# Patient Record
Sex: MALE | Race: WHITE | NOT HISPANIC OR LATINO | Employment: FULL TIME | URBAN - METROPOLITAN AREA
[De-identification: names, ages, dates, MRNs, and addresses within clinical notes are randomized per-mention and may not be internally consistent; named-entity substitution may affect disease eponyms.]

---

## 2019-02-12 ENCOUNTER — APPOINTMENT (OUTPATIENT)
Dept: GENERAL RADIOLOGY | Facility: CLINIC | Age: 44
DRG: 897 | End: 2019-02-12
Attending: EMERGENCY MEDICINE
Payer: COMMERCIAL

## 2019-02-12 ENCOUNTER — HOSPITAL ENCOUNTER (INPATIENT)
Facility: CLINIC | Age: 44
LOS: 2 days | Discharge: HOME | DRG: 897 | End: 2019-02-14
Attending: EMERGENCY MEDICINE | Admitting: INTERNAL MEDICINE
Payer: COMMERCIAL

## 2019-02-12 DIAGNOSIS — F10.930 ALCOHOL WITHDRAWAL SYNDROME WITHOUT COMPLICATION (H): ICD-10-CM

## 2019-02-12 LAB
ALBUMIN SERPL-MCNC: 3.9 G/DL (ref 3.4–5)
ALP SERPL-CCNC: 59 U/L (ref 40–150)
ALT SERPL W P-5'-P-CCNC: 27 U/L (ref 0–70)
ANION GAP SERPL CALCULATED.3IONS-SCNC: 17 MMOL/L (ref 3–14)
AST SERPL W P-5'-P-CCNC: 22 U/L (ref 0–45)
BASOPHILS # BLD AUTO: 0 10E9/L (ref 0–0.2)
BASOPHILS NFR BLD AUTO: 0.2 %
BILIRUB SERPL-MCNC: 0.7 MG/DL (ref 0.2–1.3)
BUN SERPL-MCNC: 6 MG/DL (ref 7–30)
CALCIUM SERPL-MCNC: 8.5 MG/DL (ref 8.5–10.1)
CHLORIDE SERPL-SCNC: 103 MMOL/L (ref 94–109)
CO2 SERPL-SCNC: 20 MMOL/L (ref 20–32)
CREAT SERPL-MCNC: 0.85 MG/DL (ref 0.66–1.25)
DIFFERENTIAL METHOD BLD: ABNORMAL
EOSINOPHIL # BLD AUTO: 0 10E9/L (ref 0–0.7)
EOSINOPHIL NFR BLD AUTO: 0 %
ERYTHROCYTE [DISTWIDTH] IN BLOOD BY AUTOMATED COUNT: 12.8 % (ref 10–15)
ETHANOL SERPL-MCNC: <0.01 G/DL
GFR SERPL CREATININE-BSD FRML MDRD: >90 ML/MIN/{1.73_M2}
GLUCOSE SERPL-MCNC: 132 MG/DL (ref 70–99)
HCT VFR BLD AUTO: 44.5 % (ref 40–53)
HGB BLD-MCNC: 15.8 G/DL (ref 13.3–17.7)
IMM GRANULOCYTES # BLD: 0 10E9/L (ref 0–0.4)
IMM GRANULOCYTES NFR BLD: 0.3 %
INTERPRETATION ECG - MUSE: NORMAL
LIPASE SERPL-CCNC: 139 U/L (ref 73–393)
LYMPHOCYTES # BLD AUTO: 0.7 10E9/L (ref 0.8–5.3)
LYMPHOCYTES NFR BLD AUTO: 7.1 %
MCH RBC QN AUTO: 33.5 PG (ref 26.5–33)
MCHC RBC AUTO-ENTMCNC: 35.5 G/DL (ref 31.5–36.5)
MCV RBC AUTO: 94 FL (ref 78–100)
MONOCYTES # BLD AUTO: 0.5 10E9/L (ref 0–1.3)
MONOCYTES NFR BLD AUTO: 5 %
NEUTROPHILS # BLD AUTO: 8 10E9/L (ref 1.6–8.3)
NEUTROPHILS NFR BLD AUTO: 87.4 %
NRBC # BLD AUTO: 0 10*3/UL
NRBC BLD AUTO-RTO: 0 /100
PLATELET # BLD AUTO: 232 10E9/L (ref 150–450)
POTASSIUM SERPL-SCNC: 3.5 MMOL/L (ref 3.4–5.3)
PROT SERPL-MCNC: 7.6 G/DL (ref 6.8–8.8)
RBC # BLD AUTO: 4.72 10E12/L (ref 4.4–5.9)
SODIUM SERPL-SCNC: 140 MMOL/L (ref 133–144)
TROPONIN I SERPL-MCNC: <0.015 UG/L (ref 0–0.04)
WBC # BLD AUTO: 9.2 10E9/L (ref 4–11)

## 2019-02-12 PROCEDURE — 80053 COMPREHEN METABOLIC PANEL: CPT | Performed by: EMERGENCY MEDICINE

## 2019-02-12 PROCEDURE — 93005 ELECTROCARDIOGRAM TRACING: CPT

## 2019-02-12 PROCEDURE — HZ2ZZZZ DETOXIFICATION SERVICES FOR SUBSTANCE ABUSE TREATMENT: ICD-10-PCS | Performed by: INTERNAL MEDICINE

## 2019-02-12 PROCEDURE — 96376 TX/PRO/DX INJ SAME DRUG ADON: CPT

## 2019-02-12 PROCEDURE — 96374 THER/PROPH/DIAG INJ IV PUSH: CPT

## 2019-02-12 PROCEDURE — 25000128 H RX IP 250 OP 636: Performed by: EMERGENCY MEDICINE

## 2019-02-12 PROCEDURE — 96361 HYDRATE IV INFUSION ADD-ON: CPT

## 2019-02-12 PROCEDURE — 25000132 ZZH RX MED GY IP 250 OP 250 PS 637: Performed by: EMERGENCY MEDICINE

## 2019-02-12 PROCEDURE — 99223 1ST HOSP IP/OBS HIGH 75: CPT | Mod: AI | Performed by: INTERNAL MEDICINE

## 2019-02-12 PROCEDURE — 83690 ASSAY OF LIPASE: CPT | Performed by: EMERGENCY MEDICINE

## 2019-02-12 PROCEDURE — 71046 X-RAY EXAM CHEST 2 VIEWS: CPT

## 2019-02-12 PROCEDURE — 25000132 ZZH RX MED GY IP 250 OP 250 PS 637: Performed by: INTERNAL MEDICINE

## 2019-02-12 PROCEDURE — 25800030 ZZH RX IP 258 OP 636: Performed by: INTERNAL MEDICINE

## 2019-02-12 PROCEDURE — 96375 TX/PRO/DX INJ NEW DRUG ADDON: CPT

## 2019-02-12 PROCEDURE — 99285 EMERGENCY DEPT VISIT HI MDM: CPT | Mod: 25

## 2019-02-12 PROCEDURE — 80320 DRUG SCREEN QUANTALCOHOLS: CPT | Performed by: EMERGENCY MEDICINE

## 2019-02-12 PROCEDURE — 12000000 ZZH R&B MED SURG/OB

## 2019-02-12 PROCEDURE — 84484 ASSAY OF TROPONIN QUANT: CPT | Performed by: EMERGENCY MEDICINE

## 2019-02-12 PROCEDURE — 85025 COMPLETE CBC W/AUTO DIFF WBC: CPT | Performed by: EMERGENCY MEDICINE

## 2019-02-12 RX ORDER — ONDANSETRON 2 MG/ML
4 INJECTION INTRAMUSCULAR; INTRAVENOUS EVERY 6 HOURS PRN
Status: DISCONTINUED | OUTPATIENT
Start: 2019-02-12 | End: 2019-02-14 | Stop reason: HOSPADM

## 2019-02-12 RX ORDER — AMOXICILLIN 250 MG
2 CAPSULE ORAL 2 TIMES DAILY PRN
Status: DISCONTINUED | OUTPATIENT
Start: 2019-02-12 | End: 2019-02-14 | Stop reason: HOSPADM

## 2019-02-12 RX ORDER — FOLIC ACID 1 MG/1
1 TABLET ORAL ONCE
Status: COMPLETED | OUTPATIENT
Start: 2019-02-12 | End: 2019-02-12

## 2019-02-12 RX ORDER — FOLIC ACID 1 MG/1
1 TABLET ORAL DAILY
Status: DISCONTINUED | OUTPATIENT
Start: 2019-02-13 | End: 2019-02-14 | Stop reason: HOSPADM

## 2019-02-12 RX ORDER — MULTIVITAMIN,THERAPEUTIC
1 TABLET ORAL ONCE
Status: COMPLETED | OUTPATIENT
Start: 2019-02-12 | End: 2019-02-12

## 2019-02-12 RX ORDER — LANOLIN ALCOHOL/MO/W.PET/CERES
100 CREAM (GRAM) TOPICAL DAILY
Status: DISCONTINUED | OUTPATIENT
Start: 2019-02-13 | End: 2019-02-14 | Stop reason: HOSPADM

## 2019-02-12 RX ORDER — AMOXICILLIN 250 MG
1 CAPSULE ORAL 2 TIMES DAILY PRN
Status: DISCONTINUED | OUTPATIENT
Start: 2019-02-12 | End: 2019-02-14 | Stop reason: HOSPADM

## 2019-02-12 RX ORDER — NALOXONE HYDROCHLORIDE 0.4 MG/ML
.1-.4 INJECTION, SOLUTION INTRAMUSCULAR; INTRAVENOUS; SUBCUTANEOUS
Status: DISCONTINUED | OUTPATIENT
Start: 2019-02-12 | End: 2019-02-14 | Stop reason: HOSPADM

## 2019-02-12 RX ORDER — CHLORDIAZEPOXIDE HYDROCHLORIDE 10 MG/1
10 CAPSULE, GELATIN COATED ORAL 3 TIMES DAILY PRN
Status: ON HOLD | COMMUNITY
End: 2019-09-12

## 2019-02-12 RX ORDER — LANOLIN ALCOHOL/MO/W.PET/CERES
1 CREAM (GRAM) TOPICAL
Status: ON HOLD | COMMUNITY
End: 2019-09-12

## 2019-02-12 RX ORDER — QUETIAPINE FUMARATE 25 MG/1
25-50 TABLET, FILM COATED ORAL EVERY 6 HOURS PRN
Status: DISCONTINUED | OUTPATIENT
Start: 2019-02-12 | End: 2019-02-14 | Stop reason: HOSPADM

## 2019-02-12 RX ORDER — GABAPENTIN 400 MG/1
800 CAPSULE ORAL 3 TIMES DAILY
Status: ON HOLD | COMMUNITY
End: 2019-09-12

## 2019-02-12 RX ORDER — PANTOPRAZOLE SODIUM 40 MG/1
40 TABLET, DELAYED RELEASE ORAL DAILY
Status: DISCONTINUED | OUTPATIENT
Start: 2019-02-12 | End: 2019-02-14 | Stop reason: HOSPADM

## 2019-02-12 RX ORDER — DIAZEPAM 5 MG
10 TABLET ORAL ONCE
Status: COMPLETED | OUTPATIENT
Start: 2019-02-12 | End: 2019-02-12

## 2019-02-12 RX ORDER — METOPROLOL SUCCINATE 50 MG/1
50 TABLET, EXTENDED RELEASE ORAL DAILY
Status: ON HOLD | COMMUNITY
Start: 2018-12-06 | End: 2024-03-13

## 2019-02-12 RX ORDER — LANOLIN ALCOHOL/MO/W.PET/CERES
100 CREAM (GRAM) TOPICAL ONCE
Status: COMPLETED | OUTPATIENT
Start: 2019-02-12 | End: 2019-02-12

## 2019-02-12 RX ORDER — LABETALOL 20 MG/4 ML (5 MG/ML) INTRAVENOUS SYRINGE
10
Status: DISCONTINUED | OUTPATIENT
Start: 2019-02-12 | End: 2019-02-14 | Stop reason: HOSPADM

## 2019-02-12 RX ORDER — PANTOPRAZOLE SODIUM 40 MG/1
40 TABLET, DELAYED RELEASE ORAL DAILY
Status: ON HOLD | COMMUNITY
End: 2019-09-12

## 2019-02-12 RX ORDER — ONDANSETRON 2 MG/ML
4 INJECTION INTRAMUSCULAR; INTRAVENOUS EVERY 30 MIN PRN
Status: DISCONTINUED | OUTPATIENT
Start: 2019-02-12 | End: 2019-02-12

## 2019-02-12 RX ORDER — MELOXICAM 15 MG/1
15 TABLET ORAL DAILY PRN
Status: ON HOLD | COMMUNITY
Start: 2019-01-08 | End: 2019-09-14

## 2019-02-12 RX ORDER — DIAZEPAM 5 MG
5 TABLET ORAL 3 TIMES DAILY PRN
Status: DISCONTINUED | OUTPATIENT
Start: 2019-02-12 | End: 2019-02-13

## 2019-02-12 RX ORDER — LORAZEPAM 2 MG/ML
1 INJECTION INTRAMUSCULAR ONCE
Status: COMPLETED | OUTPATIENT
Start: 2019-02-12 | End: 2019-02-12

## 2019-02-12 RX ORDER — OLANZAPINE 5 MG/1
5 TABLET, ORALLY DISINTEGRATING ORAL EVERY 6 HOURS PRN
Status: DISCONTINUED | OUTPATIENT
Start: 2019-02-12 | End: 2019-02-13

## 2019-02-12 RX ORDER — LORAZEPAM 1 MG/1
1-2 TABLET ORAL EVERY 30 MIN PRN
Status: DISCONTINUED | OUTPATIENT
Start: 2019-02-12 | End: 2019-02-14 | Stop reason: HOSPADM

## 2019-02-12 RX ORDER — METOPROLOL SUCCINATE 50 MG/1
50 TABLET, EXTENDED RELEASE ORAL DAILY
Status: DISCONTINUED | OUTPATIENT
Start: 2019-02-12 | End: 2019-02-14 | Stop reason: HOSPADM

## 2019-02-12 RX ORDER — DIAZEPAM 5 MG
10 TABLET ORAL
Status: COMPLETED | OUTPATIENT
Start: 2019-02-12 | End: 2019-02-12

## 2019-02-12 RX ORDER — ONDANSETRON 4 MG/1
4 TABLET, ORALLY DISINTEGRATING ORAL EVERY 6 HOURS PRN
Status: DISCONTINUED | OUTPATIENT
Start: 2019-02-12 | End: 2019-02-14 | Stop reason: HOSPADM

## 2019-02-12 RX ORDER — SODIUM CHLORIDE 9 MG/ML
INJECTION, SOLUTION INTRAVENOUS CONTINUOUS
Status: DISCONTINUED | OUTPATIENT
Start: 2019-02-12 | End: 2019-02-13

## 2019-02-12 RX ORDER — LORAZEPAM 2 MG/ML
1 INJECTION INTRAMUSCULAR
Status: COMPLETED | OUTPATIENT
Start: 2019-02-12 | End: 2019-02-12

## 2019-02-12 RX ORDER — MULTIPLE VITAMINS W/ MINERALS TAB 9MG-400MCG
1 TAB ORAL DAILY
Status: DISCONTINUED | OUTPATIENT
Start: 2019-02-13 | End: 2019-02-14 | Stop reason: HOSPADM

## 2019-02-12 RX ORDER — LORAZEPAM 2 MG/ML
1-2 INJECTION INTRAMUSCULAR EVERY 30 MIN PRN
Status: DISCONTINUED | OUTPATIENT
Start: 2019-02-12 | End: 2019-02-14 | Stop reason: HOSPADM

## 2019-02-12 RX ORDER — HYDRALAZINE HYDROCHLORIDE 20 MG/ML
10 INJECTION INTRAMUSCULAR; INTRAVENOUS EVERY 4 HOURS PRN
Status: DISCONTINUED | OUTPATIENT
Start: 2019-02-12 | End: 2019-02-14 | Stop reason: HOSPADM

## 2019-02-12 RX ORDER — SODIUM CHLORIDE 9 MG/ML
INJECTION, SOLUTION INTRAVENOUS CONTINUOUS
Status: DISCONTINUED | OUTPATIENT
Start: 2019-02-12 | End: 2019-02-12

## 2019-02-12 RX ADMIN — LORAZEPAM 1 MG: 1 TABLET ORAL at 18:40

## 2019-02-12 RX ADMIN — LORAZEPAM 1 MG: 2 INJECTION INTRAMUSCULAR; INTRAVENOUS at 13:23

## 2019-02-12 RX ADMIN — METOPROLOL SUCCINATE 50 MG: 50 TABLET, EXTENDED RELEASE ORAL at 18:40

## 2019-02-12 RX ADMIN — GABAPENTIN 800 MG: 300 CAPSULE ORAL at 18:39

## 2019-02-12 RX ADMIN — SERTRALINE HYDROCHLORIDE 50 MG: 50 TABLET ORAL at 18:41

## 2019-02-12 RX ADMIN — DIAZEPAM 10 MG: 5 TABLET ORAL at 15:47

## 2019-02-12 RX ADMIN — LORAZEPAM 1 MG: 2 INJECTION INTRAMUSCULAR; INTRAVENOUS at 14:21

## 2019-02-12 RX ADMIN — GABAPENTIN 800 MG: 300 CAPSULE ORAL at 22:54

## 2019-02-12 RX ADMIN — Medication 100 MG: at 17:37

## 2019-02-12 RX ADMIN — FOLIC ACID 1 MG: 1 TABLET ORAL at 17:37

## 2019-02-12 RX ADMIN — DIAZEPAM 10 MG: 5 TABLET ORAL at 17:36

## 2019-02-12 RX ADMIN — SODIUM CHLORIDE: 9 INJECTION, SOLUTION INTRAVENOUS at 18:44

## 2019-02-12 RX ADMIN — THERA TABS 1 TABLET: TAB at 17:37

## 2019-02-12 RX ADMIN — SODIUM CHLORIDE 1000 ML: 9 INJECTION, SOLUTION INTRAVENOUS at 14:21

## 2019-02-12 RX ADMIN — DIAZEPAM 10 MG: 5 TABLET ORAL at 14:25

## 2019-02-12 RX ADMIN — SODIUM CHLORIDE 1000 ML: 9 INJECTION, SOLUTION INTRAVENOUS at 17:37

## 2019-02-12 RX ADMIN — SODIUM CHLORIDE 1000 ML: 9 INJECTION, SOLUTION INTRAVENOUS at 13:15

## 2019-02-12 RX ADMIN — LORAZEPAM 1 MG: 2 INJECTION INTRAMUSCULAR; INTRAVENOUS at 15:15

## 2019-02-12 RX ADMIN — ONDANSETRON 4 MG: 2 INJECTION INTRAMUSCULAR; INTRAVENOUS at 13:22

## 2019-02-12 RX ADMIN — PANTOPRAZOLE SODIUM 40 MG: 40 TABLET, DELAYED RELEASE ORAL at 18:40

## 2019-02-12 ASSESSMENT — ACTIVITIES OF DAILY LIVING (ADL)
FALL_HISTORY_WITHIN_LAST_SIX_MONTHS: NO
RETIRED_EATING: 4-->COMPLETELY DEPENDENT
RETIRED_COMMUNICATION: 0-->UNDERSTANDS/COMMUNICATES WITHOUT DIFFICULTY
DRESS: 4-->COMPLETELY DEPENDENT
ADLS_ACUITY_SCORE: 31
SWALLOWING: 0-->SWALLOWS FOODS/LIQUIDS WITHOUT DIFFICULTY
COGNITION: 0 - NO COGNITION ISSUES REPORTED
AMBULATION: 3-->ASSISTIVE EQUIPMENT AND PERSON
TRANSFERRING: 4-->COMPLETELY DEPENDENT
BATHING: 3-->ASSISTIVE EQUIPMENT AND PERSON
TOILETING: 3-->ASSISTIVE EQUIPMENT AND PERSON

## 2019-02-12 ASSESSMENT — ENCOUNTER SYMPTOMS
VOMITING: 1
SHORTNESS OF BREATH: 0
HALLUCINATIONS: 0
ABDOMINAL PAIN: 1
DIARRHEA: 0

## 2019-02-12 ASSESSMENT — MIFFLIN-ST. JEOR
SCORE: 1723.6
SCORE: 1757.13

## 2019-02-12 NOTE — H&P
[facility]    History and Physical - Hospitalist Service       Date of Admission:  2/12/2019    Assessment & Plan   Ravi Sahh is a 43 year old male with PMH of HTN and etoh abuse being admitted on 2/12/2019 for alcohol withdrawal.    1.) Alcohol Withdrawal  - Patient to be admitted to 6th floor with telemetry  - Will order CIWA protocol with ativan   - IVFs and PO Thiamine/Folic Acid  - Psychiatry consult in AM if patients mental status allows    2.) HTN  - he hasnt had his PTA metoprolol today due to vomiting  - if unable to tolerate after zofran I will have Labetalol and Hydralazine IV ordered    3.) Elevated Anion Gap  - Suspect related to his vomiting and etoh abuse  - rehydrate with IVFs and monitor BMP    4.) Depression and Anxiety  - resume PTA Zoloft, Librium, and gabapentin  - Psych consult for the AM    5.) Nausea and Vomiting  - Suspect EtOH Gastritis from binging  - PPI and zofran  - IVFs until vomiting resolved and holding down PO  - have IV HTN meds ordered     Diet: Clears  DVT Prophylaxis: Pneumatic Compression Devices  Power Catheter: not present  Code Status: FULL    Disposition Plan   Expected discharge: 2 - 3 days, recommended to prior living arrangement once mental status at baseline and safe disposition plan/ TCU bed available.  Entered: Galen Muniz MD 02/12/2019, 3:19 PM     The patient's care was discussed with the Patient and Patient's Family.    Galen Muniz MD  [facility]    ______________________________________________________________________    Chief Complaint   Tremors, Nausea and Vomiting    History is obtained from the patient, electronic health record, emergency department physician and patient's spouse    History of Present Illness   Ravi Shah is a 43 year old male who has PMH of HTN and etoh abuse presents today with tremors and N/V.  He has been drinking about 750ml of vodka daily for the past week.  He says he will have  week long binges.  He was last hospitalized in [facility] about a month ago for withdrawal, he was inpatient for about a week.  He was supposed to see a psychiatrist this Friday.  His last drink was last night before bed, he did not resume drinking today due to onset of nausea and vomiting.  Started having tremors today, knows this is withdrawal so his wife got him to come in.  He denies any history of seizures.  He admits to not taking his HTN medication today due to vomiting, his BP is significantly elevated into 160s.  The rest of his vitals are normal.  He denies any hallucinations or formication.      Review of Systems    The 10 point Review of Systems is negative other than noted in the HPI.    Past Medical History    I have reviewed this patient's medical history and updated it with pertinent information if needed.   Past Medical History:   Diagnosis Date     Anxiety      Depressive disorder      Hypertension        Past Surgical History   I have reviewed this patient's surgical history and updated it with pertinent information if needed.  Past Surgical History:   Procedure Laterality Date     ENT SURGERY     * Repair of Herniated Disc L5-S1    Social History   I have reviewed this patient's social history and updated it with pertinent information if needed.  Social History     Tobacco Use     Smoking status: Former Smoker     Smokeless tobacco: Current User   Substance Use Topics     Alcohol use: Yes     Comment: 750cc per day for 2 weeks     Drug use: No       Family History   I have reviewed this patient's family history with the patient, his father had HTN, his mother had heart disease.    Prior to Admission Medications   Prior to Admission Medications   Prescriptions Last Dose Informant Patient Reported? Taking?   chlordiazePOXIDE (LIBRIUM) 10 MG capsule 2/11/2019 at Unknown time Self Yes Yes   Sig: Take 10 mg by mouth 3 times daily as needed for anxiety   gabapentin (NEURONTIN) 400 MG capsule 2/11/2019 at  Unknown time Self Yes Yes   Sig: Take 800 mg by mouth 3 times daily    hypromellose (ARTIFICIAL TEARS) 0.5 % SOLN ophthalmic solution prn Spouse/Significant Other Yes Yes   Sig: Place 1 drop into both eyes 4 times daily as needed for dry eyes   melatonin 3 MG tablet prn Spouse/Significant Other Yes Yes   Sig: Take 1 mg by mouth nightly as needed for sleep   meloxicam (MOBIC) 15 MG tablet prn Spouse/Significant Other Yes Yes   Sig: Take 15 mg by mouth daily as needed    metoprolol succinate ER (TOPROL-XL) 50 MG 24 hr tablet 2/11/2019 at Unknown time Spouse/Significant Other Yes Yes   Sig: Take 50 mg by mouth daily    pantoprazole (PROTONIX) 40 MG EC tablet 2/11/2019 at Unknown time Spouse/Significant Other Yes Yes   Sig: Take 40 mg by mouth daily    sertraline (ZOLOFT) 50 MG tablet 2/11/2019 at Unknown time Spouse/Significant Other Yes Yes   Sig: Take 50 mg by mouth daily       Facility-Administered Medications: None     Allergies   No Known Allergies    Physical Exam   Vital Signs: Temp: 97.7  F (36.5  C) Temp src: Temporal BP: (!) 161/118 Pulse: 93   Resp: 18 SpO2: 97 % O2 Device: None (Room air)    Weight: 180 lbs 0 oz    Constitutional: awake, alert and mild distress  Respiratory: No increased work of breathing, good air exchange, clear to auscultation bilaterally, no crackles or wheezing  Cardiovascular: Normal apical impulse, regular rate and rhythm, normal S1 and S2, no S3 or S4, and no murmur noted  GI: No scars, normal bowel sounds, soft, non-distended, non-tender, no masses palpated, no hepatosplenomegally  Musculoskeletal: no lower extremity pitting edema present  there is no redness, warmth, or swelling of the joints  full range of motion noted  motor strength is 5 out of 5 all extremities bilaterally  Neurologic: Cranial Nerves:  cranial nerves II-XII are grossly intact  Sensory:  Sensory intact  Coordination:  Finger/Nose:  Right:  abnormal - due to tremors  Left:  abnormal - due to  tremors  Neuropsychiatric: General: fidgeting and normal eye contact  Level of consciousness: alert / normal  Affect: pleasant and anxious  Orientation: oriented to self, place, time and situation  Memory and insight: normal, memory for past and recent events intact and thought process normal    Data   Data reviewed today: I reviewed all medications, new labs and imaging results over the last 24 hours. I personally reviewed no images or EKG's today.    Recent Labs   Lab 02/12/19  1305   WBC 9.2   HGB 15.8   MCV 94         POTASSIUM 3.5   CHLORIDE 103   CO2 20   BUN 6*   CR 0.85   ANIONGAP 17*   ISAIAS 8.5   *   ALBUMIN 3.9   PROTTOTAL 7.6   BILITOTAL 0.7   ALKPHOS 59   ALT 27   AST 22   LIPASE 139   TROPI <0.015     Recent Results (from the past 24 hour(s))   XR Chest 2 Views    Narrative    XR CHEST 2 VW 2/12/2019 2:06 PM    HISTORY: Chest pain.    COMPARISON: None.    FINDINGS: No airspace consolidation, pleural effusion or pneumothorax.  Normal heart size.      Impression    IMPRESSION: No acute cardiopulmonary abnormality.    HERMINIO DOMÍNGUEZ MD

## 2019-02-12 NOTE — PHARMACY-ADMISSION MEDICATION HISTORY
Admission medication history interview status for the 2/12/2019  admission is complete. See EPIC admission navigator for prior to admission medications     Medication history source reliability:Good, patient's wife present to assist with medication list.    Actions taken by pharmacist (provider contacted, etc):None     Additional medication history information not noted on PTA med list :None    Medication reconciliation/reorder completed by provider prior to medication history? No    Time spent in this activity: 5 minutes    Prior to Admission medications    Medication Sig Last Dose Taking? Auth Provider   chlordiazePOXIDE (LIBRIUM) 10 MG capsule Take 10 mg by mouth 3 times daily as needed for anxiety 2/11/2019 at Unknown time Yes Reported, Patient   gabapentin (NEURONTIN) 400 MG capsule Take 800 mg by mouth 3 times daily  2/11/2019 at Unknown time Yes Reported, Patient   hypromellose (ARTIFICIAL TEARS) 0.5 % SOLN ophthalmic solution Place 1 drop into both eyes 4 times daily as needed for dry eyes prn Yes Unknown, Entered By History   melatonin 3 MG tablet Take 1 mg by mouth nightly as needed for sleep prn Yes Reported, Patient   meloxicam (MOBIC) 15 MG tablet Take 15 mg by mouth daily as needed  prn Yes Reported, Patient   metoprolol succinate ER (TOPROL-XL) 50 MG 24 hr tablet Take 50 mg by mouth daily  2/11/2019 at Unknown time Yes Reported, Patient   pantoprazole (PROTONIX) 40 MG EC tablet Take 40 mg by mouth daily  2/11/2019 at Unknown time Yes Reported, Patient   sertraline (ZOLOFT) 50 MG tablet Take 50 mg by mouth daily  2/11/2019 at Unknown time Yes Reported, Patient

## 2019-02-12 NOTE — ED PROVIDER NOTES
History     Chief Complaint:  Alcohol withdrawal     HPI   Ravi Shah is a 43 year old male who presents with alcohol withdrawl. The patient's wife has emptied house of alcohol since an admission at a [place] hospital for alcohol withdrawal 1 month ago, but the patient has recently started drinking again. He has been drinking 750 mLs of vodka for the past week and today he has been vomiting since morning. The patient's last drink was yesterday before bed. Due to concern, the patient has visited the ED for evaluation and treatment. Upon arrival, the patient reports abdominal pain and some slight chest discomfort. The patient denies any blood in his vomit, diarrhea, shortness of breath, hallucinations, or suicidal ideation. Of note: the patient is schedule to see a psychiatrist in 3 days for alcoholism.     Allergies:  The patient has no known drug allergies.    Medications:    librum  neurontin  mobic  toprol  protonix  zoloft     Past Medical History:    hypertension    Past Surgical History:    ENT surgery     Family History:    No past pertinent family history.    Social History:  Marital Status:     Smoker:   Former   Smokeless:   Current   Alcohol:   Yes - 750 mLs a day   Drugs:   No   Lives at:   Home   Arrives with:   Wife   Clinic:    Unknown   Work:   Unknown     Review of Systems   Respiratory: Negative for shortness of breath.    Cardiovascular: Positive for chest pain.   Gastrointestinal: Positive for abdominal pain and vomiting. Negative for diarrhea.   Psychiatric/Behavioral: Negative for hallucinations and suicidal ideas.   All other systems reviewed and are negative.    Physical Exam     Patient Vitals for the past 24 hrs:   BP Temp Temp src Pulse Heart Rate Resp SpO2 Height Weight   02/12/19 1538 -- -- -- -- 102 25 97 % -- --   02/12/19 1536 (!) 150/105 -- -- 98 96 19 -- -- --   02/12/19 1535 -- -- -- -- 98 25 -- -- --   02/12/19 1534 -- -- -- -- 97 25 -- -- --   02/12/19 1533 -- -- -- --  "-- (!) 32 -- -- --   02/12/19 1519 -- -- -- -- -- -- 98 % -- --   02/12/19 1518 -- -- -- -- -- -- 99 % -- --   02/12/19 1517 -- -- -- -- -- -- 98 % -- --   02/12/19 1515 -- -- -- -- -- -- 98 % -- --   02/12/19 1514 -- -- -- -- -- -- 98 % -- --   02/12/19 1512 (!) 148/111 -- -- 106 -- -- -- -- --   02/12/19 1330 (!) 161/118 -- -- 93 -- -- 97 % -- --   02/12/19 1247 (!) 163/108 97.7  F (36.5  C) Temporal 110 -- 18 96 % 1.803 m (5' 11\") 81.6 kg (180 lb)     Physical Exam  VS: Reviewed per above  HENT: Mucous membranes moist  EYES: sclera anicteric  CV: Rate as noted, regular rhythm.   RESP: Effort normal. Breath sounds are normal bilaterally.  GI: mild general tenderness, not distended.  NEURO: Alert, moving all extremities, tremulousness of the extremities, tongue fasciculations  MSK: No deformity of the extremities  SKIN: Warm and dry      Emergency Department Course   ECG:  Indication: alcohol withdrawal  Time: 1334  Vent. Rate 91 bpm. DC interval 150. QRS duration 80. QT/QTc 404/496. P-R-T axis 59 34 46. Normal sinus rhythm. Prolonged QT. Abnormal ECG. Read time: 1343    Imaging:  Radiographic findings were communicated with the patient and family who voiced understanding of the findings.    XR Chest 2 views:   No acute cardiopulmonary abnormality. As per radiology.     Laboratory:  1305 Troponin: <0.015  CBC: WBC: 9.2, HGB: 15.8, PLT: 232  Lipase: 139  Alcohol ethyl: <0.01  CMP: Glucose 132, anion gap 17, urea nitrogen 6, o/w WNL (Creatinine: 0.85)    Interventions:  1315: NS 1L IV Bolus   1323: Ativan 1 mg IV  1421: NS 1L IV Bolus   1421: Ativan 1 mg IV  1425: Valium 10 mg PO  1515: Ativan 1 mg IV    Emergency Department Course:  (1300) I performed an exam of the patient as documented above.     (1403) I rechecked the patient and discussed the results of their workup thus far.   (1455)  I rechecked the patient.       Findings and plan explained. Patient discharged home with instructions regarding supportive care, " medications, and reasons to return. The importance of close follow-up was reviewed.     I personally reviewed the laboratory results with them and answered all related questions prior to discharge.    Impression & Plan    Medical Decision Making:  Patient presents the ER for evaluation of alcohol withdrawal symptoms, nausea and vomiting.  Vital signs notable for heart rate of 110, blood pressure 160 systolic.  Exam is consistent with alcohol withdrawal.  Abdomen is soft and minimally tender throughout- I have a lower suspicion for sinister surgical intra-abdominal process.  There is no lab evidence of pancreatitis, hepatitis, acute kidney injury, electrolyte derangement.  Anion gap was elevated to 17, which I suspect is secondary to occult lactic acidosis or ketosis in the setting of vomiting and alcohol use.  EKG did not reveal specific signs of ischemia and a single troponin was negative.  I have a lower suspicion for occult ACS.  Chest x-ray did not reveal evidence of Boerhaave syndrome, pneumothorax, pneumonia, widened mediastinum to suggest aortic dissection.  Patient was given IV Ativan as well as p.o. Valium while in the ER for alcohol withdrawal symptoms.  He was admitted to the hospital for further management.    Diagnosis:    ICD-10-CM    1. Alcohol withdrawal syndrome without complication (H) F10.230      Disposition:  Admitted to hospitalist service    Discharge Medications:     Medication List      There are no discharge medications for this visit.       Scribe Disclosure:  I, Jaylon Cool, am serving as a scribe on 2/12/2019 at 1:00 PM to personally document services performed by William Yeung MD based on my observations and the provider's statements to me.     Jaylon Cool  2/12/2019    EMERGENCY DEPARTMENT       William Yeung MD  02/12/19 [id number]

## 2019-02-12 NOTE — PROGRESS NOTES
RECEIVING UNIT ED HANDOFF REVIEW    ED Nurse Handoff Report was reviewed by: Ricardo Simpson on February 12, 2019 at 4:39 PM     Awaiting room to be cleaned. Check with [facility]UC before sending patient.

## 2019-02-12 NOTE — ED NOTES
"[facility]  ED Nurse Handoff Report    ED Chief complaint: Withdrawal (drank 750ml daily for past couple weeks.  Last drink yesterday. +N/V)      ED Diagnosis:   Final diagnoses:   Alcohol withdrawal syndrome without complication (H)       Code Status: Full Code    Allergies: No Known Allergies    Activity level - Baseline/Home:  Independent    Activity Level - Current:   Independent     Needed?: No    Isolation: No  Infection: Not Applicable  Bariatric?: No    Vital Signs:   Vitals:    02/12/19 1247 02/12/19 1330   BP: (!) 163/108 (!) 161/118   Pulse: 110 93   Resp: 18    Temp: 97.7  F (36.5  C)    TempSrc: Temporal    SpO2: 96% 97%   Weight: 81.6 kg (180 lb)    Height: 1.803 m (5' 11\")        Cardiac Rhythm: ,        Pain level:      Is this patient confused?: No   Does this patient have a guardian?  No         If yes, is there guardianship documents in the Epic \"Code/ACP\" activity?  N/A         Guardian Notified?  N/A  [place]rell - Suicide Severity Rating Scale Completed?  Yes  If yes, what color did the patient score?  White    Patient Report: Initial Complaint: Pt C/O nausea and vomiting and feeling shaky due to alcohol withdrawal. Last drink was last night. Had episode of withdrawal 1 month ago in [place]  Focused Assessment: Pt shaky and continue to have nausea and vomiting. States he wants treatment  Tests Performed: Labs  Abnormal Results:   Abnormal Labs Reviewed   CBC WITH PLATELETS DIFFERENTIAL - Abnormal; Notable for the following components:       Result Value    MCH 33.5 (*)     Absolute Lymphocytes 0.7 (*)     All other components within normal limits   COMPREHENSIVE METABOLIC PANEL - Abnormal; Notable for the following components:    Anion Gap 17 (*)     Glucose 132 (*)     Urea Nitrogen 6 (*)     All other components within normal limits     Treatments provided: Ativan, Valium, Zofran and IV fluids    Family Comments: Wife at the bedside    OBS brochure/video " discussed/provided to patient/family: N/A              Name of person given brochure if not patient: na              Relationship to patient: na    ED Medications:   Medications   0.9% sodium chloride BOLUS (1,000 mLs Intravenous New Bag 2/12/19 1421)     Followed by   0.9% sodium chloride BOLUS (0 mLs Intravenous Stopped 2/12/19 1443)     Followed by   sodium chloride 0.9% infusion (not administered)   ondansetron (ZOFRAN) injection 4 mg (4 mg Intravenous Given 2/12/19 1322)   LORazepam (ATIVAN) injection 1 mg (not administered)   LORazepam (ATIVAN) injection 1 mg (1 mg Intravenous Given 2/12/19 1323)   LORazepam (ATIVAN) injection 1 mg (1 mg Intravenous Given 2/12/19 1421)   diazepam (VALIUM) tablet 10 mg (10 mg Oral Given 2/12/19 1425)       Drips infusing?:  No    For the majority of the shift this patient was Green.   Interventions performed were na.    Severe Sepsis OR Septic Shock Diagnosis Present: No    To be done/followed up on inpatient unit:  Ativan, fluids    ED NURSE PHONE NUMBER: [phone]

## 2019-02-13 LAB
ANION GAP SERPL CALCULATED.3IONS-SCNC: 9 MMOL/L (ref 3–14)
BUN SERPL-MCNC: 3 MG/DL (ref 7–30)
CALCIUM SERPL-MCNC: 7.9 MG/DL (ref 8.5–10.1)
CHLORIDE SERPL-SCNC: 104 MMOL/L (ref 94–109)
CO2 SERPL-SCNC: 24 MMOL/L (ref 20–32)
CREAT SERPL-MCNC: 0.84 MG/DL (ref 0.66–1.25)
GFR SERPL CREATININE-BSD FRML MDRD: >90 ML/MIN/{1.73_M2}
GLUCOSE SERPL-MCNC: 120 MG/DL (ref 70–99)
POTASSIUM SERPL-SCNC: 3.5 MMOL/L (ref 3.4–5.3)
SODIUM SERPL-SCNC: 137 MMOL/L (ref 133–144)

## 2019-02-13 PROCEDURE — 36415 COLL VENOUS BLD VENIPUNCTURE: CPT | Performed by: INTERNAL MEDICINE

## 2019-02-13 PROCEDURE — 25000132 ZZH RX MED GY IP 250 OP 250 PS 637: Performed by: INTERNAL MEDICINE

## 2019-02-13 PROCEDURE — 99221 1ST HOSP IP/OBS SF/LOW 40: CPT | Performed by: PSYCHIATRY & NEUROLOGY

## 2019-02-13 PROCEDURE — 25800030 ZZH RX IP 258 OP 636: Performed by: INTERNAL MEDICINE

## 2019-02-13 PROCEDURE — 80048 BASIC METABOLIC PNL TOTAL CA: CPT | Performed by: INTERNAL MEDICINE

## 2019-02-13 PROCEDURE — 12000000 ZZH R&B MED SURG/OB

## 2019-02-13 PROCEDURE — 99232 SBSQ HOSP IP/OBS MODERATE 35: CPT | Performed by: INTERNAL MEDICINE

## 2019-02-13 RX ADMIN — PANTOPRAZOLE SODIUM 40 MG: 40 TABLET, DELAYED RELEASE ORAL at 08:15

## 2019-02-13 RX ADMIN — GABAPENTIN 800 MG: 300 CAPSULE ORAL at 16:19

## 2019-02-13 RX ADMIN — MULTIPLE VITAMINS W/ MINERALS TAB 1 TABLET: TAB at 08:16

## 2019-02-13 RX ADMIN — Medication 1 MG: at 21:47

## 2019-02-13 RX ADMIN — FOLIC ACID 1 MG: 1 TABLET ORAL at 08:15

## 2019-02-13 RX ADMIN — Medication 100 MG: at 08:16

## 2019-02-13 RX ADMIN — SERTRALINE HYDROCHLORIDE 50 MG: 50 TABLET ORAL at 08:15

## 2019-02-13 RX ADMIN — SODIUM CHLORIDE: 9 INJECTION, SOLUTION INTRAVENOUS at 03:03

## 2019-02-13 RX ADMIN — METOPROLOL SUCCINATE 50 MG: 50 TABLET, EXTENDED RELEASE ORAL at 08:15

## 2019-02-13 RX ADMIN — GABAPENTIN 800 MG: 300 CAPSULE ORAL at 08:14

## 2019-02-13 RX ADMIN — GABAPENTIN 800 MG: 300 CAPSULE ORAL at 21:36

## 2019-02-13 ASSESSMENT — ACTIVITIES OF DAILY LIVING (ADL)
ADLS_ACUITY_SCORE: 28

## 2019-02-13 NOTE — PLAN OF CARE
A&Ox4,VSS ex elevated BPs, mild anxious. CIWA 11, 7, & 5 PRN Ativan given x 1. Nausea, and minor tremors with hand extention. Last emesis noon, abd discomfort nausea, tremors. Christiano clear liquid diet. Alarms on. Up and stable with SBA.IVF infusing. Psych to see in AM Continue to monitor.

## 2019-02-13 NOTE — CONSULTS
D: SATHISH following for discharge planning/CD. SW reviewed chart.   I: Met with pt. He and his spouse called [facility] this morning. Pt completed the over the phone assessment and plans to admit to [facility] Thursday directly after discharge. His spouse will transport him. Questions answered.   P: SW available if needed.    DIANA Suarez, LGSW  [id number]

## 2019-02-13 NOTE — CONSULTS
"Consult Date:  02/13/2019      REQUESTING PHYSICIAN:  Galen Muniz MD      REQUESTING PHYSICIAN:  REASON FOR CONSULTATION:  Alcohol withdrawal.      IDENTIFYING DATA:  The patient is a 43-year-old gentleman admitted with symptoms of alcohol withdrawal, drinking approximately 3/4 liter per liquor per day.  He is currently convalescing on station 66.      CHIEF COMPLAINT:  \"I think I need treatment.\"      HISTORY OF PRESENT ILLNESS:  Ravi is a 43-year-old  male admitted with symptoms of alcohol withdrawal, drinking 3/4 of a liter of vodka daily.  He was hospitalized in [place] about a month ago with alcohol withdrawal when he was on vacation.  He was there for 6 days.  He has never had treatment, never had a DWI.  His wife is aware that he is drinking too much.  He works full-time as a .  He has never pursued treatment but acknowledges a need to do that.  He denies other drug use.  He is currently alert.  He looks disheveled, somewhat tremulous and is on a withdrawal protocol.      On further questioning, he denies any current psychiatric symptoms, denies safety concerns.  Review of his labs indicate that he is doing okay physically.  His blood count, liver function test and electrolytes were all normal on admission.  He is currently voluntary.  At this point, no chemical dependency assessment has been ordered.  Chart review indicates that he has been on Zoloft and gabapentin; Librium is also mentioned but I suspect that this was given when he was going through withdrawal in the past.  He has been on a fairly high dose of gabapentin 800 mg t.i.d.  He does not really endorse much in the way of psychiatric history but has been followed by family practice for anxiety.  It looks to me like he has had some sleep issues, but much of what is going on revolves around untreated alcohol use.  He does not endorse any thoughts of self-harm. When I looked at his prior to admission medications, Valium was " "mention, but it looks like that may be recent.      PAST PSYCHIATRIC HISTORY:  Possible anxiety/depression diagnosis.      PAST CHEMICAL DEPENDENCY HISTORY:  Notable for untreated alcohol use.  He is drinking 3/4 of a liter per day and has a previous hospitalization a month ago with alcohol withdrawal that lasted 6 days.      PAST MEDICAL HISTORY:  Notable for alcohol withdrawal and previous detox and baseline hypertension.      PRIOR TO ADMISSION MEDICATIONS:  Zoloft 50 mg daily, Protonix, Toprol, Mobic, melatonin, artificial tears, gabapentin, Librium p.r.n.      FAMILY HISTORY/SOCIAL HISTORY:  He endorses possible alcohol use in his family history.      SOCIAL HISTORY:  The patient is  with 1 child.  Works as a  and lives locally.  He has never had any legal problems,  history or trauma.      REVIEW OF SYSTEMS:  A 10-point review of systems is notable for alcohol withdrawal on neurologic exam with some tremor.      VITAL SIGNS:  Temperature 97.2, pulse 76, respiratory rate 18, blood pressure 159/106, oxygen saturation 97%.      MENTAL STATUS EXAMINATION:  The patient is a pleasant man, slightly disheveled.  He looks fatigued.  He is a good historian.  Speech is soft, rate and flow.  Normal use of language appropriate.  Motor exam tremulous.  Coordination, station and gait within normal limits.  Muscle strength and tone adequate.  Affect is pleasant.  Mood \"okay.\"  Thought process logical, coherent and goal directed.  No loosening of associations, no flight of ideas.  No formal thought disorder on exam.  Thought content negative for hallucinations, delusions, paranoia, suicidal or homicidal ideation.  Insight and judgment moderately impaired with respect to alcohol use.      IMPRESSION:  The patient is a 43-year-old gentleman with a severe alcohol use disorder.  He has concurrent depression and anxiety.  He should not be given prescribed benzodiazepines but should be maintained on a " withdrawal protocol and fully detoxed.  Continuing Zoloft is reasonable.  He needs a chemical dependency assessment and in my opinion, he should be transitioned to an inpatient setting, he is not a good candidate for outpatient treatment given his inability to stay sober.      DIAGNOSES:   1.  Alcohol use disorder, severe.   2.  Alcohol withdrawal.   3.  Unspecified depressive disorder.   4.  Unspecified anxiety disorder.      PLAN:   1.  Continue Zoloft and gabapentin.   2.  Avoid scheduled benzodiazepines and use a CIWA protocol with Valium.   3.  CD assessment to look at transitioning to an inpatient treatment setting.  He is not holdable or committable, but I think it is preferable to try to get an inpatient option set up for the best possible outcome.         GAYE LANE MD             D: 2019   T: 2019   MT: NISHA      Name:     TRICIA SYKES   MRN:      [id number]        Account:       [id number]   :      1975           Consult Date:  2019      Document: [id number]       cc: Galen Muniz MD

## 2019-02-13 NOTE — PLAN OF CARE
Patient alert/orient X4, up independently in room/hallway.  Lungs clear on RA.  CIWA scores 2/2 (slight tremor).  Tolerating diet, bp slightly elevated.  Denied pain. Other Vss.

## 2019-02-13 NOTE — PROGRESS NOTES
[facility]    Medicine Progress Note - Hospitalist Service       Date of Admission:  2/12/2019  Assessment & Plan   Ravi Shah is a 43 year old male with PMH of HTN and etoh abuse being admitted on 2/12/2019 for alcohol withdrawal.     Alcohol Withdrawal  N/V.  Resolved  Possible alcohol gastritis  Patient admits to drinking 0.75 mL of alcohol almost daily.  Last drink was on Monday.  On Tuesday began to have N/V and came in to the ED. Expresses desire to quit drinking and was admitted for alcohol withdrawal  Of note patient did have a CIWA of 11 yesterday and has received 20 mg of Valium and 1 mg of Ativan   - CIWA protocol with ativan PRN   - PO Thiamine/Folic Acid  - Psychiatry consulted  - CD consulted as well      HTHN   - PTA Metoprolol      Elevated Anion Gap.  Resolved   Suspect related to his vomiting and ETOH abuse.  Resolved with IVF      Depression and Anxiety  - PTA Zoloft, Librium, and gabapentin      Diet: Advance Diet as Tolerated: Regular Diet Adult    DVT Prophylaxis: Pneumatic Compression Devices  Power Catheter: not present  Code Status: Full Code      Disposition Plan   Expected discharge: 1-2 days, recommended to prior living arrangement once through alcohol withdrawal .  Entered: Bry Nogueira DO 02/13/2019, 11:35 AM       The patient's care was discussed with the Bedside Nurse and Patient.    Bry Nogueira DO  Hospitalist Service  [facility]    ______________________________________________________________________    Interval History   Patient seen and examined.  No longer having issues with nausea or vomiting.  No seizures or hallucinations.  No pain at this time.  No fevers or chills.     Data reviewed today: I reviewed all medications, new labs and imaging results over the last 24 hours. I personally reviewed no images or EKG's today.    Physical Exam   Vital Signs: Temp: 97.2  F (36.2  C) Temp src: Oral BP: (!) 159/106 Pulse: 76 Heart Rate:  85 Resp: 18 SpO2: 97 % O2 Device: None (Room air)    Weight: 187 lbs 6.26 oz  General Appearance: Resting comfortably in bed.  NAD   Respiratory: Clear to auscultation.  No respiratory distress  Cardiovascular: RRR.  No murmurs  GI: Bowel sounds present.  No tenderness  Skin: No rashes.  No cyanosis  Other: No edema      Data   Recent Labs   Lab 02/13/19  0819 02/12/19  1305   WBC  --  9.2   HGB  --  15.8   MCV  --  94   PLT  --  232    140   POTASSIUM 3.5 3.5   CHLORIDE 104 103   CO2 24 20   BUN 3* 6*   CR 0.84 0.85   ANIONGAP 9 17*   ISAIAS 7.9* 8.5   * 132*   ALBUMIN  --  3.9   PROTTOTAL  --  7.6   BILITOTAL  --  0.7   ALKPHOS  --  59   ALT  --  27   AST  --  22   LIPASE  --  139   TROPI  --  <0.015     No results found for this or any previous visit (from the past 24 hour(s)).

## 2019-02-13 NOTE — PLAN OF CARE
A&Ox4,VSS ex elevated BPs, mild anxious, CIWA 2-3. Denies pain, nausea, HA. Mild tremors with hand extention. Christiano clear liquid diet, advanced to regular for breakfast. Up independently with no alarms present. IVF infusing. Psych to see in AM, Continue to monitor.

## 2019-02-14 VITALS
BODY MASS INDEX: 26.23 KG/M2 | RESPIRATION RATE: 18 BRPM | TEMPERATURE: 97.8 F | SYSTOLIC BLOOD PRESSURE: 133 MMHG | OXYGEN SATURATION: 96 % | HEART RATE: 76 BPM | WEIGHT: 187.39 LBS | DIASTOLIC BLOOD PRESSURE: 85 MMHG | HEIGHT: 71 IN

## 2019-02-14 PROCEDURE — 99238 HOSP IP/OBS DSCHRG MGMT 30/<: CPT | Performed by: INTERNAL MEDICINE

## 2019-02-14 PROCEDURE — 25000132 ZZH RX MED GY IP 250 OP 250 PS 637: Performed by: INTERNAL MEDICINE

## 2019-02-14 RX ORDER — LANOLIN ALCOHOL/MO/W.PET/CERES
100 CREAM (GRAM) TOPICAL DAILY
Qty: 30 TABLET | Refills: 0 | Status: ON HOLD | OUTPATIENT
Start: 2019-02-15 | End: 2019-09-14

## 2019-02-14 RX ORDER — ONDANSETRON 4 MG/1
4 TABLET, ORALLY DISINTEGRATING ORAL EVERY 8 HOURS PRN
Qty: 30 TABLET | Refills: 0 | Status: SHIPPED | OUTPATIENT
Start: 2019-02-14 | End: 2019-02-21

## 2019-02-14 RX ORDER — MULTIPLE VITAMINS W/ MINERALS TAB 9MG-400MCG
1 TAB ORAL DAILY
Qty: 30 TABLET | Refills: 0 | Status: ON HOLD | OUTPATIENT
Start: 2019-02-15 | End: 2019-09-12

## 2019-02-14 RX ORDER — FOLIC ACID 1 MG/1
1 TABLET ORAL DAILY
Qty: 30 TABLET | Refills: 0 | Status: ON HOLD | OUTPATIENT
Start: 2019-02-15 | End: 2019-09-12

## 2019-02-14 RX ADMIN — MULTIPLE VITAMINS W/ MINERALS TAB 1 TABLET: TAB at 08:44

## 2019-02-14 RX ADMIN — GABAPENTIN 800 MG: 300 CAPSULE ORAL at 08:44

## 2019-02-14 RX ADMIN — FOLIC ACID 1 MG: 1 TABLET ORAL at 08:45

## 2019-02-14 RX ADMIN — PANTOPRAZOLE SODIUM 40 MG: 40 TABLET, DELAYED RELEASE ORAL at 08:44

## 2019-02-14 RX ADMIN — Medication 100 MG: at 08:44

## 2019-02-14 RX ADMIN — SERTRALINE HYDROCHLORIDE 50 MG: 50 TABLET ORAL at 08:44

## 2019-02-14 RX ADMIN — METOPROLOL SUCCINATE 50 MG: 50 TABLET, EXTENDED RELEASE ORAL at 08:44

## 2019-02-14 ASSESSMENT — ACTIVITIES OF DAILY LIVING (ADL)
ADLS_ACUITY_SCORE: 28

## 2019-02-14 NOTE — DISCHARGE SUMMARY
[facility]  Hospitalist Discharge Summary       Date of Admission:  2/12/2019  Date of Discharge:  2/14/2019  1:32 PM  Discharging Provider: Bry Nogueira DO      Discharge Diagnoses   1. Alcohol withdrawal   2. Possible alcohol related gastritis  3. HTN   4. Elevated anion gap, suspect related to starvation ketosis and alcohol use   5. Depression and anxiety     Follow-ups Needed After Discharge   Follow-up Appointments     Follow-up and recommended labs and tests       Follow up with primary care provider, Joe Sandra, within 2-4 weeks after leaving [facility], for hospital follow- up. No follow up labs or test are needed.  Follow up with [facility] as planned               Unresulted Labs Ordered in the Past 30 Days of this Admission     No orders found from 12/14/2018 to 2/13/2019.      No pending results     Hospital Course   Patient admits to drinking 0.75 mL of alcohol almost daily.  Last drink was on Monday.  On Tuesday began to have N/V and came in to the ED. Expresses desire to quit drinking and was admitted for alcohol withdrawal  Of note patient did have a CIWA of 11 on 2/12 and received 20 mg of Valium and 1 mg of Ativan.  Since then CIWAs barely registered   - CIWA protocol with ativan PRN   - PO Thiamine/Folic Acid  - Psychiatry consulted and felt the patient was not holdable  - Patient plans to do inpatient rehab at [facility] and plans to meet with them tomorrow       Consultations This Hospital Stay   PSYCHIATRY IP CONSULT  CHEMICAL DEPENDENCY IP CONSULT  SOCIAL WORK IP CONSULT    Code Status   Full Code    Time Spent on this Encounter   I, Bry Nogueira, personally saw the patient today and spent less than or equal to 30 minutes discharging this patient.       Bry Nogueira DO  [facility]  ______________________________________________________________________    Physical Exam   Vital Signs: Temp: 97.8  F (36.6  C) Temp src: Oral BP: 133/85   Heart  Rate: 71 Resp: 18 SpO2: 96 % O2 Device: None (Room air)    Weight: 187 lbs 6.26 oz  General Appearance: Resting comfortably.  NAD   Respiratory: Clear to auscultation.  No respiratory distress  Cardiovascular: RRR.  No murmurs  GI: Bowel sounds present.  No tenderness  Skin: No rashes.  No cyanosis  Other: No edema         Primary Care Physician   Joe Sandra    Discharge Disposition   Discharged to home  Condition at discharge: Stable    Significant Results and Procedures   Most Recent 3 CBC's:  Recent Labs   Lab Test 02/12/19  1305   WBC 9.2   HGB 15.8   MCV 94        Most Recent 3 BMP's:  Recent Labs   Lab Test 02/13/19  0819 02/12/19  1305    140   POTASSIUM 3.5 3.5   CHLORIDE 104 103   CO2 24 20   BUN 3* 6*   CR 0.84 0.85   ANIONGAP 9 17*   ISAIAS 7.9* 8.5   * 132*   ,   Results for orders placed or performed during the hospital encounter of 02/12/19   XR Chest 2 Views    Narrative    XR CHEST 2 VW 2/12/2019 2:06 PM    HISTORY: Chest pain.    COMPARISON: None.    FINDINGS: No airspace consolidation, pleural effusion or pneumothorax.  Normal heart size.      Impression    IMPRESSION: No acute cardiopulmonary abnormality.    HERMINIO DOMÍNGUEZ MD       Discharge Orders      Reason for your hospital stay    ETOH withdrawal     Follow-up and recommended labs and tests     Follow up with primary care provider, Joe Sandra, within 2-4 weeks after leaving [facility], for hospital follow- up. No follow up labs or test are needed.  Follow up with [facility] as planned     Activity    Your activity upon discharge: activity as tolerated     Diet    Follow this diet upon discharge: Orders Placed This Encounter      Advance Diet as Tolerated: Regular Diet Adult     Discharge Medications   Current Discharge Medication List      START taking these medications    Details   folic acid (FOLVITE) 1 MG tablet Take 1 tablet (1 mg) by mouth daily  Qty: 30 tablet, Refills: 0    Comments: Future refills by PCP   Joe Sandra with phone number [phone].  Associated Diagnoses: Alcohol withdrawal syndrome without complication (H)      multivitamin w/minerals (THERA-VIT-M) tablet Take 1 tablet by mouth daily  Qty: 30 tablet, Refills: 0    Comments: Future refills by PCP Dr. Joe Sandra with phone number [phone].  Associated Diagnoses: Alcohol withdrawal syndrome without complication (H)      ondansetron (ZOFRAN-ODT) 4 MG ODT tab Take 1 tablet (4 mg) by mouth every 8 hours as needed for nausea or vomiting  Qty: 30 tablet, Refills: 0    Comments: Future refills by PCP Dr. Joe Sandra with phone number [phone].  Associated Diagnoses: Alcohol withdrawal syndrome without complication (H)      vitamin B1 (THIAMINE) 100 MG tablet Take 1 tablet (100 mg) by mouth daily  Qty: 30 tablet, Refills: 0    Comments: Future refills by PCP Dr. Joe Sandra with phone number [phone].  Associated Diagnoses: Alcohol withdrawal syndrome without complication (H)         CONTINUE these medications which have NOT CHANGED    Details   chlordiazePOXIDE (LIBRIUM) 10 MG capsule Take 10 mg by mouth 3 times daily as needed for anxiety      gabapentin (NEURONTIN) 400 MG capsule Take 800 mg by mouth 3 times daily       hypromellose (ARTIFICIAL TEARS) 0.5 % SOLN ophthalmic solution Place 1 drop into both eyes 4 times daily as needed for dry eyes      melatonin 3 MG tablet Take 1 mg by mouth nightly as needed for sleep      meloxicam (MOBIC) 15 MG tablet Take 15 mg by mouth daily as needed       metoprolol succinate ER (TOPROL-XL) 50 MG 24 hr tablet Take 50 mg by mouth daily       pantoprazole (PROTONIX) 40 MG EC tablet Take 40 mg by mouth daily       sertraline (ZOLOFT) 50 MG tablet Take 50 mg by mouth daily            Allergies   No Known Allergies

## 2019-02-14 NOTE — PLAN OF CARE
Pt is A/Ox4. Up at nadine. VSS on RA. Denies pain. No chest pain. CIWA 0/0. LS clear, BS +,no bm this shift. Denies nausea. Voiding. SW is following. Anticipate discharge to [facility] today 2/14. Nursing will continue to monitor.

## 2019-02-14 NOTE — PLAN OF CARE
A+Ox4. Up independently in room and halls. Tolerating regular diet, good appetite. CIWA 1/1. LS clear, BS +, skin intact. VSS, on RA, denies pain. SW following. Anticipate discharge to [facility] tomorrow 2/14. Nursing will continue to monitor.

## 2019-03-12 ENCOUNTER — HOSPITAL ENCOUNTER (OUTPATIENT)
Dept: BEHAVIORAL HEALTH | Facility: CLINIC | Age: 44
Discharge: HOME | End: 2019-03-12
Attending: SOCIAL WORKER | Admitting: SOCIAL WORKER
Payer: COMMERCIAL

## 2019-03-12 VITALS — BODY MASS INDEX: 25.2 KG/M2 | HEIGHT: 71 IN | WEIGHT: 180 LBS

## 2019-03-12 PROCEDURE — H0001 ALCOHOL AND/OR DRUG ASSESS: HCPCS

## 2019-03-12 ASSESSMENT — ANXIETY QUESTIONNAIRES
5. BEING SO RESTLESS THAT IT IS HARD TO SIT STILL: SEVERAL DAYS
1. FEELING NERVOUS, ANXIOUS, OR ON EDGE: MORE THAN HALF THE DAYS
IF YOU CHECKED OFF ANY PROBLEMS ON THIS QUESTIONNAIRE, HOW DIFFICULT HAVE THESE PROBLEMS MADE IT FOR YOU TO DO YOUR WORK, TAKE CARE OF THINGS AT HOME, OR GET ALONG WITH OTHER PEOPLE: SOMEWHAT DIFFICULT
6. BECOMING EASILY ANNOYED OR IRRITABLE: NOT AT ALL
7. FEELING AFRAID AS IF SOMETHING AWFUL MIGHT HAPPEN: NOT AT ALL
3. WORRYING TOO MUCH ABOUT DIFFERENT THINGS: MORE THAN HALF THE DAYS
2. NOT BEING ABLE TO STOP OR CONTROL WORRYING: SEVERAL DAYS
GAD7 TOTAL SCORE: 8

## 2019-03-12 ASSESSMENT — MIFFLIN-ST. JEOR: SCORE: 1723.6

## 2019-03-12 ASSESSMENT — PATIENT HEALTH QUESTIONNAIRE - PHQ9
SUM OF ALL RESPONSES TO PHQ QUESTIONS 1-9: 10
5. POOR APPETITE OR OVEREATING: MORE THAN HALF THE DAYS

## 2019-03-12 ASSESSMENT — PAIN SCALES - GENERAL: PAINLEVEL: NO PAIN (0)

## 2019-03-12 NOTE — PROGRESS NOTES
[facility]  [address] [place] [address] [address]  [place] [address]      ADULT CD ASSESSMENT ADDENDUM      Patient Name: Ravi Shah  Cell Phone:   Home: [phone] (home) [phone] (work)   Mobile:   Telephone Information:   Mobile [phone]       Email:  [email or web address]  Emergency Contact: Dedra Shah   Tel: [phone]    The patient reported being:      With which race do you identify? White    Initial Screening Questions     1. Are you currently having severe withdrawal symptoms that are putting yourself or others in danger?  No    2. Are you currently having severe medical problems that require immediate attention?  No    3. Are you currently having severe emotional or behavioral problems that are putting yourself or others at risk of harm?  No    4. Do you have sufficient reading skills that will enable you to understand written materials, including the program rules and client rights materials?  Yes     Family History and other additional information     Who raised you? (parents, grandparents, adoptive parents, step-parents, etc.)    Both Parents    Please tell me what it was like growing up in your family. (please include any history of substance abuse, mental health issues, emotional/physical/sexual abuse, forms of discipline, and support)     Per EMR consult note on 2/13/19:  PAST PSYCHIATRIC HISTORY:  Possible anxiety/depression diagnosis.   PAST CHEMICAL DEPENDENCY HISTORY:  Notable for untreated alcohol use.  He is drinking 3/4 of a liter per day and has a previous hospitalization a month ago with alcohol withdrawal that lasted 6 days.   PAST MEDICAL HISTORY:  Notable for alcohol withdrawal and previous detox and baseline hypertension.    PRIOR TO ADMISSION MEDICATIONS:  Zoloft 50 mg daily, Protonix, Toprol, Mobic, melatonin, artificial tears, gabapentin, Librium p.r.n.   FAMILY HISTORY/SOCIAL HISTORY:  He endorses possible alcohol use in his family history.   SOCIAL  "HISTORY:  The patient is  with 1 child.  Works as a  and lives locally.  He has never had any legal problems,  history or trauma.        Do you have any children or Stepchildren? Yes, explain: Nahum 11 yrs old.     Are you being investigated by Child Protection Services? No    Do you have a child protection worker, probation office or ?  No    How would you describe your current finances?  Doing okay    If you are having problems, (unpaid bills, bankruptcy, IRS problems) please explain:  No    If working or a student are you able to function appropriately in that setting? Yes     Describe your preferred learning style:  by hands-on practice    What are your some of your personal strengths?  \"strong support from my family and friends, eloisa, willpower\"    Do you currently participate in community eloisa activities, such as attending Pentecostalism, temple, Congregation or Baptist services?  Yes, explain: [facility] [employer] in [place].     How does your spirituality impact your recovery?  Per client:a lot, big deal for whole thing.    Do you currently self-administer your medications?  Yes    Have you ever had to lie to people important to you about how much you ramirez?   No   Have you ever felt the need to bet more and more money?   No   Have you ever attempted treatment for a gambling problem?   No   Have you ever touched or fondled someone else inappropriately or forced them to have sex with you against their will?   No   Are you or have you ever been a registered sex offender?   No   Is there any history of sexual abuse in your family? No   Have you ever felt obsessed by your sexual behavior, such as having sex with many partners, masturbating often, using pornography often?   No     Have you ever received therapy or stayed in the hospital for mental health problems?   No     Have you ever hurt yourself, such as cutting, burning or hitting yourself?   No     Have you ever purged, binged or " "restricted yourself as a way to control your weight?   No     Are you on a special diet?   No     Do you have any concerns regarding your nutritional status?   No     Have you had any appetite changes in the last 3 months?   No   Have you had weight loss or weight gain of more than 10 lbs in the last 3 months?   If patient gained or lost more than 10 lbs, then refer to program RN / attending Physician for assessment.   No   Was the patient informed of BMI?    Above,  General nutrition education   No   Have you engaged in any risk-taking behavior that would put you at risk for exposure to blood-borne or sexually transmitted diseases?   No   Do you have any dental problems?   No   Have you ever lived through any trauma or stressful life events?   Yes, explain: \"death of a friend\"   In the past month, have you had any of the following symptoms related to the trauma listed above? (dreams, intense memories, flashbacks, physical reactions, etc.)   No   Have you ever believed people were spying on you, or that someone was plotting against you or trying to hurt you?   No   Have you ever believed someone was reading your mind or could hear your thoughts or that you could actually read someone's mind or hear what another person was thinking?   No   Have you ever believed that someone of some force outside of yourself was putting thoughts into your mind or made you act in a way that was not your usual self?  Have you ever though you were possessed?   No   Have you ever believed you were being sent special messages through the TV, radio or newspaper?   No   Have you ever heard things other people couldn't hear, such as voices or other noises?   No   Have you ever had visions when you were awake?  Or have you ever seen things other people couldn't see?   No   Do you have a valid 's license?    Yes     PHQ-9, ADY-7 and Suicide Risk Assessment   PHQ-9 on 3/12/2019 ADY-7 on 3/12/2019   The patient's PHQ-9 score was 10 out of " 27, indicating moderate depression.   The patient's ADY-7 score was 8 out of 21, indicating mild anxiety.       Warren-Suicide Severity Rating Scale   Suicide Ideation   1.) Have you ever wished you were dead or that you could go to sleep and not wake up?     Lifetime:  No   Past Month:  No     2.) Have you actually had any thoughts of killing yourself?   Lifetime:  No   Past Month:  No     3.) Have you been thinking about how you might do this?     Lifetime:  No   Past Month:  No     4.) Have you had these thoughts and had some intention of acting on them?     Lifetime:  No   Past Month:  No     5.) Have you started to work out the details of how to kill yourself?   Lifetime:  No   Past Month:  No     6.) Do you intend to carry out this plan?      Lifetime:  No   Past Month:  No     Intensity of Ideation   Intensity of ideation (1 being least severe, 5 being most severe):     Lifetime:  The patient denied ever having any suicidal thoughts in life.   Past Month:  The patient denied ever having any suicidal thoughts in life.     How often do you have these thoughts?  The patient denied ever having any suicidal thoughts in life.     When you have the thoughts how long do they last?  The patient denied ever having any suicidal thoughts in life.     Can you stop thinking about killing yourself or wanting to die if you want to?  The patient denied ever having any suicidal thoughts in life.     Are there things - anyone or anything (i.e. family, Cheondoism, pain of death) that stopped you from wanting to die or acting on thoughts of suicide?  Does not apply     What sort of reasons did you have for thinking about wanting to die or killing yourself (ie end pain, stop how you were feeling, get attention or reaction, revenge)?  Does not apply     Suicidal Behavior   (Suicide Attempt) - Have you made a suicide attempt?     Lifetime:  The patient had never made a suicide attempt.   Past Month:  The patient had never made a  suicide attempt.     Have you engaged in self-harm (non-suicidal self-injury)?  The patient denied having any history of engaging in self-harm (non-suicidal self-injury).     (Interrupted Attempt) - Has there been a time when you started to do something to end your life but someone or something stopped you before you actually did anything?  The patient denied having any history of an interrupted suicide attempt.     (Aborted or Self-Interrupted Attempt) - Has there been a time when you started to do something to try to end your life but you stopped yourself before you actually did anything?  No     (Preparatory Acts of Behavior) - Have you taken any steps towards making suicide attempt or preparing to kill yourself (such as collecting pills, getting a gun, giving valuables away or writing a suicide note)?  No     Actual Lethality/Medical Damage:  The patient denied ever making a suicidal attempt.       2008  [employer] [facility] [facility]  Used with permission by Sheree Monet, PhD.       Guide to C-SSRS Risk Ratings   NO IDEATION:  with no active thoughts IDEATION: with a wish to die. IDEATION: with active thoughts. Risk Ratings   If Yes No No 0 - Very Low Risk   If NA Yes No 1 - Low Risk   If NA Yes Yes 2 - Low/moderate risk   IDEATION: associated thoughts of methods without intent or plan INTENT: Intent to follow through on suicide PLAN: Plan to follow through on suicide Risk Ratings cont...   If Yes No No 3 - Moderate Risk   If Yes Yes No 4 - High Risk   If Yes Yes Yes 5 - High Risk   The patient's ADDITIONAL RISK FACTORS and lack of PROTECTIVE FACTORS may increase their overall suicide risk ratings.     Additional Risk Factors:    Significant history of having untreated or poorly treated mental health symptoms   Protective Factors:    Having people in his/her life that would prevent the patient from considering a suicide attempt (i.e. young children, spouse, parents, etc.)     An absence of  "chronic health problems or stable and well treated chronic health issues     A positive relationship with his/her clinical medical and/or mental health providers     Risk Status   Past month:0. - Very Low Risk:  Evaluation Counselors:  Document in Epic / SBAR to counselor \"Very Low Risk\".      Treatment Counselors:  Reassess upon admission as applicable, assess weekly in progress notes under Dimension 3 and summarize in Discharge / Treatment summary under Dimension 3.    Past 24 hours:0. - Very Low Risk:  Evaluation Counselors:  Document in Epic / SBAR to counselor \"Very Low Risk\".      Treatment Counselors:  Reassess upon admission as applicable, assess weekly in progress notes under Dimension 3 and summarize in Discharge / Treatment summary under Dimension 3.   Additional information to support suicide risk rating: There was no additional information to provide at this time.     Mental Health Status   Physical Appearance/Attire: Appears stated age and Neat   Hygiene: well groomed   Eye Contact: at examiner   Speech Rate:  regular   Speech Volume: regular   Speech Quality: fluid   Cognitive/Perceptual:  reality based   Cognition: memory intact    Judgment: able to concentrate   Insight: able to concentrate   Orientation:  time, place, person and situation   Thought: concrete   Hallucinations:  none   General Behavioral Tone: cooperative   Psychomotor Activity: no problem noted   Gait:  no problem   Mood: appropriate   Affect: congruence/appropriate   Counselor Notes: NA     Criteria for Diagnosis: DSM-5 Criteria for Substance Use Disorders      Alcohol Use Disorder Severe - 303.90 (F10.20)    Level of Care   I.) Intoxication and Withdrawal: 0   II.) Biomedical:  0   III.) Emotional and Behavioral:  2   IV.) Readiness to Change:  1   V.) Relapse Potential: 3   VI.) Recovery Environmental: 3     Initial Problem List     The patient lacks relapse prevention skills  The patient has poor coping skills  The patient has " poor refusal skills   The patient lacks a sober peer support network  The patient has dual issues of MI and CD  The patient lacks the ability to effectively manage his/her mental health issues    Patient/Client is willing to follow treatment recommendations.  Yes    Counselor: Diandra Wagner, [facility]      Vulnerable Adult Checklist for OUTPATIENTS     1.  Do you have a physical, emotional or mental infirmity or dysfunction?       No    2.  Does this issue impair your ability to provide for your own care without help, including providing yourself with food, shelter, clothing, healthcare or supervision?       No    3.  Because of this issue, I need assistance to protect myself from maltreatment by others.      No    Based on the above information:    This person is not a functional Vulnerable Adult according to [place] Statute 626.5572 subdivision 21.

## 2019-03-12 NOTE — PROGRESS NOTES
Rule 25 Assessment  Background Information   1. Date of Assessment Request 2/27/19 2. Date of Assessment  3/12/2019 3. Date Service Authorized     4.   Diandra Wagner [facility]   5.  Phone Number   [phone] 6. Referent  Claudia/Wife 7. Assessment Site  [facility]     8. Client Name   Ravi Shah 9. Date of Birth  1975 Age  43 year old 10. Gender  male  11. PMI/ Insurance No.  [id number]   12. Client's Primary Language:  English 13. Do you require special accommodations, such as an  or assistance with written material? No   14. Current Address: [address] [place] [id number]   15. Client Phone Numbers: [phone] (home) [phone] (work)     16. Tell me what has happened to bring you here today.    Per EMR intake:  2/27/19 Received call from pt's wife regarding IOP groups for pt.  He is currently IP at [facility] for alcohol use and will be discharged on 3/2/19.  She will have assessment faxed to intake.    3/12/19 at 1:00pm at [place]     Per EMR intake:  3/1/19 Received CD Assessment and ROGERIO from Melia Cullen ([facility] [phone] ext [phone]) faxed to CD OP [place]    Per client:Discharged on 3/2/19 from [facility].        17. Have you had other rule 25 assessments?     Yes. When, Where, and What circumstances: [facility] on 2/15/19.    DIMENSION I - Acute Intoxication /Withdrawal Potential   1. Chemical use most recent 12 months outside a facility and other significant use history (client self-report)              X = Primary Drug Used   Age of First Use Most Recent Pattern of Use and Duration   Need enough information to show pattern (both frequency and amounts) and to show tolerance for each chemical that has a diagnosis   Date of last use and time, if needed   Withdrawal Potential? Requiring special care Method of use  (oral, smoked, snort, IV, etc)     X Alcohol     13   Per EMR discharge summary on 2/14/19:Patient admits to drinking 0.75 mL of  "alcohol almost daily.  Last drink was on Monday    Per [facility] on 2/15/19:\"for the past 2 years he has been drinking 750ml or more daily. Last use 2/14/19,11pm, pint vodka\".Why it became a problem 41 hurt back and didn't want to take oxy.     Per client:  Discharging from [facility] on 3/2/19 denied use.      2/14/19,11pm, pint vodka\"  no oral      Marijuana/  Hashish   15 Per [facility] on 2/15/19:early December 2018 party 1/2 joint.     Per client:  Discharging from [facility] on 3/2/19 denied use.   12/2018 no smoke      Cocaine/Crack     19 Per [facility] on 2/15/19:cocaine used every wkend for a few years ages [age] but denied problems.  22 no snort      Meth/  Amphetamines   19 Per [facility] on 2/15/19:only meth became a problem ages [age]. Meth daily use for 2 years 2911-5613. Amount can't remember.  2006 no oral      Heroin     No use          Other Opiates/  Synthetics   No use          Inhalants     No use          Benzodiazepines     No use          Hallucinogens     19 Per [facility] on 2/15/19:LSD, mushrooms, couple of times at 19 years old.  19 no oral      Barbiturates/  Sedatives/  Hypnotics No use          Over-the-Counter Drugs   No use          Other     No use          Nicotine     18 Per [facility] on 2/15/19:quit 2007 2007 no smoke     2. Do you use greater amounts of alcohol/other drugs to feel intoxicated or achieve the desired effect?  Yes.  Or use the same amount and get less of an effect?  Yes.  Example: The patient reported having increased use and tolerance issues with alcohol.    3A. Have you ever been to detox?     No    3B. When was the first time?     The patient denied ever having a detoxification admission.    3C. How many times since then?     The patient denied ever having a detoxification admission.    3D. Date of most recent detox:     The patient denied ever having a detoxification admission.    4.  Withdrawal symptoms: " Have you had any of the following withdrawal symptoms?  Past 12 months Recent (past 30 days)   Sweating (Rapid Pulse)  Shaky / Jittery / Tremors  Unable to Sleep  High Blood Pressure  Diarrhea  Diminished Appetite  Unable to Eat  Anxiety / Worried Unable to Sleep  Agitation  Fatigue / Extremely Tired  Sad / Depressed Feeling  Nausea / Vomiting  Diarrhea  Anxiety / Worried     's Visual Observations and Symptoms: No visible withdrawal symptoms at this time    Based on the above information, is withdrawal likely to require attention as part of treatment participation?  No    Dimension I Ratings   Acute intoxication/Withdrawal potential - The placing authority must use the criteria in Dimension I to determine a client s acute intoxication and withdrawal potential.    RISK DESCRIPTIONS - Severity ratin Client displays full functioning with good ability to tolerate and cope with withdrawal discomfort. No signs or symptoms of intoxication or withdrawal or resolving signs or symptoms.    REASONS SEVERITY WAS ASSIGNED (What about the amount of the person s use and date of most recent use and history of withdrawal problems suggests the potential of withdrawal symptoms requiring professional assistance? )     No current concern. Clt reported last use date of alcohol as 19.          DIMENSION II - Biomedical Complications and Conditions   1a. Do you have any current health/medical conditions?(Include any infectious diseases, allergies, or chronic or acute pain, history of chronic conditions)       Yes.   Illnesses/Medical Conditions you are receiving care for:     Per EMR:  Past Medical History:   Diagnosis Date     Anxiety      Depressive disorder      Hypertension    .    1b. On a scale of mild, moderate to severe please specify the severity of the patient's diabetes and/or neuropathy.    The patient denied having a history of being diagnosed with diabetes or neuropathy.    2. Do you have a health care  provider? When was your most recent appointment? What concerns were identified?     The patient's PCP is Dr. Joe Sandra, [facility]. No upcoming appointment yet, but will schedule after knowing schedule for IOP tx.     3. If indicated by answers to items 1 or 2: How do you deal with these concerns? Is that working for you? If you are not receiving care for this problem, why not?      The patient reported taking prescription medications as prescribed for the above medical issues.    4A. List current medication(s) including over-the-counter or herbal supplements--including pain management:     Per client:  Zoloft 50mg, once per day since 10/17/18 for anxiety, Boaz   Metoprolol 50mg, once per day since 2/20/18 for hypertension, Boaz      Per EMR:  Current Outpatient Medications   Medication     chlordiazePOXIDE (LIBRIUM) 10 MG capsule     folic acid (FOLVITE) 1 MG tablet     gabapentin (NEURONTIN) 400 MG capsule     hypromellose (ARTIFICIAL TEARS) 0.5 % SOLN ophthalmic solution     melatonin 3 MG tablet     meloxicam (MOBIC) 15 MG tablet     metoprolol succinate ER (TOPROL-XL) 50 MG 24 hr tablet     multivitamin w/minerals (THERA-VIT-M) tablet     pantoprazole (PROTONIX) 40 MG EC tablet     sertraline (ZOLOFT) 50 MG tablet     vitamin B1 (THIAMINE) 100 MG tablet     No current facility-administered medications for this encounter.        4B. Do you follow current medical recommendations/take medications as prescribed?     Yes    4C. When did you last take your medication?     Per client: this morning.     4D. Do you need a referral to have a follow up with a primary care physician?    No.    5. Has a health care provider/healer ever recommended that you reduce or quit alcohol/drug use?     Yes    6. Are you pregnant?     NA, because the patient is male    7. Have you had any injuries, assaults/violence towards you, accidents, health related issues, overdose(s) or hospitalizations related to your  use of alcohol or other drugs:     Yes, explain: per EMR one past ED/hospitalization related to use on 19.Per [facility] collateral on 2/15/19:- hospitalization with 2 days in ICU. Weekly blackouts.     8. Do you have any specific physical needs/accommodations? No    Dimension II Ratings   Biomedical Conditions and Complications - The placing authority must use the criteria in Dimension II to determine a client s biomedical conditions and complications.   RISK DESCRIPTIONS - Severity ratin Client displays full functioning with good ability to cope with physical discomfort.    REASONS SEVERITY WAS ASSIGNED (What physical/medical problems does this person have that would inhibit his or her ability to participate in treatment? What issues does he or she have that require assistance to address?)    Clt reported medical conditions. Clt reported he has a pcp and is able to get the services he needs. Clt reported he takes his medications as prescribed and directed by his pcp.         DIMENSION III - Emotional, Behavioral, Cognitive Conditions and Complications   1. (Optional) Tell me what it was like growing up in your family. (substance use, mental health, discipline, abuse, support)     Per EMR consult note on 19:  PAST PSYCHIATRIC HISTORY:  Possible anxiety/depression diagnosis.   PAST CHEMICAL DEPENDENCY HISTORY:  Notable for untreated alcohol use.  He is drinking 3/4 of a liter per day and has a previous hospitalization a month ago with alcohol withdrawal that lasted 6 days.   PAST MEDICAL HISTORY:  Notable for alcohol withdrawal and previous detox and baseline hypertension.    PRIOR TO ADMISSION MEDICATIONS:  Zoloft 50 mg daily, Protonix, Toprol, Mobic, melatonin, artificial tears, gabapentin, Librium p.r.n.   FAMILY HISTORY/SOCIAL HISTORY:  He endorses possible alcohol use in his family history.   SOCIAL HISTORY:  The patient is  with 1 child.  Works as a  and lives locally.   He has never had any legal problems,  history or trauma.     2. When was the last time that you had significant problems...  A. with feeling very trapped, lonely, sad, blue, depressed or hopeless  about the future? Past Month    B. with sleep trouble, such as bad dreams, sleeping restlessly, or falling  asleep during the day? Past Month    C. with feeling very anxious, nervous, tense, scared, panicked, or like  something bad was going to happen? Past Month    D. with becoming very distressed and upset when something reminded  you of the past? 1+ years ago    E. with thinking about ending your life or committing suicide? Never    3. When was the last time that you did the following things two or more times?  A. Lied or conned to get things you wanted or to avoid having to do  something? Never    B. Had a hard time paying attention at school, work, or home? 2 - 12 months ago    C. Had a hard time listening to instructions at school, work, or home? Never    D. Were a bully or threatened other people? Never    E. Started physical fights with other people? Never    Note: These questions are from the Global Appraisal of Individual Needs--Short Screener. Any item marked  past month  or  2 to 12 months ago  will be scored with a severity rating of at least 2.     For each item that has occurred in the past month or past year ask follow up questions to determine how often the person has felt this way or has the behavior occurred? How recently? How has it affected their daily living? And, whether they were using or in withdrawal at the time?     Per [facility] collateral on 2/15/19: pt's current symptoms of anxiety and a recent panic attack are causing moderate difficultly managing work, family, and social activities, and his anxiety also distracts from his recovery efforts. He reports ADD but does not use medication for it. Pt reports that his anxiety has been triggered by speaking in public, social functions, and work  stress which he would either avoid situations or drink to cope.     4A. If the person has answered item 2E with  in the past year  or  the past month , ask about frequency and history of suicide in the family or someone close and whether they were under the influence.     Clt denied past or current SI.    Any history of suicide in your family? Or someone close to you?     The patient denied any family member or someone close to the patient had ever completed suicide.    4B. If the person answered item 2E  in the past month  ask about  intent, plan, means and access and any other follow-up information  to determine imminent risk. Document any actions taken to intervene  on any identified imminent risk.      Clt denied past or current SI.     5A. Have you ever been diagnosed with a mental health problem?     Yes, explain:clt reported: depression and anxiety.       5B. Are you receiving care for any mental health issues? If yes, what is the focus of that care or treatment?  Are you satisfied with the service? Most recent appointment?  How has it been helpful?     Yes, client reported:medications and past therapy. Psychiatrist 3/22/19 with [facility] to see if that is needed for ongoing care.     Per [facility] collateral on 2/15/19, psychotherapy once for 1 year in 2008.     6. Have you been prescribed medications for emotional/psychological problems?     Yes, see above in dimension 2.     7. Does your MH provider know about your use?     Yes.  7B. What does he or she have to say about it?(DSM) see above, seek out tx.    8A. Have you ever been verbally, emotionally, physically or sexually abused?      No     Follow up questions to learn current risk, continuing emotional impact.      The patient denied having any history of being verbally, emotionally, physically or sexually abused.    8B. Have you received counseling for abuse?      The patient denied having any history of being verbally, emotionally, physically or  sexually abused.    9. Have you ever experienced or been part of a group that experienced community violence, historical trauma, rape or assault?     No    10A. :    No    11. Do you have problems with any of the following things in your daily life?    No      Note: If the person has any of the above problems, follow up with items 12, 13, and 14. If none of the issues in item 11 are a problem for the person, skip to item 15.    The patient denied having any history of having problems with headaches, dizziness, problem solving, concentration, performing job/school work, remembering, in relationships with others, reading, writing, calculation, fights, being fired or arrests in his daily life.    12. Have you been diagnosed with traumatic brain injury or Alzheimer s?  No    13. If the answer to #12 is no, ask the following questions:    Have you ever hit your head or been hit on the head? No    Were you ever seen in the Emergency Room, hospital or by a doctor because of an injury to your head? No    Have you had any significant illness that affected your brain (brain tumor, meningitis, West Nile Virus, stroke or seizure, heart attack, near drowning or near suffocation)? No    14. If the answer to #12 is yes, ask if any of the problems identified in #11 occurred since the head injury or loss of oxygen. The patient had never had a head injury or a loss of oxygen.    15A. Highest grade of school completed:     Some college, but no degree    15B. Do you have a learning disability? No    15C. Did you ever have tutoring in Math or English? No    15D. Have you ever been diagnosed with Fetal Alcohol Effects or Fetal Alcohol Syndrome? No    16. If yes to item 15 B, C, or D: How has this affected your use or been affected by your use?     No    Dimension III Ratings   Emotional/Behavioral/Cognitive - The placing authority must use the criteria in Dimension III to determine a client s emotional, behavioral, and cognitive  conditions and complications.   RISK DESCRIPTIONS - Severity ratin Client has difficulty with impulse control and lacks coping skills. Client has thoughts of suicide or harm to others without means; however, the thoughts may interfere with participation in some treatment activities. Client has difficulty functioning in significant life areas. Client has moderate symptoms of emotional, behavioral, or cognitive problems. Client is able to participate in most treatment activities.    REASONS SEVERITY WAS ASSIGNED - What current issues might with thinking, feelings or behavior pose barriers to participation in a treatment program? What coping skills or other assets does the person have to offset those issues? Are these problems that can be initially accommodated by a treatment provider? If not, what specialized skills or attributes must a provider have?    Clt reported mental health diagnosis. Clt reported he takes prescribed medication for mental health symptoms by his pcp. Clt reported he has an upcoming appointment with a psychiatrist. Clt reported past therapy but denied current. Clt denied past or current abuse issues. Clt denied past or current SI. Clt denied past suicide attempts. Clt's PHQ-9 score was 10 out of 27, indicating moderate depression. Clt's ADY-7 score was 8 out of 21, indicating mild anxiety.       DIMENSION IV - Readiness for Change   1. You ve told me what brought you here today. (first section) What do you think the problem really is?     Clt reported following up with [facility].     2. Tell me how things are going. Ask enough questions to determine whether the person has use related problems or assets that can be built upon in the following areas: Family/friends/relationships; Legal; Financial; Emotional; Educational; Recreational/ leisure; Vocational/employment; Living arrangements (DSM)      Per client family and friend relationships- great, awesome, hobbies-fishing, cycling, sports  with son, isabela. 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 Can go back whenever I want to. Financially doing okay, no problems,     3. What activities have you engaged in when using alcohol/other drugs that could be hazardous to you or others (i.e. driving a car/motorcycle/boat, operating machinery, unsafe sex, sharing needles for drugs or tattoos, etc     The patient reported having a history of driving while under the influnece of alcohol or drugs.    4. How much time do you spend getting, using or getting over using alcohol or drugs? (DSM)     Per [facility] collateral on 2/15/19: always drinking more and longer than intended. Pt reports planning his drinking.     5. Reasons for drinking/drug use (Use the space below to record answers. It may not be necessary to ask each item.)  Like the feeling Yes   Trying to forget problems Yes   To cope with stress Yes   To relieve physical pain Yes   To cope with anxiety Yes   To cope with depression No   To relax or unwind Yes   Makes it easier to talk with people No   Partner encourages use No   Most friends drink or use Yes   To cope with family problems Yes   Afraid of withdrawal symptoms/to feel better Yes   Other (specify)  No     A. What concerns other people about your alcohol or drug use/Has anyone told you that you use too much? What did they say? (DSM)     Per [facility] collateral on 2/15/19: wife is not aware of the amount and frequency pt has been drinking, but she knows he's been dishonest about it. She's lost trust in him and states she won't live out her life in pt's addiction, nor will she let her son do so. She is very interested in attending family program.     B. What did you think about that/ do you think you have a problem with alcohol or drug use?     Per [facility] collateral on 2/15/19: open to recommendations, given up things he  used to enjoy, on his own and with his family. Pt reported marital conflict due to drinking and his home responsibilities have deteriorated due to drinking.     6. What changes are you willing to make? What substance are you willing to stop using? How are you going to do that? Have you tried that before? What interfered with your success with that goal?      Client reported following recommendations of [facility] to continue to not use such as IOP tx, psychiatrist appointment.     7. What would be helpful to you in making this change?     Client reported following recommendations of [facility] to continue to not use such as IOP tx, psychiatrist appointment.     Dimension IV Ratings   Readiness for Change - The placing authority must use the criteria in Dimension IV to determine a client s readiness for change.   RISK DESCRIPTIONS - Severity ratin Client is motivated with active reinforcement, to explore treatment and strategies for change, but ambivalent about illness or need for change.    REASONS SEVERITY WAS ASSIGNED - (What information did the person provide that supports your assessment of his or her readiness to change? How aware is the person of problems caused by continued use? How willing is she or he to make changes? What does the person feel would be helpful? What has the person been able to do without help?)      Clt reported he plans to continue with abstaining from use and following through with IP tx recommendations of step down IOP tx program and psychiatrist appointment. Clt appears to be in the contemplative stage of change evidenced by his verbal report and inconsistent past behaviors such as continued use despite the negative consequences of that use.          DIMENSION V - Relapse, Continued Use, and Continued Problem Potential   1A. In what ways have you tried to control, cut-down or quit your use? If you have had periods of sobriety, how did you accomplish that? What was helpful? What happened  to prevent you from continuing your sobriety? (DSM)     Per client since 3/2/19:promise to son and wife. Fear of getting sick again, not normal sickness, alcohol poisoning, two times in one month, have to be it. That was horrible, do not want to go to treatment again and go through withdrawal again. Per [facility] collateral on 2/15/19: pt reports trying to quit on his own for two days at a time for the past year. 3 weeks of no use in January of 2019 then relapsed.     1B. What were the circumstances of your most recent relapse with mood altering chemicals?    Clt reported triggers of coping with stress, anxiety, to relax, social, and afraid of withdrawal when starting.     2. Have you experienced cravings? If yes, ask follow up questions to determine if the person recognizes triggers and if the person has had any success in dealing with them.     Client reported prior to [facility]-clt reported having cravings to use mood altering chemicals on an almost daily basis.    3. Have you been treated for alcohol/other drug abuse/dependence? Yes.  3B. Number of times(lifetime) (over what period) 1.  3C. Number of times completed treatment (lifetime) 1.  3D. During the past three years have you participated in outpatient and/or residential?  Yes.  3E. When and where? [facility] IP tx 2/15/19-3/2/19.   3F. What was helpful? What was not? Helpful-being removed from everything,structure, program being busy throughout the day, downtime but not a lot to over think things, other guys in unit, friendships I made, counselor was great, she was awesome. Not helpful-nothing.    4. Support group participation: Have you/do you attend support group meetings to reduce/stop your alcohol/drug use? How recently? What was your experience? Are you willing to restart? If the person has not participated, is he or she willing?     No attendance prior to [facility]. Per client: no meeting attendance, first meeting today and sponsor friend of a friend.      5. What would assist you in staying sober/straight?     Clt reported following recommendations of [facility] to continue to not use such as IOP tx, psychiatrist appointment.     Dimension V Ratings   Relapse/Continued Use/Continued problem potential - The placing authority must use the criteria in Dimension V to determine a client s relapse, continued use, and continued problem potential.   RISK DESCRIPTIONS - Severity rating: 3 Client has poor recognition and understanding of relapse and recidivism issues and displays moderately high vulnerability for further substance use or mental health problems. Client has few coping skills and rarely applies coping skills.    REASONS SEVERITY WAS ASSIGNED - (What information did the person provide that indicates his or her understanding of relapse issues? What about the person s experience indicates how prone he or she is to relapse? What coping skills does the person have that decrease relapse potential?)      Eric reported almost daily use for the past two years. Clt reported one past IP tx recently that he completed. Clt reported triggers and prior to IP tx cravings. Clt denied any sober support group meeting attendance. Clt reported he plans to work with sponsor and attend meetings. Eric appears to be at moderately high risk for use evidenced by his lack of reported sobriety prior to IP tx despite the severity of the negative consequences of his heavy use. Eric would appear to benefit from continuation of utilization of daily sober living skills in a structured sober environment.          DIMENSION VI - Recovery Environment   1. Are you employed/attending school? Tell me about that.     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    Per Claudia collateral on 2/15/19:  for [employer], since July of 2018.    Per Claudia collateral on 2/15/19: pt  denies that  He drinks while on the job, but he states he can't control his drinking once home, on days off, or on the weekends, and he has few sober hobbies to occupy his time.     2A. Describe a typical day; evening for you. Work, school, social, leisure, volunteer, spiritual practices. Include time spent obtaining, using, recovering from drugs or alcohol. (DSM)     Clt reported discharge from [facility] on 3/2/19 and reported acclimating again to being home, appointments, and figuring out next steps.     Please describe what leisure activities have been associated with your substance abuse:     The patient denied having any leisure activities which had been associated with his substance abuse.    2B. How often do you spend more time than you planned using or use more than you planned? (DSM)     Per [facility] collateral on 2/15/19: always drinking more and longer than intended. Pt reports planning his drinking.     3. How important is using to your social connections? Do many of your family or friends use?     Clt reported most friends drink or use.     4A. Are you currently in a significant relationship?     Yes.  4B. How long? 22 years            Please describe your significant other's use of mood altering chemicals? She could go rest of life not drinking but will but 2-3 off switch. Never anything else either.     4C. Sexual Orientation:     Heterosexual    5A. Who do you live with?      Client reported with his wife and child.    5B. Tell me about their alcohol/drug use and mental health issues.     See above    5C. Are you concerned for your safety there? No    5D. Are you concerned about the safety of anyone else who lives with you? No    6A. Do you have children who live with you?     Yes.  (Ask follow-up questions to determine the person's relationship and responsibility, both legal and care giving; and what arrangements are made for supervision for the children when the person is not available.) Isma Sidhu  "11.     6B. Do you have children who do not live with you?     No    7A. Who supports you in making changes in your alcohol or drug use? What are they willing to do to support you? Who is upset or angry about you making changes in your alcohol or drug use? How big a problem is this for you?      \"mom, dad, family, sister, sober friends\"    7B. This table is provided to record information about the person s relationships and available support It is not necessary to ask each item; only to get a comprehensive picture of their support system.  How often can you count on the following people when you need someone?   Partner / Spouse Always supportive   Parent(s)/Aunt(s)/Uncle(s)/Grandparents Always supportive   Sibling(s)/Cousin(s) Always supportive   Child(silverio) Always supportive   Other relative(s) The patient doesn't have any current contact with other relatives.   Friend(s)/neighbor(s) Always supportive   Child(silverio) s father(s)/mother(s) Always supportive   Support group member(s) The patient denied having any current involvement with 12-step or other support group meetings.   Community of eloisa members Always supportive   /counselor/therapist/healer The patient denied having any current involvement with a , counselor, therapist or healer.   Other (specify) No     8A. What is your current living situation?     Client reported living with wife and child in home.     Per Claudia collateral on 2/15/19:own home been there 11 years    8B. What is your long term plan for where you will be living?     No change reported    8C. Tell me about your living environment/neighborhood? Ask enough follow up questions to determine safety, criminal activity, availability of alcohol and drugs, supportive or antagonistic to the person making changes.      No concern reported.     9. Criminal justice history: Gather current/recent history and any significant history related to substance use--Arrests? " Convictions? Circumstances? Alcohol or drug involvement? Sentences? Still on probation or parole? Expectations of the court? Current court order? Any sex offenses - lifetime? What level? (DSM)    None    10. What obstacles exist to participating in treatment? (Time off work, childcare, funding, transportation, pending assisted time, living situation)     The patient denied having any obstacles for participating in substance abuse treatment.    Dimension VI Ratings   Recovery environment - The placing authority must use the criteria in Dimension VI to determine a client s recovery environment.   RISK DESCRIPTIONS - Severity rating: 3 Client is not engaged in structured, meaningful activity and the client's peers, family, significant other, and living environment are unsupportive, or there is significant criminal justice system involvement.    REASONS SEVERITY WAS ASSIGNED - (What support does the person have for making changes? What structure/stability does the person have in his or her daily life that will increase the likelihood that changes can be sustained? What problems exist in the person s environment that will jeopardize getting/staying clean and sober?)     Eric reported he is self employed and can go back to work whenever he feels he is ready. Clt reported he lives with his wife and child. Clt reported his wife does consume alcohol. Clt reported his wife, parents, family, and friends are his supportive network. Clt denied past or current legal issues. Hersont would appear to benefit from structured sober support and routine. Hersont appears to lack sober support network.          Client Choice/Exceptions   Would you like services specific to language, age, gender, culture, Rastafari preference, race, ethnicity, sexual orientation or disability?  No    What particular treatment choices and options would you like to have? IOP    Do you have a preference for a particular treatment program? [facility]    Criteria for  Diagnosis     Criteria for Diagnosis  DSM-5 Criteria for Substance Use Disorder  Instructions: Determine whether the client currently meets the criteria for Substance Use Disorder using the diagnostic criteria in the DSM-V pp.481-589. Current means during the most recent 12 months outside a facility that controls access to substances    Category of Substance Severity (ICD-10 Code / DSM 5 Code)     Alcohol Use Disorder Severe  (10.20) (303.90)   Cannabis Use Disorder The patient does not meet the criteria for a Cannabis use disorder.   Hallucinogen Use Disorder The patient does not meet the criteria for a Hallucinogen use disorder.   Inhalant Use Disorder The patient does not meet the criteria for an Inhalant use disorder.   Opioid Use Disorder The patient does not meet the criteria for an Opioid use disorder.   Sedative, Hypnotic, or Anxiolytic Use Disorder The patient does not meet the criteria for a Sedative/Hypnotic use disorder.   Stimulant Related Disorder The patient does not meet the criteria for a Stimulant use disorder.   Tobacco Use Disorder The patient does not meet the criteria for a Tobacco use disorder.   Other (or unknown) Substance Use Disorder The patient does not meet the criteria for a Other (or unknown) Substance use disorder.       Collateral Contact Summary   Number of contacts made: 2    Contact with referring person:  Yes-collateral documentation received.     If court related records were reviewed, summarize here: No court records had been reviewed at the time of this documentation.    Information from collateral contacts supported/largely agreed with information from the client and associated risk ratings.      Rule 25 Assessment Summary and Plan   's Recommendation    1. Abstain from using all non-prescribed mood altering chemicals and substances. Take all medication as prescribed and directed by licensed physicians.  2. Complete an IOP treatment program such as [facility].  3. Follow all recommendations and referrals made by pcp.   4. Start attending sober support group meetings weekly. Obtain male sponsor.      Collateral Contacts     Name:    Claudia Conradty Mark/Melia Cullen   Relationship:    IP tx program/coordinator   Phone Number:    [phone] ext [phone] Releases:    Yes     Received collateral from collateral contact which was reviewed and utilized for this assessment collateral. Collateral documentation that was received will be uploaded into client medical record once received by medical record department. See in above assessment and below collateral information from collateral contact.  Per Claudia collateral on 2/15/19: Alcohol use disorder, severe.     Collateral Contacts     Name:    Electronic Medical Records (EMR)   Relationship:    Medical Records   Phone Number:    None   Releases:    Yes     Clt medical record was reviewed and utilized for collateral purposes of this assessment and largely agreed with the information from the client.    ollateral Contacts      A problematic pattern of alcohol/drug use leading to clinically significant impairment or distress, as manifested by at least two of the following, occurring within a 12-month period:    1.) Alcohol/drug is often taken in larger amounts or over a longer period than was intended.  2.) There is a persistent desire or unsuccessful efforts to cut down or control alcohol/drug use  3.) A great deal of time is spent in activities necessary to obtain alcohol, use alcohol, or recover from its effects.  4.) Craving, or a strong desire or urge to use alcohol/drug  5.) Recurrent alcohol/drug use resulting in a failure to fulfill major role obligations at work, school or home.  6.) Continued alcohol use despite having persistent or recurrent social or interpersonal problems caused or exacerbated by the effects of alcohol/drug.  8.) Recurrent alcohol/drug use in situations in which it is physically  hazardous.  9.) Alcohol/drug use is continued despite knowledge of having a persistent or recurrent physical or psychological problem that is likely to have been caused or exacerbated by alcohol.  10.) Tolerance, as defined by either of the following: A need for markedly increased amounts of alcohol/drug to achieve intoxication or desired effect. and A markedly diminished effect with continued use of the same amount of alcohol/drug.  11.) Withdrawal, as manifested by either of the following: The characteristic withdrawal syndrome for alcohol/drug (refer to Criteria A and B of the criteria set for alcohol/drug withdrawal). and Alcohol/drug (or a closely related substance, such as a benzodiazepine) is taken to relieve or avoid withdrawal symptoms.      Specify if: In early remission:  After full criteria for alcohol/drug use disorder were previously met, none of the criteria for alcohol/drug use disorder have been met for at least 3 months but for less than 12 months (with the exception that Criterion A4,  Craving or a strong desire or urge to use alcohol/drug  may be met).     In sustained remission:   After full criteria for alcohol use disorder were previously met, none of the criteria for alcohol/drug use disorder have been met at any time during a period of 12 months or longer (with the exception that Criterion A4,  Craving or strong desire or urge to use alcohol/drug  may be met).   Specify if:   This additional specifier is used if the individual is in an environment where access to alcohol is restricted.    Mild: Presence of 2-3 symptoms  Moderate: Presence of 4-5 symptoms  Severe: Presence of 6 or more symptoms

## 2019-03-13 ASSESSMENT — ANXIETY QUESTIONNAIRES: GAD7 TOTAL SCORE: 8

## 2019-03-18 ENCOUNTER — HOSPITAL ENCOUNTER (OUTPATIENT)
Dept: BEHAVIORAL HEALTH | Facility: CLINIC | Age: 44
End: 2019-03-18
Attending: SOCIAL WORKER
Payer: COMMERCIAL

## 2019-03-18 PROCEDURE — H2035 A/D TX PROGRAM, PER HOUR: HCPCS

## 2019-03-18 PROCEDURE — H2035 A/D TX PROGRAM, PER HOUR: HCPCS | Mod: HQ

## 2019-03-18 NOTE — PROGRESS NOTES
[place]worth-Suicide Severity Rating Scale Reassessment   Have you ever wished you were dead or that you could go to sleep and not wake up?  Past Month:  No   Have you actually had any thoughts of killing yourself?  Past Month:  No   Have you been thinking about how you might do this?     Past Month:  No Lifetime:  No   Have you had these thoughts and had some intention of acting on them?     Past Month:  No Lifetime:  No   Have you started to work out the details of how to kill yourself?   Past Month:  No Lifetime:  No   Do you intend to carry out this plan?   No   When you have the thoughts how long do they last?  The patient denied ever having any suicidal thoughts in life.   Are there things - anyone or anything (i.e. family, Episcopalian, pain of death) that stopped you from wanting to die or acting on thoughts of suicide?  Does not apply     2008  [employer]  Used with permission by Sheree Monet, PhD.       Guide to C-SSRS Risk Ratings   NO IDEATION:  with no active thoughts IDEATION: with a wish to die. IDEATION: with active thoughts. Risk Ratings   If Yes No No 0 - Very Low Risk   If NA Yes No 1 - Low Risk   If NA Yes Yes 2 - Low/moderate risk   IDEATION: associated thoughts of methods without intent or plan INTENT: Intent to follow through on suicide PLAN: Plan to follow through on suicide Risk Ratings cont...   If Yes No No 3 - Moderate Risk   If Yes Yes No 4 - High Risk   If Yes Yes Yes 5 - High Risk   The patient's ADDITIONAL RISK FACTORS and lack of PROTECTIVE FACTORS may increase their overall suicide risk ratings.     Additional Risk Factors:    No additional risk factors   Protective Factors:    not applicable     Risk Status   0. - Very Low Risk:       Additional information to support suicide risk rating: There was no additional information to provide at this time.     [place]worth-Suicide Severity Rating Scale Reassessment   Have you ever wished you were dead or that you  "could go to sleep and not wake up?  Past Month:  No   Have you actually had any thoughts of killing yourself?  Past Month:  No   Have you been thinking about how you might do this?     Past Month:  No Lifetime:  No   Have you had these thoughts and had some intention of acting on them?     Past Month:  No Lifetime:  No   Have you started to work out the details of how to kill yourself?   Past Month:  No Lifetime:  No   Do you intend to carry out this plan?   No   When you have the thoughts how long do they last?  The patient denied ever having any suicidal thoughts in life.   Are there things - anyone or anything (i.e. family, Congregational, pain of death) that stopped you from wanting to die or acting on thoughts of suicide?  Does not apply     2008  [employer]  Used with permission by Sheree Monet, PhD.       Guide to C-SSRS Risk Ratings   NO IDEATION:  with no active thoughts IDEATION: with a wish to die. IDEATION: with active thoughts. Risk Ratings   If Yes No No 0 - Very Low Risk   If NA Yes No 1 - Low Risk   If NA Yes Yes 2 - Low/moderate risk   IDEATION: associated thoughts of methods without intent or plan INTENT: Intent to follow through on suicide PLAN: Plan to follow through on suicide Risk Ratings cont...   If Yes No No 3 - Moderate Risk   If Yes Yes No 4 - High Risk   If Yes Yes Yes 5 - High Risk   The patient's ADDITIONAL RISK FACTORS and lack of PROTECTIVE FACTORS may increase their overall suicide risk ratings.     Additional Risk Factors:    No additional risk factors   Protective Factors:    No significant protective factors     Risk Status   0. - Very Low Risk:  Evaluation Counselors:  Document in Epic / SBAR to counselor \"Very Low Risk\".         Additional information to support suicide risk rating: There was no additional information to provide at this time.     "

## 2019-03-18 NOTE — PROGRESS NOTES
Initial Services Plan        Before your first treatment group, please do the following    Immediate health & safety concerns: Look for a support network (such as AA, NA, DBT group, a Jain group, etc.)    Suggestions for client during the time between intake & completion of treatment plan:  Introduce yourself to the treatment group.  Spend time getting to know your peers.  Other: complete Safety Plan and Goals for treatment.    Client issues to be addressed in the first treatment sessions:  Other : discuss client's plans to return to work.  Make treatment and recovery a priorty before returning to work      ABDELRAHMAN Sanford  3/18/2019  1:16 PM

## 2019-03-18 NOTE — PROGRESS NOTES
Comprehensive Assessment Summary     Based on client interview, review of previous assessments and   comprehensive assessment interview the following diagnosis and recommendations are:     Patient: Ravi Shah  MRN; [id number]   : 1975  Age: 43 year old Sex: male       Client meets criteria for:  303.90 Alcohol Dependence    Dimension One: Acute Intoxication/Withdrawal Potential     Ratin    (Consider the client's ability to cope with withdrawal symptoms and current state of intoxication)   Client last used alcohol on 19 and was detoxed at that time.    Dimension Two: Biomedical Condition and Complications    Ratin  (Consider the degree to which any physical disorder would interfere with treatment for substance abuse, and the client's ability to tolerate any related discomfort; determine the impact of continued chemical use on the unborn child if the client is pregnant)   Client has no health issues that interfere with treatment    Dimension Three: Emotional/Behavioral/Cognitive Conditions & Complications  Ratin  (Determine the degree to which any condition or complications are likely to interfere with treatment for substance abuse or with functioning in significant life areas and the likelihood of risk of harm to self or others)   Client reports anxiety, depression and history of ADHD.  He is taking medication for mood disorders.    Dimension Four: Treatment Acceptance/Resistance     Ratin  (Consider the amount of support and encouragement necessary to keep the client involved in treatment)   Client completed 2 weeks at [facility] and left early due to insurance not covering treatment.  He followed recommendation to continue in [facility].    Dimension Five: Continued Use/Relaspe Prevention     Ratin  (Consider the degree to which the client's recognizes relapse issues and has the skills to prevent relapse of either substance use or mental health problems)  Since this is first treatment attempt client has limited sober coping tools.    Dimension Six: Recovery Environment     Ratin   (Consider the degree to which key areas of the client's life are supportive of or antagonistic to treatment participation and recovery)   Client lives in supportive environment (wife and son support his recovery). He is self employed as a  and plans to go back to work during OP treatment.  No legal issues.    I have reviewed the information on the assessment, psychosocial and medical history and checklist:        it is current

## 2019-03-19 ENCOUNTER — HOSPITAL ENCOUNTER (OUTPATIENT)
Dept: BEHAVIORAL HEALTH | Facility: CLINIC | Age: 44
End: 2019-03-19
Attending: SOCIAL WORKER
Payer: COMMERCIAL

## 2019-03-19 PROCEDURE — H2035 A/D TX PROGRAM, PER HOUR: HCPCS | Mod: HQ

## 2019-03-20 ENCOUNTER — HOSPITAL ENCOUNTER (OUTPATIENT)
Dept: BEHAVIORAL HEALTH | Facility: CLINIC | Age: 44
End: 2019-03-20
Attending: SOCIAL WORKER
Payer: COMMERCIAL

## 2019-03-20 PROCEDURE — H2035 A/D TX PROGRAM, PER HOUR: HCPCS

## 2019-03-20 PROCEDURE — H2035 A/D TX PROGRAM, PER HOUR: HCPCS | Mod: HQ

## 2019-03-20 NOTE — PROGRESS NOTES
CD ADULT Progress Note     Treatment Plan Review completed on:  3/20/2019     Attendance Dates:  3/18/19 3/18/19 and 3/19/19     Total # of Group Sessions:  Phase I:  3 group therapy sessions (plus service initiation and treatment planning sessions)     MONDAY TUESDAY WEDNESDAY THURSDAY FRIDAY SATURDAY SUNDAY Total   Group Therapy 3 hours      3 hours 3 hours     9hours   Specialty Groups*           1:1 1 hour  1 hour      2 hours   Family Program           [place]             Phase II             Absent           Total  4 hours  3 hours 4hours     11 hours     *Specialty Groups include Mental Health Care, Assertiveness and Communication, Sobriety Maintenance Skills, Spiritual Care, Stress Management, Relapse Prevention, Family Systems.                    Learning Style:  Verbal    Staff member contributing: ABDELRAHMAN Ordaz     Received supervision:  No    Client: contributed to goals and plan    Did Client receive a copy of treatment plan/revised plan: Yes; signed 3/20/19    Changes to Treatment Plan: No    Client agrees with plan/revised plan: Yes    Any changes in Vulnerable Adult Status:  No    Substance Use Disorders:  Alcohol Use Disorder Severe (F10.20)    1) Care Coordination Activities:  referred to  aa meetings  2) Medical, Mental Health and other appointments the client attended: 3/22/19 psych appt  3) Medication issues: Zoloft -client is compliant  4) Physical and mental health problems: See dimensions 2 and 3 below for further details.   5) Review and evaluation of the individual abuse prevention plan: completed     ASAM Risk Ratings and Data       DIMENSION 1: Acute Intoxication/Withdrawal  The client's ability to cope with withdrawal symptoms and current state of intoxication       Acute Intoxication/Withdrawal - Current Risk Factor:  0    Reporting sober date of 2/14/19    Goals:  Develop effective strategies to maintain sobriety. Report to counselor and group any alcohol or substance use.     Data:  Client agrees to self disclose any alcohol or drug use to counselor and group    DIMENSION 2:  Biomedical Conditions and Complaints  The degree to which any physical disorder would interfere with treatment for substance abuse and the client's ability to tolerate any related discomfort     Biomedical Conditions and Complaints - Current Risk Factor:  0    Goals: Disclose CD status to medical providers and follow up with medical interventions while in IOP.     Data: No current health concerns. Client wants to improve physical health.   Intervention: collaboration with client; set treatment plan goals for walking, 2xweek; biking 3xweek;  And yoga 2x week. Assessment:  Client is motivated to engage in workouts with encouragement from his Dr following recovery from a back injury 2 years ago.  Plan:  Client will engage in exercise as noted above and on treatment plan and check in with group as assigned.    DIMENSION 3:  Emotional/Behavioral/Cognitive Conditions and Complications  The degree to which any condition or complications are likely to interfere with treatment for substance abuse or with function in significant life areas and the likelihood of risk of harm to self or others.     Emotional/Behavioral - Current Risk Factor:  2    DSM-5 Diagnoses:   Anxiety       Suicide Assessment: low risk  Risk Status    Ideation - Active thoughts of suicide Intent to follow through on suicide Plan for completing suicide    Yes No Yes No Yes No   Emergent  x  x  x   Urgent / Non-Emergent  x  x  x   Non- Urgent  x  x  x   No Current/Active Risk  x  x  x          Goals: Understand the relationship between mental health concerns and addiction.     Data: client admits he was self medicating with alcohol to relieve anxiety and wants to develop new coping skills.  Intervention:  Treatment planning with assignments  For meditative breathing, medication management and therapy;  Assessment: client is motivated to follow through.  Plan:   Client will use coping strategies as identified in treatment plan and engage in self care when feeling anxious.    DIMENSION 4:  Readiness to Change  Consider the amount of support and encouragement necessary to keep the client involved in treatment.     Readiness to Change - Current Risk Factor:  0    Goals:  Increase awareness with how substance use is conflicted with personal values and address any ambivalence to change.     Data: client reports motivation for change at 10 out of 10     DIMENSION 5:  Relapse/Continued Use/Continued Problem Potential  Consider the degree to which the client recognizes relapse issues and has the skills to prevent relapse of either substance use or mental health problems.     Relapse/Continued Use/Continued Problem Potential - Current Risk Factor:  2    Goals:  Increase awareness of the disease concept of addiction and insight into personal relapse triggers and strategies to address.     Data: client reports minimal cravings and thoughts of drinking    DIMENSION 6:  Recovery Environment  Consider the degree to which key areas of the client's life are supportive of or antagonistic to treatment participation and recovery.     Recovery Environment - Current Risk Factor:  2    Support group attended this week:  No    Did family agree to attend family week:  NA    If yes: NA    Goals:  Increase sober support network. Continue to identify and attend sober support group meetings.     Data: clients wife is supportive.  He has many friends who are recovering and has a sponsor 'Ray'.  He is not returning to work for several weeks.           Mandy NG [facility]

## 2019-03-20 NOTE — PROGRESS NOTES
Acknowledgement of Current Treatment Plan - Initial Treatment Plan     INITIAL TREATMENT PLAN:     1. I have participated in creating my treatment plan with my therapist / counselor on  3/20/19.   I agree with the plan as it is written in the electronic health record.    Name  Ravi Shah Signature/Date   Patient     Name of Therapist / Counselor  Mandy NG ABDELRAHMAN Signature/Date   Counselor/Therapist        2. I have completed and reviewed my Safety Plan with my counselor and signed this on _________. I have been given the hard copy of this plan.    Patient signature/date:      _____________________________________________________________________________    3. Last Use Date: __________    Patient signature/date:     _____________________________________________________________________________                                                    Acknowledgement of Current Treatment Plan - Additional Entries       ADDITIONAL GOALS AND INTERVENTIONS:    Signatures/dates required for any additional Problems, Goals, and/or Interventions added to treatment plan:  Change/Addition in Dimension ____ on date:_________  Insert here:         I have participated in creating this change and/or addition to my treatment plan copied above.   I have been given a copy of this signature page with change/addition to my treatment plan and I am in agreement with how it is written in the electronic record.       Patient signature:       ________________________________________________________________________      Counselor/Therapist signature:    ________________________________________________________________              Change in date of last use:       ________________________________________________________________        Patient signature/date (required for change in last use date):     ________________________________________________________________

## 2019-03-20 NOTE — PROGRESS NOTES
" Skills Group Progress Note   Ravi Shah  [id number]    Attendance Dates: 3/20/2019.  Length of group: 3-Hours     DIMENSION 3:  Emotional/Behavioral/Cognitive Conditions and Complications  The degree to which any condition or complications are likely to interfere with treatment for substance abuse or with function in significant life areas and the likelihood of risk of harm to self or others.     Emotional/Behavioral - Current Risk Factor:  2    DSM-5 Diagnoses:  During a hospital admission to [facility] on 2/13/19, client was provided with the following diagnoses: Unspecified depressive disorder, Unspecified anxiety disorder     Goal(s):  Learn skills to reduce symptoms of anxiety in ways that promote self care.     Guns in the home?: Yes (explain) - Client stated that the gun(s) is/are locked.  (If there are guns, tell client we recommend guns are locked in gun safe, with ammo locked separately, off-site at this time.)      Data: Client actively participated in MH group session and engaged in the discussion in response to the handout on \"Myths in the Way of Interpersonal Effectiveness\". Client reports feeling the following two emotions today: \"happy, stressed out.\" Client denied suicidal ideation or self-injurious behavior.  Client stated that he anticipates this coming Friday to be a stressor for him as he used to drink on Fridays; he stated that he will make certain to stay busy by taking a walk or by reading a [place] Meditation book.      *Client denies having a therapist at this time.  He did schedule an individual session with writer for 3/21/19. Client does report having a psychiatrist.     > Client reports his next appointment with his psychiatrist is on 3/22/19.     > Client reports taking the following psychotropic medications: N/A at this time.     > Client reports his risk factors are \"history of drinking, symptoms of anxiety, difficulty in sleeping at night, difficulty in relationship with former " "co-worker\".    > Client reports his protective factors are \"supportive family, having a sponsor, some coping skills\".     Intervention: Counselor/therapist used Behavioral Therapy, Cognitive Behavioral Therapy, Counselor Feedback, Education, Emotional management, Group Feedback, Mindfulness, Motivational Enhancement Therapy, Relapse Prevention, and DBT.    > Client practiced the following skills in group session(s) this week: mindfulness based stress reduction skills, along with breathing exercises. Writer provided client with verbal interventions including: validation, support, and psycho-education.    > Client also learned and practiced the following skills in group sessions this week: basic mindfulness meditation practice, hand out and discussion of \"Myths in the Way of Interpersonal Effectiveness\".    Assessment:    Stages of Change Model: Client is currently in the Stages of Change Model  Action stage within the stages of change model.     > Client was observed engaging in the basic mindfulness meditation practice and reported it helped with relaxation and anxiety.  Client appeared very open to continued learning for lifelong sobriety.    > Client was able to \"teach back\" the skills he learned on interpersonal effectiveness.  Client appeared to gain insight into ways his thinking can increase his symptoms of anxiety.    Plan:   -Client will spend time getting acclimated to the group.   -Client will continue to work on treatment plan objectives.  -Client will attend all weekly Phase 1 group sessions.   -Client to engage in sober activities each week.   -Client will attend 1:1 session on 3/21/19      Demetrice Vargas MS, LAD, [facility]  Licensed Psychotherapist  "

## 2019-03-20 NOTE — PROGRESS NOTES
Patient Safety Plan Template    Name:   Ravi Shah YOB: 1975 Age:  43 year old MR Number:  [id number]   Step 1: Warning signs (Thoughts, images, mood, situation, behavior) that a crisis may be developin. boredom     2. sadness     3. regret     Step 2: Internal coping strategies - Things I can do to take my mind off of my problems without contacting another person (relaxation technique, physical activity):     1. Stay busy     2. Work out     3. reading     Step 3: People and social settings that provide distraction:     1. Name: [employer]   Phone: [phone]   2. Name: [facility]   Phone: [phone]   3. Place: [facility]   4. Place: sporting events     The one thing that is most important to me and worth living for is: my family     Step 4: People whom I can ask for help:     1. Name: Jorje   Phone: above     2. Name: [facility]   Phone: above     3. Name: Shawn   Phone: [phone]     Step 5: Professionals or agencies I can contact during a crisis:     1. Clinician Name: Dr Joe Sandra   Phone: [phone]   Clinician Pager or Emergency Contact #: na     2. Clinician Name: maurizio   Phone: maurizio     Clinician Pager or Emergency Contact #: na     3. Local Urgent Care Services:     Urgent Care Services Address:     Urgent Care Services Phone: na     4. Suicide Prevention Lifeline Phone: 0-506-430-PVSS ([phone])     Step 6: Making the environment safe:     1. Keeping alcohol out of the house     2. Finding a project to do in the evenings     Safety Plan Template 2008 Meme Cantu and Jeet Snyder is reprinted with the express permission of the authors.  No portion of the Safety Plan Template may be reproduced without the express, written permission.  You can contact the authors at bhs@[email or web address] or [email or web address].

## 2019-03-21 NOTE — PROGRESS NOTES
Case consultation:  D)  Counselor reviewed ct's case.  Ct is new to the program.  This is his first treatment.  DOC:  Alcohol.  Ct has symptoms of anxiety.  He has a psychiatrist appointment on 3/22/19.  P)  Obtain ROGERIO for psychiatrist for case consult.    DIANA Redman, LICSW, [facility]  Clinical

## 2019-03-26 ENCOUNTER — HOSPITAL ENCOUNTER (OUTPATIENT)
Dept: BEHAVIORAL HEALTH | Facility: CLINIC | Age: 44
End: 2019-03-26
Attending: SOCIAL WORKER
Payer: COMMERCIAL

## 2019-03-26 PROCEDURE — H2035 A/D TX PROGRAM, PER HOUR: HCPCS | Mod: HQ

## 2019-03-27 ENCOUNTER — HOSPITAL ENCOUNTER (OUTPATIENT)
Dept: BEHAVIORAL HEALTH | Facility: CLINIC | Age: 44
End: 2019-03-27
Attending: SOCIAL WORKER
Payer: COMMERCIAL

## 2019-03-27 PROCEDURE — H2035 A/D TX PROGRAM, PER HOUR: HCPCS

## 2019-03-27 PROCEDURE — H2035 A/D TX PROGRAM, PER HOUR: HCPCS | Mod: HQ

## 2019-03-27 NOTE — PROGRESS NOTES
"D: Met with client on 3/27/19 for a 1:1 session.  The session focused on client's drinking history and his tendency to \"overthink\", which causes anxiety for him.  The session also focused on creating a treatment plan goal for client.  Client stated that while he tends to \"overthink\", he realizes that things he worries about tend to turn out better than expected.  He stated that he has worked on re-framing his thoughts, which he has learned in prior therapy.  He would like to continue to work on managing his thoughts effectively.  Client stated that his reason for being in treatment currently is due to escalating drinking, which began in 2016 due to a spine disc injury, and that he used alcohol to manage the pain.  Over the last 9 months or so, client has experienced increased withdrawal symptoms from alcohol use and realizes that his health is at risk.  He stated that he meets with a sponsor every week to work on the 12-Steps and that he has connected with some men in his [employer] who are in recovery, as well.  Lastly, client's acknowledgement of current treatment plan- additional entry, was created.      I: Reflection, CBT Skills, and acknowledgement of current treatment plan- additional entry    A: Client appears to have awareness that his thoughts can be unhelpful for him and increase his level of anxiety.  Additionally, he seems motivated to be in recovery, and is working on developing sober support.      P: Attend follow-up 1:1 session on 4/17/19      Continue attending Phase I group      Work on treatment plan goals   "

## 2019-03-27 NOTE — PROGRESS NOTES
"    Acknowledgement of Current Treatment Plan - Additional Entries       ADDITIONAL GOALS AND INTERVENTIONS:    Signatures/dates required for any additional Problems, Goals, and/or Interventions added to treatment plan:  Change/Addition in Dimension III on date: 3/27/19  Insert here:     Area of Treatment focus:   Client verbalizes that he tends to \"overthink\" which increases his level of anxiety.     Goal:   Client will develop further awareness into his anxious symptoms and he will develop tools to manage these symptoms.     Treatment Strategies:   Client will read through the handout on \"Thinking Traps\" at least once.  He will identify his top three thinking traps and he will share about these top three thinking traps in group therapy once.  Due: 4/3/19          I have participated in creating this change and/or addition to my treatment plan copied above.   I have been given a copy of this signature page with change/addition to my treatment plan and I am in agreement with how it is written in the electronic record.       Patient signature:       ________________________________________________________________________      Counselor/Therapist signature:    ________________________________________________________________              Change in date of last use:       ________________________________________________________________        Patient signature/date (required for change in last use date):     ________________________________________________________________          "

## 2019-03-27 NOTE — PROGRESS NOTES
" Skills Group Progress Note   Ravi Shah  [id number]    Attendance Dates: 3/27/2019.  Length of group: 3-Hours     DIMENSION 3:  Emotional/Behavioral/Cognitive Conditions and Complications  The degree to which any condition or complications are likely to interfere with treatment for substance abuse or with function in significant life areas and the likelihood of risk of harm to self or others.     Emotional/Behavioral - Current Risk Factor:  2    DSM-5 Diagnoses:  During a hospital admission to [facility] on 2/13/19, client was provided with the following diagnoses: Unspecified depressive disorder, Unspecified anxiety disorder     Goal(s):  Learn skills to reduce symptoms of anxiety in ways that promote self care.  Goal(s):  Client will develop further awareness into his anxious symptoms and he will develop tools to manage these symptoms.     Guns in the home?: Yes (explain) - Client stated that the gun(s) is/are locked.  (If there are guns, tell client we recommend guns are locked in gun safe, with ammo locked separately, off-site at this time.)      Data: Client actively participated in MH group session and engaged in the discussion and role play in response to the handout on \"DEAR MAN\". Client reports feeling the following emotion(s) today: \"happy\".  Client denied suicidal ideation or self-injurious behavior.  Client stated that he anticipates no stressors this Friday; he stated that he will engage in self-care this weekend by biking, and playing basketball.  He also stated that his wife will have events planned for them.     *Client denies having a therapist at this time.  He did schedule a follow-up individual session with writer for 4/17/19. Client does report having a psychiatrist at [facility].     > Client reports his next appointment with his psychiatrist is on _______.  (no upcoming appointment scheduled at this time).      > Client reports taking the following psychotropic medications: Zoloft, which has " "been increased from 50mg to 100mg.     > Client reports his risk factors are \"history of drinking, symptoms of anxiety, difficulty in sleeping at night, difficulty in relationship with former co-worker\".    > Client reports his protective factors are \"supportive family, having a sponsor, some coping skills\".     Intervention: Counselor/therapist used Behavioral Therapy, Cognitive Behavioral Therapy, Counselor Feedback, Education, Emotional management, Group Feedback, Mindfulness, Motivational Enhancement Therapy, Relapse Prevention, and DBT.    > Client practiced the following skills in group session(s) this week: guided imagery reading. Writer provided client with verbal interventions including: validation, support, and psycho-education.    > Client also learned and practiced the following skills in group sessions this week:  hand out and discussion of DBT Skill \"DEAR MAN\".    Assessment:    Stages of Change Model: Client is currently in the Stages of Change Model  Action stage within the stages of change model.     > Client was observed engaging in the guided imagery practice and reported it helped with relaxation and anxiety.  Client appeared very open to continued learning for lifelong sobriety.    > Client was able to \"teach back\" the skills he learned on interpersonal effectiveness, specifically the DBT Skill \"DEAR MAN\".  He practiced using the skill in a role play with a group partner.  Client appeared to gain insight into tools he can use when making requests of his co-worker.    Plan:   -Client will spend time getting acclimated to the group.   -Client will continue to work on treatment plan objectives.  -Client will attend all weekly Phase 1 group sessions.   -Client to engage in sober activities each week.   -Client will attend follow-up 1:1 session on 4/17/19      Demetrice Vargas MS, [facility], [facility]  Licensed Psychotherapist  "

## 2019-03-28 NOTE — PROGRESS NOTES
CD ADULT Progress Note     Treatment Plan Review completed on:  3/27/2019     Attendance Dates:  3/26, 3/27 (client was absent - excused- due to sons illness)    Total # of Group Sessions:  Phase I:  3 group therapy sessions (plus service initiation and treatment planning sessions)     MONDAY TUESDAY WEDNESDAY THURSDAY FRIDAY SATURDAY SUNDAY Total   Group Therapy excusedabsence      3 hours 3 hours     6 hours   Specialty Groups*           1:1   1 hour      1hours   Family Program           [place]             Phase II             Absent           Total   3 hours 4hours     7 hours     *Specialty Groups include Mental Health Care, Assertiveness and Communication, Sobriety Maintenance Skills, Spiritual Care, Stress Management, Relapse Prevention, Family Systems.                    Learning Style:  Verbal    Staff member contributing: ABDELRAHMAN Ordaz     Received supervision:  No    Client: contributed to goals and plan    Did Client receive a copy of treatment plan/revised plan: Yes; signed 3/20/19    Changes to Treatment Plan: No    Client agrees with plan/revised plan: Yes    Any changes in Vulnerable Adult Status:  No    Substance Use Disorders:  Alcohol Use Disorder Severe (F10.20)    1) Care Coordination Activities:  referred to  aa meetings  2) Medical, Mental Health and other appointments the client attended: 3/22/19 psych appt  3) Medication issues: Zoloft -client is compliant  4) Physical and mental health problems: See dimensions 2 and 3 below for further details.   5) Review and evaluation of the individual abuse prevention plan: completed     ASAM Risk Ratings and Data       DIMENSION 1: Acute Intoxication/Withdrawal  The client's ability to cope with withdrawal symptoms and current state of intoxication       Acute Intoxication/Withdrawal - Current Risk Factor:  0    Reporting sober date of 2/15/19  (last use date was 2/14/19)    Goals:  Develop effective strategies to maintain sobriety. Report to  counselor and group any alcohol or substance use.     3/25/19 Data: Client agrees to self disclose any alcohol or drug use to counselor and group    DIMENSION 2:  Biomedical Conditions and Complaints  The degree to which any physical disorder would interfere with treatment for substance abuse and the client's ability to tolerate any related discomfort     Biomedical Conditions and Complaints - Current Risk Factor:  0    Goals: Disclose CD status to medical providers and follow up with medical interventions while in IOP.     Data: 3/26/19 client did not participate in exercise activities as planned due to household tasks (cleaning up the garage).  Client reports overall health at 8 out of 10 (1=poor)    DIMENSION 3:  Emotional/Behavioral/Cognitive Conditions and Complications  The degree to which any condition or complications are likely to interfere with treatment for substance abuse or with function in significant life areas and the likelihood of risk of harm to self or others.     Emotional/Behavioral - Current Risk Factor:  2    DSM-5 Diagnoses:   Anxiety       Suicide Assessment: low risk  Risk Status    Ideation - Active thoughts of suicide Intent to follow through on suicide Plan for completing suicide    Yes No Yes No Yes No   Emergent  x  x  x   Urgent / Non-Emergent  x  x  x   Non- Urgent  x  x  x   No Current/Active Risk  x  x  x          Goals: Understand the relationship between mental health concerns and addiction.     Data: 3/26/19;  Ravi says mental health concerns are 2 out of 10  (0=no concerns) .  He experiences some anxiety and is toaking Zoloft to address symptoms.  He saw a psychiatrist and 3/22 and did not get a report.  He denies SI or thoughts of SIB.      DIMENSION 4:  Readiness to Change  Consider the amount of support and encouragement necessary to keep the client involved in treatment.     Readiness to Change - Current Risk Factor:  0    Goals:  Increase awareness with how substance use is  conflicted with personal values and address any ambivalence to change.     Data 3/26/19: client reports motivation for change at 10 out of 10.       DIMENSION 5:  Relapse/Continued Use/Continued Problem Potential  Consider the degree to which the client recognizes relapse issues and has the skills to prevent relapse of either substance use or mental health problems.     Relapse/Continued Use/Continued Problem Potential - Current Risk Factor:  2    Goals:  Increase awareness of the disease concept of addiction and insight into personal relapse triggers and strategies to address.     Data 3/26/19:  Client reports cravings at 1 out of 10 this week (1=no cravings).  Intervention:  Group processed triggers and warning signs of using.  Education on the Early stage of recovery which includes overconfidence, high energy (sometimes called the 'honeymoon' phase)  I cautioned Ravi to keep priorities on recovery and to not become complacent because he is feeling better.   Assessment: client displays some of the markers of early recovery and needs to watch overconfidence.  Plan:  Meet with sponsor to discuss early phase of recovery and attend 12 step meeting this weekend.    DIMENSION 6:  Recovery Environment  Consider the degree to which key areas of the client's life are supportive of or antagonistic to treatment participation and recovery.     Recovery Environment - Current Risk Factor:  2    Support group attended this week:  yes    Did family agree to attend family week:  NA    If yes: NA    Goals:  Increase sober support network. Continue to identify and attend sober support group meetings.     Data 3/26/19:  Client states he attended 2 support groups outside of treatment this week.  He attended [employer] and spent time at [employer] house.                 Mandy CURIEL

## 2019-04-11 NOTE — PROGRESS NOTES
Visit Date:   03/27/2019      CHEMICAL DEPENDENCY DISCHARGE SUMMARY      EVALUATION COUNSELOR:  Diandra QUICK.   REFERRAL SOURCE:  [facility].   PROGRAM:  Intensive Outpatient Chemical Dependency.   ADMISSION DATE:  03/18/2019.   LAST SESSION DATE:  03/27/2019.   DISCHARGE DATE:  04/11/2019.   ADMISSION DIAGNOSES:  Alcohol use disorder, severe, F10.20.   DISCHARGE DIAGNOSIS:  Alcohol use disorder, severe, F10.20.   DISCHARGE STATUS:  Incomplete treatment due to client's leaving per his request.   LAST USE DATE:  02/14/2019.     HOURS OF TREATMENT COMPLETED:  18.   PRESENTING INFORMATION:  Ravi Shah completed 2 weeks of residential treatment at [facility].  He left after 2 weeks because his insurance would not pay for anymore and they referred him to intensive outpatient treatment.  This is his first CD treatment.  SERVICES PROVIDED:  Our services included assessment, treatment planning, education regarding chemical dependency, individual and group therapy, mental health assessment, and referrals on discharge.      ISSUES ADDRESSED IN TREATMENT:   DIMENSION 1/ACUTE INTOXICATION AND WITHDRAWAL:  There was no return to alcohol use during his treatment and therefore no withdrawal issues.  Initial risk 0.  Discharge risk 0.      DIMENSION 2:  Biomedical Conditions:  There were no medical health concerns during the course of treatment, he was working on improving his physical health by walking and biking.  Initial risk 0.  Discharge risk 0.      DIMENSION 3/EMOTIONAL AND BEHAVIORAL CONDITIONS:  The client has a history of anxiety, which he self-medicated with alcohol.  Our goal was to learn skills to reduce symptoms of anxiety to promote self-care.  Some of the treatment strategies are participating in a mental health therapy group once a week; take medications for anxiety (sertraline 50 mg daily)as prescribed;  practice meditative breathing for 5 minutes twice a day for 5 days initially.  He also  experiences some shame and guilt connected to alcohol use.  He was given material to read, but did not complete that assignment.  Initial risk 2.  Discharge risk 2.      DIMENSION 4:  Readiness to Change:  This is his first treatment and he lacks insight into the impact of alcohol on himself and his family.  Our goal was to increase his internal motivation. He had an assignment to identify values violated and emotional consequences which he did not share with the group.  It also became apparent that his treatment was not his priority by going on a vacation during spring break with his wife and son, which he neglected to tell the counselors about.  He called after the first day a vacation and said he would be gone for a week. Following that vacation, he never returned to group and did not call.  I made several phone calls and left voicemail with no response from client.  On the day of his 3rd unexcused absence he called to tell me that he was returning to work and he was unable to continue our program.  Initial risk 1.  Discharge risk 3.      DIMENSION 5:  Relapse and Continued Use Potential:  The client lacks a daily routine that includes healthy and sober living skills.  Our goal was to develop sober coping and living skills.  The interventions are attending sober support groups; working with a sponsor;  practicing coping strategies of calling group members for support, using mindfulness tools; practicing yoga.    Initial risk 2.  Discharge risk 2.      DIMENSION 6:  Recovery Environment:  The client has strained relationships with his wife and son due to lack of trust around his alcohol use.  Our goal was to improve trust and communication and some of the strategies were to engage in yoga with his wife 2 times a week for 20 minutes and also to consider couples counseling and/or our family program. It is unclear how many of these strategies he used in the 5 sessions he was here.  Initial risk 2.  Discharge risk 2.       STRENGTHS: supportive wife and son; AA sponsor and recovering friends.     PROGNOSIS:  The prognosis is guarded at this time.  It would be upgraded if he enrolls in a treatment program and completes that program however he was not interested in further treatment at this time.     LIVING ARRANGEMENTS AT DISCHARGE:  The client lives with his wife and son at their home in [place], telephone is [phone].      CONTINUING CARE RECOMMENDATIONS AND REFERRALS:   1.  Abstain from all mood-altering chemicals.   2.  Attend AA meetings and work with a sponsor for sober support.   3.  Participate in an outpatient treatment program in order to complete this treatment.   4.  Schedule individual therapy through [facility].   5. Take Sertraline for anxiety as prescribed.        This information has been disclosed to you from records protected by Federal confidentiality rules (42 CFR part 2). The Federal rules prohibit you from making any further disclosure of this information unless further disclosure is expressly permitted by the written consent of the person to whom it pertains or as otherwise permitted by 42 CFR part 2. A general authorization for the release of medical or other information is NOT sufficient for this purpose. The Federal rules restrict any use of the information to criminally investigate or prosecute any alcohol or drug abuse patient.      HERNANDEZ COBB, [facility]             D: 2019   T: 2019   MT: KRISTEL      Name:     TRICIA SYKES   MRN:      [id number]        Account:      [id number]   :      1975           Visit Date:   2019      Document: [id number]

## 2019-09-11 ENCOUNTER — HOSPITAL ENCOUNTER (INPATIENT)
Facility: CLINIC | Age: 44
LOS: 2 days | Discharge: HOME | DRG: 380 | End: 2019-09-14
Attending: EMERGENCY MEDICINE | Admitting: INTERNAL MEDICINE
Payer: COMMERCIAL

## 2019-09-11 DIAGNOSIS — K85.20 ALCOHOL-INDUCED ACUTE PANCREATITIS, UNSPECIFIED COMPLICATION STATUS: ICD-10-CM

## 2019-09-11 DIAGNOSIS — F10.930 ALCOHOL WITHDRAWAL SYNDROME WITHOUT COMPLICATION (H): ICD-10-CM

## 2019-09-11 DIAGNOSIS — K22.11 ESOPHAGEAL ULCER WITH BLEEDING: ICD-10-CM

## 2019-09-11 DIAGNOSIS — F41.9 ANXIETY: Primary | ICD-10-CM

## 2019-09-11 DIAGNOSIS — K92.0 HEMATEMESIS WITH NAUSEA: ICD-10-CM

## 2019-09-11 LAB
ALBUMIN SERPL-MCNC: 4.1 G/DL (ref 3.4–5)
ALP SERPL-CCNC: 66 U/L (ref 40–150)
ALT SERPL W P-5'-P-CCNC: 68 U/L (ref 0–70)
ANION GAP SERPL CALCULATED.3IONS-SCNC: 8 MMOL/L (ref 3–14)
AST SERPL W P-5'-P-CCNC: 58 U/L (ref 0–45)
BASOPHILS # BLD AUTO: 0 10E9/L (ref 0–0.2)
BASOPHILS NFR BLD AUTO: 0.7 %
BILIRUB SERPL-MCNC: 0.5 MG/DL (ref 0.2–1.3)
BUN SERPL-MCNC: 9 MG/DL (ref 7–30)
CALCIUM SERPL-MCNC: 8.7 MG/DL (ref 8.5–10.1)
CHLORIDE SERPL-SCNC: 102 MMOL/L (ref 94–109)
CO2 SERPL-SCNC: 27 MMOL/L (ref 20–32)
CREAT SERPL-MCNC: 0.86 MG/DL (ref 0.66–1.25)
DIFFERENTIAL METHOD BLD: ABNORMAL
EOSINOPHIL # BLD AUTO: 0 10E9/L (ref 0–0.7)
EOSINOPHIL NFR BLD AUTO: 0.7 %
ERYTHROCYTE [DISTWIDTH] IN BLOOD BY AUTOMATED COUNT: 12.8 % (ref 10–15)
ETHANOL SERPL-MCNC: 0.37 G/DL
GFR SERPL CREATININE-BSD FRML MDRD: >90 ML/MIN/{1.73_M2}
GLUCOSE SERPL-MCNC: 144 MG/DL (ref 70–99)
HCT VFR BLD AUTO: 47 % (ref 40–53)
HGB BLD-MCNC: 16.4 G/DL (ref 13.3–17.7)
IMM GRANULOCYTES # BLD: 0 10E9/L (ref 0–0.4)
IMM GRANULOCYTES NFR BLD: 0.4 %
LIPASE SERPL-CCNC: 484 U/L (ref 73–393)
LYMPHOCYTES # BLD AUTO: 1.4 10E9/L (ref 0.8–5.3)
LYMPHOCYTES NFR BLD AUTO: 30.2 %
MAGNESIUM SERPL-MCNC: 2.2 MG/DL (ref 1.6–2.3)
MCH RBC QN AUTO: 35 PG (ref 26.5–33)
MCHC RBC AUTO-ENTMCNC: 34.9 G/DL (ref 31.5–36.5)
MCV RBC AUTO: 100 FL (ref 78–100)
MONOCYTES # BLD AUTO: 0.5 10E9/L (ref 0–1.3)
MONOCYTES NFR BLD AUTO: 10.8 %
NEUTROPHILS # BLD AUTO: 2.6 10E9/L (ref 1.6–8.3)
NEUTROPHILS NFR BLD AUTO: 57.2 %
NRBC # BLD AUTO: 0 10*3/UL
NRBC BLD AUTO-RTO: 0 /100
PLATELET # BLD AUTO: 223 10E9/L (ref 150–450)
POTASSIUM SERPL-SCNC: 3.4 MMOL/L (ref 3.4–5.3)
PROT SERPL-MCNC: 8 G/DL (ref 6.8–8.8)
RBC # BLD AUTO: 4.68 10E12/L (ref 4.4–5.9)
SODIUM SERPL-SCNC: 137 MMOL/L (ref 133–144)
WBC # BLD AUTO: 4.5 10E9/L (ref 4–11)

## 2019-09-11 PROCEDURE — 85025 COMPLETE CBC W/AUTO DIFF WBC: CPT | Performed by: EMERGENCY MEDICINE

## 2019-09-11 PROCEDURE — 96366 THER/PROPH/DIAG IV INF ADDON: CPT

## 2019-09-11 PROCEDURE — 99207 ZZC CDG-MDM COMPONENT: MEETS MODERATE - UP CODED: CPT | Performed by: INTERNAL MEDICINE

## 2019-09-11 PROCEDURE — 96376 TX/PRO/DX INJ SAME DRUG ADON: CPT

## 2019-09-11 PROCEDURE — 96375 TX/PRO/DX INJ NEW DRUG ADDON: CPT

## 2019-09-11 PROCEDURE — 99285 EMERGENCY DEPT VISIT HI MDM: CPT | Mod: 25

## 2019-09-11 PROCEDURE — 80320 DRUG SCREEN QUANTALCOHOLS: CPT | Performed by: EMERGENCY MEDICINE

## 2019-09-11 PROCEDURE — 25000128 H RX IP 250 OP 636: Performed by: EMERGENCY MEDICINE

## 2019-09-11 PROCEDURE — 80053 COMPREHEN METABOLIC PANEL: CPT | Performed by: EMERGENCY MEDICINE

## 2019-09-11 PROCEDURE — 99223 1ST HOSP IP/OBS HIGH 75: CPT | Mod: AI | Performed by: INTERNAL MEDICINE

## 2019-09-11 PROCEDURE — 25800030 ZZH RX IP 258 OP 636: Performed by: EMERGENCY MEDICINE

## 2019-09-11 PROCEDURE — 85610 PROTHROMBIN TIME: CPT | Performed by: EMERGENCY MEDICINE

## 2019-09-11 PROCEDURE — 83690 ASSAY OF LIPASE: CPT | Performed by: EMERGENCY MEDICINE

## 2019-09-11 PROCEDURE — 83735 ASSAY OF MAGNESIUM: CPT | Performed by: EMERGENCY MEDICINE

## 2019-09-11 PROCEDURE — 96365 THER/PROPH/DIAG IV INF INIT: CPT

## 2019-09-11 PROCEDURE — 25000125 ZZHC RX 250: Performed by: EMERGENCY MEDICINE

## 2019-09-11 RX ORDER — ONDANSETRON 2 MG/ML
4 INJECTION INTRAMUSCULAR; INTRAVENOUS ONCE
Status: DISCONTINUED | OUTPATIENT
Start: 2019-09-11 | End: 2019-09-11 | Stop reason: CLARIF

## 2019-09-11 RX ORDER — ONDANSETRON 2 MG/ML
4 INJECTION INTRAMUSCULAR; INTRAVENOUS EVERY 30 MIN PRN
Status: COMPLETED | OUTPATIENT
Start: 2019-09-11 | End: 2019-09-11

## 2019-09-11 RX ADMIN — ONDANSETRON 4 MG: 2 INJECTION INTRAMUSCULAR; INTRAVENOUS at 23:38

## 2019-09-11 RX ADMIN — SODIUM CHLORIDE 1000 ML: 9 INJECTION, SOLUTION INTRAVENOUS at 22:46

## 2019-09-11 RX ADMIN — THIAMINE HYDROCHLORIDE: 100 INJECTION, SOLUTION INTRAMUSCULAR; INTRAVENOUS at 22:42

## 2019-09-11 RX ADMIN — ONDANSETRON 4 MG: 2 INJECTION INTRAMUSCULAR; INTRAVENOUS at 22:17

## 2019-09-11 RX ADMIN — ONDANSETRON 4 MG: 2 INJECTION INTRAMUSCULAR; INTRAVENOUS at 22:45

## 2019-09-11 ASSESSMENT — ENCOUNTER SYMPTOMS
VOMITING: 1
NAUSEA: 1
DIARRHEA: 1
ABDOMINAL PAIN: 0

## 2019-09-11 ASSESSMENT — MIFFLIN-ST. JEOR: SCORE: 1702.72

## 2019-09-12 LAB
HGB BLD-MCNC: 13.9 G/DL (ref 13.3–17.7)
HGB BLD-MCNC: 13.9 G/DL (ref 13.3–17.7)
INR PPP: 0.93 (ref 0.86–1.14)
UPPER GI ENDOSCOPY: NORMAL

## 2019-09-12 PROCEDURE — 0DJ08ZZ INSPECTION OF UPPER INTESTINAL TRACT, VIA NATURAL OR ARTIFICIAL OPENING ENDOSCOPIC: ICD-10-PCS | Performed by: INTERNAL MEDICINE

## 2019-09-12 PROCEDURE — 36415 COLL VENOUS BLD VENIPUNCTURE: CPT | Performed by: HOSPITALIST

## 2019-09-12 PROCEDURE — 40000104 ZZH STATISTIC MODERATE SEDATION < 10 MIN: Performed by: INTERNAL MEDICINE

## 2019-09-12 PROCEDURE — 12000000 ZZH R&B MED SURG/OB

## 2019-09-12 PROCEDURE — HZ2ZZZZ DETOXIFICATION SERVICES FOR SUBSTANCE ABUSE TREATMENT: ICD-10-PCS | Performed by: INTERNAL MEDICINE

## 2019-09-12 PROCEDURE — 85018 HEMOGLOBIN: CPT | Performed by: HOSPITALIST

## 2019-09-12 PROCEDURE — 25000132 ZZH RX MED GY IP 250 OP 250 PS 637: Performed by: INTERNAL MEDICINE

## 2019-09-12 PROCEDURE — 43235 EGD DIAGNOSTIC BRUSH WASH: CPT | Performed by: INTERNAL MEDICINE

## 2019-09-12 PROCEDURE — 25000131 ZZH RX MED GY IP 250 OP 636 PS 637: Performed by: INTERNAL MEDICINE

## 2019-09-12 PROCEDURE — 25000125 ZZHC RX 250: Performed by: INTERNAL MEDICINE

## 2019-09-12 PROCEDURE — 99232 SBSQ HOSP IP/OBS MODERATE 35: CPT | Performed by: HOSPITALIST

## 2019-09-12 PROCEDURE — 25000128 H RX IP 250 OP 636: Performed by: INTERNAL MEDICINE

## 2019-09-12 PROCEDURE — 25800030 ZZH RX IP 258 OP 636: Performed by: INTERNAL MEDICINE

## 2019-09-12 PROCEDURE — C9113 INJ PANTOPRAZOLE SODIUM, VIA: HCPCS | Performed by: INTERNAL MEDICINE

## 2019-09-12 RX ORDER — AMOXICILLIN 250 MG
1 CAPSULE ORAL 2 TIMES DAILY PRN
Status: DISCONTINUED | OUTPATIENT
Start: 2019-09-12 | End: 2019-09-14 | Stop reason: HOSPADM

## 2019-09-12 RX ORDER — LIDOCAINE 40 MG/G
CREAM TOPICAL
Status: DISCONTINUED | OUTPATIENT
Start: 2019-09-12 | End: 2019-09-12

## 2019-09-12 RX ORDER — POTASSIUM CL/LIDO/0.9 % NACL 10MEQ/0.1L
10 INTRAVENOUS SOLUTION, PIGGYBACK (ML) INTRAVENOUS
Status: DISCONTINUED | OUTPATIENT
Start: 2019-09-12 | End: 2019-09-14 | Stop reason: HOSPADM

## 2019-09-12 RX ORDER — LANOLIN ALCOHOL/MO/W.PET/CERES
3 CREAM (GRAM) TOPICAL
Status: DISCONTINUED | OUTPATIENT
Start: 2019-09-12 | End: 2019-09-14 | Stop reason: HOSPADM

## 2019-09-12 RX ORDER — LANOLIN ALCOHOL/MO/W.PET/CERES
100 CREAM (GRAM) TOPICAL DAILY
Status: DISCONTINUED | OUTPATIENT
Start: 2019-09-12 | End: 2019-09-14 | Stop reason: HOSPADM

## 2019-09-12 RX ORDER — MULTIPLE VITAMINS W/ MINERALS TAB 9MG-400MCG
1 TAB ORAL DAILY
Status: DISCONTINUED | OUTPATIENT
Start: 2019-09-12 | End: 2019-09-14 | Stop reason: HOSPADM

## 2019-09-12 RX ORDER — POTASSIUM CHLORIDE 1.5 G/1.58G
20-40 POWDER, FOR SOLUTION ORAL
Status: DISCONTINUED | OUTPATIENT
Start: 2019-09-12 | End: 2019-09-14 | Stop reason: HOSPADM

## 2019-09-12 RX ORDER — ONDANSETRON 4 MG/1
4 TABLET, ORALLY DISINTEGRATING ORAL EVERY 8 HOURS PRN
COMMUNITY
End: 2020-02-03

## 2019-09-12 RX ORDER — NALOXONE HYDROCHLORIDE 0.4 MG/ML
.1-.4 INJECTION, SOLUTION INTRAMUSCULAR; INTRAVENOUS; SUBCUTANEOUS
Status: DISCONTINUED | OUTPATIENT
Start: 2019-09-12 | End: 2019-09-14 | Stop reason: HOSPADM

## 2019-09-12 RX ORDER — DIAZEPAM 5 MG
10 TABLET ORAL EVERY 30 MIN PRN
Status: DISCONTINUED | OUTPATIENT
Start: 2019-09-12 | End: 2019-09-14 | Stop reason: HOSPADM

## 2019-09-12 RX ORDER — ACETAMINOPHEN 650 MG/1
650 SUPPOSITORY RECTAL EVERY 4 HOURS PRN
Status: DISCONTINUED | OUTPATIENT
Start: 2019-09-12 | End: 2019-09-14 | Stop reason: HOSPADM

## 2019-09-12 RX ORDER — AMOXICILLIN 250 MG
2 CAPSULE ORAL 2 TIMES DAILY PRN
Status: DISCONTINUED | OUTPATIENT
Start: 2019-09-12 | End: 2019-09-14 | Stop reason: HOSPADM

## 2019-09-12 RX ORDER — METOPROLOL SUCCINATE 50 MG/1
50 TABLET, EXTENDED RELEASE ORAL DAILY
Status: DISCONTINUED | OUTPATIENT
Start: 2019-09-12 | End: 2019-09-14 | Stop reason: HOSPADM

## 2019-09-12 RX ORDER — POTASSIUM CHLORIDE 1500 MG/1
20-40 TABLET, EXTENDED RELEASE ORAL
Status: DISCONTINUED | OUTPATIENT
Start: 2019-09-12 | End: 2019-09-14 | Stop reason: HOSPADM

## 2019-09-12 RX ORDER — POTASSIUM CHLORIDE 7.45 MG/ML
10 INJECTION INTRAVENOUS
Status: DISCONTINUED | OUTPATIENT
Start: 2019-09-12 | End: 2019-09-14 | Stop reason: HOSPADM

## 2019-09-12 RX ORDER — ONDANSETRON 2 MG/ML
4 INJECTION INTRAMUSCULAR; INTRAVENOUS EVERY 6 HOURS PRN
Status: DISCONTINUED | OUTPATIENT
Start: 2019-09-12 | End: 2019-09-14 | Stop reason: HOSPADM

## 2019-09-12 RX ORDER — HYDRALAZINE HYDROCHLORIDE 20 MG/ML
10 INJECTION INTRAMUSCULAR; INTRAVENOUS EVERY 6 HOURS PRN
Status: DISCONTINUED | OUTPATIENT
Start: 2019-09-12 | End: 2019-09-14 | Stop reason: HOSPADM

## 2019-09-12 RX ORDER — BISACODYL 10 MG
10 SUPPOSITORY, RECTAL RECTAL DAILY PRN
Status: DISCONTINUED | OUTPATIENT
Start: 2019-09-12 | End: 2019-09-14 | Stop reason: HOSPADM

## 2019-09-12 RX ORDER — POLYETHYLENE GLYCOL 3350 17 G/17G
17 POWDER, FOR SOLUTION ORAL DAILY PRN
Status: DISCONTINUED | OUTPATIENT
Start: 2019-09-12 | End: 2019-09-14 | Stop reason: HOSPADM

## 2019-09-12 RX ORDER — DIAZEPAM 10 MG/2ML
5-10 INJECTION, SOLUTION INTRAMUSCULAR; INTRAVENOUS EVERY 30 MIN PRN
Status: DISCONTINUED | OUTPATIENT
Start: 2019-09-12 | End: 2019-09-14 | Stop reason: HOSPADM

## 2019-09-12 RX ORDER — MAGNESIUM SULFATE HEPTAHYDRATE 40 MG/ML
4 INJECTION, SOLUTION INTRAVENOUS EVERY 4 HOURS PRN
Status: DISCONTINUED | OUTPATIENT
Start: 2019-09-12 | End: 2019-09-14 | Stop reason: HOSPADM

## 2019-09-12 RX ORDER — PROCHLORPERAZINE MALEATE 5 MG
10 TABLET ORAL EVERY 6 HOURS PRN
Status: DISCONTINUED | OUTPATIENT
Start: 2019-09-12 | End: 2019-09-14 | Stop reason: HOSPADM

## 2019-09-12 RX ORDER — NITROGLYCERIN 0.4 MG/1
0.4 TABLET SUBLINGUAL EVERY 5 MIN PRN
Status: DISCONTINUED | OUTPATIENT
Start: 2019-09-12 | End: 2019-09-14 | Stop reason: HOSPADM

## 2019-09-12 RX ORDER — ONDANSETRON 4 MG/1
4 TABLET, ORALLY DISINTEGRATING ORAL EVERY 6 HOURS PRN
Status: DISCONTINUED | OUTPATIENT
Start: 2019-09-12 | End: 2019-09-14 | Stop reason: HOSPADM

## 2019-09-12 RX ORDER — LIDOCAINE 40 MG/G
CREAM TOPICAL
Status: DISCONTINUED | OUTPATIENT
Start: 2019-09-12 | End: 2019-09-14 | Stop reason: HOSPADM

## 2019-09-12 RX ORDER — POTASSIUM CHLORIDE 29.8 MG/ML
20 INJECTION INTRAVENOUS
Status: DISCONTINUED | OUTPATIENT
Start: 2019-09-12 | End: 2019-09-14 | Stop reason: HOSPADM

## 2019-09-12 RX ORDER — ACETAMINOPHEN 325 MG/1
650 TABLET ORAL EVERY 4 HOURS PRN
Status: DISCONTINUED | OUTPATIENT
Start: 2019-09-12 | End: 2019-09-14 | Stop reason: HOSPADM

## 2019-09-12 RX ORDER — PROCHLORPERAZINE 25 MG
25 SUPPOSITORY, RECTAL RECTAL EVERY 12 HOURS PRN
Status: DISCONTINUED | OUTPATIENT
Start: 2019-09-12 | End: 2019-09-14 | Stop reason: HOSPADM

## 2019-09-12 RX ORDER — FOLIC ACID 1 MG/1
1 TABLET ORAL DAILY
Status: DISCONTINUED | OUTPATIENT
Start: 2019-09-12 | End: 2019-09-14 | Stop reason: HOSPADM

## 2019-09-12 RX ORDER — FENTANYL CITRATE 50 UG/ML
INJECTION, SOLUTION INTRAMUSCULAR; INTRAVENOUS PRN
Status: DISCONTINUED | OUTPATIENT
Start: 2019-09-12 | End: 2019-09-12 | Stop reason: HOSPADM

## 2019-09-12 RX ADMIN — PANTOPRAZOLE SODIUM 40 MG: 40 INJECTION, POWDER, FOR SOLUTION INTRAVENOUS at 20:21

## 2019-09-12 RX ADMIN — Medication 100 MG: at 08:55

## 2019-09-12 RX ADMIN — FOLIC ACID 1 MG: 1 TABLET ORAL at 08:55

## 2019-09-12 RX ADMIN — DIAZEPAM 10 MG: 5 TABLET ORAL at 16:10

## 2019-09-12 RX ADMIN — DIAZEPAM 10 MG: 5 TABLET ORAL at 18:08

## 2019-09-12 RX ADMIN — ONDANSETRON 4 MG: 4 TABLET, ORALLY DISINTEGRATING ORAL at 17:06

## 2019-09-12 RX ADMIN — PANTOPRAZOLE SODIUM 40 MG: 40 INJECTION, POWDER, FOR SOLUTION INTRAVENOUS at 00:58

## 2019-09-12 RX ADMIN — DIAZEPAM 10 MG: 5 TABLET ORAL at 17:06

## 2019-09-12 RX ADMIN — DEXTROSE AND SODIUM CHLORIDE: 5; 900 INJECTION, SOLUTION INTRAVENOUS at 00:45

## 2019-09-12 RX ADMIN — DEXTROSE AND SODIUM CHLORIDE: 5; 900 INJECTION, SOLUTION INTRAVENOUS at 12:54

## 2019-09-12 RX ADMIN — DEXTROSE AND SODIUM CHLORIDE: 5; 900 INJECTION, SOLUTION INTRAVENOUS at 22:41

## 2019-09-12 RX ADMIN — PROCHLORPERAZINE MALEATE 10 MG: 5 TABLET ORAL at 20:21

## 2019-09-12 RX ADMIN — Medication 3 MG: at 00:58

## 2019-09-12 RX ADMIN — METOPROLOL SUCCINATE 50 MG: 50 TABLET, EXTENDED RELEASE ORAL at 08:54

## 2019-09-12 RX ADMIN — MULTIPLE VITAMINS W/ MINERALS TAB 1 TABLET: TAB at 08:55

## 2019-09-12 RX ADMIN — ACETAMINOPHEN 650 MG: 325 TABLET ORAL at 15:01

## 2019-09-12 RX ADMIN — PANTOPRAZOLE SODIUM 40 MG: 40 INJECTION, POWDER, FOR SOLUTION INTRAVENOUS at 08:54

## 2019-09-12 RX ADMIN — SERTRALINE HYDROCHLORIDE 50 MG: 50 TABLET ORAL at 08:54

## 2019-09-12 RX ADMIN — ONDANSETRON 4 MG: 2 INJECTION INTRAMUSCULAR; INTRAVENOUS at 11:06

## 2019-09-12 ASSESSMENT — ACTIVITIES OF DAILY LIVING (ADL)
TOILETING: 0-->INDEPENDENT
FALL_HISTORY_WITHIN_LAST_SIX_MONTHS: YES
NUMBER_OF_TIMES_PATIENT_HAS_FALLEN_WITHIN_LAST_SIX_MONTHS: 1
TRANSFERRING: 0-->INDEPENDENT
BATHING: 0-->INDEPENDENT
ADLS_ACUITY_SCORE: 14
SWALLOWING: 0-->SWALLOWS FOODS/LIQUIDS WITHOUT DIFFICULTY
ADLS_ACUITY_SCORE: 14
ADLS_ACUITY_SCORE: 14
COGNITION: 0 - NO COGNITION ISSUES REPORTED
DRESS: 0-->INDEPENDENT
ADLS_ACUITY_SCORE: 14
AMBULATION: 0-->INDEPENDENT
ADLS_ACUITY_SCORE: 14
RETIRED_COMMUNICATION: 0-->UNDERSTANDS/COMMUNICATES WITHOUT DIFFICULTY
RETIRED_EATING: 0-->INDEPENDENT

## 2019-09-12 ASSESSMENT — ENCOUNTER SYMPTOMS: TREMORS: 1

## 2019-09-12 NOTE — PLAN OF CARE
DATE & TIME: 9-12-19 from 0030 to 0730    Cognitive Concerns/ Orientation : A&O x 4   BEHAVIOR & AGGRESSION TOOL COLOR: Green, calm and cooperative for cares  CIWA SCORE: 3&O  ABNL VS/O2: VSS on RA  MOBILITY: Up SBA, ambulated to the room  PAIN MANAGMENT: reported headache rating 3/10 declined intervention  DIET: NPO until GI consulted for possible endoscopy  BOWEL/BLADDER: Continent  ABNL LAB/BG: AST 58. Lipase 483, , Ethanol 0.37  DRAIN/DEVICES: IVF infusing 100 mL/hr  TELEMETRY RHYTHM: NSR  SKIN: flushed, intact  TESTS/PROCEDURES: GI consult pending, possible endoscopy  D/C DAY/GOALS/PLACE: Discharge 2-3 days recommended to inpatient chemical dependent Vs home once safe deposition plan.   OTHER IMPORTANT INFO:   Admission    Patient arrives to room 622-1 via cart from ED.  Care plan note: See shift report note    Inpatient nursing criteria listed below were met:    PCD's Documented: Yes  Skin issues/needs documented :NA  Isolation education started/completed NA  Patient allergies verified with patient: Yes  Verified completion of [place] Risk Assessment Tool:  Yes  Verified completion of Guardianship screening tool: Yes  Fall Prevention: Care plan updated, Education given and documented Yes  Care Plan initiated: Yes  Home medications documented in belongings flowsheet: NA  Patient belongings documented in belongings flowsheet: Yes  Reminder note (belongings/ medications) placed in discharge instructions:NA  Admission profile/ required documentation complete: Yes  Bedside Report Letter given and explained to patient Yes

## 2019-09-12 NOTE — ED NOTES
DATE:  9/11/2019   TIME OF RECEIPT FROM LAB:  7247   LAB TEST:  Ethanol  LAB VALUE:  0.37  RESULTS GIVEN WITH READ-BACK TO (PROVIDER):  Dr. Ivy  TIME LAB VALUE REPORTED TO PROVIDER:   8274

## 2019-09-12 NOTE — PROGRESS NOTES
"Noted 9/11 MD dispo plan, \"2 - 3 days, recommended to Inpatient chemical dependency treatment versus prior living arrangement once safe disposition plan/ TCU bed available.\"  GI, CD, and Psych consults in place.  Added Care Transition RN / SW IP consult for 9/13 given the above.   "

## 2019-09-12 NOTE — CONSULTS
"[facility] Initial Psychiatric Consult Note      TIME SPENT IN PSYCHIATRY INITIAL CONSULT: 55 MINUTES    Consult ordered by: Chele Victoria MD  Reason: Alcohol dependence with withdrawal      Initial History     The patient's care was discussed, patient seen and chart notes were reviewed.    Patient examined for psychiatric consultation.     IDENTIFICATION  Patient is a 44 year old  male. Patient sees PCP Joe Mcmullen. Patient seen on 9/12/19 for an initial psychiatric consultation.  He does not have a psychiatrist or a therapist, however reports that he has seen some in the past.    HISTORY OF PRESENT ILLNESS  Ravi Shah is a 44-year-old  male that was admitted on 9/11/2019 for Delirium Tremens (DTS).  He has a history of alcohol dependence with alcohol withdrawal delirium.  He was last hospitalized in February 2019 for alcohol withdrawal.  He went to treatment following this, and was able to remain sober for three months, however was unable to remain sober and reports drinking approximately 3/4 L of Vodka daily.  The patient's 9/11/2019 AST was slightly elevated and platelets were normal. He drinks at his home and does not drink at the bar \"because then I would have to find a way home.\"  The patient has a history of depression and anxiety which he feels has not been controled on Zoloft 50 mg.  He reports having taken a maximum dose of Zoloft 100 mg, however \"this made me feel sick.\"  He has been on Effexor in the past, which also thought was helpful.  Discussed starting Remeron to treat his depression and anxiety instead, he expresses willingness.  Discussed starting an chemical dependency treatment program, he expresses concern as he wants to continue working.  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  He first started drinking alcohol at age 13 and reports that his daily use \"varies\" and approximates that his use to be 3/4 L of Vodka daily.  He does acknowledge feelings of guilt around his use and his wife has asked him to cut down his drinking.  A work friend of his  5 years ago and acknowledges that his drinking increased following his death.  He denies any DUIs or pending legal issues due to his alcohol use.  He reports that he does not drink at the bar, rather drinks at home.  He reports having a sponsor, however this has not been helpful for him.  He reports previous use of marijuana, cocaine, opiates, LSD, mushrooms and ecstasy.  He states he has not used these substances for over 15 years.         PAST PSYCHIATRIC HISTORY    He is currently prescribed Zoloft 50 mg stating \"I like this medication.\"  Discussed that this medication can be agitating and that Remeron may be a better option for him, he expresses interest.  He reports having seen a psychiatrist in the past however, he does not remember who he has seen.      FAMILY HISTORY    He denies a family history of mental illness, however notes that his son has a history of ADHD.  He acknowledges that his father and paternal grandfather have a history of alcohol dependence.      SOCIAL HISTORY    Patient was born and raised in [place].  He has an older sister (50).  His parents  three years ago.  He states his relationship with his parents is \"awesome\" and that he had a good childhood \"tthey always loved me.\"  The patient is currently living with his wife and son in their home in [place].  She does drink as well, he states \"She would quit if I did too.\"  He provides her number ([phone]).  The patient has a 4 year highschool degree and has two years of college, however never received a degree.  He " last work one week ago.  He works as a  and his wife works for [employer] (business woman).  He denies having any financial stressors, pending criminal charges and has not served in the .         Medications     Medications Prior to Admission   Medication Sig Dispense Refill Last Dose     hypromellose (ARTIFICIAL TEARS) 0.5 % SOLN ophthalmic solution Place 1 drop into both eyes 4 times daily as needed for dry eyes   Past Week at Unknown time     ondansetron (ZOFRAN-ODT) 4 MG ODT tab Take 4 mg by mouth every 8 hours as needed for nausea   Past Week at Unknown time     vitamin B1 (THIAMINE) 100 MG tablet Take 1 tablet (100 mg) by mouth daily 30 tablet 0 Past Week at Unknown time     meloxicam (MOBIC) 15 MG tablet Take 15 mg by mouth daily as needed    More than a month at Unknown time     metoprolol succinate ER (TOPROL-XL) 50 MG 24 hr tablet Take 50 mg by mouth daily    9/10/2019 at am     sertraline (ZOLOFT) 50 MG tablet Take 50 mg by mouth daily    9/10/2019 at am       Scheduled Medications    folic acid  1 mg Oral Daily     metoprolol succinate ER  50 mg Oral Daily     multivitamin w/minerals  1 tablet Oral Daily     pantoprazole (PROTONIX) IV  40 mg Intravenous BID     sertraline  50 mg Oral Daily     sodium chloride (PF)  3 mL Intracatheter Q8H     vitamin B1  100 mg Oral Daily     PRNs:  acetaminophen, acetaminophen, bisacodyl, diazepam **OR** diazepam, lidocaine 4%, lidocaine (buffered or not buffered), magnesium sulfate, melatonin, naloxone, nitroGLYcerin, ondansetron **OR** ondansetron, polyethylene glycol, potassium chloride, potassium chloride with lidocaine, potassium chloride, potassium chloride, potassium chloride, prochlorperazine **OR** prochlorperazine **OR** prochlorperazine, senna-docusate **OR** senna-docusate, sodium chloride (PF)      Allergies      No Known Allergies     Previous Medical History     Past Medical History:   Diagnosis Date     Anxiety      Back pain       "Depressive disorder      Hypertension         Medical Review of Systems     /68   Pulse 77   Temp 97.9  F (36.6  C) (Oral)   Resp 16   Ht 1.778 m (5' 10\")   Wt 81.6 kg (180 lb)   SpO2 97%   BMI 25.83 kg/m    Body mass index is 25.83 kg/m .    Previous 10-point ROS completed by Alondra Ivy MD on 9/11/19 reviewed by Jae Ledesma MD on September 12, 2019 and is unchanged except for those problems mentioned within the HPI.      Mental Status Examination     Appearance Lying in bed, dressed in gown. Appears stated age.   Attitude Cooperative   Orientation Oriented to person, place, time   Eye Contact Poor   Speech Regular rate, rhythm, volume and tone   Language Normal   Psychomotor Behavior Normal   Thought Process Goal-2Oriented, Intact   Associations No loose associations   Thought Content Logical and linear   Mood Depressed   Affect Flat   Fund of Knowledge Intact   Insight Fair, understands that his drinking is an issue   Judgement Poor, continues to drink despite history of alcohol withdrawal and treatment.   Attention Span & Concentration Intact   Recent & Remote Memory Intact   Gait Normal   Muscle Tone Intact      Labs     Labs reviewed.  Recent Results (from the past 24 hour(s))   CBC with platelets differential    Collection Time: 09/11/19 10:19 PM   Result Value Ref Range    WBC 4.5 4.0 - 11.0 10e9/L    RBC Count 4.68 4.4 - 5.9 10e12/L    Hemoglobin 16.4 13.3 - 17.7 g/dL    Hematocrit 47.0 40.0 - 53.0 %     78 - 100 fl    MCH 35.0 (H) 26.5 - 33.0 pg    MCHC 34.9 31.5 - 36.5 g/dL    RDW 12.8 10.0 - 15.0 %    Platelet Count 223 150 - 450 10e9/L    Diff Method Automated Method     % Neutrophils 57.2 %    % Lymphocytes 30.2 %    % Monocytes 10.8 %    % Eosinophils 0.7 %    % Basophils 0.7 %    % Immature Granulocytes 0.4 %    Nucleated RBCs 0 0 /100    Absolute Neutrophil 2.6 1.6 - 8.3 10e9/L    Absolute Lymphocytes 1.4 0.8 - 5.3 10e9/L    Absolute Monocytes 0.5 0.0 - 1.3 10e9/L "    Absolute Eosinophils 0.0 0.0 - 0.7 10e9/L    Absolute Basophils 0.0 0.0 - 0.2 10e9/L    Abs Immature Granulocytes 0.0 0 - 0.4 10e9/L    Absolute Nucleated RBC 0.0    Comprehensive metabolic panel    Collection Time: 09/11/19 10:19 PM   Result Value Ref Range    Sodium 137 133 - 144 mmol/L    Potassium 3.4 3.4 - 5.3 mmol/L    Chloride 102 94 - 109 mmol/L    Carbon Dioxide 27 20 - 32 mmol/L    Anion Gap 8 3 - 14 mmol/L    Glucose 144 (H) 70 - 99 mg/dL    Urea Nitrogen 9 7 - 30 mg/dL    Creatinine 0.86 0.66 - 1.25 mg/dL    GFR Estimate >90 >60 mL/min/[1.73_m2]    GFR Estimate If Black >90 >60 mL/min/[1.73_m2]    Calcium 8.7 8.5 - 10.1 mg/dL    Bilirubin Total 0.5 0.2 - 1.3 mg/dL    Albumin 4.1 3.4 - 5.0 g/dL    Protein Total 8.0 6.8 - 8.8 g/dL    Alkaline Phosphatase 66 40 - 150 U/L    ALT 68 0 - 70 U/L    AST 58 (H) 0 - 45 U/L   Lipase    Collection Time: 09/11/19 10:19 PM   Result Value Ref Range    Lipase 484 (H) 73 - 393 U/L   Alcohol ethyl    Collection Time: 09/11/19 10:19 PM   Result Value Ref Range    Ethanol g/dL 0.37 (HH) <0.01 g/dL   Magnesium    Collection Time: 09/11/19 10:19 PM   Result Value Ref Range    Magnesium 2.2 1.6 - 2.3 mg/dL   INR    Collection Time: 09/11/19 10:19 PM   Result Value Ref Range    INR 0.93 0.86 - 1.14   UPPER GI ENDOSCOPY    Collection Time: 09/12/19 10:07 AM   Result Value Ref Range    Upper GI Endoscopy       [facility] Endoscopy Department  _______________________________________________________________________________  Patient Name: Ravi Shah             Procedure Date: 9/12/2019 10:07 AM  MRN: [id number]                       Account Number: [id number]  YOB: 1975               Admit Type: Inpatient  Age: 44                               Room: 2  Note Status: Finalized                Attending MD: Scott Mcrae MD  Total Sedation Time:                  Instrument Name: 402 GIF-  Gastroscope  _______________________________________________________________________________     Procedure:                Upper GI endoscopy  Indications:              Functional Dyspepsia, Heartburn  Providers:                Scott Mcrae MD, Cari Carpenter RN  Referring MD:               Medicines:                Fentanyl 100 micrograms IV, Midazolam 4 mg IV,                             Cetacaine spray  Complications:            No immediate complications.  _____ __________________________________________________________________________  Procedure:                Pre-Anesthesia Assessment:                            - Prior to the procedure, a History and Physical                             was performed, and patient medications and                             allergies were reviewed. The patient is competent.                             The risks and benefits of the procedure and the                             sedation options and risks were discussed with the                             patient. All questions were answered and informed                             consent was obtained. Patient identification and                             proposed procedure were verified by the physician                             in the pre-procedure area. Mental Status                             Examination: alert and oriented. Airway                             Examination: normal oropharyngeal airway and neck                             mobility. Resp iratory Examination: clear to                             auscultation. CV Examination: normal. Prophylactic                             Antibiotics: The patient does not require                             prophylactic antibiotics. Prior Anticoagulants: The                             patient has taken no previous anticoagulant or                             antiplatelet agents. ASA Grade Assessment: II - A                             patient with mild systemic  disease. After reviewing                             the risks and benefits, the patient was deemed in                             satisfactory condition to undergo the procedure.                             The anesthesia plan was to use moderate sedation /                             analgesia (conscious sedation). Immediately prior                             to administration of medications, the patient was                             re-assessed for adequacy to receive sedatives. The                             heart rate,  respiratory rate, oxygen saturations,                             blood pressure, adequacy of pulmonary ventilation,                             and response to care were monitored throughout the                             procedure. The physical status of the patient was                             re-assessed after the procedure.                            After obtaining informed consent, the endoscope was                             passed under direct vision. Throughout the                             procedure, the patient's blood pressure, pulse, and                             oxygen saturations were monitored continuously. The                             Endoscope was introduced through the mouth, and                             advanced to the third part of duodenum. The upper                             GI endoscopy was accomplished without difficulty.                             The patient tolerated the procedure well.                                                                                    Findings:       Two cratered esophageal ulcers with no bleeding and no stigmata of        recent bleeding were found 37 cm from the incisors. The largest lesion        was 10 mm in largest dimension.       A medium-sized hiatal hernia was present.       Diffuse moderately erythematous mucosa without bleeding was found in the        entire examined stomach.       The cardia and  gastric fundus were normal on retroflexion.       The duodenal bulb, first portion of the duodenum and second portion of        the duodenum were normal.                                                                                   Impression:               - Non-bleeding esophageal ulcers.                            - Medium-sized hiatal hernia.                            - Erythematous mucosa in the stomach.                            - Normal duodenal bulb, first portion of the                             duodenum and second portion of the duodenum.                             - No specimens collected.  Recommendation:           - Anti Relux Precautions                            - Use Protonix (pantoprazole) 40 mg PO BID.                            - Return patient to hospital draper.                            - Mechanical soft diet.                                                                                   Procedure Code(s):       --- Professional ---       51996, Esophagogastroduodenoscopy, flexible, transoral; diagnostic,        including collection of specimen(s) by brushing or washing, when        performed (separate procedure)  Diagnosis Code(s):       --- Professional ---       K22.10, Ulcer of esophagus without bleeding       K44.9, Diaphragmatic hernia without obstruction or gangrene       K31.89, Other diseases of stomach and duodenum       K30, Functional dyspepsia       R12, Heartburn    CPT copyright 2017 American Medical Association. All rights reserved.    The codes documented in this report are preliminary and upon  review may   b e revised to meet current compliance requirements.    Electronically Signed by Scott Mi  __________________  Scott Mcrae MD  9/12/2019 10:37:56 AM  I was physically present for the entire viewing portion of the exam.  Scott Mcrae MD  Number of Addenda: 0    Note Initiated On: 9/12/2019 10:07 AM  MRN:                      [id number]  Procedure  Date:           9/12/2019 10:07:13 AM  Total Procedure Duration: 0 hours 1 minute 21 seconds   Estimated Blood Loss:       Scope In: 10:22:08 AM  Scope Out: 10:23:29 AM          Impression     Ravi Shah is a 44-year-old  male that was admitted on 9/11/2019 for Delirium Tremens (DTS).  He has a history of alcohol dependence with alcohol withdrawal delirium.  He was last hospitalized in February 2019 for alcohol withdrawal.  Recently participated in [facility] alcohol treatment program and states he was sober for approximately three months, however he reports drinking approximatly 3/4 L of Vodka daily since that time.  Discussed participating in a chemical dependency treatment program, however patient concerned about being able to work.  Discussed [facility] as an option for outpatient treatment, he appears willing.  Discussed starting Antabuse, patient unsure at this time.  Patient acknowledges having depression and anxiety, discussed starting Remeron, patient willing.     Diagnoses     1. Major depression, recurrent, severe, without psychotic features.  2. Alcohol use disorder     Plan     1. Explained side effects, benefits and complications of medications to the patient.  2. Medication Changes: Start Remeron 7.5 mg for three days then increasing to 15 mg daily.  3. Consider starting Antabuse.  4. Discussed chemical dependency outpatient treatment at [facility] in [place]  5. Discussed treatment plan with patient and team.  6. Re-consult Psychiatry as needed.    TIME SPENT IN PSYCHIATRY INITIAL CONSULT: 55 MINUTES       Patient ID:  Name: Ravi Shah MRN: [id number]  Admission: 9/11/2019 YOB: 1975

## 2019-09-12 NOTE — ED TRIAGE NOTES
Patient is a daily drinker, consumes at least one liter of vodka per day, trying to quit on his own, having withdrawal symptoms so he drank today to feel better. Has been drinking daily for a couple of years. Today drank about 750 mL bottle he thinks. Patient denied any past seizures from withdrawal.

## 2019-09-12 NOTE — ED PROVIDER NOTES
"  History     Chief Complaint:  Alcohol withdrawal      HPI   Ravi Shah is a 44 year old male with a history of alcohol withdrawal, who presents with constant emesis for the past 2 days, with hematemesis beginning today, prompting him to visit the ED. To note, the patient consumes alcohol daily and reports that he attempted to quit 3 days ago. He describes that he first became tremulous, nauseous, and vomited the following day. Since onset, he continues to endorse \"nonstop\" vomiting, as well as an episode of epistaxis and diarrhea today. He reports that he began consuming alcohol again today in hopes of reducing his symptoms. He endorses drinking 750 ml of alcohol daily and notes that he consumed this much today. He notes that he wants to decrease his alcohol intake and reports that he was previously sober for 2 months in February of this year after treatment at [facility]. He denies abdominal pain. He denies experiencing alcohol withdrawal seizures in the past.     Allergies:  NKDA     Medications:    Librium  Neurontin  Mobic  Toprol  Protonix  Zoloft      Past Medical History:    Anxiety  Depressive disorder  HTN  Alcohol withdrawal    Past Surgical History:    ENT surgery    Family History:    Substance abuse    Social History:  Smoking status: former  Alcohol use: yes   Drug use: no   PCP: Joe Sandra  Marital Status:        Review of Systems   HENT: Positive for nosebleeds.    Gastrointestinal: Positive for diarrhea, nausea and vomiting. Negative for abdominal pain.   Neurological: Positive for tremors.   All other systems reviewed and are negative.        Physical Exam     Patient Vitals for the past 24 hrs:   BP Temp Temp src Pulse Resp SpO2 Height Weight   09/12/19 0036 112/77 98.5  F (36.9  C) Oral 93 16 95 % -- --   09/12/19 0014 (!) 112/92 -- -- 87 16 97 % -- --   09/11/19 2324 120/89 -- -- 87 -- 93 % -- --   09/11/19 2156 (!) 148/105 97.7  F (36.5  C) Oral 91 20 97 % 1.778 m (5' 10\") 81.6 " kg (180 lb)        Physical Exam  General: Patient is alert and uncomfortable.  HEENT: Head atraumatic    Eyes: pupils equal and reactive. Conjunctiva clear   Nares: patent   Oropharynx: no lesions, uvula midline, no palatal draping, normal voice, no trismus  Neck: Supple without lymphadenopathy, no meningismus  Chest: Heart regular rate and rhythm.  No chest wall crepitus.  Lungs: Equal clear to auscultation with no wheeze or rales  Abdomen: Soft, minimal epigastric tenderness to palpation with no rebound or guarding, nondistended, normal bowel sounds  Back: No costovertebral angle tenderness, no midline C, T or L spine tenderness  Neuro: Grossly nonfocal, mildly slurred each, strength equal bilaterally, CN 2-12 intact  Extremities: No deformities, equal radial and DP pulses. No clubbing, cyanosis.  No edema  Skin: Warm and dry with no rash.       Emergency Department Course     Laboratory:  Alcohol ethyl: 0.37 (H)  Magnesium: 2.2  Lipase: 484 (H)  INR: 0.93  CMP: Glucose 144 (H), AST 58 (H), o/w WNL (Creatinine: 0.86)  CBC: WBC 4.5, HGB 16.4,     Interventions:  2217: Zofran 4 mg IV  2242: sodium chloride 0.9 % 1,000 mL with INFUVITE ADULT 10 mL, thiamine 100 mg, folic acid 1 mg infusion IV  2245: Zofran 4 mg IV  2246: NS 1L IV Bolus     Emergency Department Course:  2202: Nursing notes and vitals reviewed. I performed an exam of the patient as documented above.     IV inserted. Medicine administered as documented above. Blood drawn. This was sent to the lab for further testing, results above.    2331: I rechecked the patient and discussed the results of his workup thus far.     2341: I consulted with Dr. Tate of the hospitalist services. They are in agreement to accept the patient for admission.    Findings and plan explained to the Patient who consents to admission. Discussed the patient with Dr. Tate, who will admit the patient to a hospital bed for further monitoring, evaluation, and  treatment.    Impression & Plan      Medical Decision Making:  Patient is a 44-year-old male with history of alcohol dependence and abuse who presents the emergency department with multiple days of vomiting including episodes of hematemesis.  Patient stopped drinking 4 days ago and then due to severe withdrawal symptoms including tremulousness, nausea and vomiting as well as hematemesis he drank yesterday and today.  Patient states that his withdrawal symptoms improved but he continued to have an episode of hematemesis this evening that prompted him to come to the emergency department.  Patient is hemodynamically stable.  There is no crepitus of his chest wall and I do not suspect Boerhaave syndrome.  Suspect his hematemesis is likely from Antonia-Galvez tear.  He denies rectal bleeding and his hemoglobins are within normal limits.  He has minimal epigastric tenderness to palpation but otherwise benign abdomen with no distention and doubt perforation.  Patient remains hemodynamically stable.  Patient is committed to wanting to quit drinking and given his difficulty with withdrawal symptoms over the last several days as well as mild pancreatitis and nausea hematemesis I feel he warrants medically assisted alcohol withdrawal.  Patient is agreeable with the plan for admission.  Continued monitoring and observation as well as treatment of withdrawal symptoms in the hospital.  All patient's questions and concerns addressed.    Diagnosis:    ICD-10-CM    1. Hematemesis with nausea K92.0    2. Alcohol-induced acute pancreatitis, unspecified complication status K85.20        Disposition:  Admitted to Odette Ji, am serving as a scribe at 10:02 PM on 9/11/2019 to document services personally performed by Alondra Ivy MD based on my observations and the provider's statements to me.       Odette Araujo  9/11/2019    EMERGENCY DEPARTMENT       Alondra Ivy MD  09/12/19 0142

## 2019-09-12 NOTE — ED NOTES
"[facility]  ED Nurse Handoff Report    ED Chief complaint: Delirium Tremens (DTS)      ED Diagnosis:   Final diagnoses:   Hematemesis with nausea   Alcohol-induced acute pancreatitis, unspecified complication status       Code Status: Full Code    Allergies: No Known Allergies    Activity level - Baseline/Home:  Independent  Activity Level - Current:   Independent    Patient's Preferred language: english   Needed?: No    Isolation: No  Infection: Not Applicable  Bariatric?: No    Vital Signs:   Vitals:    09/11/19 2156 09/11/19 2324   BP: (!) 148/105 120/89   Pulse: 91 87   Resp: 20    Temp: 97.7  F (36.5  C)    TempSrc: Oral    SpO2: 97% 93%   Weight: 81.6 kg (180 lb)    Height: 1.778 m (5' 10\")        Cardiac Rhythm: ,        Pain level:      Is this patient confused?: No   Does this patient have a guardian?  No         If yes, is there guardianship documents in the Epic \"Code/ACP\" activity?  N/A         Guardian Notified?  N/A  [place] - Suicide Severity Rating Scale Completed?  Yes  If yes, what color did the patient score?  White    Patient Report: Initial Complaint: Pt has hx of alcoholism, drinking 750ml daily.  Last drink was today where he drank 750ml.  Pt has had emesis x2 days, with hematemesis starting 1 day ago.     Focused Assessment: Pt reports nausea and headache in ED.  Denies tremors, sweating, or tactile sensations.  Reports having some hallucinations at times.  VSS in ED.  Agrees with plan for admission.  Tests Performed:   Results for orders placed or performed during the hospital encounter of 09/11/19   CBC with platelets differential   Result Value Ref Range    WBC 4.5 4.0 - 11.0 10e9/L    RBC Count 4.68 4.4 - 5.9 10e12/L    Hemoglobin 16.4 13.3 - 17.7 g/dL    Hematocrit 47.0 40.0 - 53.0 %     78 - 100 fl    MCH 35.0 (H) 26.5 - 33.0 pg    MCHC 34.9 31.5 - 36.5 g/dL    RDW 12.8 10.0 - 15.0 %    Platelet Count 223 150 - 450 10e9/L    Diff Method Automated " Method     % Neutrophils 57.2 %    % Lymphocytes 30.2 %    % Monocytes 10.8 %    % Eosinophils 0.7 %    % Basophils 0.7 %    % Immature Granulocytes 0.4 %    Nucleated RBCs 0 0 /100    Absolute Neutrophil 2.6 1.6 - 8.3 10e9/L    Absolute Lymphocytes 1.4 0.8 - 5.3 10e9/L    Absolute Monocytes 0.5 0.0 - 1.3 10e9/L    Absolute Eosinophils 0.0 0.0 - 0.7 10e9/L    Absolute Basophils 0.0 0.0 - 0.2 10e9/L    Abs Immature Granulocytes 0.0 0 - 0.4 10e9/L    Absolute Nucleated RBC 0.0    Comprehensive metabolic panel   Result Value Ref Range    Sodium 137 133 - 144 mmol/L    Potassium 3.4 3.4 - 5.3 mmol/L    Chloride 102 94 - 109 mmol/L    Carbon Dioxide 27 20 - 32 mmol/L    Anion Gap 8 3 - 14 mmol/L    Glucose 144 (H) 70 - 99 mg/dL    Urea Nitrogen 9 7 - 30 mg/dL    Creatinine 0.86 0.66 - 1.25 mg/dL    GFR Estimate >90 >60 mL/min/[1.73_m2]    GFR Estimate If Black >90 >60 mL/min/[1.73_m2]    Calcium 8.7 8.5 - 10.1 mg/dL    Bilirubin Total 0.5 0.2 - 1.3 mg/dL    Albumin 4.1 3.4 - 5.0 g/dL    Protein Total 8.0 6.8 - 8.8 g/dL    Alkaline Phosphatase 66 40 - 150 U/L    ALT 68 0 - 70 U/L    AST 58 (H) 0 - 45 U/L   Lipase   Result Value Ref Range    Lipase 484 (H) 73 - 393 U/L   Alcohol ethyl   Result Value Ref Range    Ethanol g/dL 0.37 (HH) <0.01 g/dL   Magnesium   Result Value Ref Range    Magnesium 2.2 1.6 - 2.3 mg/dL       Abnormal Results: elevated ETOH  Treatments provided: see MAR    Family Comments: n/a    OBS brochure/video discussed/provided to patient/family: No              Name of person given brochure if not patient: n/a              Relationship to patient: n/a    ED Medications:   Medications   0.9% sodium chloride BOLUS (1,000 mLs Intravenous New Bag 9/11/19 0813)   sodium chloride 0.9 % 1,000 mL with INFUVITE ADULT 10 mL, thiamine 100 mg, folic acid 1 mg infusion ( Intravenous New Bag 9/11/19 2554)   ondansetron (ZOFRAN) injection 4 mg (4 mg Intravenous Given 9/11/19 5715)       Drips infusing?:  No    For the  majority of the shift this patient was Green.   Interventions performed were TLC.    Severe Sepsis OR Septic Shock Diagnosis Present: No    To be done/followed up on inpatient unit:  n/a    ED NURSE PHONE NUMBER: [phone]

## 2019-09-12 NOTE — H&P
"[facility]    History and Physical - Hospitalist Service       Date of Admission:  9/11/2019    Assessment & Plan   Ravi Shah is a 44 year old male admitted on 9/11/2019. He presents with nausea and vomiting, reported hematemesis and melena, acutely intoxicated with alcohol, though reporting recent alcohol withdrawal and seeking treatment.    Alcohol dependence with acute alcohol withdrawal: Patient has a history of alcohol withdrawal delirium requiring ICU stays in the past.  No prior history of seizure.  Predominant symptoms of nausea and vomiting.  Is interested in sobriety  -Chemical dependency consulted  -[facility] protocol with Valium  -Cardiac telemetry  -Continue to encourage cessation  -Symptomatic treatment of nausea and vomiting with antiemetics as well as Valium as above  -Received IV vitamins in the emergency department.  Oral multivitamins daily ordered thereafter  -Continue metoprolol XL 50 mg daily for anxiety and hypertension    Reported hematemesis, melena: Patient reports stools which are watery and look \"like coffee grounds\" as well as hematemesis in the setting of frequent retching and episode of epistaxis.  Currently without hematemesis, no stools yet observed.  Hemoglobin in the 16 range.  Uncertain if hematemesis is secondary to Antonia-Galvez tear or episode of epistaxis.  Seems less likely ulcer or variceal bleed as no history of cirrhosis and bloody emesis only occurred after frequent nonbloody episodes of emesis and epistaxis.  Reassuring that most recent episodes of emesis were not bloody.  This said, somewhat unusual that patient reports coffee-ground stools essentially in the same timeframe as his onset of hematemesis.  -Patient has been consented for blood products by myself if needed  -Gastroenterology consulted for possible endoscopy  -Patient is n.p.o. after midnight.  Following GI plan, can advance diet as tolerated in the setting of nausea and emesis.  -IV Protonix " twice daily    Hypertension:  -Continue metoprolol 50 mg XL daily    Anxiety: Patient reports that this is often the  of his alcohol use  -Continue Zoloft 50 mg daily  -Continue metoprolol XL 50 mg daily.       Diet: N.p.o. currently.  Following GI plan, advance diet as tolerated  DVT Prophylaxis: Pneumatic compression devices  Power Catheter: not present  Code Status: Full code    Disposition Plan   Expected discharge: 2 - 3 days, recommended to Inpatient chemical dependency treatment versus prior living arrangement once safe disposition plan/ TCU bed available.  Entered: Enrique Tate MD 09/11/2019, 11:43 PM     The patient's care was discussed with the Patient and Dr. Ivy in the emergency department.    Enrique Tate MD  [facility]    ______________________________________________________________________    Chief Complaint   Nausea and vomiting    History is obtained from the patient, chart review, discussion with Dr. Ivy in the emergency department.    History of Present Illness   Ravi Shah is a 44 year old male who presents with nausea, vomiting, and acute alcohol intoxication in the setting of developing alcohol withdrawal and attempting to self medicate.    Patient has a history of alcohol dependence with alcohol withdrawal delirium.  Last hospitalized in February 2019 for the same, and reports 3 months of sobriety following that hospitalization and treatment.  Previously was drinking 1 L of vodka daily, though when he went back to drinking after his period of sobriety, had cut back significantly.  Believes that he drank too much on Sunday with the onset of football season, this caused him to have nausea and vomiting.  He felt unwell after this, and stopped drinking alcohol, which caused him to have worsened nausea and vomiting as well as tremulousness.  Drank 750 mL of hard alcohol to stave off alcohol withdrawal, once again developed nausea and vomiting, and  presented to the emergency department for treatment.     Persistent nausea and emesis since Sunday resulted in patient developing an episode of epistaxis on Tuesday.  Associated with this was the development of hematemesis with bright red blood in his emesis.  By history seems most consistent with blood related to his epistaxis of right nostril versus Antonia-Galvez tear as nausea and vomiting had been ongoing, though patient also reports that around the same time he had onset of watery coffee-ground stool.  His watery coffee-ground stool has improved/slowed with last episode of stool 9/11 a.m. no blood or melenic stool has been noted in the emergency department.  Last episode of emesis was this afternoon.  Patient has approximately 50 mL of what appears to be gastric fluid which is nonbloody and emesis bag, though patient reports that this is mostly spit.    Review of Systems    The 10 point Review of Systems is negative other than noted in the HPI or here.   Mild abdominal discomfort which is diffuse  No fevers or chills  No shortness of breath or cough    Past Medical History    I have reviewed this patient's medical history and updated it with pertinent information if needed.   Past Medical History:   Diagnosis Date     Anxiety      Depressive disorder      Hypertension        Past Surgical History   I have reviewed this patient's surgical history and updated it with pertinent information if needed.  Past Surgical History:   Procedure Laterality Date     ENT SURGERY         Social History   I have reviewed this patient's social history and updated it with pertinent information if needed.  Social History     Tobacco Use     Smoking status: Former Smoker     Smokeless tobacco: Current User   Substance Use Topics     Alcohol use: Yes     Comment: One liter vodka per day for several years.      Drug use: No       Family History   I have reviewed this patient's family history and updated it with pertinent information  if needed.   Family History   Problem Relation Age of Onset     No Known Problems Maternal Grandmother      No Known Problems Maternal Grandfather      No Known Problems Paternal Grandmother      Substance Abuse Paternal Grandfather    Father with alcohol use history plus/minus dependence.    Prior to Admission Medications   Prior to Admission Medications   Prescriptions Last Dose Informant Patient Reported? Taking?   chlordiazePOXIDE (LIBRIUM) 10 MG capsule  Self Yes No   Sig: Take 10 mg by mouth 3 times daily as needed for anxiety   folic acid (FOLVITE) 1 MG tablet   No No   Sig: Take 1 tablet (1 mg) by mouth daily   gabapentin (NEURONTIN) 400 MG capsule  Self Yes No   Sig: Take 800 mg by mouth 3 times daily    hypromellose (ARTIFICIAL TEARS) 0.5 % SOLN ophthalmic solution  Spouse/Significant Other Yes No   Sig: Place 1 drop into both eyes 4 times daily as needed for dry eyes   melatonin 3 MG tablet  Spouse/Significant Other Yes No   Sig: Take 1 mg by mouth nightly as needed for sleep   meloxicam (MOBIC) 15 MG tablet  Spouse/Significant Other Yes No   Sig: Take 15 mg by mouth daily as needed    metoprolol succinate ER (TOPROL-XL) 50 MG 24 hr tablet  Spouse/Significant Other Yes No   Sig: Take 50 mg by mouth daily    multivitamin w/minerals (THERA-VIT-M) tablet   No No   Sig: Take 1 tablet by mouth daily   pantoprazole (PROTONIX) 40 MG EC tablet  Spouse/Significant Other Yes No   Sig: Take 40 mg by mouth daily    sertraline (ZOLOFT) 50 MG tablet  Spouse/Significant Other Yes No   Sig: Take 50 mg by mouth daily    vitamin B1 (THIAMINE) 100 MG tablet   No No   Sig: Take 1 tablet (100 mg) by mouth daily      Facility-Administered Medications: None     Allergies   No Known Allergies    Physical Exam   Vital Signs: Temp: 97.7  F (36.5  C) Temp src: Oral BP: 120/89 Pulse: 87   Resp: 20 SpO2: 93 % O2 Device: None (Room air)    Weight: 180 lbs 0 oz    General Appearance: Well-appearing 44-year-old male  Eyes: No scleral  icterus or injection.  HEENT: No blood in posterior oropharynx.  No active epistaxis at this juncture.  Respiratory: Breath sounds are clear bilaterally to auscultation without wheezes or crackles  Cardiovascular: Heart rate in the 90s.  No murmur.  Regular rhythm.  GI: Mild diffuse tenderness to palpation of abdomen, slightly more notable in the epigastrium..  Bowel sounds are present.  No palpable mass.  Lymph/Hematologic: No lower extremity edema  Genitourinary: Not examined  Skin: Francesco complexion.  Somewhat flushed.  Musculoskeletal: Muscular tone intact in all extremities without muscular wasting.  Neurologic: Alert, conversant, appropriate conversation.  Not tremulous at this juncture.  Patient does not appear clinically intoxicated despite an alcohol level of 0.37.  Psychiatric: Normal affect, pleasant    Data   Data reviewed today: I reviewed all medications, new labs and imaging results over the last 24 hours. I personally reviewed no images or EKG's today.    Recent Labs   Lab 09/11/19  2219   WBC 4.5   HGB 16.4         INR 0.93      POTASSIUM 3.4   CHLORIDE 102   CO2 27   BUN 9   CR 0.86   ANIONGAP 8   ISAIAS 8.7   *   ALBUMIN 4.1   PROTTOTAL 8.0   BILITOTAL 0.5   ALKPHOS 66   ALT 68   AST 58*   LIPASE 484*

## 2019-09-12 NOTE — PHARMACY-ADMISSION MEDICATION HISTORY
Admission medication history interview status for the 9/11/2019  admission is complete. See EPIC admission navigator for prior to admission medications     Medication history source reliability:Moderate    Actions taken by pharmacist (provider contacted, etc): Review Surescripts and last office visit note, interviewed patient     Additional medication history information not noted on PTA med list :None    Medication reconciliation/reorder completed by provider prior to medication history? No    Time spent in this activity: 30 min    Prior to Admission medications    Medication Sig Last Dose Taking? Auth Provider   hypromellose (ARTIFICIAL TEARS) 0.5 % SOLN ophthalmic solution Place 1 drop into both eyes 4 times daily as needed for dry eyes Past Week at Unknown time Yes Unknown, Entered By History   ondansetron (ZOFRAN-ODT) 4 MG ODT tab Take 4 mg by mouth every 8 hours as needed for nausea Past Week at Unknown time Yes Unknown, Entered By History   vitamin B1 (THIAMINE) 100 MG tablet Take 1 tablet (100 mg) by mouth daily Past Week at Unknown time Yes Bry Nogueira,    meloxicam (MOBIC) 15 MG tablet Take 15 mg by mouth daily as needed  More than a month at Unknown time  Reported, Patient   metoprolol succinate ER (TOPROL-XL) 50 MG 24 hr tablet Take 50 mg by mouth daily  9/10/2019 at am  Reported, Patient   sertraline (ZOLOFT) 50 MG tablet Take 50 mg by mouth daily  9/10/2019 at am  Reported, Patient

## 2019-09-12 NOTE — PROGRESS NOTES
RECEIVING UNIT ED HANDOFF REVIEW    ED Nurse Handoff Report was reviewed by: Moe Ya RN on September 12, 2019 at 12:05 AM

## 2019-09-12 NOTE — PLAN OF CARE
DATE & TIME: 9/12/19 AM                           Cognitive Concerns/ Orientation : A&Ox4    BEHAVIOR & AGGRESSION TOOL COLOR: Green   CIWA SCORE: 3/2     ABNL VS/O2: VSS on RA   MOBILITY: SBA   PAIN MANAGMENT: C/o mild headache but declined interventions   DIET: Mech/Dental Soft diet   BOWEL/BLADDER: Continent, Had 1 loose black BM this AM per Patient report   ABNL LAB/BG: AST and Lipase elevated  DRAIN/DEVICES: PIV infusing D5NS until patient can tolerate food   TELEMETRY RHYTHM: Discontinued   SKIN: Intact, Flushed   TESTS/PROCEDURES: EGD done this AM, Found Non bleeding esophageal ulcer  D/C DAY/GOALS/PLACE: Discharge 1-2 days pending safe disposition   OTHER IMPORTANT INFO: GI following

## 2019-09-12 NOTE — PROGRESS NOTES
[facility]  Hospitalist Progress Note        Chele Victoria MD   09/12/2019        Interval History:      - continues to have some melanotic stool, no hematemesis; had EGD done today         Assessment and Plan:        Ravi Shah is a 44 year old male with PMH of anxiety/depression, HTN, ETOH abuse who was admitted on 9/11/2019. He presented with nausea and vomiting, reported hematemesis and melena, acutely intoxicated with alcohol, though reporting recent alcohol withdrawal and seeking treatment.     # Alcohol dependence with acute alcohol withdrawal:   - has a history of alcohol withdrawal delirium requiring ICU stays in the past but no h/o withdrawal seizures  - ETOH on admission was 0.37  - continue valium with CIWA protocol; will get chem dep and psych eval  - continue IV fluids with D5 1/2 NS  - chem dep eval pending; will also consult psychiatry  -Continue to encourage cessation  -Received IV vitamins in the emergency department.  Oral multivitamins daily ordered thereafter     # Reported hematemesis, melena:   - continues to have some melanotic stools but no hematemesis; hemodynamically stable; no active bleed  - evaluated by Dr Mcrae from GI; EGD done 9/12/19 noted with non-bleeding esophageal ulcers, medium-sized hiatal hernia, erythematous mucosa in the stomach   - Hb 16.4 on admission; will monitor Hb q 8 hrs  - continue Protonix bid     # Hypertension:  - Continue metoprolol 50 mg XL daily     # Anxiety: Patient reports that this is often the  of his alcohol use  - Continue Zoloft 50 mg daily  - Continue metoprolol XL 50 mg daily.  - psych consulted     Diet: adavanced to soft diet after EGD, advance as tolerated  DVT Prophylaxis: Pneumatic compression devices  Code Status: Full code        Disposition Plan   Expected discharge: 2 - 3 days, recommended to Inpatient chemical dependency treatment versus prior living arrangement once safe disposition plan, pending  "improvement in alcohol withdrawal                      Physical Exam:      Blood pressure 107/68, pulse 77, temperature 97.9  F (36.6  C), temperature source Oral, resp. rate 16, height 1.778 m (5' 10\"), weight 81.6 kg (180 lb), SpO2 97 %.  Vitals:    09/11/19 2156   Weight: 81.6 kg (180 lb)     Vital Signs with Ranges  Temp:  [97.7  F (36.5  C)-98.5  F (36.9  C)] 97.9  F (36.6  C)  Pulse:  [71-93] 77  Heart Rate:  [68-81] 73  Resp:  [9-20] 16  BP: (107-157)/() 107/68  SpO2:  [92 %-97 %] 97 %  I/O's Last 24 hours  No intake/output data recorded.    Constitutional: Alert, awake and oriented X 3; lying comfortably in bed in no apparent distress; grossly tremors of UE noted   HEENT: Pupils equal and reactive to light and accomodation, EOMI intact; neck supple no raised JVD or rigidity    Oral cavity: Dry mucosa   Cardiovascular: Normal s1 s2, regular rate and rhythm, no murmur   Lungs: B/l clear to auscultation, no wheezes or crepitations   Abdomen: Soft, nt, nd, no guarding, rigidity or rebound; BS +   LE : No edema   Musculoskeletal: Power 5/5 in all extremities   Neuro: No focal neurological deficits noted, CN II to XII grossly intact   Psychiatry: normal mood and affect                Medications:          folic acid  1 mg Oral Daily     metoprolol succinate ER  50 mg Oral Daily     multivitamin w/minerals  1 tablet Oral Daily     pantoprazole (PROTONIX) IV  40 mg Intravenous BID     sertraline  50 mg Oral Daily     sodium chloride (PF)  3 mL Intracatheter Q8H     vitamin B1  100 mg Oral Daily     PRN Meds: acetaminophen, acetaminophen, bisacodyl, diazepam **OR** diazepam, lidocaine 4%, lidocaine (buffered or not buffered), magnesium sulfate, melatonin, naloxone, nitroGLYcerin, ondansetron **OR** ondansetron, polyethylene glycol, potassium chloride, potassium chloride with lidocaine, potassium chloride, potassium chloride, potassium chloride, prochlorperazine **OR** prochlorperazine **OR** prochlorperazine, " senna-docusate **OR** senna-docusate, sodium chloride (PF)         Data:      All new lab and imaging data was reviewed.   Recent Labs   Lab Test 09/11/19 2219 02/12/19  1305   WBC 4.5 9.2   HGB 16.4 15.8    94    232   INR 0.93  --       Recent Labs   Lab Test 09/11/19 2219 02/13/19  0819 02/12/19  1305    137 140   POTASSIUM 3.4 3.5 3.5   CHLORIDE 102 104 103   CO2 27 24 20   BUN 9 3* 6*   CR 0.86 0.84 0.85   ANIONGAP 8 9 17*   ISAIAS 8.7 7.9* 8.5   * 120* 132*     Recent Labs   Lab Test 02/12/19  1305   TROPI <0.015

## 2019-09-13 LAB
ALBUMIN SERPL-MCNC: 3.4 G/DL (ref 3.4–5)
ALP SERPL-CCNC: 46 U/L (ref 40–150)
ALT SERPL W P-5'-P-CCNC: 42 U/L (ref 0–70)
ANION GAP SERPL CALCULATED.3IONS-SCNC: 6 MMOL/L (ref 3–14)
AST SERPL W P-5'-P-CCNC: 28 U/L (ref 0–45)
BILIRUB SERPL-MCNC: 1.2 MG/DL (ref 0.2–1.3)
BUN SERPL-MCNC: 5 MG/DL (ref 7–30)
CALCIUM SERPL-MCNC: 8.1 MG/DL (ref 8.5–10.1)
CHLORIDE SERPL-SCNC: 103 MMOL/L (ref 94–109)
CO2 SERPL-SCNC: 27 MMOL/L (ref 20–32)
CREAT SERPL-MCNC: 0.88 MG/DL (ref 0.66–1.25)
ERYTHROCYTE [DISTWIDTH] IN BLOOD BY AUTOMATED COUNT: 12.5 % (ref 10–15)
GFR SERPL CREATININE-BSD FRML MDRD: >90 ML/MIN/{1.73_M2}
GLUCOSE SERPL-MCNC: 105 MG/DL (ref 70–99)
HCT VFR BLD AUTO: 41.6 % (ref 40–53)
HGB BLD-MCNC: 14.4 G/DL (ref 13.3–17.7)
MCH RBC QN AUTO: 35 PG (ref 26.5–33)
MCHC RBC AUTO-ENTMCNC: 34.6 G/DL (ref 31.5–36.5)
MCV RBC AUTO: 101 FL (ref 78–100)
PLATELET # BLD AUTO: 185 10E9/L (ref 150–450)
POTASSIUM SERPL-SCNC: 3.3 MMOL/L (ref 3.4–5.3)
POTASSIUM SERPL-SCNC: 4.1 MMOL/L (ref 3.4–5.3)
PROT SERPL-MCNC: 6.5 G/DL (ref 6.8–8.8)
RBC # BLD AUTO: 4.12 10E12/L (ref 4.4–5.9)
SODIUM SERPL-SCNC: 136 MMOL/L (ref 133–144)
WBC # BLD AUTO: 7.2 10E9/L (ref 4–11)

## 2019-09-13 PROCEDURE — 85027 COMPLETE CBC AUTOMATED: CPT | Performed by: INTERNAL MEDICINE

## 2019-09-13 PROCEDURE — 25000131 ZZH RX MED GY IP 250 OP 636 PS 637: Performed by: INTERNAL MEDICINE

## 2019-09-13 PROCEDURE — 25000132 ZZH RX MED GY IP 250 OP 250 PS 637: Performed by: INTERNAL MEDICINE

## 2019-09-13 PROCEDURE — 36415 COLL VENOUS BLD VENIPUNCTURE: CPT | Performed by: INTERNAL MEDICINE

## 2019-09-13 PROCEDURE — 84132 ASSAY OF SERUM POTASSIUM: CPT | Performed by: INTERNAL MEDICINE

## 2019-09-13 PROCEDURE — 25000128 H RX IP 250 OP 636: Performed by: INTERNAL MEDICINE

## 2019-09-13 PROCEDURE — 80053 COMPREHEN METABOLIC PANEL: CPT | Performed by: INTERNAL MEDICINE

## 2019-09-13 PROCEDURE — 99232 SBSQ HOSP IP/OBS MODERATE 35: CPT | Performed by: PSYCHIATRY & NEUROLOGY

## 2019-09-13 PROCEDURE — 12000000 ZZH R&B MED SURG/OB

## 2019-09-13 PROCEDURE — 25000132 ZZH RX MED GY IP 250 OP 250 PS 637: Performed by: PSYCHIATRY & NEUROLOGY

## 2019-09-13 PROCEDURE — 25800030 ZZH RX IP 258 OP 636: Performed by: INTERNAL MEDICINE

## 2019-09-13 PROCEDURE — C9113 INJ PANTOPRAZOLE SODIUM, VIA: HCPCS | Performed by: INTERNAL MEDICINE

## 2019-09-13 PROCEDURE — H0001 ALCOHOL AND/OR DRUG ASSESS: HCPCS

## 2019-09-13 PROCEDURE — 99232 SBSQ HOSP IP/OBS MODERATE 35: CPT | Performed by: INTERNAL MEDICINE

## 2019-09-13 RX ORDER — MIRTAZAPINE 7.5 MG/1
7.5 TABLET, FILM COATED ORAL AT BEDTIME
Status: DISCONTINUED | OUTPATIENT
Start: 2019-09-13 | End: 2019-09-14 | Stop reason: HOSPADM

## 2019-09-13 RX ORDER — MIRTAZAPINE 15 MG/1
15 TABLET, FILM COATED ORAL AT BEDTIME
Status: DISCONTINUED | OUTPATIENT
Start: 2019-09-13 | End: 2019-09-13

## 2019-09-13 RX ADMIN — Medication 100 MG: at 08:34

## 2019-09-13 RX ADMIN — DEXTROSE AND SODIUM CHLORIDE: 5; 900 INJECTION, SOLUTION INTRAVENOUS at 17:29

## 2019-09-13 RX ADMIN — METOPROLOL SUCCINATE 50 MG: 50 TABLET, EXTENDED RELEASE ORAL at 08:34

## 2019-09-13 RX ADMIN — FOLIC ACID 1 MG: 1 TABLET ORAL at 08:34

## 2019-09-13 RX ADMIN — SERTRALINE HYDROCHLORIDE 50 MG: 50 TABLET ORAL at 08:34

## 2019-09-13 RX ADMIN — MULTIPLE VITAMINS W/ MINERALS TAB 1 TABLET: TAB at 08:34

## 2019-09-13 RX ADMIN — MIRTAZAPINE 7.5 MG: 7.5 TABLET ORAL at 21:20

## 2019-09-13 RX ADMIN — PANTOPRAZOLE SODIUM 40 MG: 40 INJECTION, POWDER, FOR SOLUTION INTRAVENOUS at 08:34

## 2019-09-13 RX ADMIN — DEXTROSE AND SODIUM CHLORIDE: 5; 900 INJECTION, SOLUTION INTRAVENOUS at 07:27

## 2019-09-13 RX ADMIN — POTASSIUM CHLORIDE 20 MEQ: 1500 TABLET, EXTENDED RELEASE ORAL at 11:54

## 2019-09-13 RX ADMIN — PANTOPRAZOLE SODIUM 40 MG: 40 INJECTION, POWDER, FOR SOLUTION INTRAVENOUS at 21:20

## 2019-09-13 RX ADMIN — ONDANSETRON 4 MG: 4 TABLET, ORALLY DISINTEGRATING ORAL at 00:45

## 2019-09-13 RX ADMIN — ACETAMINOPHEN 650 MG: 325 TABLET ORAL at 00:50

## 2019-09-13 RX ADMIN — POTASSIUM CHLORIDE 40 MEQ: 1500 TABLET, EXTENDED RELEASE ORAL at 09:45

## 2019-09-13 RX ADMIN — Medication 3 MG: at 00:45

## 2019-09-13 ASSESSMENT — ACTIVITIES OF DAILY LIVING (ADL)
ADLS_ACUITY_SCORE: 14

## 2019-09-13 NOTE — PROGRESS NOTES
SW:  D:  This note is to acknowledge care transitions consult.    Dr Nicolas's note reviewed.  Note patient's preference is to attend IOP at [facility].  Patient does have insurance.  CD counselor will be rounding today thru the weekend and will meet with patient.  P:   will follow for CD recommendation.

## 2019-09-13 NOTE — CONSULTS
[facility] Psychiatric Consult Progress Note    Interval History:   Pt seen, chart reviewed, case discussed with nursing staff and treating clinicians. Patient feeling progressive improvement. Appetite improving. Had some poor sleep last night, but received melatonin with benefit. Plan to begin Remeron never occurred. Patient remains open to trialing, witrh targets of sleep and nausea. Still interested in referral for outpatient CD tx referral as well. [facility] via Dr. Ledesma and feels this program would fit as it would allow him to continue to work while going to the program in the evening.     Review of systems:   10 point Review of Systems completed by Karri Nicolas DO, and is  is negative other than noted in the HPI     Medications:       folic acid  1 mg Oral Daily     metoprolol succinate ER  50 mg Oral Daily     mirtazapine  15 mg Oral At Bedtime     multivitamin w/minerals  1 tablet Oral Daily     pantoprazole (PROTONIX) IV  40 mg Intravenous BID     sodium chloride (PF)  3 mL Intracatheter Q8H     vitamin B1  100 mg Oral Daily     acetaminophen, acetaminophen, bisacodyl, diazepam **OR** diazepam, hydrALAZINE, lidocaine 4%, lidocaine (buffered or not buffered), magnesium sulfate, melatonin, naloxone, nitroGLYcerin, ondansetron **OR** ondansetron, polyethylene glycol, potassium chloride, potassium chloride with lidocaine, potassium chloride, potassium chloride, potassium chloride, prochlorperazine **OR** prochlorperazine **OR** prochlorperazine, senna-docusate **OR** senna-docusate, sodium chloride (PF)    Mental Status Examination:   Appearance:  awake, alert  Eye Contact:  good  Speech:  clear, coherent  Language:Normal  Psychomotor Behavior:  no evidence of tardive dyskinesia, dystonia, or tics  Mood:  good  Affect:  appropriate and in normal range  Thought Process:  logical, linear and goal oriented no loose associations  Thought Content:  no evidence of suicidal ideation  or homicidal ideation and no evidence of psychotic thought  Oriented to:  time, person, and place  Attention Span and Concentration:  intact  Recent and Remote Memory:  intact  Fund of Knowledge: appropriate  Muscle Strength and Tone: normal  Gait and Station: Normal  Insight:  good  Judgment:  intact       Labs/Vitals:     Recent Results (from the past 24 hour(s))   Hemoglobin    Collection Time: 09/12/19  3:09 PM   Result Value Ref Range    Hemoglobin 13.9 13.3 - 17.7 g/dL   Hemoglobin    Collection Time: 09/12/19 10:07 PM   Result Value Ref Range    Hemoglobin 13.9 13.3 - 17.7 g/dL   Comprehensive metabolic panel    Collection Time: 09/13/19  8:04 AM   Result Value Ref Range    Sodium 136 133 - 144 mmol/L    Potassium 3.3 (L) 3.4 - 5.3 mmol/L    Chloride 103 94 - 109 mmol/L    Carbon Dioxide 27 20 - 32 mmol/L    Anion Gap 6 3 - 14 mmol/L    Glucose 105 (H) 70 - 99 mg/dL    Urea Nitrogen 5 (L) 7 - 30 mg/dL    Creatinine 0.88 0.66 - 1.25 mg/dL    GFR Estimate >90 >60 mL/min/[1.73_m2]    GFR Estimate If Black >90 >60 mL/min/[1.73_m2]    Calcium 8.1 (L) 8.5 - 10.1 mg/dL    Bilirubin Total 1.2 0.2 - 1.3 mg/dL    Albumin 3.4 3.4 - 5.0 g/dL    Protein Total 6.5 (L) 6.8 - 8.8 g/dL    Alkaline Phosphatase 46 40 - 150 U/L    ALT 42 0 - 70 U/L    AST 28 0 - 45 U/L   CBC with platelets    Collection Time: 09/13/19  8:04 AM   Result Value Ref Range    WBC 7.2 4.0 - 11.0 10e9/L    RBC Count 4.12 (L) 4.4 - 5.9 10e12/L    Hemoglobin 14.4 13.3 - 17.7 g/dL    Hematocrit 41.6 40.0 - 53.0 %     (H) 78 - 100 fl    MCH 35.0 (H) 26.5 - 33.0 pg    MCHC 34.6 31.5 - 36.5 g/dL    RDW 12.5 10.0 - 15.0 %    Platelet Count 185 150 - 450 10e9/L     B/P: 139/105, T: 98.4, P: 72, R: 16    Impression:   Ravi Shah is a 44-year-old  male that was admitted on 9/11/2019 for Delirium Tremens (DTS).  He has a history of alcohol dependence with alcohol withdrawal delirium.  He was last hospitalized in February 2019 for alcohol  withdrawal.  Recently participated in [facility] alcohol treatment program and states he was sober for approximately three months, however he reports drinking approximatly 3/4 L of Vodka daily since that time.  Discussed participating in a chemical dependency treatment program, however patient concerned about being able to work.  Discussed [facility] as an option for outpatient treatment, he appears willing.  Discussed starting Antabuse, patient unsure at this time.  Patient acknowledges having depression and anxiety, discussed starting Remeron, patient willing.    9/13/19: Doing better. The Remeron was never initiated, will do so this evening. SW to assist with referral for outpatient CD tx as above      DIagnoses:   1. Major depression, recurrent, severe, without psychotic features.  2. Alcohol use disorder         Plan:   1. Begin Remeron 7.5 mg at bedtime  2. Continue Zoloft 50 mg qd   3. SW to assist with outpatient CD tx referral ([facility] discussed yesterday)  4. Reconsult psych as needed      Attestation:  Patient has been seen and evaluated by me,  Karri Nicolas,

## 2019-09-13 NOTE — PLAN OF CARE
DATE & TIME: 9/13 Day       Cognitive Concerns/ Orientation : A&Ox4.   BEHAVIOR & AGGRESSION TOOL COLOR: Green, pleasant  CIWA SCORE: 4, 4 d/t tremors and sweating.  ABNL VS/O2: VSS on RA except /105.  MOBILITY: Independent, steady on feet.   PAIN MANAGMENT: Denies  DIET: Mechanical soft  BOWEL/BLADDER: Continent, using BR.  ABNL LAB/BG: WNL, Hg 16.4.  DRAIN/DEVICES: PIV D5NS at 100.  TELEMETRY RHYTHM: NA  SKIN: Intact.  TESTS/PROCEDURES: NA  D/C DAY/GOALS/PLACE: Pt interested in IOP at [facility] for treatment. Waiting on CD consult to see.   OTHER IMPORTANT INFO: Psych and GI following. Started on Remeron for tonight.

## 2019-09-13 NOTE — PLAN OF CARE
DATE & TIME: 9/12/19 1900-2300  Cognitive Concerns/ Orientation : Pt A/Ox4   BEHAVIOR & AGGRESSION TOOL COLOR: Green  CIWA SCORE: 5 (nausea, tremor, anxiety)   ABNL VS/O2: VSS on RA except BP elevated  MOBILITY: SBA, fall risk  PAIN MANAGMENT: Denies  DIET: Mechanical soft  BOWEL/BLADDER: Continent  ABNL LAB/BG: Hgb 13.9  DRAIN/DEVICES: IVF infusing  D/C DAY/GOALS/PLACE: Discharge pending progress  OTHER IMPORTANT INFO: No signs of bleeding. Hgb stable. No valium given this shift. Complaints of nausea, PRN Zofran given.

## 2019-09-13 NOTE — CONSULTS
Writer met with pt to discuss treatment options.  Pt would like to go to [facility] for OP treatment.  Completed CD evaluation.  Writer will fax referral/assessment to [facility].    Babs Wright [facility]  [phone]

## 2019-09-13 NOTE — PROGRESS NOTES
[facility]  Gastroenterology Progress Note     Ravi Shah MRN# [id number]   YOB: 1975 Age: 44 year old          Assessment and Plan:     Alcohol withdrawal (H)  Patient appears clinically well with some tremors. Nausea continues to improve. Hemoglobin stable. EGD on 9/12 revealed non bleeding esophageal ulcers, erythematous mucosa of stomach.  Recommendations:  Daily CBC and CMp  Continue with oral pantoprazole 40 mg BID  Continue with current diet  WA protocol  Chemical dependency consult            Hematemesis with nausea  Alcohol-induced acute pancreatitis, unspecified complication status      Interval History:   no new complaints, doing well, denies chest pain, denies shortness of breath, denies abdominal pain, pain is controlled, alert, oriented to person, place and time and has had a bowel movement in the last 24 hours              Review of Systems:   C: NEGATIVE for fever, chills, change in weight  E/M: NEGATIVE for ear, mouth and throat problems  R: NEGATIVE for significant cough or SOB  CV: NEGATIVE for chest pain, palpitations or peripheral edema             Medications:   I have reviewed this patient's current medications    folic acid  1 mg Oral Daily     metoprolol succinate ER  50 mg Oral Daily     mirtazapine  7.5 mg Oral At Bedtime     multivitamin w/minerals  1 tablet Oral Daily     pantoprazole (PROTONIX) IV  40 mg Intravenous BID     [START ON 9/14/2019] sertraline  50 mg Oral Daily     sodium chloride (PF)  3 mL Intracatheter Q8H     vitamin B1  100 mg Oral Daily                  Physical Exam:   Vitals were reviewed  Vital Signs with Ranges  Temp:  [97.7  F (36.5  C)-98.4  F (36.9  C)] 98.4  F (36.9  C)  Pulse:  [72] 72  Heart Rate:  [64-74] 64  Resp:  [16] 16  BP: (139-152)/(105-113) 139/105  SpO2:  [95 %-97 %] 96 %  I/O last 3 completed shifts:  In: 240 [P.O.:240]  Out: -   Constitutional: healthy, alert and no distress   Cardiovascular: negative, PMI  normal. No lifts, heaves, or thrills. RRR. No murmurs, clicks gallops or rub  Respiratory: negative, Percussion normal. Good diaphragmatic excursion. Lungs clear  Head: Normocephalic. No masses, lesions, tenderness or abnormalities  Neck: Neck supple. No adenopathy. Thyroid symmetric, normal size,, Carotids without bruits.  Abdomen: Abdomen soft, non-tender. BS normal. No masses, organomegaly             Data:   I reviewed the patient's new clinical lab test results.   Recent Labs   Lab Test 09/13/19  0804 09/12/19 2207 09/12/19  1509 09/11/19 2219 02/12/19  1305   WBC 7.2  --   --  4.5 9.2   HGB 14.4 13.9 13.9 16.4 15.8   *  --   --  100 94     --   --  223 232   INR  --   --   --  0.93  --      Recent Labs   Lab Test 09/13/19  0804 09/11/19 2219 02/13/19  0819   POTASSIUM 3.3* 3.4 3.5   CHLORIDE 103 102 104   CO2 27 27 24   BUN 5* 9 3*   ANIONGAP 6 8 9     Recent Labs   Lab Test 09/13/19  0804 09/11/19 2219 02/12/19  1305   ALBUMIN 3.4 4.1 3.9   BILITOTAL 1.2 0.5 0.7   ALT 42 68 27   AST 28 58* 22   LIPASE  --  484* 139       I reviewed the patient's new imaging results.    All laboratory data reviewed  All imaging studies reviewed by me.    Ester Tobar PA-C,  9/13/2019  [facility]  Office : [phone]  Cell: [phone] (Dr. Mcrae)  Cell: [phone] (Ester Tobar PA-C)

## 2019-09-13 NOTE — PROGRESS NOTES
[address] [address]        Assessment and Placement Summary Update     Patient name:   Ravi Shah   Patient phone: [phone] (home) [phone] (work)   Last #:   [id number]   : 1975      PMI #: This patient does not have a PMI number.   Patient address:   [address] [place] [id number]     Date of Original Assessment / Last Update: 19 (R25)  19 (DC) Update Assessment Date: 2019   Updated by:   ABDELRAHMAN Lassiter    phone number: [phone]   Referred by:   Self Agency / phone number: N/A   Referral to:   [facility]   NPI: NPI unknown   Summary:  This patient [facility] Rule 25 [facility] [place] [place] [facility] by JEAN PIERRE Arriaga [facility].  The patient's Rule 25 Assessment completed on 3/12/2019 is in the patient's electronic medical record in Epic in the Chart Review section under the Notes/Trans Tab.  The patient was admitted to [facility] on 3/27/2019 and was discharged on 2019.  A Discharge Summary was completed on 2019 and is the patient's electronic medical record in Epic in the Chart Review section under the Notes/Trans Tab.    Reason for today's update: The patient is seeking admission to outpatient treatment at [facility].        Dimension: Severity Rating/ Reason for Changes from Previous Assessment:  Dimension I: Acute intoxication/Withdrawal potential     Previous ratin Current ratin   Comments:   The patient has been drinking since May 2019.  Most recently, he's been drinking about 750 mL daily.  He is currently hospitalized for withdrawal symptoms and will be discharged when medically stable, likely tomorrow 19.     Dimension II: Biomedical Conditions and Complications     Previous ratin Current ratin   Comments:   The patient reports high blood pressure which he manages with Toprol.  He has a  "primary care provider, Dr. Joe Sandra at [facility].     Dimension III: Emotional/Behavioral/Cognitive     Previous ratin Current ratin   Comments:   No changes since last assessment.  Patient reports ongoing symptoms of anxiety.  He has experienced anxiety throughout his life, describes at as \"normal\" for him.  The patient believes his alcohol use became problematic after the death of his best friend 5 years ago.  He has not addressed grief/loss with a therapist or counselor, and is open and willing to do so.  The patient reports medication compliance.  Medications: Zoloft 50 mg      Dimension IV: Readiness for Change     Previous ratin--R25; 3 DC Current ratin   Comments:   The patient acknowledges that he did not prioritize his last treatment episode at [facility], which led to his decision to discharge and eventually return to alcohol use.  He is asking for help at this time.  He understands the expectations of the [facility] program at [facility] and is requesting a referral to obtain the services that he needs.     Dimension V: Relapse/Continued Use/Continued problem potential     Previous ratin Current ratin   Comments:   The patient remained sober from February-May 2019.  He is able to identifying triggers to his decision to drink including being [place], [place], and watching sporting events on TV.  He lacks healthy coping skills and strategies to maintain long-term sobriety.     Dimension VI: Recovery environment    Previous ratin Current ratin   Comments:   The patient own a home in [place], [place]; he lives with his wife and their son.  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  The patient describes his friends as supportive and his home environment as healthy and sober.  The patient acknowledges the negative impact of his alcohol use on his wife and son and may benefit from couples " counseling/therapy.       Summary of Assessment Update and Recommendations:   What was the outcome of last referral?  The patient completed 18 hours of treatment at [facility].  He displayed attendance problems and stopped attending [facility] after two weeks.      Recommendation and rationale for current request and significant issues that need to be addressed:    1.  Abstain from alcohol and all non-prescribed substances  2. Take all medications as prescribed  3. Enter and complete IOP at [facility], or similar program  4. Follow all recommendations of [facility] treatment team.  Recommendations may include, but are not limited to: mental health treatment (therapy), extended treatment, transfer to another program or a higher level of care, family counseling, peer support

## 2019-09-13 NOTE — PLAN OF CARE
DATE & TIME: 9/12/19 9058-7657   Cognitive Concerns/ Orientation : A&Ox4  BEHAVIOR & AGGRESSION TOOL COLOR: Green  CIWA SCORE: 7/3  ABNL VS/O2: BP elevated, otherwise VSS on RA  MOBILITY: SBA  PAIN MANAGMENT: PRN tylenol given x1 for headache  DIET: Mechanical/dental soft  BOWEL/BLADDER: Continent, up to bathroom. No BM overnight.  ABNL LAB/BG: AST 58, Lipase 484  DRAIN/DEVICES: PIV infusing D5NS at 100 ml/hr  TELEMETRY RHYTHM: NA  SKIN: WDL  TESTS/PROCEDURES: Had EGD done yesterday   D/C DAY/GOALS/PLACE: Pending progress  OTHER IMPORTANT INFO: PRN zofran given x1 for nausea. GI/CD/Psych/SW consults.

## 2019-09-13 NOTE — PROGRESS NOTES
[facility]    Hospitalist Progress Note    Date of Service (when I saw the patient): 09/13/2019    Assessment & Plan   Ravi Shah is a 44 year old male who was admitted on 9/11/2019 with hematemesis alcohol withdrawal.     # Alcohol dependence with acute alcohol withdrawal:   - has a history of alcohol withdrawal delirium requiring ICU stays in the past but no h/o withdrawal seizures  - ETOH on admission was 0.37  - continue valium with CIWA protocol  - continue IV fluids with D5 1/2 NS  - Continue to encourage cessation  - Received IV vitamins in the emergency department.  Oral multivitamins daily ordered thereafter  - Psychiatry consult appreciated  - Pt interested in IOP treatment at [facility]  - Awaiting Chem Dep consult     # Reported hematemesis, melena:   - continues to have some melanotic stools but no hematemesis; hemodynamically stable; no active bleed  - evaluated by Dr Mcrae from GI; EGD done 9/12/19 noted with non-bleeding esophageal ulcers, medium-sized hiatal hernia, erythematous mucosa in the stomach   - Hb 16.4 on admission; will monitor Hb q 8 hrs  - continue Protonix bid  - F/u as per GI     # Hypertension:  - Continue metoprolol 50 mg XL daily     # Anxiety: Patient reports that this is often the  of his alcohol use  - Continue Zoloft 50 mg daily  - Continue metoprolol XL 50 mg daily.  - Started Remoron 7.5 mg at bedtime, titrate to 15 mg in three days     Diet: adavanced to soft diet after EGD, advance as tolerated  DVT Prophylaxis: Pneumatic compression devices  Code Status: Full code     Disposition Plan   Expected discharge: 2 - 3 days, pending safe discharge plan and completion of withdrawal    Roland Thornton  Text Page (7 am to 6 pm)    Interval History   Patient complains of nausea, diarrhea, and tremulousness.  He denies vomiting or hallucinations.    -Data reviewed today: I reviewed all new labs and imaging results over the last 24 hours. I personally  reviewed no images or EKG's today.    Physical Exam   Temp: 98.4  F (36.9  C) Temp src: Oral BP: (!) 139/105 Pulse: 72 Heart Rate: 64 Resp: 16 SpO2: 96 % O2 Device: None (Room air)    Vitals:    09/11/19 2156   Weight: 81.6 kg (180 lb)     Vital Signs with Ranges  Temp:  [97.7  F (36.5  C)-98.4  F (36.9  C)] 98.4  F (36.9  C)  Pulse:  [72] 72  Heart Rate:  [64-74] 64  Resp:  [16] 16  BP: (139-152)/(105-113) 139/105  SpO2:  [95 %-97 %] 96 %  I/O last 3 completed shifts:  In: 240 [P.O.:240]  Out: -     Gen: Well nourished, well developed, alert and oriented x 3, tremulous   HEENT: Atraumatic, normocephalic  Lungs: Clear to ausculation without wheezes, rhonchi, or rales  Heart: Regular rate and rhythm, no gallops or rubs, no murmurs  GI: Bowel sound normal, no hepatosplenomegaly or masses  Lymph: No lymphadenopathy or edema  Skin: No rashes     Medications     dextrose 5% and 0.9% NaCl 100 mL/hr at 09/13/19 0727       folic acid  1 mg Oral Daily     metoprolol succinate ER  50 mg Oral Daily     mirtazapine  7.5 mg Oral At Bedtime     multivitamin w/minerals  1 tablet Oral Daily     pantoprazole (PROTONIX) IV  40 mg Intravenous BID     [START ON 9/14/2019] sertraline  50 mg Oral Daily     sodium chloride (PF)  3 mL Intracatheter Q8H     vitamin B1  100 mg Oral Daily       Data   Recent Labs   Lab 09/13/19  0804 09/12/19  2207 09/12/19  1509 09/11/19  2219   WBC 7.2  --   --  4.5   HGB 14.4 13.9 13.9 16.4   *  --   --  100     --   --  223   INR  --   --   --  0.93     --   --  137   POTASSIUM 3.3*  --   --  3.4   CHLORIDE 103  --   --  102   CO2 27  --   --  27   BUN 5*  --   --  9   CR 0.88  --   --  0.86   ANIONGAP 6  --   --  8   ISAIAS 8.1*  --   --  8.7   *  --   --  144*   ALBUMIN 3.4  --   --  4.1   PROTTOTAL 6.5*  --   --  8.0   BILITOTAL 1.2  --   --  0.5   ALKPHOS 46  --   --  66   ALT 42  --   --  68   AST 28  --   --  58*   LIPASE  --   --   --  484*       No results found for this or  any previous visit (from the past 24 hour(s)).

## 2019-09-13 NOTE — CONSULTS
[facility]  Gastroenterology Consultation         Ravi Shah  [address] [id number]  44 year old male    Admission Date/Time: 9/11/2019  Primary Care Provider: Joe Sandra  Referring / Attending Physician:  Dr. Enrique Tate    We were asked to see the patient in consultation by Dr. Enrique Tate for evaluation of hematemesis.      CC: hematemesis    HPI:  Ravi Shah is a 44 year old male who presented to ED with concerns of symptoms related to alcohol intoxication and alcohol withdrawal with complaints of nausea and vomiting. Has had similar episodes in past, most recently in Feb 2019. Has been sober over last e months. Restarted drinking 1 L of vodka daily. Noted bright red blood in emesis over last day and watery coffee ground stool. He has a past medical history of anxiety, depression and hypertension. He denies NSAID or aspirin use. Denies heartburn, dysphagia, chest pain, fever, lightheadedness or weakness.    Initial labs revealed hemoglobin of 16.4, WBC 4.5, Platelets 223. elevated lipase at 484, Mildly elevated AST at 58 with other LFTs normal, Bilirubin 0.5, electrolytes normal, INR 0.93. Ethanol level 0.37    ROS: A comprehensive ten point review of systems was negative aside from those in mentioned in the HPI.      PAST MED HX:  I have reviewed this patient's medical history and updated it with pertinent information if needed.   Past Medical History:   Diagnosis Date     Anxiety      Back pain      Depressive disorder      Hypertension        MEDICATIONS:   Prior to Admission Medications   Prescriptions Last Dose Informant Patient Reported? Taking?   hypromellose (ARTIFICIAL TEARS) 0.5 % SOLN ophthalmic solution Past Week at Unknown time Self Yes Yes   Sig: Place 1 drop into both eyes 4 times daily as needed for dry eyes   meloxicam (MOBIC) 15 MG tablet More than a month at Unknown time Self Yes No   Sig: Take 15 mg by mouth daily as needed    metoprolol  succinate ER (TOPROL-XL) 50 MG 24 hr tablet 9/10/2019 at am Self Yes No   Sig: Take 50 mg by mouth daily    ondansetron (ZOFRAN-ODT) 4 MG ODT tab Past Week at Unknown time Self Yes Yes   Sig: Take 4 mg by mouth every 8 hours as needed for nausea   sertraline (ZOLOFT) 50 MG tablet 9/10/2019 at am Self Yes No   Sig: Take 50 mg by mouth daily    vitamin B1 (THIAMINE) 100 MG tablet Past Week at Unknown time Self No Yes   Sig: Take 1 tablet (100 mg) by mouth daily      Facility-Administered Medications: None       ALLERGIES: No Known Allergies    SOCIAL HISTORY:  Social History     Tobacco Use     Smoking status: Former Smoker     Smokeless tobacco: Current User   Substance Use Topics     Alcohol use: Yes     Comment: One liter vodka per day for several years.      Drug use: No       FAMILY HISTORY:  Family History   Problem Relation Age of Onset     No Known Problems Maternal Grandmother      No Known Problems Maternal Grandfather      No Known Problems Paternal Grandmother      Substance Abuse Paternal Grandfather        PHYSICAL EXAM:   General  Alert, oriented and shakey  Vital Signs with Ranges  Temp: 98.4  F (36.9  C) Temp src: Oral BP: (!) 139/105 Pulse: 72 Heart Rate: 64 Resp: 16 SpO2: 96 % O2 Device: None (Room air)    I/O last 3 completed shifts:  In: 240 [P.O.:240]  Out: -     Constitutional: alert, moderate distress and cooperative   Cardiovascular: negative, PMI normal. No lifts, heaves, or thrills. RRR. No murmurs, clicks gallops or rub  Respiratory: negative, Percussion normal. Good diaphragmatic excursion. Lungs clear  Head: Normocephalic. No masses, lesions, tenderness or abnormalities  Neck: Neck supple. No adenopathy. Thyroid symmetric, normal size,, Carotids without bruits.  Abdomen: Abdomen soft, non-tender. BS normal. No masses, organomegaly          ADDITIONAL COMMENTS:   I reviewed the patient's new clinical lab test results.   Recent Labs   Lab Test 09/13/19  0804 09/12/19  2201 09/12/19  1907  09/11/19 2219 02/12/19  1305   WBC 7.2  --   --  4.5 9.2   HGB 14.4 13.9 13.9 16.4 15.8   *  --   --  100 94     --   --  223 232   INR  --   --   --  0.93  --      Recent Labs   Lab Test 09/13/19 0804 09/11/19 2219 02/13/19  0819   POTASSIUM 3.3* 3.4 3.5   CHLORIDE 103 102 104   CO2 27 27 24   BUN 5* 9 3*   ANIONGAP 6 8 9     Recent Labs   Lab Test 09/13/19  0804 09/11/19 2219 02/12/19  1305   ALBUMIN 3.4 4.1 3.9   BILITOTAL 1.2 0.5 0.7   ALT 42 68 27   AST 28 58* 22   LIPASE  --  484* 139       I reviewed the patient's new imaging results.        CONSULTATION ASSESSMENT AND PLAN:    Active Problems:    Ravi Shah is a pleasant 44 year old male with history of alcohol abuse with presentation to ED for alcohol intoxication and trying to self medicate withdrawal symptoms. Had complaints of nausea an bloody vomit with normal hemoglobin at 16.4.     Alcohol withdrawal (H)    Assessment: Patient has likely developed alcohol gastritis vs blake leon tears vs epitaxis as a cause for hematemesis. Other cause would include unlikely esophageal varices or PUD.     Plan: Will proceed with EGD today  Daily hemoglobin  Transfuse with hemoglobin less than 7  Keep NPo until post procedure  Continue with IV pantoprazole twice daily  Chemical dependency consult  [facility] protocol      [facility].  Office: [phone]  Cell : [phone] (Dr. Mcrae)  Cell: [phone] (Ester Tobar PA-C)

## 2019-09-14 VITALS
OXYGEN SATURATION: 99 % | HEIGHT: 70 IN | RESPIRATION RATE: 16 BRPM | DIASTOLIC BLOOD PRESSURE: 109 MMHG | WEIGHT: 180 LBS | BODY MASS INDEX: 25.77 KG/M2 | HEART RATE: 63 BPM | SYSTOLIC BLOOD PRESSURE: 143 MMHG | TEMPERATURE: 98.8 F

## 2019-09-14 LAB
ANION GAP SERPL CALCULATED.3IONS-SCNC: 4 MMOL/L (ref 3–14)
BUN SERPL-MCNC: 5 MG/DL (ref 7–30)
CALCIUM SERPL-MCNC: 8.7 MG/DL (ref 8.5–10.1)
CHLORIDE SERPL-SCNC: 105 MMOL/L (ref 94–109)
CO2 SERPL-SCNC: 28 MMOL/L (ref 20–32)
CREAT SERPL-MCNC: 0.86 MG/DL (ref 0.66–1.25)
GFR SERPL CREATININE-BSD FRML MDRD: >90 ML/MIN/{1.73_M2}
GLUCOSE SERPL-MCNC: 118 MG/DL (ref 70–99)
MAGNESIUM SERPL-MCNC: 2.1 MG/DL (ref 1.6–2.3)
POTASSIUM SERPL-SCNC: 3.9 MMOL/L (ref 3.4–5.3)
SODIUM SERPL-SCNC: 137 MMOL/L (ref 133–144)

## 2019-09-14 PROCEDURE — 80048 BASIC METABOLIC PNL TOTAL CA: CPT | Performed by: INTERNAL MEDICINE

## 2019-09-14 PROCEDURE — 25000132 ZZH RX MED GY IP 250 OP 250 PS 637: Performed by: PSYCHIATRY & NEUROLOGY

## 2019-09-14 PROCEDURE — 25000128 H RX IP 250 OP 636: Performed by: INTERNAL MEDICINE

## 2019-09-14 PROCEDURE — 25800030 ZZH RX IP 258 OP 636: Performed by: INTERNAL MEDICINE

## 2019-09-14 PROCEDURE — 99239 HOSP IP/OBS DSCHRG MGMT >30: CPT | Performed by: INTERNAL MEDICINE

## 2019-09-14 PROCEDURE — 83735 ASSAY OF MAGNESIUM: CPT | Performed by: INTERNAL MEDICINE

## 2019-09-14 PROCEDURE — C9113 INJ PANTOPRAZOLE SODIUM, VIA: HCPCS | Performed by: INTERNAL MEDICINE

## 2019-09-14 PROCEDURE — 36415 COLL VENOUS BLD VENIPUNCTURE: CPT | Performed by: INTERNAL MEDICINE

## 2019-09-14 PROCEDURE — 25000132 ZZH RX MED GY IP 250 OP 250 PS 637: Performed by: INTERNAL MEDICINE

## 2019-09-14 RX ORDER — MIRTAZAPINE 15 MG/1
15 TABLET, FILM COATED ORAL AT BEDTIME
Qty: 30 TABLET | Refills: 0 | Status: SHIPPED | OUTPATIENT
Start: 2019-09-14 | End: 2020-02-03

## 2019-09-14 RX ORDER — PANTOPRAZOLE SODIUM 40 MG/1
40 TABLET, DELAYED RELEASE ORAL
Qty: 60 TABLET | Refills: 0 | Status: SHIPPED | OUTPATIENT
Start: 2019-09-14 | End: 2020-02-03

## 2019-09-14 RX ORDER — LANOLIN ALCOHOL/MO/W.PET/CERES
100 CREAM (GRAM) TOPICAL DAILY
Qty: 30 TABLET | Refills: 0 | Status: SHIPPED | OUTPATIENT
Start: 2019-09-14 | End: 2020-02-03

## 2019-09-14 RX ADMIN — METOPROLOL SUCCINATE 50 MG: 50 TABLET, EXTENDED RELEASE ORAL at 09:33

## 2019-09-14 RX ADMIN — MULTIPLE VITAMINS W/ MINERALS TAB 1 TABLET: TAB at 09:33

## 2019-09-14 RX ADMIN — PANTOPRAZOLE SODIUM 40 MG: 40 INJECTION, POWDER, FOR SOLUTION INTRAVENOUS at 09:33

## 2019-09-14 RX ADMIN — SERTRALINE HYDROCHLORIDE 50 MG: 50 TABLET ORAL at 09:33

## 2019-09-14 RX ADMIN — DEXTROSE AND SODIUM CHLORIDE: 5; 900 INJECTION, SOLUTION INTRAVENOUS at 03:05

## 2019-09-14 RX ADMIN — FOLIC ACID 1 MG: 1 TABLET ORAL at 09:33

## 2019-09-14 RX ADMIN — Medication 100 MG: at 09:33

## 2019-09-14 ASSESSMENT — ACTIVITIES OF DAILY LIVING (ADL)
ADLS_ACUITY_SCORE: 14

## 2019-09-14 NOTE — DISCHARGE SUMMARY
"Discharge Summary    Ravi Shah MRN# [id number]   YOB: 1975 Age: 44 year old     Date of Admission:  9/11/2019  Date of Discharge:  9/14/2019  Admitting Physician:  Enrique Tate MD  Discharge Physician:  Roland Thornton MD  Discharging Service:  Hospitalist     Primary Provider: Joe Sandra          Admission Diagnoses:   Hematemesis with nausea [K92.0]  Alcohol-induced acute pancreatitis, unspecified complication status [K85.20]          Discharge Diagnosis:   Patient Active Problem List   Diagnosis     Essential hypertension     Alcohol withdrawal (H)  Bleeding esophageal ulcers             Condition on Discharge:       Discharge vitals: Blood pressure (!) 143/109, pulse 63, temperature 98.8  F (37.1  C), temperature source Oral, resp. rate 16, height 1.778 m (5' 10\"), weight 81.6 kg (180 lb), SpO2 99 %.         Gen: Well nourished, well developed, alert and oriented x 3, no acute distressed  HEENT: Atraumatic, normocephalic  Lungs: Clear to ausculation without wheezes, rhonchi, or rales  Heart: Regular rate and rhythm, no gallops or rubs, no murmurs  GI: Bowel sound normal, no hepatosplenomegaly or masses  Lymph: No lymphadenopathy or edema  Skin: No rashes          Procedures / Labs / Imaging:   Most Recent 3 CBC's:  Recent Labs   Lab Test 09/13/19  0804 09/12/19  2207 09/12/19  1509 09/11/19  2219 02/12/19  1305   WBC 7.2  --   --  4.5 9.2   HGB 14.4 13.9 13.9 16.4 15.8   *  --   --  100 94     --   --  223 232      Most Recent 3 BMP's:  Recent Labs   Lab Test 09/14/19  0856 09/13/19  1605 09/13/19  0804 09/11/19  2219     --  136 137   POTASSIUM 3.9 4.1 3.3* 3.4   CHLORIDE 105  --  103 102   CO2 28  --  27 27   BUN 5*  --  5* 9   CR 0.86  --  0.88 0.86   ANIONGAP 4  --  6 8   ISAIAS 8.7  --  8.1* 8.7   *  --  105* 144*     Most Recent 3 Troponin's:  Recent Labs   Lab Test 02/12/19  1305   TROPI <0.015     Most Recent 3 INR's:  Recent Labs   Lab Test " 09/11/19  2219   INR 0.93     Most Recent 2 LFT's:  Recent Labs   Lab Test 09/13/19  0804 09/11/19  2219   AST 28 58*   ALT 42 68   ALKPHOS 46 66   BILITOTAL 1.2 0.5     Most Recent Cholesterol Panel:No lab results found.  Most Recent 6 Bacteria Isolates From Any Culture (See EPIC Reports for Culture Details):No lab results found.  Most Recent TSH, T4 and HgbA1c: No lab results found.  Results for orders placed or performed during the hospital encounter of 02/12/19   XR Chest 2 Views    Narrative    XR CHEST 2 VW 2/12/2019 2:06 PM    HISTORY: Chest pain.    COMPARISON: None.    FINDINGS: No airspace consolidation, pleural effusion or pneumothorax.  Normal heart size.      Impression    IMPRESSION: No acute cardiopulmonary abnormality.    HERMINIO DOMÍNGUEZ MD             Medications Prior to Admission:     No medications prior to admission.             Discharge Medications:     Discharge Medication List as of 9/14/2019 12:41 PM      START taking these medications    Details   mirtazapine (REMERON) 15 MG tablet Take 1 tablet (15 mg) by mouth At Bedtime, Disp-30 tablet, R-0, E-Prescribe      pantoprazole (PROTONIX) 40 MG EC tablet Take 1 tablet (40 mg) by mouth 2 times daily (before meals), Disp-60 tablet, R-0, E-Prescribe         CONTINUE these medications which have CHANGED    Details   vitamin B1 (THIAMINE) 100 MG tablet Take 1 tablet (100 mg) by mouth daily, Disp-30 tablet, R-0, E-Prescribe         CONTINUE these medications which have NOT CHANGED    Details   hypromellose (ARTIFICIAL TEARS) 0.5 % SOLN ophthalmic solution Place 1 drop into both eyes 4 times daily as needed for dry eyes, Historical      metoprolol succinate ER (TOPROL-XL) 50 MG 24 hr tablet Take 50 mg by mouth daily , Historical      ondansetron (ZOFRAN-ODT) 4 MG ODT tab Take 4 mg by mouth every 8 hours as needed for nausea, Historical      sertraline (ZOLOFT) 50 MG tablet Take 50 mg by mouth daily , Historical         STOP taking these medications        meloxicam (MOBIC) 15 MG tablet Comments:   Reason for Stopping:                     Brief History of Illness:   Ravi Shah is a 44 year old male who was admitted for hematemesis.          Hospital Course:   Is a 44-year-old male who presents with hematemesis and active alcohol withdrawal requesting treatment.  The patient was placed on IV Protonix.  He was evaluated by gastroenterology and underwent an EGD which demonstrated 2 esophageal ulcers.  Patient was evaluated by psychiatry and placed on Remeron.  Patient was evaluated by chemical dependency and given information for outpatient treatment.  The patient completed withdrawal and was discharged home.    Greater than 35 minutes were spent in discharge planning.             Pending Results:   Unresulted Labs Ordered in the Past 30 Days of this Admission     No orders found from 8/12/2019 to 9/12/2019.

## 2019-09-14 NOTE — PLAN OF CARE
DATE & TIME: 9/14/19                      Cognitive Concerns/ Orientation : Alert and Oriented x4   BEHAVIOR & AGGRESSION TOOL COLOR: Green   CIWA SCORE: 0,0     ABNL VS/O2: VSS on RA, BP elevated which has been baseline this stay.   MOBILITY: Ind   PAIN MANAGMENT: Denies   DIET: Mech. Soft   BOWEL/BLADDER: Continent   ABNL LAB/BG: NA   DRAIN/DEVICES: PIV Infusing   TELEMETRY RHYTHM: NA   SKIN: Intact   TESTS/PROCEDURES: NA   D/C DAY/GOALS/PLACE: Possibly home tomorrow with outpatient treatment   OTHER IMPORTANT INFO: NA

## 2019-09-14 NOTE — PROGRESS NOTES
[facility]  Gastroenterology Progress Note     Ravi Shah MRN# [id number]   YOB: 1975 Age: 44 year old          Assessment and Plan:     Alcohol withdrawal (H)   Patient appears clinically well with some tremors. Nausea resolved. Hemoglobin stable. EGD on 9/12 revealed non bleeding esophageal ulcers, erythematous mucosa of stomach.  Recommendations:  Daily CBC and CMp  Continue with oral pantoprazole 40 mg BID  Advance diet to a regular diet  [facility] protocol  Chemical dependency consult            Hematemesis with nausea  Alcohol-induced acute pancreatitis, unspecified complication status  Alcohol withdrawal syndrome without complication (H)  Anxiety  Esophageal ulcer with bleeding      Interval History:   no new complaints, doing well, denies chest pain, denies shortness of breath, denies abdominal pain, alert, oriented to person, place and time, has had a bowel movement in the last 24 hours and doing well; no cp, sob, n/v/d, or abd pain.              Review of Systems:   C: NEGATIVE for fever, chills, change in weight  E/M: NEGATIVE for ear, mouth and throat problems  R: NEGATIVE for significant cough or SOB  CV: NEGATIVE for chest pain, palpitations or peripheral edema             Medications:   I have reviewed this patient's current medications    folic acid  1 mg Oral Daily     metoprolol succinate ER  50 mg Oral Daily     mirtazapine  7.5 mg Oral At Bedtime     multivitamin w/minerals  1 tablet Oral Daily     pantoprazole (PROTONIX) IV  40 mg Intravenous BID     sertraline  50 mg Oral Daily     sodium chloride (PF)  3 mL Intracatheter Q8H     vitamin B1  100 mg Oral Daily                  Physical Exam:   Vitals were reviewed  Vital Signs with Ranges  Temp:  [97.8  F (36.6  C)-98.8  F (37.1  C)] 98.8  F (37.1  C)  Pulse:  [63-70] 63  Heart Rate:  [68-72] 68  Resp:  [16-18] 16  BP: (130-143)/() 143/109  SpO2:  [97 %-99 %] 99 %  I/O last 3 completed shifts:  In: 0033  [P.O.:660; I.V.:3006]  Out: -   Constitutional: healthy, alert and no distress   Cardiovascular: negative, PMI normal. No lifts, heaves, or thrills. RRR. No murmurs, clicks gallops or rub  Respiratory: negative, Percussion normal. Good diaphragmatic excursion. Lungs clear  Head: Normocephalic. No masses, lesions, tenderness or abnormalities  Neck: Neck supple. No adenopathy. Thyroid symmetric, normal size,, Carotids without bruits.  Abdomen: Abdomen soft, non-tender. BS normal. No masses, organomegaly           Data:   I reviewed the patient's new clinical lab test results.   Recent Labs   Lab Test 09/13/19  0804 09/12/19  2207 09/12/19  1509 09/11/19  2219 02/12/19  1305   WBC 7.2  --   --  4.5 9.2   HGB 14.4 13.9 13.9 16.4 15.8   *  --   --  100 94     --   --  223 232   INR  --   --   --  0.93  --      Recent Labs   Lab Test 09/14/19  0856 09/13/19  1605 09/13/19  0804 09/11/19  2219   POTASSIUM 3.9 4.1 3.3* 3.4   CHLORIDE 105  --  103 102   CO2 28  --  27 27   BUN 5*  --  5* 9   ANIONGAP 4  --  6 8     Recent Labs   Lab Test 09/13/19  0804 09/11/19  2219 02/12/19  1305   ALBUMIN 3.4 4.1 3.9   BILITOTAL 1.2 0.5 0.7   ALT 42 68 27   AST 28 58* 22   LIPASE  --  484* 139       I reviewed the patient's new imaging results.    All laboratory data reviewed  All imaging studies reviewed by me.    Ester Tobar PA-C,  9/14/2019  [facility]  Office : [phone]  Cell: [phone] (Dr. Mcrae)  Cell: [phone] (Ester Tobar PA-C)

## 2019-09-14 NOTE — DISCHARGE INSTRUCTIONS
[facility] Program general phone number is [phone].   Babs Wright, [facility] counselor who completed your assessment, can be reach at [phone] if [facility] [place] [facility] plan doesn't work out.

## 2019-09-14 NOTE — CONSULTS
SW:  D:  Patient seen by CD counselor Babspam Wright. Note she has made a referral to [facility] Out Patient CD program.    Patient can follow up with [facility] on Monday to determine when he can start the Out Patient program.  The main phone number for [facility] is [phone].    Patient can also re-connect with Babs Wright if the referral to [facility] does not work out.  Ms Wright can be reached at [phone].  Social Work consult is not indicated as patient's discharge needs were organized by Chemical Dependency Counselor.

## 2019-09-14 NOTE — PLAN OF CARE
A&O, VS ex BP slightly elevated on Ra, LS clear, BS audible and normoactive, on mechanical soft diet, voiding adequately and independently in the bathroom, CMS intact, denies pain, with very minimal tremors during the shift. For possible discharge to home.

## 2019-09-15 NOTE — PLAN OF CARE
A&Ox4. CIWA 0. IV removed. discharge instructions taught to pt, verbalized understanding. Denied pain. Discharged to home with family transport.

## 2019-11-04 ENCOUNTER — HEALTH MAINTENANCE LETTER (OUTPATIENT)
Age: 44
End: 2019-11-04

## 2020-02-03 ENCOUNTER — HOSPITAL ENCOUNTER (INPATIENT)
Facility: CLINIC | Age: 45
LOS: 2 days | Discharge: HOME | DRG: 897 | End: 2020-02-05
Attending: EMERGENCY MEDICINE | Admitting: STUDENT IN AN ORGANIZED HEALTH CARE EDUCATION/TRAINING PROGRAM
Payer: COMMERCIAL

## 2020-02-03 ENCOUNTER — APPOINTMENT (OUTPATIENT)
Dept: ULTRASOUND IMAGING | Facility: CLINIC | Age: 45
DRG: 897 | End: 2020-02-03
Attending: EMERGENCY MEDICINE
Payer: COMMERCIAL

## 2020-02-03 DIAGNOSIS — R79.89 ELEVATED LFTS: ICD-10-CM

## 2020-02-03 DIAGNOSIS — F10.930 ALCOHOL WITHDRAWAL SYNDROME WITHOUT COMPLICATION (H): ICD-10-CM

## 2020-02-03 PROBLEM — I10 ESSENTIAL HYPERTENSION: Status: ACTIVE | Noted: 2019-02-12

## 2020-02-03 PROBLEM — F10.939 ALCOHOL WITHDRAWAL (H): Status: ACTIVE | Noted: 2019-02-12

## 2020-02-03 LAB
ALBUMIN SERPL-MCNC: 4.8 G/DL (ref 3.4–5)
ALP SERPL-CCNC: 78 U/L (ref 40–150)
ALT SERPL W P-5'-P-CCNC: 126 U/L (ref 0–70)
ANION GAP SERPL CALCULATED.3IONS-SCNC: 15 MMOL/L (ref 3–14)
AST SERPL W P-5'-P-CCNC: 121 U/L (ref 0–45)
BASOPHILS # BLD AUTO: 0 10E9/L (ref 0–0.2)
BASOPHILS NFR BLD AUTO: 0.1 %
BILIRUB SERPL-MCNC: 2.5 MG/DL (ref 0.2–1.3)
BUN SERPL-MCNC: 12 MG/DL (ref 7–30)
CALCIUM SERPL-MCNC: 10.1 MG/DL (ref 8.5–10.1)
CHLORIDE SERPL-SCNC: 96 MMOL/L (ref 94–109)
CO2 SERPL-SCNC: 20 MMOL/L (ref 20–32)
CREAT SERPL-MCNC: 0.8 MG/DL (ref 0.66–1.25)
DIFFERENTIAL METHOD BLD: ABNORMAL
EOSINOPHIL # BLD AUTO: 0 10E9/L (ref 0–0.7)
EOSINOPHIL NFR BLD AUTO: 0 %
ERYTHROCYTE [DISTWIDTH] IN BLOOD BY AUTOMATED COUNT: 13.1 % (ref 10–15)
GFR SERPL CREATININE-BSD FRML MDRD: >90 ML/MIN/{1.73_M2}
GLUCOSE SERPL-MCNC: 175 MG/DL (ref 70–99)
HCT VFR BLD AUTO: 45.3 % (ref 40–53)
HGB BLD-MCNC: 15.6 G/DL (ref 13.3–17.7)
IMM GRANULOCYTES # BLD: 0 10E9/L (ref 0–0.4)
IMM GRANULOCYTES NFR BLD: 0.3 %
LIPASE SERPL-CCNC: 560 U/L (ref 73–393)
LYMPHOCYTES # BLD AUTO: 0.4 10E9/L (ref 0.8–5.3)
LYMPHOCYTES NFR BLD AUTO: 4.9 %
MAGNESIUM SERPL-MCNC: 1.8 MG/DL (ref 1.6–2.3)
MCH RBC QN AUTO: 34.3 PG (ref 26.5–33)
MCHC RBC AUTO-ENTMCNC: 34.4 G/DL (ref 31.5–36.5)
MCV RBC AUTO: 100 FL (ref 78–100)
MONOCYTES # BLD AUTO: 0.8 10E9/L (ref 0–1.3)
MONOCYTES NFR BLD AUTO: 11.2 %
NEUTROPHILS # BLD AUTO: 6 10E9/L (ref 1.6–8.3)
NEUTROPHILS NFR BLD AUTO: 83.5 %
NRBC # BLD AUTO: 0 10*3/UL
NRBC BLD AUTO-RTO: 0 /100
PLATELET # BLD AUTO: 145 10E9/L (ref 150–450)
POTASSIUM SERPL-SCNC: 3.8 MMOL/L (ref 3.4–5.3)
PROT SERPL-MCNC: 8.9 G/DL (ref 6.8–8.8)
RBC # BLD AUTO: 4.55 10E12/L (ref 4.4–5.9)
SODIUM SERPL-SCNC: 131 MMOL/L (ref 133–144)
WBC # BLD AUTO: 7.2 10E9/L (ref 4–11)

## 2020-02-03 PROCEDURE — 96376 TX/PRO/DX INJ SAME DRUG ADON: CPT

## 2020-02-03 PROCEDURE — 93005 ELECTROCARDIOGRAM TRACING: CPT

## 2020-02-03 PROCEDURE — 96375 TX/PRO/DX INJ NEW DRUG ADDON: CPT

## 2020-02-03 PROCEDURE — 12000000 ZZH R&B MED SURG/OB

## 2020-02-03 PROCEDURE — 80053 COMPREHEN METABOLIC PANEL: CPT | Performed by: EMERGENCY MEDICINE

## 2020-02-03 PROCEDURE — 85025 COMPLETE CBC W/AUTO DIFF WBC: CPT | Performed by: EMERGENCY MEDICINE

## 2020-02-03 PROCEDURE — 99285 EMERGENCY DEPT VISIT HI MDM: CPT | Mod: 25

## 2020-02-03 PROCEDURE — 76705 ECHO EXAM OF ABDOMEN: CPT

## 2020-02-03 PROCEDURE — 96374 THER/PROPH/DIAG INJ IV PUSH: CPT

## 2020-02-03 PROCEDURE — 25800030 ZZH RX IP 258 OP 636: Performed by: EMERGENCY MEDICINE

## 2020-02-03 PROCEDURE — 25000128 H RX IP 250 OP 636: Performed by: EMERGENCY MEDICINE

## 2020-02-03 PROCEDURE — 25000132 ZZH RX MED GY IP 250 OP 250 PS 637: Performed by: EMERGENCY MEDICINE

## 2020-02-03 PROCEDURE — 25000128 H RX IP 250 OP 636: Performed by: PHYSICIAN ASSISTANT

## 2020-02-03 PROCEDURE — HZ2ZZZZ DETOXIFICATION SERVICES FOR SUBSTANCE ABUSE TREATMENT: ICD-10-PCS | Performed by: EMERGENCY MEDICINE

## 2020-02-03 PROCEDURE — 83735 ASSAY OF MAGNESIUM: CPT | Performed by: EMERGENCY MEDICINE

## 2020-02-03 PROCEDURE — 83690 ASSAY OF LIPASE: CPT | Performed by: EMERGENCY MEDICINE

## 2020-02-03 PROCEDURE — 25800030 ZZH RX IP 258 OP 636: Performed by: PHYSICIAN ASSISTANT

## 2020-02-03 PROCEDURE — 96361 HYDRATE IV INFUSION ADD-ON: CPT

## 2020-02-03 PROCEDURE — C9113 INJ PANTOPRAZOLE SODIUM, VIA: HCPCS | Performed by: PHYSICIAN ASSISTANT

## 2020-02-03 PROCEDURE — 99223 1ST HOSP IP/OBS HIGH 75: CPT | Mod: AI | Performed by: PHYSICIAN ASSISTANT

## 2020-02-03 RX ORDER — FOLIC ACID 1 MG/1
1 TABLET ORAL ONCE
Status: COMPLETED | OUTPATIENT
Start: 2020-02-03 | End: 2020-02-03

## 2020-02-03 RX ORDER — LORATADINE 10 MG/1
10 TABLET ORAL DAILY
COMMUNITY
End: 2020-03-01

## 2020-02-03 RX ORDER — NALOXONE HYDROCHLORIDE 0.4 MG/ML
.1-.4 INJECTION, SOLUTION INTRAMUSCULAR; INTRAVENOUS; SUBCUTANEOUS
Status: DISCONTINUED | OUTPATIENT
Start: 2020-02-03 | End: 2020-02-05 | Stop reason: HOSPADM

## 2020-02-03 RX ORDER — ONDANSETRON 2 MG/ML
4 INJECTION INTRAMUSCULAR; INTRAVENOUS EVERY 30 MIN PRN
Status: DISCONTINUED | OUTPATIENT
Start: 2020-02-03 | End: 2020-02-05 | Stop reason: HOSPADM

## 2020-02-03 RX ORDER — AMOXICILLIN 250 MG
1 CAPSULE ORAL 2 TIMES DAILY PRN
Status: DISCONTINUED | OUTPATIENT
Start: 2020-02-03 | End: 2020-02-05 | Stop reason: HOSPADM

## 2020-02-03 RX ORDER — LABETALOL HYDROCHLORIDE 5 MG/ML
10 INJECTION, SOLUTION INTRAVENOUS
Status: DISCONTINUED | OUTPATIENT
Start: 2020-02-03 | End: 2020-02-05 | Stop reason: HOSPADM

## 2020-02-03 RX ORDER — METOPROLOL SUCCINATE 50 MG/1
50 TABLET, EXTENDED RELEASE ORAL DAILY
Status: DISCONTINUED | OUTPATIENT
Start: 2020-02-04 | End: 2020-02-05 | Stop reason: HOSPADM

## 2020-02-03 RX ORDER — QUETIAPINE FUMARATE 25 MG/1
25-50 TABLET, FILM COATED ORAL EVERY 6 HOURS PRN
Status: DISCONTINUED | OUTPATIENT
Start: 2020-02-03 | End: 2020-02-05 | Stop reason: HOSPADM

## 2020-02-03 RX ORDER — LANOLIN ALCOHOL/MO/W.PET/CERES
100 CREAM (GRAM) TOPICAL ONCE
Status: COMPLETED | OUTPATIENT
Start: 2020-02-03 | End: 2020-02-03

## 2020-02-03 RX ORDER — HALOPERIDOL 5 MG/ML
2 INJECTION INTRAMUSCULAR EVERY 6 HOURS PRN
Status: DISCONTINUED | OUTPATIENT
Start: 2020-02-03 | End: 2020-02-05 | Stop reason: HOSPADM

## 2020-02-03 RX ORDER — MAGNESIUM OXIDE 400 MG/1
800 TABLET ORAL ONCE
Status: COMPLETED | OUTPATIENT
Start: 2020-02-03 | End: 2020-02-03

## 2020-02-03 RX ORDER — LORAZEPAM 2 MG/ML
1 INJECTION INTRAMUSCULAR ONCE
Status: COMPLETED | OUTPATIENT
Start: 2020-02-03 | End: 2020-02-03

## 2020-02-03 RX ORDER — FOLIC ACID 1 MG/1
1 TABLET ORAL DAILY
Status: DISCONTINUED | OUTPATIENT
Start: 2020-02-04 | End: 2020-02-05 | Stop reason: HOSPADM

## 2020-02-03 RX ORDER — POTASSIUM CHLORIDE 1500 MG/1
20-40 TABLET, EXTENDED RELEASE ORAL
Status: DISCONTINUED | OUTPATIENT
Start: 2020-02-03 | End: 2020-02-05 | Stop reason: HOSPADM

## 2020-02-03 RX ORDER — LIDOCAINE 40 MG/G
CREAM TOPICAL
Status: DISCONTINUED | OUTPATIENT
Start: 2020-02-03 | End: 2020-02-05 | Stop reason: HOSPADM

## 2020-02-03 RX ORDER — POTASSIUM CHLORIDE 7.45 MG/ML
10 INJECTION INTRAVENOUS
Status: DISCONTINUED | OUTPATIENT
Start: 2020-02-03 | End: 2020-02-05 | Stop reason: HOSPADM

## 2020-02-03 RX ORDER — PROCHLORPERAZINE MALEATE 5 MG
10 TABLET ORAL EVERY 6 HOURS PRN
Status: DISCONTINUED | OUTPATIENT
Start: 2020-02-03 | End: 2020-02-05 | Stop reason: HOSPADM

## 2020-02-03 RX ORDER — MULTIVITAMIN,THERAPEUTIC
1 TABLET ORAL ONCE
Status: COMPLETED | OUTPATIENT
Start: 2020-02-03 | End: 2020-02-03

## 2020-02-03 RX ORDER — TETRAHYDROZOLINE HCL 0.05 %
1 DROPS OPHTHALMIC (EYE) DAILY
Status: ON HOLD | COMMUNITY
End: 2020-03-15

## 2020-02-03 RX ORDER — ACETAMINOPHEN 325 MG/1
650 TABLET ORAL EVERY 4 HOURS PRN
Status: DISCONTINUED | OUTPATIENT
Start: 2020-02-03 | End: 2020-02-05 | Stop reason: HOSPADM

## 2020-02-03 RX ORDER — LORATADINE 10 MG/1
10 TABLET ORAL DAILY
Status: DISCONTINUED | OUTPATIENT
Start: 2020-02-04 | End: 2020-02-05 | Stop reason: HOSPADM

## 2020-02-03 RX ORDER — MAGNESIUM SULFATE HEPTAHYDRATE 40 MG/ML
4 INJECTION, SOLUTION INTRAVENOUS EVERY 4 HOURS PRN
Status: DISCONTINUED | OUTPATIENT
Start: 2020-02-03 | End: 2020-02-05 | Stop reason: HOSPADM

## 2020-02-03 RX ORDER — LANOLIN ALCOHOL/MO/W.PET/CERES
100 CREAM (GRAM) TOPICAL DAILY
Status: DISCONTINUED | OUTPATIENT
Start: 2020-02-04 | End: 2020-02-05 | Stop reason: HOSPADM

## 2020-02-03 RX ORDER — DIAZEPAM 5 MG
10 TABLET ORAL EVERY 30 MIN PRN
Status: DISCONTINUED | OUTPATIENT
Start: 2020-02-03 | End: 2020-02-05 | Stop reason: HOSPADM

## 2020-02-03 RX ORDER — HYDRALAZINE HYDROCHLORIDE 20 MG/ML
10 INJECTION INTRAMUSCULAR; INTRAVENOUS EVERY 4 HOURS PRN
Status: DISCONTINUED | OUTPATIENT
Start: 2020-02-03 | End: 2020-02-05 | Stop reason: HOSPADM

## 2020-02-03 RX ORDER — CALCIUM CARBONATE 500 MG/1
1000 TABLET, CHEWABLE ORAL 4 TIMES DAILY PRN
Status: DISCONTINUED | OUTPATIENT
Start: 2020-02-03 | End: 2020-02-05 | Stop reason: HOSPADM

## 2020-02-03 RX ORDER — POTASSIUM CL/LIDO/0.9 % NACL 10MEQ/0.1L
10 INTRAVENOUS SOLUTION, PIGGYBACK (ML) INTRAVENOUS
Status: DISCONTINUED | OUTPATIENT
Start: 2020-02-03 | End: 2020-02-05 | Stop reason: HOSPADM

## 2020-02-03 RX ORDER — MULTIPLE VITAMINS W/ MINERALS TAB 9MG-400MCG
1 TAB ORAL DAILY
Status: DISCONTINUED | OUTPATIENT
Start: 2020-02-04 | End: 2020-02-05 | Stop reason: HOSPADM

## 2020-02-03 RX ORDER — POTASSIUM CHLORIDE 29.8 MG/ML
20 INJECTION INTRAVENOUS
Status: DISCONTINUED | OUTPATIENT
Start: 2020-02-03 | End: 2020-02-05 | Stop reason: HOSPADM

## 2020-02-03 RX ORDER — PANTOPRAZOLE SODIUM 40 MG/1
40 TABLET, DELAYED RELEASE ORAL EVERY MORNING
Status: ON HOLD | COMMUNITY
End: 2020-03-15

## 2020-02-03 RX ORDER — PROCHLORPERAZINE 25 MG
25 SUPPOSITORY, RECTAL RECTAL EVERY 12 HOURS PRN
Status: DISCONTINUED | OUTPATIENT
Start: 2020-02-03 | End: 2020-02-05 | Stop reason: HOSPADM

## 2020-02-03 RX ORDER — AMOXICILLIN 250 MG
2 CAPSULE ORAL 2 TIMES DAILY PRN
Status: DISCONTINUED | OUTPATIENT
Start: 2020-02-03 | End: 2020-02-05 | Stop reason: HOSPADM

## 2020-02-03 RX ORDER — SODIUM CHLORIDE 9 MG/ML
INJECTION, SOLUTION INTRAVENOUS CONTINUOUS
Status: DISCONTINUED | OUTPATIENT
Start: 2020-02-03 | End: 2020-02-05 | Stop reason: HOSPADM

## 2020-02-03 RX ORDER — ONDANSETRON 2 MG/ML
4 INJECTION INTRAMUSCULAR; INTRAVENOUS EVERY 6 HOURS PRN
Status: DISCONTINUED | OUTPATIENT
Start: 2020-02-03 | End: 2020-02-05 | Stop reason: HOSPADM

## 2020-02-03 RX ORDER — DIAZEPAM 10 MG/2ML
5-10 INJECTION, SOLUTION INTRAMUSCULAR; INTRAVENOUS EVERY 30 MIN PRN
Status: DISCONTINUED | OUTPATIENT
Start: 2020-02-03 | End: 2020-02-05 | Stop reason: HOSPADM

## 2020-02-03 RX ORDER — ONDANSETRON 4 MG/1
4 TABLET, ORALLY DISINTEGRATING ORAL EVERY 6 HOURS PRN
Status: DISCONTINUED | OUTPATIENT
Start: 2020-02-03 | End: 2020-02-05 | Stop reason: HOSPADM

## 2020-02-03 RX ORDER — POTASSIUM CHLORIDE 1.5 G/1.58G
20-40 POWDER, FOR SOLUTION ORAL
Status: DISCONTINUED | OUTPATIENT
Start: 2020-02-03 | End: 2020-02-05 | Stop reason: HOSPADM

## 2020-02-03 RX ADMIN — LABETALOL HYDROCHLORIDE 10 MG: 5 INJECTION INTRAVENOUS at 16:48

## 2020-02-03 RX ADMIN — SODIUM CHLORIDE 1000 ML: 9 INJECTION, SOLUTION INTRAVENOUS at 11:00

## 2020-02-03 RX ADMIN — Medication 800 MG: at 12:05

## 2020-02-03 RX ADMIN — THERA TABS 1 TABLET: TAB at 12:05

## 2020-02-03 RX ADMIN — FOLIC ACID 1 MG: 1 TABLET ORAL at 12:05

## 2020-02-03 RX ADMIN — ONDANSETRON 4 MG: 2 INJECTION INTRAMUSCULAR; INTRAVENOUS at 11:00

## 2020-02-03 RX ADMIN — SODIUM CHLORIDE 1000 ML: 9 INJECTION, SOLUTION INTRAVENOUS at 12:05

## 2020-02-03 RX ADMIN — DIAZEPAM 10 MG: 5 INJECTION, SOLUTION INTRAMUSCULAR; INTRAVENOUS at 16:34

## 2020-02-03 RX ADMIN — LORAZEPAM 1 MG: 2 INJECTION INTRAMUSCULAR; INTRAVENOUS at 13:17

## 2020-02-03 RX ADMIN — LORAZEPAM 1 MG: 2 INJECTION INTRAMUSCULAR; INTRAVENOUS at 11:02

## 2020-02-03 RX ADMIN — SODIUM CHLORIDE: 9 INJECTION, SOLUTION INTRAVENOUS at 23:37

## 2020-02-03 RX ADMIN — SODIUM CHLORIDE: 9 INJECTION, SOLUTION INTRAVENOUS at 17:10

## 2020-02-03 RX ADMIN — PROCHLORPERAZINE EDISYLATE 10 MG: 5 INJECTION INTRAMUSCULAR; INTRAVENOUS at 20:49

## 2020-02-03 RX ADMIN — ONDANSETRON 4 MG: 2 INJECTION INTRAMUSCULAR; INTRAVENOUS at 17:02

## 2020-02-03 RX ADMIN — ONDANSETRON 4 MG: 2 INJECTION INTRAMUSCULAR; INTRAVENOUS at 23:40

## 2020-02-03 RX ADMIN — Medication 100 MG: at 12:05

## 2020-02-03 RX ADMIN — PANTOPRAZOLE SODIUM 40 MG: 40 INJECTION, POWDER, FOR SOLUTION INTRAVENOUS at 17:06

## 2020-02-03 ASSESSMENT — ENCOUNTER SYMPTOMS
TREMORS: 1
VOMITING: 1
DIARRHEA: 0

## 2020-02-03 ASSESSMENT — ACTIVITIES OF DAILY LIVING (ADL)
ADLS_ACUITY_SCORE: 14
ADLS_ACUITY_SCORE: 14

## 2020-02-03 NOTE — ED NOTES
[facility]  ED Nurse Handoff Report    ED Chief complaint: Vomiting (started Sunday 3pm, increasing episodes, emesis every 45 minutes this morning. denies diarrhea.  worsening abd pain that started after the vomiting.  denies fevers. )      ED Diagnosis:   Final diagnoses:   Alcohol withdrawal syndrome without complication (H)   Elevated LFTs       Code Status: to be addressed by admitting    Allergies:   Allergies   Allergen Reactions     Morphine Nausea and Vomiting       Patient Story: Pt arrived to ED from home after vomiting all weekend.   Focused Assessment:  Pt w/ hx of ETOH withdrawal reports slipped and fell on Saturday. Pt states last ETOH was Saturday and began vomiting on Saturday. Pt very shaky, tachycardic and hypertensive in ED. Pt alert and oriented   Abnormal Labs Reviewed   COMPREHENSIVE METABOLIC PANEL - Abnormal; Notable for the following components:       Result Value    Sodium 131 (*)     Anion Gap 15 (*)     Glucose 175 (*)     Bilirubin Total 2.5 (*)     Protein Total 8.9 (*)      (*)      (*)     All other components within normal limits   LIPASE - Abnormal; Notable for the following components:    Lipase 560 (*)     All other components within normal limits   CBC WITH PLATELETS DIFFERENTIAL - Abnormal; Notable for the following components:    MCH 34.3 (*)     Platelet Count 145 (*)     Absolute Lymphocytes 0.4 (*)     All other components within normal limits       Treatments and/or interventions provided: IVF, multivitamins, zofran and ativan x2 doses  Patient's response to treatments and/or interventions: BP improved, Shaking lessened and nausea improved    To be done/followed up on inpatient unit:  admitting orders    Does this patient have any cognitive concerns?: Alcohol/Drugs temporary cognitive concerns    Activity level - Baseline/Home:  Independent  Activity Level - Current:   Independent    Patient's Preferred language: English   Needed?:  No    Isolation: None  Infection: Not Applicable  Bariatric?: No    Vital Signs:   Vitals:    02/03/20 1018 02/03/20 1100 02/03/20 1130 02/03/20 1200   BP: (!) 160/116 (!) 164/120 (!) 174/118 (!) 164/116   Pulse:  100 101 105   Resp: 18 16 17 18   Temp: 98.1  F (36.7  C)      TempSrc: Oral      SpO2: 96% 96% 95% 96%   Weight: 83.6 kg (184 lb 3.2 oz)          Cardiac Rhythm:Cardiac Rhythm: Sinus tachycardia    Was the PSS-3 completed:   Yes  What interventions are required if any?               Family Comments: none present  OBS brochure/video discussed/provided to patient/family: N/A              Name of person given brochure if not patient:               Relationship to patient:     For the majority of the shift this patient's behavior was Green.   Behavioral interventions performed were .    ED NURSE PHONE NUMBER: [phone]

## 2020-02-03 NOTE — PHARMACY-ADMISSION MEDICATION HISTORY
Pharmacy Medication History  Admission medication history interview status for the 2/3/2020  admission is complete. See EPIC admission navigator for prior to admission medications     Medication history sources: Patient  Medication history source reliability: Good  Adherence assessment: Good    Significant changes made to the medication list:  Changed pantoprazole dose to 40 mg daily per pt   Added Visine eye drops  Removed mirtazapine, ondansetron, thiamine, and artifical tears      Additional medication history information:   None    Medication reconciliation completed by provider prior to medication history? N/A    Time spent in this activity: 15 minutes      Prior to Admission medications    Medication Sig Last Dose Taking? Auth Provider   loratadine (CLARITIN) 10 MG tablet Take 10 mg by mouth daily 2/3/2020 at AM Yes Reported, Patient   metoprolol succinate ER (TOPROL-XL) 50 MG 24 hr tablet Take 50 mg by mouth daily  2/3/2020 at AM Yes Reported, Patient   pantoprazole (PROTONIX) 40 MG EC tablet Take 40 mg by mouth every morning 2/3/2020 at AM Yes Reported, Patient   sertraline (ZOLOFT) 50 MG tablet Take 50 mg by mouth every morning  2/3/2020 at AM Yes Reported, Patient   tetrahydrozoline (VISINE) 0.05 % ophthalmic solution Place 1 drop into both eyes daily PRN Yes Reported, Patient

## 2020-02-03 NOTE — H&P
Admitted:     02/03/2020      PRIMARY CARE PHYSICIAN:  Joe Sandra MD      CHIEF COMPLAINT:  Abdominal pain, nausea and vomiting.      HISTORY OF PRESENT ILLNESS:  Ravi Shah is a very pleasant 44-year-old male with a past medical history of alcohol abuse, alcohol withdrawal, anxiety and depression, who presented to the Emergency Department with complaints of abdominal pain, nausea and vomiting.  The patient reports that yesterday around 15:00, he started having episodes of vomiting after waking up from a nap.  He reports that he has been vomiting about every 30 minutes and has associated abdominal pain in the epigastrium.  He reports that his vomit has been mostly liquid contents with a yellow to greenish color.  He denies any hematemesis.  He is unable to even keep water down.  He does report that he has been drinking 1 to 1-1/2 pints of hard liquor daily recently and last drink was on Sunday evening.  He has also been more tremulous.  He denies any fevers or chills, but has had diaphoresis and hot and cold flashes.  He has no headache, weakness, chest pain, cough, shortness of breath, dysuria or focal weakness.  The patient has been hospitalized for alcohol withdrawal in the past with the most recent being last September.  The patient denies any seizure secondary to withdrawal.      In the Emergency Department, the patient was evaluated by Dr. Ivy.  He was found to have blood pressure 160/116 with heart rate of 100, temperature 98.1.  EKG shows sinus tachycardia, but was otherwise normal.  Lab work is significant for sodium of 131, anion gap 15, glucose 175, total bilirubin 2.5, total protein 8.9, , , lipase 560.  Hemoglobin is normal at 15.6, platelet count 145, WBC 7.2.  Magnesium is 1.8.  The patient also had a right upper quadrant ultrasound done that showed markedly fatty infiltrated liver, upper limits of normal size, without any other abnormality demonstrated.  The patient was given  IV Ativan and IV Zofran along with oral thiamine, folate, magnesium and multiple fluid boluses.  He shows no signs of alcoholic ketoacidosis.  He is being admitted to the hospital for further management of alcohol withdrawal and possible acute alcoholic hepatitis.      PAST MEDICAL HISTORY:   1.  Anxiety.   2.  Depression.   3.  Back pain.   4.  Hypertension.   5.  Alcohol use disorder and history of alcohol withdrawal.      PAST SURGICAL HISTORY:   1.  Neck surgery.   2.  EGD.   3.  ENT surgery.      FAMILY HISTORY:  Father had hypertension.  Mother had heart disease.      SOCIAL HISTORY:  The patient is a former smoker.  He denies any illicit drug use.  He drinks 1 to 1-1/2 pints of hard liquor daily.  He is .  He works as a .      PRIOR TO ADMISSION MEDICATIONS:    Medications Prior to Admission   Medication Sig Dispense Refill Last Dose     loratadine (CLARITIN) 10 MG tablet Take 10 mg by mouth daily   2/3/2020 at AM     metoprolol succinate ER (TOPROL-XL) 50 MG 24 hr tablet Take 50 mg by mouth daily    2/3/2020 at AM     pantoprazole (PROTONIX) 40 MG EC tablet Take 40 mg by mouth every morning   2/3/2020 at AM     sertraline (ZOLOFT) 50 MG tablet Take 50 mg by mouth every morning    2/3/2020 at AM     tetrahydrozoline (VISINE) 0.05 % ophthalmic solution Place 1 drop into both eyes daily   PRN       ALLERGIES:  MORPHINE.      REVIEW OF SYSTEMS:  A complete 10-point review of systems was performed and is negative other than the items previously mentioned above HPI.      PHYSICAL EXAMINATION:   VITAL SIGNS:  Blood pressure 164/120, heart rate 102 beats per minute, temperature 98.1, respiratory rate 16, oxygen saturation 96% on room air.   GENERAL:  The patient is alert, oriented to person, place and situation, cooperative, lying in bed, no apparent distress.   EYES:  Pupils equal, round and reactive to light, extraocular movements intact.  Sclerae anicteric.  Head normocephalic, atraumatic.   Throat, mucosa and tongue appear moist.   NECK:  Supple, no cervical adenopathy.   CARDIOVASCULAR:  Mildly tachycardic and regular, no murmurs, rubs or gallops.  Distal pulses are intact.  No edema.   PULMONARY:  Lungs are clear to auscultation bilaterally, no crackles, wheezes or rhonchi.  Breathing is nonlabored   ABDOMEN:  Soft, nondistended, tender in the epigastrium and mildly tender to palpation on bilateral upper quadrants.  No rebound or guarding.  No rigidity.  Bowel sounds normoactive.   MUSCULOSKELETAL:  The patient moves all 4 extremities equally with normal strength.  Limbs atraumatic.   NEUROLOGIC:  Alert, cranial nerves II- are intact and symmetric.  Motor function is intact.  No focal deficits.   SKIN:  Warm, dry, nondiaphoretic.   PSYCHIATRIC:  Normal mood and affect.      LABORATORY DATA:  CMP:  Sodium 131, creatinine 0.80, potassium 3.8, anion gap 15, total protein 8.9, total bilirubin 2.5, , , lipase 560, glucose 175, otherwise within normal limits.  CBC:  WBC 7.2, hemoglobin 15.6, hematocrit 45.3, platelet count 145.      IMAGING:  Right upper quadrant ultrasound, limited:  Markedly fatty infiltrated liver is upper limits of normal in size.  No other abnormality demonstrated, per Radiology.      EKG:  Sinus tachycardia without ischemic changes.  I personally reviewed.      ASSESSMENT AND PLAN:  Milton Shah is a pleasant 44-year-old male with a past medical history of alcohol abuse and dependence, alcohol withdrawal, anxiety, depression and hypertension, who presents to the Emergency Department today with complaints of abdominal pain, nausea and vomiting.  He was found to be in acute alcohol withdrawal with possible acute alcoholic hepatitis and thus was admitted to the hospital for further management.     Acute alcohol withdrawal  Possible alcoholic hepatitis  Alcohol abuse disorder  Abdominal pain, nausea and vomiting.     The patient developed symptoms, yesterday at 15:00 with  frequent episodes of vomiting about every 30 minutes and associated epigastric abdominal pain.  He denies any hematemesis or bloody stools.  He was hospitalized approximately 4 months prior with similar findings.  Last alcoholic drink was evening of 02/01, per his report.  He is drinking 1 pint to 1-1/2 pints a day of hard liquor.  He is tremulous with elevated heart rate and blood pressure.  His LFTs are abnormal with , , total bilirubin 2.5.  Right upper quadrant ultrasound shows markedly fatty liver with no other acute abnormality.  Lipase is mildly elevated at 560.  WBC is normal.  We will treat for acute alcohol withdrawal per [facility] protocol.  We will have symptom-based Valium dosing.  Will order thiamine, folate and multivitamin oral.  He has received 2 liters of IV fluids in the Emergency Department.  We will have maintenance normal saline IV fluids at 150 mL per hour.  Hydralazine and labetalol were ordered for elevated blood pressure and heart rate.  Chemical Dependency and Psychiatry has been consulted.  The patient is interested in alcohol cessation.  He was counseled on the detrimental effects of alcohol on his health and possibility of alcoholic hepatitis.  We will hold off on GI consult for now, but could consider getting them involved.  He has seen Dr. Mcrae in the past.  Repeat CMP in the morning.      Mild hyponatremia.  Sodium is 131.  He is likely dehydrated.  He is receiving IV normal saline.  We will repeat CMP in the morning.     Hypertension.  This is exacerbated by alcohol withdrawal with BP as high as 174/118.  We will continue prior to admission metoprolol XL 50 mg daily.  P.r.n. labetalol and hydralazine were ordered IV.     Depression and anxiety, likely contributing to his alcohol abuse.  We will consult Psychiatry.  Continue Zoloft.     Deep venous thrombosis prophylaxis:  Mechanical with PCDs and ambulation.      CODE STATUS:  The patient is full code, confirmed on  admission.      DISPOSITION:  Inpatient status, anticipating probably 3 or more days of hospitalization.      The patient was discussed with Dr. Arsenio Mi of the [facility].  He is in agreement with my assessment and plan of care.         ARSENIO MI MD       As dictated by LORRI WHITE PA-C            D: 2020   T: 2020   MT: TANK      Name:     TRICIA SYKES   MRN:      [id number]        Account:      [id number]   :      1975        Admitted:     2020                   Document: [id number]       cc: Joe Sandra MD

## 2020-02-03 NOTE — PROGRESS NOTES
RECEIVING UNIT ED HANDOFF REVIEW    ED Nurse Handoff Report was reviewed by: Jazimn Ordonez RN on February 3, 2020 at 2:33 PM

## 2020-02-03 NOTE — ED PROVIDER NOTES
Tremulous,  History     Chief Complaint:  Vomiting     HPI   Ravi Shah is a 44 year old male with past medical history of alcohol withdrawal, anxiety, and depression who presents to the emergency department for evaluation of vomiting.  The patient reports yesterday at 1500 he started having episodes of vomiting every 30 minutes. He has had associated abdominal pain. Due to these worsening symptoms he presented to the emergency department today. Upon arrival, he reports he has become increasingly tremulous. He says he has been seen in the emergency department for alcohol withdrawal in the past, with most recent being four months prior. He says he has never had a seizure secondary to withdrawal. He says the quantity he has been drinking hasn't changed recently, and drinks 1-2 pints regularly. His last drink was two days prior. He hasn't had any known sick contacts, abnormal food, travel, or recent antibiotics. He denies diarrhea.     Allergies:  Morphine     Medications:    Metoprolol succinate  Remeron  Protonix  Zoloft  Thiamine    Past Medical History:    Anxiety  Back pain  Depression  Hypertension  Alcohol withdrawl    Past Surgical History:    ENT surgery  EGD  Neck surgery    Family History:    History reviewed. No pertinent family history.      Social History:  The patient was accompanied to the ED by himself.  Smoking Status: Former Smoker  Smokeless Tobacco: Current user  Alcohol Use: Yes   Marital Status:   [2]       Review of Systems   Gastrointestinal: Positive for vomiting. Negative for diarrhea.   Neurological: Positive for tremors.   All other systems reviewed and are negative.        Physical Exam   First Vitals:  BP: (!) 160/116  Pulse: 100  Heart Rate: 120  Temp: 98.1  F (36.7  C)  Resp: 18  Weight: 83.6 kg (184 lb 3.2 oz)  SpO2: 96 %      Physical Exam  General: Patient is alert and normal appearing.  HEENT: Head atraumatic    Eyes: pupils equal and reactive. Conjunctiva clear   Nares:  patent   Oropharynx: no lesions, uvula midline, no palatal draping, normal voice, no trismus  Neck: Supple without lymphadenopathy, no meningismus  Chest: Heart regular rate and rhythm.   Lungs: Equal clear to auscultation with no wheeze or rales  Abdomen: Soft, non tender, nondistended, normal bowel sounds  Back: No costovertebral angle tenderness, no midline C, T or L spine tenderness  Neuro: Grossly nonfocal, normal speech, strength equal bilaterally, CN 2-12 intact  Extremities: No deformities, equal radial and DP pulses. No clubbing, cyanosis.  No edema  Skin: Warm and dry with no rash.  Mildly jaundiced appearing    Emergency Department Course     ECG:  Indication: vomiting  Completed at 1121.  Read at 1131.   Sinus tachycardia. Otherwise normal ECG.   Rate 101 bpm. ID interval 154. QRS duration 80. QT/QTc 356/461. P-R-T axes 78 57 72.     Imaging:  Radiology findings were communicated with the patient who voiced understanding of the findings.    US Abdomen limited (RUQ)  IMPRESSION:  Markedly fat infiltrated liver is upper limits of normal  in size. No other abnormality demonstrated.  Report per radiology      Laboratory:  Laboratory findings were communicated with the patient who voiced understanding of the findings.    CBC: WBC 7.2, HGB 15.6,  (L)   CMP:  (L), Anion Gap 15 (H), Glucose 175 (H), Bilirubin Total 2.5 (H), Protein Total 8.9 (H),  (H),  (H) o/w WNL. (Creatinine 0.80)   Lipase: 560 (H)   Magnesium: 1.8    Interventions:  1100 Zofran 4mg IV   1100 NS Bolus 1,000mL IV   1102 Ativan 1 mg IV  1205 Folvite 1 mg PO  1205 Thiamine 100 mg PO  1205 Magnesium oxide 800 mg PO  1205 NS Bolus 1,000mL IV      Emergency Department Course:  Nursing notes and vitals reviewed.  1032: I performed an exam of the patient as documented above.    IV was inserted and blood was drawn for laboratory testing, results above.     1117 Patient rechecked and updated.      1251 Patient rechecked and  updated.     1314 I spoke with Dr. Mi of the Hospitalist service regarding patient's presentation, findings, and plan of care.      Findings and plan explained to the Patient who consents to admission. Discussed the patient with Dr. Mi, who will admit the patient to a Suds bed for further monitoring, evaluation, and treatment.   I personally reviewed the laboratory and imaging results with the Patient and answered all related questions prior to admission.   Impression & Plan      Medical Decision Making:  Ravi Shah is a 44 year old male who presents for evaluation of alcohol abuse.  Blood work otherwise looks ok; no signs of alcoholic ketoacidosis.  there is mild liver impairment or acute alcoholic hepatitis.    He appears on exam to be going through acute alcohol withdrawal.  We discussed this and decided on treatment w/ benzodiazepines, see medications given in ED above.     There are no signs of co-ingestion including acetaminophen, drugs, medications, volatile alcohols. He has no signs of trauma related to alcohol use and no further workup is needed including head CT.     Admit to medicine for further cares given history, tachycardia and withdrawal here. Likely will be a prolonged withdrawal course given this and do not feel patient can safely manage at home nor a more ambulatory setting like detox.       Diagnosis:    ICD-10-CM    1. Alcohol withdrawal syndrome without complication (H) F10.230    2. Elevated LFTs R94.5        Disposition:  Admitted under the supervision of Dr. Mi       Scribe Disclosure:  Doreen COOPER, am serving as a scribe at 10:32 AM on 2/3/2020 to document services personally performed by Alondra Ivy MD based on my observations and the provider's statements to me.    Doreen Herbert  2/3/2020    EMERGENCY DEPARTMENT       Alondra Ivy MD  02/03/20 1115

## 2020-02-04 LAB
ALBUMIN SERPL-MCNC: 3.6 G/DL (ref 3.4–5)
ALP SERPL-CCNC: 52 U/L (ref 40–150)
ALT SERPL W P-5'-P-CCNC: 82 U/L (ref 0–70)
ANION GAP SERPL CALCULATED.3IONS-SCNC: 7 MMOL/L (ref 3–14)
AST SERPL W P-5'-P-CCNC: 73 U/L (ref 0–45)
BILIRUB SERPL-MCNC: 2.3 MG/DL (ref 0.2–1.3)
BUN SERPL-MCNC: 7 MG/DL (ref 7–30)
CALCIUM SERPL-MCNC: 8.5 MG/DL (ref 8.5–10.1)
CHLORIDE SERPL-SCNC: 102 MMOL/L (ref 94–109)
CO2 SERPL-SCNC: 27 MMOL/L (ref 20–32)
CREAT SERPL-MCNC: 0.78 MG/DL (ref 0.66–1.25)
ERYTHROCYTE [DISTWIDTH] IN BLOOD BY AUTOMATED COUNT: 13.2 % (ref 10–15)
GFR SERPL CREATININE-BSD FRML MDRD: >90 ML/MIN/{1.73_M2}
GLUCOSE SERPL-MCNC: 111 MG/DL (ref 70–99)
HCT VFR BLD AUTO: 40.6 % (ref 40–53)
HGB BLD-MCNC: 13.3 G/DL (ref 13.3–17.7)
MAGNESIUM SERPL-MCNC: 1.9 MG/DL (ref 1.6–2.3)
MCH RBC QN AUTO: 33.1 PG (ref 26.5–33)
MCHC RBC AUTO-ENTMCNC: 32.8 G/DL (ref 31.5–36.5)
MCV RBC AUTO: 101 FL (ref 78–100)
PLATELET # BLD AUTO: 98 10E9/L (ref 150–450)
POTASSIUM SERPL-SCNC: 3.3 MMOL/L (ref 3.4–5.3)
POTASSIUM SERPL-SCNC: 4 MMOL/L (ref 3.4–5.3)
PROT SERPL-MCNC: 6.5 G/DL (ref 6.8–8.8)
RBC # BLD AUTO: 4.02 10E12/L (ref 4.4–5.9)
SODIUM SERPL-SCNC: 136 MMOL/L (ref 133–144)
WBC # BLD AUTO: 6.4 10E9/L (ref 4–11)

## 2020-02-04 PROCEDURE — 80053 COMPREHEN METABOLIC PANEL: CPT | Performed by: PHYSICIAN ASSISTANT

## 2020-02-04 PROCEDURE — 84132 ASSAY OF SERUM POTASSIUM: CPT | Performed by: HOSPITALIST

## 2020-02-04 PROCEDURE — 25800030 ZZH RX IP 258 OP 636: Performed by: HOSPITALIST

## 2020-02-04 PROCEDURE — 36415 COLL VENOUS BLD VENIPUNCTURE: CPT | Performed by: PHYSICIAN ASSISTANT

## 2020-02-04 PROCEDURE — 25800030 ZZH RX IP 258 OP 636: Performed by: PHYSICIAN ASSISTANT

## 2020-02-04 PROCEDURE — C9113 INJ PANTOPRAZOLE SODIUM, VIA: HCPCS | Performed by: PHYSICIAN ASSISTANT

## 2020-02-04 PROCEDURE — 25000128 H RX IP 250 OP 636: Performed by: PHYSICIAN ASSISTANT

## 2020-02-04 PROCEDURE — 40000007 ZZH STATISTIC ADULT CD FACE TO FACE-NO CHRG

## 2020-02-04 PROCEDURE — 85027 COMPLETE CBC AUTOMATED: CPT | Performed by: PHYSICIAN ASSISTANT

## 2020-02-04 PROCEDURE — 36415 COLL VENOUS BLD VENIPUNCTURE: CPT | Performed by: HOSPITALIST

## 2020-02-04 PROCEDURE — 99233 SBSQ HOSP IP/OBS HIGH 50: CPT | Performed by: PSYCHIATRY & NEUROLOGY

## 2020-02-04 PROCEDURE — 12000000 ZZH R&B MED SURG/OB

## 2020-02-04 PROCEDURE — 25000132 ZZH RX MED GY IP 250 OP 250 PS 637: Performed by: PHYSICIAN ASSISTANT

## 2020-02-04 PROCEDURE — 99232 SBSQ HOSP IP/OBS MODERATE 35: CPT | Performed by: HOSPITALIST

## 2020-02-04 PROCEDURE — 83735 ASSAY OF MAGNESIUM: CPT | Performed by: PHYSICIAN ASSISTANT

## 2020-02-04 RX ORDER — PANTOPRAZOLE SODIUM 40 MG/1
40 TABLET, DELAYED RELEASE ORAL
Status: DISCONTINUED | OUTPATIENT
Start: 2020-02-05 | End: 2020-02-05 | Stop reason: HOSPADM

## 2020-02-04 RX ADMIN — PANTOPRAZOLE SODIUM 40 MG: 40 INJECTION, POWDER, FOR SOLUTION INTRAVENOUS at 08:42

## 2020-02-04 RX ADMIN — SODIUM CHLORIDE: 9 INJECTION, SOLUTION INTRAVENOUS at 06:24

## 2020-02-04 RX ADMIN — SODIUM CHLORIDE: 9 INJECTION, SOLUTION INTRAVENOUS at 13:42

## 2020-02-04 RX ADMIN — THIAMINE HCL TAB 100 MG 100 MG: 100 TAB at 08:42

## 2020-02-04 RX ADMIN — MULTIPLE VITAMINS W/ MINERALS TAB 1 TABLET: TAB at 08:42

## 2020-02-04 RX ADMIN — SODIUM CHLORIDE: 9 INJECTION, SOLUTION INTRAVENOUS at 21:12

## 2020-02-04 RX ADMIN — LORATADINE 10 MG: 10 TABLET ORAL at 08:42

## 2020-02-04 RX ADMIN — SERTRALINE HYDROCHLORIDE 50 MG: 50 TABLET ORAL at 08:41

## 2020-02-04 RX ADMIN — METOPROLOL SUCCINATE 50 MG: 50 TABLET, EXTENDED RELEASE ORAL at 08:42

## 2020-02-04 RX ADMIN — FOLIC ACID 1 MG: 1 TABLET ORAL at 08:42

## 2020-02-04 RX ADMIN — POTASSIUM CHLORIDE 20 MEQ: 1.5 POWDER, FOR SOLUTION ORAL at 11:38

## 2020-02-04 RX ADMIN — POTASSIUM CHLORIDE 40 MEQ: 1.5 POWDER, FOR SOLUTION ORAL at 09:41

## 2020-02-04 ASSESSMENT — ACTIVITIES OF DAILY LIVING (ADL)
ADLS_ACUITY_SCORE: 12

## 2020-02-04 NOTE — PLAN OF CARE
DATE & TIME: 2/4/2020 7555-4119   Cognitive Concerns/ Orientation : A&Ox4  BEHAVIOR & AGGRESSION TOOL COLOR: Green  CIWA SCORE: 2,1,1  ABNL VS/O2: VSS on RA ex HTN  MOBILITY: SBA  PAIN MANAGMENT: denies  DIET: Reg  BOWEL/BLADDER: continent of B&B, up to BR.  ABNL LAB/BG: K+ 3.3 (replaced), recheck is 4.0, Plt 98  DRAIN/DEVICES: PIV NS @ 125ml/hr  TELEMETRY RHYTHM: SR  SKIN: Bruise to r hand, otherwise intact.   TESTS/PROCEDURES: None  D/C DAY/GOALS/PLACE: Discharge pending progress  OTHER IMPORTANT INFO:

## 2020-02-04 NOTE — PROGRESS NOTES
"CLINICAL NUTRITION SERVICES  -  ASSESSMENT NOTE    Malnutrition:   Does not meet criteria at this time.      REASON FOR ASSESSMENT  Ravi Shah is a 44 year old male seen by Registered Dietitian for Admission Nutrition Risk Screen for reduced oral intake over the last month (lower appetite per pt report)    NUTRITION HISTORY  - Information obtained from patient, spouse at bedside, EMR.     - Chart reviewed -->  - H/o alcohol withdrawal, anxiety, and depression.   - Admits with vomiting and abdominal pain  - 1-1.5 pints hard liquor daily.   - . .    - Patient reports that his appetite/intake waxes and wanes. He has a hard time keeping a regular eating schedule when he is on the road, due to scheduling \"everything needs to be there yesterday\".   - He will make stops for soups, sandwiches, salads, trying to stay away from the \"fast food\" \"fried\" options.   - He does have a fridge in his truck, but as of now is not utilizing it for pre-made meals from home. This is something he is receptive to.   - Snacks on protein shakes, raw/unsalted nuts, granola bars, protein bars.   - Some days he may only eat 1 meal/snack, other days he will eat 3. Some days he does not eat at all.   - He is on the road M-F, weekends he is home. His spouse notes that they eat well at home.   - NKFA    CURRENT NUTRITION ORDERS  Diet Order:     Clear Liquid     Current Intake/Tolerance:  - Nausea indicated in EMR  - Patient verbalized good tolerance of clear liquids. Looking forward to eating solids. Craving a plate of eggs.     NUTRITION FOCUSED PHYSICAL ASSESSMENT FOR DIAGNOSING MALNUTRITION)  Yes             Observed:    No nutrition-related physical findings observed    Obtained from Chart/Interdisciplinary Team:  Reviewed    ANTHROPOMETRICS  Height: 5' 10\"  Weight: 184 lbs 3.2 oz (83.6 kg)   Body mass index is 26.43 kg/m .  Weight Status:  Overweight BMI 25-29.9  IBW: 75.5 kg  % IBW: 111%  Weight History: Pt reports UBW of " 185# - may fluctuate up or down 5#.   Wt Readings from Last 10 Encounters:   02/03/20 83.6 kg (184 lb 3.2 oz)   09/11/19 81.6 kg (180 lb)   02/12/19 85 kg (187 lb 6.3 oz)     LABS  Labs reviewed  Recent Labs   Lab Test 02/04/20  0814 02/03/20  1050 09/14/19  0856 09/13/19  1605 09/13/19  0804   POTASSIUM 3.3* 3.8 3.9 4.1 3.3*     Recent Labs   Lab Test 02/04/20  0814 02/03/20  1050 09/14/19  0856 09/11/19  2219   MAG 1.9 1.8 2.1 2.2     Recent Labs   Lab Test 02/04/20  0814 02/03/20  1050 09/14/19  0856 09/13/19  0804 09/11/19  2219    131* 137 136 137     Recent Labs   Lab Test 02/04/20  0814 02/03/20  1050 09/14/19  0856 09/13/19  0804 09/11/19  2219   CR 0.78 0.80 0.86 0.88 0.86     Recent Labs   Lab 02/04/20  0814 02/03/20  1050   * 175*     MEDICATIONS  Medications reviewed  Folic acid 1 mg, Thera vit M, Thiamine 100 mg - per etoh withdrawal protocol  NaCl IVF @ 150 mL/hr   Antiemetics PRN    ASSESSED NUTRITION NEEDS PER APPROVED PRACTICE GUIDELINES:  Dosing Weight 83.6 kg   Estimated Energy Needs: 4693-4058 kcals (25-30 Kcal/Kg)  Justification: maintenance  Estimated Protein Needs:  grams protein (1-1.2 g pro/Kg)  Justification: maintenance  Estimated Fluid Needs: 1 mL/kcal  Justification: maintenance    MALNUTRITION:  % Weight Loss:  None noted  % Intake:  </= 75% for >/= 1 month (severe malnutrition)  Subcutaneous Fat Loss:  None observed  Muscle Loss:  None observed  Fluid Retention:  None noted    Malnutrition Diagnosis: Patient does not meet two of the above criteria necessary for diagnosing malnutrition    NUTRITION DIAGNOSIS:  Inadequate oral intake related to decreased tolerance for oral intakes w/ n/v and abd pain as evidenced by CLD x2 days.       NUTRITION INTERVENTIONS  Recommendations / Nutrition Prescription  Advancement to solids as medically indicated    Micronutrients as ordered     Implementation  Nutrition education: Provided education on meal prep for when he is on the  road, healthy snacks.       Nutrition Goals  Tolerance of diet >CLD in the next 24 hours.       MONITORING AND EVALUATION:  Progress towards goals will be monitored and evaluated per protocol and Practice Guidelines    Rosa Pretty RD, LD  [facility] [place], [facility], 55, [facility]   Pager: [phone]  Weekend Pager: [phone]

## 2020-02-04 NOTE — PLAN OF CARE
DATE & TIME: 2/3/2020 9142-7977   Cognitive Concerns/ Orientation : A&Ox4  BEHAVIOR & AGGRESSION TOOL COLOR: Green   CIWA SCORE: 5, 3, 1  ABNL VS/O2: BP elevated. Other VSS on RA.   MOBILITY: SBA  PAIN MANAGMENT: Denies pain   DIET: Clear liquids  BOWEL/BLADDER: Continent, up to bathroom.  ABNL LAB/BG: Na 131, , , Lipase 560.   DRAIN/DEVICES: PIV infusing NS at 150ml/hr  TELEMETRY RHYTHM: NSR  SKIN: Bruise R hand, otherwise intact.   TESTS/PROCEDURES: NA  D/C DAY/GOALS/PLACE: Pending progress  OTHER IMPORTANT INFO: IV zofran and compazine both given x1 for intermitt nausea.

## 2020-02-04 NOTE — PROGRESS NOTES
[facility]  Hospitalist Progress Note        Chele Victoria MD   02/04/2020        Interval History:      - reports feeling weak, still tremulous         Assessment and Plan:        Milton Shah is a 44-year-old male with a PMH of alcohol abuse and dependence, alcohol withdrawal, anxiety, depression and hypertension, who presented to ED with complaints of abdominal pain, nausea and vomiting and noted with acute alcohol withdrawal with possible acute alcoholic hepatitis.      # Alcohol dependence with Acute alcohol withdrawal  # Abnormal LFTs, likely sec to above  - clinically improving with improved nausea, vomiting and abdominal pain  - tachycardia improving; still grossly tremulous along with tongue fasciculations  - evaluated by chem dep and provided with resources, psychiatry evaluation pending  - LFTS improving; AST//126--->73/82; US abd UR apart from fatty liver  - continue CIWA protocol with valium prn  - SW for disposition  - continue thiamine, folate and multivitamin oral  - will change IV protonix to PO    # Mild hyponatremia  # Hypokalemia.    - Sodium improving 131-->136 with IV hydration  - K 3.3; will start supple protocol     # Hypertension.  - continue PTA Toprol XL; hydralazine IV prn for SBP>180    # Depression and anxiety,   - psych eval pending  - continue PTA Zoloft.      Deep venous thrombosis prophylaxis:  Mechanical with PCDs and ambulation.      CODE STATUS:  full code     DISPOSITION:  likely 2-3 days pending clinical stability from ETOH withdrawal                     Physical Exam:      Blood pressure (!) 136/94, pulse 101, temperature 97.6  F (36.4  C), resp. rate 16, weight 83.6 kg (184 lb 3.2 oz), SpO2 96 %.  Vitals:    02/03/20 1018   Weight: 83.6 kg (184 lb 3.2 oz)     Vital Signs with Ranges  Temp:  [97.6  F (36.4  C)-98.5  F (36.9  C)] 97.6  F (36.4  C)  Pulse:  [100-105] 101  Heart Rate:  [] 82  Resp:  [16-27] 16  BP: (124-174)/()  136/94  SpO2:  [95 %-99 %] 96 %  I/O's Last 24 hours  I/O last 3 completed shifts:  In: 2000 [IV Piggyback:2000]  Out: -     Constitutional: Alert, awake and oriented X 3; lying comfortably in bed; appears anxious; gross tremors of outstretched hands   HEENT: Pupils equal and reactive to light and accomodation, EOMI intact; neck supple no raised JVD or rigidity    Oral cavity: Dry oral mucosa   Cardiovascular: Normal s1 s2, regular rate and rhythm, no murmur   Lungs: B/l clear to auscultation, no wheezes or crepitations   Abdomen: Soft, nt, nd, no guarding, rigidity or rebound; BS +   LE : No edema   Musculoskeletal: Power 5/5 in all extremities   Neuro: No focal neurological deficits noted, CN II to XII grossly intact   Psychiatry: anxious                Medications:          folic acid  1 mg Oral Daily     loratadine  10 mg Oral Daily     metoprolol succinate ER  50 mg Oral Daily     multivitamin w/minerals  1 tablet Oral Daily     pantoprazole (PROTONIX) IV  40 mg Intravenous Daily with breakfast     sertraline  50 mg Oral QAM     sodium chloride (PF)  3 mL Intracatheter Q8H     vitamin B1  100 mg Oral Daily     PRN Meds: acetaminophen, calcium carbonate, diazepam **OR** diazepam, haloperidol lactate, hydrALAZINE, labetalol, lidocaine 4%, lidocaine (buffered or not buffered), magnesium sulfate, melatonin, naloxone, ondansetron, ondansetron **OR** ondansetron, potassium chloride, potassium chloride with lidocaine, potassium chloride, potassium chloride, potassium chloride, prochlorperazine **OR** prochlorperazine **OR** prochlorperazine, QUEtiapine, senna-docusate **OR** senna-docusate, sodium chloride (PF)         Data:      All new lab and imaging data was reviewed.   Recent Labs   Lab Test 02/04/20  0814 02/03/20  1050 09/13/19  0804  09/11/19  2219   WBC 6.4 7.2 7.2  --  4.5   HGB 13.3 15.6 14.4   < > 16.4   * 100 101*  --  100   PLT 98* 145* 185  --  223   INR  --   --   --   --  0.93    < > = values  in this interval not displayed.      Recent Labs   Lab Test 02/04/20  0814 02/03/20  1050 09/14/19  0856    131* 137   POTASSIUM 3.3* 3.8 3.9   CHLORIDE 102 96 105   CO2 27 20 28   BUN 7 12 5*   CR 0.78 0.80 0.86   ANIONGAP 7 15* 4   ISAIAS 8.5 10.1 8.7   * 175* 118*     Recent Labs   Lab Test 02/12/19  1305   TROPI <0.015

## 2020-02-04 NOTE — CONSULTS
2/4/2020 chem dep consult completed.      Met with patient, introduced self and role and he was agreeable to interview.  He reported he has had some sobriety this past year, following an inpatient program at [facility] and some outpatient.  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  He has done some AA meetings but does not find these particularly helpful.  He reported due to his driving during the week he cannot participate in weekly programming.  I gave him referral for [facility] who offers Saturday programming and we discussed the benefits of this in order to help him remain abstinence during weekend and interrupt his cycle of drinking.  He was agreeable.  I emailed him the link for this program and informed him to contact them to schedule an intake he said he would.  He is aware he can contact me in the future with any additional questions or needs.    Angelica Owen, [facility]  [phone]

## 2020-02-04 NOTE — CONSULTS
[facility]  Psychiatry Consultation      Patient name: Ravi Shah    MRN: [id number]    Age: 44 year old    YOB: 1975    Identifying information:   The patient is a 44 year old White male who is currently admitted to the hospitalist service on unit 66.    Reason for consult: Persistent comanaging alcohol use disorder.    History of present illness:   The patient has a history of alcohol use disorder and generalized anxiety disorder who presented voluntarily to the emergency room seeking detox from alcohol.  He had reported consuming 1 to 2 pints of liquor on a daily basis over the past 4 to 6 weeks.  Preceding his arrival, he had several bouts of emesis in the context of alcohol withdrawal symptoms.  He had called his primary care physician to report what was occurring and was referred to the emergency room for medical evaluation.  He was admitted to the hospital for medical detox noting active alcohol withdrawal symptoms.  Psychiatry was consulted to comanage his substance use disorder.  On examination today, the patient denies any recent exacerbation of his anxiety disorder.  He explains that after maintaining a couple months of sobriety, he thought he could consume a small amount of alcohol and manage the quantity he was using to avoid excessive usage.  He quickly realized that he had resumed large quantities of alcohol and drinking to the point of access.  His usage progressed to a quantity he was previously using and was followed by withdrawal symptoms when attempting to reduce his usage.  During the bouts of severe nausea and vomiting, he had here to his primary care physician's advice to seek help.  His treatment goals are to continue Zoloft for management of his anxiety disorder while safely detoxing from alcohol.  He has met with a chemical dependency consultants and given resources for outpatient treatment.  He is not interested in residential treatment at this  time.    Psychiatric Review of Systems:    -- Depressive episode: Denied depressed mood, denied vegetative symptoms, denies suicidal and homicidal thoughts.  -- Kallie:  denies symptoms  -- Psychosis:  denies symptoms  -- Anxiety: Endorsed symptoms corresponding to ADY  -- PTSD: denies symptoms  -- OCD: denies  symptoms  -- Eating disorder: denies symptoms    Psychiatric History:    No prior inpatient hospitalizations.  No prior suicide attempts.  He has received outpatient treatment targeting symptoms of a generalized anxiety disorder.  His primary care physician has been managing his outpatient care.  He recalls 2 prior trials of an SSRI and is currently taking Zoloft with adequate response.    Substance Use History:    Drug of choice is alcohol with pattern of usage corresponding to dependence, further reporting progressive usage, loss of control over usage, excessive usage, difficulty controlling his usage despite negative consequences, drinking to the point of blackout intoxication, withdrawal symptoms, and prior admissions to detox.  Alcohol usage has been excessive for the past 1 year.  He described 1 prior treatment through [facility] approximately 1 year ago.  His longest period of sobriety has been approximately 4 months.  No illicit substance usage.  No legal implications of use reported.    Medical History:  Refer to the internal medicine notes which I reviewed.       Current Facility-Administered Medications:      acetaminophen (TYLENOL) tablet 650 mg, 650 mg, Oral, Q4H PRN, Jarvis Jeffries PA     calcium carbonate (TUMS) chewable tablet 1,000 mg, 1,000 mg, Oral, 4x Daily PRN, Jarvis Jeffries PA     diazepam (VALIUM) tablet 10 mg, 10 mg, Oral, Q30 Min PRN **OR** diazepam (VALIUM) injection 5-10 mg, 5-10 mg, Intravenous, Q30 Min PRN, Jarvis Jeffries PA, 10 mg at 02/03/20 1630     folic acid (FOLVITE) tablet 1 mg, 1 mg, Oral, Daily, Jarvis Jeffries PA, 1 mg at 02/04/20 7283      haloperidol lactate (HALDOL) injection 2 mg, 2 mg, Intravenous, Q6H PRN, Jarvis Jeffries PA     hydrALAZINE (APRESOLINE) injection 10 mg, 10 mg, Intravenous, Q4H PRN, Jarvis Jeffries PA     labetalol (NORMODYNE/TRANDATE) injection 10 mg, 10 mg, Intravenous, Q2H PRN, Jarvis Jeffries PA, 10 mg at 02/03/20 1648     lidocaine (LMX4) cream, , Topical, Q1H PRN, Jarvis Jeffries PA     lidocaine 1 % 0.1-1 mL, 0.1-1 mL, Other, Q1H PRN, Jarvis Jeffries PA     loratadine (CLARITIN) tablet 10 mg, 10 mg, Oral, Daily, Jarvis Jeffries PA, 10 mg at 02/04/20 0842     magnesium sulfate 4 g in 100 mL sterile water (premade), 4 g, Intravenous, Q4H PRN, Jarvis Jeffries PA     melatonin tablet 1 mg, 1 mg, Oral, At Bedtime PRN, Jarvis Jeffries PA     metoprolol succinate ER (TOPROL-XL) 24 hr tablet 50 mg, 50 mg, Oral, Daily, Jarvis Jeffries PA, 50 mg at 02/04/20 0842     multivitamin w/minerals (THERA-VIT-M) tablet 1 tablet, 1 tablet, Oral, Daily, Jarvis Jeffries PA, 1 tablet at 02/04/20 0842     naloxone (NARCAN) injection 0.1-0.4 mg, 0.1-0.4 mg, Intravenous, Q2 Min PRN, Jarvis Jeffries PA     ondansetron (ZOFRAN) injection 4 mg, 4 mg, Intravenous, Q30 Min PRN, Alondra Ivy MD, 4 mg at 02/03/20 1100     ondansetron (ZOFRAN-ODT) ODT tab 4 mg, 4 mg, Oral, Q6H PRN **OR** ondansetron (ZOFRAN) injection 4 mg, 4 mg, Intravenous, Q6H PRN, Jarvis Jeffries PA, 4 mg at 02/03/20 2340     [START ON 2/5/2020] pantoprazole (PROTONIX) EC tablet 40 mg, 40 mg, Oral, QAM KELLIE, Chele Victoria MD     potassium chloride (KLOR-CON) Packet 20-40 mEq, 20-40 mEq, Oral or Feeding Tube, Q2H PRN, Jarvis Jeffries PA, 20 mEq at 02/04/20 1138     potassium chloride 10 mEq in 100 mL intermittent infusion with 10 mg lidocaine, 10 mEq, Intravenous, Q1H PRN, Jarvis Jeffries PA     potassium chloride 10 mEq in 100 mL sterile water intermittent  "infusion (premix), 10 mEq, Intravenous, Q1H PRN, Jarvis Jeffries PA     potassium chloride 20 mEq in 50 mL intermittent infusion, 20 mEq, Intravenous, Q1H PRN, Jarvis Jeffries PA     potassium chloride ER (K-DUR/KLOR-CON M) CR tablet 20-40 mEq, 20-40 mEq, Oral, Q2H PRN, Jarvis Jeffries PA     prochlorperazine (COMPAZINE) injection 10 mg, 10 mg, Intravenous, Q6H PRN, 10 mg at 02/03/20 2049 **OR** prochlorperazine (COMPAZINE) tablet 10 mg, 10 mg, Oral, Q6H PRN **OR** prochlorperazine (COMPAZINE) Suppository 25 mg, 25 mg, Rectal, Q12H PRN, Jarvis Jeffries PA     QUEtiapine (SEROquel) tablet 25-50 mg, 25-50 mg, Oral, Q6H PRN, Jarvis Jeffries PA     senna-docusate (SENOKOT-S/PERICOLACE) 8.6-50 MG per tablet 1 tablet, 1 tablet, Oral, BID PRN **OR** senna-docusate (SENOKOT-S/PERICOLACE) 8.6-50 MG per tablet 2 tablet, 2 tablet, Oral, BID PRN, Jarvis Jeffries PA     sertraline (ZOLOFT) tablet 50 mg, 50 mg, Oral, QAM, Jarvis Jeffries PA, 50 mg at 02/04/20 0841     sodium chloride (PF) 0.9% PF flush 3 mL, 3 mL, Intracatheter, q1 min prn, Jarvis Jeffries PA     sodium chloride (PF) 0.9% PF flush 3 mL, 3 mL, Intracatheter, Q8H, Jarvis Jeffries PA, 3 mL at 02/03/20 1708     sodium chloride 0.9% infusion, , Intravenous, Continuous, Chele Victoria MD, Last Rate: 150 mL/hr at 02/04/20 1342     vitamin B1 (THIAMINE) tablet 100 mg, 100 mg, Oral, Daily, Jarvis Jeffries PA, 100 mg at 02/04/20 0842     Social History:  Refer to the psychosocial assessment completed by the .     Family History:    He suspects that his father and sister have an anxiety disorder however not taking medication.  No knowledge of suicides in the family.    Vital Signs:    B/P: 136/94, T: 97.6, P: 101, R: 16  Estimated body mass index is 26.43 kg/m  as calculated from the following:    Height as of 9/11/19: 1.778 m (5' 10\").    Weight as of this encounter: 83.6 kg " "(184 lb 3.2 oz).       Mental status examination:  Appearance:  Alert, fair hygiene, no acute distress  Attitude:  Attempts to be cooperative  Eye Contact: Fair  Mood: \"Fine\"  Affect: Mood congruent, full, appropriate.  Speech:  Normal rates, tone, latency, volume. Not pushed or pressured.  Psychomotor Behavior:  No psychomotor abnormalities noted  Thought Process: Linear and logical; not tangential or circumstantial or disorganized  Associations:  Logical; no loose associations Noted  Thought Content:  No obvious paranoia, delusions, ideas of reference, or grandiosity noted. Denies auditory or visual hallucinations. Denies suicidal Ideations. Denies homicidal ideations.  Insight:  Fair  Judgment:  Fair  Oriented to:  time, person, and place  Attention Span and Concentration:  Intact  Recent and Remote Memory: Intact based on interviewing and details provided  Language: Appropriate based on interviewing  Fund of Knowledge: Appropriate based on interviewing  Muscle Strength and Tone: Normal upon visual inspection  Gait and Station: Normal upon visual inspection            Diagnoses:    Alcohol use disorder, severe  Generalized anxiety disorder     Recommendations:  Continue MSSA protocol with Valium for management of alcohol withdrawal symptoms.    Continue Zoloft at the current dose of 50 mg daily for management of his anxiety disorder.  The patient was educated regarding his medication as well as the option to increase the dose in the future if residual symptoms were to persist.  He will follow-up with his primary care physician regarding future dose adjustments.    He has met with the chemical dependency consultants and again resources for sobriety support.  He will consider attending an outpatient program that meets on Saturdays.  He is not interested in pursuing residential treatment options at this time.    Once the patient is medically stable, he may be discharged from the hospital.    Please reconsult with " psychiatry as needed.   Earnest Reese MD   Text Page

## 2020-02-04 NOTE — PROGRESS NOTES
Pt's home meds (Protonix, Zoloft, Metoprolol and Claritin) locked up in Cabinet number 7 of [facility]. To be released to pt at discharge.

## 2020-02-04 NOTE — DISCHARGE INSTRUCTIONS
Your medications (Metoprolol, Zoloft, Protonix and Claritin) were locked up during your hospital stay, but will be sent home with you at discharge.[facility]let is locked [facility]  envelope # [phone].

## 2020-02-04 NOTE — PLAN OF CARE
Admitted with ETOH withdrawal.  Came up from ED this afternoon.  A&Ox4.  VSS except tachycardic at times with HR in low 100s and Hypertensive.  Received prn Labetalol for BP-168/120 which decreased to 148/112.  On RA.  Tele-SD.  Received IV Zofran for c/o nausea.  CIWA-17 & 7.  Received prn IV Valium x1.  C/O headache and abdominal pain which pt states decreased after Valium.  Pt declined prn Tylenol when offered.  Pt also on scheduled IV Protonix.  Has 0.9% NS infusing at 150 ml/hr.  Pt tremulous otherwise pt appears calm.  Pt states he's currently feeling  a little bit better and did not need anything for anxiety.  Up with SBA.  Voiding in bathroom.  Had BM x1. Tolerating clear liquid diet. To be seen by Psych and Chem Dep.

## 2020-02-05 VITALS
RESPIRATION RATE: 16 BRPM | TEMPERATURE: 97.5 F | SYSTOLIC BLOOD PRESSURE: 133 MMHG | HEART RATE: 87 BPM | WEIGHT: 184.2 LBS | OXYGEN SATURATION: 96 % | BODY MASS INDEX: 26.43 KG/M2 | DIASTOLIC BLOOD PRESSURE: 100 MMHG

## 2020-02-05 LAB
ALBUMIN SERPL-MCNC: 3.8 G/DL (ref 3.4–5)
ALP SERPL-CCNC: 58 U/L (ref 40–150)
ALT SERPL W P-5'-P-CCNC: 78 U/L (ref 0–70)
ANION GAP SERPL CALCULATED.3IONS-SCNC: 6 MMOL/L (ref 3–14)
AST SERPL W P-5'-P-CCNC: 60 U/L (ref 0–45)
BILIRUB DIRECT SERPL-MCNC: 0.7 MG/DL (ref 0–0.2)
BILIRUB SERPL-MCNC: 2.3 MG/DL (ref 0.2–1.3)
BUN SERPL-MCNC: 6 MG/DL (ref 7–30)
CALCIUM SERPL-MCNC: 8.8 MG/DL (ref 8.5–10.1)
CHLORIDE SERPL-SCNC: 103 MMOL/L (ref 94–109)
CO2 SERPL-SCNC: 25 MMOL/L (ref 20–32)
CREAT SERPL-MCNC: 0.71 MG/DL (ref 0.66–1.25)
ERYTHROCYTE [DISTWIDTH] IN BLOOD BY AUTOMATED COUNT: 12.7 % (ref 10–15)
GFR SERPL CREATININE-BSD FRML MDRD: >90 ML/MIN/{1.73_M2}
GLUCOSE SERPL-MCNC: 120 MG/DL (ref 70–99)
HCT VFR BLD AUTO: 42 % (ref 40–53)
HGB BLD-MCNC: 14 G/DL (ref 13.3–17.7)
MCH RBC QN AUTO: 33.6 PG (ref 26.5–33)
MCHC RBC AUTO-ENTMCNC: 33.3 G/DL (ref 31.5–36.5)
MCV RBC AUTO: 101 FL (ref 78–100)
PLATELET # BLD AUTO: 105 10E9/L (ref 150–450)
POTASSIUM SERPL-SCNC: 3.3 MMOL/L (ref 3.4–5.3)
PROT SERPL-MCNC: 7 G/DL (ref 6.8–8.8)
RBC # BLD AUTO: 4.17 10E12/L (ref 4.4–5.9)
SODIUM SERPL-SCNC: 134 MMOL/L (ref 133–144)
WBC # BLD AUTO: 5.4 10E9/L (ref 4–11)

## 2020-02-05 PROCEDURE — 25000132 ZZH RX MED GY IP 250 OP 250 PS 637: Performed by: PHYSICIAN ASSISTANT

## 2020-02-05 PROCEDURE — 80048 BASIC METABOLIC PNL TOTAL CA: CPT | Performed by: HOSPITALIST

## 2020-02-05 PROCEDURE — 25800030 ZZH RX IP 258 OP 636: Performed by: HOSPITALIST

## 2020-02-05 PROCEDURE — 36415 COLL VENOUS BLD VENIPUNCTURE: CPT | Performed by: HOSPITALIST

## 2020-02-05 PROCEDURE — 25000132 ZZH RX MED GY IP 250 OP 250 PS 637: Performed by: HOSPITALIST

## 2020-02-05 PROCEDURE — 85027 COMPLETE CBC AUTOMATED: CPT | Performed by: HOSPITALIST

## 2020-02-05 PROCEDURE — 80076 HEPATIC FUNCTION PANEL: CPT | Performed by: HOSPITALIST

## 2020-02-05 PROCEDURE — 99238 HOSP IP/OBS DSCHRG MGMT 30/<: CPT | Performed by: PHYSICIAN ASSISTANT

## 2020-02-05 RX ORDER — LANOLIN ALCOHOL/MO/W.PET/CERES
100 CREAM (GRAM) TOPICAL DAILY
Qty: 30 TABLET | Refills: 0 | Status: ON HOLD | OUTPATIENT
Start: 2020-02-06 | End: 2020-03-15

## 2020-02-05 RX ORDER — FOLIC ACID 1 MG/1
1 TABLET ORAL DAILY
Qty: 30 TABLET | Refills: 0 | Status: ON HOLD | OUTPATIENT
Start: 2020-02-06 | End: 2020-03-15

## 2020-02-05 RX ADMIN — MULTIPLE VITAMINS W/ MINERALS TAB 1 TABLET: TAB at 08:59

## 2020-02-05 RX ADMIN — POTASSIUM CHLORIDE 20 MEQ: 1.5 POWDER, FOR SOLUTION ORAL at 13:34

## 2020-02-05 RX ADMIN — LORATADINE 10 MG: 10 TABLET ORAL at 09:00

## 2020-02-05 RX ADMIN — FOLIC ACID 1 MG: 1 TABLET ORAL at 09:03

## 2020-02-05 RX ADMIN — PANTOPRAZOLE SODIUM 40 MG: 40 TABLET, DELAYED RELEASE ORAL at 06:46

## 2020-02-05 RX ADMIN — POTASSIUM CHLORIDE 40 MEQ: 1.5 POWDER, FOR SOLUTION ORAL at 11:30

## 2020-02-05 RX ADMIN — SERTRALINE HYDROCHLORIDE 50 MG: 50 TABLET ORAL at 09:00

## 2020-02-05 RX ADMIN — THIAMINE HCL TAB 100 MG 100 MG: 100 TAB at 08:59

## 2020-02-05 RX ADMIN — METOPROLOL SUCCINATE 50 MG: 50 TABLET, EXTENDED RELEASE ORAL at 09:00

## 2020-02-05 RX ADMIN — SODIUM CHLORIDE: 9 INJECTION, SOLUTION INTRAVENOUS at 05:26

## 2020-02-05 ASSESSMENT — ACTIVITIES OF DAILY LIVING (ADL)
ADLS_ACUITY_SCORE: 12

## 2020-02-05 NOTE — DISCHARGE SUMMARY
[facility]    Discharge Summary  Hospitalist    Date of Admission:  2/3/2020  Date of Discharge:  2/5/2020  Discharging Provider: Jarvis Jeffries  Date of Service (when I saw the patient): 02/05/20    Discharge Diagnoses   1. Alcohol abuse and dependence  2. Acute alcohol withdrawal  3. Abnormal LFTs  4. Mild hyponatremia  5. Mild hypokalemia  6. Hypertension  7. Depression  8. General anxiety    History of Present Illness   Ravi Shah is a very pleasant 44-year-old male with a past medical history of alcohol abuse, alcohol withdrawal, anxiety and depression, who presented to the Emergency Department with complaints of abdominal pain, nausea and vomiting.  The patient reports that yesterday around 15:00, he started having episodes of vomiting after waking up from a nap.  He reports that he has been vomiting about every 30 minutes and has associated abdominal pain in the epigastrium.  He reports that his vomit has been mostly liquid contents with a yellow to greenish color.  He denies any hematemesis.  He is unable to even keep water down.  He does report that he has been drinking 1 to 1-1/2 pints of hard liquor daily recently and last drink was on Sunday evening.  He has also been more tremulous.  He denies any fevers or chills, but has had diaphoresis and hot and cold flashes.  He has no headache, weakness, chest pain, cough, shortness of breath, dysuria or focal weakness.  The patient has been hospitalized for alcohol withdrawal in the past with the most recent being last September.  The patient denies any seizure secondary to withdrawal.      In the Emergency Department, the patient was evaluated by Dr. Ivy.  He was found to have blood pressure 160/116 with heart rate of 100, temperature 98.1.  EKG shows sinus tachycardia, but was otherwise normal.  Lab work is significant for sodium of 131, anion gap 15, glucose 175, total bilirubin 2.5, total protein 8.9, , , lipase 560.   Hemoglobin is normal at 15.6, platelet count 145, WBC 7.2.  Magnesium is 1.8.  The patient also had a right upper quadrant ultrasound done that showed markedly fatty infiltrated liver, upper limits of normal size, without any other abnormality demonstrated.  The patient was given IV Ativan and IV Zofran along with oral thiamine, folate, magnesium and multiple fluid boluses.    Hospital Course   # Alcohol dependence with Acute alcohol withdrawal  # Abnormal LFTs, likely sec to above  --Clinically improving with improved nausea, vomiting and abdominal pain.  --Tachycardia resolved; still grossly tremulous along with tongue fasciculations.  --Psychiatry and chemical dependency evaluations completed.  -LFTS improving; AST//126-->73/82-->60/78. Abdominal ultrasound shows markedly fatty liver.  --Remarkably, patient only required 1 dose of Valium during this hospitalization for withdrawal per [facility] protocol  --Continue thiamine, folate and multivitamin   --Continue oral PPI  --Recommend to complete alcohol cessation.  Patient will follow-up with an outpatient CD treatment program.  Resources were provided to him by our CD provider and social work.  --Follow-up with PCP.     # Mild hyponatremia  # Hypokalemia.    --Sodium improving 131-->136 with IV hydration  --Potassium replaced per protocol.  --Recommend follow-up BMP with PCP within 1 week     # Hypertension.  --continue PTA Toprol XL     # Depression and anxiety,   --psych eval completed.  Recommend chemical dependency treatment for alcoholism.  --continue PTA Zoloft.       Jarvis Jeffries PA-C    Significant Results and Procedures   Detailed above.  No procedures.    Pending Results   None    Code Status   Full Code       Primary Care Physician   Joe Sandra    Physical Exam   Temp: 97.5  F (36.4  C) Temp src: Oral BP: (!) 133/100 Pulse: 87 Heart Rate: 83 Resp: 16 SpO2: 96 % O2 Device: None (Room air)    Vitals:    02/03/20 1018   Weight: 83.6 kg  (184 lb 3.2 oz)     Vital Signs with Ranges  Temp:  [97.5  F (36.4  C)-98.6  F (37  C)] 97.5  F (36.4  C)  Pulse:  [87] 87  Heart Rate:  [76-83] 83  Resp:  [16] 16  BP: (131-163)/() 133/100  SpO2:  [96 %-97 %] 96 %  I/O last 3 completed shifts:  In: 480 [P.O.:480]  Out: -     Constitutional: Alert, oriented to person, place, date, situation.  Cooperative, lying in bed in NAD.   Respiratory:  Lungs CTAB.  No crackles, wheezes, or rhonchi, no labored breathing.  GI:  Abdomen soft, NT/ND and with normoactive BS  Skin/Integumen:  Warm, dry, non-diaphoretic.  MSK: CMS x4 intact.  Neuro: Patient is tremulous.  Motor function and speech normal.  Moves all 4 extremities equally.    Discharge Disposition   Discharged to home  Condition at discharge: Stable    Consultations This Hospital Stay   PSYCHIATRY IP CONSULT  CHEMICAL DEPENDENCY IP CONSULT    Time Spent on this Encounter   I, SEVEN Paulino, personally saw the patient today and spent less than or equal to 30 minutes discharging this patient.    Discharge Orders      Reason for your hospital stay    Nausea, vomiting, and dehydration related to acute alcohol withdrawal.     Follow-up and recommended labs and tests     Follow up with primary care provider, Joe Sandra, within 7 days for hospital follow- up.  The following labs/tests are recommended: Potassium level and liver function tests.  Follow up as recommended for outpatient chemical dependency treatment program.     Activity    Your activity upon discharge: activity as tolerated     Diet    Follow this diet upon discharge: Regular     Discharge Medications   Current Discharge Medication List      START taking these medications    Details   folic acid (FOLVITE) 1 MG tablet Take 1 tablet (1 mg) by mouth daily . Future refills by PCP Dr. Joe Sandra with phone number [phone].  Qty: 30 tablet, Refills: 0    Associated Diagnoses: Alcohol withdrawal syndrome without complication (H)       vitamin B1 (THIAMINE) 100 MG tablet Take 1 tablet (100 mg) by mouth daily . Future refills by PCP Dr. Joe Sandra with phone number [phone].  Qty: 30 tablet, Refills: 0    Associated Diagnoses: Alcohol withdrawal syndrome without complication (H)         CONTINUE these medications which have NOT CHANGED    Details   loratadine (CLARITIN) 10 MG tablet Take 10 mg by mouth daily      metoprolol succinate ER (TOPROL-XL) 50 MG 24 hr tablet Take 50 mg by mouth daily       pantoprazole (PROTONIX) 40 MG EC tablet Take 40 mg by mouth every morning      sertraline (ZOLOFT) 50 MG tablet Take 50 mg by mouth every morning       tetrahydrozoline (VISINE) 0.05 % ophthalmic solution Place 1 drop into both eyes daily           Allergies   Allergies   Allergen Reactions     Morphine Nausea and Vomiting     Data   Most Recent 3 CBC's:  Recent Labs   Lab Test 02/05/20  0823 02/04/20  0814 02/03/20  1050   WBC 5.4 6.4 7.2   HGB 14.0 13.3 15.6   * 101* 100   * 98* 145*      Most Recent 3 BMP's:  Recent Labs   Lab Test 02/05/20  0823 02/04/20  1535 02/04/20  0814 02/03/20  1050     --  136 131*   POTASSIUM 3.3* 4.0 3.3* 3.8   CHLORIDE 103  --  102 96   CO2 25  --  27 20   BUN 6*  --  7 12   CR 0.71  --  0.78 0.80   ANIONGAP 6  --  7 15*   ISAIAS 8.8  --  8.5 10.1   *  --  111* 175*     Most Recent 2 LFT's:  Recent Labs   Lab Test 02/05/20  0823 02/04/20  0814   AST 60* 73*   ALT 78* 82*   ALKPHOS 58 52   BILITOTAL 2.3* 2.3*     Most Recent INR's and Anticoagulation Dosing History:  Anticoagulation Dose History     Recent Dosing and Labs Latest Ref Rng & Units 9/11/2019    INR 0.86 - 1.14 0.93        Most Recent 3 Troponin's:  Recent Labs   Lab Test 02/12/19  1305   TROPI <0.015     Most Recent Cholesterol Panel:No lab results found.  Most Recent 6 Bacteria Isolates From Any Culture (See EPIC Reports for Culture Details):No lab results found.  Most Recent TSH, T4 and A1c Labs:No lab results  found.  Results for orders placed or performed during the hospital encounter of 02/03/20   US Abdomen Limited (RUQ)    Narrative    RIGHT UPPER QUADRANT ULTRASOUND 2/3/2020 12:50 PM    HISTORY:  Abdominal pain, vomiting.    COMPARISON: None.    FINDINGS:    Gallbladder:  Normal with no cholelithiasis, wall thickening or focal  tenderness.      Bile ducts:   CHD is normal diameter.  No intrahepatic biliary  dilatation.    Liver:  Liver size is upper normal. There is marked increased  echogenicity of the liver without focal lesion. There is some sparing  adjacent to the gallbladder.     Pancreas:   Normal.     Right kidney:   Normal.       Impression    IMPRESSION:  Markedly fat infiltrated liver is upper limits of normal  in size. No other abnormality demonstrated.    ALEXIA RAGSDALE MD

## 2020-02-05 NOTE — PLAN OF CARE
DATE & TIME: 2/4/2020 1900-0730             Cognitive Concerns/ Orientation : A&Ox4  BEHAVIOR & AGGRESSION TOOL COLOR: Green   CIWA SCORE: 2,1,1    ABNL VS/O2: BP elevated. Other VSS on RA.   MOBILITY: Independent  PAIN MANAGMENT: Denies pain   DIET: Regular diet  BOWEL/BLADDER: Continent, up to bathroom.  ABNL LAB/BG: K replaced yesterday, recheck 4.0.  DRAIN/DEVICES: PIV infusing NS at 125ml/hr  TELEMETRY RHYTHM: NSR  SKIN: Bruise right hand, otherwise intact.   TESTS/PROCEDURES: NA  D/C DAY/GOALS/PLACE: Pending improvement from withdrawal.   OTHER IMPORTANT INFO: Psych following.

## 2020-03-01 ENCOUNTER — HOSPITAL ENCOUNTER (INPATIENT)
Facility: CLINIC | Age: 45
LOS: 2 days | Discharge: HOME | DRG: 897 | End: 2020-03-03
Attending: EMERGENCY MEDICINE | Admitting: INTERNAL MEDICINE
Payer: COMMERCIAL

## 2020-03-01 DIAGNOSIS — K29.00 ACUTE GASTRITIS WITHOUT HEMORRHAGE, UNSPECIFIED GASTRITIS TYPE: ICD-10-CM

## 2020-03-01 DIAGNOSIS — E87.29 ALCOHOLIC KETOACIDOSIS: ICD-10-CM

## 2020-03-01 DIAGNOSIS — F32.A DEPRESSION, UNSPECIFIED DEPRESSION TYPE: ICD-10-CM

## 2020-03-01 DIAGNOSIS — F10.20 ALCOHOL USE DISORDER, MODERATE, DEPENDENCE (H): Primary | ICD-10-CM

## 2020-03-01 DIAGNOSIS — F10.939 ALCOHOL WITHDRAWAL SYNDROME WITH COMPLICATION (H): ICD-10-CM

## 2020-03-01 DIAGNOSIS — F10.930 ALCOHOL WITHDRAWAL SYNDROME WITHOUT COMPLICATION (H): ICD-10-CM

## 2020-03-01 LAB
ALBUMIN SERPL-MCNC: 4.5 G/DL (ref 3.4–5)
ALP SERPL-CCNC: 75 U/L (ref 40–150)
ALT SERPL W P-5'-P-CCNC: 68 U/L (ref 0–70)
ANION GAP SERPL CALCULATED.3IONS-SCNC: 14 MMOL/L (ref 3–14)
AST SERPL W P-5'-P-CCNC: 67 U/L (ref 0–45)
BASOPHILS # BLD AUTO: 0 10E9/L (ref 0–0.2)
BASOPHILS NFR BLD AUTO: 0.2 %
BILIRUB SERPL-MCNC: 2.3 MG/DL (ref 0.2–1.3)
BUN SERPL-MCNC: 11 MG/DL (ref 7–30)
CALCIUM SERPL-MCNC: 9.1 MG/DL (ref 8.5–10.1)
CHLORIDE SERPL-SCNC: 99 MMOL/L (ref 94–109)
CO2 SERPL-SCNC: 24 MMOL/L (ref 20–32)
CREAT SERPL-MCNC: 0.86 MG/DL (ref 0.66–1.25)
DIFFERENTIAL METHOD BLD: ABNORMAL
EOSINOPHIL # BLD AUTO: 0 10E9/L (ref 0–0.7)
EOSINOPHIL NFR BLD AUTO: 0 %
ERYTHROCYTE [DISTWIDTH] IN BLOOD BY AUTOMATED COUNT: 12.9 % (ref 10–15)
ETHANOL SERPL-MCNC: <0.01 G/DL
GFR SERPL CREATININE-BSD FRML MDRD: >90 ML/MIN/{1.73_M2}
GLUCOSE SERPL-MCNC: 174 MG/DL (ref 70–99)
HCT VFR BLD AUTO: 47 % (ref 40–53)
HGB BLD-MCNC: 16.1 G/DL (ref 13.3–17.7)
IMM GRANULOCYTES # BLD: 0 10E9/L (ref 0–0.4)
IMM GRANULOCYTES NFR BLD: 0.3 %
KETONES BLD-SCNC: 3.2 MMOL/L (ref 0–0.6)
LIPASE SERPL-CCNC: 186 U/L (ref 73–393)
LYMPHOCYTES # BLD AUTO: 0.6 10E9/L (ref 0.8–5.3)
LYMPHOCYTES NFR BLD AUTO: 5.7 %
MCH RBC QN AUTO: 33.9 PG (ref 26.5–33)
MCHC RBC AUTO-ENTMCNC: 34.3 G/DL (ref 31.5–36.5)
MCV RBC AUTO: 99 FL (ref 78–100)
MONOCYTES # BLD AUTO: 0.6 10E9/L (ref 0–1.3)
MONOCYTES NFR BLD AUTO: 5.4 %
NEUTROPHILS # BLD AUTO: 9.7 10E9/L (ref 1.6–8.3)
NEUTROPHILS NFR BLD AUTO: 88.4 %
NRBC # BLD AUTO: 0 10*3/UL
NRBC BLD AUTO-RTO: 0 /100
PLATELET # BLD AUTO: 172 10E9/L (ref 150–450)
POTASSIUM SERPL-SCNC: 3.5 MMOL/L (ref 3.4–5.3)
PROT SERPL-MCNC: 8.4 G/DL (ref 6.8–8.8)
RBC # BLD AUTO: 4.75 10E12/L (ref 4.4–5.9)
SODIUM SERPL-SCNC: 137 MMOL/L (ref 133–144)
WBC # BLD AUTO: 11 10E9/L (ref 4–11)

## 2020-03-01 PROCEDURE — 96368 THER/DIAG CONCURRENT INF: CPT

## 2020-03-01 PROCEDURE — 96376 TX/PRO/DX INJ SAME DRUG ADON: CPT

## 2020-03-01 PROCEDURE — 80320 DRUG SCREEN QUANTALCOHOLS: CPT | Performed by: EMERGENCY MEDICINE

## 2020-03-01 PROCEDURE — 80053 COMPREHEN METABOLIC PANEL: CPT | Performed by: EMERGENCY MEDICINE

## 2020-03-01 PROCEDURE — 25800030 ZZH RX IP 258 OP 636: Performed by: INTERNAL MEDICINE

## 2020-03-01 PROCEDURE — 25000132 ZZH RX MED GY IP 250 OP 250 PS 637: Performed by: INTERNAL MEDICINE

## 2020-03-01 PROCEDURE — 25000128 H RX IP 250 OP 636: Performed by: INTERNAL MEDICINE

## 2020-03-01 PROCEDURE — 25000128 H RX IP 250 OP 636: Performed by: EMERGENCY MEDICINE

## 2020-03-01 PROCEDURE — 85025 COMPLETE CBC W/AUTO DIFF WBC: CPT | Performed by: EMERGENCY MEDICINE

## 2020-03-01 PROCEDURE — 96365 THER/PROPH/DIAG IV INF INIT: CPT

## 2020-03-01 PROCEDURE — 96375 TX/PRO/DX INJ NEW DRUG ADDON: CPT

## 2020-03-01 PROCEDURE — HZ2ZZZZ DETOXIFICATION SERVICES FOR SUBSTANCE ABUSE TREATMENT: ICD-10-PCS | Performed by: EMERGENCY MEDICINE

## 2020-03-01 PROCEDURE — C9113 INJ PANTOPRAZOLE SODIUM, VIA: HCPCS | Performed by: EMERGENCY MEDICINE

## 2020-03-01 PROCEDURE — 99285 EMERGENCY DEPT VISIT HI MDM: CPT | Mod: 25

## 2020-03-01 PROCEDURE — 12000000 ZZH R&B MED SURG/OB

## 2020-03-01 PROCEDURE — 82010 KETONE BODYS QUAN: CPT | Performed by: EMERGENCY MEDICINE

## 2020-03-01 PROCEDURE — 25800030 ZZH RX IP 258 OP 636: Performed by: EMERGENCY MEDICINE

## 2020-03-01 PROCEDURE — 25000125 ZZHC RX 250: Performed by: INTERNAL MEDICINE

## 2020-03-01 PROCEDURE — 83690 ASSAY OF LIPASE: CPT | Performed by: EMERGENCY MEDICINE

## 2020-03-01 PROCEDURE — 99222 1ST HOSP IP/OBS MODERATE 55: CPT | Mod: AI | Performed by: INTERNAL MEDICINE

## 2020-03-01 PROCEDURE — 25000125 ZZHC RX 250: Performed by: EMERGENCY MEDICINE

## 2020-03-01 RX ORDER — MAGNESIUM SULFATE HEPTAHYDRATE 500 MG/ML
2 INJECTION, SOLUTION INTRAMUSCULAR; INTRAVENOUS ONCE
Status: DISCONTINUED | OUTPATIENT
Start: 2020-03-01 | End: 2020-03-01

## 2020-03-01 RX ORDER — GABAPENTIN 800 MG/1
800 TABLET ORAL 3 TIMES DAILY
Status: DISCONTINUED | OUTPATIENT
Start: 2020-03-01 | End: 2020-03-03 | Stop reason: HOSPADM

## 2020-03-01 RX ORDER — POLYETHYLENE GLYCOL 3350 17 G/17G
17 POWDER, FOR SOLUTION ORAL DAILY PRN
Status: DISCONTINUED | OUTPATIENT
Start: 2020-03-01 | End: 2020-03-03 | Stop reason: HOSPADM

## 2020-03-01 RX ORDER — LORAZEPAM 2 MG/ML
1-2 INJECTION INTRAMUSCULAR EVERY 30 MIN PRN
Status: DISCONTINUED | OUTPATIENT
Start: 2020-03-01 | End: 2020-03-03 | Stop reason: HOSPADM

## 2020-03-01 RX ORDER — LORAZEPAM 2 MG/ML
2 INJECTION INTRAMUSCULAR ONCE
Status: COMPLETED | OUTPATIENT
Start: 2020-03-01 | End: 2020-03-01

## 2020-03-01 RX ORDER — MAGNESIUM SULFATE HEPTAHYDRATE 40 MG/ML
2 INJECTION, SOLUTION INTRAVENOUS ONCE
Status: COMPLETED | OUTPATIENT
Start: 2020-03-01 | End: 2020-03-01

## 2020-03-01 RX ORDER — PANTOPRAZOLE SODIUM 40 MG/1
40 TABLET, DELAYED RELEASE ORAL EVERY MORNING
Status: DISCONTINUED | OUTPATIENT
Start: 2020-03-02 | End: 2020-03-03 | Stop reason: HOSPADM

## 2020-03-01 RX ORDER — METOPROLOL SUCCINATE 50 MG/1
50 TABLET, EXTENDED RELEASE ORAL DAILY
Status: DISCONTINUED | OUTPATIENT
Start: 2020-03-02 | End: 2020-03-03 | Stop reason: HOSPADM

## 2020-03-01 RX ORDER — NALOXONE HYDROCHLORIDE 0.4 MG/ML
.1-.4 INJECTION, SOLUTION INTRAMUSCULAR; INTRAVENOUS; SUBCUTANEOUS
Status: DISCONTINUED | OUTPATIENT
Start: 2020-03-01 | End: 2020-03-03 | Stop reason: HOSPADM

## 2020-03-01 RX ORDER — LORAZEPAM 2 MG/ML
1 INJECTION INTRAMUSCULAR ONCE
Status: COMPLETED | OUTPATIENT
Start: 2020-03-01 | End: 2020-03-01

## 2020-03-01 RX ORDER — LORAZEPAM 1 MG/1
1-2 TABLET ORAL EVERY 30 MIN PRN
Status: DISCONTINUED | OUTPATIENT
Start: 2020-03-01 | End: 2020-03-03 | Stop reason: HOSPADM

## 2020-03-01 RX ORDER — CLONIDINE HYDROCHLORIDE 0.1 MG/1
0.1 TABLET ORAL 2 TIMES DAILY
Status: COMPLETED | OUTPATIENT
Start: 2020-03-01 | End: 2020-03-02

## 2020-03-01 RX ORDER — ONDANSETRON 4 MG/1
4 TABLET, ORALLY DISINTEGRATING ORAL EVERY 6 HOURS PRN
Status: DISCONTINUED | OUTPATIENT
Start: 2020-03-01 | End: 2020-03-03 | Stop reason: HOSPADM

## 2020-03-01 RX ORDER — ONDANSETRON 2 MG/ML
4 INJECTION INTRAMUSCULAR; INTRAVENOUS EVERY 30 MIN PRN
Status: DISCONTINUED | OUTPATIENT
Start: 2020-03-01 | End: 2020-03-02

## 2020-03-01 RX ORDER — ONDANSETRON 2 MG/ML
4 INJECTION INTRAMUSCULAR; INTRAVENOUS EVERY 6 HOURS PRN
Status: DISCONTINUED | OUTPATIENT
Start: 2020-03-01 | End: 2020-03-03 | Stop reason: HOSPADM

## 2020-03-01 RX ORDER — LANOLIN ALCOHOL/MO/W.PET/CERES
100 CREAM (GRAM) TOPICAL DAILY
Status: DISCONTINUED | OUTPATIENT
Start: 2020-03-02 | End: 2020-03-03 | Stop reason: HOSPADM

## 2020-03-01 RX ORDER — CARBOXYMETHYLCELLULOSE SODIUM 5 MG/ML
1 SOLUTION/ DROPS OPHTHALMIC DAILY
Status: DISCONTINUED | OUTPATIENT
Start: 2020-03-01 | End: 2020-03-03 | Stop reason: HOSPADM

## 2020-03-01 RX ORDER — CLONIDINE 0.2 MG/24H
1 PATCH, EXTENDED RELEASE TRANSDERMAL WEEKLY
Status: DISCONTINUED | OUTPATIENT
Start: 2020-03-01 | End: 2020-03-03 | Stop reason: HOSPADM

## 2020-03-01 RX ORDER — ACETAMINOPHEN 325 MG/1
650 TABLET ORAL EVERY 4 HOURS PRN
Status: DISCONTINUED | OUTPATIENT
Start: 2020-03-01 | End: 2020-03-03 | Stop reason: HOSPADM

## 2020-03-01 RX ORDER — MULTIPLE VITAMINS W/ MINERALS TAB 9MG-400MCG
1 TAB ORAL DAILY
Status: DISCONTINUED | OUTPATIENT
Start: 2020-03-02 | End: 2020-03-03 | Stop reason: HOSPADM

## 2020-03-01 RX ORDER — LIDOCAINE 40 MG/G
CREAM TOPICAL
Status: DISCONTINUED | OUTPATIENT
Start: 2020-03-01 | End: 2020-03-03 | Stop reason: HOSPADM

## 2020-03-01 RX ORDER — FOLIC ACID 1 MG/1
1 TABLET ORAL DAILY
Status: DISCONTINUED | OUTPATIENT
Start: 2020-03-02 | End: 2020-03-03 | Stop reason: HOSPADM

## 2020-03-01 RX ADMIN — CARBOXYMETHYLCELLULOSE SODIUM 1 DROP: 5 SOLUTION/ DROPS OPHTHALMIC at 14:48

## 2020-03-01 RX ADMIN — ONDANSETRON 4 MG: 2 INJECTION INTRAMUSCULAR; INTRAVENOUS at 21:03

## 2020-03-01 RX ADMIN — GABAPENTIN 800 MG: 800 TABLET, FILM COATED ORAL at 16:52

## 2020-03-01 RX ADMIN — ACETAMINOPHEN 650 MG: 325 TABLET, FILM COATED ORAL at 16:51

## 2020-03-01 RX ADMIN — CLONIDINE HYDROCHLORIDE 0.1 MG: 0.1 TABLET ORAL at 21:06

## 2020-03-01 RX ADMIN — CLONIDINE 1 PATCH: 0.2 PATCH TRANSDERMAL at 14:49

## 2020-03-01 RX ADMIN — ONDANSETRON 4 MG: 2 INJECTION INTRAMUSCULAR; INTRAVENOUS at 10:23

## 2020-03-01 RX ADMIN — LORAZEPAM 1 MG: 1 TABLET ORAL at 16:50

## 2020-03-01 RX ADMIN — FOLIC ACID: 5 INJECTION, SOLUTION INTRAMUSCULAR; INTRAVENOUS; SUBCUTANEOUS at 14:43

## 2020-03-01 RX ADMIN — MAGNESIUM SULFATE HEPTAHYDRATE 2 G: 40 INJECTION, SOLUTION INTRAVENOUS at 10:31

## 2020-03-01 RX ADMIN — LORAZEPAM 1 MG: 1 TABLET ORAL at 14:49

## 2020-03-01 RX ADMIN — LORAZEPAM 2 MG: 2 INJECTION INTRAMUSCULAR; INTRAVENOUS at 10:23

## 2020-03-01 RX ADMIN — GABAPENTIN 800 MG: 800 TABLET, FILM COATED ORAL at 21:05

## 2020-03-01 RX ADMIN — LORAZEPAM 1 MG: 2 INJECTION INTRAMUSCULAR; INTRAVENOUS at 11:47

## 2020-03-01 RX ADMIN — FOLIC ACID: 5 INJECTION, SOLUTION INTRAMUSCULAR; INTRAVENOUS; SUBCUTANEOUS at 10:46

## 2020-03-01 RX ADMIN — PANTOPRAZOLE SODIUM 40 MG: 40 INJECTION, POWDER, FOR SOLUTION INTRAVENOUS at 11:44

## 2020-03-01 ASSESSMENT — ENCOUNTER SYMPTOMS
HEADACHES: 0
BLOOD IN STOOL: 0
APPETITE CHANGE: 1
TREMORS: 1
ABDOMINAL PAIN: 1
NUMBNESS: 0
VOMITING: 1
WEAKNESS: 0
NAUSEA: 1

## 2020-03-01 ASSESSMENT — MIFFLIN-ST. JEOR: SCORE: 1655.1

## 2020-03-01 ASSESSMENT — ACTIVITIES OF DAILY LIVING (ADL)
ADLS_ACUITY_SCORE: 14
ADLS_ACUITY_SCORE: 14

## 2020-03-01 NOTE — ED NOTES
"[facility]  ED Nurse Handoff Report    ED Chief complaint: Delirium Tremens (DTS)      ED Diagnosis:   Final diagnoses:   None       Code Status: not addressed     Allergies:   Allergies   Allergen Reactions     Morphine Nausea and Vomiting       Patient Story: hx etoh, with pancreatitis, here last month for pancreatitis ,drinks vodka 1-2 pints daily, last drak Friday night, throwing up yest and abd pain so didn't drink    Focused Assessment:  abd pain, vomiting     Treatments and/or interventions provided: ativan, zofran, magnesium, banana bag   Patient's response to treatments and/or interventions: symptoms better, not gone     To be done/followed up on inpatient unit:  unknown     Does this patient have any cognitive concerns?: none     Activity level - Baseline/Home:  Independent  Activity Level - Current:   Independent    Patient's Preferred language: English   Needed?: No    Isolation: None  Infection: Not Applicable  Bariatric?: No    Vital Signs:   Vitals:    03/01/20 1004 03/01/20 1030 03/01/20 1045 03/01/20 1100   BP: (!) 179/136 (!) 158/114 (!) 161/120 (!) 152/104   Pulse: 120  118 118   Resp: 20      Temp: 97  F (36.1  C)      SpO2: 97%  96% 95%   Weight: 81.6 kg (180 lb)      Height: 1.702 m (5' 7\")          Cardiac Rhythm:     Was the PSS-3 completed:   Yes  What interventions are required if any?               Family Comments: dad here   OBS brochure/video discussed/provided to patient/family: na              Name of person given brochure if not patient: na              Relationship to patient: na    For the majority of the shift this patient's behavior was Green.   Behavioral interventions performed were na.    ED NURSE PHONE NUMBER: [phone]       "

## 2020-03-01 NOTE — PROGRESS NOTES
RECEIVING UNIT ED HANDOFF REVIEW    ED Nurse Handoff Report was reviewed by: Dary Juarez RN on March 1, 2020 at 1:18 PM

## 2020-03-01 NOTE — PHARMACY-ADMISSION MEDICATION HISTORY
Pharmacy Medication History  Admission medication history interview status for the 3/1/2020  admission is complete. See EPIC admission navigator for prior to admission medications     Medication history sources: Patient (medication bottles for Rx meds)  Medication history source reliability: Good  Adherence assessment: Good    Significant changes made to the medication list:  Removed loratadine     Additional medication history information:   1. Patient ran out of folic acid, which is why he hasn't taken it in a couple of days.    Medication reconciliation completed by provider prior to medication history? Yes    Time spent in this activity: 5 minutes    Prior to Admission medications    Medication Sig Last Dose Taking? Auth Provider   folic acid (FOLVITE) 1 MG tablet Take 1 tablet (1 mg) by mouth daily . Future refills by PCP Dr. Joe Sandra with phone number [phone]. Past Week at Unknown time Yes Jarvis Jeffries PA   metoprolol succinate ER (TOPROL-XL) 50 MG 24 hr tablet Take 50 mg by mouth daily  3/1/2020 at am Yes Reported, Patient   pantoprazole (PROTONIX) 40 MG EC tablet Take 40 mg by mouth every morning 3/1/2020 at am Yes Reported, Patient   sertraline (ZOLOFT) 50 MG tablet Take 50 mg by mouth every morning  3/1/2020 at am Yes Reported, Patient   tetrahydrozoline (VISINE) 0.05 % ophthalmic solution Place 1 drop into both eyes daily 3/1/2020 at am Yes Reported, Patient   vitamin B-Complex Take 1 tablet by mouth daily 2/29/2020 at Unknown time Yes Unknown, Entered By History   vitamin B1 (THIAMINE) 100 MG tablet Take 1 tablet (100 mg) by mouth daily . Future refills by PCP Dr. Joe Sandra with phone number [phone]. 2/29/2020 at Unknown time Yes Jarvis Jeffries PA

## 2020-03-01 NOTE — PROVIDER NOTIFICATION
MD Notification    Notified Person: MD    Notified Person Name: Dr. Montenegro     Notification Date/Time: 5:00 PM    Notification Interaction: paged    Purpose of Notification: FYI /108. HR tachy at times.    Orders Received: continue to monitor.     Comments:

## 2020-03-01 NOTE — ED PROVIDER NOTES
History   Chief Complaint:  Nausea and Vomiting    HPI   Ravi Shah is a 44 year old male, discharged one month ago for nausea, vomiting, and dehydration related to alcohol withdrawal who returns with same. . He has a history of alcohol abuse, alcohol withdrawal, anxiety, and depression and  presents to the emergency department for the evaluation of vomiting. The patient reports he consumed his last alcoholic drink 2 nights ago  as he went to bed. Yesterday morning, he did not have another drink as he was planning to stop, however, he developed some central abdominal pain and began vomiting. The patient reports when he has been hospitalized in the past, vomiting and abdominal pain are what usually bring him in. He does convey he has been having more tremors lately since he stopped drinking as well. He mentions he drinks about a pint to a pint and a half a day. He had first noticed his increased drinking habits in 2014 when he lost his friend. 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  He denies any hematemesis, bloody stool, numbness, weakness, headache, chest pain, or any other acute symptoms. He has not had much of an appetite. He denies suicidal or homicidal ideation.     Allergies:  Morphine    Medications:    Toprol  Protonix  Zoloft    Past Medical History:    Anxiety  Depressive disorder  Hypertension  Alcohol withdrawal    Past Surgical History:    ENT surgery  Neck surgery    Family History:    Hypertension    Social History:  Smoking status: Former  Alcohol use: Yes  Drug use: No  PCP: Joe Sandra  Marital Status:   [2] Lives with wife and child.     Review of Systems   Constitutional: Positive for appetite change.   Cardiovascular: Negative for chest pain.   Gastrointestinal: Positive for abdominal pain, nausea and vomiting.  "Negative for blood in stool.   Neurological: Positive for tremors. Negative for weakness, numbness and headaches.   Psychiatric/Behavioral: Negative for suicidal ideas.   All other systems reviewed and are negative.    Physical Exam     Patient Vitals for the past 24 hrs:   BP Temp Pulse Resp SpO2 Height Weight   03/01/20 1100 (!) 152/104 -- 118 -- 95 % -- --   03/01/20 1045 (!) 161/120 -- 118 -- 96 % -- --   03/01/20 1030 (!) 158/114 -- -- -- -- -- --   03/01/20 1004 (!) 179/136 97  F (36.1  C) 120 20 97 % 1.702 m (5' 7\") 81.6 kg (180 lb)   03/01/20 1000 (!) 179/136 -- 129 -- -- -- --     Physical Exam  Constitutional:  Oriented to person, place, and time.      Appears well-developed and well-nourished.   HENT:   Head:    Normocephalic and atraumatic.   Right Ear:   Tympanic membrane and external ear normal.   Left Ear:   Tympanic membrane and external ear normal.   Mouth/Throat:   Oropharynx is clear and moist.      Mucous membranes are normal.   Eyes:    Conjunctivae normal and EOM are normal.      Pupils are equal, round, and reactive to light.   Neck:    Normal range of motion. Neck supple.   Cardiovascular:  Tachycardic. Regular rhythm, S1 normal and S2 normal.      No gallop and no friction rub. No murmur heard.  Pulmonary/Chest:  Breath sounds normal. No respiratory distress.      No wheezes. No rhonchi. No rales.   Abdominal:   Soft. No hepatosplenomegaly. Tender epigastrium.       No rebound and no CVA tenderness.   Musculoskeletal:  Normal range of motion.   Neurological:   Alert and oriented to person, place, and time. Normal strength. Tremulous.     GCS eye subscore is 4. GCS verbal subscore is 5.      GCS motor subscore is 6.   Skin:    Skin is warm and dry.   Psychiatric:   Normal mood and affect.      Speech is normal and behavior is normal.      Judgment and thought content normal.      Cognition and memory are normal.     Emergency Department Course   Laboratory:  Laboratory findings were " communicated with the patient who voiced understanding of the findings.    CBC: WNL (WBC 11.0, HGB 16.1, )    CMP:  (H), Bilirubin Total 2.3 (H), AST 67 (H), o/w WNL (Creatinine 0.86)    Lipase: 186    Alcohol ethyl: <0.01    Ketone Beta-Hydroxybutyrate Quantitative: 3.2 (HH)     Interventions:  1023: Ativan 2 mg IV  1023: Zofran 4 mg IV  1031: Magnesium sulfate 2 g in NS IV Infusion   1046: Dextrose 5% and 0.45% NS 1L with multivitamin 10 mL, thiamine 100 mg, folic acid 1 mg IV    Emergency Department Course:  Past medical records, nursing notes, and vitals reviewed.    1009 I performed an exam of the patient as documented above.     EKG obtained in the ED, see results above.   IV was inserted and blood was drawn for laboratory testing, results above.    1115 I rechecked the patient and discussed the results of his workup thus far.     Findings and plan explained to the Patient who consents to admission. Discussed the patient with Dr. Montenegro, who will admit the patient to a med/tele bed for further monitoring, evaluation, and treatment.    I personally reviewed the laboratory results with the Patient and answered all related questions prior to admission.     Impression & Plan   Medical Decision Making:  The patient is a 44-year-old male with a history of alcohol abuse and recent admission for alcohol withdrawal who comes back again with alcohol withdrawal.  His last drink was 2 days ago.  He presents very tremulous with hypertension and tachycardia and abdominal pain. He does not have any evidence of pancreatitis but probably has a gastritis.  He does have positive ketones and probably has some element of alcoholic ketoacidosis.  He has been given D5 normal saline banana bag as well as Ativan and Protonix.  He did not follow-up with chem dep after his last discharge but was advised to do so.  He will be admitted to Dr. Montenegro.    Diagnosis:    ICD-10-CM    1. Alcohol withdrawal syndrome with complication  (H) F10.239    2. Alcoholic ketoacidosis E87.2    3. Acute gastritis without hemorrhage, unspecified gastritis type K29.00      Disposition:  Admitted to a med/tele bed.    Scribe Disclosure:  I, Bud Freitas, am serving as a scribe at 10:09 AM on 3/1/2020 to document services personally performed by Dary Gil MD based on my observations and the provider's statements to me.      Dary Gil MD  03/01/20 3243

## 2020-03-01 NOTE — H&P
[facility]    History and Physical  Hospitalist       Date of Admission:  3/1/2020    Assessment & Plan   Ravi Shah is a 44 year old male with PMH of alcohol use disorder, alcohol withdrawal, anxiety, depression, hypertension, who presents with alcohol withdrawal.    Alcohol withdrawal  Mild alcoholic hepatitis  Alcoholic ketosis  Patient was hospitalized 2/3/2020 to 2/5/2020 for alcohol withdrawal. He reports sobriety until about two weeks ago, where he relapsed drinking 1-2 pints of hard liquor a day. Last drink two days ago, patient describes onset of alcohol withdrawal symptoms yesterday. Appears improved with IV Ativan in the ED. This admission he has mildly elevated bilirubin and AST consistent with mild alcoholic hepatitis. Ketones are elevated secondary to alcohol. Leukocytosis is marginal at 11k. US abd 1 month ago shows fatty liver, no benefit to rechecking at this point.  - CIWA, thiamine, folate, Ativan PRN  - Scheduled Gabapentin, clonidine  - PTA Pantoprazole    Alcohol use disorder  Following discharge 2/5/2020 he reported he was going to set up outpatient treatment, but due to his travel/work schedule it was not going to work for him. He reports his last treatment was in 2/2020. He is interested in quitting alcohol and is receptive to more resources.  - Already saw CD counselor last month, will not reorder  - Psychiatry consult  - Consider naltrexone this admission    Hypertension: SBP 150s in ED  - PTA Metoprolol    Anxiety, depression: PTA Sertraline    DVT Prophylaxis: Pneumatic Compression Devices  Code Status: Full Code    Disposition: Expected discharge ~2-3 days    Gene Montenegro MD    Primary Care Physician   Joe Sandra    Chief Complaint   Nausea, vomiting, tremors    History is obtained from the patient    History of Present Illness   Ravi Shah is a 44 year old male who presents with nausea, vomiting tremors. He was in the hospital last month for alcohol  withdrawal. Following discharge, he returned to work and maintained his abstinence. Unfortunately, he relapsed about two weeks ago. He has been drinking between 1-2 pints every single day. He usually drinks in the afternoon or evening. His last drink was two days ago. Then yesterday afternoon, he had progressive noausea, vomiting, shakes, diaphoresis, chills. He endorses lower abdominal pain. He hasn't been able to keep anything down since yesterday. He denies any SOB or chest pain but notes that he is having some palpitations at times. He reports his last treatment was one year ago. He denies any history of withdrawal seizure. When he discharged last month, he was supposed to set up outpatient treatment but never got around to it due to all the traveling he does with his job.    Past Medical History    I have reviewed this patient's medical history and updated it with pertinent information if needed.   Past Medical History:   Diagnosis Date     Anxiety      Back pain      Depressive disorder      Hypertension        Past Surgical History   I have reviewed this patient's surgical history and updated it with pertinent information if needed.  Past Surgical History:   Procedure Laterality Date     ENT SURGERY       ESOPHAGOSCOPY, GASTROSCOPY, DUODENOSCOPY (EGD), COMBINED N/A 9/12/2019    Procedure: ESOPHAGOGASTRODUODENOSCOPY (EGD);  Surgeon: Scott Mcrae MD;  Location:  GI     NECK SURGERY         Prior to Admission Medications   Prior to Admission Medications   Prescriptions Last Dose Informant Patient Reported? Taking?   folic acid (FOLVITE) 1 MG tablet   No No   Sig: Take 1 tablet (1 mg) by mouth daily . Future refills by PCP Dr. Joe Sandra with phone number [phone].   loratadine (CLARITIN) 10 MG tablet   Yes No   Sig: Take 10 mg by mouth daily   metoprolol succinate ER (TOPROL-XL) 50 MG 24 hr tablet  Self Yes No   Sig: Take 50 mg by mouth daily    pantoprazole (PROTONIX) 40 MG EC tablet   Yes  No   Sig: Take 40 mg by mouth every morning   sertraline (ZOLOFT) 50 MG tablet  Self Yes No   Sig: Take 50 mg by mouth every morning    tetrahydrozoline (VISINE) 0.05 % ophthalmic solution   Yes No   Sig: Place 1 drop into both eyes daily   vitamin B1 (THIAMINE) 100 MG tablet   No No   Sig: Take 1 tablet (100 mg) by mouth daily . Future refills by PCP Dr. Joe Sandra with phone number [phone].      Facility-Administered Medications: None     Allergies   Allergies   Allergen Reactions     Morphine Nausea and Vomiting       Social History   I have reviewed this patient's social history and updated it with pertinent information if needed. Ravi Shah  reports that he has quit smoking. He uses smokeless tobacco. He reports current alcohol use. He reports that he does not use drugs.    Family History   I have reviewed this patient's family history and updated it with pertinent information if needed.   Family History   Problem Relation Age of Onset     No Known Problems Maternal Grandmother      No Known Problems Maternal Grandfather      No Known Problems Paternal Grandmother      Substance Abuse Paternal Grandfather        Review of Systems   The 10 point Review of Systems is negative other than noted in the HPI or here.    Physical Exam   Temp: 97  F (36.1  C)   BP: (!) 152/104 Pulse: 118   Resp: 20 SpO2: 95 % O2 Device: None (Room air)    Vital Signs with Ranges  Temp:  [97  F (36.1  C)] 97  F (36.1  C)  Pulse:  [118-129] 118  Resp:  [20] 20  BP: (152-179)/(104-136) 152/104  SpO2:  [95 %-97 %] 95 %  180 lbs 0 oz    Constitutional: Male in NAD, mildly tremulous  Eyes: PERRL, nonicteric, normal ocular movements  HEENT: Normocephalic, atraumatic, oral mucosa moist, noted tongue fasciculations  Respiratory: CTAB, no wheezing or crackles  Cardiovascular: RRR, normal S1/2, no m/r/g  GI: Nontender, nondistended  Vascular: Palpable peripheral pulses in upper and lower extremities, no lower extremity pitting  edema  Musculoskeletal: Normal strength in UE and LE, moves all extremities, noted mild tremor  Neurologic: A&Ox3  Psychiatric: Flat affect and mood    Data   Data reviewed today:  I personally reviewed labwork this admission.  Recent Labs   Lab 03/01/20  1021   WBC 11.0   HGB 16.1   MCV 99         POTASSIUM 3.5   CHLORIDE 99   CO2 24   BUN 11   CR 0.86   ANIONGAP 14   ISAIAS 9.1   *   ALBUMIN 4.5   PROTTOTAL 8.4   BILITOTAL 2.3*   ALKPHOS 75   ALT 68   AST 67*   LIPASE 186       Imaging:  No results found for this or any previous visit (from the past 24 hour(s)).

## 2020-03-02 LAB
ALBUMIN SERPL-MCNC: 3.7 G/DL (ref 3.4–5)
ALP SERPL-CCNC: 63 U/L (ref 40–150)
ALT SERPL W P-5'-P-CCNC: 48 U/L (ref 0–70)
ANION GAP SERPL CALCULATED.3IONS-SCNC: 4 MMOL/L (ref 3–14)
AST SERPL W P-5'-P-CCNC: 36 U/L (ref 0–45)
BILIRUB SERPL-MCNC: 2 MG/DL (ref 0.2–1.3)
BUN SERPL-MCNC: 6 MG/DL (ref 7–30)
CALCIUM SERPL-MCNC: 8.6 MG/DL (ref 8.5–10.1)
CHLORIDE SERPL-SCNC: 105 MMOL/L (ref 94–109)
CO2 SERPL-SCNC: 27 MMOL/L (ref 20–32)
CREAT SERPL-MCNC: 0.92 MG/DL (ref 0.66–1.25)
ERYTHROCYTE [DISTWIDTH] IN BLOOD BY AUTOMATED COUNT: 12.9 % (ref 10–15)
GFR SERPL CREATININE-BSD FRML MDRD: >90 ML/MIN/{1.73_M2}
GLUCOSE SERPL-MCNC: 114 MG/DL (ref 70–99)
HCT VFR BLD AUTO: 44 % (ref 40–53)
HGB BLD-MCNC: 14.5 G/DL (ref 13.3–17.7)
INR PPP: 0.96 (ref 0.86–1.14)
MCH RBC QN AUTO: 33.3 PG (ref 26.5–33)
MCHC RBC AUTO-ENTMCNC: 33 G/DL (ref 31.5–36.5)
MCV RBC AUTO: 101 FL (ref 78–100)
PLATELET # BLD AUTO: 138 10E9/L (ref 150–450)
POTASSIUM SERPL-SCNC: 3.9 MMOL/L (ref 3.4–5.3)
PROT SERPL-MCNC: 7 G/DL (ref 6.8–8.8)
RBC # BLD AUTO: 4.35 10E12/L (ref 4.4–5.9)
SODIUM SERPL-SCNC: 136 MMOL/L (ref 133–144)
WBC # BLD AUTO: 5.2 10E9/L (ref 4–11)

## 2020-03-02 PROCEDURE — 25000132 ZZH RX MED GY IP 250 OP 250 PS 637: Performed by: INTERNAL MEDICINE

## 2020-03-02 PROCEDURE — 99232 SBSQ HOSP IP/OBS MODERATE 35: CPT | Performed by: INTERNAL MEDICINE

## 2020-03-02 PROCEDURE — 85610 PROTHROMBIN TIME: CPT | Performed by: INTERNAL MEDICINE

## 2020-03-02 PROCEDURE — 25000128 H RX IP 250 OP 636: Performed by: INTERNAL MEDICINE

## 2020-03-02 PROCEDURE — 36415 COLL VENOUS BLD VENIPUNCTURE: CPT | Performed by: INTERNAL MEDICINE

## 2020-03-02 PROCEDURE — 80053 COMPREHEN METABOLIC PANEL: CPT | Performed by: INTERNAL MEDICINE

## 2020-03-02 PROCEDURE — 12000000 ZZH R&B MED SURG/OB

## 2020-03-02 PROCEDURE — 85027 COMPLETE CBC AUTOMATED: CPT | Performed by: INTERNAL MEDICINE

## 2020-03-02 PROCEDURE — 25000132 ZZH RX MED GY IP 250 OP 250 PS 637: Performed by: PSYCHIATRY & NEUROLOGY

## 2020-03-02 RX ORDER — NICOTINE 21 MG/24HR
1 PATCH, TRANSDERMAL 24 HOURS TRANSDERMAL DAILY
Status: DISCONTINUED | OUTPATIENT
Start: 2020-03-02 | End: 2020-03-03 | Stop reason: HOSPADM

## 2020-03-02 RX ORDER — NALTREXONE HYDROCHLORIDE 50 MG/1
50 TABLET, FILM COATED ORAL DAILY
Status: DISCONTINUED | OUTPATIENT
Start: 2020-03-03 | End: 2020-03-03 | Stop reason: HOSPADM

## 2020-03-02 RX ADMIN — THIAMINE HCL TAB 100 MG 100 MG: 100 TAB at 08:34

## 2020-03-02 RX ADMIN — GABAPENTIN 800 MG: 800 TABLET, FILM COATED ORAL at 08:34

## 2020-03-02 RX ADMIN — NICOTINE 1 PATCH: 14 PATCH, EXTENDED RELEASE TRANSDERMAL at 14:15

## 2020-03-02 RX ADMIN — SERTRALINE HYDROCHLORIDE 100 MG: 50 TABLET ORAL at 08:34

## 2020-03-02 RX ADMIN — CARBOXYMETHYLCELLULOSE SODIUM 1 DROP: 5 SOLUTION/ DROPS OPHTHALMIC at 08:34

## 2020-03-02 RX ADMIN — CLONIDINE HYDROCHLORIDE 0.1 MG: 0.1 TABLET ORAL at 10:34

## 2020-03-02 RX ADMIN — ONDANSETRON 4 MG: 2 INJECTION INTRAMUSCULAR; INTRAVENOUS at 08:39

## 2020-03-02 RX ADMIN — FOLIC ACID 1 MG: 1 TABLET ORAL at 08:34

## 2020-03-02 RX ADMIN — MULTIPLE VITAMINS W/ MINERALS TAB 1 TABLET: TAB at 08:34

## 2020-03-02 RX ADMIN — GABAPENTIN 800 MG: 800 TABLET, FILM COATED ORAL at 21:36

## 2020-03-02 RX ADMIN — GABAPENTIN 800 MG: 800 TABLET, FILM COATED ORAL at 15:15

## 2020-03-02 RX ADMIN — METOPROLOL SUCCINATE 50 MG: 50 TABLET, EXTENDED RELEASE ORAL at 08:34

## 2020-03-02 RX ADMIN — PANTOPRAZOLE SODIUM 40 MG: 40 TABLET, DELAYED RELEASE ORAL at 08:34

## 2020-03-02 ASSESSMENT — ACTIVITIES OF DAILY LIVING (ADL)
ADLS_ACUITY_SCORE: 13

## 2020-03-02 NOTE — PLAN OF CARE
DATE & TIME: 3/01/2020  2609-7144    Cognitive Concerns/ Orientation : A/O x 4   BEHAVIOR & AGGRESSION TOOL COLOR: Green  CIWA SCORE: 3 at 2100   ABNL VS/O2: hypertensive, received scheduled BP meds  MOBILITY: Up ad nadine  PAIN MANAGMENT: denies  DIET: Regular  BOWEL/BLADDER: continent, up to bathroom independently  ABNL LAB/BG: none  DRAIN/DEVICES: PIV infusing  TELEMETRY RHYTHM: NA  SKIN: intact  TESTS/PROCEDURES: none  D/C DAY/GOALS/PLACE: pending  OTHER IMPORTANT INFO: none

## 2020-03-02 NOTE — PLAN OF CARE
DATE & TIME: 3/2/2020 1549-0839     Cognitive Concerns/ Orientation : A&O x4  BEHAVIOR & AGGRESSION TOOL COLOR: Green   CIWA SCORE: 2/2/2 for mild tremor/nausea  ABNL VS/O2: VSS on RA.  MOBILITY: Independent  PAIN MANAGMENT: Denies  DIET: Regular-fair appetite  BOWEL/BLADDER: Continenet  ABNL LAB/BG: NA  DRAIN/DEVICES: PIV SL   TELEMETRY RHYTHM: N/A.  SKIN: Intact. Flushed.   TESTS/PROCEDURES: None.  D/C DAY/GOALS/PLACE: Pending 1-2 days per MD note. Plan for CD assessment  OTHER IMPORTANT INFO: Psych consulted-increased zoloft and starting naltrexone tomorrow. Clonidine patch in place left chest. Zofran given for nausea. Nicotine patch left chest.

## 2020-03-02 NOTE — CONSULTS
3/2/20 chem dep consult acknowledged. Patient seen by myself during previous hospital admission and was given resources for weekend outpatient programming.  Emailed unto  to screen if patient is interested in arranging additional chem dep services.  Expect consult will be completed by 3/4/20 or earlier. If patient is appropriate for discharge prior to being seen by chem dep, and they have active insurance, an outpatient evaluation can be scheduled by calling [facility] at [phone].    RICK Moran, [facility]  [phone]

## 2020-03-02 NOTE — PLAN OF CARE
DATE & TIME: 03/01/2020 8347-6606                      Cognitive Concerns/ Orientation : A & O x4  BEHAVIOR & AGGRESSION TOOL COLOR: Green   CIWA SCORE: 0/3  ABNL VS/O2: VSS on RA.  MOBILITY: IND  PAIN MANAGMENT: Denies  DIET: Regular  BOWEL/BLADDER: No issues.  ABNL LAB/BG: Bilirubin 2.3, AST 67, Ketone 3.2, and .  DRAIN/DEVICES: PIV SL after completion of banana bag.   TELEMETRY RHYTHM: N/A.  SKIN: Intact. Flushed.   TESTS/PROCEDURES: None.  D/C DAY/GOALS/PLACE: Pending 2-3 days per MD note.  OTHER IMPORTANT INFO: Slept soundly all night. Psych consulted. Clonidine patch in place.

## 2020-03-02 NOTE — CONSULTS
"Consult Date:  2020      PSYCHIATRY CONSULTATION      REQUESTING PHYSICIAN:  Gene Montenegro MD      REASON FOR CONSULTATION:  Alcohol use disorder.      IDENTIFYING DATA:  Ravi is a 44-year-old   male with a known alcohol use disorder, who was here about a month ago.  He has been unable to maintain abstinence and started drinking 1-2 pints of hard liquor daily.  He came in with symptoms of alcohol withdrawal, his last drink being about 2 days ago.      CHIEF COMPLAINT:  \"I started with the hard liquor and it is like poison.\"      HISTORY OF PRESENT ILLNESS:  Ravi is a 44-year-old   male presenting with the above history.  He was here about a month ago under similar circumstances.  He has been experiencing symptoms of alcohol withdrawal such as nausea, vomiting, shakes and diaphoresis.  He has been having some lower abdominal pain.  He has mild to moderate elevations in his liver function tests.  He drives a truck.  When he was here a month ago, he was going to look into some sort of outpatient treatment, but never pursued this.  He was at [facility] roughly a year ago, but only had a few months of sobriety.  He is on Zoloft for depression, currently takes 50 mg daily and reports some ongoing anxiety.  There are no safety concerns.  He is currently in florid withdrawal.  He is a voluntary patient on station 66.      On further questioning, he denies any hypomania, diana, psychosis, panic disorder, obsessive-compulsive disorder, eating disorder history, trauma history or ADHD symptoms.  He does mention that a friend  in , which seems to be a general trigger for escalating alcohol consumption.  He has had some followups through primary care for medication management and was in an outpatient support program through [facility] over a year ago.  He feels that the Zoloft does help his anxiety, helps his mood somewhat, but acknowledges that when he is drinking, this probably does " "not make much of a difference.      PAST PSYCHIATRIC HISTORY:  As above.      PAST CHEMICAL DEPENDENCY HISTORY:  Notable for an alcohol use disorder, 1 previous treatment at [facility].  He has been here 3 times in the last 6 months with alcohol withdrawal.  He is not currently connected with  or a sponsor.  He denies other illicit substance use.      PAST MEDICAL HISTORY:  Includes a history of alcohol withdrawal, chronic back pain, hypertension.      PRIOR TO ADMISSION MEDICATIONS:   1.  Zoloft 50 mg daily.   2.  Vitamin B1.   3.  Protonix.   4.  Toprol.   5.  Claritin.   6.  Folvite.      FAMILY HISTORY:  Notable for an alcohol use disorder in his father.      SOCIAL HISTORY:  The patient is  and lives locally.  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      REVIEW OF SYSTEMS:  A 10-point review of systems is unchanged from Dr. Montenegro's note 03/01/2020 at 1:01 p.m.      MOST RECENT VITAL SIGNS:  Temperature 98.2, pulse 77, respiratory rate 14, blood pressure 120/85, oxygen saturation 97%.      MENTAL STATUS EXAMINATION:  Appearance:  The patient is a flushed, disheveled man lying in bed.  He looks fatigued.  He is a good historian.  Speech is soft.  Rate and flow normal.  Use of language appropriate.  Motor exam:  Tremulous.  Muscle strength and tone adequate.  Coordination, station and gait not tested.  Affect is subdued.  Mood \"okay.\"  Thought process logical, coherent and goal directed.  No loosening of associations, no flight of ideas.  No formal thought disorder on exam.  Thought content negative for hallucinations, delusions, paranoia, suicidal or homicidal ideation.  Insight and judgment intact.  Cognitive exam:  The patient is alert and oriented x 3.  Concentration fair.  Recent and remote memory intact.  General fund of knowledge average.    "   IMPRESSION:  The patient is a 44-year-old gentleman with a severe alcohol use disorder coming in for the third time in 6 months.  He clearly needs a structured chemical dependency program.  As such, we discussed reordering a chemical dependency assessment and starting some naltrexone for relapse prevention.  Without the structure of CD treatment, I do not think the prospects for sobriety are very good.  We talked about adjusting Zoloft to 100 mg daily for optimal benefit, but again if he does maintain sobriety, this will be ineffective.  He is willing to talk to the Chemical Dependency counselor.  At the very least, he should entertain an outpatient program, but probably meets criteria for an inpatient program given the disruption in his life, including medical consequences from his drinking.      DIAGNOSES:   1.  Alcohol use disorder, severe.   2.  Unspecified anxiety disorder.   3.  Alcohol withdrawal.   4.  Alcohol-induced hepatitis.      PLAN:   1.  Adjust Zoloft to 100 mg daily to address depression/anxiety with the addition of naltrexone 50 mg daily starting tomorrow for relapse prevention.   2.  Reorder chemical dependency assessment as he would benefit from a structured chemical dependency program, at the very least outpatient, though inpatient may be needed given his challenges staying sober and mounting medical concerns.         GAYE LANE MD             D: 2020   T: 2020   MT: GEOVANNY      Name:     TRICIA SYKES   MRN:      [id number]        Account:       [id number]   :      1975           Consult Date:  2020      Document: [id number]

## 2020-03-02 NOTE — PLAN OF CARE
Admission    Patient arrives to room 637-01 @ 1330 via cart from ED.  Care plan note: A&Ox4. SBA. Banana bag @ 100ml/hr. Given tylenol for headache. CIWA: 10, 7, 8, 7.  Voiding bathroom. BP elevated, HR tachy at times.     Inpatient nursing criteria listed below were met:    PCD's Documented: NA  Skin issues/needs documented :Yes  Isolation education started/completed NA  Patient allergies verified with patient: No  Verified completion of [place] Risk Assessment Tool:  No  Verified completion of Guardianship screening tool: No  Fall Prevention: Care plan updated, Education given and documented NA  Care Plan initiated: Yes  Home medications documented in belongings flowsheet: Yes  Patient belongings documented in belongings flowsheet: Yes  Reminder note (belongings/ medications) placed in discharge instructions:Yes  Admission profile/ required documentation complete: Yes  Bedside Report Letter given and explained to patient NA

## 2020-03-02 NOTE — PROGRESS NOTES
[facility]    Hospitalist Progress Note    Interval History   - Reports improvement today. Notes that his alcoholism began worsening in 2014 and he thinks it is situational. Interested in staying off of alcohol.    Assessment & Plan   Summary: Ravi Shah is a 44 year old male with PMH of alcohol use disorder, alcohol withdrawal, anxiety, depression, hypertension, who presents with alcohol withdrawal.    Alcohol withdrawal  Mild alcoholic hepatitis  Alcoholic ketosis  Patient was hospitalized 2/3/2020 to 2/5/2020 for alcohol withdrawal. He reports sobriety until about two weeks ago, where he relapsed drinking 1-2 pints of hard liquor a day. Last drink two days ago, patient describes onset of alcohol withdrawal symptoms yesterday. Appears improved with IV Ativan in the ED. This admission he has mildly elevated bilirubin and AST consistent with mild alcoholic hepatitis. Ketones are elevated secondary to alcohol. Leukocytosis is marginal at 11k. US abd 1 month ago shows fatty liver, no benefit to rechecking at this point. LFTs and tremors improved on 3/2.  - CIWA, thiamine, folate, Ativan PRN  - Scheduled Gabapentin, clonidine  - PTA Pantoprazole    Alcohol use disorder  Following discharge 2/5/2020 he reported he was going to set up outpatient treatment, but due to his travel/work schedule it was not going to work for him. He reports his last treatment was in 2/2020. He is interested in quitting alcohol and is receptive to more resources.  - CD consult  - Psychiatry consult   - Naltrexone andZoloft    Hypertension: SBP 150s in ED  - PTA Metoprolol    Anxiety, depression: PTA Sertraline    DVT Prophylaxis: Pneumatic Compression Devices  Code Status: Full Code  PT/OT: not needed    Disposition: Expected discharge back to home tomorrow    Gene Montenegro MD  Text Page  (7am to 6pm)  -Data reviewed today: I reviewed all new labs and imaging results over the last 24 hours.    Physical Exam   Temp:  98.1  F (36.7  C) Temp src: Oral BP: (!) 128/96 Pulse: 79 Heart Rate: 76 Resp: 16 SpO2: 97 % O2 Device: None (Room air)    Vitals:    03/01/20 1004   Weight: 81.6 kg (180 lb)     Vital Signs with Ranges  Temp:  [98.1  F (36.7  C)-98.4  F (36.9  C)] 98.1  F (36.7  C)  Pulse:  [] 79  Heart Rate:  [] 76  Resp:  [12-20] 16  BP: (120-152)/() 128/96  SpO2:  [93 %-99 %] 97 %  I/O last 3 completed shifts:  In: 840 [P.O.:840]  Out: -   O2 requirements: none    Constitutional: Male in NAD, mild tremor on outstretched hands  HEENT: Eyes nonicteric, oral mucosa moist, no significant tongue fasciculations  Cardiovascular: RRR, normal S1/2, no m/r/g  Respiratory: CTAB, no wheezing or crackles  Vascular: No LE pitting edema  GI: Normoactive bowel sounds, nontender, nondistended  Neuro/Psych: Appropriate affect and mood. A&Ox3, moves all extremities,    Medications       carboxymethylcellulose PF  1 drop Both Eyes Daily     cloNIDine   Transdermal Q8H     cloNIDine  1 patch Transdermal Weekly     folic acid  1 mg Oral Daily     gabapentin  800 mg Oral TID     metoprolol succinate ER  50 mg Oral Daily     multivitamin w/minerals  1 tablet Oral Daily     [START ON 3/3/2020] naltrexone  50 mg Oral Daily     pantoprazole  40 mg Oral QAM     sertraline  100 mg Oral QAM     sodium chloride (PF)  3 mL Intracatheter Q8H     vitamin B1  100 mg Oral Daily       Data   Recent Labs   Lab 03/02/20  0848 03/01/20  1021   WBC 5.2 11.0   HGB 14.5 16.1   * 99   * 172   INR 0.96  --     137   POTASSIUM 3.9 3.5   CHLORIDE 105 99   CO2 27 24   BUN 6* 11   CR 0.92 0.86   ANIONGAP 4 14   ISAIAS 8.6 9.1   * 174*   ALBUMIN 3.7 4.5   PROTTOTAL 7.0 8.4   BILITOTAL 2.0* 2.3*   ALKPHOS 63 75   ALT 48 68   AST 36 67*   LIPASE  --  186       Imaging:   No results found for this or any previous visit (from the past 24 hour(s)).

## 2020-03-03 VITALS
DIASTOLIC BLOOD PRESSURE: 84 MMHG | WEIGHT: 180 LBS | RESPIRATION RATE: 17 BRPM | TEMPERATURE: 98 F | OXYGEN SATURATION: 96 % | BODY MASS INDEX: 28.25 KG/M2 | HEIGHT: 67 IN | SYSTOLIC BLOOD PRESSURE: 121 MMHG | HEART RATE: 61 BPM

## 2020-03-03 LAB
ALBUMIN SERPL-MCNC: 3.5 G/DL (ref 3.4–5)
ALP SERPL-CCNC: 61 U/L (ref 40–150)
ALT SERPL W P-5'-P-CCNC: 47 U/L (ref 0–70)
ANION GAP SERPL CALCULATED.3IONS-SCNC: 5 MMOL/L (ref 3–14)
AST SERPL W P-5'-P-CCNC: 40 U/L (ref 0–45)
BILIRUB SERPL-MCNC: 1.4 MG/DL (ref 0.2–1.3)
BUN SERPL-MCNC: 8 MG/DL (ref 7–30)
CALCIUM SERPL-MCNC: 9 MG/DL (ref 8.5–10.1)
CHLORIDE SERPL-SCNC: 104 MMOL/L (ref 94–109)
CO2 SERPL-SCNC: 27 MMOL/L (ref 20–32)
CREAT SERPL-MCNC: 0.88 MG/DL (ref 0.66–1.25)
GFR SERPL CREATININE-BSD FRML MDRD: >90 ML/MIN/{1.73_M2}
GLUCOSE SERPL-MCNC: 145 MG/DL (ref 70–99)
POTASSIUM SERPL-SCNC: 4 MMOL/L (ref 3.4–5.3)
PROT SERPL-MCNC: 6.9 G/DL (ref 6.8–8.8)
SODIUM SERPL-SCNC: 136 MMOL/L (ref 133–144)

## 2020-03-03 PROCEDURE — 25000132 ZZH RX MED GY IP 250 OP 250 PS 637: Performed by: PSYCHIATRY & NEUROLOGY

## 2020-03-03 PROCEDURE — 80053 COMPREHEN METABOLIC PANEL: CPT | Performed by: INTERNAL MEDICINE

## 2020-03-03 PROCEDURE — 99238 HOSP IP/OBS DSCHRG MGMT 30/<: CPT | Performed by: INTERNAL MEDICINE

## 2020-03-03 PROCEDURE — 25000132 ZZH RX MED GY IP 250 OP 250 PS 637: Performed by: INTERNAL MEDICINE

## 2020-03-03 PROCEDURE — 36415 COLL VENOUS BLD VENIPUNCTURE: CPT | Performed by: INTERNAL MEDICINE

## 2020-03-03 PROCEDURE — 40000007 ZZH STATISTIC ADULT CD FACE TO FACE-NO CHRG

## 2020-03-03 RX ORDER — GABAPENTIN 100 MG/1
CAPSULE ORAL
Qty: 36 CAPSULE | Refills: 0 | Status: ON HOLD | OUTPATIENT
Start: 2020-03-03 | End: 2020-03-15

## 2020-03-03 RX ORDER — NALTREXONE HYDROCHLORIDE 50 MG/1
50 TABLET, FILM COATED ORAL DAILY
Qty: 30 TABLET | Refills: 0 | Status: ON HOLD | OUTPATIENT
Start: 2020-03-04 | End: 2020-03-15

## 2020-03-03 RX ORDER — SERTRALINE HYDROCHLORIDE 100 MG/1
100 TABLET, FILM COATED ORAL EVERY MORNING
Qty: 30 TABLET | Refills: 0 | Status: SHIPPED | OUTPATIENT
Start: 2020-03-03 | End: 2021-05-08

## 2020-03-03 RX ADMIN — CARBOXYMETHYLCELLULOSE SODIUM 1 DROP: 5 SOLUTION/ DROPS OPHTHALMIC at 08:11

## 2020-03-03 RX ADMIN — METOPROLOL SUCCINATE 50 MG: 50 TABLET, EXTENDED RELEASE ORAL at 08:11

## 2020-03-03 RX ADMIN — NICOTINE 1 PATCH: 14 PATCH, EXTENDED RELEASE TRANSDERMAL at 08:12

## 2020-03-03 RX ADMIN — MULTIPLE VITAMINS W/ MINERALS TAB 1 TABLET: TAB at 08:11

## 2020-03-03 RX ADMIN — GABAPENTIN 800 MG: 800 TABLET, FILM COATED ORAL at 08:11

## 2020-03-03 RX ADMIN — THIAMINE HCL TAB 100 MG 100 MG: 100 TAB at 08:11

## 2020-03-03 RX ADMIN — PANTOPRAZOLE SODIUM 40 MG: 40 TABLET, DELAYED RELEASE ORAL at 08:11

## 2020-03-03 RX ADMIN — SERTRALINE HYDROCHLORIDE 100 MG: 50 TABLET ORAL at 08:11

## 2020-03-03 RX ADMIN — NALTREXONE HYDROCHLORIDE 50 MG: 50 TABLET, FILM COATED ORAL at 08:11

## 2020-03-03 RX ADMIN — FOLIC ACID 1 MG: 1 TABLET ORAL at 08:11

## 2020-03-03 ASSESSMENT — ACTIVITIES OF DAILY LIVING (ADL)
ADLS_ACUITY_SCORE: 13

## 2020-03-03 NOTE — DISCHARGE INSTRUCTIONS
PT HAS BELONGINGS LOCKED IN SECURITY. YELLOW SHEET IN CHART, WHITE SHEET IS IN SECURITY BINDER    REMOVE CLONIDINE PATCH IN 3-4 DAYS PER MD

## 2020-03-03 NOTE — PLAN OF CARE
DATE & TIME: 3/2 from 1900 to 0730    Cognitive Concerns/ Orientation : A&O x 4  BEHAVIOR & AGGRESSION TOOL COLOR: Green  CIWA SCORE: 0   ABNL VS/O2: VSS on RA  MOBILITY: Up and independent  PAIN MANAGMENT: Denied  DIET: Regular diet  BOWEL/BLADDER: Continent  ABNL LAB/BG: Bilirubin 2.0, Platelet 138, , Bilirubin direct 3.2  DRAIN/DEVICES: PIV saline lock  TELEMETRY RHYTHM: N/A  SKIN: Intact, flushed  TESTS/PROCEDURES: N/A  D/C DAY/GOALS/PLACE: Discharge pending in progress  OTHER IMPORTANT INFO: Clonidine patch on left chest. Nicotine patch on left deltoid

## 2020-03-03 NOTE — DISCHARGE SUMMARY
[facility]    Hospitalist Discharge Summary       Date of Admission:  3/1/2020  Date of Discharge:  3/3/2020  Discharging Provider: Gene Montenegro MD      Discharge Diagnoses   Alcohol withdrawal  Alcohol use disorder  Mild alcoholic hepatitis  Alcoholic ketosis    Follow-ups Needed After Discharge   Follow-up Appointments     Follow-up and recommended labs and tests       Follow up with primary care provider, Joe Sandra, within 7 days for   hospital follow- up.  No follow up labs or test are needed.  - Follow up with an outpatient treatment program as we discussed  - Follow up with a psychiatrist           Hospital Course   Ravi Shah is a 44 year old male with PMH of alcohol use disorder, alcohol withdrawal, anxiety, depression, hypertension, who was admitted on 3/1/2020 with alcohol withdrawal. Patient was hospitalized 2/3/2020 to 2/5/2020 for alcohol withdrawal. He reports sobriety until about two weeks ago, where he relapsed drinking 1-2 pints of hard liquor a day. Last drink two days prior to admission, patient describes onset of alcohol withdrawal symptoms the next day. This admission he has mildly elevated bilirubin and AST consistent with mild alcoholic hepatitis. Ketones are elevated secondary to alcohol. Patient improved with alcohol withdrawal treatment with Ativan, gabapentin, and Clonidine. US abd 1 month ago shows fatty liver. Patient appears motivated to quit. Psychiatry was consulted and patient elected to start on naltrexone. He is planning to start outpatient treatment following discharge.    Consultations This Hospital Stay   PSYCHIATRY IP CONSULT  CHEMICAL DEPENDENCY IP CONSULT  CHEMICAL DEPENDENCY IP CONSULT    Code Status   Full Code    Time Spent on this Encounter   IGene, personally saw the patient today and spent approximately 20 minutes discharging this patient.       Gene Montenegro MD  [facility]  Hospital  ______________________________________________________________________    Physical Exam   Vital Signs: Temp: 98  F (36.7  C) Temp src: Oral BP: 121/84 Pulse: 61 Heart Rate: 61 Resp: 17 SpO2: 96 % O2 Device: None (Room air)    Weight: 180 lbs 0 oz    Constitutional: Male in NAD  Eyes: PERRL, nonicteric, normal ocular movements  HEENT: Normocephalic, atraumatic, oral mucosa moist  Respiratory: CTAB, no wheezing or crackles  Cardiovascular: RRR, normal S1/2, no m/r/g  GI: No organomegaly, normoactive bowel sounds, nontender, nondistended  Skin: No rashes  Musculoskeletal: Normal strength in UE and LE, moves all extremities  Neurologic: A&Ox3  Psychiatric: Appropriate affect and mood       Primary Care Physician   Joe Sandra    Discharge Disposition   Discharged to home  Condition at discharge: Stable    Significant Results and Procedures   Most Recent 3 CBC's:  Recent Labs   Lab Test 03/02/20  0848 03/01/20  1021 02/05/20  0823   WBC 5.2 11.0 5.4   HGB 14.5 16.1 14.0   * 99 101*   * 172 105*     Most Recent 3 BMP's:  Recent Labs   Lab Test 03/03/20  0939 03/02/20  0848 03/01/20  1021    136 137   POTASSIUM 4.0 3.9 3.5   CHLORIDE 104 105 99   CO2 27 27 24   BUN 8 6* 11   CR 0.88 0.92 0.86   ANIONGAP 5 4 14   ISAIAS 9.0 8.6 9.1   * 114* 174*     Most Recent 2 LFT's:  Recent Labs   Lab Test 03/03/20  0939 03/02/20  0848   AST 40 36   ALT 47 48   ALKPHOS 61 63   BILITOTAL 1.4* 2.0*   ,   Results for orders placed or performed during the hospital encounter of 02/03/20   US Abdomen Limited (RUQ)    Narrative    RIGHT UPPER QUADRANT ULTRASOUND 2/3/2020 12:50 PM    HISTORY:  Abdominal pain, vomiting.    COMPARISON: None.    FINDINGS:    Gallbladder:  Normal with no cholelithiasis, wall thickening or focal  tenderness.      Bile ducts:   CHD is normal diameter.  No intrahepatic biliary  dilatation.    Liver:  Liver size is upper normal. There is marked increased  echogenicity of the liver  without focal lesion. There is some sparing  adjacent to the gallbladder.     Pancreas:   Normal.     Right kidney:   Normal.       Impression    IMPRESSION:  Markedly fat infiltrated liver is upper limits of normal  in size. No other abnormality demonstrated.    ALEXIA RAGSDALE MD       Discharge Orders      Reason for your hospital stay    You were hospitalized for alcohol withdrawal     Follow-up and recommended labs and tests     Follow up with primary care provider, Joe Sandra, within 7 days for hospital follow- up.  No follow up labs or test are needed.  - Follow up with an outpatient treatment program as we discussed  - Follow up with a psychiatrist     Activity    Your activity upon discharge: activity as tolerated     Full Code     Diet    Follow this diet upon discharge: Regular Diet Adult     Discharge Medications   Current Discharge Medication List      START taking these medications    Details   gabapentin (NEURONTIN) 100 MG capsule Take 3 capsules (300 mg) by mouth 3 times daily for 3 days, THEN 1 capsule (100 mg) 3 times daily for 3 days.  Qty: 36 capsule, Refills: 0    Associated Diagnoses: Alcohol withdrawal syndrome without complication (H)      naltrexone (DEPADE/REVIA) 50 MG tablet Take 1 tablet (50 mg) by mouth daily  Qty: 30 tablet, Refills: 0    Associated Diagnoses: Alcohol use disorder, moderate, dependence (H)         CONTINUE these medications which have CHANGED    Details   sertraline (ZOLOFT) 100 MG tablet Take 1 tablet (100 mg) by mouth every morning  Qty: 30 tablet, Refills: 0    Associated Diagnoses: Depression, unspecified depression type         CONTINUE these medications which have NOT CHANGED    Details   folic acid (FOLVITE) 1 MG tablet Take 1 tablet (1 mg) by mouth daily . Future refills by PCP Dr. Joe Sandra with phone number [phone].  Qty: 30 tablet, Refills: 0    Associated Diagnoses: Alcohol withdrawal syndrome without complication (H)      metoprolol  succinate ER (TOPROL-XL) 50 MG 24 hr tablet Take 50 mg by mouth daily       pantoprazole (PROTONIX) 40 MG EC tablet Take 40 mg by mouth every morning      tetrahydrozoline (VISINE) 0.05 % ophthalmic solution Place 1 drop into both eyes daily      vitamin B-Complex Take 1 tablet by mouth daily      vitamin B1 (THIAMINE) 100 MG tablet Take 1 tablet (100 mg) by mouth daily . Future refills by PCP Dr. Joe Sandra with phone number [phone].  Qty: 30 tablet, Refills: 0    Associated Diagnoses: Alcohol withdrawal syndrome without complication (H)           Allergies   Allergies   Allergen Reactions     Morphine Nausea and Vomiting

## 2020-03-03 NOTE — PLAN OF CARE
Discharge    Patient discharged to home via family  Care plan note: Patient was A&O x4. VSS on RA. Up independently. CIWA 0. Tolerating diet. Discharge AVS reviewed, patient educated on alcohol withdrawal, new medications and follow up. Patient verbalized understanding and had no further questions. Discharge medications sent with patient. Patient wants to make follow up appointments, states he has chem dep resources and doesn't need any further assistance. IV removed. All patient belongings accounted for and sent home with patient, including belongings in security.     Listed belongings gathered and returned to patient. Yes  Care Plan and Patient education resolved: Yes  Prescriptions if needed, hard copies sent with patient  Yes  Home and hospital acquired medications returned to patient: Yes  Medication Bin checked and emptied on discharge Yes  Follow up appointment made for patient: No

## 2020-03-03 NOTE — CONSULTS
3/3/20 chem dep consult acknowledged and closed.      Unit  emailed me and reported patient will seek outpatient services on his own, declining inpatient consult.  He was provided resources during his previous hospital stay by myself.  Will close at this time.  Discharge AVS can include contact information for  [facility] ([phone]) to schedule future evaluation.    Angelica Owen, [facility]  [phone]

## 2020-03-14 ENCOUNTER — HOSPITAL ENCOUNTER (INPATIENT)
Facility: CLINIC | Age: 45
LOS: 3 days | Discharge: HOME | DRG: 897 | End: 2020-03-17
Attending: EMERGENCY MEDICINE | Admitting: HOSPITALIST
Payer: COMMERCIAL

## 2020-03-14 DIAGNOSIS — F10.939 ALCOHOL WITHDRAWAL SYNDROME WITH COMPLICATION (H): ICD-10-CM

## 2020-03-14 DIAGNOSIS — E80.6 HYPERBILIRUBINEMIA: ICD-10-CM

## 2020-03-14 DIAGNOSIS — K92.0 HEMATEMESIS WITH NAUSEA: ICD-10-CM

## 2020-03-14 LAB
ALBUMIN SERPL-MCNC: 4.9 G/DL (ref 3.4–5)
ALP SERPL-CCNC: 68 U/L (ref 40–150)
ALT SERPL W P-5'-P-CCNC: 54 U/L (ref 0–70)
ANION GAP SERPL CALCULATED.3IONS-SCNC: 21 MMOL/L (ref 3–14)
AST SERPL W P-5'-P-CCNC: 43 U/L (ref 0–45)
BASOPHILS # BLD AUTO: 0 10E9/L (ref 0–0.2)
BASOPHILS NFR BLD AUTO: 0.1 %
BILIRUB SERPL-MCNC: 2.1 MG/DL (ref 0.2–1.3)
BUN SERPL-MCNC: 16 MG/DL (ref 7–30)
CALCIUM SERPL-MCNC: 9.6 MG/DL (ref 8.5–10.1)
CHLORIDE SERPL-SCNC: 96 MMOL/L (ref 94–109)
CO2 SERPL-SCNC: 16 MMOL/L (ref 20–32)
CREAT SERPL-MCNC: 1.12 MG/DL (ref 0.66–1.25)
DIFFERENTIAL METHOD BLD: ABNORMAL
EOSINOPHIL # BLD AUTO: 0 10E9/L (ref 0–0.7)
EOSINOPHIL NFR BLD AUTO: 0 %
ERYTHROCYTE [DISTWIDTH] IN BLOOD BY AUTOMATED COUNT: 13.3 % (ref 10–15)
ETHANOL SERPL-MCNC: <0.01 G/DL
GFR SERPL CREATININE-BSD FRML MDRD: 78 ML/MIN/{1.73_M2}
GLUCOSE SERPL-MCNC: 284 MG/DL (ref 70–99)
HCT VFR BLD AUTO: 48.6 % (ref 40–53)
HGB BLD-MCNC: 16.2 G/DL (ref 13.3–17.7)
IMM GRANULOCYTES # BLD: 0 10E9/L (ref 0–0.4)
IMM GRANULOCYTES NFR BLD: 0.3 %
INR PPP: 1.06 (ref 0.86–1.14)
LIPASE SERPL-CCNC: 194 U/L (ref 73–393)
LYMPHOCYTES # BLD AUTO: 0.2 10E9/L (ref 0.8–5.3)
LYMPHOCYTES NFR BLD AUTO: 1.9 %
MCH RBC QN AUTO: 32.4 PG (ref 26.5–33)
MCHC RBC AUTO-ENTMCNC: 33.3 G/DL (ref 31.5–36.5)
MCV RBC AUTO: 97 FL (ref 78–100)
MONOCYTES # BLD AUTO: 0.6 10E9/L (ref 0–1.3)
MONOCYTES NFR BLD AUTO: 5.5 %
NEUTROPHILS # BLD AUTO: 10.7 10E9/L (ref 1.6–8.3)
NEUTROPHILS NFR BLD AUTO: 92.2 %
PLATELET # BLD AUTO: 292 10E9/L (ref 150–450)
POTASSIUM SERPL-SCNC: 4 MMOL/L (ref 3.4–5.3)
PROT SERPL-MCNC: 9.3 G/DL (ref 6.8–8.8)
RBC # BLD AUTO: 5 10E12/L (ref 4.4–5.9)
SODIUM SERPL-SCNC: 133 MMOL/L (ref 133–144)
WBC # BLD AUTO: 11.6 10E9/L (ref 4–11)

## 2020-03-14 PROCEDURE — 96375 TX/PRO/DX INJ NEW DRUG ADDON: CPT

## 2020-03-14 PROCEDURE — C9113 INJ PANTOPRAZOLE SODIUM, VIA: HCPCS | Performed by: EMERGENCY MEDICINE

## 2020-03-14 PROCEDURE — 96361 HYDRATE IV INFUSION ADD-ON: CPT

## 2020-03-14 PROCEDURE — HZ2ZZZZ DETOXIFICATION SERVICES FOR SUBSTANCE ABUSE TREATMENT: ICD-10-PCS | Performed by: HOSPITALIST

## 2020-03-14 PROCEDURE — 99285 EMERGENCY DEPT VISIT HI MDM: CPT | Mod: 25

## 2020-03-14 PROCEDURE — 83690 ASSAY OF LIPASE: CPT | Performed by: EMERGENCY MEDICINE

## 2020-03-14 PROCEDURE — 80053 COMPREHEN METABOLIC PANEL: CPT | Performed by: EMERGENCY MEDICINE

## 2020-03-14 PROCEDURE — 25000128 H RX IP 250 OP 636: Performed by: HOSPITALIST

## 2020-03-14 PROCEDURE — 85610 PROTHROMBIN TIME: CPT | Performed by: EMERGENCY MEDICINE

## 2020-03-14 PROCEDURE — 93005 ELECTROCARDIOGRAM TRACING: CPT

## 2020-03-14 PROCEDURE — 85025 COMPLETE CBC W/AUTO DIFF WBC: CPT | Performed by: EMERGENCY MEDICINE

## 2020-03-14 PROCEDURE — 99223 1ST HOSP IP/OBS HIGH 75: CPT | Mod: AI | Performed by: HOSPITALIST

## 2020-03-14 PROCEDURE — 12000000 ZZH R&B MED SURG/OB

## 2020-03-14 PROCEDURE — 25800030 ZZH RX IP 258 OP 636: Performed by: EMERGENCY MEDICINE

## 2020-03-14 PROCEDURE — 96374 THER/PROPH/DIAG INJ IV PUSH: CPT

## 2020-03-14 PROCEDURE — 80320 DRUG SCREEN QUANTALCOHOLS: CPT | Performed by: EMERGENCY MEDICINE

## 2020-03-14 PROCEDURE — 25000128 H RX IP 250 OP 636: Performed by: EMERGENCY MEDICINE

## 2020-03-14 PROCEDURE — 25000128 H RX IP 250 OP 636

## 2020-03-14 RX ORDER — LORAZEPAM 2 MG/ML
2 INJECTION INTRAMUSCULAR ONCE
Status: COMPLETED | OUTPATIENT
Start: 2020-03-14 | End: 2020-03-14

## 2020-03-14 RX ORDER — THIAMINE HYDROCHLORIDE 100 MG/ML
100 INJECTION, SOLUTION INTRAMUSCULAR; INTRAVENOUS ONCE
Status: COMPLETED | OUTPATIENT
Start: 2020-03-14 | End: 2020-03-14

## 2020-03-14 RX ORDER — ONDANSETRON 2 MG/ML
INJECTION INTRAMUSCULAR; INTRAVENOUS
Status: COMPLETED
Start: 2020-03-14 | End: 2020-03-14

## 2020-03-14 RX ORDER — DIAZEPAM 10 MG/2ML
10 INJECTION, SOLUTION INTRAMUSCULAR; INTRAVENOUS ONCE
Status: COMPLETED | OUTPATIENT
Start: 2020-03-14 | End: 2020-03-14

## 2020-03-14 RX ORDER — SODIUM CHLORIDE 9 MG/ML
INJECTION, SOLUTION INTRAVENOUS CONTINUOUS
Status: DISCONTINUED | OUTPATIENT
Start: 2020-03-14 | End: 2020-03-15

## 2020-03-14 RX ADMIN — THIAMINE HYDROCHLORIDE 100 MG: 100 INJECTION, SOLUTION INTRAMUSCULAR; INTRAVENOUS at 23:58

## 2020-03-14 RX ADMIN — DIAZEPAM 10 MG: 5 INJECTION, SOLUTION INTRAMUSCULAR; INTRAVENOUS at 22:56

## 2020-03-14 RX ADMIN — LORAZEPAM 2 MG: 2 INJECTION INTRAMUSCULAR; INTRAVENOUS at 21:58

## 2020-03-14 RX ADMIN — PANTOPRAZOLE SODIUM 40 MG: 40 INJECTION, POWDER, FOR SOLUTION INTRAVENOUS at 22:13

## 2020-03-14 RX ADMIN — SODIUM CHLORIDE 1000 ML: 9 INJECTION, SOLUTION INTRAVENOUS at 22:37

## 2020-03-14 RX ADMIN — ONDANSETRON 4 MG: 2 INJECTION INTRAMUSCULAR; INTRAVENOUS at 23:58

## 2020-03-14 RX ADMIN — SODIUM CHLORIDE 1000 ML: 9 INJECTION, SOLUTION INTRAVENOUS at 22:00

## 2020-03-14 ASSESSMENT — MIFFLIN-ST. JEOR: SCORE: 1741.28

## 2020-03-15 LAB
ALBUMIN SERPL-MCNC: 3.6 G/DL (ref 3.4–5)
ALP SERPL-CCNC: 49 U/L (ref 40–150)
ALT SERPL W P-5'-P-CCNC: 37 U/L (ref 0–70)
ANION GAP SERPL CALCULATED.3IONS-SCNC: 7 MMOL/L (ref 3–14)
AST SERPL W P-5'-P-CCNC: 25 U/L (ref 0–45)
BILIRUB DIRECT SERPL-MCNC: 0.4 MG/DL (ref 0–0.2)
BILIRUB SERPL-MCNC: 1.6 MG/DL (ref 0.2–1.3)
BUN SERPL-MCNC: 13 MG/DL (ref 7–30)
CALCIUM SERPL-MCNC: 8.5 MG/DL (ref 8.5–10.1)
CHLORIDE SERPL-SCNC: 107 MMOL/L (ref 94–109)
CO2 SERPL-SCNC: 24 MMOL/L (ref 20–32)
CREAT SERPL-MCNC: 0.91 MG/DL (ref 0.66–1.25)
ERYTHROCYTE [DISTWIDTH] IN BLOOD BY AUTOMATED COUNT: 13.1 % (ref 10–15)
GFR SERPL CREATININE-BSD FRML MDRD: >90 ML/MIN/{1.73_M2}
GLUCOSE SERPL-MCNC: 114 MG/DL (ref 70–99)
HCT VFR BLD AUTO: 41.6 % (ref 40–53)
HGB BLD-MCNC: 13.9 G/DL (ref 13.3–17.7)
INTERPRETATION ECG - MUSE: NORMAL
MAGNESIUM SERPL-MCNC: 1.9 MG/DL (ref 1.6–2.3)
MCH RBC QN AUTO: 33.1 PG (ref 26.5–33)
MCHC RBC AUTO-ENTMCNC: 33.4 G/DL (ref 31.5–36.5)
MCV RBC AUTO: 99 FL (ref 78–100)
PLATELET # BLD AUTO: 217 10E9/L (ref 150–450)
POTASSIUM SERPL-SCNC: 3.5 MMOL/L (ref 3.4–5.3)
PROT SERPL-MCNC: 6.9 G/DL (ref 6.8–8.8)
RBC # BLD AUTO: 4.2 10E12/L (ref 4.4–5.9)
SODIUM SERPL-SCNC: 138 MMOL/L (ref 133–144)
WBC # BLD AUTO: 7.8 10E9/L (ref 4–11)

## 2020-03-15 PROCEDURE — 85027 COMPLETE CBC AUTOMATED: CPT | Performed by: HOSPITALIST

## 2020-03-15 PROCEDURE — 25000132 ZZH RX MED GY IP 250 OP 250 PS 637: Performed by: INTERNAL MEDICINE

## 2020-03-15 PROCEDURE — 25800030 ZZH RX IP 258 OP 636: Performed by: HOSPITALIST

## 2020-03-15 PROCEDURE — 25800030 ZZH RX IP 258 OP 636: Performed by: INTERNAL MEDICINE

## 2020-03-15 PROCEDURE — 25000128 H RX IP 250 OP 636: Performed by: HOSPITALIST

## 2020-03-15 PROCEDURE — C9113 INJ PANTOPRAZOLE SODIUM, VIA: HCPCS | Performed by: HOSPITALIST

## 2020-03-15 PROCEDURE — 36415 COLL VENOUS BLD VENIPUNCTURE: CPT | Performed by: HOSPITALIST

## 2020-03-15 PROCEDURE — 25000128 H RX IP 250 OP 636: Performed by: INTERNAL MEDICINE

## 2020-03-15 PROCEDURE — 12000000 ZZH R&B MED SURG/OB

## 2020-03-15 PROCEDURE — H2035 A/D TX PROGRAM, PER HOUR: HCPCS

## 2020-03-15 PROCEDURE — 25000132 ZZH RX MED GY IP 250 OP 250 PS 637: Performed by: HOSPITALIST

## 2020-03-15 PROCEDURE — 99232 SBSQ HOSP IP/OBS MODERATE 35: CPT | Performed by: INTERNAL MEDICINE

## 2020-03-15 PROCEDURE — 80076 HEPATIC FUNCTION PANEL: CPT | Performed by: HOSPITALIST

## 2020-03-15 PROCEDURE — 80048 BASIC METABOLIC PNL TOTAL CA: CPT | Performed by: HOSPITALIST

## 2020-03-15 PROCEDURE — 83735 ASSAY OF MAGNESIUM: CPT | Performed by: HOSPITALIST

## 2020-03-15 RX ORDER — METOPROLOL SUCCINATE 50 MG/1
50 TABLET, EXTENDED RELEASE ORAL DAILY
Status: DISCONTINUED | OUTPATIENT
Start: 2020-03-15 | End: 2020-03-17 | Stop reason: HOSPADM

## 2020-03-15 RX ORDER — ONDANSETRON 2 MG/ML
4 INJECTION INTRAMUSCULAR; INTRAVENOUS ONCE
Status: COMPLETED | OUTPATIENT
Start: 2020-03-15 | End: 2020-03-14

## 2020-03-15 RX ORDER — SODIUM CHLORIDE AND POTASSIUM CHLORIDE 150; 900 MG/100ML; MG/100ML
INJECTION, SOLUTION INTRAVENOUS CONTINUOUS
Status: DISCONTINUED | OUTPATIENT
Start: 2020-03-15 | End: 2020-03-15

## 2020-03-15 RX ORDER — LABETALOL HYDROCHLORIDE 5 MG/ML
10 INJECTION, SOLUTION INTRAVENOUS
Status: DISCONTINUED | OUTPATIENT
Start: 2020-03-15 | End: 2020-03-17 | Stop reason: HOSPADM

## 2020-03-15 RX ORDER — PROCHLORPERAZINE MALEATE 5 MG
10 TABLET ORAL EVERY 6 HOURS PRN
Status: DISCONTINUED | OUTPATIENT
Start: 2020-03-15 | End: 2020-03-17 | Stop reason: HOSPADM

## 2020-03-15 RX ORDER — DIAZEPAM 5 MG
10 TABLET ORAL EVERY 30 MIN PRN
Status: DISCONTINUED | OUTPATIENT
Start: 2020-03-15 | End: 2020-03-17 | Stop reason: HOSPADM

## 2020-03-15 RX ORDER — PANTOPRAZOLE SODIUM 40 MG/1
1 TABLET, DELAYED RELEASE ORAL
COMMUNITY
Start: 2020-01-22 | End: 2021-05-08

## 2020-03-15 RX ORDER — LANOLIN ALCOHOL/MO/W.PET/CERES
100 CREAM (GRAM) TOPICAL DAILY
Status: DISCONTINUED | OUTPATIENT
Start: 2020-03-15 | End: 2020-03-17 | Stop reason: HOSPADM

## 2020-03-15 RX ORDER — ONDANSETRON 2 MG/ML
4 INJECTION INTRAMUSCULAR; INTRAVENOUS EVERY 6 HOURS PRN
Status: DISCONTINUED | OUTPATIENT
Start: 2020-03-15 | End: 2020-03-17 | Stop reason: HOSPADM

## 2020-03-15 RX ORDER — NALOXONE HYDROCHLORIDE 0.4 MG/ML
.1-.4 INJECTION, SOLUTION INTRAMUSCULAR; INTRAVENOUS; SUBCUTANEOUS
Status: DISCONTINUED | OUTPATIENT
Start: 2020-03-15 | End: 2020-03-17 | Stop reason: HOSPADM

## 2020-03-15 RX ORDER — HYDRALAZINE HYDROCHLORIDE 20 MG/ML
10 INJECTION INTRAMUSCULAR; INTRAVENOUS EVERY 4 HOURS PRN
Status: DISCONTINUED | OUTPATIENT
Start: 2020-03-15 | End: 2020-03-17 | Stop reason: HOSPADM

## 2020-03-15 RX ORDER — AMOXICILLIN 250 MG
1 CAPSULE ORAL 2 TIMES DAILY PRN
Status: DISCONTINUED | OUTPATIENT
Start: 2020-03-15 | End: 2020-03-17 | Stop reason: HOSPADM

## 2020-03-15 RX ORDER — SERTRALINE HYDROCHLORIDE 100 MG/1
100 TABLET, FILM COATED ORAL EVERY MORNING
Status: DISCONTINUED | OUTPATIENT
Start: 2020-03-15 | End: 2020-03-17 | Stop reason: HOSPADM

## 2020-03-15 RX ORDER — LIDOCAINE 40 MG/G
CREAM TOPICAL
Status: DISCONTINUED | OUTPATIENT
Start: 2020-03-15 | End: 2020-03-17 | Stop reason: HOSPADM

## 2020-03-15 RX ORDER — SODIUM CHLORIDE 9 MG/ML
INJECTION, SOLUTION INTRAVENOUS CONTINUOUS
Status: DISCONTINUED | OUTPATIENT
Start: 2020-03-15 | End: 2020-03-17 | Stop reason: HOSPADM

## 2020-03-15 RX ORDER — PANTOPRAZOLE SODIUM 40 MG/1
40 TABLET, DELAYED RELEASE ORAL
Status: DISCONTINUED | OUTPATIENT
Start: 2020-03-15 | End: 2020-03-17 | Stop reason: HOSPADM

## 2020-03-15 RX ORDER — DIAZEPAM 10 MG/2ML
5-10 INJECTION, SOLUTION INTRAMUSCULAR; INTRAVENOUS EVERY 30 MIN PRN
Status: DISCONTINUED | OUTPATIENT
Start: 2020-03-15 | End: 2020-03-17 | Stop reason: HOSPADM

## 2020-03-15 RX ORDER — AMOXICILLIN 250 MG
2 CAPSULE ORAL 2 TIMES DAILY PRN
Status: DISCONTINUED | OUTPATIENT
Start: 2020-03-15 | End: 2020-03-17 | Stop reason: HOSPADM

## 2020-03-15 RX ORDER — ONDANSETRON 4 MG/1
4 TABLET, ORALLY DISINTEGRATING ORAL EVERY 6 HOURS PRN
Status: DISCONTINUED | OUTPATIENT
Start: 2020-03-15 | End: 2020-03-17 | Stop reason: HOSPADM

## 2020-03-15 RX ORDER — ACETAMINOPHEN 325 MG/1
650 TABLET ORAL EVERY 4 HOURS PRN
Status: DISCONTINUED | OUTPATIENT
Start: 2020-03-15 | End: 2020-03-17 | Stop reason: HOSPADM

## 2020-03-15 RX ORDER — FOLIC ACID 1 MG/1
1 TABLET ORAL DAILY
Status: DISCONTINUED | OUTPATIENT
Start: 2020-03-15 | End: 2020-03-17 | Stop reason: HOSPADM

## 2020-03-15 RX ADMIN — DIAZEPAM 10 MG: 5 TABLET ORAL at 08:38

## 2020-03-15 RX ADMIN — DIAZEPAM 10 MG: 5 TABLET ORAL at 17:03

## 2020-03-15 RX ADMIN — FOLIC ACID 1 MG: 1 TABLET ORAL at 08:38

## 2020-03-15 RX ADMIN — ACETAMINOPHEN 650 MG: 325 TABLET, FILM COATED ORAL at 13:21

## 2020-03-15 RX ADMIN — DIAZEPAM 10 MG: 5 TABLET ORAL at 01:05

## 2020-03-15 RX ADMIN — ACETAMINOPHEN 650 MG: 325 TABLET, FILM COATED ORAL at 17:20

## 2020-03-15 RX ADMIN — SODIUM CHLORIDE: 9 INJECTION, SOLUTION INTRAVENOUS at 15:01

## 2020-03-15 RX ADMIN — SODIUM CHLORIDE 1000 ML: 9 INJECTION, SOLUTION INTRAVENOUS at 01:09

## 2020-03-15 RX ADMIN — SERTRALINE HYDROCHLORIDE 100 MG: 100 TABLET ORAL at 11:54

## 2020-03-15 RX ADMIN — HYDRALAZINE HYDROCHLORIDE 10 MG: 20 INJECTION INTRAMUSCULAR; INTRAVENOUS at 17:42

## 2020-03-15 RX ADMIN — PANTOPRAZOLE SODIUM 40 MG: 40 INJECTION, POWDER, FOR SOLUTION INTRAVENOUS at 01:06

## 2020-03-15 RX ADMIN — Medication 100 MG: at 08:38

## 2020-03-15 RX ADMIN — ACETAMINOPHEN 650 MG: 325 TABLET, FILM COATED ORAL at 08:38

## 2020-03-15 RX ADMIN — PANTOPRAZOLE SODIUM 40 MG: 40 TABLET, DELAYED RELEASE ORAL at 16:06

## 2020-03-15 RX ADMIN — PANTOPRAZOLE SODIUM 40 MG: 40 INJECTION, POWDER, FOR SOLUTION INTRAVENOUS at 08:38

## 2020-03-15 RX ADMIN — DIAZEPAM 10 MG: 5 TABLET ORAL at 07:32

## 2020-03-15 RX ADMIN — PROCHLORPERAZINE EDISYLATE 10 MG: 5 INJECTION INTRAMUSCULAR; INTRAVENOUS at 17:21

## 2020-03-15 RX ADMIN — METOPROLOL SUCCINATE 50 MG: 50 TABLET, EXTENDED RELEASE ORAL at 11:54

## 2020-03-15 RX ADMIN — ONDANSETRON 4 MG: 4 TABLET, ORALLY DISINTEGRATING ORAL at 13:21

## 2020-03-15 RX ADMIN — DIAZEPAM 10 MG: 5 TABLET ORAL at 16:14

## 2020-03-15 ASSESSMENT — ACTIVITIES OF DAILY LIVING (ADL)
ADLS_ACUITY_SCORE: 13

## 2020-03-15 NOTE — PROGRESS NOTES
MD Notification    Notified Person: MD    Notified Person Name: Caio    Notification Date/Time: 3/15/20 0142    Notification Interaction: phone    Purpose of Notification: would you like telemetry?    Orders Received: telemetry ordered    Comments:

## 2020-03-15 NOTE — ED PROVIDER NOTES
"  History     Chief Complaint:  Alcohol withdrawl      HPI history supplemented by electronic chart review.  Ravi Shah is a 44 year old male with a history of anxiety and alcohol withdrawl who presents to the emergency department for evaluation of alcohol withdrawl. The patient reports 2days ago he began a 48 hour period where he \"drank too much.\" Today, the patient woke up at 0300 and began vomiting. He tried to wait out the vomiting, but throughout the day his vomiting got worse. He has not been unable to keep down fluids. He had an episode of \"coffee ground\" emesis and called his primary clinic which is [facility], who told him to go to the emergency department. He also mention that he has tremors, his throat is sore and that he has not had a bowel movement since yesterday, when it was formed and brown. He notes a history of bleeding in his stomach and last had an upper endoscopy done in September. He believes his symptoms today are due to alcohol withdrawal. He has previously been admitted for alcohol withdrawl and did go through treatment. The patient lives with his wife who drove him today.     Allergies:  Morphine    Medications:    Folvite  Neurontin  Toprol  Depade  Protonix  Zoloft  Thamine    Past Medical History:    Anxiety  Back pain  Depression  Hypertension  Substance abuse  Alcohol withdrawl    Past Surgical History:    ENT surgery  Esophagoscopy, gastroscopy, duodenoscopy, combined  Neck surgery    Family History:    No past pertinent family history.    Social History:  The patient presents alone.  Smoking Status: Former  Smokeless Tobacco: Current user  Alcohol Use: Yes  Drug Use: No  Marital Status:      Works as a , and has temporarily turned over duties to someone else.    Review of Systems   All other systems reviewed and are negative.    Physical Exam   Vitals:  Patient Vitals for the past 24 hrs:   BP Temp Temp src Heart Rate Resp SpO2 Height Weight " "  03/14/20 2132 (!) 181/130 96.9  F (36.1  C) Temporal 143 20 96 % 1.803 m (5' 11\") 83.9 kg (185 lb)     Physical Exam  General: Male sitting upright in room 6  HENT: mucous membranes moist  Eyes: PERRL without nystagmus  CV: extremities well perfused, regular rhythm, tachycardic, rate in 110s during initial exam  Resp: normal effort, clear throughout  GI: abdomen soft and nontender, no guarding  MSK: no bony tenderness   Skin: appropriately warm and dry  Neuro: alert, clear speech, oriented, moderately tremulous in bilateral extremities, + tongue fasciculations  Psych: anxious, cooperative, denies feeling suicidal, no evidence of hallucinations      Emergency Department Course   ECG:  Indication: tachycardia  Completed at 2209.  Read at 2249.   Rate 115 bpm. AR interval 146. QRS duration 72. QT/QTc 336/464. P-R-T axes 51 42 43.  Sinus tachycardia otherwise normal ECG    Laboratory:  CBC: WBC 11.6 (H) o/w WNL. (HGB 16.2, )   CMP: Carbon Dioxide 16 (L), Anion Gap 21 (H), Glucose 284 (H), Bilirubin Total 2.1 (H), Protein Total 9.3 (H) o/w AWNL (Creatinine 1.12)    Lipase: 194  INR: 1.06    Alcohol Ethyl: <0.01    Interventions:  2158 Ativan 2 mg IV  2200 NS, 1 L, IV bolus  2213 Protonix 40 mg IV  2237 NS, 1 L, IV bolus  2256 Valium 10 mg IV    Emergency Department Course:  Past medical records, nursing notes, and vitals reviewed.    2143 I performed an exam of the patient as documented above.     I performed electronic chart review in [place].  The patient was placed on continuous cardiac and pulse ox monitoring.    IV was inserted and blood was drawn for laboratory testing, results above.    2250 I rechecked the patient and discussed the results of his workup thus far.     2253 I spoke with Dr. García, hospitalist, who agreed to admit the patient.    Findings and plan explained to the Patient who consents to admission. Discussed the patient with Dr. García, who will admit the patient to a [facility] bed for further " monitoring, evaluation, and treatment.    I personally reviewed the laboratory results with the Patient and answered all related questions prior to admission.    Impression & Plan      Medical Decision Making:  I think his presentation is primarily due to alcohol withdrawal.  This is supported by history of having no alcohol for nearly 24 hours, presenting hypertension, tachycardia, tremulousness, tongue fasciculations, and an undetectable blood alcohol level.  He has a history of the same, though has never had a seizure.  He improved with multiple doses of benzodiazepines provided, though remains symptomatic and I think clearly requires hospitalization.  He was recently admitted under similar circumstances.  He expresses what seems to be a sincere desire to cease abusing alcohol.  Regarding his coffee-ground emesis, he has not had further vomiting here.  Some reassurance is provided by the fact that he did not have varices on his upper endoscopy September by Dr. Mcrae, though close monitoring and serial hemoglobins at a minimum will be necessary.  I have asked the nurse to place a second IV.  He will be admitted to the hospitalist service to a monitored bed after discussion of the above.    Diagnosis:    ICD-10-CM    1. Alcohol withdrawal syndrome with complication (H)  F10.239 CBC with platelets differential     Comprehensive metabolic panel     Lipase     Alcohol ethyl     INR   2. Hematemesis with nausea  K92.0    3. Hyperbilirubinemia  E80.6        Disposition:  Admitted to a [facility] bed under the care of Dr. García.    I, Stephon Purvis, am serving as a scribe on 3/14/2020 at 9:49 PM to personally document services performed by Nikolay Tee, * based on my observations and the provider's statements to me.     This record was created at least in part using electronic voice recognition software, so please excuse any typographical errors.    Stephon Purvis  3/14/2020    EMERGENCY DEPARTMENT       Nikolay Tee  MD John  03/15/20 0013

## 2020-03-15 NOTE — PROGRESS NOTES
RECEIVING UNIT ED HANDOFF REVIEW    ED Nurse Handoff Report was reviewed by: Vani Nelson RN on March 15, 2020 at 12:26 AM

## 2020-03-15 NOTE — PHARMACY-ADMISSION MEDICATION HISTORY
Pharmacy Medication History  Admission medication history interview status for the 3/14/2020  admission is complete. See EPIC admission navigator for prior to admission medications     Medication history sources: Patient (via phone)  Medication history source reliability: Good  Adherence assessment: Good    Significant changes made to the medication list:  Removed folic acid, gabapentin, naltrexone, tetrahydrozoline, vitamin B-complex, and vitamin B1.  Changed pantoprazole dose to twice instead of once daily      Additional medication history information:   Patient has not had his medications for three days.     Medication reconciliation completed by provider prior to medication history? No    Time spent in this activity: 15 min      Prior to Admission medications    Medication Sig Last Dose Taking? Auth Provider   metoprolol succinate ER (TOPROL-XL) 50 MG 24 hr tablet Take 50 mg by mouth daily  3/12/2020 at Unknown time Yes Reported, Patient   pantoprazole (PROTONIX) 40 MG EC tablet Take 1 tablet by mouth 2 times daily (before meals) 3/12/2020 Yes Unknown, Entered By History   sertraline (ZOLOFT) 100 MG tablet Take 1 tablet (100 mg) by mouth every morning 3/12/2020 at Unknown time Yes Gene Montenegro MD

## 2020-03-15 NOTE — CONSULTS
3/15/2020    Type of service:  CD consult  Time Service Began:  12:56pm  Time Service Ended:  1:09pm  Originating Location (pt. Location):  [facility] - [address] [place] [address] [address], [place] [address]    Distant Location (provider location):  [facility]   Reason for Televisit:  CD consult  Mode of Communication:  Video Conference via [employer]  As the provider I attest to compliance with applicable laws and regulations related to telemedicine.  ABDELRAHMAN Mattson    Writer spoke with pt via Telehealth about his options for treatment. Pt reported he went to [facility] for 2 weeks last year and that's all his insurance would cover. Pt reports he then did outpatient in [place] with S but was hard to continue that due to his work schedule and felt like he wasn't getting much out of it. Pt feels like his best option is inpatient treatment. Pt reports he would like to look into [facility] before going back to [facility]. Writer will email SATHISH Aguilar to provide pt the information for pt to call.     Deidra  Ph: [phone]    Claudia  Ph: [phone]    ABDELRAHMAN Mattson

## 2020-03-15 NOTE — ED NOTES
"[facility]  ED Nurse Handoff Report    ED Chief complaint: Alcohol Intoxication (last drink friday night, started vomiting at 3 a and shaking since 5a)      ED Diagnosis:   Final diagnoses:   Alcohol withdrawal syndrome with complication (H)   Hematemesis with nausea       Code Status: Full Code    Allergies:   Allergies   Allergen Reactions     Morphine Nausea and Vomiting       Patient Story: Hx alcoholism. Started drinking when he was 13 years old. Last drink was last night (Friday) at around midnight, became shaky and nauseous around 3am with belly pain. Pt reports he has vomited over 25 times. Pt denies any history of seizures or hallucinations. Pt reports coffee-ground emesis with hx of gastric ulcer.  Focused Assessment:  Tachycardic with HR in 120s. Pt nauseous and obviously tremulous and anxious.    Treatments and/or interventions provided: 2L NS IV, 10 mg IV Valium, 2mg IV Ativan, Protonix  Patient's response to treatments and/or interventions: Decreased shakes and nausea.    To be done/followed up on inpatient unit:  Monitor withdrawal symptoms.    Does this patient have any cognitive concerns?: none.    Activity level - Baseline/Home:  Independent  Activity Level - Current:   Independent    Patient's Preferred language: English   Needed?: No    Isolation: None  Infection: Not Applicable  Bariatric?: No    Vital Signs:   Vitals:    03/14/20 2132   BP: (!) 181/130   Resp: 20   Temp: 96.9  F (36.1  C)   TempSrc: Temporal   SpO2: 96%   Weight: 83.9 kg (185 lb)   Height: 1.803 m (5' 11\")       Cardiac Rhythm:Cardiac Rhythm: Normal sinus rhythm    Was the PSS-3 completed:   Yes  What interventions are required if any?               Family Comments: None.      For the majority of the shift this patient's behavior was Green.   Behavioral interventions performed were .    ED NURSE PHONE NUMBER: [phone]         "
Bed: ED06  Expected date:   Expected time:   Means of arrival:   Comments:  etoh    
Seizure pads placed.  
Abdomen soft, non-tender, no guarding.

## 2020-03-15 NOTE — DISCHARGE INSTRUCTIONS
Patient has wallet, phone/, cinch bag, clothing,socks, shoes in room    Phone numbers for   Deidra  Ph: [phone]  And   Claudia  Ph: [phone]

## 2020-03-15 NOTE — PLAN OF CARE
DATE & TIME: 2300-0730   Cognitive Concerns/ Orientation : A&Ox4, calm, cooperative   BEHAVIOR & AGGRESSION TOOL COLOR: green  CIWA SCORE: 8, 4,   ABNL VS/O2: /107, Tachy low 100s on RA  MOBILITY: Ind  PAIN MANAGMENT: Valium for headache, nausea  DIET: Full - ADAT  BOWEL/BLADDER: WDL  ABNL LAB/BG: Bili 2.1 WBC 11.6  DRAIN/DEVICES: 2 PIV SL  TELEMETRY RHYTHM: ST  SKIN: WDL  TESTS/PROCEDURES: n/a  D/C DAY/GOALS/PLACE: Pending withdrawal and PO tolerance  OTHER IMPORTANT INFO: Bolus 1L NS x1. Seizure precautions in place.CD consulted.

## 2020-03-15 NOTE — PROGRESS NOTES
Admission    Patient arrives to room 601-2 via cart from ED.  Care plan note: yes    Inpatient nursing criteria listed below were met:    PCD's Documented: NA  Skin issues/needs documented :NA  Isolation education started/completed NA  Patient allergies verified with patient: Yes  Verified completion of [place] Risk Assessment Tool:  Yes  Verified completion of Guardianship screening tool: Yes  Fall Prevention: Care plan updated, Education given and documented NA  Care Plan initiated: Yes  Home medications documented in belongings flowsheet: NA  Patient belongings documented in belongings flowsheet: Yes  Reminder note (belongings/ medications) placed in discharge instructions:Yes  Admission profile/ required documentation complete: Yes  Bedside Report Letter given and explained to patient No

## 2020-03-15 NOTE — PLAN OF CARE
Problem: Adult Inpatient Plan of Care  Goal: Plan of Care Review  3/15/2020 1357 by Janny Pavon RN  Outcome: Improving  3/15/2020 0428 by Vani Nelson RN  Outcome: No Change  Flowsheets  Taken 3/15/2020 0048  Plan of Care Reviewed With: patient  Taken 3/15/2020 0428  Progress: no change     Problem: Alcohol Withdrawal  Goal: Alcohol Withdrawal Symptom Control  Outcome: Improving   CIWAs being done, treated once this shift. Home meds resumed. PRN tylenol and zofran also being given. Continue to monitor-MD paged regarding potential need for IV fluids-awaiting call back. Pt is scoring Green on Escalation scale. Chem Dep consult completed this shift as well. Up independent.

## 2020-03-15 NOTE — PROGRESS NOTES
SW:  Per request of [facility]ef[facility], the phone numbers for [facility] and [facility] have been entered into his discharge instructions and also given to patient.

## 2020-03-16 PROCEDURE — 25800030 ZZH RX IP 258 OP 636: Performed by: INTERNAL MEDICINE

## 2020-03-16 PROCEDURE — 99232 SBSQ HOSP IP/OBS MODERATE 35: CPT | Performed by: INTERNAL MEDICINE

## 2020-03-16 PROCEDURE — 25000132 ZZH RX MED GY IP 250 OP 250 PS 637: Performed by: INTERNAL MEDICINE

## 2020-03-16 PROCEDURE — 12000000 ZZH R&B MED SURG/OB

## 2020-03-16 PROCEDURE — 25000132 ZZH RX MED GY IP 250 OP 250 PS 637: Performed by: HOSPITALIST

## 2020-03-16 RX ADMIN — PANTOPRAZOLE SODIUM 40 MG: 40 TABLET, DELAYED RELEASE ORAL at 05:57

## 2020-03-16 RX ADMIN — SERTRALINE HYDROCHLORIDE 100 MG: 100 TABLET ORAL at 08:48

## 2020-03-16 RX ADMIN — SODIUM CHLORIDE: 9 INJECTION, SOLUTION INTRAVENOUS at 10:20

## 2020-03-16 RX ADMIN — Medication 100 MG: at 08:48

## 2020-03-16 RX ADMIN — METOPROLOL SUCCINATE 50 MG: 50 TABLET, EXTENDED RELEASE ORAL at 08:48

## 2020-03-16 RX ADMIN — PANTOPRAZOLE SODIUM 40 MG: 40 TABLET, DELAYED RELEASE ORAL at 15:49

## 2020-03-16 RX ADMIN — FOLIC ACID 1 MG: 1 TABLET ORAL at 08:48

## 2020-03-16 RX ADMIN — SODIUM CHLORIDE: 9 INJECTION, SOLUTION INTRAVENOUS at 21:21

## 2020-03-16 RX ADMIN — SODIUM CHLORIDE: 9 INJECTION, SOLUTION INTRAVENOUS at 00:15

## 2020-03-16 RX ADMIN — DIAZEPAM 10 MG: 5 TABLET ORAL at 00:22

## 2020-03-16 ASSESSMENT — ACTIVITIES OF DAILY LIVING (ADL)
ADLS_ACUITY_SCORE: 13

## 2020-03-16 NOTE — PROGRESS NOTES
[facility]    Hospitalist Progress Note    Assessment & Plan   Ravi Shah is a 44 year old male with PMHx of hypertension, depression/anxiety and alcohol use who was admitted on 3/14/2020 for management of alcohol withdrawal.     Hospitalized earlier this morning (3/1/20 - 3/3/20) for management of withdrawals    Alcohol Dependence, in withdrawal on admission  Has known hx of alcohol dependence and has been hospitalized for management of withdrawals in the past, most recently from 3/1/20 - 3/3/20. No hx of withdrawal seizures. Has gone through treatment in the past but sounds as though he never followed up on recommendations made during his last hospital stay. Was prescribed natrexone and gabapentin last stay but says he stopped taking these when he began drinking again on 3/12. Last drink was 3/13 PM. Presented to ED on 3/14 PM for management of alcohol withdrawals. Was tachycardic and hypertensive on arrival. Bicarb was 16 with AG of 21 by lytes, renal function and LFTs were otherwise nl. EtOH neg. Given IVFs and valium. Admitted to hospital for ongoing mgmt of withdrawals   -- cont CIWA protocol with prn Valium as needed  -- cont MVI, thiamine, folate  -- additional suportive cares as needed (antiemetics, lyte replacement, etc)  -- chem dep consulted this stay -- had telemedicine visit on 3/15, planning to pursue residential treatment stay at [facility] in coming days (patient has contact info and is going to coordinate)    Vomiting, with possible hematemesis: Resolved  Occurred prior to admission -- had endorsed vomiting numerous times, emesis occ blood streaked but hasn't been consistently bloody. No noted since admission. Hgb stable. No s/sx to suggest occult bleeding. Had EGD in 9/2019 per Dr. Mcrae which showed gastric erosions but no varices  -- conts on Protonix 40mg po BID    Mild JOSE CRUZ: Resolved  Cr 1.12 on presentation. Secondary to volume depletion. Cr normalized after  IVFs    Depression / Anxiety  Chronic and stable on sertraline 100mg daily    Hypertension  Chronic and stable on metoprolol XL 50mg daily    FEN: cont NS @100ml/h for now -- can stop once steadily taking po, lytes stable, regular diet ordered  DVT Prophylaxis: PCDs, ambulate  Code Status: Full Code    Disposition: Discharge pending ongoing withdrawals, likely 1-2d. Planning to enroll in residential treatment program at discharge ([facility])    Clari Cowan    Interval History   Seen this morning. CIWA scores remain variable -- upwards of 8 overnight and last received a dose of Valium just after midnight (10mg x1). CIWA scores improving today.  Seen this morning. Sleeping comfortably. Upon waking was slightly tremulous. Feeling better today. Per RN, starting to take more po. Patient denies abd pain/n/v. Patient has been having conversations with intake staff at inpatient treatment facility ([facility]) and making arrangements for after discharge.     -Data reviewed today: I reviewed all new labs and imaging results over the last 24 hours. I personally reviewed no images or EKG's today.    Physical Exam   Temp: 97.8  F (36.6  C) Temp src: Oral BP: (!) 115/111 Pulse: 75 Heart Rate: 80 Resp: 18 SpO2: 98 % O2 Device: None (Room air)    Vitals:    03/14/20 2132   Weight: 83.9 kg (185 lb)     Vital Signs with Ranges  Temp:  [97.8  F (36.6  C)-98.1  F (36.7  C)] 97.8  F (36.6  C)  Pulse:  [75-80] 75  Heart Rate:  [80-82] 80  Resp:  [16-18] 18  BP: (115-155)/() 115/111  SpO2:  [96 %-98 %] 98 %  I/O last 3 completed shifts:  In: 800 [P.O.:800]  Out: -     Constitutional: Resting comfortably, alert and answering questions appropriately, NAD  Respiratory: CTAB, no wheeze/rales/rhonchi, no increased work of breathing  Cardiovascular: HRRR, no MGR, no LE edema  GI: S, NT, ND, +BS  Skin/Integumen: warm and mildly diaphretic  Other: +tremor in UEs    Medications     sodium chloride 100 mL/hr at 03/16/20 1020        folic acid  1 mg Oral Daily     metoprolol succinate ER  50 mg Oral Daily     pantoprazole  40 mg Oral BID AC     sertraline  100 mg Oral QAM     sodium chloride (PF)  3 mL Intracatheter Q8H     vitamin B1  100 mg Oral Daily       Data   Recent Labs   Lab 03/15/20  0817 03/14/20  2201   WBC 7.8 11.6*   HGB 13.9 16.2   MCV 99 97    292   INR  --  1.06    133   POTASSIUM 3.5 4.0   CHLORIDE 107 96   CO2 24 16*   BUN 13 16   CR 0.91 1.12   ANIONGAP 7 21*   ISAIAS 8.5 9.6   * 284*   ALBUMIN 3.6 4.9   PROTTOTAL 6.9 9.3*   BILITOTAL 1.6* 2.1*   ALKPHOS 49 68   ALT 37 54   AST 25 43   LIPASE  --  194       No results found for this or any previous visit (from the past 24 hour(s)).

## 2020-03-16 NOTE — PLAN OF CARE
DATE & TIME: 1749-2167; 3/15/2020    Cognitive Concerns/ Orientation : A & O x 4; calm and cooperative    BEHAVIOR & AGGRESSION TOOL COLOR: Green   CIWA SCORE: 0   ABNL VS/O2: Elevated BP; other VSS on room air   MOBILITY: SBA; remained in bed entire shift  PAIN MANAGMENT: denies   DIET: Regular; tolerating well  BOWEL/BLADDER: BS active x 4; continent of B&B   ABNL LAB/BG: Bilirubin= 1.6   DRAIN/DEVICES: PIV infusing NS @ 100 mL   TELEMETRY RHYTHM: NSR   SKIN: scattered bruising; dry; intact   TESTS/PROCEDURES:  N/A  D/C DAY/GOALS/PLACE: pending discharge   OTHER IMPORTANT INFO: PRN zofran and tylenol available

## 2020-03-16 NOTE — PLAN OF CARE
DATE & TIME: 2300-0730   Cognitive Concerns/ Orientation : A&Ox4 calm and cooperative    BEHAVIOR & AGGRESSION TOOL COLOR: Green   CIWA SCORE: 8, 0 (sleeping), and  3  ABNL VS/O2: VS on RA; /98   MOBILITY: SBA  PAIN MANAGMENT: Valium for headache/nausea/tremors   DIET: Regular, no emesis  BOWEL/BLADDER: WDL   ABNL LAB/BG: Bilirubin 1.6    DRAIN/DEVICES: PIV infusing NS at100    TELEMETRY RHYTHM: NSR   SKIN: scattered bruising; dry; intact   TESTS/PROCEDURES:  N/A  D/C DAY/GOALS/PLACE: pending discharge, interested in inpatient treatment. Info given from SATHISH for [facility] and [facility]   OTHER IMPORTANT INFO: PRN zofran and tylenol available

## 2020-03-16 NOTE — PLAN OF CARE
DATE & TIME: 3/16/20  [id number]   Cognitive Concerns/ Orientation : A&Ox4 calm and cooperative    BEHAVIOR & AGGRESSION TOOL COLOR: Green   CIWA SCORE: 2,2 for tremor  ABNL VS/O2: BP's 115/111, 136/95  MOBILITY: up independently to BR and showered  PAIN MANAGMENT: no pain   DIET: Regular, tolerating, no nausea, appetite fair  BOWEL/BLADDER: WDL   ABNL LAB/BG: Bilirubin 1.6    DRAIN/DEVICES: PIV infusing NS at100    TELEMETRY RHYTHM: NSR   SKIN: scattered bruising; dry; intact   TESTS/PROCEDURES:  N/A  D/C DAY/GOALS/PLACE: probably 1-2 days; patient is in contact with [facility] center  OTHER IMPORTANT INFO: Continues on IVF until PO improved

## 2020-03-17 VITALS
OXYGEN SATURATION: 96 % | RESPIRATION RATE: 16 BRPM | TEMPERATURE: 98.2 F | BODY MASS INDEX: 25.9 KG/M2 | WEIGHT: 185 LBS | HEIGHT: 71 IN | DIASTOLIC BLOOD PRESSURE: 102 MMHG | SYSTOLIC BLOOD PRESSURE: 136 MMHG | HEART RATE: 76 BPM

## 2020-03-17 LAB
ANION GAP SERPL CALCULATED.3IONS-SCNC: 7 MMOL/L (ref 3–14)
BUN SERPL-MCNC: 7 MG/DL (ref 7–30)
CALCIUM SERPL-MCNC: 8.8 MG/DL (ref 8.5–10.1)
CHLORIDE SERPL-SCNC: 107 MMOL/L (ref 94–109)
CO2 SERPL-SCNC: 25 MMOL/L (ref 20–32)
CREAT SERPL-MCNC: 0.75 MG/DL (ref 0.66–1.25)
GFR SERPL CREATININE-BSD FRML MDRD: >90 ML/MIN/{1.73_M2}
GLUCOSE SERPL-MCNC: 92 MG/DL (ref 70–99)
MAGNESIUM SERPL-MCNC: 1.9 MG/DL (ref 1.6–2.3)
POTASSIUM SERPL-SCNC: 3.1 MMOL/L (ref 3.4–5.3)
SODIUM SERPL-SCNC: 139 MMOL/L (ref 133–144)

## 2020-03-17 PROCEDURE — 83735 ASSAY OF MAGNESIUM: CPT | Performed by: INTERNAL MEDICINE

## 2020-03-17 PROCEDURE — 25000132 ZZH RX MED GY IP 250 OP 250 PS 637: Performed by: INTERNAL MEDICINE

## 2020-03-17 PROCEDURE — 25000128 H RX IP 250 OP 636: Performed by: INTERNAL MEDICINE

## 2020-03-17 PROCEDURE — 36415 COLL VENOUS BLD VENIPUNCTURE: CPT | Performed by: INTERNAL MEDICINE

## 2020-03-17 PROCEDURE — 99239 HOSP IP/OBS DSCHRG MGMT >30: CPT | Performed by: INTERNAL MEDICINE

## 2020-03-17 PROCEDURE — 25000132 ZZH RX MED GY IP 250 OP 250 PS 637: Performed by: HOSPITALIST

## 2020-03-17 PROCEDURE — 80048 BASIC METABOLIC PNL TOTAL CA: CPT | Performed by: INTERNAL MEDICINE

## 2020-03-17 RX ORDER — POTASSIUM CHLORIDE 1500 MG/1
40 TABLET, EXTENDED RELEASE ORAL ONCE
Status: COMPLETED | OUTPATIENT
Start: 2020-03-17 | End: 2020-03-17

## 2020-03-17 RX ADMIN — LABETALOL HYDROCHLORIDE 10 MG: 5 INJECTION INTRAVENOUS at 00:07

## 2020-03-17 RX ADMIN — PANTOPRAZOLE SODIUM 40 MG: 40 TABLET, DELAYED RELEASE ORAL at 08:05

## 2020-03-17 RX ADMIN — METOPROLOL SUCCINATE 50 MG: 50 TABLET, EXTENDED RELEASE ORAL at 08:05

## 2020-03-17 RX ADMIN — FOLIC ACID 1 MG: 1 TABLET ORAL at 08:05

## 2020-03-17 RX ADMIN — Medication 100 MG: at 08:05

## 2020-03-17 RX ADMIN — POTASSIUM CHLORIDE 40 MEQ: 1500 TABLET, EXTENDED RELEASE ORAL at 10:19

## 2020-03-17 RX ADMIN — SERTRALINE HYDROCHLORIDE 100 MG: 100 TABLET ORAL at 08:05

## 2020-03-17 ASSESSMENT — ACTIVITIES OF DAILY LIVING (ADL)
ADLS_ACUITY_SCORE: 13

## 2020-03-17 NOTE — PROGRESS NOTES
SW:  D:  On 3/16, the bedside nurse faxed medical records to [facility] with patient's permission.  Patient working directly with [facility] to arrange his admission to their program.  He did plan to discharge home first before entering.

## 2020-03-17 NOTE — PLAN OF CARE
DATE & TIME: 3/16/20  4427-7031  Cognitive Concerns/ Orientation : A&Ox4 calm and cooperative    BEHAVIOR & AGGRESSION TOOL COLOR: Green   CIWA SCORE: 2,2 for tremor  ABNL VS/O2: BP's high earlier. Tolerating RA.   MOBILITY: up independently to BR and showered, shaved this afternoon.   PAIN MANAGMENT: no pain   DIET: Regular, tolerating, no nausea, appetite fair.   BOWEL/BLADDER: WDL   ABNL LAB/BG: Bilirubin 1.6    DRAIN/DEVICES: PIV infusing NS at 100, other PIV SL.   TELEMETRY RHYTHM: NSR   SKIN: scattered bruising; dry; intact   TESTS/PROCEDURES:  N/A  D/C DAY/GOALS/PLACE: probably 1-2 days; patient is in contact with [facility] treatment center  OTHER IMPORTANT INFO: Continues on IVF until PO improved

## 2020-03-17 NOTE — DISCHARGE SUMMARY
[facility]    Discharge Summary  Hospitalist    Date of Admission:  3/14/2020  Date of Discharge:  3/17/2020  Discharging Provider: Clari Cowan    Discharge Diagnoses   Alcohol Dependence, in withdrawal on admission  Vomiting, with possible hematemesis: Resolved  Mild JOSE CRUZ: Resolved  Depression / Anxiety  Hypertension    History of Present Illness   Ravi Shah is a 44 year old male with PMHx of hypertension, depression/anxiety and alcohol use who was admitted on 3/14/2020 for management of alcohol withdrawal.      Hospitalized earlier this morning (3/1/20 - 3/3/20) for management of withdrawals    Hospital Course   Ravi Shah was admitted on 3/14/2020.  The following problems were addressed during his hospitalization:    Alcohol Dependence, in withdrawal on admission  Has known hx of alcohol dependence and has been hospitalized for management of withdrawals in the past, most recently from 3/1/20 - 3/3/20. No hx of withdrawal seizures. Has gone through treatment in the past but sounds as though he never followed up on recommendations made during his last hospital stay. Was prescribed natrexone and gabapentin last stay but says he stopped taking these when he began drinking again on 3/12. Last drink was 3/13 PM. Presented to ED on 3/14 PM for management of alcohol withdrawals. Was tachycardic and hypertensive on arrival. Bicarb was 16 with AG of 21 by lytes, renal function and LFTs were otherwise nl. EtOH neg. Given IVFs and valium. Admitted to hospital for ongoing mgmt of withdrawals.     Monitored on CIWA protocol and withdrawal was managed with Valium as needed. Given MVI, thiamine, folate. In addition to supportive cares (antiemetics, lyte replacement, etc). Chem dep consulted this stay -- had telemedicine visit on 3/15, planning to pursue residential treatment stay at [facility] in coming days (patient began to coordinate this during his stay, with help from SW). Discharged in  stable condition with plans to enroll in treatment in the coming days.     Vomiting, with possible hematemesis: Resolved  Occurred prior to admission -- had endorsed vomiting numerous times, emesis occ blood streaked but hasn't been consistently bloody. No noted since admission. Hgb stable. No s/sx to suggest occult bleeding. Had EGD in 9/2019 per Dr. Mcrae which showed gastric erosions but no varices. Continued on Protonix 40mg po BID.     Mild JOSE CRUZ: Resolved  Cr 1.12 on presentation. Secondary to volume depletion. Cr normalized after IVFs     Depression / Anxiety  Chronic and stable on sertraline 100mg daily     Hypertension  Chronic and stable on metoprolol XL 50mg daily     Clari Cowan DO    Code Status   Full Code       Primary Care Physician   Joe Sandra    Physical Exam   Temp: 98.2  F (36.8  C) Temp src: Oral BP: (!) 136/102 Pulse: 76 Heart Rate: 77 Resp: 16 SpO2: 96 % O2 Device: None (Room air)    Vitals:    03/14/20 2132   Weight: 83.9 kg (185 lb)     Vital Signs with Ranges  Temp:  [97.6  F (36.4  C)-98.2  F (36.8  C)] 98.2  F (36.8  C)  Pulse:  [76-81] 76  Heart Rate:  [77-85] 77  Resp:  [16-18] 16  BP: (136-157)/() 136/102  SpO2:  [95 %-97 %] 96 %  I/O last 3 completed shifts:  In: 1220 [P.O.:240; I.V.:980]  Out: -     General: Resting comfortably, alert, conversive, NAD  CVS: HRRR, no MGR, no LE edema  Respiratory: CTAB, no wheeze/rales/rhonchi, no increased work of breathing  GI: S, NT, ND, +BS  Skin: Warm/dry  Neuro: CNs 2-12 intact, no focal motor/sensory deficits, no tremor    Discharge Disposition   Discharged to home -- planning to enter residential treatment facility ([facility]) for alcohol use  Condition at discharge: Stable    Consultations This Hospital Stay   CHEMICAL DEPENDENCY IP CONSULT    Time Spent on this Encounter   IClari DO, personally saw the patient today and spent greater than 30 minutes discharging this patient.    Discharge Orders       Reason for your hospital stay    Management of your withdrawal from alcohol.     Follow-up and recommended labs and tests     Follow up with intake staff at [facility] to arrange for ongoing treatment of your alcohol dependence.  Follow up with your PCP as needed.     Activity    Your activity upon discharge: activity as tolerated     Diet    Follow this diet upon discharge: Regular     Discharge Medications   Current Discharge Medication List      CONTINUE these medications which have NOT CHANGED    Details   metoprolol succinate ER (TOPROL-XL) 50 MG 24 hr tablet Take 50 mg by mouth daily       pantoprazole (PROTONIX) 40 MG EC tablet Take 1 tablet by mouth 2 times daily (before meals)      sertraline (ZOLOFT) 100 MG tablet Take 1 tablet (100 mg) by mouth every morning  Qty: 30 tablet, Refills: 0    Associated Diagnoses: Depression, unspecified depression type         STOP taking these medications       folic acid (FOLVITE) 1 MG tablet Comments:   Reason for Stopping:             Allergies   Allergies   Allergen Reactions     Morphine Nausea and Vomiting     Data   Most Recent 3 CBC's:  Recent Labs   Lab Test 03/15/20  0817 03/14/20 2201 03/02/20  0848   WBC 7.8 11.6* 5.2   HGB 13.9 16.2 14.5   MCV 99 97 101*    292 138*      Most Recent 3 BMP's:  Recent Labs   Lab Test 03/17/20  0754 03/15/20  0817 03/14/20  2201    138 133   POTASSIUM 3.1* 3.5 4.0   CHLORIDE 107 107 96   CO2 25 24 16*   BUN 7 13 16   CR 0.75 0.91 1.12   ANIONGAP 7 7 21*   ISAIAS 8.8 8.5 9.6   GLC 92 114* 284*     Most Recent 2 LFT's:  Recent Labs   Lab Test 03/15/20  0817 03/14/20  2201   AST 25 43   ALT 37 54   ALKPHOS 49 68   BILITOTAL 1.6* 2.1*

## 2020-03-17 NOTE — PLAN OF CARE
DATE & TIME: 3/16/20-3/17/20 Night shift  Cognitive Concerns/ Orientation : A&Ox4 calm and cooperative    BEHAVIOR & AGGRESSION TOOL COLOR: Green   CIWA SCORE: 2,2 for tremor  ABNL VS/O2: BP's high earlier; gave intervention. Other VSS on RA.   MOBILITY: up independently to BR and showered, shaved last evening.   PAIN MANAGMENT: no pain   DIET: Regular, tolerating, no nausea, appetite fair.   BOWEL/BLADDER: WDL   ABNL LAB/BG: Bilirubin 1.6    DRAIN/DEVICES: PIV infusing NS at 100, other PIV SL.   TELEMETRY RHYTHM: NSR   SKIN: scattered bruising; dry; intact   TESTS/PROCEDURES:  N/A  D/C DAY/GOALS/PLACE: probably 1-2 days; patient is in contact with [facility] treatment center  OTHER IMPORTANT INFO: Continues on IVF until PO improved

## 2020-03-17 NOTE — PROGRESS NOTES
Discharge    Patient discharged to Home via Car with Wife  Care plan note VSS on RA, elevated /102. IND. Reg diet.     Listed belongings gathered and returned to patient. Yes  Care Plan and Patient education resolved: Yes  Prescriptions if needed, hard copies sent with patient  NA  Home and hospital acquired medications returned to patient: NA  Medication Bin checked and emptied on discharge Yes  Follow up appointment made for patient: No

## 2020-05-01 ENCOUNTER — HOSPITAL ENCOUNTER (INPATIENT)
Facility: CLINIC | Age: 45
LOS: 2 days | Discharge: HOME | DRG: 897 | End: 2020-05-04
Attending: EMERGENCY MEDICINE | Admitting: INTERNAL MEDICINE
Payer: COMMERCIAL

## 2020-05-01 DIAGNOSIS — F10.930 ALCOHOL WITHDRAWAL SYNDROME WITHOUT COMPLICATION (H): ICD-10-CM

## 2020-05-01 DIAGNOSIS — F10.230 ALCOHOL DEPENDENCE WITH UNCOMPLICATED WITHDRAWAL (H): Primary | ICD-10-CM

## 2020-05-01 LAB
ALBUMIN SERPL-MCNC: 4.3 G/DL (ref 3.4–5)
ALP SERPL-CCNC: 74 U/L (ref 40–150)
ALT SERPL W P-5'-P-CCNC: 127 U/L (ref 0–70)
ANION GAP SERPL CALCULATED.3IONS-SCNC: 13 MMOL/L (ref 3–14)
AST SERPL W P-5'-P-CCNC: 119 U/L (ref 0–45)
BASOPHILS # BLD AUTO: 0 10E9/L (ref 0–0.2)
BASOPHILS NFR BLD AUTO: 0.2 %
BILIRUB SERPL-MCNC: 1.7 MG/DL (ref 0.2–1.3)
BUN SERPL-MCNC: 8 MG/DL (ref 7–30)
CALCIUM SERPL-MCNC: 9.3 MG/DL (ref 8.5–10.1)
CHLORIDE SERPL-SCNC: 98 MMOL/L (ref 94–109)
CO2 SERPL-SCNC: 23 MMOL/L (ref 20–32)
CREAT SERPL-MCNC: 0.88 MG/DL (ref 0.66–1.25)
DIFFERENTIAL METHOD BLD: ABNORMAL
EOSINOPHIL # BLD AUTO: 0 10E9/L (ref 0–0.7)
EOSINOPHIL NFR BLD AUTO: 0 %
ERYTHROCYTE [DISTWIDTH] IN BLOOD BY AUTOMATED COUNT: 13.7 % (ref 10–15)
ETHANOL SERPL-MCNC: <0.01 G/DL
GFR SERPL CREATININE-BSD FRML MDRD: >90 ML/MIN/{1.73_M2}
GLUCOSE SERPL-MCNC: 174 MG/DL (ref 70–99)
HCT VFR BLD AUTO: 44.6 % (ref 40–53)
HGB BLD-MCNC: 15.2 G/DL (ref 13.3–17.7)
IMM GRANULOCYTES # BLD: 0 10E9/L (ref 0–0.4)
IMM GRANULOCYTES NFR BLD: 0.5 %
INR PPP: 1.02 (ref 0.86–1.14)
LIPASE SERPL-CCNC: 204 U/L (ref 73–393)
LYMPHOCYTES # BLD AUTO: 0.5 10E9/L (ref 0.8–5.3)
LYMPHOCYTES NFR BLD AUTO: 5.7 %
MAGNESIUM SERPL-MCNC: 1.4 MG/DL (ref 1.6–2.3)
MCH RBC QN AUTO: 34 PG (ref 26.5–33)
MCHC RBC AUTO-ENTMCNC: 34.1 G/DL (ref 31.5–36.5)
MCV RBC AUTO: 100 FL (ref 78–100)
MONOCYTES # BLD AUTO: 0.6 10E9/L (ref 0–1.3)
MONOCYTES NFR BLD AUTO: 7.5 %
NEUTROPHILS # BLD AUTO: 7.1 10E9/L (ref 1.6–8.3)
NEUTROPHILS NFR BLD AUTO: 86.1 %
NRBC # BLD AUTO: 0 10*3/UL
NRBC BLD AUTO-RTO: 0 /100
PLATELET # BLD AUTO: 188 10E9/L (ref 150–450)
POTASSIUM SERPL-SCNC: 3.4 MMOL/L (ref 3.4–5.3)
PROT SERPL-MCNC: 8.3 G/DL (ref 6.8–8.8)
RBC # BLD AUTO: 4.47 10E12/L (ref 4.4–5.9)
SODIUM SERPL-SCNC: 134 MMOL/L (ref 133–144)
WBC # BLD AUTO: 8.2 10E9/L (ref 4–11)

## 2020-05-01 PROCEDURE — 85610 PROTHROMBIN TIME: CPT | Performed by: EMERGENCY MEDICINE

## 2020-05-01 PROCEDURE — 80053 COMPREHEN METABOLIC PANEL: CPT | Performed by: EMERGENCY MEDICINE

## 2020-05-01 PROCEDURE — 93005 ELECTROCARDIOGRAM TRACING: CPT

## 2020-05-01 PROCEDURE — 25000128 H RX IP 250 OP 636: Performed by: EMERGENCY MEDICINE

## 2020-05-01 PROCEDURE — 96361 HYDRATE IV INFUSION ADD-ON: CPT

## 2020-05-01 PROCEDURE — 96365 THER/PROPH/DIAG IV INF INIT: CPT

## 2020-05-01 PROCEDURE — 83735 ASSAY OF MAGNESIUM: CPT | Performed by: EMERGENCY MEDICINE

## 2020-05-01 PROCEDURE — 80320 DRUG SCREEN QUANTALCOHOLS: CPT | Performed by: EMERGENCY MEDICINE

## 2020-05-01 PROCEDURE — 96375 TX/PRO/DX INJ NEW DRUG ADDON: CPT

## 2020-05-01 PROCEDURE — 85025 COMPLETE CBC W/AUTO DIFF WBC: CPT | Performed by: EMERGENCY MEDICINE

## 2020-05-01 PROCEDURE — HZ2ZZZZ DETOXIFICATION SERVICES FOR SUBSTANCE ABUSE TREATMENT: ICD-10-PCS | Performed by: INTERNAL MEDICINE

## 2020-05-01 PROCEDURE — 25800030 ZZH RX IP 258 OP 636: Performed by: EMERGENCY MEDICINE

## 2020-05-01 PROCEDURE — 83690 ASSAY OF LIPASE: CPT | Performed by: EMERGENCY MEDICINE

## 2020-05-01 PROCEDURE — C9113 INJ PANTOPRAZOLE SODIUM, VIA: HCPCS | Performed by: EMERGENCY MEDICINE

## 2020-05-01 PROCEDURE — 99285 EMERGENCY DEPT VISIT HI MDM: CPT | Mod: 25

## 2020-05-01 RX ORDER — LANOLIN ALCOHOL/MO/W.PET/CERES
100 CREAM (GRAM) TOPICAL DAILY
Status: DISCONTINUED | OUTPATIENT
Start: 2020-05-02 | End: 2020-05-02

## 2020-05-01 RX ORDER — ONDANSETRON 2 MG/ML
4 INJECTION INTRAMUSCULAR; INTRAVENOUS EVERY 30 MIN PRN
Status: DISCONTINUED | OUTPATIENT
Start: 2020-05-01 | End: 2020-05-02

## 2020-05-01 RX ORDER — LORAZEPAM 2 MG/ML
1 INJECTION INTRAMUSCULAR ONCE
Status: COMPLETED | OUTPATIENT
Start: 2020-05-01 | End: 2020-05-01

## 2020-05-01 RX ORDER — LORAZEPAM 2 MG/ML
1 INJECTION INTRAMUSCULAR
Status: COMPLETED | OUTPATIENT
Start: 2020-05-01 | End: 2020-05-01

## 2020-05-01 RX ORDER — MULTIPLE VITAMINS W/ MINERALS TAB 9MG-400MCG
1 TAB ORAL DAILY
Status: DISCONTINUED | OUTPATIENT
Start: 2020-05-02 | End: 2020-05-02

## 2020-05-01 RX ORDER — MAGNESIUM SULFATE HEPTAHYDRATE 40 MG/ML
2 INJECTION, SOLUTION INTRAVENOUS ONCE
Status: COMPLETED | OUTPATIENT
Start: 2020-05-01 | End: 2020-05-02

## 2020-05-01 RX ORDER — FOLIC ACID 1 MG/1
1 TABLET ORAL DAILY
Status: DISCONTINUED | OUTPATIENT
Start: 2020-05-02 | End: 2020-05-02

## 2020-05-01 RX ORDER — DIAZEPAM 5 MG
10 TABLET ORAL EVERY 30 MIN PRN
Status: DISCONTINUED | OUTPATIENT
Start: 2020-05-01 | End: 2020-05-02

## 2020-05-01 RX ORDER — DIAZEPAM 10 MG/2ML
5-10 INJECTION, SOLUTION INTRAMUSCULAR; INTRAVENOUS EVERY 30 MIN PRN
Status: DISCONTINUED | OUTPATIENT
Start: 2020-05-01 | End: 2020-05-02

## 2020-05-01 RX ADMIN — ONDANSETRON 4 MG: 2 INJECTION INTRAMUSCULAR; INTRAVENOUS at 22:24

## 2020-05-01 RX ADMIN — SODIUM CHLORIDE 1000 ML: 9 INJECTION, SOLUTION INTRAVENOUS at 22:21

## 2020-05-01 RX ADMIN — LORAZEPAM 1 MG: 2 INJECTION INTRAMUSCULAR; INTRAVENOUS at 22:26

## 2020-05-01 RX ADMIN — MAGNESIUM SULFATE HEPTAHYDRATE 2 G: 40 INJECTION, SOLUTION INTRAVENOUS at 23:45

## 2020-05-01 RX ADMIN — PANTOPRAZOLE SODIUM 40 MG: 40 INJECTION, POWDER, FOR SOLUTION INTRAVENOUS at 22:28

## 2020-05-01 RX ADMIN — LORAZEPAM 1 MG: 2 INJECTION INTRAMUSCULAR; INTRAVENOUS at 22:31

## 2020-05-01 ASSESSMENT — MIFFLIN-ST. JEOR: SCORE: 1741.28

## 2020-05-01 ASSESSMENT — ENCOUNTER SYMPTOMS
APPETITE CHANGE: 1
ABDOMINAL PAIN: 1
VOMITING: 1

## 2020-05-02 PROBLEM — F10.230 ALCOHOL DEPENDENCE WITH UNCOMPLICATED WITHDRAWAL (H): Status: ACTIVE | Noted: 2020-05-02

## 2020-05-02 LAB
ALBUMIN SERPL-MCNC: 3.5 G/DL (ref 3.4–5)
ALP SERPL-CCNC: 55 U/L (ref 40–150)
ALT SERPL W P-5'-P-CCNC: 86 U/L (ref 0–70)
ANION GAP SERPL CALCULATED.3IONS-SCNC: 9 MMOL/L (ref 3–14)
AST SERPL W P-5'-P-CCNC: 69 U/L (ref 0–45)
BASOPHILS # BLD AUTO: 0 10E9/L (ref 0–0.2)
BASOPHILS NFR BLD AUTO: 0.4 %
BILIRUB SERPL-MCNC: 1.6 MG/DL (ref 0.2–1.3)
BUN SERPL-MCNC: 9 MG/DL (ref 7–30)
CALCIUM SERPL-MCNC: 8.4 MG/DL (ref 8.5–10.1)
CHLORIDE SERPL-SCNC: 104 MMOL/L (ref 94–109)
CO2 SERPL-SCNC: 25 MMOL/L (ref 20–32)
CREAT SERPL-MCNC: 0.78 MG/DL (ref 0.66–1.25)
DIFFERENTIAL METHOD BLD: ABNORMAL
EOSINOPHIL # BLD AUTO: 0 10E9/L (ref 0–0.7)
EOSINOPHIL NFR BLD AUTO: 0.7 %
ERYTHROCYTE [DISTWIDTH] IN BLOOD BY AUTOMATED COUNT: 13.6 % (ref 10–15)
GFR SERPL CREATININE-BSD FRML MDRD: >90 ML/MIN/{1.73_M2}
GLUCOSE SERPL-MCNC: 98 MG/DL (ref 70–99)
HCT VFR BLD AUTO: 40.4 % (ref 40–53)
HGB BLD-MCNC: 13.5 G/DL (ref 13.3–17.7)
IMM GRANULOCYTES # BLD: 0 10E9/L (ref 0–0.4)
IMM GRANULOCYTES NFR BLD: 0.4 %
INTERPRETATION ECG - MUSE: NORMAL
LYMPHOCYTES # BLD AUTO: 0.8 10E9/L (ref 0.8–5.3)
LYMPHOCYTES NFR BLD AUTO: 14.2 %
MAGNESIUM SERPL-MCNC: 2.2 MG/DL (ref 1.6–2.3)
MCH RBC QN AUTO: 33.7 PG (ref 26.5–33)
MCHC RBC AUTO-ENTMCNC: 33.4 G/DL (ref 31.5–36.5)
MCV RBC AUTO: 101 FL (ref 78–100)
MONOCYTES # BLD AUTO: 0.9 10E9/L (ref 0–1.3)
MONOCYTES NFR BLD AUTO: 15.1 %
NEUTROPHILS # BLD AUTO: 3.9 10E9/L (ref 1.6–8.3)
NEUTROPHILS NFR BLD AUTO: 69.2 %
NRBC # BLD AUTO: 0 10*3/UL
NRBC BLD AUTO-RTO: 0 /100
PHOSPHATE SERPL-MCNC: 3 MG/DL (ref 2.5–4.5)
PLATELET # BLD AUTO: 150 10E9/L (ref 150–450)
POTASSIUM SERPL-SCNC: 3.4 MMOL/L (ref 3.4–5.3)
PROT SERPL-MCNC: 6.5 G/DL (ref 6.8–8.8)
RBC # BLD AUTO: 4.01 10E12/L (ref 4.4–5.9)
SODIUM SERPL-SCNC: 138 MMOL/L (ref 133–144)
WBC # BLD AUTO: 5.6 10E9/L (ref 4–11)

## 2020-05-02 PROCEDURE — 25000125 ZZHC RX 250: Performed by: INTERNAL MEDICINE

## 2020-05-02 PROCEDURE — 25000128 H RX IP 250 OP 636: Performed by: INTERNAL MEDICINE

## 2020-05-02 PROCEDURE — 84100 ASSAY OF PHOSPHORUS: CPT | Performed by: HOSPITALIST

## 2020-05-02 PROCEDURE — C9113 INJ PANTOPRAZOLE SODIUM, VIA: HCPCS | Performed by: INTERNAL MEDICINE

## 2020-05-02 PROCEDURE — 25800030 ZZH RX IP 258 OP 636: Performed by: INTERNAL MEDICINE

## 2020-05-02 PROCEDURE — 99223 1ST HOSP IP/OBS HIGH 75: CPT | Mod: AI | Performed by: INTERNAL MEDICINE

## 2020-05-02 PROCEDURE — 85025 COMPLETE CBC W/AUTO DIFF WBC: CPT | Performed by: HOSPITALIST

## 2020-05-02 PROCEDURE — 25800025 ZZH RX 258: Performed by: INTERNAL MEDICINE

## 2020-05-02 PROCEDURE — 12000000 ZZH R&B MED SURG/OB

## 2020-05-02 PROCEDURE — 36415 COLL VENOUS BLD VENIPUNCTURE: CPT | Performed by: HOSPITALIST

## 2020-05-02 PROCEDURE — 80053 COMPREHEN METABOLIC PANEL: CPT | Performed by: HOSPITALIST

## 2020-05-02 PROCEDURE — 25000132 ZZH RX MED GY IP 250 OP 250 PS 637: Performed by: INTERNAL MEDICINE

## 2020-05-02 PROCEDURE — 83735 ASSAY OF MAGNESIUM: CPT | Performed by: HOSPITALIST

## 2020-05-02 RX ORDER — MULTIPLE VITAMINS W/ MINERALS TAB 9MG-400MCG
1 TAB ORAL DAILY
Status: DISCONTINUED | OUTPATIENT
Start: 2020-05-03 | End: 2020-05-04 | Stop reason: HOSPADM

## 2020-05-02 RX ORDER — MAGNESIUM SULFATE HEPTAHYDRATE 40 MG/ML
4 INJECTION, SOLUTION INTRAVENOUS EVERY 4 HOURS PRN
Status: DISCONTINUED | OUTPATIENT
Start: 2020-05-02 | End: 2020-05-04 | Stop reason: HOSPADM

## 2020-05-02 RX ORDER — METOCLOPRAMIDE HYDROCHLORIDE 5 MG/ML
10 INJECTION INTRAMUSCULAR; INTRAVENOUS EVERY 6 HOURS PRN
Status: DISCONTINUED | OUTPATIENT
Start: 2020-05-02 | End: 2020-05-04 | Stop reason: HOSPADM

## 2020-05-02 RX ORDER — LIDOCAINE 40 MG/G
CREAM TOPICAL
Status: DISCONTINUED | OUTPATIENT
Start: 2020-05-02 | End: 2020-05-04 | Stop reason: HOSPADM

## 2020-05-02 RX ORDER — POTASSIUM CHLORIDE 1500 MG/1
20-40 TABLET, EXTENDED RELEASE ORAL
Status: DISCONTINUED | OUTPATIENT
Start: 2020-05-02 | End: 2020-05-04 | Stop reason: HOSPADM

## 2020-05-02 RX ORDER — PROCHLORPERAZINE 25 MG
25 SUPPOSITORY, RECTAL RECTAL EVERY 12 HOURS PRN
Status: DISCONTINUED | OUTPATIENT
Start: 2020-05-02 | End: 2020-05-04 | Stop reason: HOSPADM

## 2020-05-02 RX ORDER — POTASSIUM CHLORIDE 7.45 MG/ML
10 INJECTION INTRAVENOUS
Status: DISCONTINUED | OUTPATIENT
Start: 2020-05-02 | End: 2020-05-04 | Stop reason: HOSPADM

## 2020-05-02 RX ORDER — ONDANSETRON 4 MG/1
4 TABLET, ORALLY DISINTEGRATING ORAL EVERY 6 HOURS PRN
Status: DISCONTINUED | OUTPATIENT
Start: 2020-05-02 | End: 2020-05-04 | Stop reason: HOSPADM

## 2020-05-02 RX ORDER — PROCHLORPERAZINE MALEATE 5 MG
10 TABLET ORAL EVERY 6 HOURS PRN
Status: DISCONTINUED | OUTPATIENT
Start: 2020-05-02 | End: 2020-05-04 | Stop reason: HOSPADM

## 2020-05-02 RX ORDER — LORAZEPAM 2 MG/ML
1-2 INJECTION INTRAMUSCULAR EVERY 30 MIN PRN
Status: DISCONTINUED | OUTPATIENT
Start: 2020-05-02 | End: 2020-05-04 | Stop reason: HOSPADM

## 2020-05-02 RX ORDER — POTASSIUM CHLORIDE 1.5 G/1.58G
20-40 POWDER, FOR SOLUTION ORAL
Status: DISCONTINUED | OUTPATIENT
Start: 2020-05-02 | End: 2020-05-04 | Stop reason: HOSPADM

## 2020-05-02 RX ORDER — POTASSIUM CHLORIDE 29.8 MG/ML
20 INJECTION INTRAVENOUS
Status: DISCONTINUED | OUTPATIENT
Start: 2020-05-02 | End: 2020-05-04 | Stop reason: HOSPADM

## 2020-05-02 RX ORDER — NALOXONE HYDROCHLORIDE 0.4 MG/ML
.1-.4 INJECTION, SOLUTION INTRAMUSCULAR; INTRAVENOUS; SUBCUTANEOUS
Status: DISCONTINUED | OUTPATIENT
Start: 2020-05-02 | End: 2020-05-04 | Stop reason: HOSPADM

## 2020-05-02 RX ORDER — POTASSIUM CL/LIDO/0.9 % NACL 10MEQ/0.1L
10 INTRAVENOUS SOLUTION, PIGGYBACK (ML) INTRAVENOUS
Status: DISCONTINUED | OUTPATIENT
Start: 2020-05-02 | End: 2020-05-04 | Stop reason: HOSPADM

## 2020-05-02 RX ORDER — FOLIC ACID 1 MG/1
1 TABLET ORAL DAILY
Status: DISCONTINUED | OUTPATIENT
Start: 2020-05-03 | End: 2020-05-04 | Stop reason: HOSPADM

## 2020-05-02 RX ORDER — DEXTROSE MONOHYDRATE, SODIUM CHLORIDE, AND POTASSIUM CHLORIDE 50; 1.49; 9 G/1000ML; G/1000ML; G/1000ML
INJECTION, SOLUTION INTRAVENOUS CONTINUOUS
Status: DISCONTINUED | OUTPATIENT
Start: 2020-05-02 | End: 2020-05-03

## 2020-05-02 RX ORDER — ACETAMINOPHEN 325 MG/1
650 TABLET ORAL EVERY 4 HOURS PRN
Status: DISCONTINUED | OUTPATIENT
Start: 2020-05-02 | End: 2020-05-04 | Stop reason: HOSPADM

## 2020-05-02 RX ORDER — POLYETHYLENE GLYCOL 3350 17 G/17G
17 POWDER, FOR SOLUTION ORAL DAILY PRN
Status: DISCONTINUED | OUTPATIENT
Start: 2020-05-02 | End: 2020-05-04 | Stop reason: HOSPADM

## 2020-05-02 RX ORDER — OXYCODONE HYDROCHLORIDE 5 MG/1
5-10 TABLET ORAL
Status: DISCONTINUED | OUTPATIENT
Start: 2020-05-02 | End: 2020-05-04

## 2020-05-02 RX ORDER — METOCLOPRAMIDE 10 MG/1
10 TABLET ORAL EVERY 6 HOURS PRN
Status: DISCONTINUED | OUTPATIENT
Start: 2020-05-02 | End: 2020-05-04 | Stop reason: HOSPADM

## 2020-05-02 RX ORDER — SERTRALINE HYDROCHLORIDE 100 MG/1
100 TABLET, FILM COATED ORAL EVERY MORNING
Status: DISCONTINUED | OUTPATIENT
Start: 2020-05-02 | End: 2020-05-04 | Stop reason: HOSPADM

## 2020-05-02 RX ORDER — LANOLIN ALCOHOL/MO/W.PET/CERES
100 CREAM (GRAM) TOPICAL DAILY
Status: DISCONTINUED | OUTPATIENT
Start: 2020-05-03 | End: 2020-05-04 | Stop reason: HOSPADM

## 2020-05-02 RX ORDER — ONDANSETRON 2 MG/ML
4 INJECTION INTRAMUSCULAR; INTRAVENOUS EVERY 6 HOURS PRN
Status: DISCONTINUED | OUTPATIENT
Start: 2020-05-02 | End: 2020-05-04 | Stop reason: HOSPADM

## 2020-05-02 RX ORDER — LORAZEPAM 1 MG/1
1-2 TABLET ORAL EVERY 30 MIN PRN
Status: DISCONTINUED | OUTPATIENT
Start: 2020-05-02 | End: 2020-05-04 | Stop reason: HOSPADM

## 2020-05-02 RX ORDER — METOPROLOL SUCCINATE 50 MG/1
50 TABLET, EXTENDED RELEASE ORAL DAILY
Status: DISCONTINUED | OUTPATIENT
Start: 2020-05-02 | End: 2020-05-04 | Stop reason: HOSPADM

## 2020-05-02 RX ADMIN — LORAZEPAM 1 MG: 1 TABLET ORAL at 21:22

## 2020-05-02 RX ADMIN — ACETAMINOPHEN 650 MG: 325 TABLET, FILM COATED ORAL at 21:22

## 2020-05-02 RX ADMIN — ACETAMINOPHEN 650 MG: 325 TABLET, FILM COATED ORAL at 13:03

## 2020-05-02 RX ADMIN — ACETAMINOPHEN 650 MG: 325 TABLET, FILM COATED ORAL at 06:11

## 2020-05-02 RX ADMIN — ONDANSETRON 4 MG: 4 TABLET, ORALLY DISINTEGRATING ORAL at 08:49

## 2020-05-02 RX ADMIN — METOPROLOL SUCCINATE 50 MG: 50 TABLET, EXTENDED RELEASE ORAL at 01:16

## 2020-05-02 RX ADMIN — PROCHLORPERAZINE MALEATE 10 MG: 5 TABLET ORAL at 21:22

## 2020-05-02 RX ADMIN — PROCHLORPERAZINE MALEATE 10 MG: 5 TABLET ORAL at 01:16

## 2020-05-02 RX ADMIN — LORAZEPAM 1 MG: 1 TABLET ORAL at 01:16

## 2020-05-02 RX ADMIN — PANTOPRAZOLE SODIUM 40 MG: 40 INJECTION, POWDER, FOR SOLUTION INTRAVENOUS at 01:15

## 2020-05-02 RX ADMIN — FOLIC ACID: 5 INJECTION, SOLUTION INTRAMUSCULAR; INTRAVENOUS; SUBCUTANEOUS at 02:03

## 2020-05-02 RX ADMIN — POTASSIUM CHLORIDE, DEXTROSE MONOHYDRATE AND SODIUM CHLORIDE: 150; 5; 900 INJECTION, SOLUTION INTRAVENOUS at 23:05

## 2020-05-02 RX ADMIN — PANTOPRAZOLE SODIUM 40 MG: 40 INJECTION, POWDER, FOR SOLUTION INTRAVENOUS at 08:49

## 2020-05-02 RX ADMIN — POTASSIUM CHLORIDE, DEXTROSE MONOHYDRATE AND SODIUM CHLORIDE: 150; 5; 900 INJECTION, SOLUTION INTRAVENOUS at 13:03

## 2020-05-02 RX ADMIN — ACETAMINOPHEN 650 MG: 325 TABLET, FILM COATED ORAL at 01:17

## 2020-05-02 RX ADMIN — SERTRALINE HYDROCHLORIDE 100 MG: 100 TABLET ORAL at 08:50

## 2020-05-02 RX ADMIN — PANTOPRAZOLE SODIUM 40 MG: 40 INJECTION, POWDER, FOR SOLUTION INTRAVENOUS at 21:21

## 2020-05-02 RX ADMIN — METOPROLOL SUCCINATE 50 MG: 50 TABLET, EXTENDED RELEASE ORAL at 08:50

## 2020-05-02 ASSESSMENT — ACTIVITIES OF DAILY LIVING (ADL)
ADLS_ACUITY_SCORE: 13
TOILETING: 0-->INDEPENDENT
DRESS: 0-->INDEPENDENT
RETIRED_EATING: 0-->INDEPENDENT
BATHING: 0-->INDEPENDENT
ADLS_ACUITY_SCORE: 13
TRANSFERRING: 0-->INDEPENDENT
ADLS_ACUITY_SCORE: 13
ADLS_ACUITY_SCORE: 13
FALL_HISTORY_WITHIN_LAST_SIX_MONTHS: YES
ADLS_ACUITY_SCORE: 13
COGNITION: 0 - NO COGNITION ISSUES REPORTED
SWALLOWING: 0-->SWALLOWS FOODS/LIQUIDS WITHOUT DIFFICULTY
RETIRED_COMMUNICATION: 0-->UNDERSTANDS/COMMUNICATES WITHOUT DIFFICULTY
AMBULATION: 0-->INDEPENDENT
NUMBER_OF_TIMES_PATIENT_HAS_FALLEN_WITHIN_LAST_SIX_MONTHS: 1

## 2020-05-02 NOTE — PLAN OF CARE
AVSS ex HTN; per patient on RA. Pain controlled with Tylenol. Scoring CIWA; Ativan given once. Voiding to bathroom. LS dim. Diet NPO. Up with assist of 1. BS active and audible; reports mild nausea.

## 2020-05-02 NOTE — PROGRESS NOTES
5/2/2020    CD consult acknowledged.   Pt is familiar with writer, spoke with pt during last admission. Per notes, he has been sober since that admission, relapsing on 4/27.   Pt was uninterested in CD services last admission.   Expect pt to be seen 05/03 if he wants CD services.    ABDELRAHMAN Mattson

## 2020-05-02 NOTE — H&P
[facility]    History and Physical  Hospitalist       Date of Admission:  5/1/2020    Assessment & Plan     This is a 44-year-old male with history of hypertension, depression, anxiety and alcohol abuse and dependence, comes to the ER with complaint of nausea, vomiting, abdominal pain.      ASSESSMENT AND PLAN:   1.  Alcohol dependence with alcohol withdrawal:  This is a 44-year-old male with history of hypertension, anxiety, depression and history of alcohol dependence, now presented with significant recent history of alcohol abuse.  With some withdrawal symptoms.  At this time, we will admit him, keep him n.p.o., start him on IV fluids, IV vitamins, multivitamins, thiamine, folic acid.  I will check a CIWA and give him lorazepam, according to the CIWA.   2.  Abdominal pain, nausea and vomiting:  I think this is most likely secondary to alcoholic gastritis.  His lipase is within normal limits, so unlikely pancreatitis.  Given his retching and nausea and vomiting, we will keep him on antiemetic protocol, IV Protonix 40 b.i.d. and keep him n.p.o. at this time.  I will check hemoglobin again in the morning.  If he has any significant drop, we will consider GI evaluation, but I do not believe he has any significant GI bleeding at this time.  Hemoglobin is stable to 15 at this time.   3.  Sinus tachycardia:  I think is most likely rebound tachycardia, as he was not taking his metoprolol, and worsened by alcohol withdrawal.  We will restart his metoprolol at this time and keep him on IV fluids.   4.  Hypertension:  Uncontrolled because of withdrawal as well as because of noncompliance with metoprolol for a few days.  Restart metoprolol 50 mg b.i.d.   5.  Anxiety and depression:  On Zoloft.  I will continue with that.      GASTROINTESTINAL PROPHYLAXIS:  With IV Protonix as mentioned above.      CODE STATUS:  Full code.      DEEP VENOUS THROMBOSIS PROPHYLAXIS:  With SCDs.      The case discussed with the  ER physician and the nursing staff taking care of the patient.         DAWOOD GARCIA MD              DVT Prophylaxis: Pneumatic Compression Devices  Code Status: Full Code    Disposition: Expected discharge in 2 days once stable.    Dawood aGrcia MD    Primary Care Physician   Joe Sandra    Chief Complaint   Nausea, vomiting, abdominal pain, drinking alcohol heavily     History is obtained from the patient    History of Present Illness   Admitted:     05/01/2020      HISTORY OF PRESENT ILLNESS:  This is a 44-year-old male with history of hypertension, depression, anxiety and alcohol abuse and dependence, comes to the ER with complaint of nausea, vomiting, abdominal pain.      According to the patient, he has been drinking back again heavily since last Monday, that is 04/27/2020.  He told me that he was sober since he was discharged from the hospital last month and started drinking back again on Monday.  He has been drinking heavily.  He drinks about 1.75 of 2 bottles of vodka in the last 4 days.  This morning when he woke up, he started having nausea and started throwing up.  Initially it was large and was dark-green color, and then it became clear, and then he saw some streaks of blood and coffee-ground material in his vomitus.  Complaining of abdominal pain, so he decided to come to the ER.  He was also very shaky as well.  Denies any dizziness, lightheadedness.  Denies any fever but occasional chills.  No chest pain, shortness of breath, orthopnea, PND, palpitation.  No dysuria, hematuria, constipation, diarrhea.  The patient told me that he was not taking his medication for at least 2-3 days, including his metoprolol.  The patient has a history of alcohol dependence since 2012.  He has been to the inpatient and outpatient treatment in the past.  Last time inpatient was 02/2019.     Past Medical History    I have reviewed this patient's medical history and updated it with pertinent information if needed.    Past Medical History:   Diagnosis Date     Anxiety      Back pain      Depressive disorder      Hypertension      Substance abuse (H)        Past Surgical History   I have reviewed this patient's surgical history and updated it with pertinent information if needed.  Past Surgical History:   Procedure Laterality Date     ENT SURGERY       ESOPHAGOSCOPY, GASTROSCOPY, DUODENOSCOPY (EGD), COMBINED N/A 9/12/2019    Procedure: ESOPHAGOGASTRODUODENOSCOPY (EGD);  Surgeon: Scott Mcrae MD;  Location:  GI     NECK SURGERY         Prior to Admission Medications   Prior to Admission Medications   Prescriptions Last Dose Informant Patient Reported? Taking?   metoprolol succinate ER (TOPROL-XL) 50 MG 24 hr tablet  Self Yes No   Sig: Take 50 mg by mouth daily    pantoprazole (PROTONIX) 40 MG EC tablet  Self Yes No   Sig: Take 1 tablet by mouth 2 times daily (before meals)   sertraline (ZOLOFT) 100 MG tablet  Self No No   Sig: Take 1 tablet (100 mg) by mouth every morning      Facility-Administered Medications: None     Allergies   Allergies   Allergen Reactions     Morphine Nausea and Vomiting       Social History   I have reviewed this patient's social history and updated it with pertinent information if needed. Ravi GORDO Pablo  reports that he has quit smoking. His smokeless tobacco use includes chew. He reports current alcohol use. He reports that he does not use drugs.    Family History   I have reviewed this patient's family history and updated it with pertinent information if needed.   Family History   Problem Relation Age of Onset     No Known Problems Maternal Grandmother      No Known Problems Maternal Grandfather      No Known Problems Paternal Grandmother      Substance Abuse Paternal Grandfather        Review of Systems   CONSTITUTIONAL:  negative  EYES:  negative  HEENT:  negative  RESPIRATORY:  negative  CARDIOVASCULAR:  negative  GASTROINTESTINAL:  positive for nausea, vomiting and abdominal  pain  GENITOURINARY:  negative  INTEGUMENT/BREAST:  negative  HEMATOLOGIC/LYMPHATIC:  negative  ALLERGIC/IMMUNOLOGIC:  negative  ENDOCRINE:  negative  MUSCULOSKELETAL:  negative  NEUROLOGICAL:  negative    Physical Exam   Temp: 98.2  F (36.8  C) Temp src: Temporal BP: (!) 157/113 Pulse: 111 Heart Rate: 117 Resp: 22 SpO2: 97 %      Vital Signs with Ranges  Temp:  [98.2  F (36.8  C)] 98.2  F (36.8  C)  Pulse:  [111-137] 111  Heart Rate:  [115-117] 117  Resp:  [18-22] 22  BP: (157-159)/(111-118) 157/113  SpO2:  [97 %-98 %] 97 %  185 lbs 0 oz    Constitutional: Awake, alert, cooperative, no apparent distress.  Eyes: Conjunctiva and pupils examined and normal.  HEENT: Moist mucous membranes, normal dentition.  Respiratory: Clear to auscultation bilaterally, no crackles or wheezing.  Cardiovascular: Regular rate and rhythm, normal S1 and S2, and no murmur noted.  GI: Soft, non-distended, mild epigastric tenderneess, normal bowel sounds.  Lymph/Hematologic: No anterior cervical or supraclavicular adenopathy.  Skin: No rashes, no cyanosis, no edema.  Musculoskeletal: No joint swelling, erythema or tenderness.  Neurologic: Cranial nerves 2-12 intact, normal strength and sensation.  Psychiatric: Alert, oriented to person, place and time, no obvious anxiety or depression.    Data   Data reviewed today:  I personally reviewed the EKG tracing showing Sinus tachycardia, no acute ischemic changes. .  Recent Labs   Lab 05/01/20  2218   WBC 8.2   HGB 15.2         INR 1.02      POTASSIUM 3.4   CHLORIDE 98   CO2 23   BUN 8   CR 0.88   ANIONGAP 13   ISAIAS 9.3   *   ALBUMIN 4.3   PROTTOTAL 8.3   BILITOTAL 1.7*   ALKPHOS 74   *   *   LIPASE 204       No results found for this or any previous visit (from the past 24 hour(s)).

## 2020-05-02 NOTE — PROGRESS NOTES
Brief non-billing note    Patient seen during morning rounds.  He was admitted in the middle of the night by Dr. Garcia.  Patient is feeling slightly better this morning, still continues to endorse pain all over his abdomen.  Is quite emotional when discussing his alcohol use and his relapse.  Tells me he was last in alcohol treatment in February of last year.  He  relapsed in September last year and since then it has been up and down.  He has never felt this bad and had this much vomiting during prior episodes of withdrawal.  He tells me that he does not do well with vodka which is what he drank this time.    On exam he is alert, sitting up in bed and conversing appropriately.  He does not appear to be in any physical distress but appears quite emotional.  Abdomen is soft and mildly tender all over.  Plan for today: Continue on alcohol withdrawal protocol with symptom triggered Ativan.  Continue IV fluids.  Will allow clear liquid diet  today.  Continue twice daily Protonix.  Tells me that he is agreeable to talking with chemical dependency.  Alcoholic hepatitis evident on labs.  Recheck CMP tomorrow.

## 2020-05-02 NOTE — PLAN OF CARE
DATE & TIME: 0982-4028 May 2, 2020    Cognitive Concerns/ Orientation : A&Ox4    BEHAVIOR & AGGRESSION TOOL COLOR: Green  CIWA SCORE: 6, 6, 5  ABNL VS/O2: VSS on RA   MOBILITY: IND   PAIN MANAGMENT: 6/10 headache, given tylenol.  DIET: Clear liquid   BOWEL/BLADDER: BR  ABNL LAB/BG: AST/ALT elevated  DRAIN/DEVICES: PIV Infusing @ 100ml/hr  SKIN: WNL  TESTS/PROCEDURES: NA  D/C DAY/GOALS/PLACE: TBD  OTHER IMPORTANT INFO: CD and SW consulted.

## 2020-05-02 NOTE — PROVIDER NOTIFICATION
MD Notification    Notified Person: MD    Notified Person Name: Dr. Cho     Notification Date/Time: May 2, 2020 5:32 PM    Notification Interaction: paged    Purpose of Notification: FYI BP: 149/112 HR 72    Orders Received: pending    Comments:

## 2020-05-02 NOTE — H&P
Admitted:     05/01/2020      HISTORY OF PRESENT ILLNESS:  This is a 44-year-old male with history of hypertension, depression, anxiety and alcohol abuse and dependence, comes to the ER with complaint of nausea, vomiting, abdominal pain.      According to the patient, he has been drinking back again heavily since last Monday, that is 04/27/2020.  He told me that he was sober since he was discharged from the hospital last month and started drinking back again on Monday.  He has been drinking heavily.  He drinks about 1.75 of 2 bottles of vodka in the last 4 days.  This morning when he woke up, he started having nausea and started throwing up.  Initially it was large and was dark-green color, and then it became clear, and then he saw some streaks of blood and coffee-ground material in his vomitus.  Complaining of abdominal pain, so he decided to come to the ER.  He was also very shaky as well.  Denies any dizziness, lightheadedness.  Denies any fever but occasional chills.  No chest pain, shortness of breath, orthopnea, PND, palpitation.  No dysuria, hematuria, constipation, diarrhea.  The patient told me that he was not taking his medication for at least 2-3 days, including his metoprolol.  The patient has a history of alcohol dependence since 2012.  He has been to the inpatient and outpatient treatment in the past.  Last time inpatient was 02/2019.      ASSESSMENT AND PLAN:   1.  Alcohol dependence with alcohol withdrawal:  This is a 44-year-old male with history of hypertension, anxiety, depression and history of alcohol dependence, now presented with significant recent history of alcohol abuse.  With some withdrawal symptoms.  At this time, we will admit him, keep him n.p.o., start him on IV fluids, IV vitamins, multivitamins, thiamine, folic acid.  I will check a CIWA and give him lorazepam, according to the CIWA.   2.  Abdominal pain, nausea and vomiting:  I think this is most likely secondary to alcoholic  gastritis.  His lipase is within normal limits, so unlikely pancreatitis.  Given his retching and nausea and vomiting, we will keep him on antiemetic protocol, IV Protonix 40 b.i.d. and keep him n.p.o. at this time.  I will check hemoglobin again in the morning.  If he has any significant drop, we will consider GI evaluation, but I do not believe he has any significant GI bleeding at this time.  Hemoglobin is stable to 15 at this time.   3.  Sinus tachycardia:  I think is most likely rebound tachycardia, as he was not taking his metoprolol, and worsened by alcohol withdrawal.  We will restart his metoprolol at this time and keep him on IV fluids.   4.  Hypertension:  Uncontrolled because of withdrawal as well as because of noncompliance with metoprolol for a few days.  Restart metoprolol 50 mg b.i.d.   5.  Anxiety and depression:  On Zoloft.  I will continue with that.      GASTROINTESTINAL PROPHYLAXIS:  With IV Protonix as mentioned above.      CODE STATUS:  Full code.      DEEP VENOUS THROMBOSIS PROPHYLAXIS:  With SCDs.      The case discussed with the ER physician and the nursing staff taking care of the patient.         AZIZA ROBERSON MD             D: 2020   T: 2020   MT: KRISTEL      Name:     TRICIA SYKES   MRN:      [id number]        Account:      [id number]   :      1975        Admitted:     2020                   Document: [id number]       cc: Joe Sandra MD

## 2020-05-02 NOTE — ED NOTES
"[facility]  ED Nurse Handoff Report    ED Chief complaint: Withdrawal      ED Diagnosis:   Final diagnoses:   Alcohol withdrawal syndrome without complication (H)       Code Status: Full Code    Allergies:   Allergies   Allergen Reactions     Morphine Nausea and Vomiting       Patient Story: Pt with hx of alcohol abuse. Was admitted in March a couple of times and had been doing well since that admission. Pt reports he started binging a 1/3 Liter of Vodka on Monday and stopped drinking Thursday.  Focused Assessment:  Pt tremulous. Pt tachy (110-120s) and hypertensive (150/110s). +n/V. No hx of alcohol withdrawal seizures. Cooperative.    Treatments and/or interventions provided: 2mg ativan, 4mg zofran, 1L NS, 40mg protonix; 2gm Magnesium  Patient's response to treatments and/or interventions: Still tremulous.    To be done/followed up on inpatient unit:  continue monitoring alcohol withdrawal    Does this patient have any cognitive concerns?: none    Activity level - Baseline/Home:  Independent  Activity Level - Current:   Independent    Patient's Preferred language: English   Needed?: No    Isolation: None  Infection: Not Applicable  Bariatric?: No    Vital Signs:   Vitals:    05/01/20 2204 05/01/20 2300 05/01/20 2330   BP: (!) 159/118 (!) 159/111 (!) 157/113   Pulse: 137 114 111   Resp: 18 21 22   Temp: 98.2  F (36.8  C)     TempSrc: Temporal     SpO2: 98% 97% 97%   Weight: 83.9 kg (185 lb)     Height: 1.803 m (5' 11\")         Cardiac Rhythm:Cardiac Rhythm: Sinus tachycardia    Was the PSS-3 completed:   Yes  What interventions are required if any?               Family Comments: Lives with wife.  OBS brochure/video discussed/provided to patient/family: N/A              Name of person given brochure if not patient: N/A              Relationship to patient: N/A    For the majority of the shift this patient's behavior was Green.   Behavioral interventions performed were information.    ED " NURSE PHONE NUMBER: [phone]

## 2020-05-02 NOTE — PHARMACY-ADMISSION MEDICATION HISTORY
Pharmacy Medication History  Admission medication history interview status for the 5/1/2020  admission is complete. See EPIC admission navigator for prior to admission medications     Medication history sources: Patient  Medication history source reliability: Good  Adherence assessment: Good    Medication reconciliation completed by provider prior to medication history? Yes, no changes    Time spent in this activity: 10 minutes      Prior to Admission medications    Medication Sig Last Dose Taking? Auth Provider   metoprolol succinate ER (TOPROL-XL) 50 MG 24 hr tablet Take 50 mg by mouth daily  5/1/2020 at 0800 Yes Reported, Patient   pantoprazole (PROTONIX) 40 MG EC tablet Take 1 tablet by mouth 2 times daily (before meals) 5/1/2020 at 2000 Yes Unknown, Entered By History   sertraline (ZOLOFT) 100 MG tablet Take 1 tablet (100 mg) by mouth every morning 5/1/2020 at 0800 Yes Gene Montenegro MD

## 2020-05-02 NOTE — PROGRESS NOTES
RECEIVING UNIT ED HANDOFF REVIEW    ED Nurse Handoff Report was reviewed by: Ronda Luna RN on May 2, 2020 at 12:29 AM

## 2020-05-02 NOTE — ED PROVIDER NOTES
"  History     Chief Complaint:  Withdrawal    The history is provided by the patient.      Ravi hSah is a 44 year old male who presents with alcohol withdrawal. He first noticed this Thursday evening and has been vomiting approximately every thirty minutes for the past few days. He noticed blood in the vomit the last few times he has vomited. He notes excessive thirst but drinking water increases the vomiting so he has not been drinking water for the last few hours. He also has not eaten for the last couple of days. He describes pain across the abdomen and rates it as a 10 out of 10. He notes a history of alcohol abuse and states these symptoms are similar to withdrawal he has experienced in the past. He started a drinking \"zaman\" on Monday and has consumed large amounts of vodka, approximately one third of a \"large\" bottle per day. He has not been taking his regular medications, including Toprol, since the symptoms began.     Allergies:  Morphine    Medications:    Toprol XL  Protonix  Zoloft    Past Medical History:    Anxiety  Depressive disorder  Hypertension  Substance abuse    Past Surgical History:    EGD, combined  Neck surgery  ENT surgery    Family History:    No past pertinent family history.     Social History:  Former smoker  Currently uses smokeless tobacco  Alcohol abuse  Marital Status:   [2]    Review of Systems   Constitutional: Positive for appetite change.   Gastrointestinal: Positive for abdominal pain and vomiting.   10 point review of systems was obtained and negative other than mentioned above.    Physical Exam     Patient Vitals for the past 24 hrs:   BP Temp Temp src Pulse Heart Rate Resp SpO2 Height Weight   05/01/20 2330 (!) 157/113 -- -- 111 117 22 97 % -- --   05/01/20 2300 (!) 159/111 -- -- 114 115 21 97 % -- --   05/01/20 2204 (!) 159/118 98.2  F (36.8  C) Temporal 137 -- 18 98 % 1.803 m (5' 11\") 83.9 kg (185 lb)       Physical Exam  General: Lying on bed  Eyes:  The " pupils are equal and round    Conjunctivae and sclerae are normal  ENT:    Moist mucous membranes  Neck:  Normal range of motion  CV:  Tachycardic rate and regular rhythm    Skin warm and well perfused   Resp:  Lungs are clear    Non-labored    No rales    No wheezing   GI:  Abdomen is soft, there is no rigidity    No distension    No rebound tenderness     No abdominal tenderness  MS:  Normal muscular tone  Skin:  No rash or acute skin lesions noted  Neuro:   Awake, alert.      Tremulous    Tongues fasciculations    Speech is normal and fluent.    Face is symmetric.     Moves all extremities equally  Psych: Normal affect.  Appropriate interactions.    Emergency Department Course     ECG:  Time: 2214  Vent. Rate 128 bpm. VT interval 144 ms. QRS duration 74 ms. QT/QTc 296/432 ms. P-R-T axis 66 59 54.    Sinus tachycardia. Possible left atrial enlargement. Borderline ECG.     Read time: 2221      Laboratory:  Laboratory findings were communicated with the patient who voiced understanding of the findings.    CBC: WBC: 8.2, HGB: 15.2, PLT: 188  CMP: Glucose 174 (H), Bilirubin 1.7 (H),  (H),  (H), o/w WNL (Creatinine: 0.88)    INR: 1.02    Lipase: 204     Alcohol level blood: <0.01    Magnesium: 1.4 (L)      Interventions:  2221 0.9% sodium chloride BOLUS 1000 mL IV  2224 Zofran 4 mg IV  2226 Ativan 1 mg IV  2228 Protonix 40 mg IV  2231 Ativan 1 mg IV  2345 Magnesium sulfate 2 g IV    Emergency Department Course:  Past medical records, nursing notes, and vitals reviewed.    2219 I performed an exam of the patient as documented above.     EKG obtained in the ED, see results above.   IV was inserted and blood was drawn for laboratory testing, results above.     2329 I rechecked the patient and discussed the results of his workup thus far.     Findings and plan explained to the Patient who consents to admission. Discussed the patient with Dr. Garcia, who will admit the patient to a med/surg bed for further  monitoring, evaluation, and treatment.    I personally reviewed the laboratory and imaging results with the Patient and answered all related questions prior to admission.     Impression & Plan     Medical Decision Making:  Ravi Shah is a 44 year old male who presented to the ED with withdrawal. Patient tremulous, hypertensive and tachycardic. Exam consistent with alcohol withdrawal. No abdominal tenderness on exam. REports having abdominal pain every time he goes through withdrawal. Feeling better in ED but still tremulous after initial doses of medications. ALT/AST mildly elevated. No seizure history. Will admit to hospitalist for withdrawal. No mental health concerns.    Diagnosis:    ICD-10-CM    1. Alcohol withdrawal syndrome without complication (H)  F10.230      Disposition:  Admitted to med/surg.    Scribe Disclosure:  I, Lizeth Jerry, am serving as a scribe at 10:27 PM on 5/1/2020 to document services personally performed by Alondra Eng MD based on my observations and the provider's statements to me.         Alondra Eng MD  05/02/20 0310

## 2020-05-03 LAB
ALBUMIN SERPL-MCNC: 3.5 G/DL (ref 3.4–5)
ALP SERPL-CCNC: 58 U/L (ref 40–150)
ALT SERPL W P-5'-P-CCNC: 74 U/L (ref 0–70)
ANION GAP SERPL CALCULATED.3IONS-SCNC: 8 MMOL/L (ref 3–14)
AST SERPL W P-5'-P-CCNC: 59 U/L (ref 0–45)
BILIRUB SERPL-MCNC: 2.3 MG/DL (ref 0.2–1.3)
BUN SERPL-MCNC: 4 MG/DL (ref 7–30)
CALCIUM SERPL-MCNC: 8.7 MG/DL (ref 8.5–10.1)
CHLORIDE SERPL-SCNC: 101 MMOL/L (ref 94–109)
CO2 SERPL-SCNC: 25 MMOL/L (ref 20–32)
CREAT SERPL-MCNC: 0.87 MG/DL (ref 0.66–1.25)
GFR SERPL CREATININE-BSD FRML MDRD: >90 ML/MIN/{1.73_M2}
GLUCOSE SERPL-MCNC: 117 MG/DL (ref 70–99)
POTASSIUM SERPL-SCNC: 3.7 MMOL/L (ref 3.4–5.3)
PROT SERPL-MCNC: 6.8 G/DL (ref 6.8–8.8)
SODIUM SERPL-SCNC: 134 MMOL/L (ref 133–144)

## 2020-05-03 PROCEDURE — 12000000 ZZH R&B MED SURG/OB

## 2020-05-03 PROCEDURE — 25000128 H RX IP 250 OP 636: Performed by: INTERNAL MEDICINE

## 2020-05-03 PROCEDURE — 99232 SBSQ HOSP IP/OBS MODERATE 35: CPT | Performed by: HOSPITALIST

## 2020-05-03 PROCEDURE — 80053 COMPREHEN METABOLIC PANEL: CPT | Performed by: HOSPITALIST

## 2020-05-03 PROCEDURE — 25000132 ZZH RX MED GY IP 250 OP 250 PS 637: Performed by: INTERNAL MEDICINE

## 2020-05-03 PROCEDURE — 36415 COLL VENOUS BLD VENIPUNCTURE: CPT | Performed by: HOSPITALIST

## 2020-05-03 PROCEDURE — 25000132 ZZH RX MED GY IP 250 OP 250 PS 637: Performed by: HOSPITALIST

## 2020-05-03 RX ORDER — PANTOPRAZOLE SODIUM 40 MG/1
40 TABLET, DELAYED RELEASE ORAL
Status: DISCONTINUED | OUTPATIENT
Start: 2020-05-03 | End: 2020-05-04 | Stop reason: HOSPADM

## 2020-05-03 RX ADMIN — MULTIPLE VITAMINS W/ MINERALS TAB 1 TABLET: TAB at 10:09

## 2020-05-03 RX ADMIN — THIAMINE HCL TAB 100 MG 100 MG: 100 TAB at 10:09

## 2020-05-03 RX ADMIN — LORAZEPAM 1 MG: 1 TABLET ORAL at 11:43

## 2020-05-03 RX ADMIN — PANTOPRAZOLE SODIUM 40 MG: 40 TABLET, DELAYED RELEASE ORAL at 17:54

## 2020-05-03 RX ADMIN — PROCHLORPERAZINE EDISYLATE 10 MG: 5 INJECTION INTRAMUSCULAR; INTRAVENOUS at 13:29

## 2020-05-03 RX ADMIN — ACETAMINOPHEN 650 MG: 325 TABLET, FILM COATED ORAL at 20:22

## 2020-05-03 RX ADMIN — FOLIC ACID 1 MG: 1 TABLET ORAL at 10:09

## 2020-05-03 RX ADMIN — ONDANSETRON 4 MG: 4 TABLET, ORALLY DISINTEGRATING ORAL at 20:22

## 2020-05-03 RX ADMIN — METOPROLOL SUCCINATE 50 MG: 50 TABLET, EXTENDED RELEASE ORAL at 10:09

## 2020-05-03 RX ADMIN — ACETAMINOPHEN 650 MG: 325 TABLET, FILM COATED ORAL at 06:24

## 2020-05-03 RX ADMIN — LORAZEPAM 1 MG: 1 TABLET ORAL at 17:54

## 2020-05-03 RX ADMIN — PANTOPRAZOLE SODIUM 40 MG: 40 TABLET, DELAYED RELEASE ORAL at 10:09

## 2020-05-03 RX ADMIN — LORAZEPAM 1 MG: 2 INJECTION INTRAMUSCULAR; INTRAVENOUS at 13:30

## 2020-05-03 RX ADMIN — SERTRALINE HYDROCHLORIDE 100 MG: 100 TABLET ORAL at 10:09

## 2020-05-03 ASSESSMENT — ACTIVITIES OF DAILY LIVING (ADL)
ADLS_ACUITY_SCORE: 12

## 2020-05-03 NOTE — PLAN OF CARE
AVSS on RA. Pain controlled with PO tylenol. CIWA scoring continued; received Ativan PO x1. LS clear and equal. Diet Clear liquids. Up IND. Voiding WDL. BS active; passing gas.

## 2020-05-03 NOTE — PLAN OF CARE
A&O, AVSS ex mild HTN. Asymptomatic. on RA. Up IND in room. Tolerating regular diet this AM, short waves of nausea, no emesis, relieved by cool cloths and PO ativan. CIWA scoring 2-8 with mild tremor, malaise, nausea, headache and general diaphoretic appearance. Declines medication at this time. Pt verbalizing hope to discharge today if possible.  Will cont to monitor.

## 2020-05-03 NOTE — PROGRESS NOTES
[facility]    Hospitalist Progress Note    Date of Admission:  5/1/2020    Assessment & Plan       This is a 44-year-old male with history of hypertension, depression, anxiety and alcohol abuse and dependence, admitted with nausea, vomiting, abdominal pain on 5/2/2020.      ASSESSMENT AND PLAN:   1.  Alcohol dependence with alcohol withdrawal:  This is a 44-year-old male with history of hypertension, anxiety, depression and history of alcohol dependence, now presented with significant recent history of alcohol abuse.  Continue on alcohol detox protocol with IV vitamins, multivitamins, thiamine, folic acid And symptom triggered Ativan.  Received IV fluids till oral intake improved.  Chemical dependency consulted.  He is motivated to quit alcohol use and wants to receive whatever help he can get.  2.  Abdominal pain, nausea and vomiting secondary to alcoholic gastritis and alcoholic hepatitis.  His lipase is within normal limits, so unlikely pancreatitis.  Continue with Protonix twice daily.  Labs and symptoms have improved with IV fluids.  Placed on regular diet today.  Stop IV fluids.  PRN antiemetics.  3.  Hypertension:  Uncontrolled because of withdrawal as well as because of noncompliance with metoprolol for a few days.  Continue PTA metoprolol 50 mg b.i.d.   4.  Anxiety and depression:  On Zoloft.           CODE STATUS:  Full code.      DEEP VENOUS THROMBOSIS PROPHYLAXIS:  With SCDs.     Disposition: Anticipate discharge home tomorrow if withdrawal symptoms subside.    Lynne Cho MD  Text Page (7am - 6pm, M-F)    Interval History   Patient has remained stable overnight.  This morning his abdominal pain, nausea is much better.  However he appears quite shaky.  His last drink was the night prior to admission.  Today would be day 3 without alcohol.  He tells me that typically around this time withdrawal has become worse for him.  He prefers to stay in the hospital today and speak with chemical  dependency tomorrow.    -Data reviewed today: I reviewed all new labs and imaging results over the last 24 hours.    Physical Exam   Temp: 97.6  F (36.4  C) Temp src: Oral BP: (!) 139/103 Pulse: 81 Heart Rate: 72 Resp: 16 SpO2: 96 % O2 Device: None (Room air)    Vitals:    05/01/20 2204   Weight: 83.9 kg (185 lb)     Vital Signs with Ranges  Temp:  [97.6  F (36.4  C)-98.6  F (37  C)] 97.6  F (36.4  C)  Pulse:  [81] 81  Heart Rate:  [66-78] 72  Resp:  [16-18] 16  BP: (135-150)/() 139/103  SpO2:  [94 %-98 %] 96 %  I/O last 3 completed shifts:  In: 3670 [P.O.:1440; I.V.:2230]  Out: -     Constitutional: Alert, appears comfortable, in no acute distress, appears flushed, appears a bit tremulous  Respiratory: Non labored breathing  Cardiovascular: Heart sounds regular rate and rhythm, no murmurs, no leg edema  GI: Abdomen is soft, non distended, non tender. Normal BS  Skin/Integumen: no rashes, no pressure sores  Neuro: alert, converses appropriately, moving all extremities, fluent speech, no facial asymmetry, appears a bit tremulous  Psych: Appears a bit anxious    Medications       folic acid  1 mg Oral Daily     metoprolol succinate ER  50 mg Oral Daily     multivitamin w/minerals  1 tablet Oral Daily     pantoprazole  40 mg Oral BID AC     sertraline  100 mg Oral QAM     sodium chloride (PF)  3 mL Intracatheter Q8H     vitamin B1  100 mg Oral Daily       Data   Recent Labs   Lab 05/03/20  0730 05/02/20  0818 05/01/20  2218   WBC  --  5.6 8.2   HGB  --  13.5 15.2   MCV  --  101* 100   PLT  --  150 188   INR  --   --  1.02    138 134   POTASSIUM 3.7 3.4 3.4   CHLORIDE 101 104 98   CO2 25 25 23   BUN 4* 9 8   CR 0.87 0.78 0.88   ANIONGAP 8 9 13   ISAIAS 8.7 8.4* 9.3   * 98 174*   ALBUMIN 3.5 3.5 4.3   PROTTOTAL 6.8 6.5* 8.3   BILITOTAL 2.3* 1.6* 1.7*   ALKPHOS 58 55 74   ALT 74* 86* 127*   AST 59* 69* 119*   LIPASE  --   --  204       Imaging  No results found for this or any previous visit (from the past  24 hour(s)).

## 2020-05-03 NOTE — PROGRESS NOTES
5/3/2020    Spoke with pt's RN about CD consult. She will see if he wants tx or resources and get back to writer. Reports he just received some Ativan, having some withdrawal concerns today.     ADDENDUM:  Pt not discharging today, per doctor note wants to speak with CD Monday 5/04.    ABDELRAHMAN Mattson

## 2020-05-03 NOTE — PLAN OF CARE
CIWA scoring/symptoms increasing throughout shift. 1 mg Ativan given x1 with compazine. Then 1mg PO ativan. Pt +Flatus, tolerating PO, going slow. Tremor more visible and diaphoretic in appearance. PO ativan with some relief. Remains calm and cooperative, IND in room, makes needs known and calls appropriately. Plan for chem dep tomorrow. Pt verbalizes his need for resources/help.

## 2020-05-04 VITALS
WEIGHT: 185 LBS | BODY MASS INDEX: 25.9 KG/M2 | HEART RATE: 75 BPM | SYSTOLIC BLOOD PRESSURE: 129 MMHG | HEIGHT: 71 IN | TEMPERATURE: 98.3 F | OXYGEN SATURATION: 95 % | DIASTOLIC BLOOD PRESSURE: 94 MMHG | RESPIRATION RATE: 20 BRPM

## 2020-05-04 PROCEDURE — 25000132 ZZH RX MED GY IP 250 OP 250 PS 637: Performed by: HOSPITALIST

## 2020-05-04 PROCEDURE — 25000132 ZZH RX MED GY IP 250 OP 250 PS 637: Performed by: INTERNAL MEDICINE

## 2020-05-04 PROCEDURE — 99239 HOSP IP/OBS DSCHRG MGMT >30: CPT | Performed by: INTERNAL MEDICINE

## 2020-05-04 PROCEDURE — H0001 ALCOHOL AND/OR DRUG ASSESS: HCPCS

## 2020-05-04 RX ORDER — NALTREXONE HYDROCHLORIDE 50 MG/1
50 TABLET, FILM COATED ORAL DAILY
Qty: 30 TABLET | Refills: 0 | Status: SHIPPED | OUTPATIENT
Start: 2020-05-04 | End: 2021-05-08

## 2020-05-04 RX ORDER — NALTREXONE HYDROCHLORIDE 50 MG/1
50 TABLET, FILM COATED ORAL DAILY
Status: DISCONTINUED | OUTPATIENT
Start: 2020-05-04 | End: 2020-05-04 | Stop reason: HOSPADM

## 2020-05-04 RX ADMIN — PANTOPRAZOLE SODIUM 40 MG: 40 TABLET, DELAYED RELEASE ORAL at 06:22

## 2020-05-04 RX ADMIN — SERTRALINE HYDROCHLORIDE 100 MG: 100 TABLET ORAL at 09:25

## 2020-05-04 RX ADMIN — FOLIC ACID 1 MG: 1 TABLET ORAL at 09:26

## 2020-05-04 RX ADMIN — MULTIPLE VITAMINS W/ MINERALS TAB 1 TABLET: TAB at 09:25

## 2020-05-04 RX ADMIN — THIAMINE HCL TAB 100 MG 100 MG: 100 TAB at 09:25

## 2020-05-04 RX ADMIN — PANTOPRAZOLE SODIUM 40 MG: 40 TABLET, DELAYED RELEASE ORAL at 16:15

## 2020-05-04 RX ADMIN — METOPROLOL SUCCINATE 50 MG: 50 TABLET, EXTENDED RELEASE ORAL at 09:25

## 2020-05-04 RX ADMIN — NALTREXONE HYDROCHLORIDE 50 MG: 50 TABLET, FILM COATED ORAL at 16:14

## 2020-05-04 ASSESSMENT — ACTIVITIES OF DAILY LIVING (ADL)
ADLS_ACUITY_SCORE: 12

## 2020-05-04 NOTE — DISCHARGE SUMMARY
[facility]  Hospitalist Discharge Summary      Date of Admission:  5/1/2020  Date of Discharge:  5/4/2020  Discharging Provider: Sofia Butterfield MD      Discharge Diagnoses   Alcohol dependence with alcohol withdrawal:   Abdominal pain, nausea and vomiting, secondary to above.   Mild alcoholic hepatitis   Hypertension:  Anxiety and depression:     Follow-ups Needed After Discharge   Follow-up Appointments     Follow-up and recommended labs and tests       Follow up with primary care provider, Joe Sandra, within 7 days to   evaluate medication change and for hospital follow- up.  The following   labs/tests are recommended: CMP.  Initiate alcohol dependence program as discussed with chemical dep   counselor.             Unresulted Labs Ordered in the Past 30 Days of this Admission     No orders found from 4/1/2020 to 5/2/2020.          Discharge Disposition   Discharged to home  Condition at discharge: Good      Hospital Course   This is a 44-year-old male with history of hypertension, depression, anxiety and alcohol abuse and dependence, admitted with nausea, vomiting, abdominal pain on 5/2/2020.      Alcohol dependence with alcohol withdrawal:  This is a 44-year-old male with history of hypertension, anxiety, depression and history of alcohol dependence, who presented with recent history of significant alcohol abuse with the beginning of alcohol withdrawal. He has a supportive family and wants to get sober. He was admitted for detox  and received symptom triggered ativan by hospital day #3 his withdrawal symptoms had mostly resolved and he had not required ativan for over 24 hours. Chemical dependency met with him and discussed treatment program, which he plan to enroll in. We discussed options for medical therapy and he was interested in naltrexone which was prescribed. We discussed possible side effects and rationale. He should follow up with his PCP within the week.     Abdominal pain,  nausea and vomiting secondary to alcoholic gastritis and alcoholic hepatitis.  His lipase is within normal limits, so unlikely pancreatitis.  This completely resolved.     Mild alcoholic hepatitis   Recent Labs   Lab 05/03/20  0730 05/02/20  0818 05/01/20  2218   ALBUMIN 3.5 3.5 4.3   BILITOTAL 2.3* 1.6* 1.7*   ALT 74* 86* 127*   AST 59* 69* 119*   ALKPHOS 58 55 74   LIPASE  --   --  204   INR  --   --  1.02     His bili remains mildly elevated, transaminases decreased. He has no RUQ pain to suggest biliary disease. F/u LFTs with PCP.     Hypertension: Initially unncontrolled because of withdrawal as well as because of noncompliance with metoprolol for a few days. Controlled at discharge.  -  Continue PTA metoprolol 50 mg b.i.d.     Anxiety and depression: Continue PTA Zoloft.      Consultations This Hospital Stay   SOCIAL WORK IP CONSULT  CHEMICAL DEPENDENCY IP CONSULT    Code Status   Full Code    Time Spent on this Encounter   I, Sofia Butterfield MD, personally saw the patient today and spent greater than 30 minutes discharging this patient.       Sofia Butterfield MD  [facility]  ______________________________________________________________________    Physical Exam   Vital Signs: Temp: 98.3  F (36.8  C) Temp src: Oral BP: (!) 129/94 Pulse: 75 Heart Rate: 72 Resp: 20 SpO2: 95 % O2 Device: None (Room air)    Weight: 185 lbs 0 oz  Constitutional:  NAD,   Neuropsyche: Minimal tremor, calm, alert and oriented, answers questions appropriately.   Respiratory: Breathing comfortably, good air exchange, no wheezes, no crackles.   Cardiovascular: Regular rate and rhythm, no edema.  GI:  soft, NT/ND, BS normal  Skin/Integumen:  No acute rash, bruising or sign of bleeding.            Primary Care Physician   Joe Sandra    Discharge Orders      Reason for your hospital stay    You were admitted in alcohol withdrawal and treated with ativan.     Follow-up and recommended labs and tests     Follow up with  primary care provider, Joe Sandra, within 7 days to evaluate medication change and for hospital follow- up.  The following labs/tests are recommended: CMP.  Initiate alcohol dependence program as discussed with chemical dep counselor.     Activity    Your activity upon discharge: activity as tolerated     Diet    Regular Diet Adult       Significant Results and Procedures   Most Recent 3 CBC's:  Recent Labs   Lab Test 05/02/20  0818 05/01/20  2218 03/15/20  0817   WBC 5.6 8.2 7.8   HGB 13.5 15.2 13.9   * 100 99    188 217     Most Recent 3 BMP's:  Recent Labs   Lab Test 05/03/20  0730 05/02/20  0818 05/01/20  2218    138 134   POTASSIUM 3.7 3.4 3.4   CHLORIDE 101 104 98   CO2 25 25 23   BUN 4* 9 8   CR 0.87 0.78 0.88   ANIONGAP 8 9 13   ISAIAS 8.7 8.4* 9.3   * 98 174*     Most Recent 2 LFT's:  Recent Labs   Lab Test 05/03/20  0730 05/02/20  0818   AST 59* 69*   ALT 74* 86*   ALKPHOS 58 55   BILITOTAL 2.3* 1.6*     Most Recent 3 INR's:  Recent Labs   Lab Test 05/01/20 2218 03/14/20  2201 03/02/20  0848   INR 1.02 1.06 0.96   ,   Results for orders placed or performed during the hospital encounter of 02/03/20   US Abdomen Limited (RUQ)    Narrative    RIGHT UPPER QUADRANT ULTRASOUND 2/3/2020 12:50 PM    HISTORY:  Abdominal pain, vomiting.    COMPARISON: None.    FINDINGS:    Gallbladder:  Normal with no cholelithiasis, wall thickening or focal  tenderness.      Bile ducts:   CHD is normal diameter.  No intrahepatic biliary  dilatation.    Liver:  Liver size is upper normal. There is marked increased  echogenicity of the liver without focal lesion. There is some sparing  adjacent to the gallbladder.     Pancreas:   Normal.     Right kidney:   Normal.       Impression    IMPRESSION:  Markedly fat infiltrated liver is upper limits of normal  in size. No other abnormality demonstrated.    ALEXIA RAGSDALE MD       Discharge Medications   Current Discharge Medication List      START taking  these medications    Details   naltrexone (DEPADE/REVIA) 50 MG tablet Take 1 tablet (50 mg) by mouth daily  Qty: 30 tablet, Refills: 0    Comments: Future refills by PCP Dr. Joe Sandra with phone number [phone].  Associated Diagnoses: Alcohol dependence with uncomplicated withdrawal (H)         CONTINUE these medications which have NOT CHANGED    Details   metoprolol succinate ER (TOPROL-XL) 50 MG 24 hr tablet Take 50 mg by mouth daily       pantoprazole (PROTONIX) 40 MG EC tablet Take 1 tablet by mouth 2 times daily (before meals)      sertraline (ZOLOFT) 100 MG tablet Take 1 tablet (100 mg) by mouth every morning  Qty: 30 tablet, Refills: 0    Associated Diagnoses: Depression, unspecified depression type           Allergies   Allergies   Allergen Reactions     Morphine Nausea and Vomiting

## 2020-05-04 NOTE — CONSULTS
Care Transition Initial Assessment -      Met with: Patient    Active Problems:    Alcohol dependence with uncomplicated withdrawal (H)       DATA  Lives With: spouse, child(silverio), dependent   Living Arrangements: house  Identified issues/concerns regarding health management:       Reviewed chart and spoke with patient. Inquired what treatment options were discussed with c/d. Patient reports he is interested in outpatient treatment. Patient reports a man named Eric is supposed to call him today with more options for outpatient treatment.           ASSESSMENT  Cognitive Status:  awake and oriented  Concerns to be addressed: chemical dependency concerns .     PLAN  Financial costs for the patient includes NA .  Patient given options and choices for discharge Yes by c/d.  Patient/family is agreeable to the plan?  Yes:   Transportation/person available to transport on day of discharge  is TBD and have they been notified/set up TBD  Patient Goals and Preferences: outpatient treament .  Patient anticipates discharging to:  outpatient treament     SARAHY Diaz     [facility]

## 2020-05-04 NOTE — PLAN OF CARE
AVSS on RA. Complaint of mild headache. Pain controlled with PO. CIWA ~5; did not receive any Ativan overnight . LS clear and equal. Diet Regular with adequate appt Up IND in room. BS active and audible. discharge pending chem dep consult .

## 2020-05-04 NOTE — CONSULTS
5/4/20 chem dep consult completed.      Met with patient via telemedicine video visit and he was agreeable to interview.  We completed a formal substance use evaluation and I will seen a referral to [facility] program.  I will email patient a schedule and contact info for his primary group counselor.  Patient is aware all services are being provided via telemedicine and he is does not report any barriers to participation.    Angelica Owen, [facility]  [phone]

## 2020-05-04 NOTE — PROGRESS NOTES
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 necessary to ask each item; only to get a comprehensive picture of their support system.      How often can you count on the following people when you need someone?   Partner / Spouse Always supportive   Parent(s)/Aunt(s)/Uncle(s)/Grandparents Always supportive   Sibling(s)/Cousin(s) Always supportive   Child(silverio) Always supportive   Other relative(s) The patient doesn't have any current contact with other relatives.   Friend(s)/neighbor(s) Always supportive   Child(silverio) s father(s)/mother(s) Always supportive   Support group member(s) Always supportive   Community of eloisa members Always supportive   /counselor/therapist/healer The patient denied having any current involvement with a , counselor, therapist or healer.   Other (specify) No      8A. What is your current living situation?      Independent living, own home     8B. What is your long term plan for where you will be living?      No change reported     8C. Tell me about your living environment/neighborhood? Ask enough follow up questions to determine safety, criminal activity, availability of alcohol and drugs, supportive or antagonistic to the person making changes.       No concern reported.      9. Criminal justice " history: Gather current/recent history and any significant history related to substance use--Arrests? Convictions? Circumstances? Alcohol or drug involvement? Sentences? Still on probation or parole? Expectations of the court? Current court order? Any sex offenses - lifetime? What level? (DSM)     Denies any current or history.     10. What obstacles exist to participating in treatment? (Time off work, childcare, funding, transportation, pending FPC time, living situation)      The patient denied having any obstacles for participating in substance abuse treatment.     Dimension VI Ratings   Recovery environment - The placing authority must use the criteria in Dimension VI to determine a client s recovery environment.   RISK DESCRIPTIONS - Severity rating: 3 Client is not engaged in structured, meaningful activity and the client's peers, family, significant other, and living environment are unsupportive, or there is significant criminal justice system involvement.     REASONS SEVERITY WAS ASSIGNED - (What support does the person have for making changes? What structure/stability does the person have in his or her daily life that will increase the likelihood that changes can be sustained? What problems exist in the person s environment that will jeopardize getting/staying clean and sober?)      Patient reports he is  and has one child, and wife is upset regarding his drinking. He reports he has social and family support.  He is currently not working and lacks structure.  He denies any legal issues.            Client Choice/Exceptions   Would you like services specific to language, age, gender, culture, Buddhist preference, race, ethnicity, sexual orientation or disability?  No     What particular treatment choices and options would you like to have? IOP     Do you have a preference for a particular treatment program? [facility]     Criteria for Diagnosis      Criteria for Diagnosis  DSM-5 Criteria for  Substance Use Disorder  Instructions: Determine whether the client currently meets the criteria for Substance Use Disorder using the diagnostic criteria in the DSM-V pp.481-58. Current means during the most recent 12 months outside a facility that controls access to substances     Category of Substance Severity (ICD-10 Code / DSM 5 Code)      Alcohol Use Disorder Severe  (10.20) (303.90)   Cannabis Use Disorder The patient does not meet the criteria for a Cannabis use disorder.   Hallucinogen Use Disorder The patient does not meet the criteria for a Hallucinogen use disorder.   Inhalant Use Disorder The patient does not meet the criteria for an Inhalant use disorder.   Opioid Use Disorder The patient does not meet the criteria for an Opioid use disorder.   Sedative, Hypnotic, or Anxiolytic Use Disorder The patient does not meet the criteria for a Sedative/Hypnotic use disorder.   Stimulant Related Disorder The patient does not meet the criteria for a Stimulant use disorder.   Tobacco Use Disorder The patient does not meet the criteria for a Tobacco use disorder.   Other (or unknown) Substance Use Disorder The patient does not meet the criteria for a Other (or unknown) Substance use disorder.         Collateral Contact Summary   Number of contacts made: 1     Contact with referring person:  Yes     If court related records were reviewed, summarize here: No court records had been reviewed at the time of this documentation.     Information from collateral contacts supported/largely agreed with information from the client and associated risk ratings.        Rule 25 Assessment Summary and Plan   's Recommendation     Patient is recommended to attend the [facility] IOP treatment program.        Collateral Contacts      Name:     [facility]    Relationship:     Medical records    Phone Number:     [phone] Releases:     No      EHR was reviewed.     Collateral Contacts       Name:     none    Relationship:     none    Phone Number:     None    Releases:     none           ollateral Contacts       A problematic pattern of alcohol/drug use leading to clinically significant impairment or distress, as manifested by at least two of the following, occurring within a 12-month period:     1.) Alcohol/drug is often taken in larger amounts or over a longer period than was intended.  2.) There is a persistent desire or unsuccessful efforts to cut down or control alcohol/drug use  3.) A great deal of time is spent in activities necessary to obtain alcohol, use alcohol, or recover from its effects.  4.) Craving, or a strong desire or urge to use alcohol/drug  5.) Recurrent alcohol/drug use resulting in a failure to fulfill major role obligations at work, school or home.  6.) Continued alcohol use despite having persistent or recurrent social or interpersonal problems caused or exacerbated by the effects of alcohol/drug.  8.) Recurrent alcohol/drug use in situations in which it is physically hazardous.  9.) Alcohol/drug use is continued despite knowledge of having a persistent or recurrent physical or psychological problem that is likely to have been caused or exacerbated by alcohol.  10.) Tolerance, as defined by either of the following: A need for markedly increased amounts of alcohol/drug to achieve intoxication or desired effect. and A markedly diminished effect with continued use of the same amount of alcohol/drug.  11.) Withdrawal, as manifested by either of the following: The characteristic withdrawal syndrome for alcohol/drug (refer to Criteria A and B of the criteria set for alcohol/drug withdrawal). and Alcohol/drug (or a closely related substance, such as a benzodiazepine) is taken to relieve or avoid withdrawal symptoms.        Specify if: In early remission:  After full criteria for alcohol/drug use disorder were previously met, none of the criteria for alcohol/drug use disorder  have been met for at least 3 months but for less than 12 months (with the exception that Criterion A4,  Craving or a strong desire or urge to use alcohol/drug  may be met).      In sustained remission:   After full criteria for alcohol use disorder were previously met, none of the criteria for alcohol/drug use disorder have been met at any time during a period of 12 months or longer (with the exception that Criterion A4,  Craving or strong desire or urge to use alcohol/drug  may be met).   Specify if:   This additional specifier is used if the individual is in an environment where access to alcohol is restricted.     Mild: Presence of 2-3 symptoms  Moderate: Presence of 4-5 symptoms  Severe: Presence of 6 or more symptoms

## 2020-05-04 NOTE — PROGRESS NOTES
[facility]      ADULT CD ASSESSMENT ADDENDUM      Patient Name: Ravi Shah  Cell Phone:   Home: [phone] (home)    Mobile:   Telephone Information:   Mobile [phone]       Email:  [email or web address]  Emergency Contact: Dedra Shah   Tel: [phone]    The patient reported being:      With which race do you identify? White    Initial Screening Questions     1. Are you currently having severe withdrawal symptoms that are putting yourself or others in danger?  No    2. Are you currently having severe medical problems that require immediate attention?  No    3. Are you currently having severe emotional or behavioral problems that are putting yourself or others at risk of harm?  No    4. Do you have sufficient reading skills that will enable you to understand written materials, including the program rules and client rights materials?  Yes     Family History and other additional information     Who raised you? (parents, grandparents, adoptive parents, step-parents, etc.)    Both Parents    Please tell me what it was like growing up in your family. (please include any history of substance abuse, mental health issues, emotional/physical/sexual abuse, forms of discipline, and support)     Father alcohol issues    Do you have any children or Stepchildren? Yes, explain: 1 child    Are you being investigated by Child Protection Services? No    Do you have a child protection worker, probation office or ?  No    How would you describe your current finances?  Just making it    If you are having problems, (unpaid bills, bankruptcy, IRS problems) please explain:  No    If working or a student are you able to function appropriately in that setting? Unemployed     Describe your preferred learning style:  by hands-on practice    What are your some of your personal strengths?  Support and eloisa    Do you currently participate in community eloisa activities, such as attending  Adventism, temple, Quaker or Christian services?  Yes, explain: [employer]    How does your spirituality impact your recovery?  I have had some guys from [employer] reach out that are now sober.    Do you currently self-administer your medications?  Yes    Have you ever had to lie to people important to you about how much you ramirez?   No   Have you ever felt the need to bet more and more money?   No   Have you ever attempted treatment for a gambling problem?   No   Have you ever touched or fondled someone else inappropriately or forced them to have sex with you against their will?   No   Are you or have you ever been a registered sex offender?   No   Is there any history of sexual abuse in your family? No   Have you ever felt obsessed by your sexual behavior, such as having sex with many partners, masturbating often, using pornography often?   No     Have you ever received therapy or stayed in the hospital for mental health problems?   No     Have you ever hurt yourself, such as cutting, burning or hitting yourself?   No     Have you ever purged, binged or restricted yourself as a way to control your weight?   No     Are you on a special diet?   No     Do you have any concerns regarding your nutritional status?   No     Have you had any appetite changes in the last 3 months?   No   Have you had weight loss or weight gain of more than 10 lbs in the last 3 months?   If patient gained or lost more than 10 lbs, then refer to program RN / attending Physician for assessment.   No   Was the patient informed of BMI?       No   Have you engaged in any risk-taking behavior that would put you at risk for exposure to blood-borne or sexually transmitted diseases?   No   Do you have any dental problems?   No   Have you ever lived through any trauma or stressful life events?   Yes, explain: some unexpected losses and deaths, most recent was in 2014   In the past month, have you had any of the following symptoms related to the trauma  listed above? (dreams, intense memories, flashbacks, physical reactions, etc.)   No   Have you ever believed people were spying on you, or that someone was plotting against you or trying to hurt you?   No   Have you ever believed someone was reading your mind or could hear your thoughts or that you could actually read someone's mind or hear what another person was thinking?   No   Have you ever believed that someone of some force outside of yourself was putting thoughts into your mind or made you act in a way that was not your usual self?  Have you ever though you were possessed?   No   Have you ever believed you were being sent special messages through the TV, radio or newspaper?   No   Have you ever heard things other people couldn't hear, such as voices or other noises?   No   Have you ever had visions when you were awake?  Or have you ever seen things other people couldn't see?   No   Do you have a valid 's license?    Yes     PHQ-9, ADY-7 and Suicide Risk Assessment   PHQ-9 on 5/4/2020 ADY-7 on 5/4/2020   The patient's PHQ-9 score was not assessed   The patient's ADY-7 score was not assessed       [place]-Suicide Severity Rating Scale   Suicide Ideation   1.) Have you ever wished you were dead or that you could go to sleep and not wake up?     Lifetime:  No   Past Month:  No     2.) Have you actually had any thoughts of killing yourself?   Lifetime:  No   Past Month:  No     3.) Have you been thinking about how you might do this?     Lifetime:  No   Past Month:  No     4.) Have you had these thoughts and had some intention of acting on them?     Lifetime:  No   Past Month:  No     5.) Have you started to work out the details of how to kill yourself?   Lifetime:  No   Past Month:  No     6.) Do you intend to carry out this plan?      Lifetime:  No   Past Month:  No     Intensity of Ideation   Intensity of ideation (1 being least severe, 5 being most severe):     Lifetime:  The patient denied ever having  any suicidal thoughts in life.   Past Month:  The patient denied having any suicidal thoughts within the past month.     How often do you have these thoughts?  The patient denied ever having any suicidal thoughts in life.     When you have the thoughts how long do they last?  The patient denied ever having any suicidal thoughts in life.     Can you stop thinking about killing yourself or wanting to die if you want to?  The patient denied ever having any suicidal thoughts in life.     Are there things - anyone or anything (i.e. family, Gnosticism, pain of death) that stopped you from wanting to die or acting on thoughts of suicide?  Does not apply     What sort of reasons did you have for thinking about wanting to die or killing yourself (ie end pain, stop how you were feeling, get attention or reaction, revenge)?  Does not apply     Suicidal Behavior   (Suicide Attempt) - Have you made a suicide attempt?     Lifetime:  The patient had never made a suicide attempt.   Past Month:  The patient had not made a suicide attempt within the past month.     Have you engaged in self-harm (non-suicidal self-injury)?  The patient denied having any history of engaging in self-harm (non-suicidal self-injury).     (Interrupted Attempt) - Has there been a time when you started to do something to end your life but someone or something stopped you before you actually did anything?  The patient denied having any history of an interrupted suicide attempt.     (Aborted or Self-Interrupted Attempt) - Has there been a time when you started to do something to try to end your life but you stopped yourself before you actually did anything?  The patient denied having any history of an aborted or self-interrupted suicide attempt.     (Preparatory Acts of Behavior) - Have you taken any steps towards making suicide attempt or preparing to kill yourself (such as collecting pills, getting a gun, giving valuables away or writing a suicide note)?  The  "patient denied having any history of taking any steps towards making a suicide attempt or preparing to kill self.     Actual Lethality/Medical Damage:  The patient denied ever making a suicidal attempt.       2008  [employer] [facility]  Used with permission by Sheree Monet, PhD.       Guide to C-SSRS Risk Ratings   NO IDEATION:  with no active thoughts IDEATION: with a wish to die. IDEATION: with active thoughts. Risk Ratings   If Yes No No 0 - Very Low Risk   If NA Yes No 1 - Low Risk   If NA Yes Yes 2 - Low/moderate risk   IDEATION: associated thoughts of methods without intent or plan INTENT: Intent to follow through on suicide PLAN: Plan to follow through on suicide Risk Ratings cont...   If Yes No No 3 - Moderate Risk   If Yes Yes No 4 - High Risk   If Yes Yes Yes 5 - High Risk   The patient's ADDITIONAL RISK FACTORS and lack of PROTECTIVE FACTORS may increase their overall suicide risk ratings.     Additional Risk Factors:    A recent loss that was significant to the patient, i.e. loss of job, loss of home, divorce, break-up, etc.     Alcohol use   Protective Factors:    Having people in his/her life that would prevent the patient from considering a suicide attempt (i.e. young children, spouse, parents, etc.)     Having a good community support network     Risk Status   Past month: 0. - Very Low Risk:  Evaluation Counselors:  Document in Epic / NewsBasisAR to counselor \"Very Low Risk\".      Treatment Counselors:  Reassess upon admission as applicable, assess weekly in progress notes under Dimension 3 and summarize in Discharge / Treatment summary under Dimension 3.    Past 24 hours: 0. - Very Low Risk:  Evaluation Counselors:  Document in Epic / SBAR to counselor \"Very Low Risk\".      Treatment Counselors:  Reassess upon admission as applicable, assess weekly in progress notes under Dimension 3 and summarize in Discharge / Treatment summary under Dimension 3.   Additional information to " support suicide risk rating: There was no additional information to provide at this time.     Mental Health Status   Physical Appearance/Attire: Attire appropriate to age/situation   Hygiene: well groomed   Eye Contact: at examiner   Speech Rate:  regular   Speech Volume: regular   Speech Quality: fluid   Cognitive/Perceptual:  reality based   Cognition: memory intact    Judgment: intact   Insight: intact   Orientation:  time, place, person and situation   Thought: logical    Hallucinations:  none   General Behavioral Tone: cooperative   Psychomotor Activity: no problem noted   Gait:  Not assessed, lying in hospital bet   Mood: appropriate   Affect: congruence/appropriate   Counselor Notes: NA     Criteria for Diagnosis: DSM-5 Criteria for Substance Use Disorders      Alcohol Use Disorder Severe - 303.90 (F10.20)    Level of Care   I.) Intoxication and Withdrawal: 0   II.) Biomedical:  0   III.) Emotional and Behavioral:  2   IV.) Readiness to Change:  1   V.) Relapse Potential: 3   VI.) Recovery Environmental: 3     Initial Problem List     The patient lacks relapse prevention skills  The patient lacks a sober peer support network    Patient/Client is willing to follow treatment recommendations.  Yes    Counselor: ABDELRAHMAN Hernández

## 2020-05-05 ENCOUNTER — TELEPHONE (OUTPATIENT)
Dept: BEHAVIORAL HEALTH | Facility: CLINIC | Age: 45
End: 2020-05-05

## 2020-05-05 NOTE — TELEPHONE ENCOUNTER
"MACKAR  Name:   Ravi Shah   YOB: 1975 Age:  44 year old Gender:  male   Referral Source: Self   Referral ROGERIO: N/A   Insurance: [facility]: LOCAL - 1.)  The admission counselor (Christopher, Ace Romo, or Hill for LP and the service initiation counselor for the EOP & DOP programs) will send the [facility] e-cert to [facility] to activate the authorization for treatment services on the day of admission.  2.)  [facility] will document the authorization in the insurance referral notes after the authorization has been received from [facility] at [phone].    Financial Screening: Financial approval from [employer] business office is NOT NEEDED.     Precipitating Event: Treatment due to own awareness of need for help     DOC: Alcohol    Additional abused substances: None     Medical:   Past Medical History:   Diagnosis Date     Anxiety      Back pain      Depressive disorder      Hypertension      Substance abuse (H)         Mental Health: Depression and Anxiety     Prior Detox admissions: No prior IP detoxification admission(s).    Prior CD treatments: 2 prior CD treatment(s).     Psychosocial history:       1 minor child(silverio)    Stable housing and no concerns    Marital or relationship conflict with significant other due to substance abuse, Unemployed and Financial problems     [place] Suicide Risk Status:  Past month: 0. - Very Low Risk:  Evaluation Counselors:  Document in Epic / [employer]AR to counselor \"Very Low Risk\".      Treatment Counselors:  Reassess upon admission as applicable, assess weekly in progress notes under Dimension 3 and summarize in Discharge / Treatment summary under Dimension 3.    Past 24 hours: 0. - Very Low Risk:  Evaluation Counselors:  Document in Epic / SBAR to counselor \"Very Low Risk\".      Treatment Counselors:  Reassess upon admission as applicable, assess weekly in progress notes under Dimension 3 and summarize in Discharge / Treatment summary under Dimension 3.     Additional Info as needed: Faxed " MARII to office, will email [facility] ecert once benefits are verified.  Patient has been discharged from hospital so please call patient directly to schedule service initiation.  He was emailed schedule and phone number for primary counselor, Jeremy Juarez.

## 2020-05-05 NOTE — PLAN OF CARE
A&O, able to communicate needs. VSS.   CIWA scores consistently 2 throughout the day for mild tremors.  Denies nausea/headache etc.    Participated in chem dep meeting, states someone from their group is to call him tomorrow to discuss programs more.    Declined assistance for discharge, ambulated self to door 2 to meet his spouse for .  AVS reviewed, belongings with pt.  Pt aware to  discharge med from [facility] in [place].

## 2020-05-05 NOTE — TELEPHONE ENCOUNTER
----- Message from ABDELRAHMAN Humphrey sent at 5/5/2020 10:38 AM CDT -----  Regarding: Benefits  Hello-  Met with patient for CD consult at [facility] and is being referred to evening IOP.  Please create referral and verify chem dep IOP benefits.  Thank you.

## 2020-05-07 NOTE — TELEPHONE ENCOUNTER
Left another vm with client again offering to schedule service initiation session.  Requested return call.

## 2020-11-16 ENCOUNTER — HEALTH MAINTENANCE LETTER (OUTPATIENT)
Age: 45
End: 2020-11-16

## 2021-05-08 ENCOUNTER — HOSPITAL ENCOUNTER (OUTPATIENT)
Facility: CLINIC | Age: 46
Setting detail: OBSERVATION
Discharge: HOME | End: 2021-05-09
Attending: NURSE PRACTITIONER | Admitting: NURSE PRACTITIONER
Payer: COMMERCIAL

## 2021-05-08 ENCOUNTER — APPOINTMENT (OUTPATIENT)
Dept: CT IMAGING | Facility: CLINIC | Age: 46
End: 2021-05-08
Attending: NURSE PRACTITIONER
Payer: COMMERCIAL

## 2021-05-08 DIAGNOSIS — R10.32 INTRACTABLE LEFT LOWER QUADRANT ABDOMINAL PAIN: ICD-10-CM

## 2021-05-08 DIAGNOSIS — I10 HTN (HYPERTENSION): ICD-10-CM

## 2021-05-08 DIAGNOSIS — N23 RENAL COLIC: Primary | ICD-10-CM

## 2021-05-08 DIAGNOSIS — N13.30 HYDRONEPHROSIS: ICD-10-CM

## 2021-05-08 LAB
ALBUMIN UR-MCNC: 50 MG/DL
AMORPH CRY #/AREA URNS HPF: ABNORMAL /HPF
ANION GAP SERPL CALCULATED.3IONS-SCNC: 6 MMOL/L (ref 3–14)
APPEARANCE UR: ABNORMAL
BASOPHILS # BLD AUTO: 0.1 10E9/L (ref 0–0.2)
BASOPHILS NFR BLD AUTO: 1.4 %
BILIRUB UR QL STRIP: NEGATIVE
BUN SERPL-MCNC: 15 MG/DL (ref 7–30)
CALCIUM SERPL-MCNC: 9 MG/DL (ref 8.5–10.1)
CHLORIDE SERPL-SCNC: 107 MMOL/L (ref 94–109)
CO2 SERPL-SCNC: 27 MMOL/L (ref 20–32)
COLOR UR AUTO: YELLOW
CREAT SERPL-MCNC: 0.98 MG/DL (ref 0.66–1.25)
DIFFERENTIAL METHOD BLD: ABNORMAL
EOSINOPHIL # BLD AUTO: 0.2 10E9/L (ref 0–0.7)
EOSINOPHIL NFR BLD AUTO: 2.3 %
ERYTHROCYTE [DISTWIDTH] IN BLOOD BY AUTOMATED COUNT: 11.9 % (ref 10–15)
GFR SERPL CREATININE-BSD FRML MDRD: >90 ML/MIN/{1.73_M2}
GLUCOSE SERPL-MCNC: 120 MG/DL (ref 70–99)
GLUCOSE UR STRIP-MCNC: NEGATIVE MG/DL
HCT VFR BLD AUTO: 44 % (ref 40–53)
HGB BLD-MCNC: 15.1 G/DL (ref 13.3–17.7)
HGB UR QL STRIP: ABNORMAL
IMM GRANULOCYTES # BLD: 0 10E9/L (ref 0–0.4)
IMM GRANULOCYTES NFR BLD: 0.6 %
INTERPRETATION ECG - MUSE: NORMAL
KETONES UR STRIP-MCNC: 5 MG/DL
LABORATORY COMMENT REPORT: NORMAL
LEUKOCYTE ESTERASE UR QL STRIP: NEGATIVE
LYMPHOCYTES # BLD AUTO: 1.4 10E9/L (ref 0.8–5.3)
LYMPHOCYTES NFR BLD AUTO: 21.3 %
MAGNESIUM SERPL-MCNC: 1.7 MG/DL (ref 1.6–2.3)
MCH RBC QN AUTO: 36 PG (ref 26.5–33)
MCHC RBC AUTO-ENTMCNC: 34.3 G/DL (ref 31.5–36.5)
MCV RBC AUTO: 105 FL (ref 78–100)
MONOCYTES # BLD AUTO: 0.9 10E9/L (ref 0–1.3)
MONOCYTES NFR BLD AUTO: 13.3 %
MUCOUS THREADS #/AREA URNS LPF: PRESENT /LPF
NEUTROPHILS # BLD AUTO: 4 10E9/L (ref 1.6–8.3)
NEUTROPHILS NFR BLD AUTO: 61.1 %
NITRATE UR QL: NEGATIVE
NRBC # BLD AUTO: 0 10*3/UL
NRBC BLD AUTO-RTO: 0 /100
PH UR STRIP: 5.5 PH (ref 5–7)
PHOSPHATE SERPL-MCNC: 3.3 MG/DL (ref 2.5–4.5)
PLATELET # BLD AUTO: 308 10E9/L (ref 150–450)
POTASSIUM SERPL-SCNC: 3.9 MMOL/L (ref 3.4–5.3)
POTASSIUM SERPL-SCNC: 4.3 MMOL/L (ref 3.4–5.3)
RBC # BLD AUTO: 4.2 10E12/L (ref 4.4–5.9)
RBC #/AREA URNS AUTO: >182 /HPF (ref 0–2)
SARS-COV-2 RNA RESP QL NAA+PROBE: NEGATIVE
SODIUM SERPL-SCNC: 140 MMOL/L (ref 133–144)
SOURCE: ABNORMAL
SP GR UR STRIP: 1.02 (ref 1–1.03)
SPECIMEN SOURCE: NORMAL
SQUAMOUS #/AREA URNS AUTO: <1 /HPF (ref 0–1)
TROPONIN I SERPL-MCNC: <0.015 UG/L (ref 0–0.04)
TSH SERPL DL<=0.005 MIU/L-ACNC: 1.37 MU/L (ref 0.4–4)
UROBILINOGEN UR STRIP-MCNC: 2 MG/DL (ref 0–2)
WBC # BLD AUTO: 6.5 10E9/L (ref 4–11)
WBC #/AREA URNS AUTO: 2 /HPF (ref 0–5)

## 2021-05-08 PROCEDURE — 84484 ASSAY OF TROPONIN QUANT: CPT | Performed by: EMERGENCY MEDICINE

## 2021-05-08 PROCEDURE — 84100 ASSAY OF PHOSPHORUS: CPT | Performed by: INTERNAL MEDICINE

## 2021-05-08 PROCEDURE — 84132 ASSAY OF SERUM POTASSIUM: CPT | Performed by: INTERNAL MEDICINE

## 2021-05-08 PROCEDURE — 96376 TX/PRO/DX INJ SAME DRUG ADON: CPT

## 2021-05-08 PROCEDURE — 87635 SARS-COV-2 COVID-19 AMP PRB: CPT | Performed by: NURSE PRACTITIONER

## 2021-05-08 PROCEDURE — 250N000013 HC RX MED GY IP 250 OP 250 PS 637: Performed by: INTERNAL MEDICINE

## 2021-05-08 PROCEDURE — 83735 ASSAY OF MAGNESIUM: CPT | Performed by: INTERNAL MEDICINE

## 2021-05-08 PROCEDURE — C9803 HOPD COVID-19 SPEC COLLECT: HCPCS

## 2021-05-08 PROCEDURE — 36415 COLL VENOUS BLD VENIPUNCTURE: CPT | Performed by: INTERNAL MEDICINE

## 2021-05-08 PROCEDURE — 51798 US URINE CAPACITY MEASURE: CPT

## 2021-05-08 PROCEDURE — 250N000013 HC RX MED GY IP 250 OP 250 PS 637: Performed by: NURSE PRACTITIONER

## 2021-05-08 PROCEDURE — G0378 HOSPITAL OBSERVATION PER HR: HCPCS

## 2021-05-08 PROCEDURE — 258N000003 HC RX IP 258 OP 636: Performed by: NURSE PRACTITIONER

## 2021-05-08 PROCEDURE — 84443 ASSAY THYROID STIM HORMONE: CPT | Performed by: INTERNAL MEDICINE

## 2021-05-08 PROCEDURE — 93005 ELECTROCARDIOGRAM TRACING: CPT

## 2021-05-08 PROCEDURE — 96375 TX/PRO/DX INJ NEW DRUG ADDON: CPT

## 2021-05-08 PROCEDURE — 74176 CT ABD & PELVIS W/O CONTRAST: CPT

## 2021-05-08 PROCEDURE — 99220 PR INITIAL OBSERVATION CARE,LEVEL III: CPT | Performed by: INTERNAL MEDICINE

## 2021-05-08 PROCEDURE — 85025 COMPLETE CBC W/AUTO DIFF WBC: CPT | Performed by: EMERGENCY MEDICINE

## 2021-05-08 PROCEDURE — 96374 THER/PROPH/DIAG INJ IV PUSH: CPT

## 2021-05-08 PROCEDURE — 250N000011 HC RX IP 250 OP 636: Performed by: NURSE PRACTITIONER

## 2021-05-08 PROCEDURE — 96361 HYDRATE IV INFUSION ADD-ON: CPT

## 2021-05-08 PROCEDURE — 81001 URINALYSIS AUTO W/SCOPE: CPT | Performed by: EMERGENCY MEDICINE

## 2021-05-08 PROCEDURE — 250N000011 HC RX IP 250 OP 636: Performed by: INTERNAL MEDICINE

## 2021-05-08 PROCEDURE — 250N000013 HC RX MED GY IP 250 OP 250 PS 637: Performed by: EMERGENCY MEDICINE

## 2021-05-08 PROCEDURE — 99285 EMERGENCY DEPT VISIT HI MDM: CPT | Mod: 25

## 2021-05-08 PROCEDURE — 80048 BASIC METABOLIC PNL TOTAL CA: CPT | Performed by: EMERGENCY MEDICINE

## 2021-05-08 RX ORDER — NALOXONE HYDROCHLORIDE 0.4 MG/ML
0.2 INJECTION, SOLUTION INTRAMUSCULAR; INTRAVENOUS; SUBCUTANEOUS
Status: DISCONTINUED | OUTPATIENT
Start: 2021-05-08 | End: 2021-05-09 | Stop reason: HOSPADM

## 2021-05-08 RX ORDER — LANOLIN ALCOHOL/MO/W.PET/CERES
200 CREAM (GRAM) TOPICAL 3 TIMES DAILY
Status: DISCONTINUED | OUTPATIENT
Start: 2021-05-08 | End: 2021-05-09 | Stop reason: HOSPADM

## 2021-05-08 RX ORDER — METOCLOPRAMIDE HYDROCHLORIDE 5 MG/ML
5 INJECTION INTRAMUSCULAR; INTRAVENOUS ONCE
Status: COMPLETED | OUTPATIENT
Start: 2021-05-08 | End: 2021-05-08

## 2021-05-08 RX ORDER — ACETAMINOPHEN 325 MG/1
650 TABLET ORAL EVERY 4 HOURS PRN
Status: DISCONTINUED | OUTPATIENT
Start: 2021-05-08 | End: 2021-05-09 | Stop reason: HOSPADM

## 2021-05-08 RX ORDER — OXYCODONE AND ACETAMINOPHEN 5; 325 MG/1; MG/1
1 TABLET ORAL ONCE
Status: COMPLETED | OUTPATIENT
Start: 2021-05-08 | End: 2021-05-08

## 2021-05-08 RX ORDER — HYDROMORPHONE HYDROCHLORIDE 1 MG/ML
0.5 INJECTION, SOLUTION INTRAMUSCULAR; INTRAVENOUS; SUBCUTANEOUS ONCE
Status: COMPLETED | OUTPATIENT
Start: 2021-05-08 | End: 2021-05-08

## 2021-05-08 RX ORDER — LORAZEPAM 2 MG/ML
1-2 INJECTION INTRAMUSCULAR EVERY 30 MIN PRN
Status: DISCONTINUED | OUTPATIENT
Start: 2021-05-08 | End: 2021-05-09 | Stop reason: HOSPADM

## 2021-05-08 RX ORDER — OLANZAPINE 5 MG/1
5-10 TABLET, ORALLY DISINTEGRATING ORAL EVERY 6 HOURS PRN
Status: DISCONTINUED | OUTPATIENT
Start: 2021-05-08 | End: 2021-05-09 | Stop reason: HOSPADM

## 2021-05-08 RX ORDER — FOLIC ACID 1 MG/1
1 TABLET ORAL DAILY
Status: DISCONTINUED | OUTPATIENT
Start: 2021-05-08 | End: 2021-05-09 | Stop reason: HOSPADM

## 2021-05-08 RX ORDER — ONDANSETRON 2 MG/ML
4 INJECTION INTRAMUSCULAR; INTRAVENOUS ONCE
Status: COMPLETED | OUTPATIENT
Start: 2021-05-08 | End: 2021-05-08

## 2021-05-08 RX ORDER — ACETAMINOPHEN 650 MG/1
650 SUPPOSITORY RECTAL EVERY 4 HOURS PRN
Status: DISCONTINUED | OUTPATIENT
Start: 2021-05-08 | End: 2021-05-09 | Stop reason: HOSPADM

## 2021-05-08 RX ORDER — PROCHLORPERAZINE 25 MG
25 SUPPOSITORY, RECTAL RECTAL EVERY 12 HOURS PRN
Status: DISCONTINUED | OUTPATIENT
Start: 2021-05-08 | End: 2021-05-09 | Stop reason: HOSPADM

## 2021-05-08 RX ORDER — HALOPERIDOL 5 MG/ML
2.5-5 INJECTION INTRAMUSCULAR EVERY 6 HOURS PRN
Status: DISCONTINUED | OUTPATIENT
Start: 2021-05-08 | End: 2021-05-09 | Stop reason: HOSPADM

## 2021-05-08 RX ORDER — OLOPATADINE HYDROCHLORIDE 1 MG/ML
1 SOLUTION/ DROPS OPHTHALMIC 2 TIMES DAILY PRN
COMMUNITY
End: 2022-09-15

## 2021-05-08 RX ORDER — POLYETHYLENE GLYCOL 3350 17 G/17G
17 POWDER, FOR SOLUTION ORAL DAILY PRN
Status: DISCONTINUED | OUTPATIENT
Start: 2021-05-08 | End: 2021-05-09 | Stop reason: HOSPADM

## 2021-05-08 RX ORDER — ONDANSETRON 2 MG/ML
4 INJECTION INTRAMUSCULAR; INTRAVENOUS EVERY 6 HOURS PRN
Status: DISCONTINUED | OUTPATIENT
Start: 2021-05-08 | End: 2021-05-09 | Stop reason: HOSPADM

## 2021-05-08 RX ORDER — AMOXICILLIN 250 MG
2 CAPSULE ORAL 2 TIMES DAILY PRN
Status: DISCONTINUED | OUTPATIENT
Start: 2021-05-08 | End: 2021-05-09 | Stop reason: HOSPADM

## 2021-05-08 RX ORDER — ONDANSETRON 4 MG/1
4 TABLET, ORALLY DISINTEGRATING ORAL EVERY 6 HOURS PRN
Status: DISCONTINUED | OUTPATIENT
Start: 2021-05-08 | End: 2021-05-09 | Stop reason: HOSPADM

## 2021-05-08 RX ORDER — LANOLIN ALCOHOL/MO/W.PET/CERES
100 CREAM (GRAM) TOPICAL DAILY
Status: DISCONTINUED | OUTPATIENT
Start: 2021-05-16 | End: 2021-05-09 | Stop reason: HOSPADM

## 2021-05-08 RX ORDER — NALOXONE HYDROCHLORIDE 0.4 MG/ML
0.4 INJECTION, SOLUTION INTRAMUSCULAR; INTRAVENOUS; SUBCUTANEOUS
Status: DISCONTINUED | OUTPATIENT
Start: 2021-05-08 | End: 2021-05-09 | Stop reason: HOSPADM

## 2021-05-08 RX ORDER — BISACODYL 10 MG
10 SUPPOSITORY, RECTAL RECTAL DAILY PRN
Status: DISCONTINUED | OUTPATIENT
Start: 2021-05-08 | End: 2021-05-09 | Stop reason: HOSPADM

## 2021-05-08 RX ORDER — FAMOTIDINE 20 MG/1
20 TABLET, FILM COATED ORAL ONCE
Status: COMPLETED | OUTPATIENT
Start: 2021-05-08 | End: 2021-05-08

## 2021-05-08 RX ORDER — LANOLIN ALCOHOL/MO/W.PET/CERES
100 CREAM (GRAM) TOPICAL 3 TIMES DAILY
Status: DISCONTINUED | OUTPATIENT
Start: 2021-05-10 | End: 2021-05-09 | Stop reason: HOSPADM

## 2021-05-08 RX ORDER — PROCHLORPERAZINE MALEATE 10 MG
10 TABLET ORAL EVERY 6 HOURS PRN
Status: DISCONTINUED | OUTPATIENT
Start: 2021-05-08 | End: 2021-05-09 | Stop reason: HOSPADM

## 2021-05-08 RX ORDER — LORAZEPAM 1 MG/1
1-2 TABLET ORAL EVERY 30 MIN PRN
Status: DISCONTINUED | OUTPATIENT
Start: 2021-05-08 | End: 2021-05-09 | Stop reason: HOSPADM

## 2021-05-08 RX ORDER — AMOXICILLIN 250 MG
1 CAPSULE ORAL 2 TIMES DAILY PRN
Status: DISCONTINUED | OUTPATIENT
Start: 2021-05-08 | End: 2021-05-09 | Stop reason: HOSPADM

## 2021-05-08 RX ORDER — KETOROLAC TROMETHAMINE 15 MG/ML
15 INJECTION, SOLUTION INTRAMUSCULAR; INTRAVENOUS ONCE
Status: COMPLETED | OUTPATIENT
Start: 2021-05-08 | End: 2021-05-08

## 2021-05-08 RX ORDER — SODIUM CHLORIDE 9 MG/ML
INJECTION, SOLUTION INTRAVENOUS CONTINUOUS
Status: DISCONTINUED | OUTPATIENT
Start: 2021-05-08 | End: 2021-05-09 | Stop reason: HOSPADM

## 2021-05-08 RX ORDER — MORPHINE SULFATE 4 MG/ML
4 INJECTION, SOLUTION INTRAMUSCULAR; INTRAVENOUS ONCE
Status: COMPLETED | OUTPATIENT
Start: 2021-05-08 | End: 2021-05-08

## 2021-05-08 RX ORDER — OLOPATADINE HYDROCHLORIDE 1 MG/ML
1 SOLUTION/ DROPS OPHTHALMIC 2 TIMES DAILY PRN
Status: DISCONTINUED | OUTPATIENT
Start: 2021-05-08 | End: 2021-05-09 | Stop reason: HOSPADM

## 2021-05-08 RX ORDER — METOPROLOL SUCCINATE 50 MG/1
50 TABLET, EXTENDED RELEASE ORAL DAILY
Status: DISCONTINUED | OUTPATIENT
Start: 2021-05-08 | End: 2021-05-09 | Stop reason: HOSPADM

## 2021-05-08 RX ORDER — HYDROMORPHONE HCL IN WATER/PF 6 MG/30 ML
.2-.3 PATIENT CONTROLLED ANALGESIA SYRINGE INTRAVENOUS EVERY 4 HOURS PRN
Status: DISCONTINUED | OUTPATIENT
Start: 2021-05-08 | End: 2021-05-09 | Stop reason: HOSPADM

## 2021-05-08 RX ORDER — FLUMAZENIL 0.1 MG/ML
0.2 INJECTION, SOLUTION INTRAVENOUS
Status: DISCONTINUED | OUTPATIENT
Start: 2021-05-08 | End: 2021-05-09 | Stop reason: HOSPADM

## 2021-05-08 RX ORDER — METOPROLOL SUCCINATE 50 MG/1
50 TABLET, EXTENDED RELEASE ORAL DAILY
Status: DISCONTINUED | OUTPATIENT
Start: 2021-05-09 | End: 2021-05-08

## 2021-05-08 RX ORDER — MULTIPLE VITAMINS W/ MINERALS TAB 9MG-400MCG
1 TAB ORAL DAILY
Status: DISCONTINUED | OUTPATIENT
Start: 2021-05-08 | End: 2021-05-09 | Stop reason: HOSPADM

## 2021-05-08 RX ADMIN — MELATONIN 5 MG TABLET 5 MG: at 20:39

## 2021-05-08 RX ADMIN — MORPHINE SULFATE 4 MG: 4 INJECTION, SOLUTION INTRAMUSCULAR; INTRAVENOUS at 15:32

## 2021-05-08 RX ADMIN — KETOROLAC TROMETHAMINE 15 MG: 15 INJECTION, SOLUTION INTRAMUSCULAR; INTRAVENOUS at 14:22

## 2021-05-08 RX ADMIN — ONDANSETRON 4 MG: 2 INJECTION INTRAMUSCULAR; INTRAVENOUS at 19:59

## 2021-05-08 RX ADMIN — HYDROMORPHONE HYDROCHLORIDE 0.5 MG: 1 INJECTION, SOLUTION INTRAMUSCULAR; INTRAVENOUS; SUBCUTANEOUS at 14:22

## 2021-05-08 RX ADMIN — OXYCODONE HYDROCHLORIDE AND ACETAMINOPHEN 1 TABLET: 5; 325 TABLET ORAL at 16:41

## 2021-05-08 RX ADMIN — SODIUM CHLORIDE: 9 INJECTION, SOLUTION INTRAVENOUS at 20:00

## 2021-05-08 RX ADMIN — SODIUM CHLORIDE 1000 ML: 9 INJECTION, SOLUTION INTRAVENOUS at 15:30

## 2021-05-08 RX ADMIN — FAMOTIDINE 20 MG: 20 TABLET ORAL at 19:07

## 2021-05-08 RX ADMIN — ONDANSETRON 4 MG: 2 INJECTION INTRAMUSCULAR; INTRAVENOUS at 14:04

## 2021-05-08 RX ADMIN — MULTIPLE VITAMINS W/ MINERALS TAB 1 TABLET: TAB at 20:39

## 2021-05-08 RX ADMIN — LORAZEPAM 2 MG: 1 TABLET ORAL at 20:39

## 2021-05-08 RX ADMIN — HYDROMORPHONE HYDROCHLORIDE 0.2 MG: 0.2 INJECTION, SOLUTION INTRAMUSCULAR; INTRAVENOUS; SUBCUTANEOUS at 21:40

## 2021-05-08 RX ADMIN — THIAMINE HCL TAB 100 MG 200 MG: 100 TAB at 20:39

## 2021-05-08 RX ADMIN — METOPROLOL SUCCINATE 50 MG: 50 TABLET, EXTENDED RELEASE ORAL at 20:18

## 2021-05-08 RX ADMIN — FOLIC ACID 1 MG: 1 TABLET ORAL at 20:40

## 2021-05-08 RX ADMIN — HYDROMORPHONE HYDROCHLORIDE 0.5 MG: 1 INJECTION, SOLUTION INTRAMUSCULAR; INTRAVENOUS; SUBCUTANEOUS at 15:03

## 2021-05-08 RX ADMIN — HYDROMORPHONE HYDROCHLORIDE 0.5 MG: 1 INJECTION, SOLUTION INTRAMUSCULAR; INTRAVENOUS; SUBCUTANEOUS at 14:03

## 2021-05-08 RX ADMIN — ONDANSETRON 4 MG: 2 INJECTION INTRAMUSCULAR; INTRAVENOUS at 15:34

## 2021-05-08 RX ADMIN — METOCLOPRAMIDE 5 MG: 5 INJECTION, SOLUTION INTRAMUSCULAR; INTRAVENOUS at 17:37

## 2021-05-08 RX ADMIN — SODIUM CHLORIDE 1000 ML: 9 INJECTION, SOLUTION INTRAVENOUS at 14:05

## 2021-05-08 ASSESSMENT — ENCOUNTER SYMPTOMS
CHILLS: 0
DIARRHEA: 0
ABDOMINAL PAIN: 1
DYSURIA: 0
NAUSEA: 1
FEVER: 0
BLOOD IN STOOL: 0
DIFFICULTY URINATING: 1
CONSTIPATION: 0
HEMATURIA: 0
COUGH: 0
VOMITING: 0

## 2021-05-08 ASSESSMENT — MIFFLIN-ST. JEOR
SCORE: 1729.13
SCORE: 1736.28

## 2021-05-08 NOTE — ED NOTES
Emergency Department Attending Supervision Note  5/8/2021  5:43 PM      I evaluated this patient in conjunction with Michelle Chua DNP    Briefly, the patient presented for evaluation of flank pain. The patient reported sudden onset of a constant, sharp left flank pain this afternoon. The pain was radiating to his lower abdomen as well as to his left testicle to some extend. He stated some nausea and pain during urination. Denied fever or vomiting, cough, chills, dysuria or hematuria, diarrhea or constipation.       On my exam:  General: Alert and cooperative with exam. Patient in moderate distress. Normal mentation.  Head:  Scalp is NC/AT  Eyes:  No scleral icterus, PERRL  ENT:  The external nose and ears are normal.   Neck:  Normal range of motion without rigidity.  CV:  Regular rate and rhythm    No pathologic murmur   Resp:  Breath sounds are clear bilaterally    Non-labored, no retractions or accessory muscle use  GI:  Abdomen is soft, no distension, mild left lower quadrant tenderness palpation. No peritoneal signs; no CVA tenderness  MS:  No lower extremity edema   Skin:  Warm and dry, No rash or lesions noted.  Neuro: Oriented x 3. No gross motor deficits.        Results:  Labs Ordered and Resulted from Time of ED Arrival Up to the Time of Departure from the ED   CBC WITH PLATELETS DIFFERENTIAL - Abnormal; Notable for the following components:       Result Value    RBC Count 4.20 (*)      (*)     MCH 36.0 (*)     All other components within normal limits   BASIC METABOLIC PANEL - Abnormal; Notable for the following components:    Glucose 120 (*)     All other components within normal limits   ROUTINE UA WITH MICROSCOPIC - Abnormal; Notable for the following components:    Ketones Urine 5 (*)     Blood Urine Large (*)     Protein Albumin Urine 50 (*)     RBC Urine >182 (*)     Mucous Urine Present (*)     Amorphous Crystals Few (*)     All other components within normal limits   TROPONIN I    SARS-COV-2 (COVID-19) VIRUS RT-PCR   STRAIN URINE     CT Abdomen Pelvis w/o Contrast   Final Result   IMPRESSION:    1.  Left hydronephrosis and hydroureter with perinephric infiltration   could be related to a recently passed stone or ascending infection. No   urinary tract calculi are demonstrated.   2.  Other chronic and incidental findings detailed above.      ALEXIA RAGSDALE MD        My impression:  Patient is a 45-year-old male who presents with sudden onset left flank pain.  Patient's medical history and records were reviewed.  I was asked to evaluate the patient as plan is for admission to observation.  Patient's work-up demonstrates evidence of hydronephrosis on CT with UA demonstrating hematuria without other evidence of infection.  Patient is afebrile and remainder of labs are essentially unremarkable.  Concern for recently passed a kidney stone versus small stone not visualized on CT versus other.  Patient required significant amount of pain control and antinausea medications while in the ED.  Given persistent nausea and pain, patient will be admitted to observation with the hospitalist service.  He remained hemodynamically stable throughout ED course.        Diagnosis    ICD-10-CM    1. Hydronephrosis  N13.30    2. Intractable left lower quadrant abdominal pain  R10.32    3. HTN (hypertension)  I10        Waldemar Flores, Waldemar Farmer DO  05/08/21 2908

## 2021-05-08 NOTE — PHARMACY-ADMISSION MEDICATION HISTORY
Pharmacy Medication History  Admission medication history interview status for the 5/8/2021  admission is complete. See EPIC admission navigator for prior to admission medications     Location of Interview: Patient room  Medication history sources: Patient    Significant changes made to the medication list:      In the past week, patient estimated taking medication this percent of the time: greater than 90%    Additional medication history information:     Medication reconciliation completed by provider prior to medication history? No    Time spent in this activity: 10 minutes     Prior to Admission medications    Medication Sig Last Dose Taking? Auth Provider   metoprolol succinate ER (TOPROL-XL) 50 MG 24 hr tablet Take 50 mg by mouth daily  5/7/2021 at AM Yes Reported, Patient   olopatadine (PATANOL) 0.1 % ophthalmic solution Place 1 drop into both eyes 2 times daily as needed for allergies  at PRN Yes Unknown, Entered By History   polyethylene glycol-propylene glycol (SYSTANE ULTRA) 0.4-0.3 % SOLN ophthalmic solution Place 1 drop into both eyes every hour as needed for dry eyes  at PRN Yes Unknown, Entered By History       The information provided in this note is only as accurate as the sources available at the time of update(s)

## 2021-05-08 NOTE — ED NOTES
Pt voided 400cc dark urine. Strained urine and found approx 1-2mm stone, placed in specimen cup and provider notified.

## 2021-05-08 NOTE — H&P
[facility]    History and Physical  Hospitalist       Date of Admission:  5/8/2021    Assessment & Plan   Ravi Shah is a 45 year old male history of hypertension, alcohol dependence with history of alcohol withdrawal who presents with left flank pain.   CT imaging shows left hydronephrosis.  Urinalysis shows microscopic hematuria but no pyuria.  Normal vitals.  Afebrile.  Possible passage of stone in the emergency room.  Patient feeling better with resolution of pain though is worried about recurrence of pain.  He feels he needs observation overnight.    Renal colic, renal stone.  Presents with new onset left flank pain.  CT shows left hydronephrosis but no stone.  Urinalysis shows microscopic hematuria but no pyuria.  Possible passage of stone with resolution of his pain just before I walked in to examine the patient.  Possible 1 mm black stone in the specimen container.  Wife thinks patient is more comfortable.  Had 2 L fluid.  Double doses of antiemetics and narcotics.  Laboratory studies normal.  Calcium normal.  Plan, registered observation.  Likely discharge tomorrow morning.  He no further fluids indicated at this time.  Will hold off on as needed narcotics and Toradol as he have passed a stone.  If he has recurrence consider a dose of Toradol and narcotic.  Regular diet, n.p.o. at midnight should he have persisting flank pain.  Urology consult.  Urology outpatient evaluation for metabolic work-up.  Follow-up with PCP this coming Thursday already arranged.    Addendum; following arrival to floor pt having 5/10 L groin pain/flank pain.   Given etoh use will hold on further toradol. Provide prn iv dilaudid for now    Alcohol dependence.  Possible mild alcohol withdrawal.  Focal neuro exam.  Last drink was yesterday.  Drinking 4 drinks per day.  Declines Imani Pérez evaluation.  He has a PCP appointment this coming Thursday.  Will place on alcohol withdrawal protocol.  Will provide  multivitamin thiamine and folate.  A.m. labs.  Close PCP follow-up arranged.    Macrocytosis-no anemia.,  Likely related alcohol.  Check TSH B12 and folic acid.  Follow-up with PCP.  Recommend cessation of alcohol.  Covid status.  Covid pending--> negative.   Low suspicion.  No Covid symptoms.  DVT Prophylaxis: Pneumoboots.  Code Status: Full code.    Disposition: Expected discharge in than 2 days once his evaluation for his left flank pain has been not completed.    Min Gusman MD    Primary Care Physician   Joe Sandra    Chief Complaint   Left flank pain    History is obtained from the patient chart, EDMD    History of Present Illness   Ravi Shah is a 45 year old male history of hypertension, alcohol dependence with history of alcohol withdrawal who presents with left flank pain.   States he was in his usual state health until today this afternoon approximately 1 PM while sitting visiting with family developed severe left-sided flank pain that radiated to his groin.  Denies any hematuria dysuria.  No history of hernias.  No history of renal stone.  No family history of renal stone.  He had persisting pain that would increase in waves.  He ultimately presented to [facility] for further evaluation.  There is no radiation to his chest or down his leg.  No trauma.  He denies a history of renal stones UTIs.  No fevers chills nausea vomiting or diarrhea.  No chest pain palpitations syncope.  No recent antibiotics or infection.    He does use chewing tobacco at times.  He is continuing to drink alcohol approximately 4 drinks a day.  He has a history of chemical dependency inpatient treatment with sobriety for about 1 month in 2019.  He is interested in limiting his alcohol.  He declines chemical dependency counselor.  States he has a PCP appointment this coming Thursday.      Patient presented with acute onset of left flank pain.  Patient afebrile.  Slightly hypertensive in the emergency  room.  Pulse in the 80s.  Normal oxygen saturations though he did dip briefly following IV narcotic administration in the emergency room.  Patient had on exam tenderness over the left flank but no abdominal pain.  Laboratory studies notable for a normal BMP, normal troponin, normal glucose, white blood cell count 6.5.  Hemoglobin 15.  .  Normal differential.  Urinalysis showed greater than 182 RBCs.  Cells 2.  Protein and ketones present.  EKG showed normal sinus rhythm without acute ischemic changes.  CT abdomen pelvis without contrast concern for renal stone showed-  IMPRESSION:   1.  Left hydronephrosis and hydroureter with perinephric infiltration  could be related to a recently passed stone or ascending infection. No  urinary tract calculi are demonstrated.  2.  Other chronic and incidental findings detailed above.    Continue to have left flank pain.  No other clear etiology to his flank pain.  Possible that with the cuts on CT at 2 to 3 mm that they may have missed a left hydrostone.  EKG was done as indicated above and was without ischemic changes.  Troponin negative.    Patient given 2 L normal saline bolus.  3 doses of IV Dilaudid at 0.5 mg.  Toradol 15 mg IV, Reglan, Zofran x2, morphine    Admit inpatient for a likely renal stone.    Patient is now feeling more comfortable.  Possible passage of stone there is a container with his possibly 1 mm black stone in the urine collection container.  Patient is worried that he might have recurrence of his pain and does not feel comfortable being discharged home at this time.    Patient's wife he appears more comfortable.    His last drink alcohol was yesterday.  He denies alcohol withdrawal at this time.  Past Medical History    I have reviewed this patient's medical history and updated it with pertinent information if needed.   Past Medical History:   Diagnosis Date     Anxiety      Back pain      Depressive disorder      Hypertension      Substance abuse  (H)        Past Surgical History   I have reviewed this patient's surgical history and updated it with pertinent information if needed.  Past Surgical History:   Procedure Laterality Date     ENT SURGERY       ESOPHAGOSCOPY, GASTROSCOPY, DUODENOSCOPY (EGD), COMBINED N/A 9/12/2019    Procedure: ESOPHAGOGASTRODUODENOSCOPY (EGD);  Surgeon: Scott Mcrae MD;  Location:  GI     NECK SURGERY         Prior to Admission Medications   Prior to Admission Medications   Prescriptions Last Dose Informant Patient Reported? Taking?   metoprolol succinate ER (TOPROL-XL) 50 MG 24 hr tablet 5/7/2021 at AM Self Yes Yes   Sig: Take 50 mg by mouth daily    olopatadine (PATANOL) 0.1 % ophthalmic solution  at PRN Self Yes Yes   Sig: Place 1 drop into both eyes 2 times daily as needed for allergies   polyethylene glycol-propylene glycol (SYSTANE ULTRA) 0.4-0.3 % SOLN ophthalmic solution  at PRN Self Yes Yes   Sig: Place 1 drop into both eyes every hour as needed for dry eyes      Facility-Administered Medications: None     Allergies   Allergies   Allergen Reactions     Morphine Nausea and Vomiting       Social History   I have reviewed this patient's social history and updated it with pertinent information if needed. Ravi Shah  reports that he has quit smoking. His smokeless tobacco use includes chew. He reports current alcohol use. He reports that he does not use drugs.    Family History   I have reviewed this patient's family history and updated it with pertinent information if needed.   Family History   Problem Relation Age of Onset     No Known Problems Maternal Grandmother      No Known Problems Maternal Grandfather      No Known Problems Paternal Grandmother      Substance Abuse Paternal Grandfather        Review of Systems   The 10 point Review of Systems is negative other than noted in the HPI or here.     Physical Exam   Temp: 96.3  F (35.7  C) Temp src: Temporal BP: (!) 142/102 Pulse: 91   Resp: 18 SpO2: 94 % O2  Device: Nasal cannula Oxygen Delivery: 2 LPM  Vital Signs with Ranges  Temp:  [96.3  F (35.7  C)] 96.3  F (35.7  C)  Pulse:  [83-95] 91  Resp:  [18] 18  BP: (142-161)/() 142/102  SpO2:  [94 %-100 %] 94 %  185 lbs 0 oz    Constitutional: In up on the edge of the bed.  Appears comfortable but slightly tremulous, nontoxic-appearing no acute distress  Eyes: Pupils equal round reactive to light and accommodating, no nystagmus, extraocular eye motions intact  Neck-supple nontender  HEENT: Oropharynx nl  Respiratory: Clear to auscultation bilaterally, breathing easily no chest wall tenderness  Cardiovascular: Regular rate and rhythm no rubs gallops or murmurs appreciated  GI: Soft nontender nondistended  Lymph/Hematologic: No cervical or inguinal lymphadenopathy  Genitourinary: No pain over the bladder  Skin: No rash or edema  Musculoskeletal: No focal joint swelling erythema  Neurologic: He seems slightly tremulous.  Fully oriented normal tone.  Strength of 5 in extremities x4.  Appropriate.  Psychiatric: pleasant and cooperative.    Data   Data reviewed today:  I personally reviewed laboratory studies, EKG, CT imaging imaging performed the emergency room.  EKG reviewed by myself shows normal sinus rhythm without ST-T wave or ischemic changes.  Recent Labs   Lab 05/08/21  1344   WBC 6.5   HGB 15.1   *         POTASSIUM 3.9   CHLORIDE 107   CO2 27   BUN 15   CR 0.98   ANIONGAP 6   ISAIAS 9.0   *   TROPI <0.015       Imaging:  Recent Results (from the past 24 hour(s))   CT Abdomen Pelvis w/o Contrast    Narrative    CT ABDOMEN PELVIS WITHOUT CONTRAST  5/8/2021 3:18 PM    CLINICAL HISTORY: Flank pain, kidney stone suspected.  TECHNIQUE: CT scan of the abdomen and pelvis was performed without IV  contrast. Multiplanar reformats were obtained. Dose reduction  techniques were used.  CONTRAST: None.    COMPARISON: None.    FINDINGS:   LOWER CHEST: Normal.    HEPATOBILIARY: Marked fatty infiltration  of the liver. No focal liver  lesions. Gallbladder wall.    PANCREAS: Normal.    SPLEEN: Normal.    ADRENAL GLANDS: Normal.    KIDNEYS/BLADDER: There is moderate left hydronephrosis and  hydroureter. There is also left perinephric infiltration. No ureteral  calcification or other cause for obstruction demonstrated. The right  kidney and ureter appear normal. Urinary bladder is decompressed.    BOWEL: Scattered colonic diverticula but no evidence of  diverticulitis. Normal appendix. No bowel obstruction or free air.  Moderate-sized hiatal hernia.    LYMPH NODES: Normal.    VASCULATURE: Unremarkable.    PELVIC ORGANS: Normal.    OTHER: No ascites.    MUSCULOSKELETAL: Bilateral L5 spondylolysis noted. No destructive bone  lesions.      Impression    IMPRESSION:   1.  Left hydronephrosis and hydroureter with perinephric infiltration  could be related to a recently passed stone or ascending infection. No  urinary tract calculi are demonstrated.  2.  Other chronic and incidental findings detailed above.    ALEXIA RAGSDALE MD

## 2021-05-09 VITALS
HEIGHT: 71 IN | HEART RATE: 78 BPM | TEMPERATURE: 97.5 F | OXYGEN SATURATION: 95 % | WEIGHT: 183.42 LBS | BODY MASS INDEX: 25.68 KG/M2 | RESPIRATION RATE: 16 BRPM | DIASTOLIC BLOOD PRESSURE: 86 MMHG | SYSTOLIC BLOOD PRESSURE: 123 MMHG

## 2021-05-09 LAB
ALBUMIN SERPL-MCNC: 2.9 G/DL (ref 3.4–5)
ALP SERPL-CCNC: 43 U/L (ref 40–150)
ALT SERPL W P-5'-P-CCNC: 88 U/L (ref 0–70)
ANION GAP SERPL CALCULATED.3IONS-SCNC: 4 MMOL/L (ref 3–14)
AST SERPL W P-5'-P-CCNC: 60 U/L (ref 0–45)
BILIRUB DIRECT SERPL-MCNC: 0.4 MG/DL (ref 0–0.2)
BILIRUB SERPL-MCNC: 1.3 MG/DL (ref 0.2–1.3)
BUN SERPL-MCNC: 13 MG/DL (ref 7–30)
CALCIUM SERPL-MCNC: 7.7 MG/DL (ref 8.5–10.1)
CHLORIDE SERPL-SCNC: 106 MMOL/L (ref 94–109)
CO2 SERPL-SCNC: 27 MMOL/L (ref 20–32)
CREAT SERPL-MCNC: 0.9 MG/DL (ref 0.66–1.25)
ERYTHROCYTE [DISTWIDTH] IN BLOOD BY AUTOMATED COUNT: 11.8 % (ref 10–15)
GFR SERPL CREATININE-BSD FRML MDRD: >90 ML/MIN/{1.73_M2}
GLUCOSE SERPL-MCNC: 81 MG/DL (ref 70–99)
HCT VFR BLD AUTO: 36.9 % (ref 40–53)
HGB BLD-MCNC: 12.3 G/DL (ref 13.3–17.7)
MCH RBC QN AUTO: 35.5 PG (ref 26.5–33)
MCHC RBC AUTO-ENTMCNC: 33.3 G/DL (ref 31.5–36.5)
MCV RBC AUTO: 107 FL (ref 78–100)
PLATELET # BLD AUTO: 232 10E9/L (ref 150–450)
POTASSIUM SERPL-SCNC: 4.1 MMOL/L (ref 3.4–5.3)
PROT SERPL-MCNC: 5.5 G/DL (ref 6.8–8.8)
RBC # BLD AUTO: 3.46 10E12/L (ref 4.4–5.9)
SODIUM SERPL-SCNC: 137 MMOL/L (ref 133–144)
WBC # BLD AUTO: 5.6 10E9/L (ref 4–11)

## 2021-05-09 PROCEDURE — 36415 COLL VENOUS BLD VENIPUNCTURE: CPT | Performed by: INTERNAL MEDICINE

## 2021-05-09 PROCEDURE — G0378 HOSPITAL OBSERVATION PER HR: HCPCS

## 2021-05-09 PROCEDURE — 80076 HEPATIC FUNCTION PANEL: CPT | Performed by: INTERNAL MEDICINE

## 2021-05-09 PROCEDURE — 80048 BASIC METABOLIC PNL TOTAL CA: CPT | Performed by: INTERNAL MEDICINE

## 2021-05-09 PROCEDURE — 258N000003 HC RX IP 258 OP 636: Performed by: NURSE PRACTITIONER

## 2021-05-09 PROCEDURE — 99217 PR OBSERVATION CARE DISCHARGE: CPT | Performed by: PHYSICIAN ASSISTANT

## 2021-05-09 PROCEDURE — 250N000013 HC RX MED GY IP 250 OP 250 PS 637: Performed by: INTERNAL MEDICINE

## 2021-05-09 PROCEDURE — 96361 HYDRATE IV INFUSION ADD-ON: CPT

## 2021-05-09 PROCEDURE — 99207 PR MOONLIGHTING INDICATOR: CPT | Performed by: PHYSICIAN ASSISTANT

## 2021-05-09 PROCEDURE — 85027 COMPLETE CBC AUTOMATED: CPT | Performed by: INTERNAL MEDICINE

## 2021-05-09 RX ORDER — TAMSULOSIN HYDROCHLORIDE 0.4 MG/1
0.4 CAPSULE ORAL DAILY
Qty: 30 CAPSULE | Refills: 1 | Status: SHIPPED | OUTPATIENT
Start: 2021-05-09 | End: 2022-09-15

## 2021-05-09 RX ORDER — ONDANSETRON 4 MG/1
4 TABLET, ORALLY DISINTEGRATING ORAL EVERY 6 HOURS PRN
Qty: 6 TABLET | Refills: 3 | Status: SHIPPED | OUTPATIENT
Start: 2021-05-09 | End: 2022-09-15

## 2021-05-09 RX ADMIN — THIAMINE HCL TAB 100 MG 200 MG: 100 TAB at 08:17

## 2021-05-09 RX ADMIN — FOLIC ACID 1 MG: 1 TABLET ORAL at 08:17

## 2021-05-09 RX ADMIN — MULTIPLE VITAMINS W/ MINERALS TAB 1 TABLET: TAB at 08:17

## 2021-05-09 RX ADMIN — SODIUM CHLORIDE: 9 INJECTION, SOLUTION INTRAVENOUS at 04:14

## 2021-05-09 RX ADMIN — ACETAMINOPHEN 650 MG: 325 TABLET, FILM COATED ORAL at 08:17

## 2021-05-09 RX ADMIN — METOPROLOL SUCCINATE 50 MG: 50 TABLET, EXTENDED RELEASE ORAL at 08:17

## 2021-05-09 NOTE — PROGRESS NOTES
MD Notification    Notified Person: MD    Notified Person Name: Min Gusman    Notification Date/Time: 5/8/21 @ 8:28 PM    Notification Interaction: Page    Purpose of Notification: Can we get pain meds? No PRN pain medications ordered. Thank you.     Orders Received: Yes, orders for low dose dilaudid    Comments:

## 2021-05-09 NOTE — PLAN OF CARE
Observation goals PRIOR TO DISCHARGE     Comments:   -diagnostic tests and consults completed and resulted: Not met   -vital signs normal or at patient baseline: Met  -tolerating oral intake to maintain hydration: Met  -adequate pain control on oral analgesics: Partially met  -safe disposition plan has been identified: Met  -seen by urology and follow up appt made: Not met  Nurse to notify provider when observation goals have been met and patient is ready for discharge.

## 2021-05-09 NOTE — PROGRESS NOTES
Observation goals:     -diagnostic tests and consults completed and resulted- Not met  -vital signs normal or at patient baseline- Met  -tolerating oral intake to maintain hydration- Met  -adequate pain control on oral analgesics- Met  -safe disposition plan has been identified- Not met  -seen by urology and follow up appt made- Not met  Nurse to notify provider when observation goals have been met and patient is ready for discharge.

## 2021-05-09 NOTE — PROGRESS NOTES
Observation goals:    -diagnostic tests and consults completed and resulted- Not met  -vital signs normal or at patient baseline- Not met, hypertensive  -tolerating oral intake to maintain hydration- Partially met  -adequate pain control on oral analgesics- Partially met, needing IV dilaudid  -safe disposition plan has been identified- Not met  -seen by urology and follow up appt made- Not met  Nurse to notify provider when observation goals have been met and patient is ready for discharge.

## 2021-05-09 NOTE — PLAN OF CARE
Observation goals PRIOR TO DISCHARGE     Comments:   -diagnostic tests and consults completed and resulted: Met   -vital signs normal or at patient baseline: Met  -tolerating oral intake to maintain hydration: Met  -adequate pain control on oral analgesics: Partially met  -safe disposition plan has been identified: Met  -seen by urology and follow up appt made: Met  Nurse to notify provider when observation goals have been met and patient is ready for discharge.       Pt A&O. VSS. On RA. Pain manageable. Using prn tylenol and heat. Voiding. No further stones noted. Tolerating diet. CIWA 3. Up ind. Urology consult completed, ok with discharge home. Per providers patient to discharge home with outpatient urology follow up. All discharge instructions, meds, and follow ups discussed. States understanding to all. Iv removed. Escorted to exit via wheelchair by aid.

## 2021-05-09 NOTE — PROGRESS NOTES
Observation goals:     -diagnostic tests and consults completed and resulted- Not met  -vital signs normal or at patient baseline- Met  -tolerating oral intake to maintain hydration- Met  -adequate pain control on oral analgesics- Partially met, needing IV dilaudid last shifit  -safe disposition plan has been identified- Not met  -seen by urology and follow up appt made- Not met  Nurse to notify provider when observation goals have been met and patient is ready for discharge.

## 2021-05-09 NOTE — DISCHARGE SUMMARY
"[facility]  Discharge Summary  Hospitalist    Date of Admission:  5/8/2021  Date of Discharge:  5/9/2021  Discharging Provider: Rosa Flores PA-C    Discharge Diagnoses   Renal calculus, passed  Left flank pain    Hospital Course   Ravi Shah is a 45 year old year old male who has a PMH significant for hypertension, alcohol dependence with history of alcohol withdrawal . Pt was admitted to [facility] on 5/8/2021 after presenting with left flank pain.  The following problems were addressed during his hospitalization:    Renal colic, renal stone.  Presented with new onset left flank pain.  CT shows left hydronephrosis but no stone.  Urinalysis shows microscopic hematuria but no pyuria.  Possible passage of stone with improvement of his pain just prior to hospitalist exam for admission.  Possible 1 mm black stone in the specimen container.  Had 2 L fluid. Double doses of antiemetics and narcotics. Laboratory studies normal.  Calcium normal. Pt was still admitted overnight to observation. He is still feeling some 4/10 left sided \"bladder/groin pressure,\" but it's manageable with Tylenol and much different from the intolerable pain he presented with  -Urology consult completed 5/9, Urology recommend:   ---Agree with pain control, not uncommon for pain to persist for 12-24 hours after stone passage due to ureteral spasms  No calcification seen in bladder, so I anticipate stone has already been voided  --- Addition of tamsulosin and Oxybutynin 5 mg TID PRN may prove beneficial for pain control.  --- No other source of obstruction so no acute surgical intervention indicated.  ---Ok for discharge to home from  standpoint.  ---Should establish care with urologist following discharge with plan for metabolic work up for stone risk factors.  - Follow-up with PCP this coming Thursday already arranged.        Alcohol dependence.  Possible mild alcohol withdrawal.  Focal " neuro exam.  Last drink was yesterday.  Drinking 4 drinks per day.  Declines Chem Dep evaluation. A lcohol withdrawal protocol was ordered without intervention while pt was in hospital  -He has a PCP appointment this coming Thursday.   -Provide multivitamin thiamine and folate while in observation  -Close PCP follow-up arranged.     Macrocytosis-no anemia.,  Likely related alcohol. TSH WNL.  Follow-up with PCP (per pt, already has follow-up scheduled next week for a med check).  Recommend cessation of alcohol.  Covid status. negative.   Low suspicion.  No Covid symptoms.    The patient's care was discussed with the Attending Physician, Dr. Guillermo, Bedside Nurse, Care Coordinator/, and Patient.    Rosa Flores PA-C    Significant Results and Procedures   Urology consult completed 5/9 as above    Pending Results   Unresulted Labs Ordered in the Past 30 Days of this Admission     No orders found for last 31 day(s).          Code Status   Full Code       Primary Care Physician   Joe Sandra    Physical Exam   Temp: 97.5  F (36.4  C) Temp src: Oral BP: 123/86 Pulse: 78   Resp: 16 SpO2: 95 % O2 Device: None (Room air) Oxygen Delivery: 2 LPM  Vitals:    05/08/21 1335 05/08/21 1933   Weight: 83.9 kg (185 lb) 83.2 kg (183 lb 6.8 oz)     Vital Signs with Ranges  Temp:  [96.3  F (35.7  C)-97.5  F (36.4  C)] 97.5  F (36.4  C)  Pulse:  [78-95] 78  Resp:  [16-20] 16  BP: (104-165)/() 123/86  SpO2:  [92 %-100 %] 95 %  I/O last 3 completed shifts:  In: 2250 [I.V.:250; IV Piggyback:2000]  Out: 750 [Urine:550; Emesis/NG output:200]    Constitutional: resting comfortably in bed, no acute distress, just woke up from a nap  Eyes: No scleral icterus or injection  ENT: Normocephalic, atraumatic  Respiratory: No secondary muscle use or labored breathing, no supplemental oxygen required. Lungs clear to auscultation bilaterally without wheezes or rhonchi   Cardiovascular: RRR without murmur  GI: Abdomen soft,  non-tender to palpation other than mild LLQ pain with palpation, non-distended.  Bowel sounds active  Lymph/Hematologic: No LE edema, no bruising on visible skin  Genitourinary: no curtis catheter, no incontinence. No CVA tenderness,   Skin/Integumen: warm, dry, in tact without rash or hives  MSK: able to move extremities on command without weakness, walks without weakness or ataxia  Neurologic: alert to voice, oriented x3,   Neuropsychiatric: pleasant, interactive, answers questions appropriately    Discharge Disposition   Discharged to home  Condition at discharge: Stable    Consultations This Hospital Stay   UROLOGY IP CONSULT  UROLOGY IP CONSULT    Time Spent on this Encounter   IRosa PA-C, personally saw the patient today and spent less than or equal to 30 minutes discharging this patient.    Discharge Orders      Reason for your hospital stay    You were in the hospital to assess and treat the kidney stone     Follow-up and recommended labs and tests     Follow up with primary care provider, Joe Sandra, within 7 days for hospital follow- up.  The following labs/tests are recommended: basic metabolic panel.     Activity    Your activity upon discharge: activity as tolerated     Diet    Follow this diet upon discharge: Orders Placed This Encounter      Regular Diet Adult     Discharge Medications   Current Discharge Medication List      START taking these medications    Details   ondansetron (ZOFRAN-ODT) 4 MG ODT tab Take 1 tablet (4 mg) by mouth every 6 hours as needed for nausea or vomiting  Qty: 6 tablet, Refills: 3    Associated Diagnoses: Renal colic      tamsulosin (FLOMAX) 0.4 MG capsule Take 1 capsule (0.4 mg) by mouth daily  Qty: 30 capsule, Refills: 1    Associated Diagnoses: Renal colic         CONTINUE these medications which have NOT CHANGED    Details   metoprolol succinate ER (TOPROL-XL) 50 MG 24 hr tablet Take 50 mg by mouth daily       olopatadine (PATANOL) 0.1 %  ophthalmic solution Place 1 drop into both eyes 2 times daily as needed for allergies      polyethylene glycol-propylene glycol (SYSTANE ULTRA) 0.4-0.3 % SOLN ophthalmic solution Place 1 drop into both eyes every hour as needed for dry eyes           Allergies   Allergies   Allergen Reactions     Morphine Nausea and Vomiting     Data   Most Recent 3 CBC's:  Recent Labs   Lab Test 05/09/21  0606 05/08/21  1344 05/02/20  0818   WBC 5.6 6.5 5.6   HGB 12.3* 15.1 13.5   * 105* 101*    308 150      Most Recent 3 BMP's:  Recent Labs   Lab Test 05/09/21  0606 05/08/21  2142 05/08/21  1344 05/03/20  0730     --  140 134   POTASSIUM 4.1 4.3 3.9 3.7   CHLORIDE 106  --  107 101   CO2 27  --  27 25   BUN 13  --  15 4*   CR 0.90  --  0.98 0.87   ANIONGAP 4  --  6 8   ISAIAS 7.7*  --  9.0 8.7   GLC 81  --  120* 117*     Most Recent 2 LFT's:  Recent Labs   Lab Test 05/09/21  0606 05/03/20  0730   AST 60* 59*   ALT 88* 74*   ALKPHOS 43 58   BILITOTAL 1.3 2.3*     Most Recent INR's and Anticoagulation Dosing History:  Anticoagulation Dose History     Recent Dosing and Labs Latest Ref Rng & Units 9/11/2019 3/2/2020 3/14/2020 5/1/2020    INR 0.86 - 1.14 0.93 0.96 1.06 1.02        Most Recent 3 Troponin's:  Recent Labs   Lab Test 05/08/21  1344 02/12/19  1305   TROPI <0.015 <0.015     Most Recent Cholesterol Panel:No lab results found.  Most Recent 6 Bacteria Isolates From Any Culture (See EPIC Reports for Culture Details):No lab results found.  Most Recent TSH, T4 and A1c Labs:  Recent Labs   Lab Test 05/08/21 2142   TSH 1.37     Results for orders placed or performed during the hospital encounter of 05/08/21   CT Abdomen Pelvis w/o Contrast    Narrative    CT ABDOMEN PELVIS WITHOUT CONTRAST  5/8/2021 3:18 PM    CLINICAL HISTORY: Flank pain, kidney stone suspected.  TECHNIQUE: CT scan of the abdomen and pelvis was performed without IV  contrast. Multiplanar reformats were obtained. Dose reduction  techniques were  used.  CONTRAST: None.    COMPARISON: None.    FINDINGS:   LOWER CHEST: Normal.    HEPATOBILIARY: Marked fatty infiltration of the liver. No focal liver  lesions. Gallbladder wall.    PANCREAS: Normal.    SPLEEN: Normal.    ADRENAL GLANDS: Normal.    KIDNEYS/BLADDER: There is moderate left hydronephrosis and  hydroureter. There is also left perinephric infiltration. No ureteral  calcification or other cause for obstruction demonstrated. The right  kidney and ureter appear normal. Urinary bladder is decompressed.    BOWEL: Scattered colonic diverticula but no evidence of  diverticulitis. Normal appendix. No bowel obstruction or free air.  Moderate-sized hiatal hernia.    LYMPH NODES: Normal.    VASCULATURE: Unremarkable.    PELVIC ORGANS: Normal.    OTHER: No ascites.    MUSCULOSKELETAL: Bilateral L5 spondylolysis noted. No destructive bone  lesions.      Impression    IMPRESSION:   1.  Left hydronephrosis and hydroureter with perinephric infiltration  could be related to a recently passed stone or ascending infection. No  urinary tract calculi are demonstrated.  2.  Other chronic and incidental findings detailed above.    ALEXIA RAGSDALE MD

## 2021-05-09 NOTE — PROGRESS NOTES
Covid negative. AxOx4. VSS on RA, hypertensive, metoprolol given. SBA in room. Regular diet. Emesis this evening, 200ml clear liquid, zofran given, nausea and vomiting decreased. Pain to abdomen radiating to groin, pain during urination. PRN dilaudid given. Using urinal and straining urine, no stones or fragments passed. NS running @ 125ml/hr. CIWAW precautions, scored 15, lorazepam given. Plan for urology consult tomorrow. Plan for discharge tomorrow to home. Continue to monitor.

## 2021-05-09 NOTE — PLAN OF CARE
AxOx4. VSS on RA. SBA in room. Regular diet. Pain to abdomen radiating to groin, and pain during urination rating 5/10. Pt did not request any pain medication. Using urinal and straining urine, no stones or fragments passed. NS running @ 125ml/hr. CIWAW precautions, scored 7, 0,0 , lorazepam not needed. Plan for urology consult today. Plan for discharge today to home.

## 2021-05-09 NOTE — PROGRESS NOTES
RECEIVING UNIT ED HANDOFF REVIEW    ED Nurse Handoff Report was reviewed by: Nicole Eisenberg RN on May 8, 2021 at 7:20 PM

## 2021-09-18 ENCOUNTER — HEALTH MAINTENANCE LETTER (OUTPATIENT)
Age: 46
End: 2021-09-18

## 2022-01-08 ENCOUNTER — HEALTH MAINTENANCE LETTER (OUTPATIENT)
Age: 47
End: 2022-01-08

## 2022-09-15 ENCOUNTER — HOSPITAL ENCOUNTER (INPATIENT)
Facility: CLINIC | Age: 47
LOS: 3 days | Discharge: HOME | DRG: 897 | End: 2022-09-18
Attending: EMERGENCY MEDICINE | Admitting: HOSPITALIST
Payer: COMMERCIAL

## 2022-09-15 DIAGNOSIS — K70.10 ALCOHOLIC HEPATITIS WITHOUT ASCITES (H): ICD-10-CM

## 2022-09-15 DIAGNOSIS — F10.930 ALCOHOL WITHDRAWAL SYNDROME WITHOUT COMPLICATION (H): ICD-10-CM

## 2022-09-15 LAB
ALBUMIN SERPL-MCNC: 4.2 G/DL (ref 3.4–5)
ALP SERPL-CCNC: 70 U/L (ref 40–150)
ALT SERPL W P-5'-P-CCNC: 113 U/L (ref 0–70)
ANION GAP SERPL CALCULATED.3IONS-SCNC: 21 MMOL/L (ref 3–14)
AST SERPL W P-5'-P-CCNC: 145 U/L (ref 0–45)
BASOPHILS # BLD AUTO: 0.1 10E3/UL (ref 0–0.2)
BASOPHILS NFR BLD AUTO: 1 %
BILIRUB SERPL-MCNC: 2 MG/DL (ref 0.2–1.3)
BUN SERPL-MCNC: 11 MG/DL (ref 7–30)
CALCIUM SERPL-MCNC: 9.3 MG/DL (ref 8.5–10.1)
CHLORIDE BLD-SCNC: 95 MMOL/L (ref 94–109)
CO2 SERPL-SCNC: 18 MMOL/L (ref 20–32)
CREAT SERPL-MCNC: 0.84 MG/DL (ref 0.66–1.25)
EOSINOPHIL # BLD AUTO: 0 10E3/UL (ref 0–0.7)
EOSINOPHIL NFR BLD AUTO: 0 %
ERYTHROCYTE [DISTWIDTH] IN BLOOD BY AUTOMATED COUNT: 14 % (ref 10–15)
ETHANOL SERPL-MCNC: 0.03 G/DL
GFR SERPL CREATININE-BSD FRML MDRD: >90 ML/MIN/1.73M2
GLUCOSE BLD-MCNC: 83 MG/DL (ref 70–99)
HCT VFR BLD AUTO: 46.2 % (ref 40–53)
HGB BLD-MCNC: 15.3 G/DL (ref 13.3–17.7)
IMM GRANULOCYTES # BLD: 0 10E3/UL
IMM GRANULOCYTES NFR BLD: 0 %
LIPASE SERPL-CCNC: 95 U/L (ref 73–393)
LYMPHOCYTES # BLD AUTO: 0.5 10E3/UL (ref 0.8–5.3)
LYMPHOCYTES NFR BLD AUTO: 6 %
MCH RBC QN AUTO: 33.6 PG (ref 26.5–33)
MCHC RBC AUTO-ENTMCNC: 33.1 G/DL (ref 31.5–36.5)
MCV RBC AUTO: 102 FL (ref 78–100)
MONOCYTES # BLD AUTO: 0.6 10E3/UL (ref 0–1.3)
MONOCYTES NFR BLD AUTO: 8 %
NEUTROPHILS # BLD AUTO: 6.4 10E3/UL (ref 1.6–8.3)
NEUTROPHILS NFR BLD AUTO: 85 %
NRBC # BLD AUTO: 0 10E3/UL
NRBC BLD AUTO-RTO: 0 /100
PLATELET # BLD AUTO: 216 10E3/UL (ref 150–450)
POTASSIUM BLD-SCNC: 4.2 MMOL/L (ref 3.4–5.3)
PROT SERPL-MCNC: 8.1 G/DL (ref 6.8–8.8)
RBC # BLD AUTO: 4.55 10E6/UL (ref 4.4–5.9)
SARS-COV-2 RNA RESP QL NAA+PROBE: NEGATIVE
SODIUM SERPL-SCNC: 134 MMOL/L (ref 133–144)
WBC # BLD AUTO: 7.5 10E3/UL (ref 4–11)

## 2022-09-15 PROCEDURE — 250N000013 HC RX MED GY IP 250 OP 250 PS 637: Performed by: HOSPITALIST

## 2022-09-15 PROCEDURE — 85025 COMPLETE CBC W/AUTO DIFF WBC: CPT | Performed by: EMERGENCY MEDICINE

## 2022-09-15 PROCEDURE — 83735 ASSAY OF MAGNESIUM: CPT | Performed by: HOSPITALIST

## 2022-09-15 PROCEDURE — 83690 ASSAY OF LIPASE: CPT | Performed by: EMERGENCY MEDICINE

## 2022-09-15 PROCEDURE — 80053 COMPREHEN METABOLIC PANEL: CPT | Performed by: EMERGENCY MEDICINE

## 2022-09-15 PROCEDURE — 84100 ASSAY OF PHOSPHORUS: CPT | Performed by: HOSPITALIST

## 2022-09-15 PROCEDURE — C9113 INJ PANTOPRAZOLE SODIUM, VIA: HCPCS | Performed by: HOSPITALIST

## 2022-09-15 PROCEDURE — 96366 THER/PROPH/DIAG IV INF ADDON: CPT

## 2022-09-15 PROCEDURE — 96365 THER/PROPH/DIAG IV INF INIT: CPT

## 2022-09-15 PROCEDURE — 250N000011 HC RX IP 250 OP 636: Performed by: EMERGENCY MEDICINE

## 2022-09-15 PROCEDURE — 120N000001 HC R&B MED SURG/OB

## 2022-09-15 PROCEDURE — C9803 HOPD COVID-19 SPEC COLLECT: HCPCS

## 2022-09-15 PROCEDURE — 99223 1ST HOSP IP/OBS HIGH 75: CPT | Mod: AI | Performed by: HOSPITALIST

## 2022-09-15 PROCEDURE — U0005 INFEC AGEN DETEC AMPLI PROBE: HCPCS | Performed by: EMERGENCY MEDICINE

## 2022-09-15 PROCEDURE — 82077 ASSAY SPEC XCP UR&BREATH IA: CPT | Performed by: EMERGENCY MEDICINE

## 2022-09-15 PROCEDURE — 36415 COLL VENOUS BLD VENIPUNCTURE: CPT | Performed by: EMERGENCY MEDICINE

## 2022-09-15 PROCEDURE — 250N000009 HC RX 250: Performed by: EMERGENCY MEDICINE

## 2022-09-15 PROCEDURE — 96375 TX/PRO/DX INJ NEW DRUG ADDON: CPT

## 2022-09-15 PROCEDURE — 250N000011 HC RX IP 250 OP 636: Performed by: HOSPITALIST

## 2022-09-15 PROCEDURE — 96361 HYDRATE IV INFUSION ADD-ON: CPT

## 2022-09-15 PROCEDURE — HZ2ZZZZ DETOXIFICATION SERVICES FOR SUBSTANCE ABUSE TREATMENT: ICD-10-PCS | Performed by: HOSPITALIST

## 2022-09-15 PROCEDURE — 258N000003 HC RX IP 258 OP 636: Performed by: EMERGENCY MEDICINE

## 2022-09-15 PROCEDURE — 99285 EMERGENCY DEPT VISIT HI MDM: CPT | Mod: 25

## 2022-09-15 PROCEDURE — 96376 TX/PRO/DX INJ SAME DRUG ADON: CPT

## 2022-09-15 RX ORDER — PROCHLORPERAZINE 25 MG
25 SUPPOSITORY, RECTAL RECTAL EVERY 12 HOURS PRN
Status: DISCONTINUED | OUTPATIENT
Start: 2022-09-15 | End: 2022-09-18 | Stop reason: HOSPADM

## 2022-09-15 RX ORDER — GABAPENTIN 300 MG/1
900 CAPSULE ORAL EVERY 8 HOURS
Status: DISCONTINUED | OUTPATIENT
Start: 2022-09-16 | End: 2022-09-18 | Stop reason: HOSPADM

## 2022-09-15 RX ORDER — HALOPERIDOL 5 MG/ML
2.5-5 INJECTION INTRAMUSCULAR EVERY 6 HOURS PRN
Status: DISCONTINUED | OUTPATIENT
Start: 2022-09-15 | End: 2022-09-18 | Stop reason: HOSPADM

## 2022-09-15 RX ORDER — CLONIDINE HYDROCHLORIDE 0.1 MG/1
0.1 TABLET ORAL EVERY 8 HOURS
Status: DISCONTINUED | OUTPATIENT
Start: 2022-09-15 | End: 2022-09-17

## 2022-09-15 RX ORDER — LORAZEPAM 0.5 MG/1
1-2 TABLET ORAL EVERY 30 MIN PRN
Status: DISCONTINUED | OUTPATIENT
Start: 2022-09-15 | End: 2022-09-18 | Stop reason: HOSPADM

## 2022-09-15 RX ORDER — GABAPENTIN 300 MG/1
600 CAPSULE ORAL EVERY 8 HOURS
Status: DISCONTINUED | OUTPATIENT
Start: 2022-09-19 | End: 2022-09-18 | Stop reason: HOSPADM

## 2022-09-15 RX ORDER — GABAPENTIN 600 MG/1
1200 TABLET ORAL ONCE
Status: COMPLETED | OUTPATIENT
Start: 2022-09-15 | End: 2022-09-16

## 2022-09-15 RX ORDER — ONDANSETRON 2 MG/ML
4 INJECTION INTRAMUSCULAR; INTRAVENOUS EVERY 6 HOURS PRN
Status: DISCONTINUED | OUTPATIENT
Start: 2022-09-15 | End: 2022-09-18 | Stop reason: HOSPADM

## 2022-09-15 RX ORDER — PROCHLORPERAZINE MALEATE 5 MG
10 TABLET ORAL EVERY 6 HOURS PRN
Status: DISCONTINUED | OUTPATIENT
Start: 2022-09-15 | End: 2022-09-18 | Stop reason: HOSPADM

## 2022-09-15 RX ORDER — GABAPENTIN 300 MG/1
300 CAPSULE ORAL EVERY 8 HOURS
Status: DISCONTINUED | OUTPATIENT
Start: 2022-09-21 | End: 2022-09-18 | Stop reason: HOSPADM

## 2022-09-15 RX ORDER — OLANZAPINE 5 MG/1
5-10 TABLET, ORALLY DISINTEGRATING ORAL EVERY 6 HOURS PRN
Status: DISCONTINUED | OUTPATIENT
Start: 2022-09-15 | End: 2022-09-18 | Stop reason: HOSPADM

## 2022-09-15 RX ORDER — ONDANSETRON 2 MG/ML
4 INJECTION INTRAMUSCULAR; INTRAVENOUS ONCE
Status: COMPLETED | OUTPATIENT
Start: 2022-09-15 | End: 2022-09-15

## 2022-09-15 RX ORDER — LORAZEPAM 2 MG/ML
1 INJECTION INTRAMUSCULAR EVERY 30 MIN PRN
Status: DISCONTINUED | OUTPATIENT
Start: 2022-09-15 | End: 2022-09-15

## 2022-09-15 RX ORDER — GABAPENTIN 100 MG/1
100 CAPSULE ORAL EVERY 8 HOURS
Status: DISCONTINUED | OUTPATIENT
Start: 2022-09-23 | End: 2022-09-18 | Stop reason: HOSPADM

## 2022-09-15 RX ORDER — ONDANSETRON 4 MG/1
4 TABLET, FILM COATED ORAL EVERY 8 HOURS PRN
COMMUNITY
End: 2023-11-29

## 2022-09-15 RX ORDER — LORAZEPAM 2 MG/ML
1-2 INJECTION INTRAMUSCULAR EVERY 30 MIN PRN
Status: DISCONTINUED | OUTPATIENT
Start: 2022-09-15 | End: 2022-09-18 | Stop reason: HOSPADM

## 2022-09-15 RX ORDER — ONDANSETRON 4 MG/1
4 TABLET, ORALLY DISINTEGRATING ORAL EVERY 6 HOURS PRN
Status: DISCONTINUED | OUTPATIENT
Start: 2022-09-15 | End: 2022-09-18 | Stop reason: HOSPADM

## 2022-09-15 RX ORDER — HYDROMORPHONE HYDROCHLORIDE 2 MG/1
2 TABLET ORAL EVERY 4 HOURS PRN
Status: DISCONTINUED | OUTPATIENT
Start: 2022-09-15 | End: 2022-09-18 | Stop reason: HOSPADM

## 2022-09-15 RX ADMIN — LORAZEPAM 1 MG: 2 INJECTION INTRAMUSCULAR; INTRAVENOUS at 18:15

## 2022-09-15 RX ADMIN — FOLIC ACID: 5 INJECTION, SOLUTION INTRAMUSCULAR; INTRAVENOUS; SUBCUTANEOUS at 18:03

## 2022-09-15 RX ADMIN — LORAZEPAM 1 MG: 2 INJECTION INTRAMUSCULAR; INTRAVENOUS at 17:37

## 2022-09-15 RX ADMIN — LORAZEPAM 1 MG: 2 INJECTION INTRAMUSCULAR; INTRAVENOUS at 20:11

## 2022-09-15 RX ADMIN — SODIUM CHLORIDE 1000 ML: 9 INJECTION, SOLUTION INTRAVENOUS at 17:37

## 2022-09-15 RX ADMIN — ONDANSETRON 4 MG: 2 INJECTION INTRAMUSCULAR; INTRAVENOUS at 20:11

## 2022-09-15 RX ADMIN — LORAZEPAM 1 MG: 0.5 TABLET ORAL at 21:37

## 2022-09-15 RX ADMIN — ONDANSETRON 4 MG: 2 INJECTION INTRAMUSCULAR; INTRAVENOUS at 17:04

## 2022-09-15 RX ADMIN — PROCHLORPERAZINE EDISYLATE 10 MG: 5 INJECTION INTRAMUSCULAR; INTRAVENOUS at 21:36

## 2022-09-15 RX ADMIN — PANTOPRAZOLE SODIUM 40 MG: 40 INJECTION, POWDER, FOR SOLUTION INTRAVENOUS at 21:38

## 2022-09-15 ASSESSMENT — ENCOUNTER SYMPTOMS
SEIZURES: 0
ABDOMINAL PAIN: 1
VOMITING: 1
NAUSEA: 1
TREMORS: 1

## 2022-09-15 ASSESSMENT — ACTIVITIES OF DAILY LIVING (ADL)
ADLS_ACUITY_SCORE: 35

## 2022-09-15 NOTE — ED PROVIDER NOTES
History   Chief Complaint:  Alcohol Withdrawal     HPI   Ravi Shah is a 47 year old male with history of alcohol abuse and withdrawal who presents for evaluation of alcohol withdrawal. The patient reports a longstanding history of alcohol abuse and has been drinking 1-2 pints of liquor a day for about the last year and a half. He reports that he drank heavily on 9/11/22 and stopped the next day. On 9/13 he started to feel unwell and drank again. On 9/14 he started to develop nausea, vomiting, upper abdominal pain and again stopped drinking. Since then he has started to feel very shaky. This morning he reports that he had a brief syncopal episode while standing but he denies striking his head or otherwise injuring himself in the fall. Due to his ongoing symptoms today he came into the ED for evaluation with concern that he is in withdrawal. He denies any history of alcohol withdrawal seizures. He notes that he went through treatment for alcohol abuse in 2019.     Review of Systems   Gastrointestinal: Positive for abdominal pain, nausea and vomiting.   Neurological: Positive for tremors and syncope. Negative for seizures.   All other systems reviewed and are negative.    Allergies:  Morphine      Medications:  Toprol XL   Flomax     Past Medical History:     Hypertension  Alcohol abuse  Alcohol withdrawal   Kidney stones      Past Surgical History:    EGD combined   ENT surgery  Neck surgery      Social History:  Marital status:    The patient presents to the ED alone.   Alcohol use: Positive, 1-2 pints of liquor a day, last drink 9/13.     Physical Exam     Patient Vitals for the past 24 hrs:   BP Temp Temp src Pulse Resp SpO2   09/15/22 2100 (!) 153/104 -- -- (!) 125 22 95 %   09/15/22 2059 -- -- -- -- -- 95 %   09/15/22 2058 -- -- -- -- -- 96 %   09/15/22 2057 -- -- -- -- -- 95 %   09/15/22 2056 -- -- -- -- -- 95 %   09/15/22 2000 (!) 147/111 -- -- 116 28 97 %   09/15/22 1900 (!) 146/110 -- -- 114 20  97 %   09/15/22 1654 (!) 149/102 99.6  F (37.6  C) Temporal 119 16 100 %       Physical Exam  Constitutional: Middle aged white male. Tremulous.   HENT: No signs of trauma.   Eyes: Pupils are equal, round, and reactive to light. Minimal lateral nystagmus.   Neck: Normal range of motion. No JVD present. No cervical adenopathy. No posterior neck discomfort.   Cardiovascular: Rapid but regular.  Exam reveals no gallop and no friction rub.    No murmur heard.  Pulmonary/Chest: Bilateral breath sounds normal. No wheezes, rhonchi or rales.  Abdominal: Soft. Mild upper abdominal tenderness. No rebound or guarding. 1+ femoral pulses.   Musculoskeletal: No edema. No tenderness.   Lymphadenopathy: No lymphadenopathy.   Neurological: Alert and oriented to person, place, and time. Normal strength. Coordination normal.   Skin: Skin is warm and dry. No rash noted. No erythema.       Emergency Department Course      Laboratory:  Labs Ordered and Resulted from Time of ED Arrival to Time of ED Departure   COMPREHENSIVE METABOLIC PANEL - Abnormal       Result Value    Sodium 134      Potassium 4.2      Chloride 95      Carbon Dioxide (CO2) 18 (*)     Anion Gap 21 (*)     Urea Nitrogen 11      Creatinine 0.84      Calcium 9.3      Glucose 83      Alkaline Phosphatase 70       (*)      (*)     Protein Total 8.1      Albumin 4.2      Bilirubin Total 2.0 (*)     GFR Estimate >90     ETHYL ALCOHOL LEVEL - Abnormal    Alcohol ethyl 0.03 (*)    CBC WITH PLATELETS AND DIFFERENTIAL - Abnormal    WBC Count 7.5      RBC Count 4.55      Hemoglobin 15.3      Hematocrit 46.2       (*)     MCH 33.6 (*)     MCHC 33.1      RDW 14.0      Platelet Count 216      % Neutrophils 85      % Lymphocytes 6      % Monocytes 8      % Eosinophils 0      % Basophils 1      % Immature Granulocytes 0      NRBCs per 100 WBC 0      Absolute Neutrophils 6.4      Absolute Lymphocytes 0.5 (*)     Absolute Monocytes 0.6      Absolute Eosinophils 0.0       Absolute Basophils 0.1      Absolute Immature Granulocytes 0.0      Absolute NRBCs 0.0     LIPASE - Normal    Lipase 95     COVID-19 VIRUS (CORONAVIRUS) BY PCR - Normal    SARS CoV2 PCR Negative        Emergency Department Course:     Reviewed:  I reviewed nursing notes, vitals and past medical history    Assessments:  1717:  I obtained history and examined the patient as noted above.      Consults:  1956: I spoke with Dr. Bah of the hospitalist service regarding patient's presentation, findings, and plan of care.     Interventions:  1704 Zofran 4 mg IV   1737 Ativan 1 mg Iv   1737 NS 1,000 mL IV   1803 NS 1,000 mL with infuvite adult 10 mL, thiamine 100 mg, folic acid 1 mg IV   1815 Ativan 1 mg IV   2011 Ativan 1 mg IV   2011 Zofran 4 mg IV     Disposition:  The patient was admitted to the hospital under the care of Dr. Bah.     Impression & Plan      Medical Decision Making:  Joe Shah is a 47 year old male who comes in with probable alcohol withdrawal. He had been drinking at least a liter a day up until three days ago. Then he tried to stop and he stopped for a day but the next day he started drinking again and then began vomiting and having abdominal pain, so he did not drink anymore. The patient is shaky and tachycardic and has some mild hypertension but he is awake and alert. The patient was started on IV fluids and a banana bag. Labs were obtained. He was given Ativan. He is doing better but still has some hypertension and tachycardia. Alcohol was minimally elevated and some of his liver functions are up as well. I have recommended admission for alcohol withdrawal and alcoholic hepatitis for further evaluation and treatment.      Diagnosis:    ICD-10-CM    1. Alcohol withdrawal syndrome without complication (H)  F10.230    2. Alcoholic hepatitis without ascites  K70.10         Scribe Disclosure:  I, Urban Mcmahon, am serving as a scribe at 5:17 PM on 9/15/2022 to document services  personally performed by Alan Wren MD based on my observations and the provider's statements to me.             Alan Wren MD  09/15/22 3924

## 2022-09-15 NOTE — ED TRIAGE NOTES
abd pain for 2 days with vomiting, hx etoh withdrawal, last drank tue, no seizure hx, states he passed out today

## 2022-09-16 LAB
MAGNESIUM SERPL-MCNC: 1.7 MG/DL (ref 1.6–2.3)
PHOSPHATE SERPL-MCNC: 4 MG/DL (ref 2.5–4.5)

## 2022-09-16 PROCEDURE — 250N000011 HC RX IP 250 OP 636: Performed by: HOSPITALIST

## 2022-09-16 PROCEDURE — 250N000013 HC RX MED GY IP 250 OP 250 PS 637: Performed by: INTERNAL MEDICINE

## 2022-09-16 PROCEDURE — 250N000009 HC RX 250: Performed by: HOSPITALIST

## 2022-09-16 PROCEDURE — 250N000013 HC RX MED GY IP 250 OP 250 PS 637: Performed by: HOSPITALIST

## 2022-09-16 PROCEDURE — 120N000001 HC R&B MED SURG/OB

## 2022-09-16 PROCEDURE — C9113 INJ PANTOPRAZOLE SODIUM, VIA: HCPCS | Performed by: HOSPITALIST

## 2022-09-16 PROCEDURE — 258N000003 HC RX IP 258 OP 636: Performed by: HOSPITALIST

## 2022-09-16 PROCEDURE — 99233 SBSQ HOSP IP/OBS HIGH 50: CPT | Performed by: INTERNAL MEDICINE

## 2022-09-16 RX ORDER — NALOXONE HYDROCHLORIDE 0.4 MG/ML
0.4 INJECTION, SOLUTION INTRAMUSCULAR; INTRAVENOUS; SUBCUTANEOUS
Status: DISCONTINUED | OUTPATIENT
Start: 2022-09-16 | End: 2022-09-18 | Stop reason: HOSPADM

## 2022-09-16 RX ORDER — HYDROMORPHONE HCL IN WATER/PF 6 MG/30 ML
0.2 PATIENT CONTROLLED ANALGESIA SYRINGE INTRAVENOUS
Status: DISCONTINUED | OUTPATIENT
Start: 2022-09-16 | End: 2022-09-18 | Stop reason: HOSPADM

## 2022-09-16 RX ORDER — FLUMAZENIL 0.1 MG/ML
0.2 INJECTION, SOLUTION INTRAVENOUS
Status: DISCONTINUED | OUTPATIENT
Start: 2022-09-16 | End: 2022-09-18 | Stop reason: HOSPADM

## 2022-09-16 RX ORDER — LIDOCAINE 40 MG/G
CREAM TOPICAL
Status: DISCONTINUED | OUTPATIENT
Start: 2022-09-16 | End: 2022-09-18 | Stop reason: HOSPADM

## 2022-09-16 RX ORDER — MULTIPLE VITAMINS W/ MINERALS TAB 9MG-400MCG
1 TAB ORAL DAILY
Status: DISCONTINUED | OUTPATIENT
Start: 2022-09-17 | End: 2022-09-18 | Stop reason: HOSPADM

## 2022-09-16 RX ORDER — FOLIC ACID 1 MG/1
1 TABLET ORAL DAILY
Status: DISCONTINUED | OUTPATIENT
Start: 2022-09-17 | End: 2022-09-18 | Stop reason: HOSPADM

## 2022-09-16 RX ORDER — FOLIC ACID 1 MG/1
1 TABLET ORAL DAILY
Status: DISCONTINUED | OUTPATIENT
Start: 2022-09-16 | End: 2022-09-16

## 2022-09-16 RX ORDER — METOPROLOL SUCCINATE 50 MG/1
50 TABLET, EXTENDED RELEASE ORAL DAILY
Status: DISCONTINUED | OUTPATIENT
Start: 2022-09-16 | End: 2022-09-18 | Stop reason: HOSPADM

## 2022-09-16 RX ORDER — NALOXONE HYDROCHLORIDE 0.4 MG/ML
0.2 INJECTION, SOLUTION INTRAMUSCULAR; INTRAVENOUS; SUBCUTANEOUS
Status: DISCONTINUED | OUTPATIENT
Start: 2022-09-16 | End: 2022-09-18 | Stop reason: HOSPADM

## 2022-09-16 RX ADMIN — HYDROMORPHONE HYDROCHLORIDE 2 MG: 2 TABLET ORAL at 20:48

## 2022-09-16 RX ADMIN — CLONIDINE HYDROCHLORIDE 0.1 MG: 0.1 TABLET ORAL at 07:51

## 2022-09-16 RX ADMIN — HYDROMORPHONE HYDROCHLORIDE 2 MG: 2 TABLET ORAL at 16:27

## 2022-09-16 RX ADMIN — CLONIDINE HYDROCHLORIDE 0.1 MG: 0.1 TABLET ORAL at 01:07

## 2022-09-16 RX ADMIN — GABAPENTIN 1200 MG: 600 TABLET, FILM COATED ORAL at 01:03

## 2022-09-16 RX ADMIN — PANTOPRAZOLE SODIUM 40 MG: 40 INJECTION, POWDER, FOR SOLUTION INTRAVENOUS at 07:53

## 2022-09-16 RX ADMIN — GABAPENTIN 900 MG: 300 CAPSULE ORAL at 16:07

## 2022-09-16 RX ADMIN — FOLIC ACID: 5 INJECTION, SOLUTION INTRAMUSCULAR; INTRAVENOUS; SUBCUTANEOUS at 08:56

## 2022-09-16 RX ADMIN — GABAPENTIN 900 MG: 300 CAPSULE ORAL at 07:51

## 2022-09-16 RX ADMIN — LORAZEPAM 1 MG: 0.5 TABLET ORAL at 01:07

## 2022-09-16 RX ADMIN — LORAZEPAM 1 MG: 0.5 TABLET ORAL at 07:57

## 2022-09-16 RX ADMIN — GABAPENTIN 900 MG: 300 CAPSULE ORAL at 23:31

## 2022-09-16 RX ADMIN — METOPROLOL SUCCINATE 50 MG: 50 TABLET, EXTENDED RELEASE ORAL at 16:00

## 2022-09-16 RX ADMIN — CLONIDINE HYDROCHLORIDE 0.1 MG: 0.1 TABLET ORAL at 23:30

## 2022-09-16 ASSESSMENT — ACTIVITIES OF DAILY LIVING (ADL)
ADLS_ACUITY_SCORE: 20
ADLS_ACUITY_SCORE: 35
ADLS_ACUITY_SCORE: 20
ADLS_ACUITY_SCORE: 20
ADLS_ACUITY_SCORE: 35
ADLS_ACUITY_SCORE: 20
WEAR_GLASSES_OR_BLIND: YES
ADLS_ACUITY_SCORE: 35
ADLS_ACUITY_SCORE: 20
ADLS_ACUITY_SCORE: 20
CONCENTRATING,_REMEMBERING_OR_MAKING_DECISIONS_DIFFICULTY: NO
DOING_ERRANDS_INDEPENDENTLY_DIFFICULTY: NO
TOILETING_ISSUES: NO
FALL_HISTORY_WITHIN_LAST_SIX_MONTHS: YES
DIFFICULTY_EATING/SWALLOWING: NO
ADLS_ACUITY_SCORE: 35
WALKING_OR_CLIMBING_STAIRS_DIFFICULTY: NO
ADLS_ACUITY_SCORE: 20
ADLS_ACUITY_SCORE: 35
NUMBER_OF_TIMES_PATIENT_HAS_FALLEN_WITHIN_LAST_SIX_MONTHS: 1
CHANGE_IN_FUNCTIONAL_STATUS_SINCE_ONSET_OF_CURRENT_ILLNESS/INJURY: YES
DRESSING/BATHING_DIFFICULTY: NO

## 2022-09-16 NOTE — PLAN OF CARE
Goal Outcome Evaluation:                    Summary:  ETOH  DATE & TIME: 9/16 10-19  Cognitive Concerns/ Orientation : A & O x4  BEHAVIOR & AGGRESSION TOOL COLOR: green  CIWA SCORE: 6/5 for tremor and headache  ABNL VS/O2: vss  MOBILITY: Up with sba, sl unsteadiness when first up  PAIN MANAGMENT: Medicated for headach x1  DIET: regular,good appetite  BOWEL/BLADDER: continent  ABNL LAB/BG: Elevated liver functions, BA .03 on admission to ED.  DRAIN/DEVICES: PIV Right  TELEMETRY RHYTHM: SR  SKIN: intact  TESTS/PROCEDURES: NA  D/C DAY/GOALS/PLACE: 1-2 DAYS, pending CD and psych evals as ordered  IMPORTANT INFO:

## 2022-09-16 NOTE — H&P
[facility]    History and Physical - Hospitalist Service       Date of Admission:  9/15/2022    Assessment & Plan      Joe Shah is a 47 year old male with medical history significant for alcohol dependence and multiple admissions for withdrawal, was brought to the ER for evaluation of nausea vomiting abdominal pain and is being admitted on 9/15/2022 for further management.     Alcohol dependence  Alcohol withdrawal  Elevated LFTs  -Admit to inpatient  -CIWA protocol with lorazepam.  Gabapentin, clonidine and MVI.  -Addiction medicine consult  -Patient is willing to quit and seeking assistance, psychiatry and CD assessment if his withdrawal is managed in the next 24-48 hours.  -Follow electrolytes and replace as needed.  -Follow LFTs which are chronically elevated.    Nausea vomiting and abdominal pain  Suspect related to alcohol withdrawal.  Could have alcoholic gastritis.  He has history of GI bleed but denies coffee-ground emesis, hematemesis, hematochezia or melena currently.  Hemoglobin normal.  -Clear liquid diet and advance as tolerated  -I will place him on IV PPI and transition to p.o. treat empirically for couple weeks    Hypertension  Sinus tachycardia  Patient is on Toprol-XL, resume when verified    Possible syncope  Patient reports a fainting spell when he stood up.  Suspect related to orthostatic hypotension versus vasovagal.  Denies hitting her head or seizure.  -Monitor on telemetry  -Hold off on further work-up unless arrhythmia noted or new neurologic or cardiac symptoms      Depression and anxiety  Patient used to be on Zoloft in the past  -Denies depression or suicidal thoughts.  -Pending med reconciliation  -Will consider psychiatry consult in the next day or 2       Diet:  Clear liquid and advance as tolerated  DVT Prophylaxis: Pneumatic Compression Devices  Power Catheter: Not present  Central Lines: None  Cardiac Monitoring: None  Code Status:  Full code by  "default    Clinically Significant Risk Factors Present on Admission            # Anion Gap Metabolic Acidosis: AG = 21 mmol/L (Ref range: 3 - 14 mmol/L) on admission, will monitor and treat as appropriate               Disposition Plan      Expected Discharge Date: 09/17/2022                The patient's care was discussed with the Patient.    Kaylee Bah MD  Hospitalist Service  [facility]  Securely message with the Vocera Web Console (learn more here)  Text page via [employer] Paging/Directory         ______________________________________________________________________    Chief Complaint   Nausea vomiting and abdominal pain    History is obtained from the patient, chart review, discussion with ER physician    History of Present Illness   Joe Shah is a 47 year old male with medical history significant for alcohol dependence and multiple admissions for withdrawal, was brought to the ER for evaluation of nausea vomiting abdominal pain.    Patient has had multiple admissions for alcohol withdrawal in the past.  He has been to rehab in the past.  He reports drinking 1-2 pints of alcohol for about a year and a half.  Patient states he wanted to quit and so after last drink on Sunday, did not drink any on Monday.  He felt nauseated and not so good but try to manage with drinking Gatorade.  Given that he had some symptoms of withdrawal, he wanted to \"cut the edge off\" and so had only 2 drinks next day on Tuesday.  Next morning he woke up with nausea and had 5-6 episodes of vomiting throughout the day.  Discontinued today as well and so he was brought to ER by his wife.    Patient reports vomitus was clear without blood or coffee-ground emesis.  He has had upper GI bleed in the past.  Reports generalized abdominal pain from vomiting.  No hematochezia or melena.  Denies dyspnea.  She was not able to keep anything down given vomiting and while he was standing up, he had fainting spell but " went to the ground slowly and was able to get up immediately.  His wife was there and noted he did not have a seizure.      Patient was evaluated by Dr. Dominguez in ER.  Patient was hypertensive and tachycardic.  Labs showed elevated LFTs, macrocytosis and blood alcohol level of 0.03.  IV fluid, banana bag, lorazepam, Zofran was given and patient is being admitted for further management        Review of Systems    The 10 point Review of Systems is negative other than noted in the HPI or here.      Past Medical History    I have reviewed this patient's medical history and updated it with pertinent information if needed.   Past Medical History:   Diagnosis Date     Anxiety      Back pain      Depressive disorder      Hypertension      Substance abuse (H)        Past Surgical History   I have reviewed this patient's surgical history and updated it with pertinent information if needed.  Past Surgical History:   Procedure Laterality Date     ENT SURGERY       ESOPHAGOSCOPY, GASTROSCOPY, DUODENOSCOPY (EGD), COMBINED N/A 9/12/2019    Procedure: ESOPHAGOGASTRODUODENOSCOPY (EGD);  Surgeon: Scott Mcrae MD;  Location:  GI     NECK SURGERY         Social History   I have reviewed this patient's social history and updated it with pertinent information if needed.  Social History     Tobacco Use     Smoking status: Former Smoker     Smokeless tobacco: Current User     Types: Chew   Substance Use Topics     Alcohol use: Yes     Comment: binge since Monday 04/27/20     Drug use: No       Family History   I have reviewed this patient's family history and updated it with pertinent information if needed.  Family History   Problem Relation Age of Onset     No Known Problems Maternal Grandmother      No Known Problems Maternal Grandfather      No Known Problems Paternal Grandmother      Substance Abuse Paternal Grandfather        Prior to Admission Medications   Prior to Admission Medications   Prescriptions Last Dose  Informant Patient Reported? Taking?   metoprolol succinate ER (TOPROL-XL) 50 MG 24 hr tablet  Self Yes No   Sig: Take 50 mg by mouth daily    olopatadine (PATANOL) 0.1 % ophthalmic solution  Self Yes No   Sig: Place 1 drop into both eyes 2 times daily as needed for allergies   ondansetron (ZOFRAN-ODT) 4 MG ODT tab   No No   Sig: Take 1 tablet (4 mg) by mouth every 6 hours as needed for nausea or vomiting   polyethylene glycol-propylene glycol (SYSTANE ULTRA) 0.4-0.3 % SOLN ophthalmic solution  Self Yes No   Sig: Place 1 drop into both eyes every hour as needed for dry eyes   tamsulosin (FLOMAX) 0.4 MG capsule   No No   Sig: Take 1 capsule (0.4 mg) by mouth daily      Facility-Administered Medications: None     Allergies   Allergies   Allergen Reactions     Morphine Nausea and Vomiting       Physical Exam   Vital Signs: Temp: 99.6  F (37.6  C) Temp src: Temporal BP: (!) 146/110 Pulse: 114   Resp: 20 SpO2: 97 %      Weight: 0 lbs 0 oz    General: AAOx3, calm, very pleasant, appears restless and tremulous comfortable.  HEENT: PERRLA EOMI. Mucosa moist.   Lungs: Bilateral equal air entry. Clear to auscultation, normal work of breathing.   CVS: S1S2 regular, tachycardic, no murmur  Abdomen: Soft, mild generalized tenderness but no guarding or rigidity, ND. BS heard.  MSK: No edema or deformities.  Neuro: AAOX3. CN 2-12 normal. Strength symmetrical.  Skin: No rash.       Data   Data reviewed today: I reviewed all medications, new labs and imaging results over the last 24 hours. I personally reviewed the EKG tracing showing Sinus tachycardia to 110s on telemetry.    Recent Labs   Lab 09/15/22  1659   WBC 7.5   HGB 15.3   *         POTASSIUM 4.2   CHLORIDE 95   CO2 18*   BUN 11   CR 0.84   ANIONGAP 21*   ISAIAS 9.3   GLC 83   ALBUMIN 4.2   PROTTOTAL 8.1   BILITOTAL 2.0*   ALKPHOS 70   *   *   LIPASE 95     No results found for this or any previous visit (from the past 24 hour(s)).

## 2022-09-16 NOTE — ED NOTES
Pt sleeping L side easy resps. Appears comfortable no obvious tremors or restlessness. SR up x 2. Call bell on rail.

## 2022-09-16 NOTE — ED NOTES
[facility]  ED Nurse Handoff Report    ED Chief complaint: Abdominal Pain      ED Diagnosis:   Final diagnoses:   None       Code Status: Full Code    Allergies:   Allergies   Allergen Reactions     Morphine Nausea and Vomiting       Patient Story: Patient presents with complaints of abdominal pain and nausea over the last two days. Hx of ETOH    Focused Assessment:  History of alcohol abuse and withdrawal, drinks 1-2 pints of liquor a day for about the last year and a half. He reports that he drank heavily on 9/11/22 and stopped the next day. On 9/13 he started to feel unwell and drank again. On 9/14 he started to develop nausea, vomiting, upper abdominal pain, and tremors. This morning he reports that he had a brief syncopal episode while standing but he denies striking his head or otherwise injuring himself in the fall. Due to his ongoing symptoms today he came into the ED for evaluation with concern that he is in withdrawal. AOx4 and very pleasant      Treatments and/or interventions provided:   Labs Ordered and Resulted from Time of ED Arrival to Time of ED Departure   COMPREHENSIVE METABOLIC PANEL - Abnormal       Result Value    Sodium 134      Potassium 4.2      Chloride 95      Carbon Dioxide (CO2) 18 (*)     Anion Gap 21 (*)     Urea Nitrogen 11      Creatinine 0.84      Calcium 9.3      Glucose 83      Alkaline Phosphatase 70       (*)      (*)     Protein Total 8.1      Albumin 4.2      Bilirubin Total 2.0 (*)     GFR Estimate >90     ETHYL ALCOHOL LEVEL - Abnormal    Alcohol ethyl 0.03 (*)    CBC WITH PLATELETS AND DIFFERENTIAL - Abnormal    WBC Count 7.5      RBC Count 4.55      Hemoglobin 15.3      Hematocrit 46.2       (*)     MCH 33.6 (*)     MCHC 33.1      RDW 14.0      Platelet Count 216      % Neutrophils 85      % Lymphocytes 6      % Monocytes 8      % Eosinophils 0      % Basophils 1      % Immature Granulocytes 0      NRBCs per 100 WBC 0      Absolute  Neutrophils 6.4      Absolute Lymphocytes 0.5 (*)     Absolute Monocytes 0.6      Absolute Eosinophils 0.0      Absolute Basophils 0.1      Absolute Immature Granulocytes 0.0      Absolute NRBCs 0.0     LIPASE - Normal    Lipase 95     COVID-19 VIRUS (CORONAVIRUS) BY PCR - Normal    SARS CoV2 PCR Negative       No orders to display       Does this patient have any cognitive concerns?: Alcohol/Drugs temporary cognitive concerns    Activity level - Baseline/Home:  Independent  Activity Level - Current:   Stand with Assist    Patient's Preferred language: English   Needed?: No    Isolation: None  Infection: Not Applicable  Patient tested for COVID 19 prior to admission: YES  Bariatric?: No    Vital Signs:   Vitals:    09/15/22 1654 09/15/22 1900   BP: (!) 149/102 (!) 146/110   Pulse: 119 114   Resp: 16 20   Temp: 99.6  F (37.6  C)    TempSrc: Temporal    SpO2: 100% 97%       Cardiac Rhythm:     Was the PSS-3 completed:   Yes  What interventions are required if any?               Family Comments: Family updated   OBS brochure/video discussed/provided to patient/family: No                For the majority of the shift this patient's behavior was Green.   Behavioral interventions performed were Care explained.    ED NURSE PHONE NUMBER: [phone]

## 2022-09-16 NOTE — ED NOTES
Pt up to use bathroom, Unsteady but able to ambulate independent. Tremulous, no nausea no cp see emar for meds. nsr on tele.

## 2022-09-16 NOTE — PROGRESS NOTES
[facility]    Hospitalist Progress Note    Assessment & Plan   Joe Shah is a 47 year old male with medical history significant for alcohol dependence and multiple admissions for withdrawal, was brought to the ER for evaluation of nausea vomiting abdominal pain and is being admitted on 9/15/2022 for further management.      Alcohol dependence  Alcohol withdrawal  Elevated LFTs  -CIWA protocol with lorazepam.  Gabapentin, clonidine and MVI.  -Addiction medicine consult  -Patient is willing to quit and seeking assistance, psychiatry and CD assessment if his withdrawal is managed in the next 24-48 hours.  -Follow electrolytes and replace as needed.  -Follow LFTs which are chronically elevated.     Nausea vomiting and abdominal pain  Suspect related to alcohol withdrawal.  Could have alcoholic gastritis.  He has history of GI bleed but denies coffee-ground emesis, hematemesis, hematochezia or melena currently.  Hemoglobin normal.  -Clear liquid diet and advance as tolerated  -I will place him on IV PPI and transition to p.o. treat empirically for couple weeks     Hypertension  Sinus tachycardia  Patient is on Toprol-XL, resumed     Possible syncope  Patient reports a fainting spell when he stood up.  Suspect related to orthostatic hypotension versus vasovagal.  Denies hitting her head or seizure.  -Monitor on telemetry, will obtain echocardiogram.       Depression and anxiety  Patient used to be on Zoloft in the past  -Denies depression or suicidal thoughts.  -Consider psychiatry consult as needed      DVT Prophylaxis: SCDs  Code Status: Full Code     Disposition: Expected discharge in 1 to 2 days  Clinically Significant Risk Factors Present on Admission            # Anion Gap Metabolic Acidosis: AG = 21 mmol/L (Ref range: 3 - 14 mmol/L) on admission, will monitor and treat as appropriate         # Overweight: Estimated body mass index is 26.2 kg/m  as calculated from the following:     "Height as of this encounter: 1.803 m (5' 11\").    Weight as of this encounter: 85.2 kg (187 lb 13.3 oz).        Emily Rowe MD  Text Page   (7am to 6pm)    Interval History   Patient was quite sleepy when I visited with him.  -Data reviewed today: I reviewed all new labs and imaging results over the last 24 hours.  Physical Exam   Temp: 97.8  F (36.6  C) Temp src: Oral BP: (!) 130/92 Pulse: 89   Resp: 20 SpO2: 94 % O2 Device: None (Room air)    Vitals:    09/16/22 1000   Weight: 85.2 kg (187 lb 13.3 oz)     Vital Signs with Ranges  Temp:  [97.8  F (36.6  C)-99.6  F (37.6  C)] 97.8  F (36.6  C)  Pulse:  [] 89  Resp:  [10-28] 20  BP: (110-153)/() 130/92  SpO2:  [87 %-100 %] 94 %  No intake/output data recorded.    Constitutional: Awake, alert, cooperative, no apparent distress  Respiratory: Clear to auscultation bilaterally, no crackles or wheezing  Cardiovascular: Regular rate and rhythm, normal S1 and S2, and no murmur noted  GI: Normal bowel sounds, soft, non-distended, non-tender  Skin/Integumen: No rashes, no cyanosis, no edema  Neuro : moving all 4 extremities, no focal deficit noted     Medications       cloNIDine  0.1 mg Oral Q8H     [START ON 9/17/2022] folic acid  1 mg Oral Daily     [START ON 9/23/2022] gabapentin  100 mg Oral Q8H     [START ON 9/21/2022] gabapentin  300 mg Oral Q8H     [START ON 9/19/2022] gabapentin  600 mg Oral Q8H     gabapentin  900 mg Oral Q8H     [START ON 9/17/2022] multivitamin w/minerals  1 tablet Oral Daily     pantoprazole  40 mg Intravenous Daily with breakfast     sodium chloride (PF)  3 mL Intracatheter Q8H     [START ON 9/17/2022] thiamine  100 mg Oral Daily       Data   Recent Labs   Lab 09/15/22  1659   WBC 7.5   HGB 15.3   *         POTASSIUM 4.2   CHLORIDE 95   CO2 18*   BUN 11   CR 0.84   ANIONGAP 21*   ISAIAS 9.3   GLC 83   ALBUMIN 4.2   PROTTOTAL 8.1   BILITOTAL 2.0*   ALKPHOS 70   *   *   LIPASE 95     Recent Labs   Lab " 09/15/22  1659   GLC 83       Imaging:   No results found for this or any previous visit (from the past 24 hour(s)).

## 2022-09-16 NOTE — ED NOTES
Received pt into care. Alert oriented gcs15. Mild tremors. Given warm blankets for shivering. NSR on tele NO CP or sob. Vitamin bag complete. C/O abdo cramping and nausea no v/d. SR up x 2.

## 2022-09-16 NOTE — ED NOTES
Pt feeling much more comfortable CIWA 3, settled to bed to L side easy resps. Bolus infusing. Mina nausea.

## 2022-09-16 NOTE — ED NOTES
"Pt woke for vitals. Has been sleeping majority of the night, States \" tremors come and go\" Didn't want ativan at this time. CIWA 3. Easy resps.  "

## 2022-09-16 NOTE — PROGRESS NOTES
RECEIVING UNIT ED HANDOFF REVIEW    ED Nurse Handoff Report was reviewed by: Lazara Roberts RN on September 16, 2022 at 9:11 AM

## 2022-09-16 NOTE — UTILIZATION REVIEW
"  Admission Status; Secondary Review Determination         Under the authority of the Utilization Management Committee, the utilization review process indicated a secondary review on the above patient.  The review outcome is based on review of the medical records, discussions with staff, and applying clinical experience noted on the date of the review.        (X)      Inpatient Status Appropriate - This patient's medical care is consistent with medical management for inpatient care and reasonable inpatient medical practice.      () Observation Status Appropriate - This patient does not meet hospital inpatient criteria and is placed in observation status. If this patient's primary payer is Medicare and was admitted as an inpatient, Condition Code 44 should be used and patient status changed to \"observation\".   () Admission Status NOT Appropriate - This patient's medical care is not consistent with medical management for Inpatient or Observation Status.          RATIONALE FOR DETERMINATION     47 year old male with medical history significant for alcohol dependence and multiple admissions for withdrawal, was brought to the ER for evaluation of nausea, vomiting, and abdominal pain and was admitted on 9/15/2022 for further management.   He had tachycardia.  CIWA protocol was initiated.  Patient will have IV fluids, electrolyte, and vitamin replacement.  His pain and nausea will be medically managed.  He is appropriate for inpatient status.    The severity of illness, intensity of service provided, expected LOS and risk for adverse outcome make the care complex, high risk and appropriate for hospital admission.        The information on this document is developed by the utilization review team in order for the business office to ensure compliance.  This only denotes the appropriateness of proper admission status and does not reflect the quality of care rendered.         The definitions of Inpatient Status and Observation " Status used in making the determination above are those provided in the CMS Coverage Manual, Chapter 1 and Chapter 6, section 70.4.      Sincerely,     Hao Frank MD  Physician Advisor  Utilization Review/ Case Management  [facility].

## 2022-09-16 NOTE — PHARMACY-ADMISSION MEDICATION HISTORY
Pharmacy Medication History  Admission medication history interview status for the 9/15/2022  admission is complete. See EPIC admission navigator for prior to admission medications     Location of Interview: Patient room  Medication history sources: Patient, Surescripts and Care Everywhere    Significant changes made to the medication list:  1. Removed patanol, tamsulosin  2. Changed ondansetron from ODT to HCl tabs    Additional medication history information:   1. Patient reported he tried to take metoprolol today and yesterday but vomited afterwards both days.     Medication reconciliation completed by provider prior to medication history? No    Time spent in this activity: 15 min    Prior to Admission medications    Medication Sig Last Dose Taking? Auth Provider Long Term End Date   metoprolol succinate ER (TOPROL-XL) 50 MG 24 hr tablet Take 50 mg by mouth daily  9/15/2022 at AM Yes Reported, Patient Yes    ondansetron (ZOFRAN) 4 MG tablet Take 4 mg by mouth every 8 hours as needed for nausea PRN at PRN Yes Unknown, Entered By History     polyethylene glycol-propylene glycol (SYSTANE ULTRA) 0.4-0.3 % SOLN ophthalmic solution Place 1 drop into both eyes every hour as needed for dry eyes PRN at PRN  Unknown, Entered By History         The information provided in this note is only as accurate as the sources available at the time of update(s)

## 2022-09-16 NOTE — ED NOTES
Pt was c/o nausea, improved with meds, States turning to L side helps with same. Improved tremors, given additional ativan for ciwa 11.

## 2022-09-17 LAB
ALBUMIN SERPL-MCNC: 3.4 G/DL (ref 3.4–5)
ALP SERPL-CCNC: 59 U/L (ref 40–150)
ALT SERPL W P-5'-P-CCNC: 59 U/L (ref 0–70)
ANION GAP SERPL CALCULATED.3IONS-SCNC: 11 MMOL/L (ref 3–14)
AST SERPL W P-5'-P-CCNC: 47 U/L (ref 0–45)
BASOPHILS # BLD AUTO: 0 10E3/UL (ref 0–0.2)
BASOPHILS NFR BLD AUTO: 1 %
BILIRUB SERPL-MCNC: 2.2 MG/DL (ref 0.2–1.3)
BUN SERPL-MCNC: 9 MG/DL (ref 7–30)
CALCIUM SERPL-MCNC: 8.9 MG/DL (ref 8.5–10.1)
CHLORIDE BLD-SCNC: 95 MMOL/L (ref 94–109)
CO2 SERPL-SCNC: 26 MMOL/L (ref 20–32)
CREAT SERPL-MCNC: 0.79 MG/DL (ref 0.66–1.25)
EOSINOPHIL # BLD AUTO: 0.1 10E3/UL (ref 0–0.7)
EOSINOPHIL NFR BLD AUTO: 2 %
ERYTHROCYTE [DISTWIDTH] IN BLOOD BY AUTOMATED COUNT: 13 % (ref 10–15)
GFR SERPL CREATININE-BSD FRML MDRD: >90 ML/MIN/1.73M2
GLUCOSE BLD-MCNC: 107 MG/DL (ref 70–99)
HCT VFR BLD AUTO: 39.9 % (ref 40–53)
HGB BLD-MCNC: 13.8 G/DL (ref 13.3–17.7)
IMM GRANULOCYTES # BLD: 0 10E3/UL
IMM GRANULOCYTES NFR BLD: 1 %
LYMPHOCYTES # BLD AUTO: 1 10E3/UL (ref 0.8–5.3)
LYMPHOCYTES NFR BLD AUTO: 14 %
MCH RBC QN AUTO: 34.1 PG (ref 26.5–33)
MCHC RBC AUTO-ENTMCNC: 34.6 G/DL (ref 31.5–36.5)
MCV RBC AUTO: 99 FL (ref 78–100)
MONOCYTES # BLD AUTO: 0.8 10E3/UL (ref 0–1.3)
MONOCYTES NFR BLD AUTO: 11 %
NEUTROPHILS # BLD AUTO: 4.7 10E3/UL (ref 1.6–8.3)
NEUTROPHILS NFR BLD AUTO: 71 %
NRBC # BLD AUTO: 0 10E3/UL
NRBC BLD AUTO-RTO: 0 /100
PLATELET # BLD AUTO: 155 10E3/UL (ref 150–450)
POTASSIUM BLD-SCNC: 4.2 MMOL/L (ref 3.4–5.3)
PROT SERPL-MCNC: 6.5 G/DL (ref 6.8–8.8)
RBC # BLD AUTO: 4.05 10E6/UL (ref 4.4–5.9)
SODIUM SERPL-SCNC: 132 MMOL/L (ref 133–144)
WBC # BLD AUTO: 6.6 10E3/UL (ref 4–11)

## 2022-09-17 PROCEDURE — C9113 INJ PANTOPRAZOLE SODIUM, VIA: HCPCS | Performed by: HOSPITALIST

## 2022-09-17 PROCEDURE — 36415 COLL VENOUS BLD VENIPUNCTURE: CPT | Performed by: HOSPITALIST

## 2022-09-17 PROCEDURE — 250N000011 HC RX IP 250 OP 636: Performed by: HOSPITALIST

## 2022-09-17 PROCEDURE — 250N000013 HC RX MED GY IP 250 OP 250 PS 637: Performed by: INTERNAL MEDICINE

## 2022-09-17 PROCEDURE — 99232 SBSQ HOSP IP/OBS MODERATE 35: CPT | Performed by: HOSPITALIST

## 2022-09-17 PROCEDURE — 85025 COMPLETE CBC W/AUTO DIFF WBC: CPT | Performed by: HOSPITALIST

## 2022-09-17 PROCEDURE — 80053 COMPREHEN METABOLIC PANEL: CPT | Performed by: HOSPITALIST

## 2022-09-17 PROCEDURE — 120N000001 HC R&B MED SURG/OB

## 2022-09-17 PROCEDURE — 250N000013 HC RX MED GY IP 250 OP 250 PS 637: Performed by: HOSPITALIST

## 2022-09-17 PROCEDURE — 82040 ASSAY OF SERUM ALBUMIN: CPT | Performed by: HOSPITALIST

## 2022-09-17 RX ORDER — PANTOPRAZOLE SODIUM 40 MG/1
40 TABLET, DELAYED RELEASE ORAL
Status: DISCONTINUED | OUTPATIENT
Start: 2022-09-18 | End: 2022-09-18 | Stop reason: HOSPADM

## 2022-09-17 RX ADMIN — GABAPENTIN 900 MG: 300 CAPSULE ORAL at 23:11

## 2022-09-17 RX ADMIN — POLYETHYLENE GLYCOL 400 AND PROPYLENE GLYCOL 1 DROP: 4; 3 SOLUTION/ DROPS OPHTHALMIC at 23:11

## 2022-09-17 RX ADMIN — PANTOPRAZOLE SODIUM 40 MG: 40 INJECTION, POWDER, FOR SOLUTION INTRAVENOUS at 08:15

## 2022-09-17 RX ADMIN — FOLIC ACID 1 MG: 1 TABLET ORAL at 08:16

## 2022-09-17 RX ADMIN — GABAPENTIN 900 MG: 300 CAPSULE ORAL at 16:23

## 2022-09-17 RX ADMIN — THIAMINE HCL TAB 100 MG 100 MG: 100 TAB at 08:16

## 2022-09-17 RX ADMIN — POLYETHYLENE GLYCOL 400 AND PROPYLENE GLYCOL 1 DROP: 4; 3 SOLUTION/ DROPS OPHTHALMIC at 01:34

## 2022-09-17 RX ADMIN — ONDANSETRON 4 MG: 4 TABLET, ORALLY DISINTEGRATING ORAL at 10:07

## 2022-09-17 RX ADMIN — METOPROLOL SUCCINATE 50 MG: 50 TABLET, EXTENDED RELEASE ORAL at 08:24

## 2022-09-17 RX ADMIN — MULTIPLE VITAMINS W/ MINERALS TAB 1 TABLET: TAB at 08:16

## 2022-09-17 RX ADMIN — GABAPENTIN 900 MG: 300 CAPSULE ORAL at 08:16

## 2022-09-17 ASSESSMENT — ACTIVITIES OF DAILY LIVING (ADL)
ADLS_ACUITY_SCORE: 20

## 2022-09-17 NOTE — PROGRESS NOTES
"[facility]    Hospitalist Progress Note    Assessment & Plan   Joe Shah is a 47 year old male with medical history significant for alcohol dependence and multiple admissions for withdrawal, was brought to the ER for evaluation of nausea vomiting abdominal pain and is being admitted on 9/15/2022 for further management.      Alcohol dependence, alcohol use disorder  Acute alcohol withdrawal  Elevated LFTs secondary to alcoholic hepatitis  -CIWA protocol with lorazepam.  Gabapentin taper and MVI.  -Patient is willing to quit and seeking assistance.  Awaiting consultation with addiction medicine and chemical dependency.  -Follow electrolytes and replace as needed.  -Transaminitis improving.     Nausea vomiting and abdominal pain likely secondary to alcoholic gastritis, hepatitis  Hyponatremia  Suspect related to alcohol withdrawal.  Could have alcoholic gastritis.  He has history of GI bleed but denies coffee-ground emesis, hematemesis, hematochezia or melena currently.  Hemoglobin normal.  -Tolerating regular diet  -Plan to continue on PPI for next few weeks     Hypertension  Sinus tachycardia  Patient is on Toprol-XL, resumed     Possible syncope  Patient reports a fainting spell when he stood up.  Suspect related to orthostatic hypotension versus vasovagal.  Denies hitting his head or seizure.  -Remained stable on telemetry monitoring    Depression and anxiety  Patient used to be on Zoloft in the past  -Denies depression or suicidal thoughts.      DVT Prophylaxis: SCDs  Code Status: Full Code     Disposition: Expected discharge in 1 to 2 days  Clinically Significant Risk Factors Present on Admission                # Overweight: Estimated body mass index is 26.2 kg/m  as calculated from the following:    Height as of this encounter: 1.803 m (5' 11\").    Weight as of this encounter: 85.2 kg (187 lb 13.3 oz).        Lynne Cho MD  Text Page   (7am to 6pm)    Interval History "   Stable overnight.  This morning however tells me that he feels a bit worse today compared to yesterday.  Last drink was about 4 days ago.  Tells me that he is motivated to quit 100%.  Is willing to talk with addiction medicine/chemical dependency.  Is also looking forward to starting his new job in the [employer].  Feels that his new job will keep him busy and successful in quitting alcohol.    -Data reviewed today: I reviewed all new labs and imaging results over the last 24 hours.  Physical Exam   Temp: 97.8  F (36.6  C) Temp src: Oral BP: (!) 125/96 Pulse: 70   Resp: 18 SpO2: 93 % O2 Device: None (Room air)    Vitals:    09/16/22 1000   Weight: 85.2 kg (187 lb 13.3 oz)     Vital Signs with Ranges  Temp:  [97.7  F (36.5  C)-98.8  F (37.1  C)] 97.8  F (36.6  C)  Pulse:  [66-81] 70  Resp:  [16-20] 18  BP: (122-146)/() 125/96  SpO2:  [92 %-95 %] 93 %  I/O last 3 completed shifts:  In: 480 [P.O.:480]  Out: -     Constitutional: Awake, alert, cooperative, no apparent distress, still somewhat tremulous and shaky while sitting up  Respiratory: Clear to auscultation bilaterally, no crackles or wheezing  Cardiovascular: Regular rate and rhythm, normal S1 and S2, and no murmur noted  GI: Normal bowel sounds, soft, non-distended, non-tender  Skin/Integumen: No rashes, no cyanosis, no edema  Neuro : moving all 4 extremities, no focal deficit noted     Medications       cloNIDine  0.1 mg Oral Q8H     folic acid  1 mg Oral Daily     [START ON 9/23/2022] gabapentin  100 mg Oral Q8H     [START ON 9/21/2022] gabapentin  300 mg Oral Q8H     [START ON 9/19/2022] gabapentin  600 mg Oral Q8H     gabapentin  900 mg Oral Q8H     metoprolol succinate ER  50 mg Oral Daily     multivitamin w/minerals  1 tablet Oral Daily     [START ON 9/18/2022] pantoprazole  40 mg Oral QAM AC     sodium chloride (PF)  3 mL Intracatheter Q8H     thiamine  100 mg Oral Daily       Data   Recent Labs   Lab 09/17/22  0619 09/15/22  1659   WBC  6.6 7.5   HGB 13.8 15.3   MCV 99 102*    216   * 134   POTASSIUM 4.2 4.2   CHLORIDE 95 95   CO2 26 18*   BUN 9 11   CR 0.79 0.84   ANIONGAP 11 21*   ISAIAS 8.9 9.3   * 83   ALBUMIN 3.4 4.2   PROTTOTAL 6.5* 8.1   BILITOTAL 2.2* 2.0*   ALKPHOS 59 70   ALT 59 113*   AST 47* 145*   LIPASE  --  95     Recent Labs   Lab 09/17/22  0619 09/15/22  1659   * 83       Imaging:   No results found for this or any previous visit (from the past 24 hour(s)).

## 2022-09-17 NOTE — PROGRESS NOTES
Care Transitions  Acknowledging consult.  SW will complete assessment.  Pt known to  staff from admissions back in 2020 at which time patient's plan had been to begin out patient Substance Use treatment.  Note addiction medicine is consulted.   Unsure if addiction medicine will complete consult over the w/e.

## 2022-09-17 NOTE — PLAN OF CARE
Goal Outcome Evaluation:  Summary:  ETOH  DATE & TIME: 9/16 1900-0730  Cognitive Concerns/ Orientation : A & O x4  BEHAVIOR & AGGRESSION TOOL COLOR: green  CIWA SCORE: 5/3/3 for tremor, headache, and nausea   ABNL VS/O2: VSS on RA ex HTN  MOBILITY: Up SBA  PAIN MANAGMENT: Medicated for headache x1  DIET: regular,good appetite  BOWEL/BLADDER: continent  ABNL LAB/BG: Elevated liver functions, BA .03 on admission to ED.  DRAIN/DEVICES: PIV SL  TELEMETRY RHYTHM: SR  SKIN: intact  TESTS/PROCEDURES: NA  D/C DAY/GOALS/PLACE: 1-2 DAYS, pending CD and psych evals as ordered  IMPORTANT INFO:

## 2022-09-17 NOTE — PLAN OF CARE
Goal Outcome Evaluation:                    Summary:  ETOH  DATE & TIME: 9/16 07-19  Cognitive Concerns/ Orientation : A & O x4  BEHAVIOR & AGGRESSION TOOL COLOR: green  CIWA SCORE: 3/3/4 for tremor, headache, and mild anxiety  ABNL VS/O2: VSS on RA ex HTN  MOBILITY: Up SBA  PAIN MANAGMENT: Medicated for nausea x1  DIET: regular,fair appetite  BOWEL/BLADDER: continent  ABNL LAB/BG: Elevated liver functions improving,bili 2.2, BA .03 on admission to ED.  DRAIN/DEVICES: PIV SL  TELEMETRY RHYTHM: SR, discontinue, dc'd    SKIN: intact  TESTS/PROCEDURES: NA  D/C DAY/GOALS/PLACE: 1-2 DAYS, pending CD eval as ordered  IMPORTANT INFO:   Ambulated in [place], tolerated well., had complexback surgery in May and needs to keep moving-fall risk removed after ambulating in [place] without a problem.

## 2022-09-18 VITALS
HEART RATE: 74 BPM | HEIGHT: 71 IN | SYSTOLIC BLOOD PRESSURE: 119 MMHG | WEIGHT: 188.49 LBS | OXYGEN SATURATION: 95 % | DIASTOLIC BLOOD PRESSURE: 85 MMHG | BODY MASS INDEX: 26.39 KG/M2 | TEMPERATURE: 97.9 F | RESPIRATION RATE: 18 BRPM

## 2022-09-18 PROCEDURE — 250N000013 HC RX MED GY IP 250 OP 250 PS 637: Performed by: HOSPITALIST

## 2022-09-18 PROCEDURE — 250N000013 HC RX MED GY IP 250 OP 250 PS 637: Performed by: INTERNAL MEDICINE

## 2022-09-18 PROCEDURE — 99239 HOSP IP/OBS DSCHRG MGMT >30: CPT | Performed by: HOSPITALIST

## 2022-09-18 RX ORDER — FOLIC ACID 1 MG/1
1 TABLET ORAL DAILY
Qty: 30 TABLET | Refills: 0 | Status: SHIPPED | OUTPATIENT
Start: 2022-09-19 | End: 2023-05-26

## 2022-09-18 RX ORDER — LANOLIN ALCOHOL/MO/W.PET/CERES
100 CREAM (GRAM) TOPICAL DAILY
Qty: 30 TABLET | Refills: 0 | Status: SHIPPED | OUTPATIENT
Start: 2022-09-19 | End: 2022-10-19

## 2022-09-18 RX ORDER — MULTIPLE VITAMINS W/ MINERALS TAB 9MG-400MCG
1 TAB ORAL DAILY
Qty: 30 TABLET | Refills: 0 | Status: SHIPPED | OUTPATIENT
Start: 2022-09-19 | End: 2023-11-29

## 2022-09-18 RX ORDER — GABAPENTIN 100 MG/1
100 CAPSULE ORAL 3 TIMES DAILY
Qty: 6 CAPSULE | Refills: 0 | Status: SHIPPED | OUTPATIENT
Start: 2022-09-23 | End: 2023-05-26

## 2022-09-18 RX ORDER — GABAPENTIN 300 MG/1
CAPSULE ORAL
Qty: 27 CAPSULE | Refills: 0 | Status: SHIPPED | OUTPATIENT
Start: 2022-09-18 | End: 2023-05-26

## 2022-09-18 RX ORDER — PANTOPRAZOLE SODIUM 40 MG/1
40 TABLET, DELAYED RELEASE ORAL
Qty: 14 TABLET | Refills: 0 | Status: SHIPPED | OUTPATIENT
Start: 2022-09-19 | End: 2023-05-26

## 2022-09-18 RX ADMIN — PANTOPRAZOLE SODIUM 40 MG: 40 TABLET, DELAYED RELEASE ORAL at 06:30

## 2022-09-18 RX ADMIN — FOLIC ACID 1 MG: 1 TABLET ORAL at 08:20

## 2022-09-18 RX ADMIN — METOPROLOL SUCCINATE 50 MG: 50 TABLET, EXTENDED RELEASE ORAL at 08:20

## 2022-09-18 RX ADMIN — THIAMINE HCL TAB 100 MG 100 MG: 100 TAB at 08:20

## 2022-09-18 RX ADMIN — MULTIPLE VITAMINS W/ MINERALS TAB 1 TABLET: TAB at 08:20

## 2022-09-18 RX ADMIN — GABAPENTIN 900 MG: 300 CAPSULE ORAL at 08:19

## 2022-09-18 ASSESSMENT — ACTIVITIES OF DAILY LIVING (ADL)
ADLS_ACUITY_SCORE: 20

## 2022-09-18 NOTE — DISCHARGE INSTRUCTIONS
If you would like to complete an outpatient Substance Use Assessment thru [facility] you can call [facility] on Monday at [phone].

## 2022-09-18 NOTE — PLAN OF CARE
Goal Outcome Evaluation:  Summary:  ETOH  DATE & TIME: 9/17 1900-0730  Cognitive Concerns/ Orientation : A & O x4  BEHAVIOR & AGGRESSION TOOL COLOR: green  CIWA SCORE: 1/1/1 for tremor  ABNL VS/O2: VSS on RA   MOBILITY: Up Ind  PAIN MANAGMENT: Denies pain  DIET: regular,fair appetite  BOWEL/BLADDER: continent  ABNL LAB/BG: Elevated liver functions improving,bili 2.2, BA .03 on admission to ED.  DRAIN/DEVICES: PIV SL  TELEMETRY RHYTHM: N/A  SKIN: intact  TESTS/PROCEDURES: NA  D/C DAY/GOALS/PLACE:discharge pending CD evaluation

## 2022-09-18 NOTE — DISCHARGE SUMMARY
Discharge Summary  Hospitalist    Date of Admission:  9/15/2022  Date of Discharge:  9/18/2022  Discharging Provider: Lynne Coh MD, MD  Date of Service (when I saw the patient): 09/18/22    Discharge Diagnoses   Alcohol dependence, alcohol use disorder  Acute alcohol withdrawal  Elevated LFTs secondary to alcoholic hepatitis  Nausea vomiting and abdominal pain likely secondary to alcoholic gastritis, hepatitis  Hyponatremia    History of Present Illness   Please refer H & P for details.      Hospital Course   Joe Shah is a 47 year old male with medical history significant for alcohol dependence and multiple admissions for withdrawal, was brought to the ER for evaluation of nausea vomiting abdominal pain and is being admitted on 9/15/2022 for further management.      Alcohol dependence, alcohol use disorder  Acute alcohol withdrawal  Elevated LFTs secondary to alcoholic hepatitis  -CIWA protocol with lorazepam.  Gabapentin taper and MVI.  -Patient is willing to quit and seeking assistance.    Consulted addiction medicine and chemical dependency, however unable to be completed as patient was hospitalized over the weekend when the services were unavailable.  Patient did meet with the  and plan is for patient to reconnect with his sponsor and will resume meetings.  Patient knows how to access these resources.  Patient is motivated to quit.  He also looks forward to starting his new job that he believes will help him keep busy and away from alcohol.  -Follow electrolytes and replace as needed.  -Transaminitis improving.  -Withdrawal symptoms improved by time of discharge.  Will discharge on gabapentin taper.     Nausea vomiting and abdominal pain likely secondary to alcoholic gastritis, hepatitis  Hyponatremia  Suspect related to alcohol withdrawal.  Could have alcoholic gastritis.  He has history of GI bleed but denies coffee-ground emesis, hematemesis, hematochezia or melena currently.   Hemoglobin normal.  -Tolerating regular diet  -Plan to continue on PPI for next 2 weeks     Hypertension  Sinus tachycardia  Patient is on Toprol-XL, resumed     Possible syncope  Patient reports a fainting spell when he stood up.  Suspect related to orthostatic hypotension versus vasovagal.  Denies hitting his head or seizure.  -Remained stable on telemetry monitoring     Depression and anxiety  Patient used to be on Zoloft in the past  -Denies depression or suicidal thoughts.         Lynne Cho MD, MD      Pending Results   These results will be followed up by Hospitalist team.  Unresulted Labs Ordered in the Past 30 Days of this Admission     No orders found from 8/16/2022 to 9/16/2022.          Code Status   Full Code       Primary Care Physician   Mike Rodney    Follow-ups Needed After Discharge   Follow-up Appointments     Follow-up and recommended labs and tests       Follow up with primary care provider, Mike Rodney, within 7 days   for hospital follow- up.  No follow up labs or test are needed.             Physical Exam   Temp: 97.9  F (36.6  C) Temp src: Oral BP: 119/85 Pulse: 74   Resp: 18 SpO2: 95 % O2 Device: None (Room air)    Vitals:    09/16/22 1000 09/18/22 0712   Weight: 85.2 kg (187 lb 13.3 oz) 85.5 kg (188 lb 7.9 oz)     Vital Signs with Ranges  Temp:  [97.9  F (36.6  C)-98.1  F (36.7  C)] 97.9  F (36.6  C)  Pulse:  [65-74] 74  Resp:  [16-20] 18  BP: (115-121)/(81-94) 119/85  SpO2:  [94 %-95 %] 95 %  I/O last 3 completed shifts:  In: 240 [P.O.:240]  Out: -     Constitutional: Awake, alert, cooperative, no apparent distress, still somewhat tremulous and shaky while sitting up  Respiratory: Clear to auscultation bilaterally, no crackles or wheezing  Cardiovascular: Regular rate and rhythm, normal S1 and S2, and no murmur noted  GI: Normal bowel sounds, soft, non-distended, non-tender  Skin/Integumen: No rashes, no cyanosis, no edema  Neuro : moving all 4 extremities, no  focal deficit noted             Discharge Disposition   Discharged to home  Condition at discharge: Stable    Consultations This Hospital Stay   ADDICTION MEDICINE INPATIENT CONSULT  CARE MANAGEMENT / SOCIAL WORK IP CONSULT  CHEMICAL DEPENDENCY IP CONSULT    Time Spent on this Encounter   ILynne MD, personally saw the patient today and spent greater than 30 minutes discharging this patient.    Discharge Orders      Reason for your hospital stay    Acute alcohol withdrawal     Follow-up and recommended labs and tests     Follow up with primary care provider, Mike Rodney, within 7 days for hospital follow- up.  No follow up labs or test are needed.     Activity    Your activity upon discharge: activity as tolerated     When to contact your care team    Call your primary doctor if you have any of the following: Worsening confusion, fever, pain, tremors     Discharge Instructions    Alcohol cessation strongly encouraged.     Diet    Follow this diet upon discharge: Orders Placed This Encounter      Advance Diet as Tolerated: Regular Diet Adult     Discharge Medications   Discharge Medication List as of 9/18/2022 11:28 AM      START taking these medications    Details   folic acid (FOLVITE) 1 MG tablet Take 1 tablet (1 mg) by mouth daily, Disp-30 tablet, R-0, E-Prescribe      !! gabapentin (NEURONTIN) 100 MG capsule Take 1 capsule (100 mg) by mouth 3 times daily for 2 days, Disp-6 capsule, R-0, E-Prescribe      !! gabapentin (NEURONTIN) 300 MG capsule Take 3 capsules (900 mg) by mouth 3 times daily for 1 day, THEN 2 capsules (600 mg) 3 times daily for 2 days, THEN 1 capsule (300 mg) 3 times daily for 2 days., Disp-27 capsule, R-0, E-Prescribe      multivitamin w/minerals (THERA-VIT-M) tablet Take 1 tablet by mouth daily, Disp-30 tablet, R-0, E-Prescribe      pantoprazole (PROTONIX) 40 MG EC tablet Take 1 tablet (40 mg) by mouth every morning (before breakfast), Disp-14 tablet, R-0, E-Prescribe       thiamine (B-1) 100 MG tablet Take 1 tablet (100 mg) by mouth daily for 30 days, Disp-30 tablet, R-0, E-Prescribe       !! - Potential duplicate medications found. Please discuss with provider.      CONTINUE these medications which have NOT CHANGED    Details   metoprolol succinate ER (TOPROL-XL) 50 MG 24 hr tablet Take 50 mg by mouth daily , Historical      ondansetron (ZOFRAN) 4 MG tablet Take 4 mg by mouth every 8 hours as needed for nausea, Historical      polyethylene glycol-propylene glycol (SYSTANE ULTRA) 0.4-0.3 % SOLN ophthalmic solution Place 1 drop into both eyes every hour as needed for dry eyes, Historical           Allergies   Allergies   Allergen Reactions     Morphine Nausea and Vomiting     Data   Most Recent 3 CBC's:  Recent Labs   Lab Test 09/17/22  0619 09/15/22  1659 05/09/21  0606   WBC 6.6 7.5 5.6   HGB 13.8 15.3 12.3*   MCV 99 102* 107*    216 232      Most Recent 3 BMP's:  Recent Labs   Lab Test 09/17/22  0619 09/15/22  1659 05/09/21  0606   * 134 137   POTASSIUM 4.2 4.2 4.1   CHLORIDE 95 95 106   CO2 26 18* 27   BUN 9 11 13   CR 0.79 0.84 0.90   ANIONGAP 11 21* 4   ISAIAS 8.9 9.3 7.7*   * 83 81     Most Recent 2 LFT's:  Recent Labs   Lab Test 09/17/22  0619 09/15/22  1659   AST 47* 145*   ALT 59 113*   ALKPHOS 59 70   BILITOTAL 2.2* 2.0*     Most Recent INR's and Anticoagulation Dosing History:  Anticoagulation Dose History     Recent Dosing and Labs Latest Ref Rng & Units 9/11/2019 3/2/2020 3/14/2020 5/1/2020    INR 0.86 - 1.14 0.93 0.96 1.06 1.02        Most Recent 3 Troponin's:  Recent Labs   Lab Test 05/08/21  1344 02/12/19  1305   TROPI <0.015 <0.015     Most Recent Cholesterol Panel:No lab results found.  Most Recent 6 Bacteria Isolates From Any Culture (See EPIC Reports for Culture Details):No lab results found.  Most Recent TSH, T4 and A1c Labs:  Recent Labs   Lab Test 05/08/21  2142   TSH 1.37       Results for orders placed or performed during the hospital  encounter of 05/08/21   CT Abdomen Pelvis w/o Contrast    Narrative    CT ABDOMEN PELVIS WITHOUT CONTRAST  5/8/2021 3:18 PM    CLINICAL HISTORY: Flank pain, kidney stone suspected.  TECHNIQUE: CT scan of the abdomen and pelvis was performed without IV  contrast. Multiplanar reformats were obtained. Dose reduction  techniques were used.  CONTRAST: None.    COMPARISON: None.    FINDINGS:   LOWER CHEST: Normal.    HEPATOBILIARY: Marked fatty infiltration of the liver. No focal liver  lesions. Gallbladder wall.    PANCREAS: Normal.    SPLEEN: Normal.    ADRENAL GLANDS: Normal.    KIDNEYS/BLADDER: There is moderate left hydronephrosis and  hydroureter. There is also left perinephric infiltration. No ureteral  calcification or other cause for obstruction demonstrated. The right  kidney and ureter appear normal. Urinary bladder is decompressed.    BOWEL: Scattered colonic diverticula but no evidence of  diverticulitis. Normal appendix. No bowel obstruction or free air.  Moderate-sized hiatal hernia.    LYMPH NODES: Normal.    VASCULATURE: Unremarkable.    PELVIC ORGANS: Normal.    OTHER: No ascites.    MUSCULOSKELETAL: Bilateral L5 spondylolysis noted. No destructive bone  lesions.      Impression    IMPRESSION:   1.  Left hydronephrosis and hydroureter with perinephric infiltration  could be related to a recently passed stone or ascending infection. No  urinary tract calculi are demonstrated.  2.  Other chronic and incidental findings detailed above.    ALEXIA RAGSDALE MD

## 2022-09-18 NOTE — CONSULTS
Care Management Initial Consult    General Information  Assessment completed with: Patient,    Type of CM/SW Visit: Initial Assessment    Primary Care Provider verified and updated as needed:  (Annel Quintanilla)   Readmission within the last 30 days: no previous admission in last 30 days      Reason for Consult: substance use concerns  Advance Care Planning:            Communication Assessment  Patient's communication style: spoken language (English or Bilingual)    Hearing Difficulty or Deaf: no   Wear Glasses or Blind: yes    Cognitive  Cognitive/Neuro/Behavioral: WDL                      Living Environment:   People in home: child(silverio), dependent, spouse (14 year old son)     Current living Arrangements: house      Able to return to prior arrangements: yes       Family/Social Support:  Care provided by: self  Provides care for: child(silverio)  Marital Status:   Wife, Children, Other (specify)          Description of Support System: Supportive, Involved    Support Assessment: Adequate family and caregiver support    Current Resources:   Patient receiving home care services:  none     Community Resources:  Chemical Dependency sponsor  Equipment currently used at home: none  Supplies currently used at home:      Employment/Financial:  Employment Status:    Currently unemployed, in the hiring process with [employer].  Will work as a  or       Financial Concerns:             Lifestyle & Psychosocial Needs:  Social Determinants of Health     Tobacco Use: Not on file   Alcohol Use: Not on file   Financial Resource Strain: Not on file   Food Insecurity: Not on file   Transportation Needs: Not on file   Physical Activity: Not on file   Stress: Not on file   Social Connections: Not on file   Intimate Partner Violence: Not on file   Depression: Not on file   Housing Stability: Not on file       Functional Status:  Prior to admission patient needed assistance:   Dependent ADLs:: Independent           Mental Health Status:  Mental Health Status: No Current Concerns       Chemical Dependency Status:  Chemical Dependency Status: Current Concern  Chemical Dependency Management:  (plans to resume meetings with his sponsor and consider returning to out patient treatment)          Values/Beliefs:  Spiritual, Cultural Beliefs, Restorationism Practices, Values that affect care:                 Additional Information:  Met with patient in response to the consult.  Per bedside RN, patient will likely discharge today.  Writer introduced role and explained the CD counselors are not available on the weekend and likely neither is the Addiction Specialist.  Writer very pleasant and engaged in our conversation.  Patient shares he has recently recovered from back surgery.  His back surgery was due to work related injury 1&1/2 years ago.  Lack of ability to be active and employed and being alone during the day when his family is gone did lead to boredom and relapse with alcohol.  He chose to cease drinking last Monday and his wife encouraged him to come to the hospital later in the week due to his withdrawal symptoms.  He reports his wife drinks very minimal and does not have alcohol abuse as he does.  She and his 14 year old are very supportive for patient's sobriety. Wife has stated she will cease use of alcohol use if patient does.   Patient reports he was at [facility] in 2019 for only two weeks due to insurance coverage. Following [facility] he attend Out Patient CD at [facility] for 3 months.   He also was referred to a sponsor who he met with 2 times a week for over a year.   He currently is not meeting with this sponsor though has been in contact with him.  Patient describes his sponsor as very supportive however sponsor does not want to waste his time or patient's time when patient is in a relapse. His sponsor has been recovered for over 20 years and uses the 12 step model which patient identifies with.    Patient has tried AA but did not care for the model of listening to everyone's story and recounting his own mistakes. He preferred the mentoring of his sponsor as they also studied the 12 steps.   Patient describes feeling good since being here, he is sleeping well and has a good appetite. Patient has a support system and resources to connect with Out Patient Relapse treatment and support to re-connect with his sponsor.   Contact information for [facility] have been entered into patient''s AVS.        Kassidy Rowell, ROSASW

## 2022-11-20 ENCOUNTER — HEALTH MAINTENANCE LETTER (OUTPATIENT)
Age: 47
End: 2022-11-20

## 2022-11-27 ENCOUNTER — HOSPITAL ENCOUNTER (EMERGENCY)
Facility: CLINIC | Age: 47
Discharge: HOME | End: 2022-11-28
Attending: NURSE PRACTITIONER | Admitting: NURSE PRACTITIONER
Payer: COMMERCIAL

## 2022-11-27 ENCOUNTER — APPOINTMENT (OUTPATIENT)
Dept: MRI IMAGING | Facility: CLINIC | Age: 47
End: 2022-11-27
Attending: NURSE PRACTITIONER
Payer: COMMERCIAL

## 2022-11-27 DIAGNOSIS — M54.16 LUMBAR RADICULOPATHY: ICD-10-CM

## 2022-11-27 PROBLEM — F51.01 PRIMARY INSOMNIA: Status: ACTIVE | Noted: 2022-08-12

## 2022-11-27 PROBLEM — D72.819 LEUKOPENIA: Status: ACTIVE | Noted: 2022-05-03

## 2022-11-27 PROBLEM — F10.10 EXCESSIVE DRINKING ALCOHOL: Status: ACTIVE | Noted: 2022-08-12

## 2022-11-27 PROCEDURE — 96374 THER/PROPH/DIAG INJ IV PUSH: CPT | Mod: 59

## 2022-11-27 PROCEDURE — 72158 MRI LUMBAR SPINE W/O & W/DYE: CPT

## 2022-11-27 PROCEDURE — A9585 GADOBUTROL INJECTION: HCPCS | Performed by: NURSE PRACTITIONER

## 2022-11-27 PROCEDURE — 80048 BASIC METABOLIC PNL TOTAL CA: CPT | Performed by: NURSE PRACTITIONER

## 2022-11-27 PROCEDURE — 36415 COLL VENOUS BLD VENIPUNCTURE: CPT | Performed by: NURSE PRACTITIONER

## 2022-11-27 PROCEDURE — 86140 C-REACTIVE PROTEIN: CPT | Performed by: NURSE PRACTITIONER

## 2022-11-27 PROCEDURE — 99285 EMERGENCY DEPT VISIT HI MDM: CPT | Mod: 25

## 2022-11-27 PROCEDURE — 250N000011 HC RX IP 250 OP 636: Performed by: NURSE PRACTITIONER

## 2022-11-27 PROCEDURE — 85025 COMPLETE CBC W/AUTO DIFF WBC: CPT | Performed by: NURSE PRACTITIONER

## 2022-11-27 PROCEDURE — 85652 RBC SED RATE AUTOMATED: CPT | Performed by: NURSE PRACTITIONER

## 2022-11-27 PROCEDURE — 255N000002 HC RX 255 OP 636: Performed by: NURSE PRACTITIONER

## 2022-11-27 RX ORDER — KETOROLAC TROMETHAMINE 15 MG/ML
15 INJECTION, SOLUTION INTRAMUSCULAR; INTRAVENOUS ONCE
Status: COMPLETED | OUTPATIENT
Start: 2022-11-27 | End: 2022-11-27

## 2022-11-27 RX ORDER — GADOBUTROL 604.72 MG/ML
8 INJECTION INTRAVENOUS ONCE
Status: COMPLETED | OUTPATIENT
Start: 2022-11-27 | End: 2022-11-27

## 2022-11-27 RX ADMIN — GADOBUTROL 8 ML: 604.72 INJECTION INTRAVENOUS at 23:43

## 2022-11-27 RX ADMIN — KETOROLAC TROMETHAMINE 15 MG: 15 INJECTION, SOLUTION INTRAMUSCULAR; INTRAVENOUS at 23:54

## 2022-11-27 ASSESSMENT — ENCOUNTER SYMPTOMS
DIAPHORESIS: 0
CONSTIPATION: 0
MYALGIAS: 0
FREQUENCY: 0
DYSURIA: 0
NECK PAIN: 0
HEMATURIA: 0
CONFUSION: 0
ARTHRALGIAS: 1
VOMITING: 0
LIGHT-HEADEDNESS: 0
DIZZINESS: 0
CHEST TIGHTNESS: 0
FATIGUE: 0
NECK STIFFNESS: 0
COUGH: 0
WOUND: 0
JOINT SWELLING: 0
ABDOMINAL PAIN: 0
BLOOD IN STOOL: 0
CHILLS: 0
BACK PAIN: 1
NAUSEA: 0
DIFFICULTY URINATING: 0
SHORTNESS OF BREATH: 0
DIARRHEA: 0
FLANK PAIN: 0

## 2022-11-27 ASSESSMENT — ACTIVITIES OF DAILY LIVING (ADL)
ADLS_ACUITY_SCORE: 33
ADLS_ACUITY_SCORE: 33

## 2022-11-28 VITALS
OXYGEN SATURATION: 96 % | WEIGHT: 188 LBS | TEMPERATURE: 97.7 F | DIASTOLIC BLOOD PRESSURE: 119 MMHG | BODY MASS INDEX: 26.32 KG/M2 | RESPIRATION RATE: 17 BRPM | HEIGHT: 71 IN | HEART RATE: 110 BPM | SYSTOLIC BLOOD PRESSURE: 144 MMHG

## 2022-11-28 LAB
ANION GAP SERPL CALCULATED.3IONS-SCNC: 11 MMOL/L (ref 3–14)
BASOPHILS # BLD AUTO: 0.1 10E3/UL (ref 0–0.2)
BASOPHILS NFR BLD AUTO: 1 %
BUN SERPL-MCNC: 10 MG/DL (ref 7–30)
CALCIUM SERPL-MCNC: 8.7 MG/DL (ref 8.5–10.1)
CHLORIDE BLD-SCNC: 106 MMOL/L (ref 94–109)
CO2 SERPL-SCNC: 25 MMOL/L (ref 20–32)
CREAT SERPL-MCNC: 0.71 MG/DL (ref 0.66–1.25)
CRP SERPL-MCNC: <2.9 MG/L (ref 0–8)
EOSINOPHIL # BLD AUTO: 0.1 10E3/UL (ref 0–0.7)
EOSINOPHIL NFR BLD AUTO: 3 %
ERYTHROCYTE [DISTWIDTH] IN BLOOD BY AUTOMATED COUNT: 13.6 % (ref 10–15)
ERYTHROCYTE [SEDIMENTATION RATE] IN BLOOD BY WESTERGREN METHOD: 6 MM/HR (ref 0–15)
GFR SERPL CREATININE-BSD FRML MDRD: >90 ML/MIN/1.73M2
GLUCOSE BLD-MCNC: 102 MG/DL (ref 70–99)
HCT VFR BLD AUTO: 43.5 % (ref 40–53)
HGB BLD-MCNC: 14.7 G/DL (ref 13.3–17.7)
IMM GRANULOCYTES # BLD: 0 10E3/UL
IMM GRANULOCYTES NFR BLD: 0 %
LYMPHOCYTES # BLD AUTO: 1 10E3/UL (ref 0.8–5.3)
LYMPHOCYTES NFR BLD AUTO: 20 %
MCH RBC QN AUTO: 34.2 PG (ref 26.5–33)
MCHC RBC AUTO-ENTMCNC: 33.8 G/DL (ref 31.5–36.5)
MCV RBC AUTO: 101 FL (ref 78–100)
MONOCYTES # BLD AUTO: 0.5 10E3/UL (ref 0–1.3)
MONOCYTES NFR BLD AUTO: 10 %
NEUTROPHILS # BLD AUTO: 3.3 10E3/UL (ref 1.6–8.3)
NEUTROPHILS NFR BLD AUTO: 66 %
NRBC # BLD AUTO: 0 10E3/UL
NRBC BLD AUTO-RTO: 0 /100
PLATELET # BLD AUTO: 185 10E3/UL (ref 150–450)
POTASSIUM BLD-SCNC: 4 MMOL/L (ref 3.4–5.3)
RBC # BLD AUTO: 4.3 10E6/UL (ref 4.4–5.9)
SODIUM SERPL-SCNC: 142 MMOL/L (ref 133–144)
WBC # BLD AUTO: 5 10E3/UL (ref 4–11)

## 2022-11-28 RX ORDER — METHYLPREDNISOLONE 4 MG
TABLET, DOSE PACK ORAL
Qty: 21 TABLET | Refills: 0 | Status: SHIPPED | OUTPATIENT
Start: 2022-11-28 | End: 2023-05-26

## 2022-11-28 RX ORDER — NAPROXEN 500 MG/1
500 TABLET ORAL 2 TIMES DAILY WITH MEALS
Qty: 24 TABLET | Refills: 0 | Status: SHIPPED | OUTPATIENT
Start: 2022-11-28 | End: 2022-12-10

## 2022-11-28 RX ORDER — OXYCODONE AND ACETAMINOPHEN 5; 325 MG/1; MG/1
1 TABLET ORAL EVERY 6 HOURS PRN
Qty: 30 TABLET | Refills: 0 | Status: SHIPPED | OUTPATIENT
Start: 2022-11-28 | End: 2022-12-28

## 2022-11-28 ASSESSMENT — ACTIVITIES OF DAILY LIVING (ADL): ADLS_ACUITY_SCORE: 35

## 2022-11-28 NOTE — ED PROVIDER NOTES
History     Chief Complaint:  Leg Pain       HPI   Joe Shah is a 47 year old male, with history significant for hypertension, alcohol dependence, alcoholic hepatitis without ascites, who presents with bilateral low back pain.  The patient underwent an L5/S1 decompressive laminectomy and fusion, left L5 foraminotomy on 5/3/2022.  The patient is a .  He notes since 11/22/2022 he had acute worsening of his bilateral posterior leg pain with weakness of the bilateral legs right greater than left.  He spoke with [facility] family physicians, his primary care who started him on gabapentin and he has had no relief.  He notes the pain starts in the bilateral buttocks and goes down the posterior legs to his calf.  He does not have pain in the anterior legs or upper body.  He denies any constipation, diarrhea, dysuria, urinary retention, fevers.     ROS:  Review of Systems   Constitutional: Negative for chills, diaphoresis and fatigue.   HENT: Negative.    Respiratory: Negative for cough, chest tightness and shortness of breath.    Cardiovascular: Negative for chest pain.   Gastrointestinal: Negative for abdominal pain, blood in stool, constipation, diarrhea, nausea and vomiting.   Genitourinary: Negative for decreased urine volume, difficulty urinating, dysuria, flank pain, frequency, hematuria, penile swelling, scrotal swelling, testicular pain and urgency.   Musculoskeletal: Positive for arthralgias, back pain and gait problem. Negative for joint swelling, myalgias, neck pain and neck stiffness.   Skin: Negative for rash and wound.   Neurological: Negative for dizziness and light-headedness.   Psychiatric/Behavioral: Negative for confusion.   All other systems reviewed and are negative.    Allergies:  Morphine     Medications:    folic acid (FOLVITE) 1 MG tablet  gabapentin (NEURONTIN) 100 MG capsule  gabapentin (NEURONTIN) 300 MG capsule  metoprolol succinate ER (TOPROL-XL) 50 MG 24 hr tablet  multivitamin  "w/minerals (THERA-VIT-M) tablet  ondansetron (ZOFRAN) 4 MG tablet  pantoprazole (PROTONIX) 40 MG EC tablet  polyethylene glycol-propylene glycol (SYSTANE ULTRA) 0.4-0.3 % SOLN ophthalmic solution        Past Medical History:    Past Medical History:   Diagnosis Date     Anxiety      Back pain      Depressive disorder      Hypertension      Substance abuse (H)        Past Surgical History:    Past Surgical History:   Procedure Laterality Date     ENT SURGERY       ESOPHAGOSCOPY, GASTROSCOPY, DUODENOSCOPY (EGD), COMBINED N/A 9/12/2019    Procedure: ESOPHAGOGASTRODUODENOSCOPY (EGD);  Surgeon: Scott Mcrae MD;  Location:  GI     NECK SURGERY          Family History:    family history includes No Known Problems in his maternal grandfather, maternal grandmother, and paternal grandmother; Substance Abuse in his paternal grandfather.    Social History:   reports that he has quit smoking. His smokeless tobacco use includes chew. He reports current alcohol use. He reports that he does not use drugs.  PCP: Mike Rodney     Physical Exam     Patient Vitals for the past 24 hrs:   BP Temp Temp src Pulse Resp SpO2 Height Weight   11/27/22 1951 (!) 164/110 97.7  F (36.5  C) Temporal 103 17 98 % 1.803 m (5' 11\") 85.3 kg (188 lb)        Physical Exam  Nursing notes reviewed. Vitals reviewed.  General: Alert. Well kept.  Eyes:  Conjunctiva non-injected, non-icteric.  Neck/Throat: Moist mucous membranes. Normal voice.  Cardiac: Regular rhythm. Normal heart sounds. 2+ DP pulses bilateral  Pulmonary: Clear and equal breath sounds bilaterally.   Musculoskeletal:  No midline tenderness to the cervical or thoracic spine.  Pain over the bilateral lumbar spine without rash.  Normal movement at the hips/knee/ankles.   Skin:  Warm and dry without rashes.  Neuro:  Sensation intact throughout the legs.  5/5 strength at the hips/knees/ankles.  2+ patellar reflexes.  Normal gait.  Psych:  Normal affect.    Emergency Department " Course   Imaging:  Lumbar spine MRI w & w/o contrast - surgery <10yrs    (Results Pending)    Report per radiology    Laboratory:  CBC-pending  BMP-pending  ESR-pending  CRP-pending    Emergency Department Course:  Reviewed:  I reviewed nursing notes, vitals, past medical history and Care Everywhere    Assessments:  2121 I obtained history and examined the patient as noted above.     Interventions:  Medications   sodium chloride (PF) 0.9% PF flush 10 mL (has no administration in time range)   gadobutrol (GADAVIST) injection 8 mL (has no administration in time range)   ketorolac (TORADOL) injection 15 mg (has no administration in time range)      Disposition:  The patient was signed out to my colleague Dr. Carpio pending MRI and laboratory results.    Impression & Plan    Medical Decision Making:  Joe Shah is a 47 year old male, with history significant for hypertension, alcohol dependence, alcoholic hepatitis without ascites, who presents with bilateral low back pain and leg pain.  He has normal neurologic examination with a subjective weakness of the lower extremities right greater than left.  Due to the acute worsening and no improvement on gabapentin, patient was sent to MRI.  Lab work including CBC, BMP, ESR/CRP were ordered.  Patient's pain was treated with Toradol.  The patient will be signed out to my colleague Dr. Carpio pending lab work and imaging with plan for likely discharge.     Diagnosis:    ICD-10-CM    1. Essential hypertension  I10            Discharge Medications:  New Prescriptions    No medications on file        11/27/2022   Hooven, Michelle, CNP        Hooven, Michelle, CNP  11/27/22 9471

## 2022-11-28 NOTE — ED TRIAGE NOTES
Burning bilateral leg pain since Tuesday for 5 days. PCP prescribed gabapentin, started taking it today but I has not help. Concerned about problems after a back surgery in 05/2022.     Triage Assessment     Row Name 11/27/22 1953       Respiratory WDL    Respiratory WDL WDL       Skin Circulation/Temperature WDL    Skin Circulation/Temperature WDL WDL       Cardiac WDL    Cardiac WDL WDL       Peripheral/Neurovascular WDL    Peripheral Neurovascular WDL WDL       Cognitive/Neuro/Behavioral WDL    Cognitive/Neuro/Behavioral WDL WDL

## 2022-11-28 NOTE — DISCHARGE INSTRUCTIONS
Return to the ER for difficulty urinating or moving her bowels, numbness or weakness of the legs, or any new concerns.    Do not drive, use machinery, use alcohol, use other sedating medications, or use any medication that also contains acetaminophen (Tylenol) while taking Percocet.

## 2022-11-28 NOTE — ED PROVIDER NOTES
ED ATTENDING PHYSICIAN NOTE:   I evaluated this patient in conjunction with Michelle Chua CNP. I have participated in the care of the patient and personally performed key elements of the history, exam, and medical decision making.      HPI:   Joe Shah is a 47 year old male who presents with lumbar radiculopathy.  He has bilateral pain in the legs that kept him awake at night.  He does not have any new sensory loss or weakness.  He is able to ambulate.  No bladder or bowel incontinence or saddle anesthesia.  He is postop about 6 months from a lumbar fusion.  He says he spoke to his physician over the phone who referred him into the ED.     EXAM:    General: No distress  Cardiac: Regular rate and rhythm, no murmur  Lungs: Clear to auscultation bilaterally, breathing comfortably  Abdomen: Soft, nontender nondistended  Musculoskeletal: Well-healing surgical wound of the lumbar spine.  Full range of motion of the back.  Mild bilateral lumbar paraspinal tenderness.  No step-off in the midline.  Neurologic: Speech is fluent.  Strength 5 out of 5 nuchal bilaterally for flexion and extension at the knee and at the hip as well as her dorsi and plantarflexion of the feet.  Sensation light touch intact and preserved lower extremities.  Reflexes 2+ and equal bilaterally at the patella.     MEDICAL DECISION MAKING/ASSESSMENT AND PLAN:    The patient had an MRI that did not show any acute complication at the surgical site.  Inflammatory markers are negative.  The patient was prescribed Percocet, Naprosyn, and Medrol.  He will continue to follow through the outpatient setting.     DIAGNOSIS:     ICD-10-CM    1. Lumbar radiculopathy  M54.16                DISPOSITION:   Home       11/27/2022  [facility] EMERGENCY DEPT     Mynor Carpio MD  11/28/22 0048

## 2023-04-15 ENCOUNTER — HEALTH MAINTENANCE LETTER (OUTPATIENT)
Age: 48
End: 2023-04-15

## 2023-05-17 NOTE — CONSULTS
UROLOGY CONSULTATION:    PATIENT: Ravi HART (1975)  AGE: 45 year old year old male    Referring Physician: Rosa Flores PA-C  Primary Physician: Joe Sandra    CHIEF COMPLAINT:  Left Renal Colic    HPI:   Mr. Shah is a 45 yoM with a history of HTN, Anxiety, and alcohol dependence who presented to the [facility] ER yesterday with left-sided flank pain with associated nausea and emesis. Patient underwent work which included a UA significantly only for RBCs and CT scan which showed hydroureteronephrosis with no stone suggesting recent passage. No calcifications seen in the bladder. No personal history of kidney stones or family history of kidney stones that he is aware of.    At current reports significantly improved, occasional residual pressure but seems to be dampening.    Past Medical History:   Diagnosis Date     Anxiety      Back pain      Depressive disorder      Hypertension      Substance abuse (H)      Past Surgical History:   Procedure Laterality Date     ENT SURGERY       ESOPHAGOSCOPY, GASTROSCOPY, DUODENOSCOPY (EGD), COMBINED N/A 2019    Procedure: ESOPHAGOGASTRODUODENOSCOPY (EGD);  Surgeon: Scott Mcrae MD;  Location:  GI     NECK SURGERY       PAST SOCIAL HISTORY  Social History     Social History Narrative     Not on file     Social History     Tobacco Use     Smoking status: Former Smoker     Smokeless tobacco: Current User     Types: Chew   Substance Use Topics     Alcohol use: Yes     Comment: binge since 18       FAMILY HISTORY  family history includes No Known Problems in his maternal grandfather, maternal grandmother, and paternal grandmother; Substance Abuse in his paternal grandfather.    REVIEW OF SYSTEMS:  A comprehensive review of systems was reviewed with the patient with all findings across 10 systems negative except as per dictated in HPI.    There are no exam notes on file for this visit.             No current outpatient medications on  "file.                Allergies   Allergen Reactions     Morphine Nausea and Vomiting       PHYSICAL EXAM:          /86 (BP Location: Right arm)   Pulse 78   Temp 97.5  F (36.4  C) (Oral)   Resp 16   Ht 1.803 m (5' 11\")   Wt 83.2 kg (183 lb 6.8 oz)   SpO2 95%   BMI 25.58 kg/m           General appearance shows no deformaties and good grooming.  EYES: no icterus  HEAD, EARS, NOSE, MOUTH, AND THROAT: atraumatic, normocephalic  NECK: symmetric  CHEST WALL: symmetric  CARDIAC: skin well perfused, pulses regular  RESPIRATORY: breathing unlabored  ABDOMEN: soft, nontender, non distended, no rebound, guarding or peritoneal signs  BACK/FLANKS: no CVA tenderness bilaterally  SKIN/HAIR/NAILS: no visible rashes  NEUROLOGIC: no focal deficits  PSYCHIATRIC: Alert and oriented x3. Speech: normal Mood: normal Affect: normal    LABS:            Color Urine (no units)   Date Value   05/08/2021 Yellow     Appearance Urine (no units)   Date Value   05/08/2021 Slightly Cloudy     Glucose Urine (mg/dL)   Date Value   05/08/2021 Negative     Bilirubin Urine (no units)   Date Value   05/08/2021 Negative     Ketones Urine (mg/dL)   Date Value   05/08/2021 5 (A)     Specific Gravity Urine (no units)   Date Value   05/08/2021 1.024     pH Urine (pH)   Date Value   05/08/2021 5.5     Protein Albumin Urine (mg/dL)   Date Value   05/08/2021 50 (A)     Nitrite Urine (no units)   Date Value   05/08/2021 Negative     Leukocyte Esterase Urine (no units)   Date Value   05/08/2021 Negative              No results found for: PSA             Hemoglobin   Date Value Ref Range Status   05/09/2021 12.3 (L) 13.3 - 17.7 g/dL Final          Lab Results   Component Value Date    CR 0.90 05/09/2021         IMAGING:   I have personally reviewed and interpreted the images from patient's CT A/P dated 5/8/2021: left hydroureteronephrosis to the level of the bladder with perinephric fat stranding consistent with recently passed stone.  No evidence of " calcification within the bladder lumen.      ASSESSMENT:   45 year old male with left flank pain consistent with recently passed stone    PLAN:    1. Flank Pain  - Agree with pain control, not uncommon for pain to persist for 12-24 hours after stone passage due to ureteral spasms  - No calcification seen in bladder, so I anticipate stone has already been voided  - Addition of tamsulosin and Oxybutynin 5 mg TID PRN may prove beneficial for pain control.  - No other source of obstruction so no acute surgical intervention indicated.  - Ok for discharge to home from  standpoint.  - Should establish care with urologist following discharge with plan for metabolic work up for stone risk factors.    Solitario Parks MD  [facility]  Pager: [phone]  Office: [phone]  Surgical [phone]   Skin normal color for race, warm, dry and intact. No evidence of rash.

## 2023-05-26 ENCOUNTER — HOSPITAL ENCOUNTER (INPATIENT)
Facility: CLINIC | Age: 48
LOS: 2 days | Discharge: HOME | DRG: 368 | End: 2023-05-28
Attending: EMERGENCY MEDICINE | Admitting: INTERNAL MEDICINE
Payer: COMMERCIAL

## 2023-05-26 DIAGNOSIS — E87.6 HYPOKALEMIA: ICD-10-CM

## 2023-05-26 DIAGNOSIS — K92.2 UPPER GI BLEED: ICD-10-CM

## 2023-05-26 DIAGNOSIS — F10.930 ALCOHOL WITHDRAWAL SYNDROME WITHOUT COMPLICATION (H): ICD-10-CM

## 2023-05-26 DIAGNOSIS — K22.11 ULCER OF ESOPHAGUS WITH BLEEDING: Primary | ICD-10-CM

## 2023-05-26 DIAGNOSIS — R11.2 NAUSEA AND VOMITING, UNSPECIFIED VOMITING TYPE: ICD-10-CM

## 2023-05-26 DIAGNOSIS — E86.0 DEHYDRATION: ICD-10-CM

## 2023-05-26 LAB
ABO/RH(D): NORMAL
ALBUMIN SERPL BCG-MCNC: 4.7 G/DL (ref 3.5–5.2)
ALP SERPL-CCNC: 82 U/L (ref 40–129)
ALT SERPL W P-5'-P-CCNC: 63 U/L (ref 10–50)
ANION GAP SERPL CALCULATED.3IONS-SCNC: 27 MMOL/L (ref 7–15)
ANTIBODY SCREEN: NEGATIVE
AST SERPL W P-5'-P-CCNC: 93 U/L (ref 10–50)
ATRIAL RATE - MUSE: 129 BPM
BASOPHILS # BLD AUTO: 0 10E3/UL (ref 0–0.2)
BASOPHILS NFR BLD AUTO: 0 %
BILIRUB SERPL-MCNC: 1.7 MG/DL
BUN SERPL-MCNC: 20.4 MG/DL (ref 6–20)
CALCIUM SERPL-MCNC: 10.1 MG/DL (ref 8.6–10)
CHLORIDE SERPL-SCNC: 86 MMOL/L (ref 98–107)
CREAT SERPL-MCNC: 0.91 MG/DL (ref 0.67–1.17)
DEPRECATED HCO3 PLAS-SCNC: 22 MMOL/L (ref 22–29)
DIASTOLIC BLOOD PRESSURE - MUSE: NORMAL MMHG
EOSINOPHIL # BLD AUTO: 0 10E3/UL (ref 0–0.7)
EOSINOPHIL NFR BLD AUTO: 0 %
ERYTHROCYTE [DISTWIDTH] IN BLOOD BY AUTOMATED COUNT: 12.4 % (ref 10–15)
ETHANOL SERPL-MCNC: <0.01 G/DL
GFR SERPL CREATININE-BSD FRML MDRD: >90 ML/MIN/1.73M2
GLUCOSE SERPL-MCNC: 203 MG/DL (ref 70–99)
HCT VFR BLD AUTO: 45.3 % (ref 40–53)
HGB BLD-MCNC: 13.4 G/DL (ref 13.3–17.7)
HGB BLD-MCNC: 13.5 G/DL (ref 13.3–17.7)
HGB BLD-MCNC: 15.6 G/DL (ref 13.3–17.7)
HOLD SPECIMEN: NORMAL
HOLD SPECIMEN: NORMAL
IMM GRANULOCYTES # BLD: 0 10E3/UL
IMM GRANULOCYTES NFR BLD: 0 %
INTERPRETATION ECG - MUSE: NORMAL
LACTATE SERPL-SCNC: 0.8 MMOL/L (ref 0.7–2)
LIPASE SERPL-CCNC: 64 U/L (ref 13–60)
LYMPHOCYTES # BLD AUTO: 0.3 10E3/UL (ref 0.8–5.3)
LYMPHOCYTES NFR BLD AUTO: 4 %
MAGNESIUM SERPL-MCNC: 1.8 MG/DL (ref 1.7–2.3)
MCH RBC QN AUTO: 34.7 PG (ref 26.5–33)
MCHC RBC AUTO-ENTMCNC: 34.4 G/DL (ref 31.5–36.5)
MCV RBC AUTO: 101 FL (ref 78–100)
MONOCYTES # BLD AUTO: 0.7 10E3/UL (ref 0–1.3)
MONOCYTES NFR BLD AUTO: 8 %
NEUTROPHILS # BLD AUTO: 7.7 10E3/UL (ref 1.6–8.3)
NEUTROPHILS NFR BLD AUTO: 88 %
NRBC # BLD AUTO: 0 10E3/UL
NRBC BLD AUTO-RTO: 0 /100
P AXIS - MUSE: 73 DEGREES
PHOSPHATE SERPL-MCNC: 2 MG/DL (ref 2.5–4.5)
PLATELET # BLD AUTO: 226 10E3/UL (ref 150–450)
POTASSIUM SERPL-SCNC: 4.1 MMOL/L (ref 3.4–5.3)
PR INTERVAL - MUSE: 146 MS
PROT SERPL-MCNC: 8.2 G/DL (ref 6.4–8.3)
QRS DURATION - MUSE: 72 MS
QT - MUSE: 300 MS
QTC - MUSE: 439 MS
R AXIS - MUSE: 62 DEGREES
RBC # BLD AUTO: 4.5 10E6/UL (ref 4.4–5.9)
SODIUM SERPL-SCNC: 135 MMOL/L (ref 136–145)
SPECIMEN EXPIRATION DATE: NORMAL
SYSTOLIC BLOOD PRESSURE - MUSE: NORMAL MMHG
T AXIS - MUSE: 65 DEGREES
VENTRICULAR RATE- MUSE: 129 BPM
VIT B12 SERPL-MCNC: 531 PG/ML (ref 232–1245)
WBC # BLD AUTO: 8.8 10E3/UL (ref 4–11)

## 2023-05-26 PROCEDURE — 80053 COMPREHEN METABOLIC PANEL: CPT | Performed by: EMERGENCY MEDICINE

## 2023-05-26 PROCEDURE — 86850 RBC ANTIBODY SCREEN: CPT | Performed by: EMERGENCY MEDICINE

## 2023-05-26 PROCEDURE — 82077 ASSAY SPEC XCP UR&BREATH IA: CPT | Performed by: EMERGENCY MEDICINE

## 2023-05-26 PROCEDURE — 99292 CRITICAL CARE ADDL 30 MIN: CPT

## 2023-05-26 PROCEDURE — 36415 COLL VENOUS BLD VENIPUNCTURE: CPT | Performed by: INTERNAL MEDICINE

## 2023-05-26 PROCEDURE — 99291 CRITICAL CARE FIRST HOUR: CPT | Mod: 25

## 2023-05-26 PROCEDURE — 250N000009 HC RX 250: Performed by: EMERGENCY MEDICINE

## 2023-05-26 PROCEDURE — 83690 ASSAY OF LIPASE: CPT | Performed by: EMERGENCY MEDICINE

## 2023-05-26 PROCEDURE — 82435 ASSAY OF BLOOD CHLORIDE: CPT | Performed by: EMERGENCY MEDICINE

## 2023-05-26 PROCEDURE — 258N000003 HC RX IP 258 OP 636: Performed by: EMERGENCY MEDICINE

## 2023-05-26 PROCEDURE — 120N000001 HC R&B MED SURG/OB

## 2023-05-26 PROCEDURE — 250N000011 HC RX IP 250 OP 636: Performed by: EMERGENCY MEDICINE

## 2023-05-26 PROCEDURE — 258N000003 HC RX IP 258 OP 636: Performed by: INTERNAL MEDICINE

## 2023-05-26 PROCEDURE — 96361 HYDRATE IV INFUSION ADD-ON: CPT

## 2023-05-26 PROCEDURE — HZ2ZZZZ DETOXIFICATION SERVICES FOR SUBSTANCE ABUSE TREATMENT: ICD-10-PCS | Performed by: INTERNAL MEDICINE

## 2023-05-26 PROCEDURE — 93005 ELECTROCARDIOGRAM TRACING: CPT

## 2023-05-26 PROCEDURE — C9113 INJ PANTOPRAZOLE SODIUM, VIA: HCPCS | Performed by: INTERNAL MEDICINE

## 2023-05-26 PROCEDURE — 84100 ASSAY OF PHOSPHORUS: CPT | Performed by: INTERNAL MEDICINE

## 2023-05-26 PROCEDURE — 96376 TX/PRO/DX INJ SAME DRUG ADON: CPT

## 2023-05-26 PROCEDURE — 82607 VITAMIN B-12: CPT | Performed by: INTERNAL MEDICINE

## 2023-05-26 PROCEDURE — 85025 COMPLETE CBC W/AUTO DIFF WBC: CPT | Performed by: EMERGENCY MEDICINE

## 2023-05-26 PROCEDURE — 83605 ASSAY OF LACTIC ACID: CPT | Performed by: INTERNAL MEDICINE

## 2023-05-26 PROCEDURE — 83735 ASSAY OF MAGNESIUM: CPT | Performed by: INTERNAL MEDICINE

## 2023-05-26 PROCEDURE — 250N000013 HC RX MED GY IP 250 OP 250 PS 637: Performed by: INTERNAL MEDICINE

## 2023-05-26 PROCEDURE — 250N000011 HC RX IP 250 OP 636: Performed by: INTERNAL MEDICINE

## 2023-05-26 PROCEDURE — 85018 HEMOGLOBIN: CPT | Performed by: INTERNAL MEDICINE

## 2023-05-26 PROCEDURE — 99222 1ST HOSP IP/OBS MODERATE 55: CPT | Performed by: INTERNAL MEDICINE

## 2023-05-26 PROCEDURE — 96365 THER/PROPH/DIAG IV INF INIT: CPT

## 2023-05-26 PROCEDURE — 36415 COLL VENOUS BLD VENIPUNCTURE: CPT | Performed by: EMERGENCY MEDICINE

## 2023-05-26 PROCEDURE — C9113 INJ PANTOPRAZOLE SODIUM, VIA: HCPCS | Performed by: EMERGENCY MEDICINE

## 2023-05-26 PROCEDURE — 96375 TX/PRO/DX INJ NEW DRUG ADDON: CPT

## 2023-05-26 RX ORDER — SODIUM CHLORIDE, SODIUM LACTATE, POTASSIUM CHLORIDE, CALCIUM CHLORIDE 600; 310; 30; 20 MG/100ML; MG/100ML; MG/100ML; MG/100ML
125 INJECTION, SOLUTION INTRAVENOUS CONTINUOUS
Status: DISCONTINUED | OUTPATIENT
Start: 2023-05-26 | End: 2023-05-26

## 2023-05-26 RX ORDER — DIAZEPAM 10 MG/2ML
5 INJECTION, SOLUTION INTRAMUSCULAR; INTRAVENOUS ONCE
Status: COMPLETED | OUTPATIENT
Start: 2023-05-26 | End: 2023-05-26

## 2023-05-26 RX ORDER — AMOXICILLIN 250 MG
1 CAPSULE ORAL 2 TIMES DAILY PRN
Status: DISCONTINUED | OUTPATIENT
Start: 2023-05-26 | End: 2023-05-28 | Stop reason: HOSPADM

## 2023-05-26 RX ORDER — LIDOCAINE 40 MG/G
CREAM TOPICAL
Status: DISCONTINUED | OUTPATIENT
Start: 2023-05-26 | End: 2023-05-28 | Stop reason: HOSPADM

## 2023-05-26 RX ORDER — SODIUM CHLORIDE 9 MG/ML
INJECTION, SOLUTION INTRAVENOUS CONTINUOUS
Status: DISCONTINUED | OUTPATIENT
Start: 2023-05-26 | End: 2023-05-27

## 2023-05-26 RX ORDER — METOPROLOL SUCCINATE 50 MG/1
50 TABLET, EXTENDED RELEASE ORAL DAILY
Status: DISCONTINUED | OUTPATIENT
Start: 2023-05-26 | End: 2023-05-28 | Stop reason: HOSPADM

## 2023-05-26 RX ORDER — ONDANSETRON 2 MG/ML
4 INJECTION INTRAMUSCULAR; INTRAVENOUS EVERY 30 MIN PRN
Status: DISCONTINUED | OUTPATIENT
Start: 2023-05-26 | End: 2023-05-26

## 2023-05-26 RX ORDER — FLUMAZENIL 0.1 MG/ML
0.2 INJECTION, SOLUTION INTRAVENOUS
Status: DISCONTINUED | OUTPATIENT
Start: 2023-05-26 | End: 2023-05-27

## 2023-05-26 RX ORDER — PHENOBARBITAL SODIUM 130 MG/ML
260 INJECTION, SOLUTION INTRAMUSCULAR; INTRAVENOUS ONCE
Status: COMPLETED | OUTPATIENT
Start: 2023-05-26 | End: 2023-05-26

## 2023-05-26 RX ORDER — ONDANSETRON 2 MG/ML
4 INJECTION INTRAMUSCULAR; INTRAVENOUS ONCE
Status: COMPLETED | OUTPATIENT
Start: 2023-05-26 | End: 2023-05-26

## 2023-05-26 RX ORDER — HYDROCODONE BITARTRATE AND ACETAMINOPHEN 5; 325 MG/1; MG/1
1 TABLET ORAL EVERY 6 HOURS PRN
Status: ON HOLD | COMMUNITY
End: 2023-05-28

## 2023-05-26 RX ORDER — AMOXICILLIN 250 MG
2 CAPSULE ORAL 2 TIMES DAILY PRN
Status: DISCONTINUED | OUTPATIENT
Start: 2023-05-26 | End: 2023-05-28 | Stop reason: HOSPADM

## 2023-05-26 RX ORDER — LORAZEPAM 2 MG/ML
1-2 INJECTION INTRAMUSCULAR EVERY 30 MIN PRN
Status: DISCONTINUED | OUTPATIENT
Start: 2023-05-26 | End: 2023-05-28 | Stop reason: HOSPADM

## 2023-05-26 RX ORDER — FOLIC ACID 1 MG/1
1 TABLET ORAL DAILY
Status: DISCONTINUED | OUTPATIENT
Start: 2023-05-26 | End: 2023-05-28 | Stop reason: HOSPADM

## 2023-05-26 RX ORDER — ONDANSETRON 4 MG/1
4 TABLET, ORALLY DISINTEGRATING ORAL EVERY 6 HOURS PRN
Status: DISCONTINUED | OUTPATIENT
Start: 2023-05-26 | End: 2023-05-28 | Stop reason: HOSPADM

## 2023-05-26 RX ORDER — MULTIPLE VITAMINS W/ MINERALS TAB 9MG-400MCG
1 TAB ORAL DAILY
Status: DISCONTINUED | OUTPATIENT
Start: 2023-05-26 | End: 2023-05-28 | Stop reason: HOSPADM

## 2023-05-26 RX ORDER — ONDANSETRON 2 MG/ML
4 INJECTION INTRAMUSCULAR; INTRAVENOUS EVERY 6 HOURS PRN
Status: DISCONTINUED | OUTPATIENT
Start: 2023-05-26 | End: 2023-05-28 | Stop reason: HOSPADM

## 2023-05-26 RX ORDER — LORAZEPAM 0.5 MG/1
1-2 TABLET ORAL EVERY 30 MIN PRN
Status: DISCONTINUED | OUTPATIENT
Start: 2023-05-26 | End: 2023-05-28 | Stop reason: HOSPADM

## 2023-05-26 RX ORDER — PHENOBARBITAL SODIUM 130 MG/ML
130 INJECTION, SOLUTION INTRAMUSCULAR; INTRAVENOUS ONCE
Status: COMPLETED | OUTPATIENT
Start: 2023-05-26 | End: 2023-05-26

## 2023-05-26 RX ADMIN — SODIUM CHLORIDE, POTASSIUM CHLORIDE, SODIUM LACTATE AND CALCIUM CHLORIDE 125 ML/HR: 600; 310; 30; 20 INJECTION, SOLUTION INTRAVENOUS at 03:51

## 2023-05-26 RX ADMIN — Medication 1 ML: at 04:05

## 2023-05-26 RX ADMIN — DIAZEPAM 5 MG: 5 INJECTION INTRAMUSCULAR; INTRAVENOUS at 01:16

## 2023-05-26 RX ADMIN — THIAMINE HCL TAB 100 MG 100 MG: 100 TAB at 10:05

## 2023-05-26 RX ADMIN — Medication 1 ML: at 06:10

## 2023-05-26 RX ADMIN — LORAZEPAM 2 MG: 2 INJECTION INTRAMUSCULAR; INTRAVENOUS at 04:05

## 2023-05-26 RX ADMIN — PHENOBARBITAL SODIUM 260 MG: 130 INJECTION INTRAMUSCULAR; INTRAVENOUS at 03:11

## 2023-05-26 RX ADMIN — ONDANSETRON 4 MG: 2 INJECTION INTRAMUSCULAR; INTRAVENOUS at 02:49

## 2023-05-26 RX ADMIN — MULTIPLE VITAMINS W/ MINERALS TAB 1 TABLET: TAB at 10:05

## 2023-05-26 RX ADMIN — LORAZEPAM 2 MG: 2 INJECTION INTRAMUSCULAR; INTRAVENOUS at 05:11

## 2023-05-26 RX ADMIN — PANTOPRAZOLE SODIUM 40 MG: 40 INJECTION, POWDER, FOR SOLUTION INTRAVENOUS at 20:16

## 2023-05-26 RX ADMIN — ONDANSETRON 4 MG: 2 INJECTION INTRAMUSCULAR; INTRAVENOUS at 00:45

## 2023-05-26 RX ADMIN — PANTOPRAZOLE SODIUM 40 MG: 40 INJECTION, POWDER, FOR SOLUTION INTRAVENOUS at 10:04

## 2023-05-26 RX ADMIN — Medication 1 ML: at 05:11

## 2023-05-26 RX ADMIN — PANTOPRAZOLE SODIUM 40 MG: 40 INJECTION, POWDER, FOR SOLUTION INTRAVENOUS at 01:17

## 2023-05-26 RX ADMIN — LORAZEPAM 2 MG: 0.5 TABLET ORAL at 06:11

## 2023-05-26 RX ADMIN — DIAZEPAM 5 MG: 5 INJECTION INTRAMUSCULAR; INTRAVENOUS at 02:49

## 2023-05-26 RX ADMIN — SODIUM CHLORIDE: 9 INJECTION, SOLUTION INTRAVENOUS at 11:13

## 2023-05-26 RX ADMIN — FOLIC ACID 1 MG: 1 TABLET ORAL at 10:05

## 2023-05-26 RX ADMIN — FOLIC ACID: 5 INJECTION, SOLUTION INTRAMUSCULAR; INTRAVENOUS; SUBCUTANEOUS at 02:31

## 2023-05-26 RX ADMIN — METOPROLOL SUCCINATE 50 MG: 50 TABLET, EXTENDED RELEASE ORAL at 10:05

## 2023-05-26 RX ADMIN — FAMOTIDINE 20 MG: 10 INJECTION INTRAVENOUS at 01:16

## 2023-05-26 RX ADMIN — SODIUM CHLORIDE: 9 INJECTION, SOLUTION INTRAVENOUS at 04:05

## 2023-05-26 RX ADMIN — LORAZEPAM 2 MG: 2 INJECTION INTRAMUSCULAR; INTRAVENOUS at 14:13

## 2023-05-26 RX ADMIN — PHENOBARBITAL SODIUM 130 MG: 130 INJECTION INTRAMUSCULAR; INTRAVENOUS at 06:10

## 2023-05-26 RX ADMIN — SODIUM CHLORIDE, POTASSIUM CHLORIDE, SODIUM LACTATE AND CALCIUM CHLORIDE 1000 ML: 600; 310; 30; 20 INJECTION, SOLUTION INTRAVENOUS at 01:25

## 2023-05-26 ASSESSMENT — ACTIVITIES OF DAILY LIVING (ADL)
CONCENTRATING,_REMEMBERING_OR_MAKING_DECISIONS_DIFFICULTY: NO
ADLS_ACUITY_SCORE: 35
FALL_HISTORY_WITHIN_LAST_SIX_MONTHS: NO
ADLS_ACUITY_SCORE: 35
DRESSING/BATHING_DIFFICULTY: NO
DOING_ERRANDS_INDEPENDENTLY_DIFFICULTY: NO
ADLS_ACUITY_SCORE: 35
ADLS_ACUITY_SCORE: 20
CHANGE_IN_FUNCTIONAL_STATUS_SINCE_ONSET_OF_CURRENT_ILLNESS/INJURY: YES
TOILETING_ISSUES: NO
WEAR_GLASSES_OR_BLIND: YES
EQUIPMENT_CURRENTLY_USED_AT_HOME: WALKER, ROLLING
WALKING_OR_CLIMBING_STAIRS_DIFFICULTY: NO
ADLS_ACUITY_SCORE: 22
DIFFICULTY_EATING/SWALLOWING: NO
DIFFICULTY_COMMUNICATING: NO
ADLS_ACUITY_SCORE: 22
ADLS_ACUITY_SCORE: 20
ADLS_ACUITY_SCORE: 35
ADLS_ACUITY_SCORE: 22
ADLS_ACUITY_SCORE: 20
ADLS_ACUITY_SCORE: 35
ADLS_ACUITY_SCORE: 35

## 2023-05-26 NOTE — PHARMACY-ADMISSION MEDICATION HISTORY
Pharmacist Admission Medication History    Admission medication history is complete. The information provided in this note is only as accurate as the sources available at the time of the update.    Medication reconciliation/reorder completed by provider prior to medication history? No    Information Source(s): Patient and CareEverywhere/SureScripts via in-person    Pertinent Information:   - Pt hasn't been able to keep any medications down recently, but is normally compliant with metoprolol    Changes made to PTA medication list:    Added:   o Norco (recent surgery)    Deleted:   o Folic acid  o Gabapentin  o Methylpred  o Pantoprazole    Changed: None    Medication Affordability:       Allergies reviewed with patient and updates made in EHR: yes    Medication History Completed By: Ro Abdul [facility] 5/26/2023 7:33 AM    Prior to Admission medications    Medication Sig Last Dose Taking? Auth Provider Long Term End Date   HYDROcodone-acetaminophen (NORCO) 5-325 MG tablet Take 1 tablet by mouth every 6 hours as needed for severe pain prn at prn Yes Unknown, Entered By History     metoprolol succinate ER (TOPROL-XL) 50 MG 24 hr tablet Take 50 mg by mouth daily  Past Week Yes Reported, Patient Yes    multivitamin w/minerals (THERA-VIT-M) tablet Take 1 tablet by mouth daily Past Week Yes Lynne Cho MD     ondansetron (ZOFRAN) 4 MG tablet Take 4 mg by mouth every 8 hours as needed for nausea Past Week Yes Unknown, Entered By History     polyethylene glycol-propylene glycol (SYSTANE ULTRA) 0.4-0.3 % SOLN ophthalmic solution Place 1 drop into both eyes every hour as needed for dry eyes prn at prn Yes Unknown, Entered By History

## 2023-05-26 NOTE — ED TRIAGE NOTES
[facility]  ED Arrival Note    Arrives through triage. ABC's intact. A &O X4. . Pt arrives with c/o hematemesis this evening and tremors. Reports starting Oxycodone for neuropathy on Tuesday. Last drink on Monday.       Visitors during triage: Spouse      Triage Interventions: EKG and IV and labs    Ambulatory: Yes    Meets Stroke Criteria?: No    Meets Trauma Criteria?: No    Shock Index: <0.8, for provider reference    Directed to: Triage Lobby    Pronouns: he/him       Triage Assessment     Row Name 05/26/23 0030       Triage Assessment (Adult)    Airway WDL WDL       Respiratory WDL    Respiratory WDL WDL       Skin Circulation/Temperature WDL    Skin Circulation/Temperature WDL WDL       Cardiac WDL    Cardiac WDL WDL       Peripheral/Neurovascular WDL    Peripheral Neurovascular WDL WDL       Cognitive/Neuro/Behavioral WDL    Cognitive/Neuro/Behavioral WDL WDL

## 2023-05-26 NOTE — PROGRESS NOTES
[facility]    Medicine Progress Note - Hospitalist Service    Date of Admission:  5/26/2023    Assessment & Plan   47 year old male with past medical history of alcohol use disorder, hypertension, neuropathy, and lumbar radiculopathy with previous decompressive laminectomy 5/2022.  Admitted for hematemesis and concerns of acute alcohol withdrawal.    Hematemesis  Upper gastrointestinal bleed  Alcohol use disorder  Acute alcohol withdrawal  Elevated liver enzymes consistent with alcohol induced hepatitis  Macrocytosis, admission   Hyponatremia  Admitted for hematemesis and concerns of upper GI bleed in the setting of alcohol use disorder.  Gastroenterology consulted.  Past EGD September 2019 with nonbleeding esophageal ulcers, medium size hiatal hernia, and erythematous stomach-see report.    GI consult follow-up    EGD 5/27 per GI    IV Protonix    Avoid non-steroidal anti-inflammatory drugs (NSAIDs) or blood thinners    Monitor hemoglobin    IV fluids    Alcohol abstinence discussed and encouraged; chemical dependency consultation; outpatient follow-up with primary clinic provider    Monitor for signs/symptoms of acute alcohol withdrawal; CIWA protocol; lorazepam per protocol; supplemental thiamine/folate/MVI    Monitor liver function tests (LFTs)    Monitor serum sodium, mildly low, asymptomatic, 135 on 5/26    Clear liquid diet, nothing by mouth (NPO) after midnight for EGD 5/27    AM labs as ordered    Essential hypertension    Continue Toprol XL, monitor blood pressure and heart rate    History of neuropathy  History of lumbar radiculopathy with previous decompressive laminectomy 5/2022  Chronic neuropathy, past back surgery ~1 year ago.  Possible component of alcohol induced neuropathy.  B12 level checked and satisfactory.    Monitor, follow-up with primary clinic provider     Consider physical therapy (PT) consult    Disposition     Estimated length of stay 48  "hours    Anticipated discharge to home       Diet: Clear Liquid Diet    DVT Prophylaxis: Pneumatic Compression Devices  Power Catheter: Not present  Lines: None     Cardiac Monitoring: None  Code Status: Full Code      Clinically Significant Risk Factors Present on Admission             # Anion Gap Metabolic Acidosis: Highest Anion Gap = 27 mmol/L in last 2 days, will monitor and treat as appropriate      # Hypertension: Noted on problem list      # Overweight: Estimated body mass index is 25.1 kg/m  as calculated from the following:    Height as of this encounter: 1.803 m (5' 11\").    Weight as of this encounter: 81.6 kg (180 lb).            Disposition Plan      Expected Discharge Date: 05/28/2023      Destination: home with family            Jarvis Benja Yip MD  Hospitalist Service  [facility]  Securely message with [employer] (more info)  Text page via [employer] Paging/Directory   ______________________________________________________________________    Interval History   Hospital admission earlier today by Dr. Villa for acute alcohol withdrawal and hematemesis.  Lying in bed, mildly tremulous, answering questions and following simple commands.  GI consulted with plans for upper endoscopy today.  No complaints of abdominal pain, recurrent nausea or vomiting, or recurrent hematemesis.  Care plan discussed with nursing staff.  Admission history and physical reviewed from earlier today.    Physical Exam   Vital Signs: Temp: 98.7  F (37.1  C) Temp src: Oral BP: (!) 150/97 Pulse: 94   Resp: 18 SpO2: 99 % O2 Device: None (Room air)    Weight: 180 lbs 0 oz    Awake alert, sitting up in bed, mildly tremulous in no immediate distress  Lungs clear with good inspiratory effort  Heart regular rhythm without rubs or gallops  Abdomen soft and nontender without rebound or rigidity  Extremities without edema  Skin warm and dry  Mental status pleasant, answering questions and following simple commands " appropriately    Medical Decision Making             Data     I have personally reviewed the following data over the past 24 hrs:    8.8  \   13.4   / 226     135 (L) 86 (L) 20.4 (H) /  203 (H)   4.1 22 0.91 \       ALT: 63 (H) AST: 93 (H) AP: 82 TBILI: 1.7 (H)   ALB: 4.7 TOT PROTEIN: 8.2 LIPASE: 64 (H)       Procal: N/A CRP: N/A Lactic Acid: 0.8         Imaging results reviewed over the past 24 hrs:   No results found for this or any previous visit (from the past 24 hour(s)).

## 2023-05-26 NOTE — CONSULTS
[facility]  Gastroenterology Consultation         Joe Shah  [address] [address]  47 year old male    Admission Date/Time: 5/26/2023  Primary Care Provider: Mike Rodney  Referring / Attending Physician:  Dr. Gucci Villa    We were asked to see the patient in consultation by Dr. Gucci Villa for evaluation of possible upper GI bleed.      CC: hematemesis    HPI:  Joe Shah is a 47 year old male who has a past medical history of alcohol use disorder and neuropathy whom presented with nausea and coffee ground emesis that started on 5/24. He reports he has 4 beers with 3 glasses of wine but occasionally does have a grady or other hard alcohol.  Has history of alcohol treatment- most recent in 2019. He has neuropathy and takes 2 aleve daily. He reports no fever, chills, chest pain, dizzines, or bright red emesis. Reports prior history of PUD and has had an EGD most recently in 2019 noting a hital hernia and non bleeding esophageal ulcers.    ROS: A comprehensive ten point review of systems was negative aside from those in mentioned in the HPI.      PAST MED HX:  I have reviewed this patient's medical history and updated it with pertinent information if needed.   Past Medical History:   Diagnosis Date     Anxiety      Back pain      Depressive disorder      Hypertension      Substance abuse (H)        MEDICATIONS:   Prior to Admission Medications   Prescriptions Last Dose Informant Patient Reported? Taking?   HYDROcodone-acetaminophen (NORCO) 5-325 MG tablet prn at prn Self Yes Yes   Sig: Take 1 tablet by mouth every 6 hours as needed for severe pain   metoprolol succinate ER (TOPROL-XL) 50 MG 24 hr tablet Past Week Self Yes Yes   Sig: Take 50 mg by mouth daily    multivitamin w/minerals (THERA-VIT-M) tablet Past Week Self No Yes   Sig: Take 1 tablet by mouth daily   ondansetron (ZOFRAN) 4 MG tablet Past Week Self Yes Yes   Sig: Take 4 mg by  mouth every 8 hours as needed for nausea   polyethylene glycol-propylene glycol (SYSTANE ULTRA) 0.4-0.3 % SOLN ophthalmic solution prn at prn Self Yes Yes   Sig: Place 1 drop into both eyes every hour as needed for dry eyes      Facility-Administered Medications: None       ALLERGIES:   Allergies   Allergen Reactions     Morphine Nausea and Vomiting       SOCIAL HISTORY:  Social History     Tobacco Use     Smoking status: Former     Smokeless tobacco: Current     Types: Chew   Substance Use Topics     Alcohol use: Yes     Comment: binge since Monday 04/27/20     Drug use: No       FAMILY HISTORY:  Family History   Problem Relation Age of Onset     No Known Problems Maternal Grandmother      No Known Problems Maternal Grandfather      No Known Problems Paternal Grandmother      Substance Abuse Paternal Grandfather        PHYSICAL EXAM:   General  Alert, oriented, tremors, appears comfortable  Vital Signs with Ranges  Temp: 99.9  F (37.7  C) Temp src: Oral BP: (!) 138/97 Pulse: 102   Resp: 18 SpO2: 96 % O2 Device: None (Room air)    I/O last 3 completed shifts:  In: 1000 [IV Piggyback:1000]  Out: -     Constitutional: healthy, alert and no distress   Cardiovascular: negative, PMI normal. No lifts, heaves, or thrills. RRR. No murmurs, clicks gallops or rub  Respiratory: negative, Percussion normal. Good diaphragmatic excursion. Lungs clear  Abdomen: Abdomen soft, non-tender. BS normal. No masses, organomegaly          ADDITIONAL COMMENTS:   I reviewed the patient's new clinical lab test results.   Recent Labs   Lab Test 05/26/23  0628 05/26/23  0044 11/27/22  2354 09/17/22  0619 05/02/20  0818 05/01/20  2218 03/15/20  0817 03/14/20  2201 03/02/20  0848   WBC  --  8.8 5.0 6.6   < > 8.2   < > 11.6* 5.2   HGB 13.5 15.6 14.7 13.8   < > 15.2   < > 16.2 14.5   MCV  --  101* 101* 99   < > 100   < > 97 101*   PLT  --  226 185 155   < > 188   < > 292 138*   INR  --   --   --   --   --  1.02  --  1.06 0.96    < > = values in  this interval not displayed.     Recent Labs   Lab Test 05/26/23  0044 11/27/22  2354 09/17/22  0619   POTASSIUM 4.1 4.0 4.2   CHLORIDE 86* 106 95   CO2 22 25 26   BUN 20.4* 10 9   ANIONGAP 27* 11 11     Recent Labs   Lab Test 05/26/23  0044 09/17/22  0619 09/15/22  1659 05/09/21  0606 05/08/21  1413 05/02/20  0818 05/01/20  2218   ALBUMIN 4.7 3.4 4.2   < >  --    < > 4.3   BILITOTAL 1.7* 2.2* 2.0*   < >  --    < > 1.7*   ALT 63* 59 113*   < >  --    < > 127*   AST 93* 47* 145*   < >  --    < > 119*   PROTEIN  --   --   --   --  50*  --   --    LIPASE 64*  --  95  --   --   --  204    < > = values in this interval not displayed.       I reviewed the patient's new imaging results.        CONSULTATION ASSESSMENT AND PLAN:    Joe Shah is a 47 year old male with history of alcohol abuse and withdrawal whom presents with c/o hematemesis.    Alcohol withdrawal syndrome  Nausea and vomiting  Coffee ground emesis  Hemoglobin 15.6->13.5  EtOH negative on presentation  Tremors present on exam and tachycardic  AST/ALT 93/63 Tbili 1.7.   Consumes 7 or more beverages of alcohol daily, and aleve daily  Has h/o esophagitis on prior EGD  Patient likely has PUD and alcoholic gastritis    -- recommend EGD to r/o source of bleed. Patient currently stable with hemoglobin in normal range- although down 2 g. He has tremors and tachycardic from withdrawal. Reports eating at 4 am? Will recommend to plan EGD tomorrow.  -- can start clear liquid diet  -- NPO at midnight  -- q 8 hour hemoglobin  -- IV pantoprazole 40 mg BID        [facility].  Office: [phone]  Cell : [phone] (Dr. Mcrae)  Cell: [phone] (Ester Tobar PA-C)

## 2023-05-26 NOTE — ED NOTES
[facility]  ED Nurse Handoff Report    ED Chief complaint: Hematemesis and Tremors      ED Diagnosis:   Final diagnoses:   Nausea and vomiting, unspecified vomiting type   Dehydration   Upper GI bleed   Alcohol withdrawal syndrome without complication (H)       Code Status: Full Code    Allergies:   Allergies   Allergen Reactions     Morphine Nausea and Vomiting       Patient Story:   Patient presented to ED with alcohol withdrawal. Last alcohol intake was Monday. Also has hematemesis.     Focused Assessment:    Neuro: Alert, oriented  Respiratory: Breathing well on room air   Cardiology: tachycardic  Gastrointestinal: sore throat, uses Chloraseptic prn. Hematemesis, placed on NPO  Musculoskeletal: moves all extremities well   Skin: Intact skin   Lines: 18 gauge on right wrist       Treatments and/or interventions provided:   Ativan and Phenobarbital given. Fluids started.     Patient's response to treatments and/or interventions:  Tolerated cares. Heart rate improved from 130's to 110's.    To be done/followed up on inpatient unit:  CIWA monitoring, possible endoscopy.     Does this patient have any cognitive concerns?: alert and oriented.     Activity level - Baseline/Home:  Independent  Activity Level - Current:   Stand with Assist    Patient's Preferred language: English   Needed?: No    Isolation: None  Infection: Not Applicable  Patient tested for COVID 19 prior to admission: NO  Bariatric?: No    Vital Signs:   Vitals:    05/26/23 0600 05/26/23 0701 05/26/23 0731 05/26/23 0801   BP: 132/88 (!) 141/96 (!) 130/98 (!) 126/94   Pulse: (!) 122 120 111 111   Resp: 27 19 22 20   Temp:       TempSrc:       SpO2: 95%      Weight:       Height:           Cardiac Rhythm: sinus tachycardia    Was the PSS-3 completed:   Yes  What interventions are required if any?  Anticipation of need. Explanation of cares. Active listening.     Family Comments: not present     For the majority of the shift  this patient's behavior was Green.   Behavioral interventions performed were   Anticipation of need. Explanation of cares. Active listening.     ED NURSE PHONE NUMBER: [phone]

## 2023-05-26 NOTE — PROGRESS NOTES
Shift Summary [id number]    Admitting Diagnosis: Dehydration [E86.0]  Upper GI bleed [K92.2]  Alcohol withdrawal syndrome without complication (H) [F10.930]  Nausea and vomiting, unspecified vomiting type [R11.2]   Vitals wnl, EXCEPT for tachycardia.   Pain :c/o mild generalized abdominal pain.     PRN.   A&Ox4, CIWA protocol. Ativan given 1x for high CIWA score.   Voiding : continent of bladder.   Mobility : stand by assist of 1.   Tele : NA.   CMS : WNL.   Lung Sounds clear on room air.   GI : upper GI bleed but no evidence of GI bleed on unit.   Dressing : PIV site CDI.     Orders Placed This Encounter      Clear Liquid Diet      NPO per Anesthesia Guidelines for Procedure/Surgery Except for: Meds       Plan:     NPO at midnight. EGD in the morning. 5/27

## 2023-05-26 NOTE — ED PROVIDER NOTES
"  History     Chief Complaint:  Hematemesis and Tremors       HPI   Joe Shah is a 47 year old male with history of substance abuse, depression, and anxiety who presents with vomiting and tremors. He reports multiple episodes of vomiting. The patient says that he got some pain meds yesterday from his doctor due to his neuropathy. He notes that he took 3 pills yesterday evening, but usually he says that he does not take them because he doesn't like them. He says that he has had issues with nausea and vomiting with oxycodone in the past. He denies diarrhea. He says that his urine is dark yellow and he notes that has not even been able to keep fluids down. He says that he had a little alcohol three days ago, but nothing since. He notes that he usually drinks 2-3 drinks of either beer or wine on weeknights and maybe 4 or 5 drinks on Friday and Saturday nights. This past weekend he said he had five vodka tonics.     Independent Historian:   None - Patient Only    Review of External Notes:   I reviewed the patient's discharge summary from 9/15/23-9/18/23 admission for alcohol withdrawal.      Medications:    Gabapentin  Folic acid  Methylprednisolone  Metoprolol   Zofran  Pantoprazole   Norco  Hydroxyzine    Past Medical History:    Anxiety  Back pain  Depression  Hypertension   Substance abuse  Alcohol dependence    Past Surgical History:    EGD  Neck surgery     Physical Exam     Patient Vitals for the past 24 hrs:   BP Temp Temp src Pulse Resp SpO2 Height Weight   05/26/23 0232 (!) 150/99 -- -- 112 (!) 7 99 % -- --   05/26/23 0217 (!) 154/105 -- -- 112 17 96 % -- --   05/26/23 0147 (!) 163/112 -- -- 103 17 98 % -- --   05/26/23 0132 (!) 145/104 -- -- 105 15 97 % -- --   05/26/23 0110 (!) 142/109 -- -- 111 (!) 9 97 % -- --   05/26/23 0105 (!) 163/111 -- -- 112 10 95 % -- --   05/26/23 0029 (!) 163/103 98.7  F (37.1  C) Temporal (!) 135 18 98 % 1.803 m (5' 11\") 81.6 kg (180 lb)      Physical Exam  General: Appears " well-developed and well-nourished.   Head: No signs of trauma.   Mouth/Throat: Oropharynx is clear and moist.   Eyes: Conjunctivae are normal.   Neck: Normal range of motion. No nuchal rigidity. No cervical adenopathy  CV: Tachycardic and regular rhythm.    Resp: Effort normal and breath sounds normal. No respiratory distress.   GI: Soft. There is mild diffuse tenderness.  No rebound or guarding.  Normal bowel sounds.    MSK: Normal range of motion. no edema. No Calf tenderness.  +tremulous  Neuro: The patient is alert and oriented to person, place, and time. strength in upper/lower extremities normal and symmetrical. Sensation normal. Speech normal.  Skin: Skin is warm and dry. No rash noted.   Psych: normal mood and affect. behavior is normal.         Emergency Department Course   ECG  ECG taken at 0037, ECG read at 0059  Sinus tachycardia. Otherwise normal ECG.    Rate 129 bpm. HI interval 146 ms. QRS duration 72 ms. QT/QTc 300/439 ms. P-R-T axes 73 62 65.     Laboratory:  Labs Ordered and Resulted from Time of ED Arrival to Time of ED Departure   COMPREHENSIVE METABOLIC PANEL - Abnormal       Result Value    Sodium 135 (*)     Potassium 4.1      Chloride 86 (*)     Carbon Dioxide (CO2) 22      Anion Gap 27 (*)     Urea Nitrogen 20.4 (*)     Creatinine 0.91      Calcium 10.1 (*)     Glucose 203 (*)     Alkaline Phosphatase 82      AST 93 (*)     ALT 63 (*)     Protein Total 8.2      Albumin 4.7      Bilirubin Total 1.7 (*)     GFR Estimate >90     CBC WITH PLATELETS AND DIFFERENTIAL - Abnormal    WBC Count 8.8      RBC Count 4.50      Hemoglobin 15.6      Hematocrit 45.3       (*)     MCH 34.7 (*)     MCHC 34.4      RDW 12.4      Platelet Count 226      % Neutrophils 88      % Lymphocytes 4      % Monocytes 8      % Eosinophils 0      % Basophils 0      % Immature Granulocytes 0      NRBCs per 100 WBC 0      Absolute Neutrophils 7.7      Absolute Lymphocytes 0.3 (*)     Absolute Monocytes 0.7      Absolute  Eosinophils 0.0      Absolute Basophils 0.0      Absolute Immature Granulocytes 0.0      Absolute NRBCs 0.0     LIPASE - Abnormal    Lipase 64 (*)    ETHYL ALCOHOL LEVEL - Normal    Alcohol ethyl <0.01     TYPE AND SCREEN, ADULT    ABO/RH(D) O POS      Antibody Screen Negative      SPECIMEN EXPIRATION DATE [id number]     ABO/RH TYPE AND SCREEN        Emergency Department Course & Assessments:         Interventions:  Medications   lactated ringers BOLUS 1,000 mL (0 mLs Intravenous Stopped 5/26/23 0223)     Followed by   lactated ringers infusion (has no administration in time range)   ondansetron (ZOFRAN) injection 4 mg (4 mg Intravenous $Given 5/26/23 0249)   PHENobarbital (LUMINAL) injection 260 mg (has no administration in time range)   ondansetron (ZOFRAN) injection 4 mg (4 mg Intravenous $Given 5/26/23 0045)   famotidine (PEPCID) injection 20 mg (20 mg Intravenous $Given 5/26/23 0116)   diazepam (VALIUM) injection 5 mg (5 mg Intravenous $Given 5/26/23 0116)   pantoprazole (PROTONIX) IV push injection 40 mg (40 mg Intravenous $Given 5/26/23 0117)   sodium chloride 0.9 % 1,000 mL with Infuvite Adult 10 mL, thiamine 100 mg, folic acid 1 mg infusion ( Intravenous $New Bag 5/26/23 0231)   diazepam (VALIUM) injection 5 mg (5 mg Intravenous $Given 5/26/23 0249)      Assessments:  0057 I examined the patient and obtained history as noted above.  0308 I rechecked the patient and updated him on findings.      Independent Interpretation (X-rays, CTs, rhythm strip):  None    Consultations/Discussion of Management or Tests:  0246 I spoke with Dr. Villa, hospitalist, regarding admission of the patient.         Social Determinants of Health affecting care:   None    Disposition:  The patient was admitted to the hospital under the care of Dr. Villa.     Impression & Plan      Medical Decision Making:  Joe Shah presents due to nausea and vomiting and weakness.  He states that throughout the day today he has  had repeated vomiting has not been able to keep anything down.  States he has become quite weak.  He reports that he is having some chronic pain to his extremities and his doctor had given him some hydrocodone a couple of days ago.  Because he was given the opiate, he knew he should not drink alcohol and his last drink was on Monday.  On my evaluation he was tremulous and tachycardic.  He had some mild discomfort with palpation of the abdomen, but no peritoneal findings.  Blood work was obtained that showed a slight elevation of his LFTs but was otherwise appropriate.  On chart review the patient does have a significant history of alcohol withdrawal and I am concerned that that is contributing to his current presentation.  He was given IV fluids, Valium, and phenobarbital.  With this his nausea did improve.  He did report having some blood when he threw up and he does have a history of some upper GI bleed.  He was given Pepcid and Protonix.  He is admitted to the hospital service for continued evaluation and management.        Diagnosis:    ICD-10-CM    1. Nausea and vomiting, unspecified vomiting type  R11.2       2. Dehydration  E86.0       3. Upper GI bleed  K92.2       4. Alcohol withdrawal syndrome without complication (H)  F10.930            Discharge Medications:  New Prescriptions    No medications on file      Scribe Disclosure:  I, John Byrd, am serving as a scribe at 12:50 AM on 5/26/2023 to document services personally performed by Robert Grissom MD based on my observations and the provider's statements to me.     5/26/2023   Robert Grissom MD Bergenstal, John A, MD  05/26/23 1108

## 2023-05-26 NOTE — PROGRESS NOTES
RECEIVING UNIT ED HANDOFF REVIEW    ED Nurse Handoff Report was reviewed by: AMAYA RICE RN on May 26, 2023 at 8:24 AM

## 2023-05-26 NOTE — H&P
"[facility]    History and Physical - Hospitalist Service       Date of Admission:  5/26/2023    Assessment & Plan      Joe Shah is a 47 year old male admitted on 5/26/2023. He presents with tremors, hematemesis    Alcohol withdrawal   Alcohol use disorder  *multiple admissions in the past for Etoh withdrawal  *drinks daily, more on weekends.   Received pain meds for neuropathy yesterday, didn't want to drink while taking so stopped drinking. Now with n/v, tremors. Can't keep anything down. Also noted possible coffee-ground emesis. In ED tachy 100-130s. Other VSS. LFTs as below. Lipase normal.   - phenobarbital given in ED x 1  - CIWA  - prn lorazepam  - IV/PO vitamins  - chem dep consult- pt in agreement    Hematemesis  [protonix 40 mg daily]  Hemoglobin 15.6 on presentation. Had black/ coffee emesis. No BRB.   - q6 hgb x 4  - GI consult- Thor pt  - pantoprazole 40 mg IV BID  - NPO    Transaminitis  Elevated bilirubin  Bili is chronically elevated, upper 1s to low 2s. At 1.7 on presentation. AST/ALT 93/63.   - monitor  - GI as above    Hypertension  - resume metoprolol 50 mg daily     Neuropathy  Lumbar radiculopathy  Hx lumbar fusion, decompressive laminectomy, L5 foraminectomy 5/2022. States having worsening neuropathic pain bilaterally recently. Also in hands. ?? Alcoholic neuropathy  - resume meds with rec  - check B12 level       Diet:   NPO, IV fluids  DVT Prophylaxis: Ambulate every shift  Power Catheter: Not present  Lines: None     Cardiac Monitoring: None  Code Status:   Full     Clinically Significant Risk Factors Present on Admission             # Anion Gap Metabolic Acidosis: Highest Anion Gap = 27 mmol/L in last 2 days, will monitor and treat as appropriate      # Hypertension: Noted on problem list      # Overweight: Estimated body mass index is 25.1 kg/m  as calculated from the following:    Height as of this encounter: 1.803 m (5' 11\").    Weight as of this " encounter: 81.6 kg (180 lb).            Disposition Plan           Gucci Villa MD  Hospitalist Service  [facility]  Securely message with [employer] (more info)  Text page via [employer] Paging/Directory     ______________________________________________________________________    Chief Complaint   N/v, possible hematemesis    History is obtained from the patient, electronic health record and emergency department physician    History of Present Illness   Joe Shah is a 47 year old male who presents with nausea vomiting and concern for hematemesis.  Patient's extremely pleasant 47-year-old gentleman who unfortunately has been dealing with alcohol use disorder for many years.  He reports during the week he drinks 4 beers with 3 glasses of wine but occasionally does have a grady or other hard alcohol.  On weekends he notes when [place] to the cabin he drinks quite a bit.  He has had a history of treatment in 2019 at [place] he was sober for 6 months after this.  He does note he has attempted to cut back.  He has been having increasing neuropathic pain in his legs as he does have a history of back problems.  He reports he was started on a narcotic type pain medication, which she uses responsibly, and he knew that he should not drink alcohol with this.  Then he started to have severe nausea vomiting and could not keep anything down.  At one point he threw up and it looked like dark coffee with chron's in it.  He did not notice any red blood.  Denies any chest pain or shortness of breath.  He does have a sore throat from throwing up.  He does have some mild abdominal pain simply from throwing up.  He does have a history of alcohol in his family with his father.      Past Medical History    Past Medical History:   Diagnosis Date     Anxiety      Back pain      Depressive disorder      Hypertension      Substance abuse (H)        Past Surgical History   Past Surgical History:   Procedure  Laterality Date     ENT SURGERY       ESOPHAGOSCOPY, GASTROSCOPY, DUODENOSCOPY (EGD), COMBINED N/A 9/12/2019    Procedure: ESOPHAGOGASTRODUODENOSCOPY (EGD);  Surgeon: Scott Mcrae MD;  Location:  GI     NECK SURGERY         Prior to Admission Medications   Prior to Admission Medications   Prescriptions Last Dose Informant Patient Reported? Taking?   folic acid (FOLVITE) 1 MG tablet   No No   Sig: Take 1 tablet (1 mg) by mouth daily   gabapentin (NEURONTIN) 100 MG capsule   No No   Sig: Take 1 capsule (100 mg) by mouth 3 times daily for 2 days   gabapentin (NEURONTIN) 300 MG capsule   No No   Sig: Take 3 capsules (900 mg) by mouth 3 times daily for 1 day, THEN 2 capsules (600 mg) 3 times daily for 2 days, THEN 1 capsule (300 mg) 3 times daily for 2 days.   methylPREDNISolone (MEDROL DOSEPAK) 4 MG tablet therapy pack   No No   Sig: Follow Package Directions   metoprolol succinate ER (TOPROL-XL) 50 MG 24 hr tablet  Self Yes No   Sig: Take 50 mg by mouth daily    multivitamin w/minerals (THERA-VIT-M) tablet   No No   Sig: Take 1 tablet by mouth daily   ondansetron (ZOFRAN) 4 MG tablet  Self Yes No   Sig: Take 4 mg by mouth every 8 hours as needed for nausea   pantoprazole (PROTONIX) 40 MG EC tablet   No No   Sig: Take 1 tablet (40 mg) by mouth every morning (before breakfast)   polyethylene glycol-propylene glycol (SYSTANE ULTRA) 0.4-0.3 % SOLN ophthalmic solution  Self Yes No   Sig: Place 1 drop into both eyes every hour as needed for dry eyes      Facility-Administered Medications: None        Review of Systems    The 10 point Review of Systems is negative other than noted in the HPI or here.     Social History   I have reviewed this patient's social history and updated it with pertinent information if needed.  Social History     Tobacco Use     Smoking status: Former     Smokeless tobacco: Current     Types: Chew   Substance Use Topics     Alcohol use: Yes     Comment: binge since Monday 04/27/20     Drug  use: No        Physical Exam   Vital Signs: Temp: 98.7  F (37.1  C) Temp src: Temporal BP: (!) 150/99 Pulse: 112   Resp: (!) 7 SpO2: 99 %      Weight: 180 lbs 0 oz    General Appearance: Alert, very pleasant, tremulous  Respiratory: CTA B  Cardiovascular: RRR, no murmur. No edema  GI: soft, nt/nd  Skin: no rashes or lesions grossly    Other: CN grossly intact, LAND      Medical Decision Making       70 MINUTES SPENT BY ME on the date of service doing chart review, history, exam, documentation & further activities per the note.      Data     I have personally reviewed the following data over the past 24 hrs:    8.8  \   15.6   / 226     135 (L) 86 (L) 20.4 (H) /  203 (H)   4.1 22 0.91 \       ALT: 63 (H) AST: 93 (H) AP: 82 TBILI: 1.7 (H)   ALB: 4.7 TOT PROTEIN: 8.2 LIPASE: 64 (H)       Imaging results reviewed over the past 24 hrs:   No results found for this or any previous visit (from the past 24 hour(s)).

## 2023-05-27 LAB
ANION GAP SERPL CALCULATED.3IONS-SCNC: 12 MMOL/L (ref 7–15)
BASOPHILS # BLD AUTO: 0.1 10E3/UL (ref 0–0.2)
BASOPHILS NFR BLD AUTO: 1 %
BUN SERPL-MCNC: 14.3 MG/DL (ref 6–20)
CALCIUM SERPL-MCNC: 9 MG/DL (ref 8.6–10)
CHLORIDE SERPL-SCNC: 96 MMOL/L (ref 98–107)
CREAT SERPL-MCNC: 0.77 MG/DL (ref 0.67–1.17)
DEPRECATED HCO3 PLAS-SCNC: 25 MMOL/L (ref 22–29)
EOSINOPHIL # BLD AUTO: 0.1 10E3/UL (ref 0–0.7)
EOSINOPHIL NFR BLD AUTO: 1 %
ERYTHROCYTE [DISTWIDTH] IN BLOOD BY AUTOMATED COUNT: 12.1 % (ref 10–15)
GFR SERPL CREATININE-BSD FRML MDRD: >90 ML/MIN/1.73M2
GLUCOSE SERPL-MCNC: 96 MG/DL (ref 70–99)
HCT VFR BLD AUTO: 40.2 % (ref 40–53)
HGB BLD-MCNC: 13.6 G/DL (ref 13.3–17.7)
IMM GRANULOCYTES # BLD: 0.1 10E3/UL
IMM GRANULOCYTES NFR BLD: 1 %
LYMPHOCYTES # BLD AUTO: 0.7 10E3/UL (ref 0.8–5.3)
LYMPHOCYTES NFR BLD AUTO: 10 %
MAGNESIUM SERPL-MCNC: 1.9 MG/DL (ref 1.7–2.3)
MCH RBC QN AUTO: 34.5 PG (ref 26.5–33)
MCHC RBC AUTO-ENTMCNC: 33.8 G/DL (ref 31.5–36.5)
MCV RBC AUTO: 102 FL (ref 78–100)
MONOCYTES # BLD AUTO: 0.9 10E3/UL (ref 0–1.3)
MONOCYTES NFR BLD AUTO: 13 %
NEUTROPHILS # BLD AUTO: 5.4 10E3/UL (ref 1.6–8.3)
NEUTROPHILS NFR BLD AUTO: 74 %
NRBC # BLD AUTO: 0 10E3/UL
NRBC BLD AUTO-RTO: 0 /100
PLAT MORPH BLD: NORMAL
PLATELET # BLD AUTO: 132 10E3/UL (ref 150–450)
POTASSIUM SERPL-SCNC: 3.8 MMOL/L (ref 3.4–5.3)
RBC # BLD AUTO: 3.94 10E6/UL (ref 4.4–5.9)
RBC MORPH BLD: NORMAL
SODIUM SERPL-SCNC: 133 MMOL/L (ref 136–145)
WBC # BLD AUTO: 7.1 10E3/UL (ref 4–11)

## 2023-05-27 PROCEDURE — 85025 COMPLETE CBC W/AUTO DIFF WBC: CPT | Performed by: HOSPITALIST

## 2023-05-27 PROCEDURE — 83735 ASSAY OF MAGNESIUM: CPT | Performed by: HOSPITALIST

## 2023-05-27 PROCEDURE — 99232 SBSQ HOSP IP/OBS MODERATE 35: CPT | Performed by: HOSPITALIST

## 2023-05-27 PROCEDURE — 250N000011 HC RX IP 250 OP 636: Performed by: INTERNAL MEDICINE

## 2023-05-27 PROCEDURE — 0DB78ZX EXCISION OF STOMACH, PYLORUS, VIA NATURAL OR ARTIFICIAL OPENING ENDOSCOPIC, DIAGNOSTIC: ICD-10-PCS | Performed by: INTERNAL MEDICINE

## 2023-05-27 PROCEDURE — 999N000099 HC STATISTIC MODERATE SEDATION < 10 MIN: Performed by: INTERNAL MEDICINE

## 2023-05-27 PROCEDURE — 250N000009 HC RX 250: Performed by: INTERNAL MEDICINE

## 2023-05-27 PROCEDURE — 36415 COLL VENOUS BLD VENIPUNCTURE: CPT | Performed by: HOSPITALIST

## 2023-05-27 PROCEDURE — 43239 EGD BIOPSY SINGLE/MULTIPLE: CPT | Performed by: INTERNAL MEDICINE

## 2023-05-27 PROCEDURE — 80048 BASIC METABOLIC PNL TOTAL CA: CPT | Performed by: HOSPITALIST

## 2023-05-27 PROCEDURE — C9113 INJ PANTOPRAZOLE SODIUM, VIA: HCPCS | Performed by: INTERNAL MEDICINE

## 2023-05-27 PROCEDURE — 250N000013 HC RX MED GY IP 250 OP 250 PS 637: Performed by: INTERNAL MEDICINE

## 2023-05-27 PROCEDURE — 88305 TISSUE EXAM BY PATHOLOGIST: CPT | Mod: TC | Performed by: INTERNAL MEDICINE

## 2023-05-27 PROCEDURE — 120N000001 HC R&B MED SURG/OB

## 2023-05-27 PROCEDURE — 0DB68ZX EXCISION OF STOMACH, VIA NATURAL OR ARTIFICIAL OPENING ENDOSCOPIC, DIAGNOSTIC: ICD-10-PCS | Performed by: INTERNAL MEDICINE

## 2023-05-27 RX ORDER — NALOXONE HYDROCHLORIDE 0.4 MG/ML
0.2 INJECTION, SOLUTION INTRAMUSCULAR; INTRAVENOUS; SUBCUTANEOUS
Status: DISCONTINUED | OUTPATIENT
Start: 2023-05-27 | End: 2023-05-28 | Stop reason: HOSPADM

## 2023-05-27 RX ORDER — FENTANYL CITRATE 50 UG/ML
INJECTION, SOLUTION INTRAMUSCULAR; INTRAVENOUS PRN
Status: DISCONTINUED | OUTPATIENT
Start: 2023-05-27 | End: 2023-05-27 | Stop reason: HOSPADM

## 2023-05-27 RX ORDER — NALOXONE HYDROCHLORIDE 0.4 MG/ML
0.4 INJECTION, SOLUTION INTRAMUSCULAR; INTRAVENOUS; SUBCUTANEOUS
Status: DISCONTINUED | OUTPATIENT
Start: 2023-05-27 | End: 2023-05-28 | Stop reason: HOSPADM

## 2023-05-27 RX ORDER — LIDOCAINE 40 MG/G
CREAM TOPICAL
Status: DISCONTINUED | OUTPATIENT
Start: 2023-05-27 | End: 2023-05-27 | Stop reason: HOSPADM

## 2023-05-27 RX ORDER — FLUMAZENIL 0.1 MG/ML
0.2 INJECTION, SOLUTION INTRAVENOUS
Status: ACTIVE | OUTPATIENT
Start: 2023-05-27 | End: 2023-05-27

## 2023-05-27 RX ADMIN — PANTOPRAZOLE SODIUM 40 MG: 40 INJECTION, POWDER, FOR SOLUTION INTRAVENOUS at 08:03

## 2023-05-27 RX ADMIN — Medication 1 ML: at 20:43

## 2023-05-27 RX ADMIN — FOLIC ACID 1 MG: 1 TABLET ORAL at 08:03

## 2023-05-27 RX ADMIN — LORAZEPAM 1 MG: 0.5 TABLET ORAL at 17:39

## 2023-05-27 RX ADMIN — MULTIPLE VITAMINS W/ MINERALS TAB 1 TABLET: TAB at 08:03

## 2023-05-27 RX ADMIN — METOPROLOL SUCCINATE 50 MG: 50 TABLET, EXTENDED RELEASE ORAL at 08:41

## 2023-05-27 RX ADMIN — THIAMINE HCL TAB 100 MG 100 MG: 100 TAB at 08:03

## 2023-05-27 RX ADMIN — PANTOPRAZOLE SODIUM 40 MG: 40 INJECTION, POWDER, FOR SOLUTION INTRAVENOUS at 20:38

## 2023-05-27 ASSESSMENT — ACTIVITIES OF DAILY LIVING (ADL)
ADLS_ACUITY_SCORE: 22

## 2023-05-27 NOTE — PLAN OF CARE
Goal Outcome Evaluation:     Summary:  Shift 05/27/[id number]  Care Plan Summary Note: Alcohol Withdrawal, GI Bleed  Orientation: AXOOx4  Activity Level: SBA  Fall Risk: YES  Behavior & Aggression Tool Color: Green  Pain Management: Minimal pain  ABNL VS/O2: VSS om RA  ABNL Lab/BG:  Diet: Regular diet  Bowel/Bladder:B S active,  voiding without problem  Drains/Devices: PIV infusing  ml /hr  Tests/Procedures for next shift: EGD  Anticipated DC date:TBD  Other Important Info

## 2023-05-27 NOTE — PLAN OF CARE
Goal Outcome Evaluation:  Summary 05/26-05/27: [id number]         Patient is alert and oriented X4. VSS on RA with good sat. Up with assist of 1 with gait belt and walker. On clear liquid diet. PIV IV NS infusing 100ml/hr. Patient denies pain. On CIWA protocol a with score of 2@1600 and  @00 with score of 1,  resting comfortably in bed.  No  episode of vomiting or nausea noted or reports.NPO after midnight for EGD tomorrow morning. Contient of B&B, void bathroom. Patient resting comfortably in bed.

## 2023-05-27 NOTE — PROGRESS NOTES
[facility]    Medicine Progress Note - Hospitalist Service    Date of Admission:  5/26/2023    Assessment & Plan   47 year old male with past medical history of alcohol use disorder, hypertension, neuropathy, and lumbar radiculopathy with previous decompressive laminectomy 5/2022.  Admitted for hematemesis and concerns of acute alcohol withdrawal.    Hematemesis  Upper gastrointestinal bleed  Alcohol use disorder  Acute alcohol withdrawal  Elevated liver enzymes consistent with alcohol induced hepatitis  Macrocytosis, admission   Thrombocytopenia  Hyponatremia  Admitted for hematemesis and concerns of upper GI bleed in the setting of alcohol use disorder.  Gastroenterology consulted.  Past EGD September 2019 with nonbleeding esophageal ulcers, medium size hiatal hernia, and erythematous stomach-see report.  EGD 5/27/23, report PENDING.    GI consult follow-up, help appreciated    EGD 5/27, await results    Continue IV Protonix, likely transition to oral 5/28    Avoid non-steroidal anti-inflammatory drugs (NSAIDs) or blood thinners    Monitor hemoglobin, recheck in AM    Discontinue IV fluids if tolerating oral intake    Alcohol abstinence discussed and encouraged; chemical dependency consultation; outpatient follow-up with primary clinic provider very important    Monitor for signs/symptoms of acute alcohol withdrawal; CIWA protocol; lorazepam per protocol; supplemental thiamine/folate/MVI    Monitor liver function tests (LFTs), recheck in AM    Mild thrombocytopenia, likely alcohol-induced, recheck in AM    Monitor serum sodium, mildly low, asymptomatic, 135 on 5/26, 133 on 5/27    Advance diet as tolerated after EGD 5/27    AM labs ordered    Essential hypertension    Continue Toprol XL, monitor blood pressure and heart rate    History of neuropathy  History of lumbar radiculopathy with previous decompressive laminectomy 5/2022  Chronic neuropathy, past back surgery ~1 year ago.   "Possible component of alcohol induced neuropathy.  B12 level checked and satisfactory.    Monitor, follow-up with primary clinic provider, stable     Consider physical therapy (PT) consult if worsening symptoms    Disposition     Estimated length of stay 24 hours, anticipated discharge 5/28    Anticipated discharge to home       Diet: Mechanical/Dental Soft Diet    DVT Prophylaxis: Pneumatic Compression Devices  Power Catheter: Not present  Lines: None     Cardiac Monitoring: None  Code Status: Full Code      Clinically Significant Risk Factors             # Anion Gap Metabolic Acidosis: Highest Anion Gap = 27 mmol/L in last 2 days, will monitor and treat as appropriate      # Hypertension: Noted on problem list        # Overweight: Estimated body mass index is 25.1 kg/m  as calculated from the following:    Height as of this encounter: 1.803 m (5' 11\").    Weight as of this encounter: 81.6 kg (180 lb)., PRESENT ON ADMISSION          Disposition Plan      Expected Discharge Date: 05/28/2023, 12:00 PM    Destination: home with family            Jarvis Benja Yip MD  Hospitalist Service  [facility]  Securely message with [employer] (more info)  Text page via [employer] Paging/Directory   ______________________________________________________________________    Interval History   First visit with patient yesterday.  Admitted for hematemesis, concerns of acute blood loss, and acute alcohol withdrawal with history of alcohol use disorder.  Upper endoscopy planned for today.  Seen and examined after endoscopy.  Mild throat discomfort.  Groggy and awakening from anesthesia.  No report of recurrent nausea, vomiting, or hematemesis.  Hemoglobin stable.  Continues on IV Protonix.    Physical Exam   Vital Signs: Temp: 98.3  F (36.8  C) Temp src: Oral BP: (!) 140/98 Pulse: 71   Resp: 15 SpO2: 95 % O2 Device: None (Room air)    Weight: 180 lbs 0 oz    Lying in bed, returning from endoscopy, appearing tired, " pleasant and interactive  Lungs clear with good inspiratory effort, no wheezes or crackles  Heart regular rhythm without rubs or gallops  Abdomen soft and nontender without rebound or rigidity  Extremities without edema  Skin warm and dry  Mental status pleasant, resting in no immediate distress    Medical Decision Making             Data     I have personally reviewed the following data over the past 24 hrs:    7.1  \   13.6   / 132 (L)     133 (L) 96 (L) 14.3 /  96   3.8 25 0.77 \       Imaging results reviewed over the past 24 hrs:   No results found for this or any previous visit (from the past 24 hour(s)).

## 2023-05-27 NOTE — PROGRESS NOTES
Shift Summary [id number]    Admitting Diagnosis: Dehydration [E86.0]  Upper GI bleed [K92.2]  Alcohol withdrawal syndrome without complication (H) [F10.930]  Nausea and vomiting, unspecified vomiting type [R11.2]   Vitals WNL.   Pain mild headache.    A&Ox4, forgetful.   Voiding : WNL.   Mobility : Ax1, stand by.   Tele: NA.  CMS : intermittent numbness in BLE.   Lung Sounds clear on room air.   GI : WNL.   Dressing : PIV site CDI.     Orders Placed This Encounter      Mechanical/Dental Soft Diet       Plan:     EGD complete today.Two esophageal ulcers found. Biopsies obtained for H. Pylori.

## 2023-05-28 VITALS
RESPIRATION RATE: 17 BRPM | WEIGHT: 180 LBS | SYSTOLIC BLOOD PRESSURE: 124 MMHG | HEART RATE: 73 BPM | HEIGHT: 71 IN | TEMPERATURE: 99 F | OXYGEN SATURATION: 96 % | DIASTOLIC BLOOD PRESSURE: 95 MMHG | BODY MASS INDEX: 25.2 KG/M2

## 2023-05-28 LAB
ALBUMIN SERPL BCG-MCNC: 3.7 G/DL (ref 3.5–5.2)
ALP SERPL-CCNC: 63 U/L (ref 40–129)
ALT SERPL W P-5'-P-CCNC: 39 U/L (ref 10–50)
ANION GAP SERPL CALCULATED.3IONS-SCNC: 10 MMOL/L (ref 7–15)
AST SERPL W P-5'-P-CCNC: 60 U/L (ref 10–50)
BILIRUB DIRECT SERPL-MCNC: 0.39 MG/DL (ref 0–0.3)
BILIRUB SERPL-MCNC: 1.4 MG/DL
BUN SERPL-MCNC: 11.2 MG/DL (ref 6–20)
CALCIUM SERPL-MCNC: 9 MG/DL (ref 8.6–10)
CHLORIDE SERPL-SCNC: 93 MMOL/L (ref 98–107)
CREAT SERPL-MCNC: 0.79 MG/DL (ref 0.67–1.17)
DEPRECATED HCO3 PLAS-SCNC: 27 MMOL/L (ref 22–29)
GFR SERPL CREATININE-BSD FRML MDRD: >90 ML/MIN/1.73M2
GLUCOSE SERPL-MCNC: 119 MG/DL (ref 70–99)
HGB BLD-MCNC: 13.7 G/DL (ref 13.3–17.7)
MAGNESIUM SERPL-MCNC: 1.7 MG/DL (ref 1.7–2.3)
PLATELET # BLD AUTO: 160 10E3/UL (ref 150–450)
POTASSIUM SERPL-SCNC: 3.2 MMOL/L (ref 3.4–5.3)
PROT SERPL-MCNC: 6.6 G/DL (ref 6.4–8.3)
SODIUM SERPL-SCNC: 130 MMOL/L (ref 136–145)
UPPER GI ENDOSCOPY: NORMAL

## 2023-05-28 PROCEDURE — 36415 COLL VENOUS BLD VENIPUNCTURE: CPT | Performed by: HOSPITALIST

## 2023-05-28 PROCEDURE — 250N000011 HC RX IP 250 OP 636: Performed by: INTERNAL MEDICINE

## 2023-05-28 PROCEDURE — 83735 ASSAY OF MAGNESIUM: CPT | Performed by: HOSPITALIST

## 2023-05-28 PROCEDURE — 82310 ASSAY OF CALCIUM: CPT | Performed by: HOSPITALIST

## 2023-05-28 PROCEDURE — 82248 BILIRUBIN DIRECT: CPT | Performed by: HOSPITALIST

## 2023-05-28 PROCEDURE — 85049 AUTOMATED PLATELET COUNT: CPT | Performed by: HOSPITALIST

## 2023-05-28 PROCEDURE — C9113 INJ PANTOPRAZOLE SODIUM, VIA: HCPCS | Performed by: INTERNAL MEDICINE

## 2023-05-28 PROCEDURE — 250N000013 HC RX MED GY IP 250 OP 250 PS 637: Performed by: HOSPITALIST

## 2023-05-28 PROCEDURE — 80053 COMPREHEN METABOLIC PANEL: CPT | Performed by: HOSPITALIST

## 2023-05-28 PROCEDURE — 99239 HOSP IP/OBS DSCHRG MGMT >30: CPT | Performed by: HOSPITALIST

## 2023-05-28 PROCEDURE — 85018 HEMOGLOBIN: CPT | Performed by: HOSPITALIST

## 2023-05-28 PROCEDURE — 250N000013 HC RX MED GY IP 250 OP 250 PS 637: Performed by: INTERNAL MEDICINE

## 2023-05-28 RX ORDER — POTASSIUM CHLORIDE 1.5 G/1.58G
20 POWDER, FOR SOLUTION ORAL DAILY
Qty: 7 PACKET | Refills: 0 | Status: SHIPPED | OUTPATIENT
Start: 2023-05-28 | End: 2023-06-04

## 2023-05-28 RX ORDER — PANTOPRAZOLE SODIUM 40 MG/1
40 TABLET, DELAYED RELEASE ORAL SEE ADMIN INSTRUCTIONS
Qty: 60 TABLET | Refills: 0 | Status: SHIPPED | OUTPATIENT
Start: 2023-05-28 | End: 2023-11-29

## 2023-05-28 RX ORDER — POTASSIUM CHLORIDE 1.5 G/1.58G
20 POWDER, FOR SOLUTION ORAL ONCE
Status: COMPLETED | OUTPATIENT
Start: 2023-05-28 | End: 2023-05-28

## 2023-05-28 RX ORDER — PANTOPRAZOLE SODIUM 40 MG/1
40 TABLET, DELAYED RELEASE ORAL
Status: DISCONTINUED | OUTPATIENT
Start: 2023-05-29 | End: 2023-05-28 | Stop reason: HOSPADM

## 2023-05-28 RX ADMIN — PANTOPRAZOLE SODIUM 40 MG: 40 INJECTION, POWDER, FOR SOLUTION INTRAVENOUS at 08:01

## 2023-05-28 RX ADMIN — Medication 1 ML: at 06:05

## 2023-05-28 RX ADMIN — POTASSIUM CHLORIDE 20 MEQ: 1.5 POWDER, FOR SOLUTION ORAL at 13:57

## 2023-05-28 RX ADMIN — MULTIPLE VITAMINS W/ MINERALS TAB 1 TABLET: TAB at 08:01

## 2023-05-28 RX ADMIN — THIAMINE HCL TAB 100 MG 100 MG: 100 TAB at 08:01

## 2023-05-28 RX ADMIN — METOPROLOL SUCCINATE 50 MG: 50 TABLET, EXTENDED RELEASE ORAL at 10:00

## 2023-05-28 RX ADMIN — FOLIC ACID 1 MG: 1 TABLET ORAL at 08:01

## 2023-05-28 ASSESSMENT — ACTIVITIES OF DAILY LIVING (ADL)
ADLS_ACUITY_SCORE: 22
ADLS_ACUITY_SCORE: 22
ADLS_ACUITY_SCORE: 23
ADLS_ACUITY_SCORE: 22
ADLS_ACUITY_SCORE: 23
ADLS_ACUITY_SCORE: 22
ADLS_ACUITY_SCORE: 23

## 2023-05-28 NOTE — DISCHARGE SUMMARY
"[facility]  Hospitalist Discharge Summary      Date of Admission:  5/26/2023  Date of Discharge:  5/28/2023  Discharging Provider: Jarvis Yip MD  Discharge Service: Hospitalist Service    Discharge Diagnoses   1. Hematemesis, resolved  2. EGD 5/27/23 with LA Grade D reflux esophagitis with no bleeding, esophageal ulcers, benign appearing esophageal stenosis, small hiatal hernia, nonbleeding gastric ulcers without bleeding (biopsied) - see report  3. Alcohol use disorder with concerns of acute alcohol withdrawal on admission  4. Elevated liver enzymes consistent with alcohol induced hepatitis  5. Macrocytosis  6. Thrombocytopenia, likely alcohol induced  7. Hyponatremia, outpatient follow-up with recheck labs is recommended  8. Hypokalemia, outpatient follow-up with recheck labs as recommended  9. Essential hypertension  10. History of neuropathy  11. History of lumbar radiculopathy and previous decompressive laminectomy 5/2022    Clinically Significant Risk Factors     # Overweight: Estimated body mass index is 25.1 kg/m  as calculated from the following:    Height as of this encounter: 1.803 m (5' 11\").    Weight as of this encounter: 81.6 kg (180 lb).       Follow-ups Needed After Discharge   Follow-up Appointments     Follow-up and recommended labs and tests       Follow up with primary care provider, Mike Rodney, within 7 days   to evaluate medication change, hospital follow-up, review results of upper   endoscopy from 5/27/23.  The following labs/tests are recommended: CBC,   liver profile, basic profile on day of clinic visit, ordered and reviewed   by primary clinic provider.             Unresulted Labs Ordered in the Past 30 Days of this Admission     No orders found from 4/26/2023 to 5/27/2023.      These results will be followed up by primary clinic provider and gastrointestinal consult team (Dr. Mcrae).    Discharge Disposition   Discharged to home  Condition " at discharge: Stable    Hospital Course   47 year old male with past medical history of alcohol use disorder, hypertension, neuropathy, and lumbar radiculopathy with previous decompressive laminectomy 5/2022.  Admitted for hematemesis and concerns of acute alcohol withdrawal and upper GI bleed.  For details, see admission note.  Serial hemoglobin monitoring on admission with IV fluids administered.  IV Protonix started.  Gastroenterology consulted.  Alcohol abstinence discussed and encouraged with chemical dependency consultation recommended, follow-up as outpatient.  Underwent upper endoscopy on 5/27/2023 with multiple findings, see official report and above.  No stigmata of bleeding identified.  Biopsies obtained and PENDING at time of discharge, needing outpatient review by primary clinic provider and Dr. Mcrae of GI team.  Minimal alcohol withdrawal noted during hospital stay with lorazepam administered as needed and CIWA protocol followed.  Discharged home in stable condition on 5/28/2023 with outpatient follow-up with primary clinic provider within 1 week with labs in addition to GI as recommended.  Patient given strict instructions to avoid alcohol and non-steroidal anti-inflammatory drugs (NSAIDs) and to call or seek medical attention medially if any signs or symptoms of recurrent gastrointestinal bleeding.  He agrees.  He will follow-up with clinic provider for biopsy results as discussed on discharge.  Questions answered and concerns addressed and patient very appreciative of cares provided in hospital.    Consultations This Hospital Stay   GASTROENTEROLOGY IP CONSULT  CHEMICAL DEPENDENCY IP CONSULT    Code Status   Full Code    Time Spent on this Encounter   I, Jarvis Yip MD, personally saw the patient today and spent greater than 30 minutes discharging this patient.       Jarvis Yip MD  [facility]  [address] [place] [id number]  Phone:  [phone]  Fax: [phone]  ______________________________________________________________________    Physical Exam   Vital Signs: Temp: 99  F (37.2  C) Temp src: Oral BP: (!) 124/95 Pulse: 73   Resp: 17 SpO2: 96 % O2 Device: None (Room air)    Weight: 180 lbs 0 oz    Awake and alert, lying in bed, comfortable and excited to be discharged home  Lungs clear with good inspiratory effort, no wheezes or crackles   Heart regular rhythm without rubs or gallops  Abdomen soft and nontender without rebound or rigidity, benign exam  Extremities without edema  Skin warm and dry  Mental status awake alert, answering questions and following commands, normal       Primary Care Physician   Mike Rodney    Discharge Orders      Reason for your hospital stay    Nausea and vomiting, upper gastrointestinal bleeding; upper endoscopy performed with esophageal ulcers identified and biopsies obtained (need follow-up with primary clinic provider for results);     Follow-up and recommended labs and tests     Follow up with primary care provider, Mike Rodney, within 7 days to evaluate medication change, hospital follow-up, review results of upper endoscopy from 5/27/23.  The following labs/tests are recommended: CBC, liver profile, basic profile on day of clinic visit, ordered and reviewed by primary clinic provider.     Activity    Your activity upon discharge: activity as tolerated, advance slowly     Diet    Follow this diet upon discharge: Orders Placed This Encounter      Mechanical/Dental Soft Diet    Avoid alcohol as discussed       Significant Results and Procedures   Most Recent 3 CBC's:Recent Labs   Lab Test 05/28/23  0900 05/27/23  0800 05/26/23  1144 05/26/23  0628 05/26/23  0044 11/27/22  2354   WBC  --  7.1  --   --  8.8 5.0   HGB 13.7 13.6 13.4   < > 15.6 14.7   MCV  --  102*  --   --  101* 101*    132*  --   --  226 185    < > = values in this interval not displayed.     Most Recent 3 BMP's:Recent  Labs   Lab Test 05/28/23  0900 05/27/23  0800 05/26/23  0044   * 133* 135*   POTASSIUM 3.2* 3.8 4.1   CHLORIDE 93* 96* 86*   CO2 27 25 22   BUN 11.2 14.3 20.4*   CR 0.79 0.77 0.91   ANIONGAP 10 12 27*   ISAIAS 9.0 9.0 10.1*   * 96 203*     Most Recent 2 LFT's:Recent Labs   Lab Test 05/28/23  0900 05/26/23  0044   AST 60* 93*   ALT 39 63*   ALKPHOS 63 82   BILITOTAL 1.4* 1.7*     Discharge Medications   Current Discharge Medication List      START taking these medications    Details   pantoprazole (PROTONIX) 40 MG EC tablet Take 1 tablet (40 mg) by mouth See Admin Instructions Take twice daily for 2 weeks, then once daily thereafter or as directed by clinic provider (for esophageal ulcers)  Qty: 60 tablet, Refills: 0    Associated Diagnoses: Upper GI bleed; Ulcer of esophagus with bleeding      potassium chloride (KLOR-CON) 20 MEQ packet Take 20 mEq by mouth daily for 7 days Recheck potassium level with clinic provider within ~1 week of hospital discharge  Qty: 7 packet, Refills: 0    Associated Diagnoses: Hypokalemia         CONTINUE these medications which have NOT CHANGED    Details   metoprolol succinate ER (TOPROL-XL) 50 MG 24 hr tablet Take 50 mg by mouth daily       multivitamin w/minerals (THERA-VIT-M) tablet Take 1 tablet by mouth daily  Qty: 30 tablet, Refills: 0    Associated Diagnoses: Alcohol withdrawal syndrome without complication (H)      ondansetron (ZOFRAN) 4 MG tablet Take 4 mg by mouth every 8 hours as needed for nausea      polyethylene glycol-propylene glycol (SYSTANE ULTRA) 0.4-0.3 % SOLN ophthalmic solution Place 1 drop into both eyes every hour as needed for dry eyes         STOP taking these medications       HYDROcodone-acetaminophen (NORCO) 5-325 MG tablet Comments:   Reason for Stopping:             Allergies   Allergies   Allergen Reactions     Morphine Nausea and Vomiting

## 2023-05-31 PROCEDURE — 88342 IMHCHEM/IMCYTCHM 1ST ANTB: CPT | Mod: 26 | Performed by: PATHOLOGY

## 2023-05-31 PROCEDURE — 88305 TISSUE EXAM BY PATHOLOGIST: CPT | Mod: 26 | Performed by: PATHOLOGY

## 2023-06-05 LAB
PATH REPORT.ADDENDUM SPEC: NORMAL
PATH REPORT.COMMENTS IMP SPEC: NORMAL
PATH REPORT.COMMENTS IMP SPEC: NORMAL
PATH REPORT.FINAL DX SPEC: NORMAL
PATH REPORT.GROSS SPEC: NORMAL
PATH REPORT.MICROSCOPIC SPEC OTHER STN: NORMAL
PATH REPORT.RELEVANT HX SPEC: NORMAL
PHOTO IMAGE: NORMAL

## 2023-11-29 ENCOUNTER — APPOINTMENT (OUTPATIENT)
Dept: GENERAL RADIOLOGY | Facility: CLINIC | Age: 48
DRG: 025 | End: 2023-11-29
Attending: HOSPITALIST
Payer: COMMERCIAL

## 2023-11-29 ENCOUNTER — HOSPITAL ENCOUNTER (INPATIENT)
Facility: CLINIC | Age: 48
LOS: 5 days | Discharge: HOME | DRG: 025 | End: 2023-12-04
Attending: EMERGENCY MEDICINE | Admitting: HOSPITALIST
Payer: COMMERCIAL

## 2023-11-29 DIAGNOSIS — S06.5XAA SUBDURAL HEMATOMA (H): ICD-10-CM

## 2023-11-29 DIAGNOSIS — F10.930 ALCOHOL WITHDRAWAL SYNDROME WITHOUT COMPLICATION (H): ICD-10-CM

## 2023-11-29 DIAGNOSIS — Z86.79 S/P SUBDURAL HEMATOMA EVACUATION: Primary | ICD-10-CM

## 2023-11-29 DIAGNOSIS — Z98.890 S/P SUBDURAL HEMATOMA EVACUATION: Primary | ICD-10-CM

## 2023-11-29 DIAGNOSIS — E86.0 DEHYDRATION: ICD-10-CM

## 2023-11-29 LAB
ALBUMIN SERPL BCG-MCNC: 4.2 G/DL (ref 3.5–5.2)
ALP SERPL-CCNC: 94 U/L (ref 40–150)
ALT SERPL W P-5'-P-CCNC: 52 U/L (ref 0–70)
ANION GAP SERPL CALCULATED.3IONS-SCNC: 20 MMOL/L (ref 7–15)
AST SERPL W P-5'-P-CCNC: 69 U/L (ref 0–45)
ATRIAL RATE - MUSE: 117 BPM
BASOPHILS # BLD AUTO: 0.1 10E3/UL (ref 0–0.2)
BASOPHILS NFR BLD AUTO: 1 %
BILIRUB SERPL-MCNC: 1.7 MG/DL
BUN SERPL-MCNC: 10.4 MG/DL (ref 6–20)
CALCIUM SERPL-MCNC: 9.3 MG/DL (ref 8.6–10)
CHLORIDE SERPL-SCNC: 89 MMOL/L (ref 98–107)
CREAT SERPL-MCNC: 0.73 MG/DL (ref 0.67–1.17)
DEPRECATED HCO3 PLAS-SCNC: 20 MMOL/L (ref 22–29)
DIASTOLIC BLOOD PRESSURE - MUSE: NORMAL MMHG
EGFRCR SERPLBLD CKD-EPI 2021: >90 ML/MIN/1.73M2
EOSINOPHIL # BLD AUTO: 0 10E3/UL (ref 0–0.7)
EOSINOPHIL NFR BLD AUTO: 0 %
ERYTHROCYTE [DISTWIDTH] IN BLOOD BY AUTOMATED COUNT: 14.3 % (ref 10–15)
ETHANOL SERPL-MCNC: 0.05 G/DL
GLUCOSE SERPL-MCNC: 99 MG/DL (ref 70–99)
HCT VFR BLD AUTO: 43.9 % (ref 40–53)
HGB BLD-MCNC: 15 G/DL (ref 13.3–17.7)
IMM GRANULOCYTES # BLD: 0.1 10E3/UL
IMM GRANULOCYTES NFR BLD: 1 %
INTERPRETATION ECG - MUSE: NORMAL
LYMPHOCYTES # BLD AUTO: 0.7 10E3/UL (ref 0.8–5.3)
LYMPHOCYTES NFR BLD AUTO: 7 %
MAGNESIUM SERPL-MCNC: 1.6 MG/DL (ref 1.7–2.3)
MCH RBC QN AUTO: 33.4 PG (ref 26.5–33)
MCHC RBC AUTO-ENTMCNC: 34.2 G/DL (ref 31.5–36.5)
MCV RBC AUTO: 98 FL (ref 78–100)
MONOCYTES # BLD AUTO: 1 10E3/UL (ref 0–1.3)
MONOCYTES NFR BLD AUTO: 10 %
NEUTROPHILS # BLD AUTO: 7.7 10E3/UL (ref 1.6–8.3)
NEUTROPHILS NFR BLD AUTO: 81 %
NRBC # BLD AUTO: 0 10E3/UL
NRBC BLD AUTO-RTO: 0 /100
P AXIS - MUSE: 64 DEGREES
PHOSPHATE SERPL-MCNC: 3.5 MG/DL (ref 2.5–4.5)
PLATELET # BLD AUTO: 457 10E3/UL (ref 150–450)
POTASSIUM SERPL-SCNC: 3.9 MMOL/L (ref 3.4–5.3)
PR INTERVAL - MUSE: 142 MS
PROT SERPL-MCNC: 8 G/DL (ref 6.4–8.3)
QRS DURATION - MUSE: 70 MS
QT - MUSE: 332 MS
QTC - MUSE: 463 MS
R AXIS - MUSE: 52 DEGREES
RBC # BLD AUTO: 4.49 10E6/UL (ref 4.4–5.9)
SODIUM SERPL-SCNC: 129 MMOL/L (ref 135–145)
SYSTOLIC BLOOD PRESSURE - MUSE: NORMAL MMHG
T AXIS - MUSE: 32 DEGREES
TROPONIN T SERPL HS-MCNC: 7 NG/L
VENTRICULAR RATE- MUSE: 117 BPM
WBC # BLD AUTO: 9.5 10E3/UL (ref 4–11)

## 2023-11-29 PROCEDURE — 258N000003 HC RX IP 258 OP 636: Performed by: HOSPITALIST

## 2023-11-29 PROCEDURE — 84100 ASSAY OF PHOSPHORUS: CPT | Performed by: HOSPITALIST

## 2023-11-29 PROCEDURE — 258N000003 HC RX IP 258 OP 636: Performed by: EMERGENCY MEDICINE

## 2023-11-29 PROCEDURE — 96361 HYDRATE IV INFUSION ADD-ON: CPT

## 2023-11-29 PROCEDURE — 96365 THER/PROPH/DIAG IV INF INIT: CPT

## 2023-11-29 PROCEDURE — 82077 ASSAY SPEC XCP UR&BREATH IA: CPT | Performed by: EMERGENCY MEDICINE

## 2023-11-29 PROCEDURE — 70220 X-RAY EXAM OF SINUSES: CPT

## 2023-11-29 PROCEDURE — 36415 COLL VENOUS BLD VENIPUNCTURE: CPT | Performed by: EMERGENCY MEDICINE

## 2023-11-29 PROCEDURE — 99285 EMERGENCY DEPT VISIT HI MDM: CPT | Mod: 25

## 2023-11-29 PROCEDURE — 250N000009 HC RX 250: Performed by: EMERGENCY MEDICINE

## 2023-11-29 PROCEDURE — 250N000013 HC RX MED GY IP 250 OP 250 PS 637: Performed by: HOSPITALIST

## 2023-11-29 PROCEDURE — 84484 ASSAY OF TROPONIN QUANT: CPT | Performed by: EMERGENCY MEDICINE

## 2023-11-29 PROCEDURE — 99222 1ST HOSP IP/OBS MODERATE 55: CPT | Performed by: HOSPITALIST

## 2023-11-29 PROCEDURE — 85049 AUTOMATED PLATELET COUNT: CPT | Performed by: EMERGENCY MEDICINE

## 2023-11-29 PROCEDURE — 250N000011 HC RX IP 250 OP 636: Performed by: EMERGENCY MEDICINE

## 2023-11-29 PROCEDURE — 96375 TX/PRO/DX INJ NEW DRUG ADDON: CPT

## 2023-11-29 PROCEDURE — 71046 X-RAY EXAM CHEST 2 VIEWS: CPT

## 2023-11-29 PROCEDURE — 250N000013 HC RX MED GY IP 250 OP 250 PS 637: Performed by: EMERGENCY MEDICINE

## 2023-11-29 PROCEDURE — 83735 ASSAY OF MAGNESIUM: CPT | Performed by: HOSPITALIST

## 2023-11-29 PROCEDURE — 82310 ASSAY OF CALCIUM: CPT | Performed by: EMERGENCY MEDICINE

## 2023-11-29 PROCEDURE — 120N000001 HC R&B MED SURG/OB

## 2023-11-29 RX ORDER — GABAPENTIN 100 MG/1
100 CAPSULE ORAL EVERY 8 HOURS
Status: DISCONTINUED | OUTPATIENT
Start: 2023-12-07 | End: 2023-12-04 | Stop reason: HOSPADM

## 2023-11-29 RX ORDER — GABAPENTIN 300 MG/1
900 CAPSULE ORAL EVERY 8 HOURS
Status: COMPLETED | OUTPATIENT
Start: 2023-11-30 | End: 2023-12-02

## 2023-11-29 RX ORDER — SODIUM CHLORIDE 9 MG/ML
INJECTION, SOLUTION INTRAVENOUS CONTINUOUS
Status: ACTIVE | OUTPATIENT
Start: 2023-11-29 | End: 2023-11-30

## 2023-11-29 RX ORDER — AMOXICILLIN 250 MG
1 CAPSULE ORAL 2 TIMES DAILY PRN
Status: DISCONTINUED | OUTPATIENT
Start: 2023-11-29 | End: 2023-11-30

## 2023-11-29 RX ORDER — LORAZEPAM 2 MG/ML
1 INJECTION INTRAMUSCULAR ONCE
Status: COMPLETED | OUTPATIENT
Start: 2023-11-29 | End: 2023-11-29

## 2023-11-29 RX ORDER — ONDANSETRON 2 MG/ML
4 INJECTION INTRAMUSCULAR; INTRAVENOUS EVERY 6 HOURS PRN
Status: DISCONTINUED | OUTPATIENT
Start: 2023-11-29 | End: 2023-11-30

## 2023-11-29 RX ORDER — GABAPENTIN 600 MG/1
1200 TABLET ORAL ONCE
Status: COMPLETED | OUTPATIENT
Start: 2023-11-29 | End: 2023-11-29

## 2023-11-29 RX ORDER — CLONIDINE HYDROCHLORIDE 0.1 MG/1
0.1 TABLET ORAL EVERY 8 HOURS
Status: DISCONTINUED | OUTPATIENT
Start: 2023-11-29 | End: 2023-12-04 | Stop reason: HOSPADM

## 2023-11-29 RX ORDER — MULTIPLE VITAMINS W/ MINERALS TAB 9MG-400MCG
1 TAB ORAL DAILY
Status: DISCONTINUED | OUTPATIENT
Start: 2023-11-30 | End: 2023-12-04 | Stop reason: HOSPADM

## 2023-11-29 RX ORDER — ONDANSETRON 4 MG/1
4 TABLET, ORALLY DISINTEGRATING ORAL EVERY 6 HOURS PRN
Status: DISCONTINUED | OUTPATIENT
Start: 2023-11-29 | End: 2023-11-30

## 2023-11-29 RX ORDER — FLUMAZENIL 0.1 MG/ML
0.2 INJECTION, SOLUTION INTRAVENOUS
Status: DISCONTINUED | OUTPATIENT
Start: 2023-11-29 | End: 2023-12-04 | Stop reason: HOSPADM

## 2023-11-29 RX ORDER — CALCIUM CARBONATE 500 MG/1
1000 TABLET, CHEWABLE ORAL 4 TIMES DAILY PRN
Status: DISCONTINUED | OUTPATIENT
Start: 2023-11-29 | End: 2023-11-30

## 2023-11-29 RX ORDER — DIAZEPAM 10 MG/2ML
5-10 INJECTION, SOLUTION INTRAMUSCULAR; INTRAVENOUS EVERY 30 MIN PRN
Status: DISCONTINUED | OUTPATIENT
Start: 2023-11-29 | End: 2023-12-04 | Stop reason: HOSPADM

## 2023-11-29 RX ORDER — HALOPERIDOL 5 MG/ML
2.5-5 INJECTION INTRAMUSCULAR EVERY 6 HOURS PRN
Status: DISCONTINUED | OUTPATIENT
Start: 2023-11-29 | End: 2023-12-04 | Stop reason: HOSPADM

## 2023-11-29 RX ORDER — LIDOCAINE 40 MG/G
CREAM TOPICAL
Status: DISCONTINUED | OUTPATIENT
Start: 2023-11-29 | End: 2023-11-30

## 2023-11-29 RX ORDER — FOLIC ACID 1 MG/1
1 TABLET ORAL DAILY
Status: DISCONTINUED | OUTPATIENT
Start: 2023-11-30 | End: 2023-12-04 | Stop reason: HOSPADM

## 2023-11-29 RX ORDER — DIAZEPAM 5 MG
10 TABLET ORAL EVERY 30 MIN PRN
Status: DISCONTINUED | OUTPATIENT
Start: 2023-11-29 | End: 2023-12-04 | Stop reason: HOSPADM

## 2023-11-29 RX ORDER — AMOXICILLIN 250 MG
2 CAPSULE ORAL 2 TIMES DAILY PRN
Status: DISCONTINUED | OUTPATIENT
Start: 2023-11-29 | End: 2023-11-30

## 2023-11-29 RX ORDER — GABAPENTIN 300 MG/1
600 CAPSULE ORAL EVERY 8 HOURS
Status: DISCONTINUED | OUTPATIENT
Start: 2023-12-03 | End: 2023-12-04 | Stop reason: HOSPADM

## 2023-11-29 RX ORDER — GABAPENTIN 300 MG/1
300 CAPSULE ORAL EVERY 8 HOURS
Status: DISCONTINUED | OUTPATIENT
Start: 2023-12-05 | End: 2023-12-04 | Stop reason: HOSPADM

## 2023-11-29 RX ORDER — ACETAMINOPHEN 325 MG/1
650 TABLET ORAL EVERY 4 HOURS PRN
Status: DISCONTINUED | OUTPATIENT
Start: 2023-11-29 | End: 2023-12-01 | Stop reason: DRUGHIGH

## 2023-11-29 RX ORDER — DIAZEPAM 5 MG
5 TABLET ORAL ONCE
Status: COMPLETED | OUTPATIENT
Start: 2023-11-29 | End: 2023-11-29

## 2023-11-29 RX ORDER — OLANZAPINE 5 MG/1
5-10 TABLET, ORALLY DISINTEGRATING ORAL EVERY 6 HOURS PRN
Status: DISCONTINUED | OUTPATIENT
Start: 2023-11-29 | End: 2023-12-04 | Stop reason: HOSPADM

## 2023-11-29 RX ORDER — ONDANSETRON 2 MG/ML
4 INJECTION INTRAMUSCULAR; INTRAVENOUS EVERY 30 MIN PRN
Status: DISCONTINUED | OUTPATIENT
Start: 2023-11-29 | End: 2023-11-29

## 2023-11-29 RX ORDER — HYDRALAZINE HYDROCHLORIDE 20 MG/ML
10 INJECTION INTRAMUSCULAR; INTRAVENOUS EVERY 4 HOURS PRN
Status: DISCONTINUED | OUTPATIENT
Start: 2023-11-29 | End: 2023-12-01 | Stop reason: DRUGHIGH

## 2023-11-29 RX ORDER — ACETAMINOPHEN 650 MG/1
650 SUPPOSITORY RECTAL EVERY 4 HOURS PRN
Status: DISCONTINUED | OUTPATIENT
Start: 2023-11-29 | End: 2023-12-04 | Stop reason: HOSPADM

## 2023-11-29 RX ORDER — LORAZEPAM 2 MG/ML
2 INJECTION INTRAMUSCULAR ONCE
Status: DISCONTINUED | OUTPATIENT
Start: 2023-11-29 | End: 2023-11-29

## 2023-11-29 RX ADMIN — SODIUM CHLORIDE: 9 INJECTION, SOLUTION INTRAVENOUS at 21:38

## 2023-11-29 RX ADMIN — ONDANSETRON 4 MG: 2 INJECTION INTRAMUSCULAR; INTRAVENOUS at 17:53

## 2023-11-29 RX ADMIN — FOLIC ACID: 5 INJECTION, SOLUTION INTRAMUSCULAR; INTRAVENOUS; SUBCUTANEOUS at 18:31

## 2023-11-29 RX ADMIN — DIAZEPAM 10 MG: 5 TABLET ORAL at 22:48

## 2023-11-29 RX ADMIN — DIAZEPAM 5 MG: 5 TABLET ORAL at 17:53

## 2023-11-29 RX ADMIN — LORAZEPAM 1 MG: 2 INJECTION INTRAMUSCULAR; INTRAVENOUS at 17:53

## 2023-11-29 RX ADMIN — CLONIDINE HYDROCHLORIDE 0.1 MG: 0.1 TABLET ORAL at 21:17

## 2023-11-29 RX ADMIN — GABAPENTIN 1200 MG: 600 TABLET, FILM COATED ORAL at 22:42

## 2023-11-29 RX ADMIN — ACETAMINOPHEN 650 MG: 325 TABLET, FILM COATED ORAL at 21:17

## 2023-11-29 RX ADMIN — SODIUM CHLORIDE 1000 ML: 9 INJECTION, SOLUTION INTRAVENOUS at 17:39

## 2023-11-29 RX ADMIN — DIAZEPAM 10 MG: 5 TABLET ORAL at 21:18

## 2023-11-29 ASSESSMENT — LIFESTYLE VARIABLES
HEADACHE, FULLNESS IN HEAD: VERY SEVERE
VISUAL DISTURBANCES: NOT PRESENT
AUDITORY DISTURBANCES: NOT PRESENT
VISUAL DISTURBANCES: NOT PRESENT
ANXIETY: NO ANXIETY, AT EASE
TREMOR: 6
ORIENTATION AND CLOUDING OF SENSORIUM: ORIENTED AND CAN DO SERIAL ADDITIONS
AGITATION: NORMAL ACTIVITY
TOTAL SCORE: 12
AGITATION: NORMAL ACTIVITY
HEADACHE, FULLNESS IN HEAD: MODERATELY SEVERE
NAUSEA AND VOMITING: NO NAUSEA AND NO VOMITING
TOTAL SCORE: 8
PAROXYSMAL SWEATS: NO SWEAT VISIBLE
NAUSEA AND VOMITING: NO NAUSEA AND NO VOMITING
PAROXYSMAL SWEATS: NO SWEAT VISIBLE
ANXIETY: NO ANXIETY, AT EASE
TREMOR: MODERATE, WITH PATIENT'S ARMS EXTENDED
ORIENTATION AND CLOUDING OF SENSORIUM: ORIENTED AND CAN DO SERIAL ADDITIONS
AUDITORY DISTURBANCES: NOT PRESENT

## 2023-11-29 ASSESSMENT — ACTIVITIES OF DAILY LIVING (ADL)
ADLS_ACUITY_SCORE: 35

## 2023-11-29 NOTE — ED TRIAGE NOTES
Pt reports SOB, 8/10 chest pain, and a left sided headache for the last 2 days.       Triage Assessment (Adult)       Row Name 11/29/23 9721          Triage Assessment    Airway WDL WDL        Respiratory WDL    Respiratory WDL X  see note        Skin Circulation/Temperature WDL    Skin Circulation/Temperature WDL WDL        Cardiac WDL    Cardiac WDL X  see note        Peripheral/Neurovascular WDL    Peripheral Neurovascular WDL WDL        Cognitive/Neuro/Behavioral WDL    Cognitive/Neuro/Behavioral WDL WDL

## 2023-11-29 NOTE — ED PROVIDER NOTES
History     Chief Complaint:  Shortness of Breath, Chest Pain, and Headache       The history is provided by the patient.      Joe Shah is a 48 year old male who presents with generalized unwell feeling, some chest pain, muscle aches, headache and tremors for the last 2 to 3 days.  He states that he does drink a regular amount of alcohol, sometimes at work, and he is a .  He states that he has been having a lot of alcohol this month, and then seems to have quit cold turkey just after Thanksgiving, and notes that he has had some shakes for the last 3 days and now presents with the above symptoms as well.  States adamantly he has not had anything to drink in the last 3 days.  No nausea or vomiting, no appetite, no diarrhea and no abdominal pain at this time.    Independent Historian:   No    Review of External Notes: Yes I have reviewed the patient's last hospitalization for an esophageal ulcer and alcohol withdrawal with a GI bleed from May 26, 2023.    Allergies:  Morphine     Medications:    Toprol-XL  Zofran  Norco  Protonix    Past Medical History:    Anxiety   Depression  Hypertension   Substance abuse   Lumbar radiculopathy  Leukopenia  Primary insomnia   Alcoholic hepatitis, without ascites   Spondylolisthesis of lumbosacral region     Past Surgical History:    Neck surgery  ENT surgery     Family History:    family history includes No Known Problems in his maternal grandfather, maternal grandmother, and paternal grandmother; Substance Abuse in his paternal grandfather.    Social History:   reports that he has quit smoking. His smokeless tobacco use includes chew. He reports that he does not currently use alcohol. He reports that he does not use drugs.  PCP: Mike Rodney     Physical Exam   Patient Vitals for the past 24 hrs:   BP Temp Temp src Pulse Resp SpO2   11/29/23 1722 (!) 137/90 98  F (36.7  C) Oral (!) 127 20 99 %        Physical Exam  Vitals: reviewed by me  General: Pt  seen on hospital reggie[place]mananish, cooperative, and alert to conversation  Eyes: Tracking well, clear conjunctiva BL  ENT: MMM, midline trachea.   Lungs: No tachypnea, no accessory muscle use. No respiratory distress.   CV: Rate as above  MSK: no joint effusion.  No evidence of trauma  Skin: No rash  Neuro: Clear speech and no facial droop.  Very tremulous however  Psych: Not RIS, no e/o AH/VH    Emergency Department Course     ECG results from 11/29/23   EKG 12 lead     Value    Systolic Blood Pressure     Diastolic Blood Pressure     Ventricular Rate 117    Atrial Rate 117    NC Interval 142    QRS Duration 70        QTc 463    P Axis 64    R AXIS 52    T Axis 32    Interpretation ECG      Sinus tachycardia  Otherwise normal ECG  When compared with ECG of 26-MAY-2023 00:37,  No significant change was found       Laboratory:  Labs Ordered and Resulted from Time of ED Arrival to Time of ED Departure   COMPREHENSIVE METABOLIC PANEL - Abnormal       Result Value    Sodium 129 (*)     Potassium 3.9      Carbon Dioxide (CO2) 20 (*)     Anion Gap 20 (*)     Urea Nitrogen 10.4      Creatinine 0.73      GFR Estimate >90      Calcium 9.3      Chloride 89 (*)     Glucose 99      Alkaline Phosphatase 94      AST 69 (*)     ALT 52      Protein Total 8.0      Albumin 4.2      Bilirubin Total 1.7 (*)    ETHYL ALCOHOL LEVEL - Abnormal    Alcohol ethyl 0.05 (*)    CBC WITH PLATELETS AND DIFFERENTIAL - Abnormal    WBC Count 9.5      RBC Count 4.49      Hemoglobin 15.0      Hematocrit 43.9      MCV 98      MCH 33.4 (*)     MCHC 34.2      RDW 14.3      Platelet Count 457 (*)     % Neutrophils 81      % Lymphocytes 7      % Monocytes 10      % Eosinophils 0      % Basophils 1      % Immature Granulocytes 1      NRBCs per 100 WBC 0      Absolute Neutrophils 7.7      Absolute Lymphocytes 0.7 (*)     Absolute Monocytes 1.0      Absolute Eosinophils 0.0      Absolute Basophils 0.1      Absolute Immature Granulocytes 0.1       Absolute NRBCs 0.0     TROPONIN T, HIGH SENSITIVITY - Normal    Troponin T, High Sensitivity 7        Emergency Department Course & Assessments:    Interventions:  Medications   ondansetron (ZOFRAN) injection 4 mg (4 mg Intravenous $Given 11/29/23 1753)   LORazepam (ATIVAN) injection 2 mg (has no administration in time range)   sodium chloride 0.9% BOLUS 1,000 mL (0 mLs Intravenous Stopped 11/29/23 1836)   diazepam (VALIUM) tablet 5 mg (5 mg Oral $Given 11/29/23 1753)   LORazepam (ATIVAN) injection 1 mg (1 mg Intravenous $Given 11/29/23 1753)   sodium chloride 0.9 % 1,000 mL with Infuvite Adult 10 mL, thiamine 100 mg, folic acid 1 mg infusion ( Intravenous $New Bag 11/29/23 1831)      Independent Interpretation (X-rays, CTs, rhythm strip):  None     Consultations/Discussion of Management or Tests:  1906 I consulted with Dr. Victoria (hospitalist) regarding the patient.     Social Determinants of Health affecting care:   Stress/Adjustment Disorders    Disposition:  The patient was admitted to the hospital under the care of Dr. Victoria.     Impression & Plan    Medical Decision Making:  This is a 48 old woman presents emergency room with appears to be alcohol withdrawal.  He denies having any alcohol last over days but unfortunately does have an elevated alcohol level here, and even within the system his symptoms were not adequately controlled with 1 of Ativan IV and 5 of Valium orally.  On my reassessment he continues to be quite tremulous, and I do not think that he will do well at home.  He tells me that he would prefer to come into the hospital and I do see that he has had to be admitted for alcohol withdrawal in the past as well.  Will plan for additional fluids, additional Ativan and admission to the hospitalist team as above.  Patient with no vomiting, does have a history of GI bleed but his hemoglobin is reassuring here today.  Patient is okay with this plan.    Critical care time 30 minutes.    Diagnosis:     ICD-10-CM    1. Alcohol withdrawal syndrome without complication (H)  F10.930       2. Dehydration  E86.0             Scribe Disclosure:  I, Nelson Mac, am serving as a scribe at 7:07 PM on 11/29/2023 to document services personally performed by Galen Wilburn MD based on my observations and the provider's statements to me.    11/29/2023   Galen Wilburn MD Fitzgerald, Michael Maxwell Kreofsky, MD  11/29/23 1918

## 2023-11-30 ENCOUNTER — APPOINTMENT (OUTPATIENT)
Dept: MRI IMAGING | Facility: CLINIC | Age: 48
DRG: 025 | End: 2023-11-30
Attending: INTERNAL MEDICINE
Payer: COMMERCIAL

## 2023-11-30 ENCOUNTER — APPOINTMENT (OUTPATIENT)
Dept: CT IMAGING | Facility: CLINIC | Age: 48
DRG: 025 | End: 2023-11-30
Attending: INTERNAL MEDICINE
Payer: COMMERCIAL

## 2023-11-30 PROBLEM — G44.52 NEW DAILY PERSISTENT HEADACHE: Status: ACTIVE | Noted: 2023-11-30

## 2023-11-30 PROBLEM — R25.1 TREMOR: Status: ACTIVE | Noted: 2023-11-30

## 2023-11-30 PROBLEM — Z86.16 HISTORY OF 2019 NOVEL CORONAVIRUS DISEASE (COVID-19): Status: ACTIVE | Noted: 2023-11-30

## 2023-11-30 PROBLEM — S06.5XAA SUBDURAL HEMATOMA (H): Status: ACTIVE | Noted: 2023-11-30

## 2023-11-30 LAB
ALBUMIN SERPL BCG-MCNC: 3.5 G/DL (ref 3.5–5.2)
ALP SERPL-CCNC: 74 U/L (ref 40–150)
ALT SERPL W P-5'-P-CCNC: 36 U/L (ref 0–70)
ANION GAP SERPL CALCULATED.3IONS-SCNC: 9 MMOL/L (ref 7–15)
AST SERPL W P-5'-P-CCNC: 45 U/L (ref 0–45)
BASOPHILS # BLD AUTO: 0.1 10E3/UL (ref 0–0.2)
BASOPHILS NFR BLD AUTO: 1 %
BILIRUB SERPL-MCNC: 1.7 MG/DL
BUN SERPL-MCNC: 9.7 MG/DL (ref 6–20)
CALCIUM SERPL-MCNC: 8.3 MG/DL (ref 8.6–10)
CHLORIDE SERPL-SCNC: 99 MMOL/L (ref 98–107)
CREAT SERPL-MCNC: 0.76 MG/DL (ref 0.67–1.17)
CRP SERPL-MCNC: 35.63 MG/L
DEPRECATED HCO3 PLAS-SCNC: 23 MMOL/L (ref 22–29)
EGFRCR SERPLBLD CKD-EPI 2021: >90 ML/MIN/1.73M2
EOSINOPHIL # BLD AUTO: 0.1 10E3/UL (ref 0–0.7)
EOSINOPHIL NFR BLD AUTO: 1 %
ERYTHROCYTE [DISTWIDTH] IN BLOOD BY AUTOMATED COUNT: 14.8 % (ref 10–15)
ERYTHROCYTE [SEDIMENTATION RATE] IN BLOOD BY WESTERGREN METHOD: 7 MM/HR (ref 0–20)
GLUCOSE SERPL-MCNC: 95 MG/DL (ref 70–99)
HCT VFR BLD AUTO: 37.1 % (ref 40–53)
HGB BLD-MCNC: 12.1 G/DL (ref 13.3–17.7)
IMM GRANULOCYTES # BLD: 0.1 10E3/UL
IMM GRANULOCYTES NFR BLD: 1 %
INR PPP: 1.08 (ref 0.85–1.15)
LYMPHOCYTES # BLD AUTO: 0.9 10E3/UL (ref 0.8–5.3)
LYMPHOCYTES NFR BLD AUTO: 11 %
MCH RBC QN AUTO: 33 PG (ref 26.5–33)
MCHC RBC AUTO-ENTMCNC: 32.6 G/DL (ref 31.5–36.5)
MCV RBC AUTO: 101 FL (ref 78–100)
MONOCYTES # BLD AUTO: 1 10E3/UL (ref 0–1.3)
MONOCYTES NFR BLD AUTO: 12 %
NEUTROPHILS # BLD AUTO: 5.7 10E3/UL (ref 1.6–8.3)
NEUTROPHILS NFR BLD AUTO: 74 %
NRBC # BLD AUTO: 0 10E3/UL
NRBC BLD AUTO-RTO: 0 /100
PLATELET # BLD AUTO: 351 10E3/UL (ref 150–450)
POTASSIUM SERPL-SCNC: 4 MMOL/L (ref 3.4–5.3)
PROT SERPL-MCNC: 6.4 G/DL (ref 6.4–8.3)
RBC # BLD AUTO: 3.67 10E6/UL (ref 4.4–5.9)
SODIUM SERPL-SCNC: 131 MMOL/L (ref 135–145)
WBC # BLD AUTO: 7.8 10E3/UL (ref 4–11)

## 2023-11-30 PROCEDURE — 86140 C-REACTIVE PROTEIN: CPT | Performed by: INTERNAL MEDICINE

## 2023-11-30 PROCEDURE — 85610 PROTHROMBIN TIME: CPT | Performed by: PHYSICIAN ASSISTANT

## 2023-11-30 PROCEDURE — 250N000013 HC RX MED GY IP 250 OP 250 PS 637: Performed by: HOSPITALIST

## 2023-11-30 PROCEDURE — G0378 HOSPITAL OBSERVATION PER HR: HCPCS

## 2023-11-30 PROCEDURE — 70486 CT MAXILLOFACIAL W/O DYE: CPT

## 2023-11-30 PROCEDURE — 120N000001 HC R&B MED SURG/OB

## 2023-11-30 PROCEDURE — 99233 SBSQ HOSP IP/OBS HIGH 50: CPT | Performed by: INTERNAL MEDICINE

## 2023-11-30 PROCEDURE — 85652 RBC SED RATE AUTOMATED: CPT | Performed by: INTERNAL MEDICINE

## 2023-11-30 PROCEDURE — 258N000003 HC RX IP 258 OP 636: Performed by: HOSPITALIST

## 2023-11-30 PROCEDURE — 99204 OFFICE O/P NEW MOD 45 MIN: CPT | Performed by: PHYSICIAN ASSISTANT

## 2023-11-30 PROCEDURE — 36415 COLL VENOUS BLD VENIPUNCTURE: CPT | Performed by: HOSPITALIST

## 2023-11-30 PROCEDURE — 250N000013 HC RX MED GY IP 250 OP 250 PS 637: Performed by: INTERNAL MEDICINE

## 2023-11-30 PROCEDURE — 86618 LYME DISEASE ANTIBODY: CPT | Performed by: INTERNAL MEDICINE

## 2023-11-30 PROCEDURE — 80053 COMPREHEN METABOLIC PANEL: CPT | Performed by: HOSPITALIST

## 2023-11-30 PROCEDURE — 36415 COLL VENOUS BLD VENIPUNCTURE: CPT | Performed by: INTERNAL MEDICINE

## 2023-11-30 PROCEDURE — 70551 MRI BRAIN STEM W/O DYE: CPT

## 2023-11-30 PROCEDURE — 85025 COMPLETE CBC W/AUTO DIFF WBC: CPT | Performed by: HOSPITALIST

## 2023-11-30 RX ORDER — PROCHLORPERAZINE MALEATE 10 MG
10 TABLET ORAL EVERY 6 HOURS PRN
Status: DISCONTINUED | OUTPATIENT
Start: 2023-11-30 | End: 2023-12-04 | Stop reason: HOSPADM

## 2023-11-30 RX ORDER — NAPROXEN SODIUM 220 MG
220 TABLET ORAL 2 TIMES DAILY WITH MEALS
Status: DISCONTINUED | OUTPATIENT
Start: 2023-11-30 | End: 2023-11-30

## 2023-11-30 RX ORDER — AMOXICILLIN 250 MG
2 CAPSULE ORAL 2 TIMES DAILY PRN
Status: DISCONTINUED | OUTPATIENT
Start: 2023-11-30 | End: 2023-12-04 | Stop reason: HOSPADM

## 2023-11-30 RX ORDER — AMOXICILLIN 250 MG
1 CAPSULE ORAL 2 TIMES DAILY PRN
Status: DISCONTINUED | OUTPATIENT
Start: 2023-11-30 | End: 2023-11-30

## 2023-11-30 RX ORDER — ONDANSETRON 2 MG/ML
4 INJECTION INTRAMUSCULAR; INTRAVENOUS EVERY 6 HOURS PRN
Status: DISCONTINUED | OUTPATIENT
Start: 2023-11-30 | End: 2023-11-30

## 2023-11-30 RX ORDER — AMOXICILLIN 250 MG
1 CAPSULE ORAL 2 TIMES DAILY PRN
Status: DISCONTINUED | OUTPATIENT
Start: 2023-11-30 | End: 2023-12-04 | Stop reason: HOSPADM

## 2023-11-30 RX ORDER — DOCUSATE SODIUM 100 MG/1
100 CAPSULE, LIQUID FILLED ORAL 2 TIMES DAILY
Status: DISCONTINUED | OUTPATIENT
Start: 2023-11-30 | End: 2023-12-04 | Stop reason: HOSPADM

## 2023-11-30 RX ORDER — LIDOCAINE 40 MG/G
CREAM TOPICAL
Status: DISCONTINUED | OUTPATIENT
Start: 2023-11-30 | End: 2023-12-04 | Stop reason: HOSPADM

## 2023-11-30 RX ORDER — CALCIUM CARBONATE 500 MG/1
1000 TABLET, CHEWABLE ORAL 4 TIMES DAILY PRN
Status: DISCONTINUED | OUTPATIENT
Start: 2023-11-30 | End: 2023-12-01 | Stop reason: DRUGHIGH

## 2023-11-30 RX ORDER — NAPROXEN 250 MG/1
250 TABLET ORAL 2 TIMES DAILY WITH MEALS
Status: DISCONTINUED | OUTPATIENT
Start: 2023-11-30 | End: 2023-11-30

## 2023-11-30 RX ORDER — PROPRANOLOL HYDROCHLORIDE 20 MG/1
20 TABLET ORAL 2 TIMES DAILY
Status: DISCONTINUED | OUTPATIENT
Start: 2023-11-30 | End: 2023-11-30

## 2023-11-30 RX ORDER — ONDANSETRON 2 MG/ML
4 INJECTION INTRAMUSCULAR; INTRAVENOUS EVERY 6 HOURS PRN
Status: DISCONTINUED | OUTPATIENT
Start: 2023-11-30 | End: 2023-12-04 | Stop reason: HOSPADM

## 2023-11-30 RX ORDER — ONDANSETRON 4 MG/1
4 TABLET, ORALLY DISINTEGRATING ORAL EVERY 6 HOURS PRN
Status: DISCONTINUED | OUTPATIENT
Start: 2023-11-30 | End: 2023-12-04 | Stop reason: HOSPADM

## 2023-11-30 RX ORDER — PROCHLORPERAZINE 25 MG
25 SUPPOSITORY, RECTAL RECTAL EVERY 12 HOURS PRN
Status: DISCONTINUED | OUTPATIENT
Start: 2023-11-30 | End: 2023-12-04 | Stop reason: HOSPADM

## 2023-11-30 RX ORDER — AMOXICILLIN 250 MG
2 CAPSULE ORAL 2 TIMES DAILY PRN
Status: DISCONTINUED | OUTPATIENT
Start: 2023-11-30 | End: 2023-11-30

## 2023-11-30 RX ORDER — ONDANSETRON 4 MG/1
4 TABLET, ORALLY DISINTEGRATING ORAL EVERY 6 HOURS PRN
Status: DISCONTINUED | OUTPATIENT
Start: 2023-11-30 | End: 2023-11-30

## 2023-11-30 RX ADMIN — GABAPENTIN 900 MG: 300 CAPSULE ORAL at 10:59

## 2023-11-30 RX ADMIN — ACETAMINOPHEN 650 MG: 325 TABLET, FILM COATED ORAL at 19:06

## 2023-11-30 RX ADMIN — THIAMINE HCL TAB 100 MG 100 MG: 100 TAB at 08:06

## 2023-11-30 RX ADMIN — GABAPENTIN 900 MG: 300 CAPSULE ORAL at 19:06

## 2023-11-30 RX ADMIN — GABAPENTIN 900 MG: 300 CAPSULE ORAL at 03:57

## 2023-11-30 RX ADMIN — CLONIDINE HYDROCHLORIDE 0.1 MG: 0.1 TABLET ORAL at 12:33

## 2023-11-30 RX ADMIN — CLONIDINE HYDROCHLORIDE 0.1 MG: 0.1 TABLET ORAL at 05:30

## 2023-11-30 RX ADMIN — ACETAMINOPHEN 650 MG: 325 TABLET, FILM COATED ORAL at 05:33

## 2023-11-30 RX ADMIN — SODIUM CHLORIDE: 9 INJECTION, SOLUTION INTRAVENOUS at 11:07

## 2023-11-30 RX ADMIN — OLANZAPINE 5 MG: 5 TABLET, ORALLY DISINTEGRATING ORAL at 08:21

## 2023-11-30 RX ADMIN — ACETAMINOPHEN 650 MG: 325 TABLET, FILM COATED ORAL at 09:33

## 2023-11-30 RX ADMIN — FOLIC ACID 1 MG: 1 TABLET ORAL at 08:06

## 2023-11-30 RX ADMIN — PROPRANOLOL HYDROCHLORIDE 20 MG: 20 TABLET ORAL at 17:59

## 2023-11-30 RX ADMIN — MULTIPLE VITAMINS W/ MINERALS TAB 1 TABLET: TAB at 08:06

## 2023-11-30 ASSESSMENT — ACTIVITIES OF DAILY LIVING (ADL)
ADLS_ACUITY_SCORE: 22
ADLS_ACUITY_SCORE: 20
ADLS_ACUITY_SCORE: 22

## 2023-11-30 NOTE — ED NOTES
[facility]    ED Nurse Handoff Report    ED Chief complaint: Shortness of Breath, Chest Pain, and Headache      ED Diagnosis:   Final diagnoses:   Alcohol withdrawal syndrome without complication (H)   Dehydration       Code Status: Full Code    Allergies:   Allergies   Allergen Reactions    Morphine Nausea and Vomiting       Patient Story:  Pt arrives to ED for increased weakness, SOB, CP and HA. Pt reports he had become an everyday drinker and has now stopped drinking around thanksgiving. Pt is shakey, and c/o severe HA.     Focused Assessment:    Pt is A&Ox4, independent, can make needs known. Pt is moderately/severe shakiness, and severe HA, no other withdrawal symptoms. Ciwa = 12, given 10 of valium PO.     Labs Ordered and Resulted from Time of ED Arrival to Time of ED Departure   COMPREHENSIVE METABOLIC PANEL - Abnormal       Result Value    Sodium 129 (*)     Potassium 3.9      Carbon Dioxide (CO2) 20 (*)     Anion Gap 20 (*)     Urea Nitrogen 10.4      Creatinine 0.73      GFR Estimate >90      Calcium 9.3      Chloride 89 (*)     Glucose 99      Alkaline Phosphatase 94      AST 69 (*)     ALT 52      Protein Total 8.0      Albumin 4.2      Bilirubin Total 1.7 (*)    ETHYL ALCOHOL LEVEL - Abnormal    Alcohol ethyl 0.05 (*)    CBC WITH PLATELETS AND DIFFERENTIAL - Abnormal    WBC Count 9.5      RBC Count 4.49      Hemoglobin 15.0      Hematocrit 43.9      MCV 98      MCH 33.4 (*)     MCHC 34.2      RDW 14.3      Platelet Count 457 (*)     % Neutrophils 81      % Lymphocytes 7      % Monocytes 10      % Eosinophils 0      % Basophils 1      % Immature Granulocytes 1      NRBCs per 100 WBC 0      Absolute Neutrophils 7.7      Absolute Lymphocytes 0.7 (*)     Absolute Monocytes 1.0      Absolute Eosinophils 0.0      Absolute Basophils 0.1      Absolute Immature Granulocytes 0.1      Absolute NRBCs 0.0     MAGNESIUM - Abnormal    Magnesium 1.6 (*)    TROPONIN T, HIGH SENSITIVITY - Normal     Troponin T, High Sensitivity 7     PHOSPHORUS - Normal    Phosphorus 3.5         No orders to display         Treatments and/or interventions provided:    \F2  Medications   hydrALAZINE (APRESOLINE) injection 10 mg (has no administration in time range)   lidocaine 1 % 0.1-1 mL (has no administration in time range)   lidocaine (LMX4) cream (has no administration in time range)   sodium chloride (PF) 0.9% PF flush 3 mL (has no administration in time range)   sodium chloride (PF) 0.9% PF flush 3 mL (has no administration in time range)   senna-docusate (SENOKOT-S/PERICOLACE) 8.6-50 MG per tablet 1 tablet (has no administration in time range)     Or   senna-docusate (SENOKOT-S/PERICOLACE) 8.6-50 MG per tablet 2 tablet (has no administration in time range)   calcium carbonate (TUMS) chewable tablet 1,000 mg (has no administration in time range)   cloNIDine (CATAPRES) tablet 0.1 mg (0.1 mg Oral $Given 11/29/23 2117)   OLANZapine zydis (zyPREXA) ODT tab 5-10 mg (has no administration in time range)     Or   haloperidol lactate (HALDOL) injection 2.5-5 mg (has no administration in time range)   flumazenil (ROMAZICON) injection 0.2 mg (has no administration in time range)   melatonin tablet 5 mg (has no administration in time range)   acetaminophen (TYLENOL) tablet 650 mg (650 mg Oral $Given 11/29/23 2117)     Or   acetaminophen (TYLENOL) Suppository 650 mg ( Rectal See Alternative 11/29/23 2117)   ondansetron (ZOFRAN ODT) ODT tab 4 mg (has no administration in time range)     Or   ondansetron (ZOFRAN) injection 4 mg (has no administration in time range)   gabapentin (NEURONTIN) tablet 1,200 mg (has no administration in time range)   gabapentin (NEURONTIN) capsule 900 mg (has no administration in time range)   gabapentin (NEURONTIN) capsule 600 mg (has no administration in time range)   gabapentin (NEURONTIN) capsule 300 mg (has no administration in time range)   gabapentin (NEURONTIN) capsule 100 mg (has no administration  in time range)   diazepam (VALIUM) tablet 10 mg (10 mg Oral $Given 11/29/23 2118)     Or   diazepam (VALIUM) injection 5-10 mg ( Intravenous See Alternative 11/29/23 2118)   thiamine (B-1) tablet 100 mg (has no administration in time range)   folic acid (FOLVITE) tablet 1 mg (has no administration in time range)   multivitamin w/minerals (THERA-VIT-M) tablet 1 tablet (has no administration in time range)   sodium chloride 0.9 % infusion ( Intravenous $New Bag 11/29/23 2138)   sodium chloride 0.9% BOLUS 1,000 mL (0 mLs Intravenous Stopped 11/29/23 1836)   diazepam (VALIUM) tablet 5 mg (5 mg Oral $Given 11/29/23 1753)   LORazepam (ATIVAN) injection 1 mg (1 mg Intravenous $Given 11/29/23 1753)   sodium chloride 0.9 % 1,000 mL with Infuvite Adult 10 mL, thiamine 100 mg, folic acid 1 mg infusion ( Intravenous Stopped 11/29/23 2137)       Patient's response to treatments and/or interventions:    Pt has mild relief with valium and tylenol    To be done/followed up on inpatient unit:   See any in-patient orders    Does this patient have any cognitive concerns?: none    Activity level - Baseline/Home:    Independent    Activity Level - Current:    Stand with Assist    Patient's Preferred language: English     Needed?: No    Isolation: None  Infection: Not Applicable  Patient tested for COVID 19 prior to admission: NO    Bariatric?: No    Vital Signs:   Vitals:    11/29/23 1722 11/29/23 2100   BP: (!) 137/90 (!) 138/108   Pulse: (!) 127 103   Resp: 20    Temp: 98  F (36.7  C)    TempSrc: Oral    SpO2: 99% 97%       Cardiac Rhythm: Normal sinus    Was the PSS-3 completed:   Yes  What interventions are required if any?               Family Comments: none at baseline  OBS brochure/video discussed/provided to patient/family: N/A              Name of person given brochure if not patient:               Relationship to patient:     For the majority of the shift this patient's behavior was Green.  Behavioral interventions  performed were .    ED NURSE PHONE NUMBER: *[phone]

## 2023-11-30 NOTE — PROGRESS NOTES
RECEIVING UNIT ED HANDOFF REVIEW    ED Nurse Handoff Report was reviewed by: Trudy Almaraz RN on November 29, 2023 at 10:36 PM

## 2023-11-30 NOTE — UTILIZATION REVIEW
"    Admission Status; Secondary Review Determination         Under the authority of the Utilization Management Committee, the utilization review process indicated a secondary review on the above patient.  The review outcome is based on review of the medical records, discussions with staff, and applying clinical experience noted on the date of the review.          (x) Observation Status Appropriate - This patient does not meet hospital inpatient criteria and is placed in observation status. If this patient's primary payer is Medicare and was admitted as an inpatient, Condition Code 44 should be used and patient status changed to \"observation\".     RATIONALE FOR DETERMINATION   48-year-old male with a complex medical history, including alcohol dependence, hypertension, GERD, a history of UGI bleed with esophagitis and non-bleeding gastric ulcer, lumbar radiculopathy post-decompressive laminectomy, and recent COVID infection, presented to the ED with generalized body ache, headache, sinus congestion, and poor oral intake. Concerns for alcohol withdrawal were raised due to his history of alcohol dependence, tremulousness on examination, and a positive blood alcohol level of 0.05. Laboratory findings indicated hyponatremia with a high anion gap, likely attributed to alcoholic ketosis. The patient was started on the CIWA protocol with Valium as needed, a Neurontin taper, and scheduled clonidine, along with multivitamins containing thiamine and folic acid. Additionally, a slow normal saline infusion was initiated, and counseling for alcohol cessation was provided with a chemical dependency consult planned.  There is no evidence of severe alcohol withdrawal monitoring  The severity of illness, intensity of service provided, expected LOS and risk for adverse outcome make the care appropriate for further observation; however, doesn't meet criteria for hospital inpatient admission. Dr King  notified of this " determination.    This document was produced using voice recognition software.      The information on this document is developed by the utilization review team in order for the business office to ensure compliance.  This only denotes the appropriateness of proper admission status and does not reflect the quality of care rendered.         The definitions of Inpatient Status and Observation Status used in making the determination above are those provided in the CMS Coverage Manual, Chapter 1 and Chapter 6, section 70.4.      Sincerely,     CONNIE LYONS MD    System Medical Director  Utilization Management  [facility].

## 2023-11-30 NOTE — PLAN OF CARE
Date and Time: 2300-0730  Orientation: A/Ox4  Activity: SBA w/ GB. Unsteady on feet.  Diet/BS Checks: Regular diet  Tele:  -  IV Access/Drains: PIV inf NS @ 100 ml/hr  Pain Management: C/O pain of headache pain, PRN tylenol given x1.  Abnormal VS/Results:  VSS on RA, mag 1.7. Blood ETOH 0.05.  Bowel/Bladder: Cont B/B  Skin/Wounds: WDL  Consults: Chemical dependency  D/C Disposition: Discharge plan pending  Other Info: Pt arrived from the ED around 2300. Xray of sinus and chest completed, unremarkable. On CIWAs, scored 5 and 4. Tremors noted in the hands.

## 2023-11-30 NOTE — PROGRESS NOTES
[facility]    Medicine Progress Note - Hospitalist Service    Date of Admission:  11/29/2023    Assessment & Plan    48 year old male with PMH significant for alcohol dependence (with h/o alcohol withdrawal), hypertension, GERD, h/o UGI Bleed (EGD 5/2023 with LA grade D esophagitis, non-bleeding gastric ulcer), h/o lumbar radiculopathy (s/p decompressive laminectomy 5/2022) who presented to ED with generalized body ache, headache, sinus congestion, admitted inpatient 11/29/23 with concerns for likely alcohol withdrawal.     Principal Problem:    Alcohol withdrawal syndrome without complication (H)    >> IMproving likely.   See other active problems below.   He is not interested in Chem Dep.  Generally feels his drinking is a lot less.  I will cancel the chem dep     LATER ENTRY:   IMAGING SHOWS SDH.   NEUROSURGERY CONSULT.      Active Problems:      Dehydration - s/p IVF   taking better oral intake of late.  Will hold IVF tonight.         New daily persistent headache - Due to this being new, severe and affecting ADLs of late, will get imaging.  He implies some L sinus pain AND L temporal pain       >>Head and sinus imaging -        >> Sed Rate//CRP       >> trial Naproxen       Tremor  -  Mild, may be chronic    no FmHx. He feels this tremor started sometime after his back surgery.          > Trial propanolol.   Discussed w/patient.   This may help H/A,  too...     History of 2019 novel coronavirus disease (COVID-19)  -- 2 - 3 weeks ago.  Difficult to know if some of his symptoms are sequelae of his recent illness.             Observation Goals: -diagnostic tests and consults completed and resulted, -vital signs normal or at patient baseline, -tolerating oral intake to maintain hydration, -safe disposition plan has been identified, Chem dep consult, Nurse to notify provider when observation goals have been met and patient is ready for discharge.  Diet: Regular Diet Adult    DVT  Prophylaxis: Low Risk/Ambulatory with no VTE prophylaxis indicated  Power Catheter: Not present  Lines: None     Cardiac Monitoring: None  Code Status: Full Code      Clinically Significant Risk Factors Present on Admission         # Hyponatremia: Lowest Na = 129 mmol/L in last 2 days, will monitor as appropriate  # Hypocalcemia: Lowest Ca = 8.3 mg/dL in last 2 days, will monitor and replace as appropriate   # Hypomagnesemia: Lowest Mg = 1.6 mg/dL in last 2 days, will replace as needed  # Anion Gap Metabolic Acidosis: Highest Anion Gap = 20 mmol/L in last 2 days, will monitor and treat as appropriate      # Hypertension: Noted on problem list                 Disposition Plan      Expected Discharge Date: 12/01/2023                    Modesta King MD  Hospitalist Service  [facility]  Securely message with [employer] (more info)  Text page via [employer] Paging/Directory   ______________________________________________________________________    Interval History    Ongoing headache    better at the moment.   New.  A concern.     Physical Exam   Vital Signs: Temp: 98  F (36.7  C) Temp src: Oral BP: 125/82 Pulse: 71   Resp: 18 SpO2: 95 % O2 Device: None (Room air)    Weight: 0 lbs 0 oz    Constitutional: awake, alert, cooperative, no apparent distress, and appears stated age  Eyes: Lids and lashes normal, pupils equal, round and reactive to light, extra ocular muscles intact, sclera clear, conjunctiva normal  ENT: Normocephalic, without obvious abnormality, atraumatic, sinuses nontender on palpation, external ears without lesions, oral pharynx with moist mucous membranes, tonsils without erythema or exudates, gums normal and good dentition.  Respiratory: No increased work of breathing, good air exchange, clear to auscultation bilaterally, no crackles or wheezing  Cardiovascular: Normal apical impulse, regular rate and rhythm, normal S1 and S2, no S3 or S4, and no murmur noted  Musculoskeletal:  no lower extremity pitting edema present  there is no redness, warmth, or swelling of the joints  Neuropsychiatric: Affect: normal and pleasant  Orientation: oriented to self, place, time and situation  Memory and insight: normal, memory for past and recent events intact, and thought process normal    Medical Decision Making       55 MINUTES SPENT BY ME on the date of service doing chart review, history, exam, documentation & further activities per the note.      Data     I have personally reviewed the following data over the past 24 hrs:    7.8  \   12.1 (L)   / 351     131 (L) 99 9.7 /  95   4.0 23 0.76 \     ALT: 36 AST: 45 AP: 74 TBILI: 1.7 (H)   ALB: 3.5 TOT PROTEIN: 6.4 LIPASE: N/A     Trop: 7 BNP: N/A       Imaging results reviewed over the past 24 hrs:   Recent Results (from the past 24 hour(s))   XR Chest 2 Views    Narrative    EXAM: XR CHEST 2 VIEWS  LOCATION: [facility]  DATE: 11/29/2023    INDICATION: shortness of breath  COMPARISON: 02/12/2019      Impression    IMPRESSION: Heart size is normal. Thoracic aorta is tortuous. Lungs are clear of CHF, lobar consolidation or effusion. No acute bony abnormality.   XR Sinus Complete G/E 3 Views    Narrative    EXAM: XR SINUS COMPLETE G/E 3 VIEWS  LOCATION: [facility]  DATE: 11/29/2023    INDICATION:  Headache, nasal drainage, eval for sinusitis  COMPARISON: None.      Impression    IMPRESSION: No convincing fluid in the sinuses. CT is the preferred modality for the evaluation of the paranasal sinuses.

## 2023-11-30 NOTE — PHARMACY-ADMISSION MEDICATION HISTORY
Pharmacist Admission Medication History    Admission medication history is complete. The information provided in this note is only as accurate as the sources available at the time of the update.    Information Source(s): Patient and CareEverywhere/SureScripts via in-person    Pertinent Information:     Changes made to PTA medication list:  Added: None  Deleted: multivitamin, pantoprazole, zofran, systane eye drop.  Changed: None      Allergies reviewed with patient and updates made in EHR: no    Medication History Completed By: Akil Aliheyder, RPH 11/29/2023 7:35 PM    PTA Med List   Medication Sig Last Dose    metoprolol succinate ER (TOPROL-XL) 50 MG 24 hr tablet Take 50 mg by mouth daily  11/28/2023

## 2023-11-30 NOTE — H&P
[facility]  History and Physical   Hospitalist  Chele Victoria MD       Joe Shah MRN# [id number]   YOB: 1975 Age: 48 year old      Date of Admission:  11/29/2023         Assessment and Plan:   Joe Shah is a 48 year old male with PMH significant for alcohol dependence (with h/o alcohol withdrawal), hypertension, GERD, h/o UGI Bleed (EGD 5/2023 with LA grade D esophagitis, non-bleeding gastric ulcer), h/o lumbar radiculopathy (s/p decompressive laminectomy 5/2022) who presented to ED with generalized body ache, headache, sinus congestion, admitted inpatient 11/29/23 with concerns for likely alcohol withdrawal.    He is recovering from COVID (tested positive two weeks ago) and subsequently has been having some sinus infection with nasal drainage. Over past couple of days has been having headache, generalized body ache, poor PO intake.    Likely alcohol withdrawal  Hyponatremia  high anion gap, likely alcoholic ketosis  H/o alcohol dependence with withdrawal  -patient not very forthcoming with his alcohol history; initially stated his last drink was on Thanksgiving but when told his blood alcohol was still positive, stated he had couple of drinks few days ago  -tremulous on exam; blood alcohol level 0.05; sodium 129, anion gap 20  -will start on CIWA protocol with Valium PRN  -Neurontin taper, schedule clonidine  -PO multivitamins with thiamine and folic acid  -will start NS at 75 ML per hour and monitor BMP  -counseled on alcohol suggestion ; will also consult chemical dependency     Headache  sinus congestion  recent COVID infection  -was noted with COVID one week before Thanksgiving and subsequently has been having some runny nose and headache; also reports some shortness of breath  -afebrile, no leukocytosis  -will obtain a chest x-ray and sinus x-ray  -supportive treatment with hydration, PRN Tylenol  -no need for CT head imaging at this time    GERD, h/o G.I.  bleed (5/2023)  -had EGD in May 2023 with note of LA grade D esophagitis and not bleeding gastric ulcer  -no clinical concern for bleeding at this time  -will resume PTA Protonix when verified by pharmacy    Hypertension  -resume PTA Toprol-XL when verified     Clinically Significant Risk Factors Present on Admission         # Hyponatremia: Lowest Na = 129 mmol/L in last 2 days, will monitor as appropriate     # Anion Gap Metabolic Acidosis: Highest Anion Gap = 20 mmol/L in last 2 days, will monitor and treat as appropriate          # Hypertension: Noted on problem list                      DVT prophylaxis: mechanical with PCD boots  Code status: full code    Care plan discussed with ED provider and patient.           Primary Care Physician:   Mike Rodney [phone]         Chief Complaint:     Headache, sinus congestion, shortness of breath, generalized body ache, tremors    History is obtained from the patient         History of Present Illness:     Joe Shah is a 48 year old male with PMH significant for alcohol dependence (with h/o alcohol withdrawal), hypertension, GERD, h/o UGI Bleed (EGD 5/2023 with LA grade D esophagitis, non-bleeding gastric ulcer), h/o lumbar radiculopathy (s/p decompressive laminectomy 5/2022) who presented to ED with generalized body ache, headache, sinus congestion, admitted inpatient 11/29/23 with concerns for likely alcohol withdrawal.    He is recovering from COVID (tested positive two weeks ago) and subsequently has been having some sinus infection with nasal drainage. Over past couple of days has been having headache, generalized body ache, poor PO intake.    -patient not very forthcoming with his alcohol history; initially stated his last drink was on Thanksgiving but when told his blood alcohol was still positive, stated he had couple of drinks few days ago  -tremulous on exam; blood alcohol level 0.05; sodium 129, anion gap 20    In the ED he was seen by   [facility] and hospitalist was requested admission for further evaluation due to concerns for alcohol withdrawal.    The patient denies any fever, chills, rigors or chest pain. Denies pain abdomen.  No bowel or bladder disturbances.           Past Medical History:     alcohol dependence (with h/o alcohol withdrawal)  Hypertension  GERD  h/o UGI Bleed (EGD 5/2023 with LA grade D esophagitis, non-bleeding gastric ulcer)  h/o lumbar radiculopathy (s/p decompressive laminectomy 5/2022)           Past Surgical History:     Past Surgical History:   Procedure Laterality Date    ENT SURGERY      ESOPHAGOSCOPY, GASTROSCOPY, DUODENOSCOPY (EGD), COMBINED N/A 9/12/2019    Procedure: ESOPHAGOGASTRODUODENOSCOPY (EGD);  Surgeon: Scott Mcrae MD;  Location:  GI    ESOPHAGOSCOPY, GASTROSCOPY, DUODENOSCOPY (EGD), COMBINED N/A 5/27/2023    Procedure: Esophagoscopy, gastroscopy, duodenoscopy (EGD), combined;  Surgeon: Scott Mcrae MD;  Location:  GI    NECK SURGERY                Home Medications:     Prior to Admission Medications   Prescriptions Last Dose Informant Patient Reported? Taking?   metoprolol succinate ER (TOPROL-XL) 50 MG 24 hr tablet  Self Yes No   Sig: Take 50 mg by mouth daily    multivitamin w/minerals (THERA-VIT-M) tablet  Self No No   Sig: Take 1 tablet by mouth daily   ondansetron (ZOFRAN) 4 MG tablet  Self Yes No   Sig: Take 4 mg by mouth every 8 hours as needed for nausea   pantoprazole (PROTONIX) 40 MG EC tablet   No No   Sig: Take 1 tablet (40 mg) by mouth See Admin Instructions Take twice daily for 2 weeks, then once daily thereafter or as directed by clinic provider (for esophageal ulcers)   polyethylene glycol-propylene glycol (SYSTANE ULTRA) 0.4-0.3 % SOLN ophthalmic solution  Self Yes No   Sig: Place 1 drop into both eyes every hour as needed for dry eyes      Facility-Administered Medications: None            Allergies:     Allergies   Allergen Reactions    Morphine Nausea and  Vomiting            Social History:   Joe Shah  reports that he has quit smoking. His smokeless tobacco use includes chew. He reports that he does not currently use alcohol. He reports that he does not use drugs.              Family History:   Joe Shah family history includes No Known Problems in his maternal grandfather, maternal grandmother, and paternal grandmother; Substance Abuse in his paternal grandfather.    Family history was reviewed by myself and not pertinent to current presentation.           Review of Systems:   A10 point Review of Systems was done and were negative other than noted in the HPI.             Physical Exam:   Blood pressure (!) 137/90, pulse (!) 127, temperature 98  F (36.7  C), temperature source Oral, resp. rate 20, SpO2 99%.  0 lbs 0 oz        Constitutional: Alert, awake and oriented X 3; has a sick looking appearance, tremulous   HEENT: Pupils equal and reactive to light and accomodation, EOMI intact; neck supple no raised JVD or rigidity    Oral cavity: dry oral mucosa   Cardiovascular: Normal s1 s2, regular rhythm, slightly tachycardic ; no murmur   Lungs: B/l clear to auscultation, no wheezes or crepitations   Abdomen: Soft, nt, nd, no guarding, rigidity or rebound; BS +   LE : No edema   Musculoskeletal: Power 5/5 in all extremities   Neuro: No focal neurological deficits noted, CN II to XII grossly intact   Psychiatry: normal mood and affect  Skin: No obvious skin rashes or ulcers             Data:   All new lab and imaging data was reviewed in Epic.   Significant labs and imagings include:    CMP notable for sodium 129, chloride 89, bicarb 20, anion gap 20  AST 69, ALT 52, bilirubin 1.7, glucose 99  troponin T 7  EKG with sinus tachycardia  blood alcohol level 0.05             Chele Victoria MD  Hospitalist

## 2023-11-30 NOTE — CONSULTS
Neurosurgery Consult    HPI    Mr. Shah is a 48-year-old male who presented to the hospital for headache, weakness, sinus congestion likely alcohol withdrawal.  States his symptoms have been getting worse since Thanksgiving, was COVID-positive 2 weeks ago.    Today had a sinus CT scan which demonstrated left-sided subdural hematoma, followed by brain MRI.    Patient reports persistent left-sided headache.  Reports generalized weakness.    Patient is not on anticoagulation, he does states he fell down the stairs about 3 weeks ago jarring himself but did not hit his head at that time.        Medical history  Alcohol dependence (with h/o alcohol withdrawal), hypertension, GERD, h/o UGI Bleed (EGD 5/2023 with LA grade D esophagitis, non-bleeding gastric ulcer), h/o lumbar radiculopathy (s/p decompressive laminectomy 5/2022)    Social history  Works as a       B/P: 103/73, T: 97.7, P: 77, R: 16       Exam    Alert and oriented no acute distress  Moving all extremities  Finger-nose slow and accurate  Negative pronator drift  Extraocular movements intact  Pupils equal and reactive    Labs    Platelets 475,000  INR pending      Imaging    IMPRESSION:   1.  Recent subacute subdural hemorrhage surrounding the left cerebral  hemisphere measuring up to 1.5 cm in thickness and causing 9 mm of  rightward shift in the midline structures.  2.  Similar small volume subacute subdural hemorrhage along the  undersurface of the right temporal lobe.  3.  No hydrocephalus or infarct.    Assessment     subdural hematoma, left, 1.5 cm thickness, 9 mm rightward midline shift.    Alcohol withdrawal    Plan:      Obtain INR stat  Repeat head CT scan at 7 AM, sooner if neurologic changes  Blood pressure less than 150  Every 4 hours neurochecks  Patient should be n.p.o. at midnight in case surgery needs to be done tomorrow.    Discussed with Dr. Phoenix

## 2023-12-01 ENCOUNTER — ANESTHESIA EVENT (OUTPATIENT)
Dept: SURGERY | Facility: CLINIC | Age: 48
DRG: 025 | End: 2023-12-01
Payer: COMMERCIAL

## 2023-12-01 ENCOUNTER — APPOINTMENT (OUTPATIENT)
Dept: CT IMAGING | Facility: CLINIC | Age: 48
DRG: 025 | End: 2023-12-01
Attending: PHYSICIAN ASSISTANT
Payer: COMMERCIAL

## 2023-12-01 ENCOUNTER — ANESTHESIA (OUTPATIENT)
Dept: SURGERY | Facility: CLINIC | Age: 48
DRG: 025 | End: 2023-12-01
Payer: COMMERCIAL

## 2023-12-01 ENCOUNTER — PREP FOR PROCEDURE (OUTPATIENT)
Dept: NEUROLOGY | Facility: CLINIC | Age: 48
End: 2023-12-01
Payer: COMMERCIAL

## 2023-12-01 DIAGNOSIS — S06.5XAA SUBDURAL HEMATOMA (H): Primary | ICD-10-CM

## 2023-12-01 LAB
ABO/RH(D): NORMAL
ANION GAP SERPL CALCULATED.3IONS-SCNC: 9 MMOL/L (ref 7–15)
ANTIBODY SCREEN: NEGATIVE
APTT PPP: 32 SECONDS (ref 22–38)
B BURGDOR IGG+IGM SER QL: 0.04
BUN SERPL-MCNC: 6.7 MG/DL (ref 6–20)
CALCIUM SERPL-MCNC: 8.6 MG/DL (ref 8.6–10)
CHLORIDE SERPL-SCNC: 106 MMOL/L (ref 98–107)
CREAT SERPL-MCNC: 0.75 MG/DL (ref 0.67–1.17)
DEPRECATED HCO3 PLAS-SCNC: 23 MMOL/L (ref 22–29)
EGFRCR SERPLBLD CKD-EPI 2021: >90 ML/MIN/1.73M2
GLUCOSE BLDC GLUCOMTR-MCNC: 112 MG/DL (ref 70–99)
GLUCOSE BLDC GLUCOMTR-MCNC: 206 MG/DL (ref 70–99)
GLUCOSE BLDC GLUCOMTR-MCNC: 224 MG/DL (ref 70–99)
GLUCOSE SERPL-MCNC: 109 MG/DL (ref 70–99)
POTASSIUM SERPL-SCNC: 3.9 MMOL/L (ref 3.4–5.3)
SODIUM SERPL-SCNC: 138 MMOL/L (ref 135–145)
SPECIMEN EXPIRATION DATE: NORMAL

## 2023-12-01 PROCEDURE — 36415 COLL VENOUS BLD VENIPUNCTURE: CPT | Performed by: ANESTHESIOLOGY

## 2023-12-01 PROCEDURE — C1713 ANCHOR/SCREW BN/BN,TIS/BN: HCPCS | Performed by: NEUROLOGICAL SURGERY

## 2023-12-01 PROCEDURE — 250N000013 HC RX MED GY IP 250 OP 250 PS 637: Performed by: INTERNAL MEDICINE

## 2023-12-01 PROCEDURE — 200N000001 HC R&B ICU

## 2023-12-01 PROCEDURE — 250N000013 HC RX MED GY IP 250 OP 250 PS 637: Performed by: HOSPITALIST

## 2023-12-01 PROCEDURE — 36415 COLL VENOUS BLD VENIPUNCTURE: CPT | Performed by: PHYSICIAN ASSISTANT

## 2023-12-01 PROCEDURE — 272N000001 HC OR GENERAL SUPPLY STERILE: Performed by: NEUROLOGICAL SURGERY

## 2023-12-01 PROCEDURE — 370N000017 HC ANESTHESIA TECHNICAL FEE, PER MIN: Performed by: NEUROLOGICAL SURGERY

## 2023-12-01 PROCEDURE — 250N000005 HC OR RX SURGIFLO HEMOSTATIC MATRIX 10ML 199102S OPNP: Performed by: NEUROLOGICAL SURGERY

## 2023-12-01 PROCEDURE — 250N000009 HC RX 250: Performed by: NURSE ANESTHETIST, CERTIFIED REGISTERED

## 2023-12-01 PROCEDURE — 250N000025 HC SEVOFLURANE, PER MIN: Performed by: NEUROLOGICAL SURGERY

## 2023-12-01 PROCEDURE — 61154 BURR HOLE W/EVAC&/DRG HMTMA: CPT | Mod: LT | Performed by: NEUROLOGICAL SURGERY

## 2023-12-01 PROCEDURE — 61154 BURR HOLE W/EVAC&/DRG HMTMA: CPT | Mod: AS | Performed by: PHYSICIAN ASSISTANT

## 2023-12-01 PROCEDURE — 00C40ZZ EXTIRPATION OF MATTER FROM INTRACRANIAL SUBDURAL SPACE, OPEN APPROACH: ICD-10-PCS | Performed by: NEUROLOGICAL SURGERY

## 2023-12-01 PROCEDURE — 250N000011 HC RX IP 250 OP 636: Performed by: ANESTHESIOLOGY

## 2023-12-01 PROCEDURE — 70450 CT HEAD/BRAIN W/O DYE: CPT

## 2023-12-01 PROCEDURE — 258N000003 HC RX IP 258 OP 636: Performed by: ANESTHESIOLOGY

## 2023-12-01 PROCEDURE — 250N000011 HC RX IP 250 OP 636: Performed by: NURSE ANESTHETIST, CERTIFIED REGISTERED

## 2023-12-01 PROCEDURE — 80048 BASIC METABOLIC PNL TOTAL CA: CPT | Performed by: PHYSICIAN ASSISTANT

## 2023-12-01 PROCEDURE — 999N000141 HC STATISTIC PRE-PROCEDURE NURSING ASSESSMENT: Performed by: NEUROLOGICAL SURGERY

## 2023-12-01 PROCEDURE — 250N000011 HC RX IP 250 OP 636: Performed by: PHYSICIAN ASSISTANT

## 2023-12-01 PROCEDURE — 85730 THROMBOPLASTIN TIME PARTIAL: CPT | Performed by: PHYSICIAN ASSISTANT

## 2023-12-01 PROCEDURE — 99232 SBSQ HOSP IP/OBS MODERATE 35: CPT | Performed by: INTERNAL MEDICINE

## 2023-12-01 PROCEDURE — 710N000010 HC RECOVERY PHASE 1, LEVEL 2, PER MIN: Performed by: NEUROLOGICAL SURGERY

## 2023-12-01 PROCEDURE — 360N000078 HC SURGERY LEVEL 5, PER MIN: Performed by: NEUROLOGICAL SURGERY

## 2023-12-01 PROCEDURE — 258N000003 HC RX IP 258 OP 636: Performed by: NURSE ANESTHETIST, CERTIFIED REGISTERED

## 2023-12-01 PROCEDURE — 250N000013 HC RX MED GY IP 250 OP 250 PS 637: Performed by: PHYSICIAN ASSISTANT

## 2023-12-01 PROCEDURE — 258N000003 HC RX IP 258 OP 636: Performed by: INTERNAL MEDICINE

## 2023-12-01 PROCEDURE — 86901 BLOOD TYPING SEROLOGIC RH(D): CPT | Performed by: ANESTHESIOLOGY

## 2023-12-01 PROCEDURE — 86850 RBC ANTIBODY SCREEN: CPT | Performed by: ANESTHESIOLOGY

## 2023-12-01 PROCEDURE — 250N000011 HC RX IP 250 OP 636: Mod: JZ | Performed by: NEUROLOGICAL SURGERY

## 2023-12-01 PROCEDURE — 250N000009 HC RX 250: Performed by: NEUROLOGICAL SURGERY

## 2023-12-01 DEVICE — IMP PLATE SYN BURR HOLE COVER 17MM 04.503.023: Type: IMPLANTABLE DEVICE | Site: CRANIAL | Status: FUNCTIONAL

## 2023-12-01 DEVICE — IMP PLATE BURR HOLE COVER  MATRIXNEURO SHUNT 17MM 04.503.028: Type: IMPLANTABLE DEVICE | Site: CRANIAL | Status: FUNCTIONAL

## 2023-12-01 DEVICE — IMP SCR SYN MATRIX LOW PRO 1.5X04MM SELF DRILL 04.503.104.01: Type: IMPLANTABLE DEVICE | Site: CRANIAL | Status: FUNCTIONAL

## 2023-12-01 RX ORDER — SODIUM CHLORIDE, SODIUM LACTATE, POTASSIUM CHLORIDE, CALCIUM CHLORIDE 600; 310; 30; 20 MG/100ML; MG/100ML; MG/100ML; MG/100ML
INJECTION, SOLUTION INTRAVENOUS CONTINUOUS
Status: DISCONTINUED | OUTPATIENT
Start: 2023-12-01 | End: 2023-12-01 | Stop reason: HOSPADM

## 2023-12-01 RX ORDER — ONDANSETRON 2 MG/ML
4 INJECTION INTRAMUSCULAR; INTRAVENOUS EVERY 6 HOURS PRN
Status: DISCONTINUED | OUTPATIENT
Start: 2023-12-01 | End: 2023-12-01 | Stop reason: DRUGHIGH

## 2023-12-01 RX ORDER — AMOXICILLIN 250 MG
1 CAPSULE ORAL 2 TIMES DAILY
Status: DISCONTINUED | OUTPATIENT
Start: 2023-12-01 | End: 2023-12-04 | Stop reason: HOSPADM

## 2023-12-01 RX ORDER — OXYCODONE HYDROCHLORIDE 5 MG/1
10 TABLET ORAL EVERY 4 HOURS PRN
Status: DISCONTINUED | OUTPATIENT
Start: 2023-12-01 | End: 2023-12-04 | Stop reason: HOSPADM

## 2023-12-01 RX ORDER — CEFAZOLIN SODIUM/WATER 2 G/20 ML
2 SYRINGE (ML) INTRAVENOUS
Status: COMPLETED | OUTPATIENT
Start: 2023-12-01 | End: 2023-12-01

## 2023-12-01 RX ORDER — ONDANSETRON 4 MG/1
4 TABLET, ORALLY DISINTEGRATING ORAL EVERY 6 HOURS PRN
Status: DISCONTINUED | OUTPATIENT
Start: 2023-12-01 | End: 2023-12-01 | Stop reason: DRUGHIGH

## 2023-12-01 RX ORDER — NALOXONE HYDROCHLORIDE 0.4 MG/ML
0.2 INJECTION, SOLUTION INTRAMUSCULAR; INTRAVENOUS; SUBCUTANEOUS
Status: DISCONTINUED | OUTPATIENT
Start: 2023-12-01 | End: 2023-12-04 | Stop reason: HOSPADM

## 2023-12-01 RX ORDER — HYDROMORPHONE HYDROCHLORIDE 1 MG/ML
1 SOLUTION ORAL EVERY 4 HOURS PRN
Status: DISCONTINUED | OUTPATIENT
Start: 2023-12-01 | End: 2023-12-01 | Stop reason: ALTCHOICE

## 2023-12-01 RX ORDER — POLYETHYLENE GLYCOL 3350 17 G/17G
17 POWDER, FOR SOLUTION ORAL DAILY
Status: DISCONTINUED | OUTPATIENT
Start: 2023-12-02 | End: 2023-12-04 | Stop reason: HOSPADM

## 2023-12-01 RX ORDER — BUPIVACAINE HYDROCHLORIDE 2.5 MG/ML
INJECTION, SOLUTION EPIDURAL; INFILTRATION; INTRACAUDAL PRN
Status: DISCONTINUED | OUTPATIENT
Start: 2023-12-01 | End: 2023-12-01 | Stop reason: HOSPADM

## 2023-12-01 RX ORDER — ACETAMINOPHEN 325 MG/1
650 TABLET ORAL EVERY 4 HOURS PRN
Status: DISCONTINUED | OUTPATIENT
Start: 2023-12-04 | End: 2023-12-04 | Stop reason: HOSPADM

## 2023-12-01 RX ORDER — ACETAMINOPHEN 325 MG/1
975 TABLET ORAL EVERY 8 HOURS
Qty: 27 TABLET | Refills: 0 | Status: COMPLETED | OUTPATIENT
Start: 2023-12-01 | End: 2023-12-04

## 2023-12-01 RX ORDER — HYDROMORPHONE HCL IN WATER/PF 6 MG/30 ML
0.4 PATIENT CONTROLLED ANALGESIA SYRINGE INTRAVENOUS
Status: DISCONTINUED | OUTPATIENT
Start: 2023-12-01 | End: 2023-12-04 | Stop reason: HOSPADM

## 2023-12-01 RX ORDER — SODIUM CHLORIDE 9 MG/ML
INJECTION, SOLUTION INTRAVENOUS CONTINUOUS
Status: DISCONTINUED | OUTPATIENT
Start: 2023-12-01 | End: 2023-12-04 | Stop reason: HOSPADM

## 2023-12-01 RX ORDER — LIDOCAINE HYDROCHLORIDE 20 MG/ML
INJECTION, SOLUTION INFILTRATION; PERINEURAL PRN
Status: DISCONTINUED | OUTPATIENT
Start: 2023-12-01 | End: 2023-12-01

## 2023-12-01 RX ORDER — ONDANSETRON 2 MG/ML
INJECTION INTRAMUSCULAR; INTRAVENOUS PRN
Status: DISCONTINUED | OUTPATIENT
Start: 2023-12-01 | End: 2023-12-01

## 2023-12-01 RX ORDER — FENTANYL CITRATE 50 UG/ML
INJECTION, SOLUTION INTRAMUSCULAR; INTRAVENOUS PRN
Status: DISCONTINUED | OUTPATIENT
Start: 2023-12-01 | End: 2023-12-01

## 2023-12-01 RX ORDER — HYDROXYZINE HYDROCHLORIDE 25 MG/1
25 TABLET, FILM COATED ORAL EVERY 6 HOURS PRN
Status: DISCONTINUED | OUTPATIENT
Start: 2023-12-01 | End: 2023-12-04 | Stop reason: HOSPADM

## 2023-12-01 RX ORDER — PROCHLORPERAZINE MALEATE 10 MG
10 TABLET ORAL EVERY 6 HOURS PRN
Status: DISCONTINUED | OUTPATIENT
Start: 2023-12-01 | End: 2023-12-01 | Stop reason: DRUGHIGH

## 2023-12-01 RX ORDER — HYDROMORPHONE HCL IN WATER/PF 6 MG/30 ML
0.4 PATIENT CONTROLLED ANALGESIA SYRINGE INTRAVENOUS EVERY 5 MIN PRN
Status: DISCONTINUED | OUTPATIENT
Start: 2023-12-01 | End: 2023-12-01 | Stop reason: HOSPADM

## 2023-12-01 RX ORDER — PROPOFOL 10 MG/ML
INJECTION, EMULSION INTRAVENOUS PRN
Status: DISCONTINUED | OUTPATIENT
Start: 2023-12-01 | End: 2023-12-01

## 2023-12-01 RX ORDER — FENTANYL CITRATE 0.05 MG/ML
50 INJECTION, SOLUTION INTRAMUSCULAR; INTRAVENOUS EVERY 5 MIN PRN
Status: DISCONTINUED | OUTPATIENT
Start: 2023-12-01 | End: 2023-12-01 | Stop reason: HOSPADM

## 2023-12-01 RX ORDER — LABETALOL HYDROCHLORIDE 5 MG/ML
10 INJECTION, SOLUTION INTRAVENOUS
Status: COMPLETED | OUTPATIENT
Start: 2023-12-01 | End: 2023-12-01

## 2023-12-01 RX ORDER — METHOCARBAMOL 500 MG/1
500 TABLET, FILM COATED ORAL EVERY 6 HOURS PRN
Status: DISCONTINUED | OUTPATIENT
Start: 2023-12-01 | End: 2023-12-04 | Stop reason: HOSPADM

## 2023-12-01 RX ORDER — CALCIUM CARBONATE 500 MG/1
1000 TABLET, CHEWABLE ORAL 4 TIMES DAILY PRN
Status: DISCONTINUED | OUTPATIENT
Start: 2023-12-01 | End: 2023-12-04 | Stop reason: HOSPADM

## 2023-12-01 RX ORDER — HYDROMORPHONE HCL IN WATER/PF 6 MG/30 ML
0.2 PATIENT CONTROLLED ANALGESIA SYRINGE INTRAVENOUS
Status: DISCONTINUED | OUTPATIENT
Start: 2023-12-01 | End: 2023-12-04 | Stop reason: HOSPADM

## 2023-12-01 RX ORDER — LABETALOL HYDROCHLORIDE 5 MG/ML
10-40 INJECTION, SOLUTION INTRAVENOUS EVERY 10 MIN PRN
Status: DISCONTINUED | OUTPATIENT
Start: 2023-12-01 | End: 2023-12-04 | Stop reason: HOSPADM

## 2023-12-01 RX ORDER — HYDROMORPHONE HCL IN WATER/PF 6 MG/30 ML
0.2 PATIENT CONTROLLED ANALGESIA SYRINGE INTRAVENOUS EVERY 5 MIN PRN
Status: DISCONTINUED | OUTPATIENT
Start: 2023-12-01 | End: 2023-12-01 | Stop reason: HOSPADM

## 2023-12-01 RX ORDER — BISACODYL 10 MG
10 SUPPOSITORY, RECTAL RECTAL DAILY PRN
Status: DISCONTINUED | OUTPATIENT
Start: 2023-12-01 | End: 2023-12-04 | Stop reason: HOSPADM

## 2023-12-01 RX ORDER — HYDRALAZINE HYDROCHLORIDE 20 MG/ML
2.5-5 INJECTION INTRAMUSCULAR; INTRAVENOUS EVERY 10 MIN PRN
Status: DISCONTINUED | OUTPATIENT
Start: 2023-12-01 | End: 2023-12-01 | Stop reason: HOSPADM

## 2023-12-01 RX ORDER — DEXAMETHASONE SODIUM PHOSPHATE 4 MG/ML
INJECTION, SOLUTION INTRA-ARTICULAR; INTRALESIONAL; INTRAMUSCULAR; INTRAVENOUS; SOFT TISSUE PRN
Status: DISCONTINUED | OUTPATIENT
Start: 2023-12-01 | End: 2023-12-01

## 2023-12-01 RX ORDER — NALOXONE HYDROCHLORIDE 0.4 MG/ML
0.4 INJECTION, SOLUTION INTRAMUSCULAR; INTRAVENOUS; SUBCUTANEOUS
Status: DISCONTINUED | OUTPATIENT
Start: 2023-12-01 | End: 2023-12-04 | Stop reason: HOSPADM

## 2023-12-01 RX ORDER — CEFAZOLIN SODIUM/WATER 2 G/20 ML
2 SYRINGE (ML) INTRAVENOUS SEE ADMIN INSTRUCTIONS
Status: DISCONTINUED | OUTPATIENT
Start: 2023-12-01 | End: 2023-12-01 | Stop reason: HOSPADM

## 2023-12-01 RX ORDER — HYDRALAZINE HYDROCHLORIDE 20 MG/ML
10-20 INJECTION INTRAMUSCULAR; INTRAVENOUS EVERY 30 MIN PRN
Status: DISCONTINUED | OUTPATIENT
Start: 2023-12-01 | End: 2023-12-02

## 2023-12-01 RX ORDER — MAGNESIUM HYDROXIDE 1200 MG/15ML
LIQUID ORAL PRN
Status: DISCONTINUED | OUTPATIENT
Start: 2023-12-01 | End: 2023-12-01 | Stop reason: HOSPADM

## 2023-12-01 RX ORDER — FENTANYL CITRATE 0.05 MG/ML
25 INJECTION, SOLUTION INTRAMUSCULAR; INTRAVENOUS EVERY 5 MIN PRN
Status: DISCONTINUED | OUTPATIENT
Start: 2023-12-01 | End: 2023-12-01 | Stop reason: HOSPADM

## 2023-12-01 RX ORDER — LIDOCAINE 40 MG/G
CREAM TOPICAL
Status: DISCONTINUED | OUTPATIENT
Start: 2023-12-01 | End: 2023-12-04 | Stop reason: HOSPADM

## 2023-12-01 RX ORDER — OXYCODONE HYDROCHLORIDE 5 MG/1
5 TABLET ORAL EVERY 4 HOURS PRN
Status: DISCONTINUED | OUTPATIENT
Start: 2023-12-01 | End: 2023-12-04 | Stop reason: HOSPADM

## 2023-12-01 RX ORDER — ONDANSETRON 2 MG/ML
4 INJECTION INTRAMUSCULAR; INTRAVENOUS EVERY 30 MIN PRN
Status: DISCONTINUED | OUTPATIENT
Start: 2023-12-01 | End: 2023-12-01 | Stop reason: HOSPADM

## 2023-12-01 RX ORDER — ONDANSETRON 4 MG/1
4 TABLET, ORALLY DISINTEGRATING ORAL EVERY 30 MIN PRN
Status: DISCONTINUED | OUTPATIENT
Start: 2023-12-01 | End: 2023-12-01 | Stop reason: HOSPADM

## 2023-12-01 RX ADMIN — LIDOCAINE HYDROCHLORIDE 100 MG: 20 INJECTION, SOLUTION INFILTRATION; PERINEURAL at 16:17

## 2023-12-01 RX ADMIN — SODIUM CHLORIDE: 9 INJECTION, SOLUTION INTRAVENOUS at 09:07

## 2023-12-01 RX ADMIN — CLONIDINE HYDROCHLORIDE 0.1 MG: 0.1 TABLET ORAL at 12:39

## 2023-12-01 RX ADMIN — LABETALOL HYDROCHLORIDE 10 MG: 5 INJECTION INTRAVENOUS at 18:14

## 2023-12-01 RX ADMIN — DOCUSATE SODIUM 100 MG: 100 CAPSULE, LIQUID FILLED ORAL at 20:00

## 2023-12-01 RX ADMIN — Medication 2 G: at 16:17

## 2023-12-01 RX ADMIN — GABAPENTIN 900 MG: 300 CAPSULE ORAL at 03:29

## 2023-12-01 RX ADMIN — SUGAMMADEX 200 MG: 100 INJECTION, SOLUTION INTRAVENOUS at 17:12

## 2023-12-01 RX ADMIN — DEXAMETHASONE SODIUM PHOSPHATE 4 MG: 4 INJECTION, SOLUTION INTRA-ARTICULAR; INTRALESIONAL; INTRAMUSCULAR; INTRAVENOUS; SOFT TISSUE at 16:39

## 2023-12-01 RX ADMIN — CLONIDINE HYDROCHLORIDE 0.1 MG: 0.1 TABLET ORAL at 20:01

## 2023-12-01 RX ADMIN — PROPOFOL 200 MG: 10 INJECTION, EMULSION INTRAVENOUS at 16:17

## 2023-12-01 RX ADMIN — PHENYLEPHRINE HYDROCHLORIDE 0.4 MCG/KG/MIN: 10 INJECTION INTRAVENOUS at 16:38

## 2023-12-01 RX ADMIN — CLONIDINE HYDROCHLORIDE 0.1 MG: 0.1 TABLET ORAL at 03:30

## 2023-12-01 RX ADMIN — LABETALOL HYDROCHLORIDE 20 MG: 5 INJECTION INTRAVENOUS at 18:44

## 2023-12-01 RX ADMIN — ROCURONIUM BROMIDE 20 MG: 50 INJECTION, SOLUTION INTRAVENOUS at 16:39

## 2023-12-01 RX ADMIN — FENTANYL CITRATE 50 MCG: 50 INJECTION INTRAMUSCULAR; INTRAVENOUS at 16:34

## 2023-12-01 RX ADMIN — OXYCODONE HYDROCHLORIDE 5 MG: 5 TABLET ORAL at 23:07

## 2023-12-01 RX ADMIN — ACETAMINOPHEN 650 MG: 325 TABLET, FILM COATED ORAL at 12:39

## 2023-12-01 RX ADMIN — ACETAMINOPHEN 650 MG: 325 TABLET, FILM COATED ORAL at 07:50

## 2023-12-01 RX ADMIN — ACETAMINOPHEN 650 MG: 325 TABLET, FILM COATED ORAL at 01:37

## 2023-12-01 RX ADMIN — ROCURONIUM BROMIDE 50 MG: 50 INJECTION, SOLUTION INTRAVENOUS at 16:17

## 2023-12-01 RX ADMIN — SODIUM CHLORIDE, POTASSIUM CHLORIDE, SODIUM LACTATE AND CALCIUM CHLORIDE: 600; 310; 30; 20 INJECTION, SOLUTION INTRAVENOUS at 15:52

## 2023-12-01 RX ADMIN — GABAPENTIN 900 MG: 300 CAPSULE ORAL at 11:27

## 2023-12-01 RX ADMIN — MULTIPLE VITAMINS W/ MINERALS TAB 1 TABLET: TAB at 07:45

## 2023-12-01 RX ADMIN — GABAPENTIN 900 MG: 300 CAPSULE ORAL at 18:52

## 2023-12-01 RX ADMIN — HYDROMORPHONE HYDROCHLORIDE 1 MG: 1 SOLUTION ORAL at 14:00

## 2023-12-01 RX ADMIN — FOLIC ACID 1 MG: 1 TABLET ORAL at 07:45

## 2023-12-01 RX ADMIN — MIDAZOLAM 2 MG: 1 INJECTION INTRAMUSCULAR; INTRAVENOUS at 16:07

## 2023-12-01 RX ADMIN — DOCUSATE SODIUM 50 MG AND SENNOSIDES 8.6 MG 1 TABLET: 8.6; 5 TABLET, FILM COATED ORAL at 20:00

## 2023-12-01 RX ADMIN — ACETAMINOPHEN 975 MG: 325 TABLET, FILM COATED ORAL at 19:56

## 2023-12-01 RX ADMIN — THIAMINE HCL TAB 100 MG 100 MG: 100 TAB at 07:45

## 2023-12-01 RX ADMIN — ONDANSETRON 4 MG: 2 INJECTION INTRAMUSCULAR; INTRAVENOUS at 16:57

## 2023-12-01 RX ADMIN — FENTANYL CITRATE 50 MCG: 50 INJECTION INTRAMUSCULAR; INTRAVENOUS at 16:10

## 2023-12-01 ASSESSMENT — ACTIVITIES OF DAILY LIVING (ADL)
ADLS_ACUITY_SCORE: 22
ADLS_ACUITY_SCORE: 23
ADLS_ACUITY_SCORE: 22
ADLS_ACUITY_SCORE: 22
ADLS_ACUITY_SCORE: 23
ADLS_ACUITY_SCORE: 22

## 2023-12-01 ASSESSMENT — LIFESTYLE VARIABLES: TOBACCO_USE: 1

## 2023-12-01 NOTE — PROGRESS NOTES
[facility]    Neurosurgery  Daily Note    Assessment & Plan   Mr. Shah is a 48-year-old male NOT anticoagulated with  history of COVID 2 weeks ago who presented 11/30/2023 for worsening headache, weakness, sinus congestion likely alcohol withdrawal and found to have left sided SDH.    Rpt imaging yesterday and today is stable.     AM ROUNDS: continues to have 9/10 headache that is not alleviated by anything. Denies associated vision changes, nausea, vomiting.     Platelets yesterday 351,000  INR 1.08     Narrative & Impression   EXAM: CT HEAD W/O CONTRAST  LOCATION: [facility]  DATE: 12/1/2023     INDICATION: SDH follow up  COMPARISON: 11/30/2023.  TECHNIQUE: Routine CT Head without IV contrast. Multiplanar reformats. Dose reduction techniques were used.     FINDINGS:  INTRACRANIAL CONTENTS: Left-sided holohemispheric subdural hematoma findings are redemonstrated with mixed density blood products with some acute blood products present. There is persistent midline shift of approximately 9-10 mm to the right. Sulcal   effacement on the left cerebral hemisphere is noted.     VISUALIZED ORBITS/SINUSES/MASTOIDS: No intraorbital abnormality. No paranasal sinus mucosal disease. No middle ear or mastoid effusion.     BONES/SOFT TISSUES: No acute calvarial fracture..                                                                      IMPRESSION:  1.  Stable left-sided holohemispheric subdural hematoma findings with stable approximately 9-10 mm of midline shift to the right.       Plan:  -Plan for left sided agata hole and SDH evacuation with Dr. Phoenix this afternoon, time pending. Reviewed indication, procedure, post op recovery time frame, activity restrictions, wound cares, follow up schedule with patient who was in agreement   -Pre op labs and orders are in   -Continue Npo  -Continue pain management   -PT/OT    Patient and RN updated  Dr. Phoenix in agreement     Lexi  June BARONE [facility]  [address]  [address]  [place] [address]    Tel [phone]  Pager [phone]    Principal Problem:    Subdural hematoma (H)  Active Problems:    Dehydration    Alcohol withdrawal syndrome without complication (H)    History of 2019 novel coronavirus disease (COVID-19)    New daily persistent headache    Tremor     Lexi Watkins PA-C    Interval History   Ongoing severe headache     Physical Exam   Temp: 97.7  F (36.5  C) Temp src: Oral BP: (!) 148/108 Pulse: 78   Resp: 18 SpO2: 97 % O2 Device: None (Room air)    There were no vitals filed for this visit.  Vital Signs with Ranges  Temp:  [97.7  F (36.5  C)-98.7  F (37.1  C)] 97.7  F (36.5  C)  Pulse:  [71-87] 78  Resp:  [15-18] 18  BP: ()/() 148/108  SpO2:  [95 %-99 %] 97 %  No intake/output data recorded.    Awake, alert, oriented to person and place, disoriented to time   Pupils react to light, full EOMs  II-XII grossly intact  Slight right pronator drift  Smooth FTN BL  Limited participation in strength exam. Appears symmetric in BUE and BLE       Medications    sodium chloride 75 mL/hr at 12/01/23 0907       cloNIDine  0.1 mg Oral Q8H    docusate sodium  100 mg Oral BID    folic acid  1 mg Oral Daily    [START ON 12/7/2023] gabapentin  100 mg Oral Q8H    [START ON 12/5/2023] gabapentin  300 mg Oral Q8H    [START ON 12/3/2023] gabapentin  600 mg Oral Q8H    gabapentin  900 mg Oral Q8H    multivitamin w/minerals  1 tablet Oral Daily    sodium chloride (PF)  3 mL Intracatheter Q8H    thiamine  100 mg Oral Daily       Plans discussed with Dr. Phoenix who was in agreement with plans    Lexi BARONE [facility]  [address]  [address]  [place] [address]    Tel [phone]  Pager [phone]

## 2023-12-01 NOTE — ANESTHESIA PREPROCEDURE EVALUATION
Anesthesia Pre-Procedure Evaluation    Patient: Joe Shah   MRN: [id number] : 1975        Procedure : Procedure(s):  CREATION, CRANIAL FANTASMA HOLE, WITH SUBDURAL HEMATOMA EVACUATION          Past Medical History:   Diagnosis Date    Anxiety     Back pain     Depressive disorder     Hypertension     Substance abuse (H)       Past Surgical History:   Procedure Laterality Date    ENT SURGERY      ESOPHAGOSCOPY, GASTROSCOPY, DUODENOSCOPY (EGD), COMBINED N/A 2019    Procedure: ESOPHAGOGASTRODUODENOSCOPY (EGD);  Surgeon: Scott Mcrae MD;  Location:  GI    ESOPHAGOSCOPY, GASTROSCOPY, DUODENOSCOPY (EGD), COMBINED N/A 2023    Procedure: Esophagoscopy, gastroscopy, duodenoscopy (EGD), combined;  Surgeon: Scott Mcrae MD;  Location:  GI    NECK SURGERY        Allergies   Allergen Reactions    Morphine Nausea and Vomiting      Social History     Tobacco Use    Smoking status: Former    Smokeless tobacco: Current     Types: Chew   Substance Use Topics    Alcohol use: Not Currently     Comment: denies ETOH use      Wt Readings from Last 1 Encounters:   23 77.9 kg (171 lb 11.8 oz)        Anesthesia Evaluation   Pt has had prior anesthetic.     No history of anesthetic complications       ROS/MED HX  ENT/Pulmonary:  - neg pulmonary ROS   (+)                tobacco use, Past use,                   (-) sleep apnea   Neurologic: Comment: Subdural hematoma      Cardiovascular:     (+)  hypertension- -   -  - -                                      METS/Exercise Tolerance:     Hematologic:       Musculoskeletal:       GI/Hepatic:     (+)           hepatitis  liver disease,       Renal/Genitourinary:       Endo:  - neg endo ROS  (-) Type II DM   Psychiatric/Substance Use:     (+) psychiatric history anxiety and depression alcohol abuse      Infectious Disease:       Malignancy:       Other:            Physical Exam    Airway        Mallampati: II   TM distance: > 3 FB   Neck ROM: full  "  Mouth opening: > 3 cm    Respiratory Devices and Support         Dental       (+) Minor Abnormalities - some fillings, tiny chips      Cardiovascular   cardiovascular exam normal          Pulmonary   pulmonary exam normal                OUTSIDE LABS:  CBC:   Lab Results   Component Value Date    WBC 7.8 11/30/2023    WBC 9.5 11/29/2023    HGB 12.1 (L) 11/30/2023    HGB 15.0 11/29/2023    HCT 37.1 (L) 11/30/2023    HCT 43.9 11/29/2023     11/30/2023     (H) 11/29/2023     BMP:   Lab Results   Component Value Date     12/01/2023     (L) 11/30/2023    POTASSIUM 3.9 12/01/2023    POTASSIUM 4.0 11/30/2023    CHLORIDE 106 12/01/2023    CHLORIDE 99 11/30/2023    CO2 23 12/01/2023    CO2 23 11/30/2023    BUN 6.7 12/01/2023    BUN 9.7 11/30/2023    CR 0.75 12/01/2023    CR 0.76 11/30/2023     (H) 12/01/2023    GLC 95 11/30/2023     COAGS:   Lab Results   Component Value Date    PTT 32 12/01/2023    INR 1.08 11/30/2023     POC: No results found for: \"BGM\", \"HCG\", \"HCGS\"  HEPATIC:   Lab Results   Component Value Date    ALBUMIN 3.5 11/30/2023    PROTTOTAL 6.4 11/30/2023    ALT 36 11/30/2023    AST 45 11/30/2023    ALKPHOS 74 11/30/2023    BILITOTAL 1.7 (H) 11/30/2023     OTHER:   Lab Results   Component Value Date    LACT 0.8 05/26/2023    ISAIAS 8.6 12/01/2023    PHOS 3.5 11/29/2023    MAG 1.6 (L) 11/29/2023    LIPASE 64 (H) 05/26/2023    TSH 1.37 05/08/2021    CRP <2.9 11/27/2022    SED 7 11/30/2023       Anesthesia Plan    ASA Status:  3    NPO Status:  NPO Appropriate    Anesthesia Type: General.     - Airway: ETT   Induction: Intravenous, Propofol.   Maintenance: Balanced.        Consents    Anesthesia Plan(s) and associated risks, benefits, and realistic alternatives discussed. Questions answered and patient/representative(s) expressed understanding.     - Discussed:     - Discussed with:  Patient            Postoperative Care    Pain management: IV analgesics.   PONV prophylaxis: " Ondansetron (or other 5HT-3), Background Propofol Infusion     Comments:               Ronni Grayson MD    I have reviewed the pertinent notes and labs in the chart from the past 30 days and (re)examined the patient.  Any updates or changes from those notes are reflected in this note.

## 2023-12-01 NOTE — PROGRESS NOTES
"[facility]    Medicine Progress Note - Hospitalist Service    Date of Admission:  11/29/2023    Assessment & Plan       Principal Problem:      Subdural hematoma (H)       presented with new daily headache, affecting oral intake and ADLs.   He thought he may be in EtOH w/d so came to ER    Imaging MRI) showed:       1.  Recent subacute subdural hemorrhage surrounding the left cerebral  hemisphere measuring up to 1.5 cm in thickness and causing 9 mm of  rightward shift in the midline structures.  2.  Similar small volume subacute subdural hemorrhage along the  undersurface of the right temporal lobe.    Neurosurgery managing:  Appreciate input.   Surgery this afternoon (12/1/23)   post op orders  (pain, activity, wound care, VTE prophyllaxis.) per neurosurgery   Pain:  will rx IV dilaudid prn for now.    He is good candidate for surgery.  Prior to admission works a very active job without any limitations.      =====================================  Active Problems:      Dehydration    poor po pta.   IVF  -- NPO for surgery later today.           Alcohol withdrawal syndrome without complication (H)    he presented feeling he might be in w/d     Likely he knew \"something was wrong\"  No w/d symptoms noted, watchful waiting .           History of 2019 novel coronavirus disease (COVID-19)     2-3 weeks ago.  Was weak during illness and fell down stairs, did not strike head be recalls being stunned for a couple minutes after it happened.           Tremor     likely has some ET.  He feels this started around the time of his back surgery.            Observation Goals: -diagnostic tests and consults completed and resulted, -vital signs normal or at patient baseline, -tolerating oral intake to maintain hydration, -safe disposition plan has been identified, Chem dep consult, Nurse to notify provider when observation goals have been met and patient is ready for discharge.  Diet: NPO per Anesthesia " Guidelines for Procedure/Surgery Except for: Meds    DVT Prophylaxis: Pneumatic Compression Devices  per neurosurgery  Power Catheter: Not present  Lines: None     Cardiac Monitoring: None  Code Status: Full Code      Clinically Significant Risk Factors Present on Admission          # Hyponatremia: Lowest Na = 129 mmol/L in last 2 days, will monitor as appropriate  # Hypocalcemia: Lowest Ca = 8.3 mg/dL in last 2 days, will monitor and replace as appropriate   # Hypomagnesemia: Lowest Mg = 1.6 mg/dL in last 2 days, will replace as needed  # Anion Gap Metabolic Acidosis: Highest Anion Gap = 20 mmol/L in last 2 days, will monitor and treat as appropriate      # Hypertension: Noted on problem list                 Disposition Plan      Expected Discharge Date: 12/02/2023                    Modesta King MD  Hospitalist Service  [facility]  Securely message with Veeip (more info)  Text page via [employer] Paging/Directory   ______________________________________________________________________    Interval History   Headache pain hard to control     Physical Exam   Vital Signs: Temp: 97.7  F (36.5  C) Temp src: Oral BP: (!) 148/108 Pulse: 78   Resp: 18 SpO2: 97 % O2 Device: None (Room air)    Weight: 0 lbs 0 oz    Constitutional: awake, alert, cooperative,  mild distress 2/2 pain, and appears stated age  ENT: Normocephalic, without obvious abnormality, atraumatic, sinuses nontender on palpation, external ears without lesions, oral pharynx with moist mucous membranes, tonsils without erythema or exudates, gums normal and good dentition.  Respiratory: No increased work of breathing, good air exchange, clear to auscultation bilaterally, no crackles or wheezing  Cardiovascular: Normal apical impulse, regular rate and rhythm, normal S1 and S2, no S3 or S4, and no murmur noted  Musculoskeletal: well developed,   no lower extremity pitting edema present  Neurologic: Awake, alert, oriented to name,  place and time.  Cranial nerves II-XII are grossly intact.  Normal speech .   Neuropsychiatric: General: normal, calm, and normal eye contact  Level of consciousness: alert / normal  Affect: normal and pleasant  Orientation: oriented to self, place, time and situation  Memory and insight: normal, memory for past and recent events intact, and thought process normal    Medical Decision Making        35 MINUTES SPENT BY ME on the date of service doing chart review, history, exam, documentation & further activities per the note.  Tests ORDERED & REVIEWED in the past 24 hours:  Tests ORDERED in the past 24 hours:   Tests REVIEWED in the past 24 hours:  - BMP  - CMP  - CRP  - sed rate       Data     I have personally reviewed the following data over the past 24 hrs:    N/A  \   N/A   / N/A     N/A N/A N/A /  N/A   N/A N/A N/A \     ALT: N/A AST: N/A AP: N/A TBILI: N/A   ALB: N/A TOT PROTEIN: N/A LIPASE: N/A     Procal: N/A CRP: 35.63 (H) Lactic Acid: N/A       INR:  1.08 PTT:  N/A   D-dimer:  N/A Fibrinogen:  N/A       Imaging results reviewed over the past 24 hrs:   Recent Results (from the past 24 hour(s))   CT Sinus w/o Contrast    Narrative    CT SCAN OF THE PARANASAL SINUSES AND FACE  11/30/2023 2:59 PM     HISTORY: new sinus headache, severe    TECHNIQUE: Noncontrast axial scans of the sinuses with coronal and  sagittal reformations were completed. Radiation dose for this scan was  reduced using automated exposure control, adjustment of the mA and/or  kV according to patient size, or iterative reconstruction technique.      COMPARISON: None.    FINDINGS:   Frontal sinuses: Trace mucosal thickening in the inferior aspects of  the frontal sinuses. The frontal sinus drainage pathways appear  patent.     Ethmoid sinuses: Trace scattered mucosal thickening.     Right maxillary sinus: Moderate mucosal thickening in the alveolar  recess. Minimal periapical lucency about a few maxillary tooth roots  with focal dehiscence  into the maxillary sinus (series 5, image 37),  suggestive of at least partial dental origin of maxillary sinus  disease. The ostiomeatal unit is normal with a patent infundibulum.  Small linear lucency through the lateral aspect of the right maxillary  sinus (series 2, image 4) appears to reflect a nutrient channel.    Left maxillary sinus: Trace mucosal thickening. The ostiomeatal unit  is normal with a patent infundibulum.     Sphenoid sinus: Trace mucosal thickening. The sphenoid sinus ostia and  sphenoethmoidal recesses are clear.     Nasal septum: No significant deviation. Small right apex spur  inferiorly.     Turbinates and nasal cavity: No nasal mucosal thickening. The  turbinates are unremarkable.     Laminae papyracea and cribriform plate: Normal.    Brain: Incompletely visualized mixed density left convexity subdural  hematoma measuring up to 19 mm in thickness. This results in mass  effect on the left hemisphere and minimal rightward midline shift.  Partial effacement of the left lateral ventricle.      Impression    IMPRESSION:   1. Mixed density likely acute on chronic left convexity subdural  hematoma with mass effect on the left hemisphere and rightward midline  shift. MRI was in progress at the time of this dictation.  2. Moderate right maxillary sinus mucosal thickening, likely at least  of partial dental origin.  3. Otherwise, no significant paranasal sinus mucosal thickening.    Findings were discussed with Dr. Modesta Chungover the [facility] secure chat  at 3:37 PM on 11/30/2023.    SHAUN AGUSTIN MD         SYSTEM ID:  [id number]   MR Brain w/o Contrast    Narrative    MR BRAIN WITHOUT CONTRAST 11/30/2023 3:30 PM    INDICATION: New headache (left frontal/parietal).  TECHNIQUE: Noncontrast MRI of the brain.  CONTRAST: None  COMPARISON: Sinus CT 11/30/2023    FINDINGS:  There is no restricted diffusion. There is a FLAIR and T1  hyperintense subdural hemorrhage along the entirety of the left  cerebral  hemisphere measuring up to approximately 1.5 cm in maximal  thickness. This causes 9 mm of rightward shift of the midline  structures at the level of the third ventricle and broad sulcal  effacement in the left cerebral hemisphere. There is a small volume  similar subacute subdural hematoma on the right along the undersurface  of the temporal lobe. Mild mucosal thickening in the sinuses. Mastoid  air cells appear free from significant disease. Intraorbital contents  are unremarkable. No hydrocephalus. Intracranial flow voids are  intact. Signal intensity of the brain parenchyma is within normal  limits for the patient's age.      Impression    IMPRESSION:   1.  Recent subacute subdural hemorrhage surrounding the left cerebral  hemisphere measuring up to 1.5 cm in thickness and causing 9 mm of  rightward shift in the midline structures.  2.  Similar small volume subacute subdural hemorrhage along the  undersurface of the right temporal lobe.  3.  No hydrocephalus or infarct.    EVAN SWEET MD         SYSTEM ID:  [id number]   CT Head w/o Contrast    Narrative    EXAM: CT HEAD W/O CONTRAST  LOCATION: [facility]  DATE: 12/1/2023    INDICATION: SDH follow up  COMPARISON: 11/30/2023.  TECHNIQUE: Routine CT Head without IV contrast. Multiplanar reformats. Dose reduction techniques were used.    FINDINGS:  INTRACRANIAL CONTENTS: Left-sided holohemispheric subdural hematoma findings are redemonstrated with mixed density blood products with some acute blood products present. There is persistent midline shift of approximately 9-10 mm to the right. Sulcal   effacement on the left cerebral hemisphere is noted.    VISUALIZED ORBITS/SINUSES/MASTOIDS: No intraorbital abnormality. No paranasal sinus mucosal disease. No middle ear or mastoid effusion.    BONES/SOFT TISSUES: No acute calvarial fracture..      Impression    IMPRESSION:  1.  Stable left-sided holohemispheric subdural hematoma findings  with stable approximately 9-10 mm of midline shift to the right.

## 2023-12-01 NOTE — PLAN OF CARE
Pt here with SDH, going for agata hole procedure this afternoon, schedule time pending. A&Ox4. Neuros intact ex tremors BUE (pt says has had since back surgery 2022) and HA. VSS.  Pt NPO for procedure. Takes pills whole. Up with SBA. Pt c/o 9/10 HA pain, PRN dilaudid given. CIWAA scoring 6 for tremors and HA. Pt scoring green on the Aggression Stop Light Tool. Plan [place] hole procedure this afternoon. Discharge plan pending.

## 2023-12-01 NOTE — PROGRESS NOTES
"BRIEF NUTRITION ASSESSMENT    REASON FOR ASSESSMENT:  Joe Shah is a 48 year old male seen by Registered Dietitian for Admission Nutrition Risk Screen for answering \"yes\" to recently losing weight without trying (2-13#) and \"yes\" to recently eating poorly d/t a decrease in appetite.    NUTRITION HISTORY:  - Spoke with patient at bedside. Per patient, he hasn't noticed a decrease in appetite or any weight loss recently. He said he usually eats around 2-3 meals per day (doesn't eat breakfast all the time). If he does have breakfast it is usually a protein shake. He said he would be willing to try a Glucerna once his diet gets advanced for an extra boost of protein. He had no questions or concerns for writer at this time.    CURRENT DIET AND INTAKE:  Diet:  NPO for procedure 12/1        Regular Diet 11/29 - 11/30      - Ordered 3 meals yesterday per [facility]    ANTHROPOMETRICS:  Height: 5'11\"  Weight:  81.6 kg (180 lb) - May 26th, 2023  BMI: 25.1  Weight Status: Overweight BMI 25-29.9  IBW:  78.1 kg  %IBW: 104%  Weight History:   Wt Readings from Last 10 Encounters:   05/26/23 81.6 kg (180 lb)   11/27/22 85.3 kg (188 lb)   09/18/22 85.5 kg (188 lb 7.9 oz)   05/08/21 83.2 kg (183 lb 6.8 oz)   05/01/20 83.9 kg (185 lb)   03/14/20 83.9 kg (185 lb)   03/01/20 81.6 kg (180 lb)   02/03/20 83.6 kg (184 lb 3.2 oz)   09/11/19 81.6 kg (180 lb)   02/12/19 85 kg (187 lb 6.3 oz)     LABS:  Labs noted    MALNUTRITION:  Patient does not meet two of the following criteria necessary for diagnosing malnutrition.     % Weight Loss:  Unable to determine, limited data  % Intake:  No decreased intake noted per patient  Subcutaneous Fat Loss:  None observed  Muscle Loss:  None observed  Fluid Retention:  None noted    NUTRITION INTERVENTION:  Nutrition Diagnosis:  No nutrition diagnosis at this time.    Implementation:  Nutrition Education:  Per Provider order if indicated  Ordered updated weight    FOLLOW UP/MONITORING:   Will " re-evaluate in 7 - 10 days, or sooner, if re-consulted.    Clari Goode RD, LD  Clinical Dietitian - [facility]

## 2023-12-01 NOTE — PROGRESS NOTES
Post op note    POD0 s/p CREATION, CRANIAL FANTASMA HOLE, WITH SUBDURAL HEMATOMA EVACUATION Left with Dr. Phoenix     PLAN  ICU  HOB30  SBP<140  Neuro and vital checks as ordered  Head CT in AM and sooner should concerns arise  Pain management   Wound cares  Monitor KATERYNA output   PT/OT   Medical cares per hospitalist     Plans reviewed with Dr. Alban BARONE [facility]  [address]  [address] [place] [address]    Tel [phone]  Pager [phone]

## 2023-12-01 NOTE — PLAN OF CARE
Orientation: A/Ox4  Activity: SBA with GB  Diet/BS Checks: Reg diet-good appetite.  Tele:  N/A  IV Access/Drains: LPIV infusing NS @ 100 mL/hr.  Pain Management: Tylenol - headaches  Abnormal VS/Results: VSS on RA  Bowel/Bladder: Continent B/B. No BM this shift.  Skin/Wounds: None  Consults: Chem dependency, Neuro  D/C Disposition: Pending  Other Info: Pt went down to CT and MRI this shift-see results. Ciwa's preformed Q 4hr- scores 6-7. Slight hand tremors noted. Nuero checks Q 4hr.

## 2023-12-01 NOTE — UTILIZATION REVIEW
"Admission Status; Secondary Review Determination     Under the authority of the Utilization Management Commitee, the utilization review process indicated a secondary review on the above patient. The review outcome is based on review of the medical records, discussions with staff, and applying clinical experience noted on the date of the review.     (x) Inpatient Status Appropriate - This patient's medical care is consistent with medical management for inpatient care and reasonable inpatient medical practice.     RATIONALE FOR DETERMINATION:   Per neurosurgery consult note: \"48-year-old male NOT anticoagulated with  history of COVID 2 weeks ago who presented 11/30/2023 for worsening headache, weakness, sinus congestion likely alcohol withdrawal and found to have left sided SDH.\"    Patient is complaining of ongoing severe headache.    Vital signs notable for initial hypotension, now has moderate hypertension.    Labs unremarkable.    Brain MRI shows recent subacute subdural hemorrhage surrounding the left cerebral hemisphere measuring 1.5 cm in thickness and causing 9 mm of rightward shift.  Repeat head CT scan showed stable subdural hematoma    Neurosurgery is planning on taking the patient to the OR for operative management of subdural hematoma with agata hole placement    with the patient's finding of subdural hematoma with midline shift and ongoing symptomatic headache, with plans for agata hole placement by neurosurgery, given complexity and intensity of care inpatient status is recommended    Dr. King was notified of this recommendation via text message through [facility] today    At the time of admission with the information available to the attending physician more than 2 nights Hospital complex care was anticipated, based on patient risk of adverse outcome if treated as outpatient and complex care required. Inpatient admission is appropriate based on the Medicare guidelines.    The information on this " document is developed by the utilization review team in order for the business office to ensure compliance. This only denotes the appropriateness of proper admission status and does not reflect the quality of care rendered.   The definitions of Inpatient Status and Observation Status used in making the determination above are those provided in the CMS Coverage Manual, Chapter 1 and Chapter 6, section 70.4.     Sincerely,     Ezra Wolff MD  Utilization Review   Physician Advisor   [facility]

## 2023-12-01 NOTE — ANESTHESIA CARE TRANSFER NOTE
Patient: Joe Shah    Procedure: Procedure(s):  CREATION, CRANIAL FANTASMA HOLE, WITH SUBDURAL HEMATOMA EVACUATION       Diagnosis: Subdural hematoma (H) [S06.5XAA]  Diagnosis Additional Information: No value filed.    Anesthesia Type:   General     Note:    Oropharynx: oropharynx clear of all foreign objects and spontaneously breathing  Level of Consciousness: awake  Oxygen Supplementation: face mask  Level of Supplemental Oxygen (L/min / FiO2): 6  Independent Airway: airway patency satisfactory and stable  Dentition: dentition unchanged  Vital Signs Stable: post-procedure vital signs reviewed and stable  Report to RN Given: handoff report given  Patient transferred to: PACU  Comments: Neuromuscular blockade reversed with sugammadex, spontaneous respirations, adequate tidal volumes, followed commands to voice, oropharynx suctioned with soft flexible catheter, extubated atraumatically, extubated with suction, airway patent after extubation.  Oxygen via facemask at 6 liters per minute to PACU. Oxygen tubing connected to wall O2 in PACU, SpO2, NiBP, and EKG monitors and alarms on and functioning, Teddy Hugger warmer connected to patient gown, report on patient's clinical status given to PACU RN, RN questions answered.         Handoff Report: Identifed the Patient, Identified the Reponsible Provider, Reviewed the pertinent medical history, Discussed the surgical course, Reviewed Intra-OP anesthesia mangement and issues during anesthesia, Set expectations for post-procedure period and Allowed opportunity for questions and acknowledgement of understanding      Vitals:  Vitals Value Taken Time   /92 12/01/23 1722   Temp     Pulse 69 12/01/23 1725   Resp 15 12/01/23 1725   SpO2 100 % 12/01/23 1725   Vitals shown include unfiled device data.    Electronically Signed By: Eladia Romero  December 1, 2023  5:26 PM

## 2023-12-01 NOTE — OP NOTE
[facility]   Operative Note    Pre-operative diagnosis: Subdural hematoma (H) [S06.5XAA], with brain compression   Post-operative diagnosis Same   Procedure: Procedure(s):  CREATION, CRANIAL FANTASMA HOLE, WITH SUBDURAL HEMATOMA EVACUATION Left   Surgeon(s): Surgeon(s) and Role:     * Gio Phoenix MD - Primary     * Lexi Watkins PA-C - Assisting   Estimated blood loss: * No values recorded between 12/1/2023  4:34 PM and 12/1/2023  4:59 PM * 25ml   Specimens: * No specimens in log *   Findings: Chronic subdural under pressure.  10 Peruvian round KATERYNA placed in subdural space         Indications: Patient is a 48-year-old male with a left-sided chronic subdural hematoma with brain compression with symptomatic headaches and midline shift.  He is brought for evacuation of the chronic subdural hematoma. Please note that Lexi Watkins PA-C's assistance was needed for positioning, retraction, suctioning, and closure.      Description of procedure: Patient was brought to the operating room.  General endotracheal anesthesia was induced.  Patient was positioned supine in the Ro headrest with the head turned to the right.  The left side of the head was prepped and draped in sterile fashion and 2 bur holes were planned 1 in the frontal and 1 in the parietal region.  The scalp was opened and the skull exposed.  The  was used to create bur holes each site.  The dura was then coagulated and opened with immediate return of dark subdural fluid consistent with chronic subdural hematoma.  The subdural space was then copiously irrigated until the drainage was clear.  A 10 [place]uvian round KATERYNA drain was placed in the frontal bur hole.  Gelfoam was placed over both bur holes and the Synthes low-profile cranial plating system was used to cover the bur holes.  Galea was closed with  2-0 Vicryl.  Staples were used for skin.  The drain was secured with a drain stitch and a leeroy stitch.  Patient was  then awakened extubated and taken to the recovery room in good condition.

## 2023-12-01 NOTE — PLAN OF CARE
Grace Ocampo, RN on 12/1/2023 at 6:00 AM    Reason for Admission: body aches and alcohol withdrawal. Doesn't remember falling. CT showed SDH with 1.5 cm midline shift.   Cognitive/Mentation: A/Ox 4  Neuros/CMS: Intact ex CIWA   VS: VSS on RA. BP parameters < 150  GI: BS active, passing flatus, last BM 11/29/23. Continent.  : Continent.  Pulmonary: LS clear.  Pain: HA, tylenol given.   Drains/Lines: L PIV SL  Skin: intact  Activity: Standby assist  Diet: NPO @ 0000. Takes pills whole.   Therapies recs: pending  Discharge: pending  Aggression Stoplight Tool: green  End of shift summary: CIWA q 4 (look at order).

## 2023-12-01 NOTE — ANESTHESIA PROCEDURE NOTES
Airway       Patient location during procedure: OR       Procedure Start/Stop Times: 12/1/2023 4:19 PM  Staff -        Anesthesiologist:  Ronni Grayson MD       CRNA: Eladia Romero       Performed By: CRNA  Consent for Airway        Urgency: elective  Indications and Patient Condition       Indications for airway management: elmira-procedural       Induction type:intravenous       Mask difficulty assessment: 2 - vent by mask + OA or adjuvant +/- NMBA    Final Airway Details       Final airway type: endotracheal airway       Successful airway: ETT - single and Oral  Endotracheal Airway Details        ETT size (mm): 8.0       Cuffed: yes       Successful intubation technique: direct laryngoscopy       DL Blade Type: MAC 3       Grade View of Cords: 1       Adjucts: stylet       Position: Right       Measured from: gums/teeth       Secured at (cm): 21       Bite block used: None    Post intubation assessment        Placement verified by: capnometry, equal breath sounds and chest rise        Number of attempts at approach: 1       Number of other approaches attempted: 0       Secured with: tape       Ease of procedure: easy       Dentition: Intact and Unchanged    Medication(s) Administered   Medication Administration Time: 12/1/2023 4:19 PM

## 2023-12-02 ENCOUNTER — APPOINTMENT (OUTPATIENT)
Dept: PHYSICAL THERAPY | Facility: CLINIC | Age: 48
DRG: 025 | End: 2023-12-02
Attending: PHYSICIAN ASSISTANT
Payer: COMMERCIAL

## 2023-12-02 ENCOUNTER — APPOINTMENT (OUTPATIENT)
Dept: CT IMAGING | Facility: CLINIC | Age: 48
DRG: 025 | End: 2023-12-02
Attending: PHYSICIAN ASSISTANT
Payer: COMMERCIAL

## 2023-12-02 ENCOUNTER — APPOINTMENT (OUTPATIENT)
Dept: OCCUPATIONAL THERAPY | Facility: CLINIC | Age: 48
DRG: 025 | End: 2023-12-02
Attending: PHYSICIAN ASSISTANT
Payer: COMMERCIAL

## 2023-12-02 LAB — GLUCOSE BLDC GLUCOMTR-MCNC: 110 MG/DL (ref 70–99)

## 2023-12-02 PROCEDURE — 99232 SBSQ HOSP IP/OBS MODERATE 35: CPT | Performed by: HOSPITALIST

## 2023-12-02 PROCEDURE — 97165 OT EVAL LOW COMPLEX 30 MIN: CPT | Mod: GO

## 2023-12-02 PROCEDURE — 250N000013 HC RX MED GY IP 250 OP 250 PS 637: Performed by: HOSPITALIST

## 2023-12-02 PROCEDURE — 250N000011 HC RX IP 250 OP 636: Mod: JZ | Performed by: PHYSICIAN ASSISTANT

## 2023-12-02 PROCEDURE — 97530 THERAPEUTIC ACTIVITIES: CPT | Mod: GP | Performed by: PHYSICAL THERAPIST

## 2023-12-02 PROCEDURE — 70450 CT HEAD/BRAIN W/O DYE: CPT

## 2023-12-02 PROCEDURE — 97116 GAIT TRAINING THERAPY: CPT | Mod: GP | Performed by: PHYSICAL THERAPIST

## 2023-12-02 PROCEDURE — 97161 PT EVAL LOW COMPLEX 20 MIN: CPT | Mod: GP | Performed by: PHYSICAL THERAPIST

## 2023-12-02 PROCEDURE — 250N000013 HC RX MED GY IP 250 OP 250 PS 637: Performed by: PHYSICIAN ASSISTANT

## 2023-12-02 PROCEDURE — 250N000011 HC RX IP 250 OP 636: Mod: JZ | Performed by: HOSPITALIST

## 2023-12-02 PROCEDURE — 250N000013 HC RX MED GY IP 250 OP 250 PS 637: Performed by: INTERNAL MEDICINE

## 2023-12-02 PROCEDURE — 97535 SELF CARE MNGMENT TRAINING: CPT | Mod: GO

## 2023-12-02 PROCEDURE — 200N000001 HC R&B ICU

## 2023-12-02 RX ORDER — METOPROLOL SUCCINATE 50 MG/1
50 TABLET, EXTENDED RELEASE ORAL DAILY
Status: DISCONTINUED | OUTPATIENT
Start: 2023-12-02 | End: 2023-12-04 | Stop reason: HOSPADM

## 2023-12-02 RX ORDER — HYDRALAZINE HYDROCHLORIDE 20 MG/ML
10-20 INJECTION INTRAMUSCULAR; INTRAVENOUS EVERY 30 MIN PRN
Status: DISCONTINUED | OUTPATIENT
Start: 2023-12-02 | End: 2023-12-04 | Stop reason: HOSPADM

## 2023-12-02 RX ADMIN — DIAZEPAM 10 MG: 5 TABLET ORAL at 18:39

## 2023-12-02 RX ADMIN — ACETAMINOPHEN 975 MG: 325 TABLET, FILM COATED ORAL at 04:16

## 2023-12-02 RX ADMIN — DOCUSATE SODIUM 100 MG: 100 CAPSULE, LIQUID FILLED ORAL at 20:07

## 2023-12-02 RX ADMIN — GABAPENTIN 900 MG: 300 CAPSULE ORAL at 11:21

## 2023-12-02 RX ADMIN — LABETALOL HYDROCHLORIDE 20 MG: 5 INJECTION INTRAVENOUS at 06:54

## 2023-12-02 RX ADMIN — OXYCODONE HYDROCHLORIDE 5 MG: 5 TABLET ORAL at 13:26

## 2023-12-02 RX ADMIN — CLONIDINE HYDROCHLORIDE 0.1 MG: 0.1 TABLET ORAL at 04:17

## 2023-12-02 RX ADMIN — METHOCARBAMOL 500 MG: 500 TABLET ORAL at 07:49

## 2023-12-02 RX ADMIN — ACETAMINOPHEN 975 MG: 325 TABLET, FILM COATED ORAL at 19:07

## 2023-12-02 RX ADMIN — LABETALOL HYDROCHLORIDE 10 MG: 5 INJECTION INTRAVENOUS at 06:20

## 2023-12-02 RX ADMIN — ACETAMINOPHEN 975 MG: 325 TABLET, FILM COATED ORAL at 11:21

## 2023-12-02 RX ADMIN — DOCUSATE SODIUM 100 MG: 100 CAPSULE, LIQUID FILLED ORAL at 11:25

## 2023-12-02 RX ADMIN — DOCUSATE SODIUM 50 MG AND SENNOSIDES 8.6 MG 1 TABLET: 8.6; 5 TABLET, FILM COATED ORAL at 11:25

## 2023-12-02 RX ADMIN — DOCUSATE SODIUM 50 MG AND SENNOSIDES 8.6 MG 1 TABLET: 8.6; 5 TABLET, FILM COATED ORAL at 20:07

## 2023-12-02 RX ADMIN — GABAPENTIN 900 MG: 300 CAPSULE ORAL at 04:16

## 2023-12-02 RX ADMIN — FOLIC ACID 1 MG: 1 TABLET ORAL at 07:49

## 2023-12-02 RX ADMIN — THIAMINE HCL TAB 100 MG 100 MG: 100 TAB at 07:49

## 2023-12-02 RX ADMIN — GABAPENTIN 900 MG: 300 CAPSULE ORAL at 19:07

## 2023-12-02 RX ADMIN — HYDRALAZINE HYDROCHLORIDE 20 MG: 20 INJECTION INTRAMUSCULAR; INTRAVENOUS at 19:07

## 2023-12-02 RX ADMIN — MULTIPLE VITAMINS W/ MINERALS TAB 1 TABLET: TAB at 07:49

## 2023-12-02 RX ADMIN — OXYCODONE HYDROCHLORIDE 5 MG: 5 TABLET ORAL at 07:02

## 2023-12-02 RX ADMIN — OXYCODONE HYDROCHLORIDE 10 MG: 5 TABLET ORAL at 20:06

## 2023-12-02 RX ADMIN — HYDRALAZINE HYDROCHLORIDE 20 MG: 20 INJECTION INTRAMUSCULAR; INTRAVENOUS at 07:49

## 2023-12-02 RX ADMIN — METOPROLOL SUCCINATE 50 MG: 50 TABLET, EXTENDED RELEASE ORAL at 11:21

## 2023-12-02 RX ADMIN — HYDROXYZINE HYDROCHLORIDE 25 MG: 25 TABLET, FILM COATED ORAL at 07:49

## 2023-12-02 ASSESSMENT — ACTIVITIES OF DAILY LIVING (ADL)
ADLS_ACUITY_SCORE: 23
ADLS_ACUITY_SCORE: 23
ADLS_ACUITY_SCORE: 22
PREVIOUS_RESPONSIBILITIES: MEAL PREP;HOUSEKEEPING;LAUNDRY;SHOPPING;YARDWORK;MEDICATION MANAGEMENT;FINANCES;DRIVING;WORK
ADLS_ACUITY_SCORE: 22
ADLS_ACUITY_SCORE: 22
ADLS_ACUITY_SCORE: 23
ADLS_ACUITY_SCORE: 22
ADLS_ACUITY_SCORE: 22

## 2023-12-02 NOTE — PROGRESS NOTES
[facility]    Neurosurgery Progress Note    Date of Service (when I saw the patient): 12/02/2023     Assessment & Plan     Procedure(s):  CREATION, CRANIAL FANTASMA HOLE, WITH SUBDURAL HEMATOMA EVACUATION   -1 Day Post-Op  Patient seen sitting up in bed. Patient reports a left temporal headache since post op; however, this is better compared to pre-op and is alleviated with Oxycodone. Denies vision changes, nausea, vomiting. Surgical site CDI. Drain intact.    Imaging:   EXAM: CT HEAD WITHOUT CONTRAST  LOCATION: [facility]  DATE: 12/02/2023  IMPRESSION:  1.  Interval partial evacuation of the left hemispheric subdural hemorrhage with placement of a subdural drain. Associated decreased volume and mass effect. Left to right midline shift now measures 3 mm, previously 10 mm.      Drain output: 40 mL     Plan:  - Patient is okay to transfer to floor per NSGY  - Neuro q2hr 7am-7pm, Neuro q4hr 7pm-7am   - Routine wound care  - Keep drain in place and continue to monitor output  - HOB 30 degrees  - SBP <140  - PT/OT recs appreciated   - Can consider repeating head CT for new neurological deficits       I have discussed the following assessment and plan with Dr. Phoenix.    Deana Man PAChiloC  [facility] Neurosurgery  [facility]  [address] [address]      Physical Exam   Temp: 98  F (36.7  C) Temp src: Oral BP: 124/80 Pulse: 84   Resp: (!) 38 SpO2: 99 % O2 Device: None (Room air) Oxygen Delivery: 3 LPM  Vitals:    12/01/23 1359 12/01/23 1830 12/02/23 0000   Weight: 77.9 kg (171 lb 11.8 oz) 79.3 kg (174 lb 13.2 oz) 97.4 kg (214 lb 11.7 oz)     Vital Signs with Ranges  Temp:  [98  F (36.7  C)-98.8  F (37.1  C)] 98  F (36.7  C)  Pulse:  [64-98] 84  Resp:  [10-38] 38  BP: (105-191)/() 124/80  SpO2:  [94 %-99 %] 99 %  I/O last 3 completed shifts:  In: 3316.25 [P.O.:900; I.V.:2416.25]  Out: 40 [Drains:40]     , Blood  "pressure 124/80, pulse 84, temperature 98  F (36.7  C), temperature source Oral, resp. rate (!) 38, height 1.803 m (5' 11\"), weight 97.4 kg (214 lb 11.7 oz), SpO2 99%.  214 lbs 11.65 oz    NEUROLOGICAL EXAMINATION:   Mental status:  Alert and Oriented x 3, speech is fluent. NAD.  Cranial nerves:  II-XII intact. PERRL. EOMI  Motor:  Strength is 5/5 throughout the upper and lower extremities  Shoulder Abduction:  Right:  5/5   Left:  5/5  Biceps:                      Right:  5/5   Left:  5/5  Triceps:                     Right:  5/5   Left:  5/5  interosseus :            Right:  5/5   Left:  5/5   Hip Flexor:                Right: 5/5  Left:  5/5  Hip Adductor:             Right:  5/5  Left:  5/5  Hip Abductor:             Right:  5/5  Left:  5/5  Gastroc Soleus:        Right:  5/5  Left:  5/5  Tib/Ant:                      Right:  5/5  Left:  5/5  Sensation:  Intact BUE/BLE  Reflexes:  Negative Clonus.    Coordination:  Smooth finger to nose testing.   Negative pronator drift.    Incision: Open to air, no erythema/swelling/drainage  Drain: Intact  BUE tremor (present pre-op)     Medications    sodium chloride Stopped (12/02/23 0800)      acetaminophen  975 mg Oral Q8H    cloNIDine  0.1 mg Oral Q8H    docusate sodium  100 mg Oral BID    folic acid  1 mg Oral Daily    [START ON 12/7/2023] gabapentin  100 mg Oral Q8H    [START ON 12/5/2023] gabapentin  300 mg Oral Q8H    [START ON 12/3/2023] gabapentin  600 mg Oral Q8H    gabapentin  900 mg Oral Q8H    metoprolol succinate ER  50 mg Oral Daily    multivitamin w/minerals  1 tablet Oral Daily    polyethylene glycol  17 g Oral or NG Tube Daily    senna-docusate  1 tablet Oral or NG Tube BID    sodium chloride (PF)  3 mL Intracatheter Q8H    sodium chloride (PF)  3 mL Intracatheter Q8H    thiamine  100 mg Oral Daily       Data     CBC RESULTS:   Recent Labs   Lab Test 11/30/23  0833   WBC 7.8   RBC 3.67*   HGB 12.1*   HCT 37.1*   *   MCH 33.0   MCHC 32.6   RDW 14.8 "

## 2023-12-02 NOTE — PLAN OF CARE
Goal Outcome Evaluation:    Up to ICU from PACU at 1830. Stable vital signs. Moderate headache. Neuros intact. BP controlled with labetalol. Eating regular diet.

## 2023-12-02 NOTE — PROGRESS NOTES
12/02/23 0925   Appointment Info   Signing Clinician's Name / Credentials (OT) Vanessa Moreno OTR/L   Living Environment   People in Home spouse;child(silverio), dependent  (15 year old son)   Current Living Arrangements house  (Multi level home)   Transportation Anticipated family or friend will provide  (Pt typically drives)   Self-Care   Usual Activity Tolerance good   Current Activity Tolerance moderate   Equipment Currently Used at Home walker, standard   Fall history within last six months no   Activity/Exercise/Self-Care Comment Independent in all ADLs and mobility at baseline.   Instrumental Activities of Daily Living (IADL)   Previous Responsibilities meal prep;housekeeping;laundry;shopping;yardwork;medication management;finances;driving;work   IADL Comments Works full time in [employer], does lots of driving for work.   General Information   Onset of Illness/Injury or Date of Surgery 12/01/23   Referring Physician Lexi Watkins PA-C   Patient/Family Therapy Goal Statement (OT) Home   Additional Occupational Profile Info/Pertinent History of Current Problem POD #1 s/p CREATION, CRANIAL FANTASMA HOLE, WITH SUBDURAL HEMATOMA EVACUATION Left with Dr. Phoenix . See chart for details.   Cognitive Status Examination   Orientation Status orientation to person, place and time   Safety Deficit at risk behavior observed;impulsivity;awareness of need for assistance;insight into deficits/self-awareness;judgment;problem-solving   Cognitive Screens/Assessments   Cognitive Assessments Completed [facility] Mental Status Exam (UMS):  Total Score out of /30 17   [facility] Domains assessed: orientation, memory, attention, executive functions   SLUMS Interpretation Completed the [facility] Cognitive Screen to assess cognition and the implications on I/ADLs. Pt scored 17/30 indicating suspected cognitive impairment. Pt demonstrated difficulty with delayed recall, executive function and sequencing. A score at this level may  indicate difficulty with I/ADLs, including medication management, meal preparation. Further cognitive testing warranted.   Pain Assessment   Patient Currently in Pain Yes, see Vital Sign flowsheet   Range of Motion Comprehensive   General Range of Motion no range of motion deficits identified   Comment, General Range of Motion LUE and RUE WNL   Strength Comprehensive (MMT)   Comment, General Manual Muscle Testing (MMT) Assessment RIGHT hand dominant. LUE shoulder flexion 4/5 on MMT. RUE shoulder flexion 5/5 on MMT.   Transfers   Transfers bed-chair transfer;sit-stand transfer;toilet transfer   Transfer Skill: Bed to Chair/Chair to Bed   Bed-Chair [place] (Transfers) set up;verbal cues;contact guard   Sit-Stand Transfer   Sit-Stand [place] (Transfers) set up;verbal cues;contact guard   Toilet Transfer   Type (Toilet Transfer) sit-stand;stand-sit   [place] Level (Toilet Transfer) set up;verbal cues;contact guard   Activities of Daily Living   BADL Assessment/Intervention lower body dressing   Additional Documentation Comment, BADL Assessment/Training (Row)   Lower Body Dressing Assessment/Training   [place] Level (Lower Body Dressing) don;doff;pants/bottoms;set up;verbal cues  (SBA)   Clinical Impression   Criteria for Skilled Therapeutic Interventions Met (OT) Yes, treatment indicated   OT Diagnosis Decreased independence in I/ADLs.   Influenced by the following impairments Decreased independence in I/ADLs.   OT Problem List-Impairments impacting ADL problems related to;activity tolerance impaired;cognition;balance;strength;pain;post-surgical precautions   Assessment of Occupational Performance 1-3 Performance Deficits   Identified Performance Deficits Decreased independence in I/ADLs. (Dressing, bathing, toileting); cognition impacting I/ADLs.   Planned Therapy Interventions (OT) ADL retraining;IADL retraining;cognition;neuromuscular re-education;strengthening;transfer training;home program  guidelines;progressive activity/exercise   Clinical Decision Making Complexity (OT) problem focused assessment/low complexity   Risk & Benefits of therapy have been explained evaluation/treatment results reviewed;care plan/treatment goals reviewed;risks/benefits reviewed;patient   OT Total Evaluation Time   OT Eval, Low Complexity Minutes (87479) 11   OT Goals   Therapy Frequency (OT) Daily   OT Predicted Duration/Target Date for Goal Attainment 12/06/23   OT Goals Hygiene/Grooming;Upper Body Dressing;Lower Body Dressing;Toilet Transfer/Toileting;Cognition;OT Goal 1   OT: Hygiene/Grooming modified independent;while standing   OT: Upper Body Dressing Modified independent;including set-up/clothing retrieval   OT: Lower Body Dressing Modified independent;including set-up/clothing retrieval   OT: Toilet Transfer/Toileting Modified independent;cleaning and garment management;toilet transfer   OT: Cognitive Patient/caregiver will verbalize understanding of cognitive assessment results/recommendations as needed for safe discharge planning   OT: Goal 1 Pt will verbalize understanding of 2 shower transfer techniques, in order to increase IND in I/ADLs.   Interventions   Interventions Quick Adds Self-Care/Home Management   Self-Care/Home Management   Self-Care/Home Mgmt/ADL, Compensatory, Meal Prep Minutes (51863) 25   Symptoms Noted During/After Treatment (Meal Preparation/Planning Training) fatigue   Treatment Detail/Skilled Intervention Educated on LE dressing with compensatory techniques. While seated/standing, pt completed LE dressing (don/doff underwear) with SBA and set up, after education. Pt ambulated to the bathroom with no AD and CGA for toilet transfer. TRansfer to/from the toilet with CGA. PEricares with CGA. During session, pt impulsive with functional transfers and during functional ADLs.   OT Discharge Planning   OT Plan Med Box; TB Dress with retireval ; sequenced ADLs   OT Discharge Recommendation (DC Rec)  [facility]-Motivated patient will benefit from intensive, interdisciplinary therapy.  Anticipate will be able to tolerate 3 hours of therapy per day   OT Rationale for DC Rec At baseline, pt IND in all I/ADLs and mobility. Pt limited by cognition and decreased IND in I/ADLs. At this time recommend ARU to address safety and IND in I/ADLs. If home, home with 24 hour supervision/A in all I/ADLs and OP OT (family can assist in transportation).   OT Brief overview of current status CGA Toilet transfer and toileting; SLUMS 17/30   Total Session Time   Timed Code Treatment Minutes 25   Total Session Time (sum of timed and untimed services) 36

## 2023-12-02 NOTE — ANESTHESIA POSTPROCEDURE EVALUATION
Patient: Joe Shah    Procedure: Procedure(s):  CREATION, CRANIAL FANTASMA HOLE, WITH SUBDURAL HEMATOMA EVACUATION       Anesthesia Type:  General    Note:     Postop Pain Control: Uneventful            Sign Out: Well controlled pain   PONV: No   Neuro/Psych: Uneventful            Sign Out: Acceptable/Baseline neuro status   Airway/Respiratory: Uneventful            Sign Out: Acceptable/Baseline resp. status   CV/Hemodynamics: Uneventful            Sign Out: Acceptable CV status; No obvious hypovolemia; No obvious fluid overload   Other NRE: NONE   DID A NON-ROUTINE EVENT OCCUR? No           Last vitals:  Vitals Value Taken Time   /102 12/01/23 1810   Temp 36.7  C (98.1  F) 12/01/23 1722   Pulse 75 12/01/23 1818   Resp 19 12/01/23 1818   SpO2 95 % 12/01/23 1818   Vitals shown include unfiled device data.    Electronically Signed By: Ronni Grayson MD  December 1, 2023  6:20 PM

## 2023-12-02 NOTE — PROGRESS NOTES
"   12/02/23 1251   Appointment Info   Signing Clinician's Name / Credentials (PT) Regina Bacon PT, DPT   Living Environment   People in Home spouse;child(silverio), dependent  (15 yo son)   Current Living Arrangements house   Home Accessibility other (see comments)  (Mult level home.)   Transportation Anticipated car, drives self;family or friend will provide   Self-Care   Usual Activity Tolerance good   Current Activity Tolerance moderate   Fall history within last six months yes   Number of times patient has fallen within last six months 2   Activity/Exercise/Self-Care Comment Reports independence at baseline. States he tripped, in the dark, over a dog bed-he caught himself on the wall with both hands, but then fell backward. He also reports he fell down 5 stairs, \"I was sleeping so hard and woke up needing to go to the bathroom really bad.\" \"I was just in a hurry.\" Attributes the falls to covid.   General Information   Onset of Illness/Injury or Date of Surgery 12/01/23   Referring Physician Lexi Watkins PA-C   Pertinent History of Current Problem (include personal factors and/or comorbidities that impact the POC) 47 yo male with recent fall, now POD #1 S/p creation, cranial agata hole, hamtoma evacuation by MSG on 12/1.   Existing Precautions/Restrictions fall  (Keep systolic <140, HOB >30 degrees)   General Observations Multiple family members at bedside.   Cognition   Affect/Mental Status (Cognition) WFL;anxious   Orientation Status (Cognition) oriented x 4   Follows Commands (Cognition) follows multi-step commands;over 90% accuracy;increased processing time needed;repetition of directions required;verbal cues/prompting required  (Needs redirection at times, tangential)   Safety Deficit (Cognition) minimal deficit;impulsivity;insight into deficits/self-awareness   Cognitive Status Comments Pt disussed how he \"failed\" the cognitive assessment from OT this am. Pt states \"I haven't slept, I was not fully " "awake.\"   Pain Assessment   Patient Currently in Pain Yes, see Vital Sign flowsheet  (6/10-\"I'm probably due for oxy.\")   Posture    Posture Forward head position;Protracted shoulders   Range of Motion (ROM)   ROM Comment B LEs and UEs WFL   Strength (Manual Muscle Testing)   Strength Comments Demonstrates equal strength B with gross assessment of MMTs in sitting for B LEs.  Pt reports fatigue, due to lack of sleep, but also reports recent covid had him \"down for 10 days.\"   Bed Mobility   Comment, (Bed Mobility) SBA sup>sit   Transfers   Comment, (Transfers) Sit>stand CGA, use of hands for assist/balance. Completed sit<>stand x 5 in 49 seconds.   Gait/Stairs (Locomotion)   Comment, (Gait/Stairs) Bedside gait; unsteady, CGA. stability improves with distance-see Rx.   Balance   Balance Comments Sitting balance fair +, HA limits reach outside the MARSHALL or to the floor. Standing balance static, increased sway, pt with slight UE and LE tremor like activity-RN notified. Pt wtih possible DTs.   Sensory Examination   Sensory Perception Comments Denies n/t. Gross light touch at B LEs and UEs intact.   Coordination   Coordination Comments Smooth persuit, intact, note saccades to the R slow. VOR intact otherwise.   Clinical Impression   Criteria for Skilled Therapeutic Intervention Yes, treatment indicated   PT Diagnosis (PT) Impaired Gait   Influenced by the following impairments Decreaed balance; generalized weakness, impaireda ct tolerance from baseline.   Functional limitations due to impairments Decreased functional independence with mobility.   Clinical Presentation (PT Evaluation Complexity) stable   Clinical Presentation Rationale see MR   Clinical Decision Making (Complexity) low complexity   Planned Therapy Interventions (PT) balance training;bed mobility training;gait training;home exercise program;neuromuscular re-education;patient/family education;ROM (range of motion);stair " "training;strengthening;stretching;transfer training;progressive activity/exercise;home program guidelines   Risk & Benefits of therapy have been explained evaluation/treatment results reviewed;care plan/treatment goals reviewed;risks/benefits reviewed;current/potential barriers reviewed;participants voiced agreement with care plan;participants included;patient   PT Total Evaluation Time   PT Eval, Low Complexity Minutes (50008) 10   Physical Therapy Goals   PT Frequency Daily   PT Predicted Duration/Target Date for Goal Attainment 12/05/23   PT Goals Bed Mobility;Transfers;Gait;Stairs;PT Goal 1   PT: Bed Mobility Independent;Supine to/from sit;Rolling   PT: Transfers Independent;Sit to/from stand;Bed to/from chair   PT: Gait Supervision/stand-by assist;Greater than 200 feet  (No LOB with mild balance challenges.)   PT: Stairs Supervision/stand-by assist;Greater than 10 stairs;Rail on both sides   PT: Goal 1 Pt will score >22 on the FGA with indication of decreased fall risk.   Interventions   Interventions Quick Adds Gait Training;Therapeutic Activity;Therapeutic Procedure   Therapeutic Procedure/Exercise   Treatment Detail/Skilled Intervention Edu pt on use of AROM for circulation and reduced stiffness. APs, LAQs x 5.   Therapeutic Activity   Therapeutic Activities: dynamic activities to improve functional performance Minutes (40901) 10   Treatment Detail/Skilled Intervention Edu pt on benefit of OOB, up to chair for meals and amb in meade. Completed sit<>stand from recliner with close supervision, safety cues for ensuring sitting surface before sitting as pt \"dives\" sideways into the chair. Alarm on, all needs in reach, VSS as assessed pre, during and post activity.   Gait Training   Gait Training Minutes (98530) 10   Treatment Detail/Skilled Intervention Gait training included cues for posture and gaze, cues for balance challenges and seek/find challenges. Ambulated 300' with CGA, unsteady, slight path deviation. " VSS throughout.   PT Discharge Planning   PT Plan Balance challenges; DGI; gait in meade; stairs.   PT Discharge Recommendation (DC Rec) home with assist   PT Rationale for DC Rec Anticipate pt to recover to SBA or less with all functional mobility. Defer cognitive needs to OT. Of note pt reports he is a  for an [employer] company and he does do heavy lifting daily. Reports his wife works from  home and will be available at disch home. Pt will benefit from continued acute skilled PT for progressing strength, act roby and balance during LOS.   PT Brief overview of current status CGA to SBA with all functional mobility, unsteady with gait, tremulous at times.   Total Session Time   Timed Code Treatment Minutes 20   Total Session Time (sum of timed and untimed services) 30

## 2023-12-02 NOTE — PROGRESS NOTES
[facility]    Medicine Progress Note - Hospitalist Service    Date of Admission:  11/29/2023    Assessment & Plan   Pt Presented with new daily headache, affecting oral intake and ADLs.   He thought he may be in EtOH w/d so came to ER  Imaging showed:       1.  Recent subacute subdural hemorrhage surrounding the left cerebral  hemisphere measuring up to 1.5 cm in thickness and causing 9 mm of  rightward shift in the midline structures.  2.  Similar small volume subacute subdural hemorrhage along the  undersurface of the right temporal lobe.      SDH: likely 2/2 to recent fall.   S/p creation, cranial agata hole, hamtoma evacuation by MSG on 12/1.  - BP control.  - Post op orders  (pain, activity, wound care, VTE prophyllaxis.) per neurosurgery         Alcohol use disorder. Severe. Drinks daily (vodka).  Alcohol withdrawal syndrome without complication (H)    No significant w/d sxs at this time other than the baseline tremor.  - CIWA with benzos.  - Thiamine/folate/MVI.  - Cont gabapentin.  - CD/psych consult (pt agrees).    Recent COVID infection  Was noted with COVID one week before Thanksgiving and subsequently has been having some runny nose.  Afebrile, no leukocytosis. Neg CXR. No sob.  - Supportive care.  - Monitor for sxs.     GERD, h/o G.I. bleed (5/2023)  Had EGD in May 2023 with note of LA grade D esophagitis and not bleeding gastric ulcer  - Cont PTA PPI.     Hypertension.  - Resume PTA toprol.  - Prn hydralazine/labetolol for BP hoal of <140.    Tremor     likely has some ET.  He feels this started around the time of his back surgery.   - Outpatient follow up.          Diet: Advance Diet as Tolerated: Regular Diet Adult    DVT Prophylaxis: Pneumatic Compression Devices  per neurosurgery  Power Catheter: Not present  Lines: None     Cardiac Monitoring: None  Code Status: Full Code        Disp: when ok with NSGY. PT/OT.  Transfer out of MICU today if ok with NSGY.         Awil W.  MD [place]  Hospitalist Service  [facility]    ______________________________________________________________________    Interval History   Mild R sided headache. No dizziness. No diplopia. No focal neuro complaints. No cp/sob.  Awake and oriented x3.  Mother at bedside.    Physical Exam   Vital Signs: Temp: 98  F (36.7  C) Temp src: Oral BP: 124/80 Pulse: 84   Resp: (!) 38 SpO2: 99 % O2 Device: None (Room air) Oxygen Delivery: 3 LPM  Weight: 214 lbs 11.65 oz    Constitutional: awake, alert, cooperative,  mild distress 2/2 pain, and appears stated age  ENT: Normocephalic, without obvious abnormality, atraumatic, sinuses nontender on palpation, external ears without lesions, oral pharynx with moist mucous membranes, tonsils without erythema or exudates, gums normal and good dentition.  Respiratory: No increased work of breathing, good air exchange, clear to auscultation bilaterally, no crackles or wheezing  Cardiovascular: Normal apical impulse, regular rate and rhythm, normal S1 and S2, no S3 or S4, and no murmur noted  Musculoskeletal/skin: well developed,  agata hole.  no lower extremity pitting edema present  Neurologic: Awake, alert, oriented to name, place and time.  Cranial nerves II-XII are grossly intact.  Normal speech .   Level of consciousness: alert / normal           Data     I have personally reviewed the following data over the past 24 hrs:    N/A  \   N/A   / N/A     138 106 6.7 /  110 (H)   3.9 23 0.75 \     INR:  N/A PTT:  32   D-dimer:  N/A Fibrinogen:  N/A       Imaging results reviewed over the past 24 hrs:   Recent Results (from the past 24 hour(s))   CT Head w/o Contrast    Narrative    EXAM: CT HEAD WITHOUT CONTRAST  LOCATION: [facility]  DATE: 12/02/2023    INDICATION: Status post SDH evacuation.  COMPARISON: 12/01/2023.  TECHNIQUE: Routine CT Head without IV contrast. Multiplanar reformats. Dose reduction techniques were  used.    FINDINGS:  INTRACRANIAL CONTENTS: Interval left-sided agata holes with placement of a subdural drain. The left hemispheric subdural hemorrhage has decreased in overall volume, measuring approximately 14 mm in maximal thickness. Unchanged trace extension along the   left tentorium. Left to right midline shift measures approximately 3 mm, previously 10 mm. The basal cisterns are preserved. Decreased mass effect on the left lateral ventricle. No CT evidence of acute infarct. Normal parenchymal attenuation. Mild   generalized volume loss. No hydrocephalus.     VISUALIZED ORBITS/SINUSES/MASTOIDS: No intraorbital abnormality. No paranasal sinus mucosal disease. No middle ear or mastoid effusion.    BONES/SOFT TISSUES: Left frontal and parietal agata holes.      Impression    IMPRESSION:  1.  Interval partial evacuation of the left hemispheric subdural hemorrhage with placement of a subdural drain. Associated decreased volume and mass effect. Left to right midline shift now measures 3 mm, previously 10 mm.

## 2023-12-02 NOTE — PLAN OF CARE
Goal Outcome Evaluation:      Plan of Care Reviewed With: patient    Overall Patient Progress: improvingOverall Patient Progress: improving    Neuro: Intact ex BUE tremors.   CV: SR. SBP goal<140- PRN labetalol given 2x.   Resp: RA.   Skin: 2 L scalp incisions- DIAMANTE, staples intact and approximated. KATERYNA drain in place w/ 40ml ouput this shift.   Access: R PIV.  Gtts: NS.   Outcome Evaluation: Maintained bedrest w/ bathroom privileges throughout shift. Neuro status remains intact ex BUE tremors. PRN labetalol given 2x for SBP>140. Repeat head CT completed this AM. PRN oxy given 2x for HA- improved.    Problem: Adult Inpatient Plan of Care  Goal: Plan of Care Review  Description: Up to ICU from PACU at 1830. Stable vital signs. Moderate headache. Neuros intact. BP controlled with labetalol. Eating regular diet.  Outcome: Progressing  Flowsheets (Taken 12/2/2023 0614)  Outcome Evaluation: Maintained bedrest w/ bathroom privileges throughout shift. Neuro status remains intact ex BUE tremors. PRN labetalol given 2x for SBP>140. Repeat head CT completed this AM. PRN oxy given 2x for HA- improved.  Plan of Care Reviewed With: patient  Overall Patient Progress: improving     Problem: Stroke, Intracerebral Hemorrhage  Goal: Optimal Pain Control and Function  Outcome: Progressing  Intervention: Monitor and Manage Pain  Recent Flowsheet Documentation  Taken 12/2/2023 0400 by Shira Ovalle RN  Pain Management Interventions:   rest   repositioned  Taken 12/2/2023 0000 by Shira Ovalle RN  Pain Management Interventions:   rest   repositioned  Taken 12/1/2023 2307 by Shira Ovalle RN  Pain Management Interventions: medication (see MAR)  Taken 12/1/2023 2041 by Shira Ovalle, RN  Pain Management Interventions:   rest   repositioned   cold applied  Taken 12/1/2023 2000 by Shira Ovalle RN  Pain Management Interventions: medication (see MAR)

## 2023-12-03 ENCOUNTER — APPOINTMENT (OUTPATIENT)
Dept: CT IMAGING | Facility: CLINIC | Age: 48
DRG: 025 | End: 2023-12-03
Attending: PHYSICIAN ASSISTANT
Payer: COMMERCIAL

## 2023-12-03 ENCOUNTER — APPOINTMENT (OUTPATIENT)
Dept: OCCUPATIONAL THERAPY | Facility: CLINIC | Age: 48
DRG: 025 | End: 2023-12-03
Payer: COMMERCIAL

## 2023-12-03 LAB
ANION GAP SERPL CALCULATED.3IONS-SCNC: 12 MMOL/L (ref 7–15)
BUN SERPL-MCNC: 6.1 MG/DL (ref 6–20)
CALCIUM SERPL-MCNC: 8.4 MG/DL (ref 8.6–10)
CHLORIDE SERPL-SCNC: 100 MMOL/L (ref 98–107)
CREAT SERPL-MCNC: 0.71 MG/DL (ref 0.67–1.17)
DEPRECATED HCO3 PLAS-SCNC: 21 MMOL/L (ref 22–29)
EGFRCR SERPLBLD CKD-EPI 2021: >90 ML/MIN/1.73M2
ERYTHROCYTE [DISTWIDTH] IN BLOOD BY AUTOMATED COUNT: 14.6 % (ref 10–15)
GLUCOSE SERPL-MCNC: 125 MG/DL (ref 70–99)
HCT VFR BLD AUTO: 35.4 % (ref 40–53)
HGB BLD-MCNC: 11.8 G/DL (ref 13.3–17.7)
MCH RBC QN AUTO: 34 PG (ref 26.5–33)
MCHC RBC AUTO-ENTMCNC: 33.3 G/DL (ref 31.5–36.5)
MCV RBC AUTO: 102 FL (ref 78–100)
PLATELET # BLD AUTO: 313 10E3/UL (ref 150–450)
POTASSIUM SERPL-SCNC: 3.8 MMOL/L (ref 3.4–5.3)
RBC # BLD AUTO: 3.47 10E6/UL (ref 4.4–5.9)
SODIUM SERPL-SCNC: 133 MMOL/L (ref 135–145)
WBC # BLD AUTO: 8.8 10E3/UL (ref 4–11)

## 2023-12-03 PROCEDURE — 80048 BASIC METABOLIC PNL TOTAL CA: CPT | Performed by: HOSPITALIST

## 2023-12-03 PROCEDURE — 85027 COMPLETE CBC AUTOMATED: CPT | Performed by: HOSPITALIST

## 2023-12-03 PROCEDURE — 250N000013 HC RX MED GY IP 250 OP 250 PS 637: Performed by: PHYSICIAN ASSISTANT

## 2023-12-03 PROCEDURE — 250N000013 HC RX MED GY IP 250 OP 250 PS 637: Performed by: HOSPITALIST

## 2023-12-03 PROCEDURE — 97535 SELF CARE MNGMENT TRAINING: CPT | Mod: GO | Performed by: OCCUPATIONAL THERAPIST

## 2023-12-03 PROCEDURE — 120N000001 HC R&B MED SURG/OB

## 2023-12-03 PROCEDURE — 99232 SBSQ HOSP IP/OBS MODERATE 35: CPT | Performed by: HOSPITALIST

## 2023-12-03 PROCEDURE — 70450 CT HEAD/BRAIN W/O DYE: CPT

## 2023-12-03 PROCEDURE — 36415 COLL VENOUS BLD VENIPUNCTURE: CPT | Performed by: HOSPITALIST

## 2023-12-03 RX ADMIN — METOPROLOL SUCCINATE 50 MG: 50 TABLET, EXTENDED RELEASE ORAL at 08:22

## 2023-12-03 RX ADMIN — GABAPENTIN 600 MG: 300 CAPSULE ORAL at 12:23

## 2023-12-03 RX ADMIN — METHOCARBAMOL 500 MG: 500 TABLET ORAL at 21:52

## 2023-12-03 RX ADMIN — CLONIDINE HYDROCHLORIDE 0.1 MG: 0.1 TABLET ORAL at 21:53

## 2023-12-03 RX ADMIN — ACETAMINOPHEN 975 MG: 325 TABLET, FILM COATED ORAL at 04:39

## 2023-12-03 RX ADMIN — OXYCODONE HYDROCHLORIDE 10 MG: 5 TABLET ORAL at 12:24

## 2023-12-03 RX ADMIN — GABAPENTIN 600 MG: 300 CAPSULE ORAL at 04:39

## 2023-12-03 RX ADMIN — CLONIDINE HYDROCHLORIDE 0.1 MG: 0.1 TABLET ORAL at 14:32

## 2023-12-03 RX ADMIN — GABAPENTIN 600 MG: 300 CAPSULE ORAL at 19:55

## 2023-12-03 RX ADMIN — ACETAMINOPHEN 975 MG: 325 TABLET, FILM COATED ORAL at 12:23

## 2023-12-03 RX ADMIN — ACETAMINOPHEN 975 MG: 325 TABLET, FILM COATED ORAL at 19:55

## 2023-12-03 RX ADMIN — FOLIC ACID 1 MG: 1 TABLET ORAL at 08:22

## 2023-12-03 RX ADMIN — CLONIDINE HYDROCHLORIDE 0.1 MG: 0.1 TABLET ORAL at 05:32

## 2023-12-03 RX ADMIN — OXYCODONE HYDROCHLORIDE 10 MG: 5 TABLET ORAL at 02:16

## 2023-12-03 RX ADMIN — MULTIPLE VITAMINS W/ MINERALS TAB 1 TABLET: TAB at 08:29

## 2023-12-03 RX ADMIN — OXYCODONE HYDROCHLORIDE 10 MG: 5 TABLET ORAL at 17:07

## 2023-12-03 RX ADMIN — OXYCODONE HYDROCHLORIDE 10 MG: 5 TABLET ORAL at 08:29

## 2023-12-03 RX ADMIN — THIAMINE HCL TAB 100 MG 100 MG: 100 TAB at 08:22

## 2023-12-03 ASSESSMENT — ACTIVITIES OF DAILY LIVING (ADL)
ADLS_ACUITY_SCORE: 23
ADLS_ACUITY_SCORE: 22
ADLS_ACUITY_SCORE: 23
ADLS_ACUITY_SCORE: 22
ADLS_ACUITY_SCORE: 22

## 2023-12-03 NOTE — PLAN OF CARE
Goal Outcome Evaluation:    Neuros intact. Up in chair with meals. Walked w/ PT. Pain controlled. BP improved after resuming metoprolol, no PRN antiHTN meds needed since. KATERYNA drain output: 48 mL over 12 hours.       Problem: Adult Inpatient Plan of Care  Goal: Plan of Care Review  Description: Up to ICU from PACU at 1830. Stable vital signs. Moderate headache. Neuros intact. BP controlled with labetalol. Eating regular diet.  12/2/2023 1847 by Francisco Sandra, RN  Outcome: Progressing  Flowsheets (Taken 12/2/2023 1847)  Outcome Evaluation: Off BiPAP all day today. Down to 4L nasal cannula. A&Ox4. Diuresis continues. Eating mod cho diet, good appetite. Glucose increased in afternoon 2/2 PO intake and steroids- lantus and aspart increased. Oxy restarted for generalized pain.  Plan of Care Reviewed With:   patient   spouse  Overall Patient Progress: improving  12/2/2023 1846 by Francisco Sandra, RN  Outcome: Progressing  Goal: Absence of Hospital-Acquired Illness or Injury  Intervention: Identify and Manage Fall Risk  Recent Flowsheet Documentation  Taken 12/2/2023 1600 by Francisco Sandra, RN  Safety Promotion/Fall Prevention:   safety round/check completed   clutter free environment maintained   lighting adjusted   nonskid shoes/slippers when out of bed   patient and family education   room door open   room near nurse's station   room organization consistent   supervised activity  Taken 12/2/2023 1200 by Francisco Sandra, RN  Safety Promotion/Fall Prevention:   safety round/check completed   clutter free environment maintained   lighting adjusted   nonskid shoes/slippers when out of bed   patient and family education   room door open   room near nurse's station   room organization consistent   supervised activity  Taken 12/2/2023 0800 by Francisco Sandra, RN  Safety Promotion/Fall Prevention:   safety round/check completed   clutter free environment maintained   lighting adjusted   nonskid shoes/slippers when out of bed    patient and family education   room door open   room near nurse's station   room organization consistent   supervised activity  Intervention: Prevent Skin Injury  Recent Flowsheet Documentation  Taken 12/2/2023 1600 by Francisco Sandra RN  Body Position: position changed independently  Skin Protection:   tubing/devices free from skin contact   transparent dressing maintained   silicone foam dressing in place  Device Skin Pressure Protection: (mepilex applied)   other (see comments)   tubing/devices free from skin contact  Taken 12/2/2023 1200 by Francisco Sandra RN  Body Position: position changed independently  Skin Protection:   tubing/devices free from skin contact   transparent dressing maintained   silicone foam dressing in place  Device Skin Pressure Protection: (mepilex applied)   other (see comments)   tubing/devices free from skin contact  Taken 12/2/2023 0800 by Francisco Sandra RN  Body Position: position changed independently  Skin Protection:   tubing/devices free from skin contact   transparent dressing maintained   silicone foam dressing in place  Device Skin Pressure Protection: (mepilex applied)   other (see comments)   tubing/devices free from skin contact  Intervention: Prevent and Manage VTE (Venous Thromboembolism) Risk  Recent Flowsheet Documentation  Taken 12/2/2023 1600 by Francisco Sandra RN  VTE Prevention/Management: SCDs (sequential compression devices) on  Taken 12/2/2023 1200 by Francisco Sandra RN  VTE Prevention/Management: SCDs (sequential compression devices) on  Taken 12/2/2023 0800 by Francisco Sandra RN  VTE Prevention/Management: SCDs (sequential compression devices) on  Intervention: Prevent Infection  Recent Flowsheet Documentation  Taken 12/2/2023 1600 by Francisco Sandra RN  Infection Prevention:   hand hygiene promoted   rest/sleep promoted   single patient room provided  Taken 12/2/2023 1200 by Francisco Sandra RN  Infection Prevention:   hand hygiene promoted   rest/sleep  promoted   single patient room provided  Taken 12/2/2023 0800 by Francisco Sandra RN  Infection Prevention:   hand hygiene promoted   rest/sleep promoted   single patient room provided  Goal: Optimal Comfort and Wellbeing  Intervention: Provide Person-Centered Care  Recent Flowsheet Documentation  Taken 12/2/2023 1600 by Francisco Sandra RN  Trust Relationship/Rapport:   care explained   choices provided   questions answered   questions encouraged   thoughts/feelings acknowledged  Taken 12/2/2023 1200 by Francisco Sandra RN  Trust Relationship/Rapport:   care explained   choices provided   questions answered   questions encouraged   thoughts/feelings acknowledged  Taken 12/2/2023 0800 by Francisco Sandra RN  Trust Relationship/Rapport:   care explained   choices provided   questions answered   questions encouraged   thoughts/feelings acknowledged     Problem: Fall Injury Risk  Goal: Absence of Fall and Fall-Related Injury  Intervention: Identify and Manage Contributors  Recent Flowsheet Documentation  Taken 12/2/2023 1600 by Francisco Sandra RN  Medication Review/Management: medications reviewed  Taken 12/2/2023 1200 by Francisco Sandra RN  Medication Review/Management: medications reviewed  Taken 12/2/2023 0800 by Francisco Sandra RN  Medication Review/Management: medications reviewed  Intervention: Promote Injury-Free Environment  Recent Flowsheet Documentation  Taken 12/2/2023 1600 by Francisco Sandra RN  Safety Promotion/Fall Prevention:   safety round/check completed   clutter free environment maintained   lighting adjusted   nonskid shoes/slippers when out of bed   patient and family education   room door open   room near nurse's station   room organization consistent   supervised activity  Taken 12/2/2023 1200 by Francisco Sandra RN  Safety Promotion/Fall Prevention:   safety round/check completed   clutter free environment maintained   lighting adjusted   nonskid shoes/slippers when out of bed   patient and  family education   room door open   room near nurse's station   room organization consistent   supervised activity  Taken 12/2/2023 0800 by Francisco Sandra RN  Safety Promotion/Fall Prevention:   safety round/check completed   clutter free environment maintained   lighting adjusted   nonskid shoes/slippers when out of bed   patient and family education   room door open   room near nurse's station   room organization consistent   supervised activity     Problem: Pain Acute  Goal: Optimal Pain Control and Function  Intervention: Prevent or Manage Pain  Recent Flowsheet Documentation  Taken 12/2/2023 1600 by Francisco Sandra RN  Sensory Stimulation Regulation:   auditory stimulation minimized   care clustered   lighting decreased   quiet environment promoted   tactile stimulation minimized   visual stimulation minimized  Bowel Elimination Promotion:   adequate fluid intake promoted   diet adjusted  Medication Review/Management: medications reviewed  Taken 12/2/2023 1200 by Francisco Sandra RN  Sensory Stimulation Regulation:   auditory stimulation minimized   care clustered   lighting decreased   quiet environment promoted   tactile stimulation minimized   visual stimulation minimized  Bowel Elimination Promotion:   adequate fluid intake promoted   diet adjusted  Medication Review/Management: medications reviewed  Taken 12/2/2023 0800 by Francisco Sandra RN  Sensory Stimulation Regulation:   auditory stimulation minimized   care clustered   lighting decreased   quiet environment promoted   tactile stimulation minimized   visual stimulation minimized  Bowel Elimination Promotion:   adequate fluid intake promoted   diet adjusted  Medication Review/Management: medications reviewed  Intervention: Optimize Psychosocial Wellbeing  Recent Flowsheet Documentation  Taken 12/2/2023 1600 by Francisco Sandra RN  Supportive Measures: active listening utilized  Taken 12/2/2023 1200 by Francisco Sandra RN  Supportive Measures:  active listening utilized  Taken 12/2/2023 0800 by Francisco Sandra RN  Supportive Measures: active listening utilized     Problem: Comorbidity Management  Goal: Blood Glucose Levels Within Targeted Range  Intervention: Monitor and Manage Glycemia  Recent Flowsheet Documentation  Taken 12/2/2023 1600 by Francisco Sandra RN  Glycemic Management: blood glucose monitored  Medication Review/Management: medications reviewed  Taken 12/2/2023 1200 by Francisco Sandra RN  Glycemic Management: blood glucose monitored  Medication Review/Management: medications reviewed  Taken 12/2/2023 0800 by Francisco Sandra RN  Glycemic Management: blood glucose monitored  Medication Review/Management: medications reviewed  Goal: Blood Pressure in Desired Range  Intervention: Maintain Blood Pressure Management  Recent Flowsheet Documentation  Taken 12/2/2023 1600 by Francisco Sandra RN  Medication Review/Management: medications reviewed  Taken 12/2/2023 1200 by Francisco Sandra RN  Medication Review/Management: medications reviewed  Taken 12/2/2023 0800 by Francisco Sandra RN  Medication Review/Management: medications reviewed     Problem: Stroke, Intracerebral Hemorrhage  Goal: Optimal Coping  Intervention: Support Psychosocial Response to Stroke  Recent Flowsheet Documentation  Taken 12/2/2023 1600 by Francisco Sandra RN  Supportive Measures: active listening utilized  Taken 12/2/2023 1200 by Francisco Sandra RN  Supportive Measures: active listening utilized  Taken 12/2/2023 0800 by Francisco Sandra RN  Supportive Measures: active listening utilized  Goal: Effective Bowel Elimination  Intervention: Promote Effective Bowel Elimination  Recent Flowsheet Documentation  Taken 12/2/2023 1600 by Francisco Sandra RN  Bowel Elimination Management: (Scheduled stool softners given)   toileting offered   other (see comments)  Bowel Program: maintenance program followed  Taken 12/2/2023 1200 by Francisco Sandra RN  Bowel Elimination Management:  (Scheduled stool softners given)   toileting offered   other (see comments)  Bowel Program: maintenance program followed  Taken 12/2/2023 0800 by Francisco Sandra RN  Bowel Elimination Management: (Scheduled stool softners given)   toileting offered   other (see comments)  Bowel Program: maintenance program followed  Goal: Optimal Cerebral Tissue Perfusion  Intervention: Protect and Optimize Cerebral Perfusion  Recent Flowsheet Documentation  Taken 12/2/2023 1600 by Francisco Sandra RN  Sensory Stimulation Regulation:   auditory stimulation minimized   care clustered   lighting decreased   quiet environment promoted   tactile stimulation minimized   visual stimulation minimized  Cerebral Perfusion Promotion: blood pressure monitored  Fluid/Electrolyte Management:   fluids provided   intravenous fluid replacement initiated  Taken 12/2/2023 1200 by Francisco Sandra RN  Sensory Stimulation Regulation:   auditory stimulation minimized   care clustered   lighting decreased   quiet environment promoted   tactile stimulation minimized   visual stimulation minimized  Cerebral Perfusion Promotion: blood pressure monitored  Fluid/Electrolyte Management:   fluids provided   intravenous fluid replacement initiated  Taken 12/2/2023 0800 by Francisco Sandra RN  Sensory Stimulation Regulation:   auditory stimulation minimized   care clustered   lighting decreased   quiet environment promoted   tactile stimulation minimized   visual stimulation minimized  Cerebral Perfusion Promotion: blood pressure monitored  Fluid/Electrolyte Management:   fluids provided   intravenous fluid replacement initiated  Goal: Optimal Cognitive Function  Intervention: Optimize Cognitive Function  Recent Flowsheet Documentation  Taken 12/2/2023 1600 by Francisco Sandra RN  Sensory Stimulation Regulation:   auditory stimulation minimized   care clustered   lighting decreased   quiet environment promoted   tactile stimulation minimized   visual stimulation  minimized  Reorientation Measures:   calendar in view   clock in view   familiar social contact encouraged  Environment Familiarity/Consistency: daily routine followed  Taken 12/2/2023 1200 by Francisco Sandra RN  Sensory Stimulation Regulation:   auditory stimulation minimized   care clustered   lighting decreased   quiet environment promoted   tactile stimulation minimized   visual stimulation minimized  Reorientation Measures:   calendar in view   clock in view   familiar social contact encouraged  Environment Familiarity/Consistency: daily routine followed  Taken 12/2/2023 0800 by Francisco Sandra RN  Sensory Stimulation Regulation:   auditory stimulation minimized   care clustered   lighting decreased   quiet environment promoted   tactile stimulation minimized   visual stimulation minimized  Reorientation Measures:   calendar in view   clock in view   familiar social contact encouraged  Environment Familiarity/Consistency: daily routine followed  Goal: Effective Communication Skills  Intervention: Optimize Communication Skills  Recent Flowsheet Documentation  Taken 12/2/2023 1600 by Francisco Sandra RN  Communication Enhancement Strategies:   call light answered in person   communication board used   extra time allowed for response   verbal and visual cues paired  Taken 12/2/2023 1200 by Francisco Sandra RN  Communication Enhancement Strategies:   call light answered in person   communication board used   extra time allowed for response   verbal and visual cues paired  Taken 12/2/2023 0800 by Francisco Sandra RN  Communication Enhancement Strategies:   call light answered in person   communication board used   extra time allowed for response   verbal and visual cues paired  Goal: Optimal Functional Ability  Intervention: Optimize Functional Ability  Recent Flowsheet Documentation  Taken 12/2/2023 1600 by Francisco Sandra RN  Activity Management:   up ad nadine   activity encouraged  Taken 12/2/2023 1200 by  [place], Francisco, RN  Activity Management:   up ad nadine   activity encouraged  Taken 12/2/2023 0800 by Francisco Sandra RN  Activity Management:   up ad nadine   activity encouraged  Goal: Effective Oxygenation and Ventilation  Intervention: Optimize Oxygenation and Ventilation  Recent Flowsheet Documentation  Taken 12/2/2023 1600 by Francisco Sandra RN  Airway/Ventilation Management:   airway patency maintained   calming measures promoted   pulmonary hygiene promoted  Head of Bed (HOB) Positioning: HOB at 30-45 degrees  Taken 12/2/2023 1200 by Francisco Sandra RN  Airway/Ventilation Management:   airway patency maintained   calming measures promoted   pulmonary hygiene promoted  Head of Bed (HOB) Positioning: HOB at 30-45 degrees  Taken 12/2/2023 0800 by Francisco Sandra RN  Airway/Ventilation Management:   airway patency maintained   calming measures promoted   pulmonary hygiene promoted  Head of Bed (HOB) Positioning: HOB at 30-45 degrees  Goal: Improved Sensorimotor Function  Intervention: Optimize Range of Motion, Motor Control and Function  Recent Flowsheet Documentation  Taken 12/2/2023 1600 by Francisco Sandra RN  Spasticity Management: weight-bearing facilitated  Positioning/Transfer Devices:   pillows   in use  Taken 12/2/2023 1200 by Francisco Sandra RN  Spasticity Management: weight-bearing facilitated  Positioning/Transfer Devices:   pillows   in use  Taken 12/2/2023 0800 by Francisco Sandra RN  Spasticity Management: weight-bearing facilitated  Positioning/Transfer Devices:   pillows   in use  Intervention: Optimize Sensory and Perceptual Ability  Recent Flowsheet Documentation  Taken 12/2/2023 1600 by Francisco Sandra RN  Pressure Reduction Techniques:   frequent weight shift encouraged   heels elevated off bed  Pressure Reduction Devices:   foam padding utilized   specialty bed utilized  Taken 12/2/2023 1200 by Francisco Sandra RN  Pressure Reduction Techniques:   frequent weight shift encouraged   heels  elevated off bed  Pressure Reduction Devices:   foam padding utilized   specialty bed utilized  Taken 12/2/2023 0800 by Francisco Sandra RN  Pressure Reduction Techniques:   frequent weight shift encouraged   heels elevated off bed  Pressure Reduction Devices:   foam padding utilized   specialty bed utilized  Goal: Safe and Effective Swallow  Intervention: Optimize Eating and Swallowing  Recent Flowsheet Documentation  Taken 12/2/2023 1600 by Francisco Sandra RN  Aspiration Precautions:   awake/alert before oral intake   distractions minimized during oral intake   upright posture maintained  Swallowing Interventions: Dysphagia: upright position maintained 30 mins after intake  Taken 12/2/2023 1200 by Francisco Sandra RN  Aspiration Precautions:   awake/alert before oral intake   distractions minimized during oral intake   upright posture maintained  Swallowing Interventions: Dysphagia: upright position maintained 30 mins after intake  Taken 12/2/2023 0800 by Francisco Sandra RN  Aspiration Precautions:   awake/alert before oral intake   distractions minimized during oral intake   upright posture maintained  Swallowing Interventions: Dysphagia: upright position maintained 30 mins after intake  Goal: Effective Urinary Elimination  Intervention: Promote Effective Bladder Elimination  Recent Flowsheet Documentation  Taken 12/2/2023 1600 by Francisco Sandra RN  Urinary Elimination Promotion: toileting offered  Taken 12/2/2023 1200 by Francisco Sandra RN  Urinary Elimination Promotion: toileting offered  Taken 12/2/2023 0800 by Francisco Sandra RN  Urinary Elimination Promotion: toileting offered

## 2023-12-03 NOTE — PLAN OF CARE
Problem: Adult Inpatient Plan of Care  Goal: Readiness for Transition of Care  Outcome: Progressing     Problem: Fall Injury Risk  Goal: Absence of Fall and Fall-Related Injury  Outcome: Progressing  Intervention: Identify and Manage Contributors  Recent Flowsheet Documentation  Taken 12/3/2023 0400 by Lynda Eli RN  Medication Review/Management: medications reviewed  Taken 12/3/2023 0000 by Lynda Eli RN  Medication Review/Management: medications reviewed  Taken 12/2/2023 2000 by Lynda Eli RN  Medication Review/Management: medications reviewed  Intervention: Promote Injury-Free Environment  Recent Flowsheet Documentation  Taken 12/3/2023 0400 by Lynda Eli RN  Safety Promotion/Fall Prevention:   activity supervised   clutter free environment maintained   lighting adjusted   nonskid shoes/slippers when out of bed   patient and family education   room near nurse's station   room organization consistent   safety round/check completed   supervised activity  Taken 12/3/2023 0000 by Lynda Eli RN  Safety Promotion/Fall Prevention:   activity supervised   clutter free environment maintained   lighting adjusted   nonskid shoes/slippers when out of bed   patient and family education   room near nurse's station   room organization consistent   safety round/check completed   supervised activity  Taken 12/2/2023 2000 by Lynda Eli RN  Safety Promotion/Fall Prevention:   activity supervised   clutter free environment maintained   lighting adjusted   nonskid shoes/slippers when out of bed   patient and family education   room near nurse's station   room organization consistent   safety round/check completed   supervised activity     Problem: Pain Acute  Goal: Optimal Pain Control and Function  Outcome: Progressing  Intervention: Develop Pain Management Plan  Recent Flowsheet Documentation  Taken 12/3/2023 0439 by Lynda Eli RN  Pain Management Interventions: medication (see  MAR)  Taken 12/3/2023 0216 by Lynda Eli RN  Pain Management Interventions:   medication (see MAR)   rest  Taken 12/2/2023 2006 by Lynda Eli RN  Pain Management Interventions:   medication (see MAR)   rest  Intervention: Prevent or Manage Pain  Recent Flowsheet Documentation  Taken 12/3/2023 0400 by Lynda Eli RN  Sensory Stimulation Regulation:   auditory stimulation minimized   care clustered   lighting decreased   quiet environment promoted   tactile stimulation minimized  Bowel Elimination Promotion:   adequate fluid intake promoted   ambulation promoted  Medication Review/Management: medications reviewed  Taken 12/3/2023 0000 by Lynda Eli RN  Sensory Stimulation Regulation:   auditory stimulation minimized   care clustered   lighting decreased   quiet environment promoted   tactile stimulation minimized  Bowel Elimination Promotion:   adequate fluid intake promoted   ambulation promoted  Medication Review/Management: medications reviewed  Taken 12/2/2023 2000 by Lynda Eli RN  Sensory Stimulation Regulation:   auditory stimulation minimized   care clustered   lighting decreased   quiet environment promoted   tactile stimulation minimized  Bowel Elimination Promotion:   adequate fluid intake promoted   ambulation promoted  Medication Review/Management: medications reviewed  Intervention: Optimize Psychosocial Wellbeing  Recent Flowsheet Documentation  Taken 12/3/2023 0400 by Lynda Eli RN  Supportive Measures: active listening utilized  Taken 12/3/2023 0000 by Lynda Eli RN  Supportive Measures: active listening utilized  Taken 12/2/2023 2000 by Lynda Eli RN  Supportive Measures: active listening utilized     Problem: Comorbidity Management  Goal: Blood Pressure in Desired Range  Outcome: Progressing  Intervention: Maintain Blood Pressure Management  Recent Flowsheet Documentation  Taken 12/3/2023 0400 by Lynda Eli RN  Medication Review/Management:  medications reviewed  Taken 12/3/2023 0000 by Lynda Eli RN  Medication Review/Management: medications reviewed  Taken 12/2/2023 2000 by Lynda Eli RN  Medication Review/Management: medications reviewed     Problem: Stroke, Intracerebral Hemorrhage  Goal: Effective Bowel Elimination  Outcome: Progressing  Intervention: Promote Effective Bowel Elimination  Recent Flowsheet Documentation  Taken 12/3/2023 0400 by Lynda Eli, RN  Bowel Elimination Management: toileting offered  Bowel Program: maintenance program followed  Taken 12/3/2023 0000 by Lynda Eli RN  Bowel Elimination Management: toileting offered  Bowel Program: maintenance program followed  Taken 12/2/2023 2000 by Lynda Eli RN  Bowel Elimination Management: toileting offered  Bowel Program: maintenance program followed   Goal Outcome Evaluation:    Neuro: Intact, tremors noted on CIWA protocol, no PRNs this shift.  CV/VS: SR, no PRNs needed for BP management this shift. Clonidine held x1.  Respiratory: RA, lungs clear.  GI/: Voiding well, no BM this shift.  Skin:  Intact, pt removed sacral mepilex.   Pain: PRN pain meds given x2 this shift with good effect.   Labs: AM labs stable.  Activity: Up in room and to bathroom with SBA.   Diet: Tolerating regular diet.   Plan/Changes: Plan to transfer to  today.

## 2023-12-03 NOTE — PROGRESS NOTES
"[facility]    Neurosurgery Progress Note    Date of Service (when I saw the patient): 12/03/2023     Assessment & Plan     Procedure(s):  CREATION, CRANIAL FANTASMA HOLE, WITH SUBDURAL HEMATOMA EVACUATION   -2 Days Post-Op  Patient seen today lying in bed with family at bedside. Patient reports he had a bilateral headache similar in intensity to pre-op earlier this morning. Patient was given oxycodone and symptoms improved. Denies vision changes, nausea, vomiting. Drain intact with leeroy stitch. Incision open to air, staples intact. Patient worked with PT and OT yesterday. He states he did not do well with OT because he was awoken many times throughout the night and was \"groggy and tired\" during the evaluation.     Drain output: 30 mL     Plan:  - Drain removed today in sterile fashion, leeroy stitch tied, no significant drainage noted, patient tolerated well. Tip of drain was identified and removed.    - Patient is okay to transfer to floor per NSGY  Routine wound care  - Pain control measures  - HOB 30 degrees  - SBP <140  - Continue therapies   - Can consider repeated head CT for new neurological deficits or concerns     Addendum: Will order head CT due to increased headaches this morning. If head CT is stable, neuro checks can be q4hr per NSGY.     Addendum: Repeat head CT is stable, okay to change to Neuro q4hr per NSGY and continue to treat symptomatically. Will repeat CT if patient develops neurological deficits or concerns.     EXAM: CT HEAD W/O CONTRAST  LOCATION: [facility]  DATE: 12/3/2023  IMPRESSION:  1.  Slight interval decrease in size of the mixed attenuation left cerebral convexity subdural hematoma.  2.  No significant change in associated mass effect.    I have discussed the following assessment and plan with Dr. Phoenix.    Deana Man PA-C  [facility]  [address]  [address] [place] [address] " "[id number]      Physical Exam   Temp: 98.5  F (36.9  C) Temp src: Oral BP: (!) 123/90 Pulse: 78   Resp: 15 SpO2: 94 % O2 Device: None (Room air)    Vitals:    12/01/23 1830 12/02/23 0000 12/03/23 0500   Weight: 79.3 kg (174 lb 13.2 oz) 97.4 kg (214 lb 11.7 oz) 78.9 kg (173 lb 15.1 oz)     Vital Signs with Ranges  Temp:  [98.5  F (36.9  C)-99  F (37.2  C)] 98.5  F (36.9  C)  Pulse:  [] 78  Resp:  [12-30] 15  BP: ()/() 123/90  SpO2:  [87 %-99 %] 94 %  I/O last 3 completed shifts:  In: 630 [P.O.:480; I.V.:150]  Out: 98 [Drains:98]     , Blood pressure (!) 123/90, pulse 78, temperature 98.5  F (36.9  C), temperature source Oral, resp. rate 15, height 1.803 m (5' 11\"), weight 78.9 kg (173 lb 15.1 oz), SpO2 94%.  173 lbs 15.09 oz   NEUROLOGICAL EXAMINATION:   Mental status:  Alert and Oriented x 3, speech is fluent. NAD.  Pin point pupils bilaterally and equal   EOMI  Cranial nerves:  II-XII intact.   Motor:  Strength is 5/5 throughout the upper and lower extremities  Shoulder Abduction:  Right:  5/5   Left:  5/5  Biceps:                      Right:  5/5   Left:  5/5  Triceps:                     Right:  5/5   Left:  5/5  interosseus :            Right:  5/5   Left:  5/5   Hip Flexor:                Right: 5/5  Left:  5/5  Hip Adductor:             Right:  5/5  Left:  5/5  Hip Abductor:             Right:  5/5  Left:  5/5  Gastroc Soleus:        Right:  5/5  Left:  5/5  Tib/Ant:                      Right:  5/5  Left:  5/5  Reflexes:  Negative Clonus.    Coordination:  Smooth finger to nose testing.   Negative pronator drift.   Incision: Open to air, staples intact, no erythema/swelling/drainage   Drain: Intact  BUE tremor (present pre-op)    Medications    sodium chloride Stopped (12/02/23 0800)      acetaminophen  975 mg Oral Q8H    cloNIDine  0.1 mg Oral Q8H    docusate sodium  100 mg Oral BID    folic acid  1 mg Oral Daily    [START ON 12/7/2023] gabapentin  100 mg Oral Q8H    [START ON 12/5/2023] " gabapentin  300 mg Oral Q8H    gabapentin  600 mg Oral Q8H    metoprolol succinate ER  50 mg Oral Daily    multivitamin w/minerals  1 tablet Oral Daily    polyethylene glycol  17 g Oral or NG Tube Daily    senna-docusate  1 tablet Oral or NG Tube BID    sodium chloride (PF)  3 mL Intracatheter Q8H    thiamine  100 mg Oral Daily       Data     CBC RESULTS:   Recent Labs   Lab Test 12/03/23  0531   WBC 8.8   RBC 3.47*   HGB 11.8*   HCT 35.4*   *   MCH 34.0*   MCHC 33.3   RDW 14.6

## 2023-12-03 NOTE — PLAN OF CARE
Problem: Adult Inpatient Plan of Care  Goal: Absence of Hospital-Acquired Illness or Injury  Intervention: Identify and Manage Fall Risk  Recent Flowsheet Documentation  Taken 12/3/2023 1600 by Pipe Stern RN  Safety Promotion/Fall Prevention:   activity supervised   clutter free environment maintained   lighting adjusted   nonskid shoes/slippers when out of bed   patient and family education   room near nurse's station   room organization consistent   safety round/check completed   supervised activity  Taken 12/3/2023 1200 by Pipe Stern RN  Safety Promotion/Fall Prevention:   activity supervised   clutter free environment maintained   lighting adjusted   nonskid shoes/slippers when out of bed   patient and family education   room near nurse's station   room organization consistent   safety round/check completed   supervised activity  Taken 12/3/2023 0800 by Pipe Stern RN  Safety Promotion/Fall Prevention:   activity supervised   clutter free environment maintained   lighting adjusted   nonskid shoes/slippers when out of bed   patient and family education   room near nurse's station   room organization consistent   safety round/check completed   supervised activity  Intervention: Prevent Skin Injury  Recent Flowsheet Documentation  Taken 12/3/2023 1600 by Pipe Stern RN  Body Position: position changed independently  Skin Protection:   adhesive use limited   pulse oximeter probe site changed   silicone foam dressing in place   tubing/devices free from skin contact  Device Skin Pressure Protection: (Mepilex in place)   tubing/devices free from skin contact   other (see comments)  Taken 12/3/2023 1400 by Pipe Stern RN  Body Position: position changed independently  Taken 12/3/2023 1200 by Pipe Stern RN  Body Position: position changed independently  Skin Protection:   adhesive use limited   pulse oximeter probe site changed   silicone foam dressing in place    tubing/devices free from skin contact  Device Skin Pressure Protection: (Mepilex in place)   tubing/devices free from skin contact   other (see comments)  Taken 12/3/2023 1000 by Pipe Stern RN  Body Position: position changed independently  Taken 12/3/2023 0800 by Pipe Stern RN  Body Position: position changed independently  Skin Protection:   adhesive use limited   pulse oximeter probe site changed   silicone foam dressing in place   tubing/devices free from skin contact  Device Skin Pressure Protection: (Mepilex in place)   tubing/devices free from skin contact   other (see comments)  Intervention: Prevent and Manage VTE (Venous Thromboembolism) Risk  Recent Flowsheet Documentation  Taken 12/3/2023 1600 by Pipe Stern RN  VTE Prevention/Management: SCDs (sequential compression devices) on  Taken 12/3/2023 1200 by Pipe Stern RN  VTE Prevention/Management: SCDs (sequential compression devices) on  Taken 12/3/2023 0800 by Pipe Stern RN  VTE Prevention/Management: SCDs (sequential compression devices) on  Intervention: Prevent Infection  Recent Flowsheet Documentation  Taken 12/3/2023 1600 by Pipe tSern RN  Infection Prevention:   environmental surveillance performed   equipment surfaces disinfected   hand hygiene promoted   rest/sleep promoted   single patient room provided  Taken 12/3/2023 1200 by Pipe Stern RN  Infection Prevention:   environmental surveillance performed   equipment surfaces disinfected   hand hygiene promoted   rest/sleep promoted   single patient room provided  Taken 12/3/2023 0800 by Pipe Stern RN  Infection Prevention:   environmental surveillance performed   equipment surfaces disinfected   hand hygiene promoted   rest/sleep promoted   single patient room provided  Goal: Optimal Comfort and Wellbeing  Intervention: Provide Person-Centered Care  Recent Flowsheet Documentation  Taken 12/3/2023 1600 by Pipe Stern  RN  Trust Relationship/Rapport:   care explained   choices provided   questions answered   questions encouraged   reassurance provided   thoughts/feelings acknowledged  Taken 12/3/2023 1200 by Pipe Stern RN  Trust Relationship/Rapport:   care explained   choices provided   questions answered   questions encouraged   reassurance provided   thoughts/feelings acknowledged  Taken 12/3/2023 0800 by Pipe Stern RN  Trust Relationship/Rapport:   care explained   choices provided   questions answered   questions encouraged   reassurance provided   thoughts/feelings acknowledged     Problem: Fall Injury Risk  Goal: Absence of Fall and Fall-Related Injury  Intervention: Identify and Manage Contributors  Recent Flowsheet Documentation  Taken 12/3/2023 1600 by Pipe Stern RN  Medication Review/Management: medications reviewed  Taken 12/3/2023 1200 by Pipe Stern RN  Medication Review/Management: medications reviewed  Taken 12/3/2023 0800 by Pipe Stern RN  Medication Review/Management: medications reviewed  Intervention: Promote Injury-Free Environment  Recent Flowsheet Documentation  Taken 12/3/2023 1600 by Pipe Stern RN  Safety Promotion/Fall Prevention:   activity supervised   clutter free environment maintained   lighting adjusted   nonskid shoes/slippers when out of bed   patient and family education   room near nurse's station   room organization consistent   safety round/check completed   supervised activity  Taken 12/3/2023 1200 by Pipe Stern RN  Safety Promotion/Fall Prevention:   activity supervised   clutter free environment maintained   lighting adjusted   nonskid shoes/slippers when out of bed   patient and family education   room near nurse's station   room organization consistent   safety round/check completed   supervised activity  Taken 12/3/2023 0800 by Pipe Stern RN  Safety Promotion/Fall Prevention:   activity supervised   clutter free  environment maintained   lighting adjusted   nonskid shoes/slippers when out of bed   patient and family education   room near nurse's station   room organization consistent   safety round/check completed   supervised activity     Problem: Pain Acute  Goal: Optimal Pain Control and Function  Intervention: Prevent or Manage Pain  Recent Flowsheet Documentation  Taken 12/3/2023 1600 by Pipe Stern RN  Sensory Stimulation Regulation:   auditory stimulation minimized   care clustered   lighting decreased   quiet environment promoted   tactile stimulation minimized  Bowel Elimination Promotion:   adequate fluid intake promoted   ambulation promoted  Medication Review/Management: medications reviewed  Taken 12/3/2023 1200 by Pipe Stern RN  Sensory Stimulation Regulation:   auditory stimulation minimized   care clustered   lighting decreased   quiet environment promoted   tactile stimulation minimized  Bowel Elimination Promotion:   adequate fluid intake promoted   ambulation promoted  Medication Review/Management: medications reviewed  Taken 12/3/2023 0800 by Pipe Stern RN  Sensory Stimulation Regulation:   auditory stimulation minimized   care clustered   lighting decreased   quiet environment promoted   tactile stimulation minimized  Bowel Elimination Promotion:   adequate fluid intake promoted   ambulation promoted  Medication Review/Management: medications reviewed  Intervention: Optimize Psychosocial Wellbeing  Recent Flowsheet Documentation  Taken 12/3/2023 1600 by Pipe Stern RN  Supportive Measures: active listening utilized  Taken 12/3/2023 1200 by Pipe Stern RN  Supportive Measures: active listening utilized  Taken 12/3/2023 0800 by Pipe Stern RN  Supportive Measures: active listening utilized     Problem: Comorbidity Management  Goal: Blood Glucose Levels Within Targeted Range  Intervention: Monitor and Manage Glycemia  Recent Flowsheet Documentation  Taken  12/3/2023 1600 by Pipe Stern RN  Glycemic Management: blood glucose monitored  Medication Review/Management: medications reviewed  Taken 12/3/2023 1200 by Pipe Stern RN  Glycemic Management: blood glucose monitored  Medication Review/Management: medications reviewed  Taken 12/3/2023 0800 by Pipe Stern RN  Glycemic Management: blood glucose monitored  Medication Review/Management: medications reviewed  Goal: Blood Pressure in Desired Range  Intervention: Maintain Blood Pressure Management  Recent Flowsheet Documentation  Taken 12/3/2023 1600 by Pipe Stern RN  Medication Review/Management: medications reviewed  Taken 12/3/2023 1200 by Pipe Stern RN  Medication Review/Management: medications reviewed  Taken 12/3/2023 0800 by Pipe Stern RN  Medication Review/Management: medications reviewed     Problem: Stroke, Intracerebral Hemorrhage  Goal: Optimal Coping  Intervention: Support Psychosocial Response to Stroke  Recent Flowsheet Documentation  Taken 12/3/2023 1600 by Pipe Stern RN  Supportive Measures: active listening utilized  Taken 12/3/2023 1200 by Pipe Stern RN  Supportive Measures: active listening utilized  Taken 12/3/2023 0800 by Pipe Stern RN  Supportive Measures: active listening utilized  Goal: Effective Bowel Elimination  Intervention: Promote Effective Bowel Elimination  Recent Flowsheet Documentation  Taken 12/3/2023 1600 by Pipe Stern RN  Bowel Elimination Management: toileting offered  Bowel Program: maintenance program followed  Taken 12/3/2023 1200 by Pipe Stern RN  Bowel Elimination Management: toileting offered  Bowel Program: maintenance program followed  Taken 12/3/2023 0800 by Pipe Stern RN  Bowel Elimination Management: toileting offered  Bowel Program: maintenance program followed  Goal: Optimal Cerebral Tissue Perfusion  Intervention: Protect and Optimize Cerebral Perfusion  Recent  Flowsheet Documentation  Taken 12/3/2023 1600 by Pipe Stern RN  Sensory Stimulation Regulation:   auditory stimulation minimized   care clustered   lighting decreased   quiet environment promoted   tactile stimulation minimized  Cerebral Perfusion Promotion: blood pressure monitored  Fluid/Electrolyte Management: fluids provided  Taken 12/3/2023 1200 by Pipe Stern RN  Sensory Stimulation Regulation:   auditory stimulation minimized   care clustered   lighting decreased   quiet environment promoted   tactile stimulation minimized  Cerebral Perfusion Promotion: blood pressure monitored  Fluid/Electrolyte Management: fluids provided  Taken 12/3/2023 0800 by Pipe Stern RN  Sensory Stimulation Regulation:   auditory stimulation minimized   care clustered   lighting decreased   quiet environment promoted   tactile stimulation minimized  Cerebral Perfusion Promotion: blood pressure monitored  Fluid/Electrolyte Management: fluids provided  Goal: Optimal Cognitive Function  Intervention: Optimize Cognitive Function  Recent Flowsheet Documentation  Taken 12/3/2023 1600 by Pipe Stern RN  Sensory Stimulation Regulation:   auditory stimulation minimized   care clustered   lighting decreased   quiet environment promoted   tactile stimulation minimized  Reorientation Measures:   calendar in view   clock in view   familiar social contact encouraged  Environment Familiarity/Consistency: daily routine followed  Taken 12/3/2023 1200 by Pipe Stern RN  Sensory Stimulation Regulation:   auditory stimulation minimized   care clustered   lighting decreased   quiet environment promoted   tactile stimulation minimized  Reorientation Measures:   calendar in view   clock in view   familiar social contact encouraged  Environment Familiarity/Consistency: daily routine followed  Taken 12/3/2023 0800 by Pipe Stern RN  Sensory Stimulation Regulation:   auditory stimulation minimized   care  clustered   lighting decreased   quiet environment promoted   tactile stimulation minimized  Reorientation Measures:   calendar in view   clock in view   familiar social contact encouraged  Environment Familiarity/Consistency: daily routine followed  Goal: Effective Communication Skills  Intervention: Optimize Communication Skills  Recent Flowsheet Documentation  Taken 12/3/2023 1600 by Pipe Stern RN  Communication Enhancement Strategies:   call light answered in person   verbal and visual cues paired  Taken 12/3/2023 1200 by Pipe Stern RN  Communication Enhancement Strategies:   call light answered in person   verbal and visual cues paired  Taken 12/3/2023 0800 by Pipe Stern RN  Communication Enhancement Strategies:   call light answered in person   verbal and visual cues paired  Goal: Optimal Functional Ability  Intervention: Optimize Functional Ability  Recent Flowsheet Documentation  Taken 12/3/2023 1600 by Pipe Stern RN  Activity Management:   activity encouraged   up ad nadine  Taken 12/3/2023 1200 by Pipe Stern RN  Activity Management:   activity encouraged   up ad nadine  Taken 12/3/2023 0800 by Pipe Stern RN  Activity Management:   activity encouraged   up ad nadine  Goal: Effective Oxygenation and Ventilation  Intervention: Optimize Oxygenation and Ventilation  Recent Flowsheet Documentation  Taken 12/3/2023 1600 by Pipe Stern RN  Airway/Ventilation Management:   airway patency maintained   calming measures promoted   pulmonary hygiene promoted  Head of Bed (HOB) Positioning: HOB at 30 degrees  Taken 12/3/2023 1400 by Pipe Stern RN  Head of Bed (HOB) Positioning: HOB at 30 degrees  Taken 12/3/2023 1200 by Pipe Stern RN  Airway/Ventilation Management:   airway patency maintained   calming measures promoted   pulmonary hygiene promoted  Head of Bed (HOB) Positioning: HOB at 30 degrees  Taken 12/3/2023 1000 by Pipe Stern RN  Head  of Bed (HOB) Positioning: HOB at 30 degrees  Taken 12/3/2023 0800 by Pipe Stern RN  Airway/Ventilation Management:   airway patency maintained   calming measures promoted   pulmonary hygiene promoted  Head of Bed (HOB) Positioning: HOB at 30 degrees  Goal: Improved Sensorimotor Function  Intervention: Optimize Range of Motion, Motor Control and Function  Recent Flowsheet Documentation  Taken 12/3/2023 1600 by Pipe Stern RN  Spasticity Management: weight-bearing facilitated  Positioning/Transfer Devices:   pillows   in use  Taken 12/3/2023 1400 by Pipe Stern RN  Positioning/Transfer Devices:   pillows   in use  Taken 12/3/2023 1200 by Pipe Stern RN  Spasticity Management: weight-bearing facilitated  Positioning/Transfer Devices:   pillows   in use  Taken 12/3/2023 1000 by Pipe Stern RN  Positioning/Transfer Devices:   pillows   in use  Taken 12/3/2023 0800 by Pipe Stern RN  Spasticity Management: weight-bearing facilitated  Positioning/Transfer Devices:   pillows   in use  Intervention: Optimize Sensory and Perceptual Ability  Recent Flowsheet Documentation  Taken 12/3/2023 1600 by Pipe Stern RN  Pressure Reduction Techniques:   frequent weight shift encouraged   heels elevated off bed  Pressure Reduction Devices:   foam padding utilized   specialty bed utilized  Taken 12/3/2023 1200 by Pipe Stern RN  Pressure Reduction Techniques:   frequent weight shift encouraged   heels elevated off bed  Pressure Reduction Devices:   foam padding utilized   specialty bed utilized  Taken 12/3/2023 0800 by Pipe Stern RN  Pressure Reduction Techniques:   frequent weight shift encouraged   heels elevated off bed  Pressure Reduction Devices:   foam padding utilized   specialty bed utilized  Goal: Safe and Effective Swallow  Intervention: Optimize Eating and Swallowing  Recent Flowsheet Documentation  Taken 12/3/2023 1600 by Pipe Stern  RN  Aspiration Precautions:   awake/alert before oral intake   oral hygiene care promoted   upright posture maintained  Swallowing Interventions: Dysphagia: upright position maintained 30 mins after intake  Taken 12/3/2023 1200 by Pipe Stern RN  Aspiration Precautions:   awake/alert before oral intake   oral hygiene care promoted   upright posture maintained  Swallowing Interventions: Dysphagia: upright position maintained 30 mins after intake  Taken 12/3/2023 0800 by Pipe Stern RN  Aspiration Precautions:   awake/alert before oral intake   oral hygiene care promoted   upright posture maintained  Swallowing Interventions: Dysphagia: upright position maintained 30 mins after intake  Goal: Effective Urinary Elimination  Intervention: Promote Effective Bladder Elimination  Recent Flowsheet Documentation  Taken 12/3/2023 1600 by Pipe Stern RN  Urinary Elimination Promotion: toileting offered  Taken 12/3/2023 1200 by Pipe Stern RN  Urinary Elimination Promotion: toileting offered  Taken 12/3/2023 0800 by Pipe Stern RN  Urinary Elimination Promotion: toileting offered   Goal Outcome Evaluation:    Pt. In room air, clear lungs, good CV: SR, good perfusion, neuro exams intact, pt. Pleasant and cooperative, good PO intake, walking to void into commode and in room, KATERYNA drain removed by neurosurgery staff at bedside, neuro checks to Q4H, oxycodone given X 2 with some relief for headache, transferred in wheel chair to 7th floor by nurses aid in wheel chair without incident.

## 2023-12-03 NOTE — PROGRESS NOTES
[facility]    Medicine Progress Note - Hospitalist Service    Date of Admission:  11/29/2023    Assessment & Plan   Pt Presented with new daily headache, affecting oral intake and ADLs.   He thought he may be in EtOH w/d so came to ER  Imaging showed:       1.  Recent subacute subdural hemorrhage surrounding the left cerebral  hemisphere measuring up to 1.5 cm in thickness and causing 9 mm of  rightward shift in the midline structures.  2.  Similar small volume subacute subdural hemorrhage along the  undersurface of the right temporal lobe.      SDH: likely 2/2 to recent fall.   S/p creation, cranial agata hole, hamtoma evacuation by MSG on 12/1. Repeat CTH on 12/2 showed decreased volume and mass effect.  - BP control.  - Post op orders  (pain, activity, wound care, VTE prophyllaxis.) per neurosurgery     Alcohol use disorder. Severe. Drinks daily (vodka).  Alcohol withdrawal syndrome without complication (H)    No significant w/d sxs at this time other than the baseline tremor.  - CIWA with benzos.  - Thiamine/folate/MVI.  - Cont gabapentin.  - CD/psych consulted (pt agreed).    Recent COVID infection  Was noted with COVID one week before Thanksgiving and subsequently has been having some runny nose.  Afebrile, no leukocytosis. Neg CXR. No sob.  - Supportive care.  - Monitor for sxs.     GERD, h/o G.I. bleed (5/2023)  Had EGD in May 2023 with note of LA grade D esophagitis and not bleeding gastric ulcer  - Cont PTA PPI.     Hypertension.  - Resume PTA toprol.  - Prn hydralazine/labetolol for BP hoal of <140.    Tremor     likely has some ET.  He feels this started around the time of his back surgery.   - Outpatient follow up.          Diet: Advance Diet as Tolerated: Regular Diet Adult    DVT Prophylaxis: Pneumatic Compression Devices  per neurosurgery  Power Catheter: Not present  Lines: None     Cardiac Monitoring: None  Code Status: Full Code        Disp: when ok with NSGY.  PT/OT.  Transfer out of MICU today.         Samuel Harrison MD  Hospitalist Service  [facility]    ______________________________________________________________________    Interval History   Still Mild R sided headaches (oxy helps). No dizziness. No diplopia. No focal neuro complaints. No cp/sob.  Awake and oriented x3.  Mother at bedside.    Physical Exam   Vital Signs: Temp: 98.5  F (36.9  C) Temp src: Oral BP: (!) 123/90 Pulse: 78   Resp: 15 SpO2: 94 % O2 Device: None (Room air)    Weight: 173 lbs 15.09 oz    Constitutional: awake, alert, cooperative,  mild distress 2/2 pain, and appears stated age  ENT: Normocephalic, without obvious abnormality, atraumatic, sinuses nontender on palpation, external ears without lesions, oral pharynx with moist mucous membranes, tonsils without erythema or exudates, gums normal and good dentition.  Respiratory: No increased work of breathing, good air exchange, clear to auscultation bilaterally, no crackles or wheezing  Cardiovascular: Normal apical impulse, regular rate and rhythm, normal S1 and S2, no S3 or S4, and no murmur noted  Musculoskeletal/skin: well developed,  agata hole.  no lower extremity pitting edema present  Neurologic: Awake, alert, oriented to name, place and time.  Cranial nerves II-XII are grossly intact.  Normal speech .   Level of consciousness: alert / normal           Data     I have personally reviewed the following data over the past 24 hrs:    8.8  \   11.8 (L)   / 313     133 (L) 100 6.1 /  125 (H)   3.8 21 (L) 0.71 \       Imaging results reviewed over the past 24 hrs:   No results found for this or any previous visit (from the past 24 hour(s)).

## 2023-12-04 ENCOUNTER — APPOINTMENT (OUTPATIENT)
Dept: PHYSICAL THERAPY | Facility: CLINIC | Age: 48
DRG: 025 | End: 2023-12-04
Payer: COMMERCIAL

## 2023-12-04 ENCOUNTER — APPOINTMENT (OUTPATIENT)
Dept: OCCUPATIONAL THERAPY | Facility: CLINIC | Age: 48
DRG: 025 | End: 2023-12-04
Payer: COMMERCIAL

## 2023-12-04 VITALS
TEMPERATURE: 98.2 F | RESPIRATION RATE: 16 BRPM | WEIGHT: 173.94 LBS | SYSTOLIC BLOOD PRESSURE: 124 MMHG | HEART RATE: 73 BPM | HEIGHT: 71 IN | DIASTOLIC BLOOD PRESSURE: 73 MMHG | BODY MASS INDEX: 24.35 KG/M2 | OXYGEN SATURATION: 93 %

## 2023-12-04 LAB
ANION GAP SERPL CALCULATED.3IONS-SCNC: 8 MMOL/L (ref 7–15)
BUN SERPL-MCNC: 7.3 MG/DL (ref 6–20)
CALCIUM SERPL-MCNC: 9.1 MG/DL (ref 8.6–10)
CHLORIDE SERPL-SCNC: 98 MMOL/L (ref 98–107)
CREAT SERPL-MCNC: 0.66 MG/DL (ref 0.67–1.17)
DEPRECATED HCO3 PLAS-SCNC: 26 MMOL/L (ref 22–29)
EGFRCR SERPLBLD CKD-EPI 2021: >90 ML/MIN/1.73M2
ERYTHROCYTE [DISTWIDTH] IN BLOOD BY AUTOMATED COUNT: 14.2 % (ref 10–15)
GLUCOSE SERPL-MCNC: 90 MG/DL (ref 70–99)
HCT VFR BLD AUTO: 35.4 % (ref 40–53)
HGB BLD-MCNC: 11.7 G/DL (ref 13.3–17.7)
MCH RBC QN AUTO: 33.5 PG (ref 26.5–33)
MCHC RBC AUTO-ENTMCNC: 33.1 G/DL (ref 31.5–36.5)
MCV RBC AUTO: 101 FL (ref 78–100)
PLATELET # BLD AUTO: 300 10E3/UL (ref 150–450)
POTASSIUM SERPL-SCNC: 4 MMOL/L (ref 3.4–5.3)
RBC # BLD AUTO: 3.49 10E6/UL (ref 4.4–5.9)
SODIUM SERPL-SCNC: 132 MMOL/L (ref 135–145)
WBC # BLD AUTO: 7.4 10E3/UL (ref 4–11)

## 2023-12-04 PROCEDURE — 99239 HOSP IP/OBS DSCHRG MGMT >30: CPT | Performed by: INTERNAL MEDICINE

## 2023-12-04 PROCEDURE — 250N000013 HC RX MED GY IP 250 OP 250 PS 637: Performed by: HOSPITALIST

## 2023-12-04 PROCEDURE — 80048 BASIC METABOLIC PNL TOTAL CA: CPT | Performed by: HOSPITALIST

## 2023-12-04 PROCEDURE — 97535 SELF CARE MNGMENT TRAINING: CPT | Mod: GO

## 2023-12-04 PROCEDURE — 97116 GAIT TRAINING THERAPY: CPT | Mod: GP

## 2023-12-04 PROCEDURE — 36415 COLL VENOUS BLD VENIPUNCTURE: CPT | Performed by: HOSPITALIST

## 2023-12-04 PROCEDURE — 85027 COMPLETE CBC AUTOMATED: CPT | Performed by: HOSPITALIST

## 2023-12-04 RX ORDER — METHOCARBAMOL 500 MG/1
500 TABLET, FILM COATED ORAL EVERY 6 HOURS PRN
Qty: 28 TABLET | Refills: 3 | Status: SHIPPED | OUTPATIENT
Start: 2023-12-04 | End: 2024-01-22

## 2023-12-04 RX ORDER — MULTIPLE VITAMINS W/ MINERALS TAB 9MG-400MCG
1 TAB ORAL DAILY
COMMUNITY
Start: 2023-12-05 | End: 2024-01-22

## 2023-12-04 RX ORDER — OXYCODONE HYDROCHLORIDE 5 MG/1
5 TABLET ORAL EVERY 6 HOURS PRN
Qty: 12 TABLET | Refills: 0 | Status: SHIPPED | OUTPATIENT
Start: 2023-12-04 | End: 2023-12-06

## 2023-12-04 RX ORDER — ACETAMINOPHEN 325 MG/1
975 TABLET ORAL EVERY 8 HOURS
Qty: 27 TABLET | Refills: 0 | Status: ON HOLD | OUTPATIENT
Start: 2023-12-04 | End: 2024-01-25

## 2023-12-04 RX ORDER — FOLIC ACID 1 MG/1
1 TABLET ORAL DAILY
Qty: 30 TABLET | Refills: 11 | Status: ON HOLD | OUTPATIENT
Start: 2023-12-05 | End: 2024-03-13

## 2023-12-04 RX ADMIN — GABAPENTIN 600 MG: 300 CAPSULE ORAL at 11:38

## 2023-12-04 RX ADMIN — METHOCARBAMOL 500 MG: 500 TABLET ORAL at 10:48

## 2023-12-04 RX ADMIN — GABAPENTIN 600 MG: 300 CAPSULE ORAL at 03:16

## 2023-12-04 RX ADMIN — FOLIC ACID 1 MG: 1 TABLET ORAL at 08:57

## 2023-12-04 RX ADMIN — OXYCODONE HYDROCHLORIDE 10 MG: 5 TABLET ORAL at 08:57

## 2023-12-04 RX ADMIN — CLONIDINE HYDROCHLORIDE 0.1 MG: 0.1 TABLET ORAL at 05:52

## 2023-12-04 RX ADMIN — ACETAMINOPHEN 975 MG: 325 TABLET, FILM COATED ORAL at 11:38

## 2023-12-04 RX ADMIN — OXYCODONE HYDROCHLORIDE 5 MG: 5 TABLET ORAL at 03:17

## 2023-12-04 RX ADMIN — METOPROLOL SUCCINATE 50 MG: 50 TABLET, EXTENDED RELEASE ORAL at 08:57

## 2023-12-04 RX ADMIN — ACETAMINOPHEN 975 MG: 325 TABLET, FILM COATED ORAL at 03:16

## 2023-12-04 RX ADMIN — CLONIDINE HYDROCHLORIDE 0.1 MG: 0.1 TABLET ORAL at 14:07

## 2023-12-04 RX ADMIN — OXYCODONE HYDROCHLORIDE 10 MG: 5 TABLET ORAL at 15:54

## 2023-12-04 RX ADMIN — DOCUSATE SODIUM 100 MG: 100 CAPSULE, LIQUID FILLED ORAL at 08:57

## 2023-12-04 RX ADMIN — THIAMINE HCL TAB 100 MG 100 MG: 100 TAB at 08:57

## 2023-12-04 ASSESSMENT — ACTIVITIES OF DAILY LIVING (ADL)
ADLS_ACUITY_SCORE: 22

## 2023-12-04 NOTE — PROGRESS NOTES
Neurosurgery Progress Note:     POD#3 s/p Raphael hole for SDH evacuation     24 hours events: Pt doing well on phone this AM. Expressed interest in going home. Clear for discharge from NSGY standpoint. Followup appointments scheduled.     On exam:Intact with no focal deficits. GCS 15  Incision:CDI     PLAN:  -OK to discharge  -2 week followup for incision check  -6 week followup with CT prior-ordered.  -Activity instructions placed in discharge navigator. No lifting or straining. Limit lifting to 10 lbs or less. Will evaluate return to work in 4-6 weeks during appointment. Pt is a  with lifting required for local deliveries.   -Will order oxycodone script for post op pain.     Shayan Trujillo DNP  [facility] Neurosurgery  [address] [address]     -Plan discussed with Dr. Phoenix.

## 2023-12-04 NOTE — PLAN OF CARE
Neuro: Intact. Occasional dizziness with position changes  Cardio: BP WDL  Resp: LS clear RA  GI: Tolerating diet BS active  : Continent  Pain: PRN oxy and robaxin  Skin: Surgical incision open to air. No drainage noted CDI  Activity: A1 GB

## 2023-12-04 NOTE — PLAN OF CARE
Goal Outcome Evaluation:    Date/Time: 2300-7:30am    Trauma    Diagnosis: Subacute subdural Hematoma surrounding the left Cerebral hemisphere   POD# 3 of creation, cranial Raphael hole with subdural hematoma evacuation    Mental Status: A&Ox4  Activity/dangle: Assist of 1 with GW  Diet: Regular  Pain: Oxycodone given x 1  Power/Voiding: BR  Tele/Restraints/Iso: NA  02/LDA:RA  D/C Date:TBD  Other Info: Passed a quiet shift, c/o headache, oxycodone given x 1. Pt is on withdrawal assessment of alcohol, surgical incision exposed to air, no drainage seen on left side, PIV SL, neuro inplace. Will continue to monitor.

## 2023-12-04 NOTE — CONSULTS
Met with pt for CD Consult and introduced self and role in pt's care.  Pt declined to complete assessment for referrals to [facility] or any additional resource needs.    Relayed to pt that if they change their mind while admitted and would like to complete a CD Assessment for referrals to treatment or need any additional CD Resources they may alert unit staff who will assist in having CD Consult re-ordered.  If pt has active insurance they may also schedule an assessment on an outpatient basis by calling [facility] at [phone].    JEAN PIERRE Jett, [facility]  Substance Use Disorder Evaluation Counselor  Email: [email or web address]

## 2023-12-04 NOTE — DISCHARGE SUMMARY
[facility]  Hospitalist Discharge Summary      Date of Admission:  11/29/2023  Date of Discharge:  12/4/2023 to home  Discharging Provider: Karen Cowan MD  Discharge Service: Hospitalist Service    Discharge Diagnoses   Subdural Hematoma  Alcohol use disorder. Severe. Drinks daily (vodka).  Alcohol withdrawal syndrome without complication (H)    Recent COVID infection  GERD  Hypertension  Tremor       Clinically Significant Risk Factors          Follow-ups Needed After Discharge   Follow-up Appointments     Follow-up and recommended labs and tests       Your post-operative follow up appointments have been/will be made for two   weeks with a nurse for a well being and wound check visit as well as in   six weeks with a provider.        Follow-up and recommended labs and tests       Follow up with primary care provider, Mike Rodney, within 7 days   for hospital follow- up.  No follow up labs or test are needed.        With neurosurgery as outlined. Their office will contact you to schedule.     Unresulted Labs Ordered in the Past 30 Days of this Admission       No orders found from 10/30/2023 to 11/30/2023.        These results will be followed up by n/a    Discharge Disposition   Discharged to home  Condition at discharge: Stable    Hospital Course   49 yo man with alcohol abuse history, hypertension, chronic back pain, recent covid-19 infection who presented with headache. Found to have subacute subdural hemorrhage with right shift.     Subdural hematoma in the setting of fall and alcohol use  S/p creation, cranial agata hole, hematoma evacuation on 12/1. Repeat head CT on 12/2 showed decreased volume and mass effect.  - Post op orders (pain, activity, wound care, VTE prophyllaxis.)  Were managed per neurosurgery   -Close follow-up in neurosurgery clinic is outlined in neurosurgery documentation.  Their office will contact him to make these arrangements for any imaging and/or  "labs needed  -2-week follow-up for incision check and likely staple removal  -See discharge orders for activity instructions and lift restrictions  -Anticipate reevaluating return to work in 4 to 6 weeks  -Oxycodone prescribed per neurosurgery service     Severe alcohol use disorder  Alcohol withdrawal syndrome without complication (H)    History of alcohol withdrawal  No significant w/d sxs at this time other than the baseline tremor.  - CIWA with benzos.  - Thiamine/folate/MVI prescribed at discharge.  -Received gabapentin while hospitalized  -Reported he is \"done with drinking\" and declined chemical dependency referral for treatment.  According to the chemical dependency note he is in the precontemplative stage of change as he does not feel that alcohol use is a problem nor impacting his function.  -Reviewed in detail significant ongoing risk of negative effects of alcohol use, including lowering seizure threshold.  Advised that he should not be driving while taking any oxycodone.  The patient verbalized understanding of these and the other risks of ongoing alcohol use with his mother present at the bedside.     Recent COVID infection  Was noted with COVID one week before Thanksgiving and subsequently has been having some runny nose. Afebrile, no leukocytosis. Neg CXR. No sob.  - Supportive care provided     GERD, h/o G.I. bleed (5/2023)  Had EGD in May 2023 with note of LA grade D esophagitis and not bleeding gastric ulcer  -Continued PTA PPI.     Hypertension  - Resumed PTA toprol.     Essential tremor, possible     He feels this started around the time of his back surgery.   - Outpatient follow up.    Consultations This Hospital Stay   CHEMICAL DEPENDENCY IP CONSULT  NEUROSURGERY IP CONSULT  PHYSICAL THERAPY ADULT IP CONSULT  OCCUPATIONAL THERAPY ADULT IP CONSULT  PSYCHIATRY IP CONSULT  CHEMICAL DEPENDENCY IP CONSULT    Code Status   Full Code    Time Spent on this Encounter   I, Karen Cowan MD, " personally saw the patient today and spent greater than 30 minutes discharging this patient.       Karen Cowan MD  [facility] NEUROSCIENCE UNIT  [address] [place] [place] [address]  Phone: [phone]  ______________________________________________________________________    Physical Exam   Vital Signs: Temp: 98.2  F (36.8  C) Temp src: Oral BP: (!) 141/46 Pulse: 72   Resp: 16 SpO2: 98 % O2 Device: None (Room air)    Weight: 173 lbs 15.09 oz         Primary Care Physician   Mike Rodney    Discharge Orders      CT Head w/o contrast*     Follow-up and recommended labs and tests     Your post-operative follow up appointments have been/will be made for two weeks with a nurse for a well being and wound check visit as well as in six weeks with a provider.     Activity    Discharge instructions:    No lifting of more than 10 pounds until follow up visit.    Ok to shampoo hair, shower / bathe, but do not scrub or submerge incision under water until evaluated post operatively in clinic.    Ok to walk as tolerated, avoid bedrest.    No contact sports until after follow up visit.    No high impact activities such as; running/jogging, snowmobile or 4 el riding or any other recreational vehicles.    Call my office at [phone] for increasing redness, swelling or pus draining from the incision, increased pain or any other questions and concerns.    Go to ER with any seizure activity, mental status change (increasing confusion), difficulty with speech or increasing or acute weakness     Reason for your hospital stay    Fall with subdural hematoma, alcohol abuse problems     Follow-up and recommended labs and tests     Follow up with primary care provider, Mike Rodney, within 7 days for hospital follow- up.  No follow up labs or test are needed.     Diet    Follow this diet upon discharge: Regular Diet Adult       Significant Results and Procedures   See Epic    Discharge  Medications   Current Discharge Medication List        START taking these medications    Details   acetaminophen (TYLENOL) 325 MG tablet Take 3 tablets (975 mg) by mouth every 8 hours  Qty: 27 tablet, Refills: 0    Associated Diagnoses: Subdural hematoma (H)      folic acid (FOLVITE) 1 MG tablet Take 1 tablet (1 mg) by mouth daily  Qty: 30 tablet, Refills: 11    Associated Diagnoses: Alcohol withdrawal syndrome without complication (H)      methocarbamol (ROBAXIN) 500 MG tablet Take 1 tablet (500 mg) by mouth every 6 hours as needed for muscle spasms  Qty: 28 tablet, Refills: 3    Associated Diagnoses: Subdural hematoma (H)      multivitamin w/minerals (THERA-VIT-M) tablet Take 1 tablet by mouth daily    Associated Diagnoses: Alcohol withdrawal syndrome without complication (H)      oxyCODONE (ROXICODONE) 5 MG tablet Take 1 tablet (5 mg) by mouth every 6 hours as needed for pain  Qty: 12 tablet, Refills: 0    Associated Diagnoses: S/P subdural hematoma evacuation; Subdural hematoma (H)           CONTINUE these medications which have NOT CHANGED    Details   metoprolol succinate ER (TOPROL-XL) 50 MG 24 hr tablet Take 50 mg by mouth daily            Allergies   Allergies   Allergen Reactions    Morphine Nausea and Vomiting

## 2023-12-04 NOTE — CONSULTS
Triage and Transition - Consult and Liaison     Joe Shah  December 4, 2023    Session start: 12:18 pm  Session end: 12:35 pm  Session duration in minutes: 17 min  CPT utilized: 30669 - Brief diagnostic assessment (modifier 52)  Patient was seen virtually ([employer] cart or other teleconferencing device).    Diagnosis:   Substance-Related & Addictive Disorders Alcohol Use Disorder   303.90 (F10.20) Severe   , by history;      Plan/Recommendations:   Patient in pre-contemplative stage of change, he does not feel alcohol use is a problem or impacting his functioning. He declines assistance in alcohol use reduction. Involuntary treatment is not indicated at this time.   Patient denies all mental health concerns.   Please enter another psychiatry if further visits are needed. Patients are not followed by Psychiatry C&L Service unless otherwise indicated.     Reason for consult: Psychiatry consult was requested due to alcohol use disorder. Patient was seen by Triage and Transition Consult & Liaison team.     Identifying information: Ravi is 48 year old White  male   followed related to SDH, alcohol withdrawal.     Brief Psychosocial History  Patient is . Patient works fulltime as a .      Summary of Patient Situation  Patient seen. He is minimally engaged. He denies concerns regarding his alcohol use. He reprots he doesn't feel it is impacting his functioning. Denies that it is impacting relationships, work, etc. He is unable to acknowledge any impact on his physical health. Patient states its only negative impact on him is sleeping in later on Saturdays then he normally owuld .He reports he drinks every day, varies how much, most days 4-5 drinks. He states usually more on Friday nights. I inquired about using alcohol while working or prior to , he denies this. He states there is a requirement to not drink 8 hours before a shift. I engaged patient in motivational interviewing, pointing  "out discrepancies. I stated that colten experiencing withdrawal symptoms from alcohol use indicates body is physically addicted and how that can impact his physical health. He was somewhat receptive to this. He denies plan to quit alcohol use.    On further questioning, he denies any hypomania, diana, psychosis, panic disorder, obsessive-compulsive disorder, eating disorder history, trauma history or ADHD symptoms. Patient denies depressive symptoms. He de suicidal ideation. He denies appetite concerns. He denies sleep related concerns, however, does endorse he needs alcohol to fall asleep. He denies anxiety, but later states he uses alcohol to \"take the edge off\" and how much he drinks depends on how hard his day was.     Significant Clinical History  Patient has never been admitted to inpatient psychiatry. Denies history of commitment, ECT, or other treatment. He did some outaptient [facility] years ago. He was also on zoloft at one point.     Notable for an alcohol use disorder, 1 previous treatment at [facility]. He has had several admissions for alcohol use.     Mental Status Exam   Affect: Flat  Appearance: Appropriate   Attention Span/Concentration: Attentive    Eye Contact: Engaged  Fund of Knowledge: Appropriate   Language /Speech Content: Fluent  Language /Speech Volume: Normal   Language /Speech Rate/Productions: Normal   Recent Memory: Intact  Remote Memory: Intact  Mood: Irritable   Orientation:   Person: Yes   Place: Yes  Time of Day: Yes   Date: Yes   Situation (Do they understand why they are here?): Yes   Psychomotor Behavior: Normal   Thought Content: Clear  Thought Form: Intact    Current medications:   Current Facility-Administered Medications   Medication    acetaminophen (TYLENOL) Suppository 650 mg    acetaminophen (TYLENOL) tablet 650 mg    bisacodyl (DULCOLAX) suppository 10 mg    calcium carbonate (TUMS) chewable tablet 1,000 mg    cloNIDine (CATAPRES) tablet 0.1 mg    " diazepam (VALIUM) tablet 10 mg    Or    diazepam (VALIUM) injection 5-10 mg    docusate sodium (COLACE) capsule 100 mg    flumazenil (ROMAZICON) injection 0.2 mg    folic acid (FOLVITE) tablet 1 mg    [START ON 12/7/2023] gabapentin (NEURONTIN) capsule 100 mg    [START ON 12/5/2023] gabapentin (NEURONTIN) capsule 300 mg    gabapentin (NEURONTIN) capsule 600 mg    OLANZapine zydis (zyPREXA) ODT tab 5-10 mg    Or    haloperidol lactate (HALDOL) injection 2.5-5 mg    hydrALAZINE (APRESOLINE) injection 10-20 mg    HYDROmorphone (DILAUDID) injection 0.2 mg    Or    HYDROmorphone (DILAUDID) injection 0.4 mg    hydrOXYzine (ATARAX) tablet 25 mg    labetalol (NORMODYNE/TRANDATE) injection 10-40 mg    lidocaine (LMX4) cream    lidocaine (LMX4) cream    lidocaine 1 % 0.1-1 mL    lidocaine 1 % 0.1-1 mL    magnesium hydroxide (MILK OF MAGNESIA) suspension 30 mL    melatonin tablet 5 mg    methocarbamol (ROBAXIN) tablet 500 mg    metoprolol succinate ER (TOPROL XL) 24 hr tablet 50 mg    multivitamin w/minerals (THERA-VIT-M) tablet 1 tablet    naloxone (NARCAN) injection 0.2 mg    Or    naloxone (NARCAN) injection 0.4 mg    Or    naloxone (NARCAN) injection 0.2 mg    Or    naloxone (NARCAN) injection 0.4 mg    ondansetron (ZOFRAN ODT) ODT tab 4 mg    Or    ondansetron (ZOFRAN) injection 4 mg    oxyCODONE (ROXICODONE) tablet 5 mg    Or    oxyCODONE (ROXICODONE) tablet 10 mg    polyethylene glycol (MIRALAX) Packet 17 g    prochlorperazine (COMPAZINE) injection 10 mg    Or    prochlorperazine (COMPAZINE) tablet 10 mg    Or    prochlorperazine (COMPAZINE) suppository 25 mg    senna-docusate (SENOKOT-S/PERICOLACE) 8.6-50 MG per tablet 1 tablet    senna-docusate (SENOKOT-S/PERICOLACE) 8.6-50 MG per tablet 1 tablet    Or    senna-docusate (SENOKOT-S/PERICOLACE) 8.6-50 MG per tablet 2 tablet    sodium chloride (PF) 0.9% PF flush 3 mL    sodium chloride (PF) 0.9% PF flush 3 mL    sodium chloride (PF) 0.9% PF flush 3 mL    sodium chloride  0.9 % infusion        Therapeutic intervention and progress:  Therapeutic intervention consisted of building therapeutic rapport, active listening, validation, and motivational interviewing .     Sofia Reno [facility]   Triage and Transition - Consult and Liaison   [phone]

## 2023-12-05 NOTE — PLAN OF CARE
Occupational Therapy Discharge Summary    Reason for therapy discharge:    Discharged to home.    Progress towards therapy goal(s). See goals on Care Plan in [facility] electronic health record for goal details.  Goals not met.  Barriers to achieving goals:   discharge from facility.    Therapy recommendation(s):    Continued therapy is recommended.  Rationale/Recommendations:  per notes At baseline, pt IND in all I/ADLs and mobility. Pt slightly limited by cognition and decreased IND in I/ADLs. Recommend home with 24 hour supervision/A in all I/ADLs and OP OT (family can assist in transportation)..

## 2023-12-05 NOTE — PLAN OF CARE
Patient discharged. Discontinued IV. Discharge paperwork reviewed, patient indicates understanding of all follow up instructions and appointments. All questions answered. Patient received new prescriptions from [facility] and indicates understanding of medication schedule. Patient states they have all belongings. Pt family here to drive them home.

## 2023-12-05 NOTE — PLAN OF CARE
Physical Therapy Discharge Summary    Reason for therapy discharge:    Discharged to home with outpatient therapy.    Progress towards therapy goal(s). See goals on Care Plan in [facility] electronic health record for goal details.  Goals partially met.  Barriers to achieving goals:   discharge from facility.    Therapy recommendation(s):    Continued therapy is recommended.  Rationale/Recommendations:  Pt is below baseline mobility levels at this time, but ambulating safely with SEC or no AD with SBA. Safely navigating stairs with use of railing. Noting continued impaired activity tolerance, balance, and strength. Would benefit from follow up with outpatient PT to continue progressing mobility and strength progressions. Recommend discharge home with assist from spouse with higher level IADLs.

## 2023-12-06 ENCOUNTER — PATIENT OUTREACH (OUTPATIENT)
Dept: CARE COORDINATION | Facility: CLINIC | Age: 48
End: 2023-12-06
Payer: COMMERCIAL

## 2023-12-06 DIAGNOSIS — S06.5XAA SUBDURAL HEMATOMA (H): ICD-10-CM

## 2023-12-06 DIAGNOSIS — Z98.890 S/P SUBDURAL HEMATOMA EVACUATION: ICD-10-CM

## 2023-12-06 DIAGNOSIS — Z86.79 S/P SUBDURAL HEMATOMA EVACUATION: ICD-10-CM

## 2023-12-06 RX ORDER — OXYCODONE HYDROCHLORIDE 5 MG/1
5 TABLET ORAL EVERY 6 HOURS PRN
Qty: 30 TABLET | Refills: 0 | Status: SHIPPED | OUTPATIENT
Start: 2023-12-06 | End: 2023-12-14

## 2023-12-06 NOTE — PROGRESS NOTES
[facility] Contact  [facility]/Voicemail     Clinical Data: Post-Discharge Outreach     Outreach attempted x 2.  Left message on patient's voicemail, providing [facility]'s central phone number of [phone] ([phone]) for questions/concerns and/or to schedule an appt with an [facility] provider, if they do not have a PCP.      Plan:  [facility] will do no further outreaches at this time.     SANDY Gaitan  [phone]  [facility]     *Connected Care Resource Team does NOT follow patient ongoing. Referrals are identified based on internal discharge reports and the outreach is to ensure patient has an understanding of their discharge instructions.

## 2023-12-06 NOTE — TELEPHONE ENCOUNTER
Patient calling for a refill of oxycodone.     DOS: 12/1/23  Procedure: CREATION, CRANIAL FANTASMA HOLE, WITH SUBDURAL HEMATOMA EVACUATION Left   Surgeon: Dr. Alban Valle Post Op: 5 days     Current symptom(s): Patient reports incisional pain that goes down to his left ear and a constant left sided headache. He states the pain is currently a 10/10 as he is out of oxycodone. He reports in the hospital he was taking 10mg oxy Q4H, so this has been a hard adjustment. Incision looks good per wife, no redness, swelling, drainage, warmth or fevers. No new symptoms since getting home   Current pain management: has been using 1-2 tabs oxycodone every 4-6 hours, reviewed importance of administration instructions. He reports pain is uncontrolled with 1 tab Q6H    Last fill: 12/4 #12  Next visit: 12/21 with RN    Medication pended for your approval, if appropriate. Pharmacy verified.     Any patient questions or concerns:     Informed patient request will be forwarded to care team.

## 2023-12-06 NOTE — TELEPHONE ENCOUNTER
Patient's wife, Dedra calling on behalf of patient. Patient had surgery with  on 12/1/23. Patient is experiencing pain and will be out of his pain medication. Patient is requesting refill of oxycodone.

## 2023-12-14 DIAGNOSIS — S06.5XAA SUBDURAL HEMATOMA (H): ICD-10-CM

## 2023-12-14 DIAGNOSIS — Z86.79 S/P SUBDURAL HEMATOMA EVACUATION: ICD-10-CM

## 2023-12-14 DIAGNOSIS — Z98.890 S/P SUBDURAL HEMATOMA EVACUATION: ICD-10-CM

## 2023-12-14 RX ORDER — OXYCODONE HYDROCHLORIDE 5 MG/1
5-10 TABLET ORAL EVERY 6 HOURS PRN
Qty: 30 TABLET | Refills: 0 | Status: SHIPPED | OUTPATIENT
Start: 2023-12-14 | End: 2023-12-14

## 2023-12-14 RX ORDER — OXYCODONE HYDROCHLORIDE 5 MG/1
5-10 TABLET ORAL EVERY 6 HOURS PRN
Qty: 20 TABLET | Refills: 0 | Status: SHIPPED | OUTPATIENT
Start: 2023-12-14 | End: 2023-12-17

## 2023-12-14 NOTE — TELEPHONE ENCOUNTER
Patient is calling to request refill of pain medication. Please call patient back at [phone]. Thank you~

## 2023-12-14 NOTE — TELEPHONE ENCOUNTER
"Patient calling for a refill of Oxycodone.     DOS: 12/1/23  Procedure: CREATION, CRANIAL FANTASMA HOLE, WITH SUBDURAL HEMATOMA EVACUATION   Surgeon: Dr. Alban Valle Post Op: 2    Current symptom(s):   Incisional pain to staples, down into pts temples L>R. 7-8/10 last night. \"Splitting headache\" when not taking medications. Increase pain because he stopped taking meds the last couple of days.     Current pain management:   Oxycodone: NA, pt stopped taking over the last few days. Now has increase pain. First few days was taking QID, then tried BID. Pt did take 10 mg at a time depending, pts rx is written for 5 mg. Pt states 10 mg once in the am is what worked best for him in the hospital.    Tylenol: NA > pt states he does not feel like taking this as it was not helping. He does not want to \"take something for the sake of taking it if its not helping\".     Pt instructed to take medications as prescribed. Will ask providers about increase to 10 mgs.     Last fill: 12/6/23  Next visit: 12/21/23    Medication pended for your approval, if appropriate. Pharmacy verified.     Any patient questions or concerns: increase pain because he weaned himself off. Requesting refill and increase dose.     Informed patient request will be forwarded to care team.   "

## 2023-12-14 NOTE — TELEPHONE ENCOUNTER
Patient called clinic to inform that oxycodone is not available in any of the [facility] pharmacies now. He stated that there is a shortage for this medication on entire [facility] pharmacies. Patient is requesting for a substitute pain medication. Requesting a call back at [phone]. Thank you.

## 2023-12-14 NOTE — TELEPHONE ENCOUNTER
Placed call to [facility] -    Canceled oxycodone order.    Pt has [facility] listed in list, [facility] and [facility] have it in stock.. Anuj'd up medication with that pharmacy. Pt updated

## 2023-12-21 ENCOUNTER — ALLIED HEALTH/NURSE VISIT (OUTPATIENT)
Dept: NEUROSURGERY | Facility: CLINIC | Age: 48
End: 2023-12-21
Payer: COMMERCIAL

## 2023-12-21 DIAGNOSIS — Z98.890 S/P CRANIOTOMY: Primary | ICD-10-CM

## 2023-12-21 PROCEDURE — 99207 PR NO CHARGE NURSE ONLY: CPT

## 2023-12-21 NOTE — PATIENT INSTRUCTIONS
Instructions for Patient    Incision  Keep your incision clean and dry at all times.   You may get your incision wet in the shower. Use a gentle shampoo with no fragrance, such as baby shampoo, until incision is completely healed. Allow soap and water to run over the incision and gently pat it dry.   Look at your incision site every day. You  may need a mirror or family member to help you.   Watch for signs of infection  Redness, swelling, warmth, drainage (Green or yellow drainage (pus) from your incision or increased bloody drainage), and fever of 101 degrees or higher  Notify clinic [phone]  No submerging incision in water such as pools, hot tubs, or baths for at least 8 weeks and until the incision is healed  Do not apply lotions or ointments to incision  Avoid coloring your hair or permanent styling until cleared by your surgeon    Activity  Post operative activity limitations for 4 weeks after surgery: No bending forward, no lifting anything greater than 10 pounds (a gallon of milk weighs approximately 8 pounds)  Ok to start driving if it has been 2 weeks since surgery and you have not had seizure activity and you are not taking narcotics.  If you have had a seizure, you may not drive for at least 3 months according to [place] law.  No strenuous activity  We encourage short frequent walks. You may gradually increase the distance as tolerated.    Medications   Refills of pain medication:   Please call the neurosurgery clinic to request 2-3 days before you run out  Weaning from narcotic pain medications  When it is time, start weaning by extending the time between doses.   For example, if you're taking 2 tabs every 4 hours, spread it out to 2 tabs over 4.5, 5, 6 hours. At that point you can certainly cut down to 1 tab, then wean to an as needed basis until completely done with them.Refills: call our clinic 2-3 business days before you are out of medication. A nurse will call you back to obtain a pain  assessment.   Don't take more than 3000mg of Acetaminophen in 24 hours    Encouraged icing for at least 3-4 times throughout the day for 20-30 minutes at a time. Avoid heat to the incision area.   Taking stool softeners regularly can reduce constipation commonly caused by narcotic pain medications.    Call the Clinic or go to the Emergency Room  Development of new pain/symptoms or worsening of symptoms  Incision infection (incisional redness, swelling, warmth, drainage, or fever)    Go to the Emergency Room   If sudden onset of Acute changes in the level of consciousness (increased confusion, memory loss, speech abnormalities), Any change in hearing or vision , increased headaches, or New onset of seizures  Chest pain   Trouble breathing     Post-Op Follow Up Appointments  2 week post- op for staple removal with a nurse. If you live far away, you may see your primary care doctor for a wound check at 2 weeks.   4-6 weeks post-op with nurse practitioner or physician assistant   3 month post-op with nurse practitioner or physician assistant     [facility]  [facility] - [address] [address]  Telephone:  [phone]       Fax:  [phone]

## 2023-12-21 NOTE — PROGRESS NOTES
Post-op Nurse Visit:    Patient presents today s/p CREATION, CRANIAL FANTASMA HOLE, WITH SUBDURAL HEMATOMA EVACUATION  on 12/1/23 per the order of Gio Phoenix MD .  accompanies patient.    Pain  7-8/10 to head in AM, 5-6/10.     Pain Relief Measures:  Oxycodone: not taking now. His regular [facility] out of [facility]. Rx sent to [facility] on 12/14, patient unaware. Advised he check to see if it is there.   Tylenol: TID  Robaxin: denies  Ice: denies      Neuro Assessment  Denies new onset of weakness, acute changes in the level of consciousness (increased confusion, memory loss, speech abnormalities), any change in hearing or vision, increased headaches, or new onset of seizures.    Symptoms compared to pre-op: improved  Equal strength to bilateral upper extremities  Neurologically intact     Incision   Incision inspected. Edges well-approximated. No redness, swelling, drainage, warmth, or fever noted. staple(s)  removed without difficulty. Drain suture clipped.     Return to work discussed: he is a . Not ready to return     All of patient's questions addressed today. Patient was instructed to call with any additional questions/concerns.

## 2024-01-05 ENCOUNTER — HOSPITAL ENCOUNTER (OUTPATIENT)
Dept: CT IMAGING | Facility: CLINIC | Age: 49
Discharge: HOME | End: 2024-01-05
Payer: COMMERCIAL

## 2024-01-05 DIAGNOSIS — Z86.79 S/P SUBDURAL HEMATOMA EVACUATION: ICD-10-CM

## 2024-01-05 DIAGNOSIS — Z98.890 S/P SUBDURAL HEMATOMA EVACUATION: ICD-10-CM

## 2024-01-05 PROCEDURE — 70450 CT HEAD/BRAIN W/O DYE: CPT

## 2024-01-08 ENCOUNTER — VIRTUAL VISIT (OUTPATIENT)
Dept: NEUROSURGERY | Facility: CLINIC | Age: 49
End: 2024-01-08
Payer: COMMERCIAL

## 2024-01-08 DIAGNOSIS — Z98.890 S/P SUBDURAL HEMATOMA EVACUATION: Primary | ICD-10-CM

## 2024-01-08 DIAGNOSIS — Z86.79 S/P SUBDURAL HEMATOMA EVACUATION: Primary | ICD-10-CM

## 2024-01-08 DIAGNOSIS — S06.5XAA SUBDURAL HEMATOMA (H): ICD-10-CM

## 2024-01-08 PROCEDURE — 99024 POSTOP FOLLOW-UP VISIT: CPT | Mod: 93 | Performed by: NURSE PRACTITIONER

## 2024-01-08 NOTE — PROGRESS NOTES
Ravi is a 48 year old who is being evaluated via a billable telephone visit.      How would you like to obtain your AVS? MyChart    Distant Location (provider location):  On-site    Neurosurgery Progress Note:     Patient presents for telephone visit for 1 month follow-up of left-sided subdural hematoma he sustained likely after falling 3 weeks prior to admission.  CT scan with no acute process or expansion of subdural hematoma.  These findings were reviewed with the patient.  Please review radiology read for further details.     On exam: Patient's exam is neuro intact with no focal deficits.  Patient states that he has been headache free for about 10 days now.       PLAN:  -Repeat CT 1 month with a follow-up appointment  -Patient requests return to work letter that will be sent to him digitally     Shayan Trujillo DNP  [facility]  [address] [address]       Phone call duration: 20 minutes

## 2024-01-08 NOTE — NURSING NOTE
Ravi is a 48 year old who is being evaluated via a billable telephone visit.      What phone number would you like to be contacted at? [phone]  How would you like to obtain your AVS? Rajni    Distant Location (provider location):  On-site  Phone call duration: 5 minutes  Within [facility] [place]

## 2024-01-08 NOTE — LETTER
January 8, 2024      Joe Shah  [address] [facility] [address]        To Whom It May Concern:    Joe Shah was seen in our clinic. He may return to work without restrictions.      Sincerely,        Shayan Trujillo, DNP

## 2024-01-08 NOTE — LETTER
1/8/2024         RE: Joe Shah  [address]  [place] [address]        Dear Colleague,    Thank you for referring your patient, Joe Shah, to the [facility]. Please see a copy of my visit note below.    Ravi is a 48 year old who is being evaluated via a billable telephone visit.      How would you like to obtain your AVS? MyChart    Distant Location (provider location):  On-site    Neurosurgery Progress Note:     Patient presents for telephone visit for 1 month follow-up of left-sided subdural hematoma he sustained likely after falling 3 weeks prior to admission.  CT scan with no acute process or expansion of subdural hematoma.  These findings were reviewed with the patient.  Please review radiology read for further details.     On exam: Patient's exam is neuro intact with no focal deficits.  Patient states that he has been headache free for about 10 days now.       PLAN:  -Repeat CT 1 month with a follow-up appointment  -Patient requests return to work letter that will be sent to him digitally     Shayan Trujillo DNP  [facility]  [address] [address]       Phone call duration: 20 minutes            Again, thank you for allowing me to participate in the care of your patient.        Sincerely,        Shayan Trujillo CNP

## 2024-01-11 ENCOUNTER — TELEPHONE (OUTPATIENT)
Dept: NEUROSURGERY | Facility: CLINIC | Age: 49
End: 2024-01-11
Payer: COMMERCIAL

## 2024-01-11 NOTE — TELEPHONE ENCOUNTER
Patient will need to be scheduled one month from 1-8-24 for a Head CT and follow up visit with Shayan Trujillo.    Patient was provided with scheduling phone number and also advised to have call transferred to [facility] so that the CT can be scheduled as well. [phone] was the call back number left in message.

## 2024-01-15 ENCOUNTER — TELEPHONE (OUTPATIENT)
Dept: NEUROSURGERY | Facility: CLINIC | Age: 49
End: 2024-01-15

## 2024-01-15 NOTE — TELEPHONE ENCOUNTER
LVM for patient to call back to schedule a 1 month hospital follow up for SDH with CT a day or two prior to clinic visit. Clinic visit to be with any VINITA at [facility] if possible.

## 2024-01-17 VITALS
HEART RATE: 77 BPM | WEIGHT: 185 LBS | BODY MASS INDEX: 25.8 KG/M2 | SYSTOLIC BLOOD PRESSURE: 135 MMHG | OXYGEN SATURATION: 94 % | RESPIRATION RATE: 16 BRPM | DIASTOLIC BLOOD PRESSURE: 88 MMHG | TEMPERATURE: 98 F

## 2024-01-17 PROCEDURE — 99284 EMERGENCY DEPT VISIT MOD MDM: CPT | Mod: 25

## 2024-01-18 ENCOUNTER — APPOINTMENT (OUTPATIENT)
Dept: CT IMAGING | Facility: CLINIC | Age: 49
End: 2024-01-18
Attending: EMERGENCY MEDICINE
Payer: COMMERCIAL

## 2024-01-18 ENCOUNTER — HOSPITAL ENCOUNTER (EMERGENCY)
Facility: CLINIC | Age: 49
Discharge: HOME | End: 2024-01-18
Attending: EMERGENCY MEDICINE | Admitting: EMERGENCY MEDICINE
Payer: COMMERCIAL

## 2024-01-18 DIAGNOSIS — R51.9 NONINTRACTABLE HEADACHE, UNSPECIFIED CHRONICITY PATTERN, UNSPECIFIED HEADACHE TYPE: ICD-10-CM

## 2024-01-18 LAB
ANION GAP SERPL CALCULATED.3IONS-SCNC: 15 MMOL/L (ref 7–15)
BASOPHILS # BLD AUTO: 0 10E3/UL (ref 0–0.2)
BASOPHILS NFR BLD AUTO: 1 %
BUN SERPL-MCNC: 5.4 MG/DL (ref 6–20)
CALCIUM SERPL-MCNC: 9.2 MG/DL (ref 8.6–10)
CHLORIDE SERPL-SCNC: 100 MMOL/L (ref 98–107)
CREAT SERPL-MCNC: 0.85 MG/DL (ref 0.67–1.17)
DEPRECATED HCO3 PLAS-SCNC: 26 MMOL/L (ref 22–29)
EGFRCR SERPLBLD CKD-EPI 2021: >90 ML/MIN/1.73M2
EOSINOPHIL # BLD AUTO: 0.1 10E3/UL (ref 0–0.7)
EOSINOPHIL NFR BLD AUTO: 2 %
ERYTHROCYTE [DISTWIDTH] IN BLOOD BY AUTOMATED COUNT: 13.5 % (ref 10–15)
GLUCOSE SERPL-MCNC: 120 MG/DL (ref 70–99)
HCT VFR BLD AUTO: 41.5 % (ref 40–53)
HGB BLD-MCNC: 13.9 G/DL (ref 13.3–17.7)
IMM GRANULOCYTES # BLD: 0 10E3/UL
IMM GRANULOCYTES NFR BLD: 0 %
LYMPHOCYTES # BLD AUTO: 1.5 10E3/UL (ref 0.8–5.3)
LYMPHOCYTES NFR BLD AUTO: 33 %
MCH RBC QN AUTO: 32.3 PG (ref 26.5–33)
MCHC RBC AUTO-ENTMCNC: 33.5 G/DL (ref 31.5–36.5)
MCV RBC AUTO: 97 FL (ref 78–100)
MONOCYTES # BLD AUTO: 0.6 10E3/UL (ref 0–1.3)
MONOCYTES NFR BLD AUTO: 12 %
NEUTROPHILS # BLD AUTO: 2.4 10E3/UL (ref 1.6–8.3)
NEUTROPHILS NFR BLD AUTO: 52 %
NRBC # BLD AUTO: 0 10E3/UL
NRBC BLD AUTO-RTO: 0 /100
PLATELET # BLD AUTO: 217 10E3/UL (ref 150–450)
POTASSIUM SERPL-SCNC: 3.7 MMOL/L (ref 3.4–5.3)
RBC # BLD AUTO: 4.3 10E6/UL (ref 4.4–5.9)
SODIUM SERPL-SCNC: 141 MMOL/L (ref 135–145)
WBC # BLD AUTO: 4.6 10E3/UL (ref 4–11)

## 2024-01-18 PROCEDURE — 85025 COMPLETE CBC W/AUTO DIFF WBC: CPT | Performed by: EMERGENCY MEDICINE

## 2024-01-18 PROCEDURE — 36415 COLL VENOUS BLD VENIPUNCTURE: CPT | Performed by: EMERGENCY MEDICINE

## 2024-01-18 PROCEDURE — 70450 CT HEAD/BRAIN W/O DYE: CPT

## 2024-01-18 PROCEDURE — 80048 BASIC METABOLIC PNL TOTAL CA: CPT | Performed by: EMERGENCY MEDICINE

## 2024-01-18 ASSESSMENT — ACTIVITIES OF DAILY LIVING (ADL)
ADLS_ACUITY_SCORE: 35
ADLS_ACUITY_SCORE: 35

## 2024-01-18 NOTE — ED TRIAGE NOTES
Pt c/o post op problem. Pt recently had brain surgery this last december. Pt states he has two incisions on his HA and one of the incision 8/10 pain and tender to touch starting yesterday. Pt also endores HA. Pt Last CT of his brain was on 1/5 which was WNL. Pt denies blood thinners.     Triage Assessment (Adult)       Row Name 01/17/24 0649          Triage Assessment    Airway WDL WDL        Respiratory WDL    Respiratory WDL WDL        Skin Circulation/Temperature WDL    Skin Circulation/Temperature WDL WDL        Cardiac WDL    Cardiac WDL WDL        Peripheral/Neurovascular WDL    Peripheral Neurovascular WDL WDL        Cognitive/Neuro/Behavioral WDL    Cognitive/Neuro/Behavioral WDL WDL

## 2024-01-18 NOTE — ED PROVIDER NOTES
History     Chief Complaint:  Post-op Problem       HPI   Joe Shah is a 48 year old male who presents to the ED for evaluation of 3 days of a posterior left-sided headache and tenderness to the scalp in the area of his prior cranial bur hole and subdural hematoma evacuation.  He denies any new trauma, fall or injury.  No vomiting or nausea.  He denies any numbness tingling or weakness.  No fever.  No neck pain or stiffness.  He reports he has otherwise been doing well and has returned to work.  He is not anticoagulated.  He continues to use alcohol regularly.  He was told to return to the hospital for urgent evaluation if he developed any new headache and therefore he presented to the ED today.  He denies any other symptoms at this time.      Independent Historian:   None - Patient Only    Review of External Notes:  I reviewed the patient's neurosurgery progress note from 1/8/2024:  Patient presented that day for telephone visit for 1 month follow-up of left-sided subdural hematoma which he sustained after falling.  CT was stable at that time.  Plan was for ongoing supportive outpatient care with repeat head CT in 1 month.  He was cleared to return to work at that time.    Medications:    acetaminophen (TYLENOL) 325 MG tablet  folic acid (FOLVITE) 1 MG tablet  methocarbamol (ROBAXIN) 500 MG tablet  metoprolol succinate ER (TOPROL-XL) 50 MG 24 hr tablet  multivitamin w/minerals (THERA-VIT-M) tablet        Past Medical History:    Past Medical History:   Diagnosis Date    Anxiety     Back pain     Depressive disorder     Hypertension     Substance abuse (H)        Past Surgical History:    Past Surgical History:   Procedure Laterality Date    FANTASMA HOLE(S) EVACUATE HEMATOMA SUBDURAL N/A 12/1/2023    Procedure: CREATION, CRANIAL FANTASMA HOLE, WITH SUBDURAL HEMATOMA EVACUATION;  Surgeon: Gio Phoenix MD;  Location: [facility] OR    ENT SURGERY      ESOPHAGOSCOPY, GASTROSCOPY, DUODENOSCOPY (EGD), COMBINED N/A  9/12/2019    Procedure: ESOPHAGOGASTRODUODENOSCOPY (EGD);  Surgeon: Scott Mcrae MD;  Location:  GI    ESOPHAGOSCOPY, GASTROSCOPY, DUODENOSCOPY (EGD), COMBINED N/A 5/27/2023    Procedure: Esophagoscopy, gastroscopy, duodenoscopy (EGD), combined;  Surgeon: Scott Mcrae MD;  Location:  GI    NECK SURGERY          Physical Exam   Patient Vitals for the past 24 hrs:   BP Temp Pulse Resp SpO2 Weight   01/17/24 2353 135/88 98  F (36.7  C) 77 16 94 % 83.9 kg (185 lb)        Physical Exam  General: Well appearing, nontoxic.  Resting comfortably  Head:  Scalp, face, and head appear normal. Well healed left parietal surgical incisions which are clean dry and intact.  No swelling, erythema or induration.  No fluctuance.  Eyes:  Pupils are equal, round, reactive to light EOMI, no nystagmus     Conjunctivae non-injected and sclerae white  ENT:    The external nose is normal    Pinnae are normal  Neck:  Normal range of motion    There is no rigidity noted    Trachea is in the midline  CV:  Regular rate and rhythm     Normal S1/S2, no S3/S4    No murmur or rub. Radial pulses 2+ bilaterally.  Resp:  Lungs are clear and equal bilaterally  There is no tachypnea    No increased work of breathing    No rales, wheezing, or rhonchi  GI:  Abdomen is soft, no rigidity or guarding    No distension, or mass    No tenderness or rebound tenderness   MS:  Normal muscular tone    Symmetric motor strength    No lower extremity edema  Skin:  No rash or acute skin lesions noted  Neuro:  A&Ox3, GCS 15    CN II - XII intact    Speech clear, fluent, and normal    Strength 5/5 and symmetric in bilateral upper and lower extremities.    No pronator drift. No leg drift. SILT throughout.    No ankle clonus    FTN testing normal. No tremor.     Gait normal    No meningismus   Psych: Normal affect. Appropriate interactions.    Emergency Department Course     Imaging:  CT Head w/o Contrast   Final Result   IMPRESSION:   1.  Persistent  but decreased in size intermediate attenuating subdural hematoma over the left frontal lobe with minimal underlying mass effect.   2.  No convincing interval acute intracranial hemorrhage, infarct or hydrocephalus.                              Laboratory:  Labs Ordered and Resulted from Time of ED Arrival to Time of ED Departure   BASIC METABOLIC PANEL - Abnormal       Result Value    Sodium 141      Potassium 3.7      Chloride 100      Carbon Dioxide (CO2) 26      Anion Gap 15      Urea Nitrogen 5.4 (*)     Creatinine 0.85      GFR Estimate >90      Calcium 9.2      Glucose 120 (*)    CBC WITH PLATELETS AND DIFFERENTIAL - Abnormal    WBC Count 4.6      RBC Count 4.30 (*)     Hemoglobin 13.9      Hematocrit 41.5      MCV 97      MCH 32.3      MCHC 33.5      RDW 13.5      Platelet Count 217      % Neutrophils 52      % Lymphocytes 33      % Monocytes 12      % Eosinophils 2      % Basophils 1      % Immature Granulocytes 0      NRBCs per 100 WBC 0      Absolute Neutrophils 2.4      Absolute Lymphocytes 1.5      Absolute Monocytes 0.6      Absolute Eosinophils 0.1      Absolute Basophils 0.0      Absolute Immature Granulocytes 0.0      Absolute NRBCs 0.0          Procedures     Emergency Department Course & Assessments:             Interventions:  Medications - No data to display     Assessments, Independent Interpretation, Consults/Discussion of Management/Tests:  ED Course as of 01/18/24 0551   Thu Jan 18, 2024   0255 Patient seen and evaluated    0442 Patient updated on findings and plan of care.       Social Determinants of Health affecting care:  None    Disposition:  The patient was discharged to home.     Impression & Plan      Medical Decision Making:  Joe Shah is a 48 year old male who presents to the ED for evaluation of headache in the setting of recent surgical evacuation of the subdural hemorrhage.  On my evaluation he is well-appearing, hemodynamically stable and afebrile.  No focal neurologic  deficits.  He denies any new trauma or injury.  He is not anticoagulated.  CT scan of the head showed improving extra-axial fluid collection decreasing from 6 mm down to 3 mm.  No evidence of any new hemorrhage or other acute process.  Examination of the scalp, skin and soft tissues did not reveal any evidence of skin or soft tissue infection, or any other postoperative complication.  ED evaluation is otherwise reassuring.  Headache is benign at this time and patient reports he is feeling improved.  I recommended close outpatient follow-up with his neurosurgery team as well as his primary care physician.  Close return precautions were provided and he was discharged in stable condition..      Diagnosis:    ICD-10-CM    1. Nonintractable headache, unspecified chronicity pattern, unspecified headache type  R51.9            Discharge Medications:  Discharge Medication List as of 1/18/2024  5:10 AM             1/18/2024   Myron Simon MD Daro, Ryan Clay, MD  01/18/24 0553

## 2024-01-22 ENCOUNTER — APPOINTMENT (OUTPATIENT)
Dept: ULTRASOUND IMAGING | Facility: CLINIC | Age: 49
DRG: 897 | End: 2024-01-22
Attending: EMERGENCY MEDICINE
Payer: COMMERCIAL

## 2024-01-22 ENCOUNTER — HOSPITAL ENCOUNTER (INPATIENT)
Facility: CLINIC | Age: 49
LOS: 3 days | Discharge: HOME | DRG: 897 | End: 2024-01-25
Attending: EMERGENCY MEDICINE | Admitting: STUDENT IN AN ORGANIZED HEALTH CARE EDUCATION/TRAINING PROGRAM
Payer: COMMERCIAL

## 2024-01-22 ENCOUNTER — APPOINTMENT (OUTPATIENT)
Dept: CT IMAGING | Facility: CLINIC | Age: 49
DRG: 897 | End: 2024-01-22
Attending: EMERGENCY MEDICINE
Payer: COMMERCIAL

## 2024-01-22 DIAGNOSIS — F10.230 ALCOHOL DEPENDENCE WITH UNCOMPLICATED WITHDRAWAL (H): ICD-10-CM

## 2024-01-22 DIAGNOSIS — F10.930 ALCOHOL WITHDRAWAL SYNDROME WITHOUT COMPLICATION (H): ICD-10-CM

## 2024-01-22 DIAGNOSIS — K21.9 GASTROESOPHAGEAL REFLUX DISEASE WITHOUT ESOPHAGITIS: Primary | ICD-10-CM

## 2024-01-22 DIAGNOSIS — S06.5XAA SUBDURAL HEMATOMA (H): ICD-10-CM

## 2024-01-22 DIAGNOSIS — R11.2 NAUSEA AND VOMITING, UNSPECIFIED VOMITING TYPE: ICD-10-CM

## 2024-01-22 LAB
ALBUMIN SERPL BCG-MCNC: 4.9 G/DL (ref 3.5–5.2)
ALP SERPL-CCNC: 96 U/L (ref 40–150)
ALT SERPL W P-5'-P-CCNC: 85 U/L (ref 0–70)
ANION GAP SERPL CALCULATED.3IONS-SCNC: 29 MMOL/L (ref 7–15)
AST SERPL W P-5'-P-CCNC: 194 U/L (ref 0–45)
ATRIAL RATE - MUSE: 150 BPM
BASOPHILS # BLD AUTO: 0 10E3/UL (ref 0–0.2)
BASOPHILS NFR BLD AUTO: 0 %
BILIRUB SERPL-MCNC: 2.2 MG/DL
BUN SERPL-MCNC: 9.7 MG/DL (ref 6–20)
CALCIUM SERPL-MCNC: 10.4 MG/DL (ref 8.6–10)
CHLORIDE SERPL-SCNC: 91 MMOL/L (ref 98–107)
CREAT SERPL-MCNC: 0.91 MG/DL (ref 0.67–1.17)
DEPRECATED HCO3 PLAS-SCNC: 20 MMOL/L (ref 22–29)
DIASTOLIC BLOOD PRESSURE - MUSE: NORMAL MMHG
EGFRCR SERPLBLD CKD-EPI 2021: >90 ML/MIN/1.73M2
EOSINOPHIL # BLD AUTO: 0 10E3/UL (ref 0–0.7)
EOSINOPHIL NFR BLD AUTO: 0 %
ERYTHROCYTE [DISTWIDTH] IN BLOOD BY AUTOMATED COUNT: 13.9 % (ref 10–15)
ETHANOL SERPL-MCNC: <0.01 G/DL
GLUCOSE SERPL-MCNC: 215 MG/DL (ref 70–99)
HCT VFR BLD AUTO: 44.4 % (ref 40–53)
HGB BLD-MCNC: 14.9 G/DL (ref 13.3–17.7)
IMM GRANULOCYTES # BLD: 0 10E3/UL
IMM GRANULOCYTES NFR BLD: 0 %
INTERPRETATION ECG - MUSE: NORMAL
LIPASE SERPL-CCNC: 44 U/L (ref 13–60)
LYMPHOCYTES # BLD AUTO: 0.3 10E3/UL (ref 0.8–5.3)
LYMPHOCYTES NFR BLD AUTO: 3 %
MAGNESIUM SERPL-MCNC: 1.3 MG/DL (ref 1.7–2.3)
MAGNESIUM SERPL-MCNC: 2.7 MG/DL (ref 1.7–2.3)
MCH RBC QN AUTO: 32.7 PG (ref 26.5–33)
MCHC RBC AUTO-ENTMCNC: 33.6 G/DL (ref 31.5–36.5)
MCV RBC AUTO: 98 FL (ref 78–100)
MONOCYTES # BLD AUTO: 0.8 10E3/UL (ref 0–1.3)
MONOCYTES NFR BLD AUTO: 9 %
NEUTROPHILS # BLD AUTO: 7.1 10E3/UL (ref 1.6–8.3)
NEUTROPHILS NFR BLD AUTO: 88 %
NRBC # BLD AUTO: 0 10E3/UL
NRBC BLD AUTO-RTO: 0 /100
P AXIS - MUSE: 50 DEGREES
PHOSPHATE SERPL-MCNC: 3.8 MG/DL (ref 2.5–4.5)
PLATELET # BLD AUTO: 195 10E3/UL (ref 150–450)
POTASSIUM SERPL-SCNC: 4.6 MMOL/L (ref 3.4–5.3)
PR INTERVAL - MUSE: 118 MS
PROT SERPL-MCNC: 8.8 G/DL (ref 6.4–8.3)
QRS DURATION - MUSE: 68 MS
QT - MUSE: 320 MS
QTC - MUSE: 505 MS
R AXIS - MUSE: 50 DEGREES
RBC # BLD AUTO: 4.55 10E6/UL (ref 4.4–5.9)
SODIUM SERPL-SCNC: 140 MMOL/L (ref 135–145)
SYSTOLIC BLOOD PRESSURE - MUSE: NORMAL MMHG
T AXIS - MUSE: 64 DEGREES
VENTRICULAR RATE- MUSE: 150 BPM
WBC # BLD AUTO: 8.1 10E3/UL (ref 4–11)

## 2024-01-22 PROCEDURE — 76705 ECHO EXAM OF ABDOMEN: CPT

## 2024-01-22 PROCEDURE — 250N000013 HC RX MED GY IP 250 OP 250 PS 637: Performed by: NURSE PRACTITIONER

## 2024-01-22 PROCEDURE — 83735 ASSAY OF MAGNESIUM: CPT | Performed by: NURSE PRACTITIONER

## 2024-01-22 PROCEDURE — 82077 ASSAY SPEC XCP UR&BREATH IA: CPT | Performed by: EMERGENCY MEDICINE

## 2024-01-22 PROCEDURE — 250N000011 HC RX IP 250 OP 636

## 2024-01-22 PROCEDURE — 93005 ELECTROCARDIOGRAM TRACING: CPT

## 2024-01-22 PROCEDURE — 80053 COMPREHEN METABOLIC PANEL: CPT | Performed by: EMERGENCY MEDICINE

## 2024-01-22 PROCEDURE — 99223 1ST HOSP IP/OBS HIGH 75: CPT | Performed by: NURSE PRACTITIONER

## 2024-01-22 PROCEDURE — 120N000001 HC R&B MED SURG/OB

## 2024-01-22 PROCEDURE — 36415 COLL VENOUS BLD VENIPUNCTURE: CPT | Performed by: NURSE PRACTITIONER

## 2024-01-22 PROCEDURE — 96365 THER/PROPH/DIAG IV INF INIT: CPT

## 2024-01-22 PROCEDURE — 96375 TX/PRO/DX INJ NEW DRUG ADDON: CPT

## 2024-01-22 PROCEDURE — 96368 THER/DIAG CONCURRENT INF: CPT

## 2024-01-22 PROCEDURE — 36415 COLL VENOUS BLD VENIPUNCTURE: CPT | Performed by: EMERGENCY MEDICINE

## 2024-01-22 PROCEDURE — HZ2ZZZZ DETOXIFICATION SERVICES FOR SUBSTANCE ABUSE TREATMENT: ICD-10-PCS | Performed by: NURSE PRACTITIONER

## 2024-01-22 PROCEDURE — 85025 COMPLETE CBC W/AUTO DIFF WBC: CPT | Performed by: EMERGENCY MEDICINE

## 2024-01-22 PROCEDURE — 258N000003 HC RX IP 258 OP 636: Performed by: EMERGENCY MEDICINE

## 2024-01-22 PROCEDURE — 83690 ASSAY OF LIPASE: CPT | Performed by: EMERGENCY MEDICINE

## 2024-01-22 PROCEDURE — 258N000003 HC RX IP 258 OP 636: Performed by: NURSE PRACTITIONER

## 2024-01-22 PROCEDURE — 250N000011 HC RX IP 250 OP 636: Performed by: EMERGENCY MEDICINE

## 2024-01-22 PROCEDURE — 84100 ASSAY OF PHOSPHORUS: CPT | Performed by: EMERGENCY MEDICINE

## 2024-01-22 PROCEDURE — 70450 CT HEAD/BRAIN W/O DYE: CPT

## 2024-01-22 PROCEDURE — 250N000013 HC RX MED GY IP 250 OP 250 PS 637: Performed by: EMERGENCY MEDICINE

## 2024-01-22 PROCEDURE — 250N000011 HC RX IP 250 OP 636: Performed by: NURSE PRACTITIONER

## 2024-01-22 PROCEDURE — 83735 ASSAY OF MAGNESIUM: CPT | Performed by: EMERGENCY MEDICINE

## 2024-01-22 PROCEDURE — 250N000009 HC RX 250: Performed by: EMERGENCY MEDICINE

## 2024-01-22 PROCEDURE — 99285 EMERGENCY DEPT VISIT HI MDM: CPT | Mod: 25

## 2024-01-22 RX ORDER — METOPROLOL SUCCINATE 50 MG/1
50 TABLET, EXTENDED RELEASE ORAL DAILY
Status: DISCONTINUED | OUTPATIENT
Start: 2024-01-22 | End: 2024-01-25 | Stop reason: HOSPADM

## 2024-01-22 RX ORDER — SODIUM CHLORIDE, SODIUM LACTATE, POTASSIUM CHLORIDE, CALCIUM CHLORIDE 600; 310; 30; 20 MG/100ML; MG/100ML; MG/100ML; MG/100ML
INJECTION, SOLUTION INTRAVENOUS CONTINUOUS
Status: DISCONTINUED | OUTPATIENT
Start: 2024-01-22 | End: 2024-01-24

## 2024-01-22 RX ORDER — LORAZEPAM 2 MG/ML
2 INJECTION INTRAMUSCULAR ONCE
Status: COMPLETED | OUTPATIENT
Start: 2024-01-22 | End: 2024-01-22

## 2024-01-22 RX ORDER — GABAPENTIN 300 MG/1
300 CAPSULE ORAL 3 TIMES DAILY PRN
Status: ON HOLD | COMMUNITY
End: 2024-01-25

## 2024-01-22 RX ORDER — AMOXICILLIN 250 MG
2 CAPSULE ORAL 2 TIMES DAILY PRN
Status: DISCONTINUED | OUTPATIENT
Start: 2024-01-22 | End: 2024-01-25 | Stop reason: HOSPADM

## 2024-01-22 RX ORDER — LORAZEPAM 1 MG/1
1-2 TABLET ORAL EVERY 30 MIN PRN
Status: DISCONTINUED | OUTPATIENT
Start: 2024-01-22 | End: 2024-01-24

## 2024-01-22 RX ORDER — LIDOCAINE 40 MG/G
CREAM TOPICAL
Status: DISCONTINUED | OUTPATIENT
Start: 2024-01-22 | End: 2024-01-25 | Stop reason: HOSPADM

## 2024-01-22 RX ORDER — OLANZAPINE 5 MG/1
5-10 TABLET, ORALLY DISINTEGRATING ORAL EVERY 6 HOURS PRN
Status: DISCONTINUED | OUTPATIENT
Start: 2024-01-22 | End: 2024-01-25 | Stop reason: HOSPADM

## 2024-01-22 RX ORDER — LORAZEPAM 2 MG/ML
1-2 INJECTION INTRAMUSCULAR EVERY 30 MIN PRN
Status: DISCONTINUED | OUTPATIENT
Start: 2024-01-22 | End: 2024-01-24

## 2024-01-22 RX ORDER — ONDANSETRON 2 MG/ML
4 INJECTION INTRAMUSCULAR; INTRAVENOUS ONCE
Status: COMPLETED | OUTPATIENT
Start: 2024-01-22 | End: 2024-01-22

## 2024-01-22 RX ORDER — BIOTIN 10 MG
2 TABLET ORAL DAILY
Status: ON HOLD | COMMUNITY
End: 2024-03-13

## 2024-01-22 RX ORDER — CALCIUM CARBONATE 500 MG/1
1000 TABLET, CHEWABLE ORAL 4 TIMES DAILY PRN
Status: DISCONTINUED | OUTPATIENT
Start: 2024-01-22 | End: 2024-01-25 | Stop reason: HOSPADM

## 2024-01-22 RX ORDER — FLUMAZENIL 0.1 MG/ML
0.2 INJECTION, SOLUTION INTRAVENOUS
Status: DISCONTINUED | OUTPATIENT
Start: 2024-01-22 | End: 2024-01-25 | Stop reason: HOSPADM

## 2024-01-22 RX ORDER — HALOPERIDOL 5 MG/ML
2.5-5 INJECTION INTRAMUSCULAR EVERY 6 HOURS PRN
Status: DISCONTINUED | OUTPATIENT
Start: 2024-01-22 | End: 2024-01-25 | Stop reason: HOSPADM

## 2024-01-22 RX ORDER — PANTOPRAZOLE SODIUM 40 MG/1
40 TABLET, DELAYED RELEASE ORAL DAILY
Status: ON HOLD | COMMUNITY
End: 2024-03-13

## 2024-01-22 RX ORDER — ONDANSETRON 2 MG/ML
4 INJECTION INTRAMUSCULAR; INTRAVENOUS EVERY 6 HOURS PRN
Status: DISCONTINUED | OUTPATIENT
Start: 2024-01-22 | End: 2024-01-22

## 2024-01-22 RX ORDER — ONDANSETRON 4 MG/1
4 TABLET, ORALLY DISINTEGRATING ORAL EVERY 6 HOURS PRN
Status: DISCONTINUED | OUTPATIENT
Start: 2024-01-22 | End: 2024-01-22

## 2024-01-22 RX ORDER — GABAPENTIN 600 MG/1
1200 TABLET ORAL ONCE
Status: COMPLETED | OUTPATIENT
Start: 2024-01-22 | End: 2024-01-22

## 2024-01-22 RX ORDER — GABAPENTIN 300 MG/1
600 CAPSULE ORAL EVERY 8 HOURS
Qty: 12 CAPSULE | Refills: 0 | Status: DISCONTINUED | OUTPATIENT
Start: 2024-01-25 | End: 2024-01-25 | Stop reason: HOSPADM

## 2024-01-22 RX ORDER — PROCHLORPERAZINE MALEATE 10 MG
10 TABLET ORAL EVERY 6 HOURS PRN
Status: DISCONTINUED | OUTPATIENT
Start: 2024-01-22 | End: 2024-01-25 | Stop reason: HOSPADM

## 2024-01-22 RX ORDER — ACETAMINOPHEN 325 MG/1
650 TABLET ORAL EVERY 4 HOURS PRN
Status: DISCONTINUED | OUTPATIENT
Start: 2024-01-22 | End: 2024-01-25 | Stop reason: HOSPADM

## 2024-01-22 RX ORDER — SODIUM CHLORIDE 9 MG/ML
INJECTION, SOLUTION INTRAVENOUS CONTINUOUS
Status: DISCONTINUED | OUTPATIENT
Start: 2024-01-22 | End: 2024-01-22

## 2024-01-22 RX ORDER — LORAZEPAM 1 MG/1
1-2 TABLET ORAL EVERY 30 MIN PRN
Status: DISCONTINUED | OUTPATIENT
Start: 2024-01-22 | End: 2024-01-22

## 2024-01-22 RX ORDER — AMOXICILLIN 250 MG
1 CAPSULE ORAL 2 TIMES DAILY PRN
Status: DISCONTINUED | OUTPATIENT
Start: 2024-01-22 | End: 2024-01-25 | Stop reason: HOSPADM

## 2024-01-22 RX ORDER — MAGNESIUM SULFATE HEPTAHYDRATE 40 MG/ML
4 INJECTION, SOLUTION INTRAVENOUS ONCE
Status: COMPLETED | OUTPATIENT
Start: 2024-01-22 | End: 2024-01-22

## 2024-01-22 RX ORDER — MAGNESIUM SULFATE HEPTAHYDRATE 40 MG/ML
2 INJECTION, SOLUTION INTRAVENOUS ONCE
Status: COMPLETED | OUTPATIENT
Start: 2024-01-22 | End: 2024-01-22

## 2024-01-22 RX ORDER — MULTIPLE VITAMINS W/ MINERALS TAB 9MG-400MCG
1 TAB ORAL DAILY
Status: DISCONTINUED | OUTPATIENT
Start: 2024-01-23 | End: 2024-01-25 | Stop reason: HOSPADM

## 2024-01-22 RX ORDER — ACETAMINOPHEN 650 MG/1
650 SUPPOSITORY RECTAL EVERY 4 HOURS PRN
Status: DISCONTINUED | OUTPATIENT
Start: 2024-01-22 | End: 2024-01-25 | Stop reason: HOSPADM

## 2024-01-22 RX ORDER — ONDANSETRON 2 MG/ML
INJECTION INTRAMUSCULAR; INTRAVENOUS
Status: COMPLETED
Start: 2024-01-22 | End: 2024-01-22

## 2024-01-22 RX ORDER — OXYCODONE HYDROCHLORIDE 5 MG/1
5-10 TABLET ORAL EVERY 6 HOURS PRN
Status: ON HOLD | COMMUNITY
End: 2024-01-25

## 2024-01-22 RX ORDER — GABAPENTIN 300 MG/1
300 CAPSULE ORAL EVERY 8 HOURS
Qty: 6 CAPSULE | Refills: 0 | Status: DISCONTINUED | OUTPATIENT
Start: 2024-01-27 | End: 2024-01-25 | Stop reason: HOSPADM

## 2024-01-22 RX ORDER — FOLIC ACID 1 MG/1
1 TABLET ORAL DAILY
Status: DISCONTINUED | OUTPATIENT
Start: 2024-01-23 | End: 2024-01-25 | Stop reason: HOSPADM

## 2024-01-22 RX ORDER — PROCHLORPERAZINE 25 MG
25 SUPPOSITORY, RECTAL RECTAL EVERY 12 HOURS PRN
Status: DISCONTINUED | OUTPATIENT
Start: 2024-01-22 | End: 2024-01-25 | Stop reason: HOSPADM

## 2024-01-22 RX ORDER — LORAZEPAM 2 MG/ML
1-2 INJECTION INTRAMUSCULAR EVERY 30 MIN PRN
Status: DISCONTINUED | OUTPATIENT
Start: 2024-01-22 | End: 2024-01-22

## 2024-01-22 RX ORDER — GABAPENTIN 100 MG/1
100 CAPSULE ORAL EVERY 8 HOURS
Qty: 9 CAPSULE | Refills: 0 | Status: DISCONTINUED | OUTPATIENT
Start: 2024-01-29 | End: 2024-01-25 | Stop reason: HOSPADM

## 2024-01-22 RX ORDER — CLONIDINE HYDROCHLORIDE 0.1 MG/1
0.1 TABLET ORAL EVERY 8 HOURS
Status: DISCONTINUED | OUTPATIENT
Start: 2024-01-22 | End: 2024-01-25 | Stop reason: HOSPADM

## 2024-01-22 RX ORDER — GABAPENTIN 300 MG/1
900 CAPSULE ORAL EVERY 8 HOURS
Qty: 27 CAPSULE | Refills: 0 | Status: COMPLETED | OUTPATIENT
Start: 2024-01-22 | End: 2024-01-25

## 2024-01-22 RX ORDER — FAMOTIDINE 20 MG/1
20 TABLET, FILM COATED ORAL 2 TIMES DAILY
Status: DISCONTINUED | OUTPATIENT
Start: 2024-01-22 | End: 2024-01-25 | Stop reason: HOSPADM

## 2024-01-22 RX ADMIN — LORAZEPAM 2 MG: 2 INJECTION INTRAMUSCULAR; INTRAVENOUS at 07:36

## 2024-01-22 RX ADMIN — CLONIDINE HYDROCHLORIDE 0.1 MG: 0.1 TABLET ORAL at 20:14

## 2024-01-22 RX ADMIN — MAGNESIUM SULFATE HEPTAHYDRATE 2 G: 40 INJECTION, SOLUTION INTRAVENOUS at 08:05

## 2024-01-22 RX ADMIN — GABAPENTIN 1200 MG: 600 TABLET, FILM COATED ORAL at 12:59

## 2024-01-22 RX ADMIN — LORAZEPAM 2 MG: 1 TABLET ORAL at 08:40

## 2024-01-22 RX ADMIN — LORAZEPAM 1 MG: 1 TABLET ORAL at 13:10

## 2024-01-22 RX ADMIN — SODIUM CHLORIDE 1000 ML: 9 INJECTION, SOLUTION INTRAVENOUS at 07:38

## 2024-01-22 RX ADMIN — LORAZEPAM 1 MG: 1 TABLET ORAL at 11:46

## 2024-01-22 RX ADMIN — METOPROLOL SUCCINATE 50 MG: 50 TABLET, EXTENDED RELEASE ORAL at 18:24

## 2024-01-22 RX ADMIN — ONDANSETRON 4 MG: 2 INJECTION INTRAMUSCULAR; INTRAVENOUS at 07:00

## 2024-01-22 RX ADMIN — SODIUM CHLORIDE, POTASSIUM CHLORIDE, SODIUM LACTATE AND CALCIUM CHLORIDE: 600; 310; 30; 20 INJECTION, SOLUTION INTRAVENOUS at 20:16

## 2024-01-22 RX ADMIN — THIAMINE HYDROCHLORIDE: 100 INJECTION, SOLUTION INTRAMUSCULAR; INTRAVENOUS at 08:41

## 2024-01-22 RX ADMIN — GABAPENTIN 900 MG: 300 CAPSULE ORAL at 20:14

## 2024-01-22 RX ADMIN — CLONIDINE HYDROCHLORIDE 0.1 MG: 0.1 TABLET ORAL at 11:46

## 2024-01-22 RX ADMIN — FAMOTIDINE 20 MG: 10 INJECTION, SOLUTION INTRAVENOUS at 09:49

## 2024-01-22 RX ADMIN — SODIUM CHLORIDE, POTASSIUM CHLORIDE, SODIUM LACTATE AND CALCIUM CHLORIDE: 600; 310; 30; 20 INJECTION, SOLUTION INTRAVENOUS at 11:39

## 2024-01-22 RX ADMIN — ONDANSETRON 4 MG: 2 INJECTION INTRAMUSCULAR; INTRAVENOUS at 11:44

## 2024-01-22 RX ADMIN — FAMOTIDINE 20 MG: 20 TABLET ORAL at 20:14

## 2024-01-22 RX ADMIN — ACETAMINOPHEN 650 MG: 325 TABLET, FILM COATED ORAL at 22:38

## 2024-01-22 RX ADMIN — LORAZEPAM 2 MG: 1 TABLET ORAL at 09:22

## 2024-01-22 RX ADMIN — MAGNESIUM SULFATE IN WATER 4 G: 40 INJECTION, SOLUTION INTRAVENOUS at 13:01

## 2024-01-22 ASSESSMENT — ACTIVITIES OF DAILY LIVING (ADL)
ADLS_ACUITY_SCORE: 35
ADLS_ACUITY_SCORE: 26
ADLS_ACUITY_SCORE: 35
ADLS_ACUITY_SCORE: 26
ADLS_ACUITY_SCORE: 26
ADLS_ACUITY_SCORE: 27

## 2024-01-22 ASSESSMENT — LIFESTYLE VARIABLES
HEADACHE, FULLNESS IN HEAD: MODERATE
HEADACHE, FULLNESS IN HEAD: VERY MILD
TREMOR: 3
ANXIETY: NO ANXIETY, AT EASE
AGITATION: SOMEWHAT MORE THAN NORMAL ACTIVITY
ANXIETY: NO ANXIETY, AT EASE
ORIENTATION AND CLOUDING OF SENSORIUM: ORIENTED AND CAN DO SERIAL ADDITIONS
ORIENTATION AND CLOUDING OF SENSORIUM: ORIENTED AND CAN DO SERIAL ADDITIONS
HEADACHE, FULLNESS IN HEAD: MODERATE
AUDITORY DISTURBANCES: NOT PRESENT
AGITATION: NORMAL ACTIVITY
TREMOR: 3
AGITATION: NORMAL ACTIVITY
VISUAL DISTURBANCES: NOT PRESENT
NAUSEA AND VOMITING: MILD NAUSEA WITH NO VOMITING
TOTAL SCORE: 10
NAUSEA AND VOMITING: 3
PAROXYSMAL SWEATS: NO SWEAT VISIBLE
AGITATION: SOMEWHAT MORE THAN NORMAL ACTIVITY
AUDITORY DISTURBANCES: NOT PRESENT
NAUSEA AND VOMITING: 6
ORIENTATION AND CLOUDING OF SENSORIUM: ORIENTED AND CAN DO SERIAL ADDITIONS
ANXIETY: 3
ORIENTATION AND CLOUDING OF SENSORIUM: ORIENTED AND CAN DO SERIAL ADDITIONS
PAROXYSMAL SWEATS: NO SWEAT VISIBLE
ANXIETY: MODERATELY ANXIOUS, OR GUARDED, SO ANXIETY IS INFERRED
HEADACHE, FULLNESS IN HEAD: MILD
PAROXYSMAL SWEATS: NO SWEAT VISIBLE
VISUAL DISTURBANCES: NOT PRESENT
TREMOR: MODERATE, WITH PATIENT'S ARMS EXTENDED
AUDITORY DISTURBANCES: NOT PRESENT
TREMOR: MODERATE, WITH PATIENT'S ARMS EXTENDED
ORIENTATION AND CLOUDING OF SENSORIUM: ORIENTED AND CAN DO SERIAL ADDITIONS
VISUAL DISTURBANCES: NOT PRESENT
AUDITORY DISTURBANCES: NOT PRESENT
NAUSEA AND VOMITING: 3
VISUAL DISTURBANCES: NOT PRESENT
TOTAL SCORE: 16
TOTAL SCORE: 9
VISUAL DISTURBANCES: NOT PRESENT
NAUSEA AND VOMITING: 6
PAROXYSMAL SWEATS: NO SWEAT VISIBLE
TREMOR: 3
PAROXYSMAL SWEATS: NO SWEAT VISIBLE
AUDITORY DISTURBANCES: NOT PRESENT
TOTAL SCORE: 5
ANXIETY: NO ANXIETY, AT EASE
HEADACHE, FULLNESS IN HEAD: MODERATE
AGITATION: NORMAL ACTIVITY

## 2024-01-22 NOTE — ED TRIAGE NOTES
L SDH evacuated 1 month ago following headache.  Had drain left in for a few weeks.  750 ml vodka sat and Sunday.  Vomiting since yesterday am     Triage Assessment (Adult)       Row Name 01/22/24 0648          Triage Assessment    Airway WDL WDL        Respiratory WDL    Respiratory WDL WDL        Skin Circulation/Temperature WDL    Skin Circulation/Temperature WDL WDL        Cardiac WDL    Cardiac WDL X     Cardiac Rhythm ST        Peripheral/Neurovascular WDL    Peripheral Neurovascular WDL WDL        Cognitive/Neuro/Behavioral WDL    Cognitive/Neuro/Behavioral WDL WDL

## 2024-01-22 NOTE — CONSULTS
Writer attempted to meet with Pt to complete a RAISSA evaluation. Pt reported he is interested in exploring TC options however requested we meet tomorrow as he is feeling extremely tired. Writer notified him that I will reach out again on 1/23/24.    ABDELRAHMAN Perea

## 2024-01-22 NOTE — H&P
[facility]    History and Physical - Hospitalist Service       Date of Admission:  1/22/2024    Assessment & Plan      Joe Shah is a 48 year old male with a significant past medical history for EtOH abuse, hypertension, gastroesophageal reflux disease depression, anxiety, and recent subdural hematoma status post cranial bur hole and hematoma evacuation on 12/1/2023 who is admitted on 1/22/2024. He presented to the emergency department morning of 1/22/2024 with persistent vomiting after having heavy alcohol intake over the weekend Friday, Saturday, and last drink Sunday morning of vodka and orange juice.  Workup in the emergency department was notable for magnesium 1.3, bicarb 20, anion gap 29, and EtOH level 0.  Found to be quite tremulous with elevated CIWA scores, hypertensive, and tachycardic.  Repeat CT head was complete as well noting decrease of thickness and subdural collection along the left cerebral convex and no evidence of new ICH noted.  Request for admission to the hospital service for EtOH withdrawal.    Acute alcohol withdrawal -significant long family history for ETOH abuse father, grandfather. Patient reports want to break the cycle with 2 sons at home. Does have good family support, wife rarely drinks. Interested in an inpatient program, does have sponsor.  ETOH protocol initiated to include gabapentin, clonidine, and ativan  Thiamine, folate, and mvi  Neuro checks given recent SDH  Social work/ and chem dep referrals/consult  [facility]  currently 16  Acute metabolic acidosis with anion gap elevation, secondary to alcohol and emesis-   Bicarb 20 anion gap 29  Receiving fluid resuscitation while in emergency department and will continue LR at 100 mL/h  Expect resolution will recheck chemistry in a.m.  Hypomagnesemia -   Patient did receive 2 g IV mag in emergency department continue to replace per protocol  Recheck in a.m.  Transaminitis -   Likely secondary  to alcohol use slightly more elevated than previous however.  Did have complaint of right upper quadrant pain also with hyperbilirubinemia  Right upper quadrant ultrasound pending  Abdominal pain -   Likely secondary to retching and vomiting however could be multifactorial  Does have some right upper quadrant pain with palpation most pain localized to lower quadrants however.  Hypertensive urgency with history of hypertension -   Continue patient's home metoprolol  Hypertensive urgency secondary to EtOH withdrawal is receiving clonidine per protocol  Will provide for hydralazine as well for SBP greater than 190  Sinus tachycardia -   Secondary to #1  Will place on cardiac monitor  Recent subdural hematoma -status post cranial bur hole and hematoma evacuation on 12/1/2024  Patient reports that there was not a fall.  Perhaps this was spontaneous with substance abuse history  Areas of agata holes appear to be healing, well-approximated  Mild headache reported  CT scan shows resolving and no new bleeding  Will have patient on neurochecks given current EtOH withdrawal and recent bleed  Tylenol for pain  Hyperbilirubemia -   Likely this is secondary from #1.  Does have some right upper quadrant pain however and right upper quadrant ultrasound has been ordered and pending  Recheck LFTs in the a.m.  Hyperglycemia, without history of diabetes -   Likely reactionary stress response given retching nausea vomiting and EtOH abuse  Recheck chemistry in a.m.  Gastroesophageal reflux with GI bleed history  Reports unfavorable side effects from PPI, upset stomach  Will order famotidine while admitted for now  Depression and anxiety -  Patient is on antidepressant currently  Endorses using alcohol as a response to depression or anxiety  Would benefit from psychiatry  CODE STATUS - Patient wishes to be full code and this will be honored during this hospital stay.      Diet:  advance as tolerated  DVT Prophylaxis: VTE Prophylaxis  contraindicated due to recent SDH, sequentials ordered  Power Catheter: Not present  Lines: None     Cardiac Monitoring: None    Clinically Significant Risk Factors Present on Admission         # Hypercalcemia: Highest Ca = 10.4 mg/dL in last 2 days, will monitor as appropriate                     Disposition Plan      Expected Discharge Date: 01/26/2024,  3:00 PM           The patient's care was discussed with the Attending Physician, Dr. Oconnor .    Sheree Santamaria, DNP,APRN [facility]  Hospitalist Service  [facility]  Securely message with [employer] (more info)  Text page via [facility] Paging/Directory     ______________________________________________________________________    Chief Complaint   ETOH     History is obtained from the patient  Previous admission record    History of Present Illness   Joe Shah is a 48 year old male with a significant past medical history for EtOH abuse, hypertension, gastroesophageal reflux disease depression, anxiety, and recent subdural hematoma status post cranial bur hole and hematoma evacuation on 12/1/2023 who is admitted on 1/22/2024. He presented to the emergency department morning of 1/22/2024 with persistent vomiting after having heavy alcohol intake over the weekend Friday, Saturday, and last drink Sunday morning of vodka and orange juice.  Workup in the emergency department was notable for magnesium 1.3, bicarb 20, anion gap 29, and EtOH level 0.  Found to be quite tremulous with elevated CIWA scores, hypertensive, and tachycardic.  Repeat CT head was complete as well noting decrease of thickness and subdural collection along the left cerebral convex and no evidence of new ICH noted.  Request for admission to the hospital service for EtOH withdrawal.    Currently, patient is seen while still in the emergency department room 11 he is alert, oriented, very pleasant when I enter the room.  Notable tremors to hands and tongue.  He endorses headache but  denies any dizziness or diplopia.  He denies any recent illness or fever.  He denies feeling any shortness of breath, chest pain, or palpitations.  He does endorse abdominal pain mostly to the lower quadrants with right upper quadrant pain upon palpation.  He reports nausea last event of retching and vomiting was just prior to arrival to the emergency department.  He denies any diarrhea or constipation.  He is usually quite active ambulates without difficulty does not use any assistive devices then lives independently with his wife and 2 sons.      Past Medical History    Past Medical History:   Diagnosis Date    Anxiety     Back pain     Depressive disorder     Hypertension     SDH (subdural hematoma) (H)     Substance abuse (H)        Past Surgical History   Past Surgical History:   Procedure Laterality Date    FANTASMA HOLE(S) EVACUATE HEMATOMA SUBDURAL N/A 12/1/2023    Procedure: CREATION, CRANIAL FANTASMA HOLE, WITH SUBDURAL HEMATOMA EVACUATION;  Surgeon: Gio Phoenix MD;  Location:  OR    ENT SURGERY      ESOPHAGOSCOPY, GASTROSCOPY, DUODENOSCOPY (EGD), COMBINED N/A 9/12/2019    Procedure: ESOPHAGOGASTRODUODENOSCOPY (EGD);  Surgeon: Scott Mcrae MD;  Location:  GI    ESOPHAGOSCOPY, GASTROSCOPY, DUODENOSCOPY (EGD), COMBINED N/A 5/27/2023    Procedure: Esophagoscopy, gastroscopy, duodenoscopy (EGD), combined;  Surgeon: Scott Mcrae MD;  Location:  GI    NECK SURGERY         Prior to Admission Medications   Prior to Admission Medications   Prescriptions Last Dose Informant Patient Reported? Taking?   acetaminophen (TYLENOL) 325 MG tablet   No No   Sig: Take 3 tablets (975 mg) by mouth every 8 hours   Patient not taking: Reported on 1/8/2024   folic acid (FOLVITE) 1 MG tablet   No No   Sig: Take 1 tablet (1 mg) by mouth daily   Patient not taking: Reported on 1/8/2024   methocarbamol (ROBAXIN) 500 MG tablet   No No   Sig: Take 1 tablet (500 mg) by mouth every 6 hours as needed for muscle  spasms   Patient not taking: Reported on 1/8/2024   metoprolol succinate ER (TOPROL-XL) 50 MG 24 hr tablet  Self Yes No   Sig: Take 50 mg by mouth daily    multivitamin w/minerals (THERA-VIT-M) tablet   No No   Sig: Take 1 tablet by mouth daily   Patient not taking: Reported on 1/8/2024      Facility-Administered Medications: None          Physical Exam   Vital Signs: Temp: 97.9  F (36.6  C) Temp src: Temporal BP: (!) 156/113 Pulse: 120   Resp: (!) 9 SpO2: 93 % O2 Device: None (Room air)    Weight: 0 lbs 0 oz    Constitutional: awake, alert, cooperative, no apparent distress, and appears stated age  Eyes: Lids and lashes normal, pupils equal, round and reactive to light, extra ocular muscles intact, sclera clear, conjunctiva normal  ENT: Normocephalic, without obvious abnormality, atraumatic, sinuses nontender on palpation, external ears without lesions, oral pharynx with moist mucous membranes, tonsils without erythema or exudates, gums normal and good dentition.  Hematologic / Lymphatic: no cervical lymphadenopathy  Respiratory: No increased work of breathing, good air exchange, clear to auscultation bilaterally, no crackles or wheezing  Cardiovascular: Normal apical impulse, regular rate and rhythm, normal S1 and S2, no S3 or S4, and no murmur noted and tachycardia  GI: No scars, normal bowel sounds, soft, non-distended, non-tender, no masses palpated, no hepatosplenomegally and tenderness noted in the right upper quadrant Swanson's sign is present, in the right lower quadrant, and in the left lower quadrant  Skin: no bruising or bleeding  Musculoskeletal: There is no redness, warmth, or swelling of the joints.  Full range of motion noted.  Motor strength is 5 out of 5 all extremities bilaterally.  Tone is normal.  no lower extremity pitting edema present  there is no redness, warmth, or swelling of the joints  full range of motion noted  motor strength is 5 out of 5 all extremities bilaterally  Neurologic: Awake,  alert, oriented to name, place and time.  Cranial nerves II-XII are grossly intact.  Motor is 5 out of 5 bilaterally.  Cerebellar finger to nose, heel to shin intact.  Sensory is intact.  Babinski down going, Romberg negative, and gait is normal. Tongue and hand tremor noted  Mental Status Exam:  Level of Alertness:   awake  Orientation:   person, place, time.  Neuropsychiatric: General: normal, calm, and normal eye contact    Medical Decision Making       81 MINUTES SPENT BY ME on the date of service doing chart review, history, exam, documentation & further activities per the note.  NOTE(S)/MEDICAL RECORDS REVIEWED over the past 24 hours: previous admission       Data     I have personally reviewed the following data over the past 24 hrs:    8.1  \   14.9   / 195     140 91 (L) 9.7 /  215 (H)   4.6 20 (L) 0.91 \     ALT: 85 (H) AST: 194 (H) AP: 96 TBILI: 2.2 (H)   ALB: 4.9 TOT PROTEIN: 8.8 (H) LIPASE: 44

## 2024-01-22 NOTE — ED PROVIDER NOTES
History     Chief Complaint:  Alcohol Intoxication (L SDH evacuated 1 month ago following headache.  Had drain left in for a few weeks.  750 ml vodka sat and Sunday.  Vomiting since yesterday am)       STEPH Shah is a 48 year old male history of subdural hematoma evacuated at the beginning of December who presents the emergency department following 24 hours of persistent vomiting.  Patient states he drank heavily on Friday and Saturday.  Yesterday he was in bed all day and began having just intractable vomiting and signs of alcohol withdrawal with tremulousness.  He denies any recent falls.  He states his headache pain that he was seen here on Wednesday with a negative head CT has resolved.  He denies diarrhea.  Denies chest pain.  He does feel palpitations and his high heart rate.  Patient states this is occurred before when he drinks heavily and does not eat food.      Independent Historian:   None - Patient Only    Review of External Notes:   ED visit from 1/17/2024  And discharge summary from 12/4/2023    Medications:    acetaminophen (TYLENOL) 325 MG tablet  folic acid (FOLVITE) 1 MG tablet  methocarbamol (ROBAXIN) 500 MG tablet  metoprolol succinate ER (TOPROL-XL) 50 MG 24 hr tablet  multivitamin w/minerals (THERA-VIT-M) tablet        Past Medical History:    Past Medical History:   Diagnosis Date    Anxiety     Back pain     Depressive disorder     Hypertension     SDH (subdural hematoma) (H)     Substance abuse (H)        Past Surgical History:    Past Surgical History:   Procedure Laterality Date    FANTASMA HOLE(S) EVACUATE HEMATOMA SUBDURAL N/A 12/1/2023    Procedure: CREATION, CRANIAL FANTASMA HOLE, WITH SUBDURAL HEMATOMA EVACUATION;  Surgeon: Gio Phoenix MD;  Location:  OR    ENT SURGERY      ESOPHAGOSCOPY, GASTROSCOPY, DUODENOSCOPY (EGD), COMBINED N/A 9/12/2019    Procedure: ESOPHAGOGASTRODUODENOSCOPY (EGD);  Surgeon: Scott Mcrae MD;  Location:  GI    ESOPHAGOSCOPY,  GASTROSCOPY, DUODENOSCOPY (EGD), COMBINED N/A 5/27/2023    Procedure: Esophagoscopy, gastroscopy, duodenoscopy (EGD), combined;  Surgeon: Scott Mcrae MD;  Location:  GI    NECK SURGERY          Physical Exam   Patient Vitals for the past 24 hrs:   BP Temp Temp src Pulse Resp SpO2   01/22/24 0637 (!) 168/125 97.9  F (36.6  C) Temporal (!) 157 20 100 %        Physical Exam  General: Patient is alert and anxious appearing  HEENT: Head atraumatic    Eyes: pupils equal and reactive. Conjunctiva clear   Nares: patent   Oropharynx: no lesions, uvula midline, no palatal draping, normal voice, no trismus  Neck: Supple without lymphadenopathy, no meningismus  Chest: Tachycardic but regular  Lungs: Equal clear to auscultation with no wheeze or rales  Abdomen: Soft, non tender, nondistended, normal bowel sounds  Back: No costovertebral angle tenderness, no midline C, T or L spine tenderness  Neuro: Generalized tremulousness with worsening with intention, grossly nonfocal, normal speech, strength equal bilaterally, CN 2-12 intact  Extremities: No deformities, equal radial and DP pulses. No clubbing, cyanosis.  No edema  Skin: Warm and dry with no rash.       Emergency Department Course   ECG  ECG results from 01/22/24   EKG 12-lead, tracing only     Value    Systolic Blood Pressure     Diastolic Blood Pressure     Ventricular Rate 150    Atrial Rate 150    MS Interval 118    QRS Duration 68        QTc 505    P Axis 50    R AXIS 50    T Axis 64    Interpretation ECG      ** Critical Test Result: High HR  Sinus tachycardia  Otherwise normal ECG  When compared with ECG of 29-NOV-2023 17:24,  No significant change was found  Confirmed by GENERATED REPORT, COMPUTER ([id number]),  Lenora Thayer ([id number]) on 1/22/2024 9:27:51 AM           Imaging:  CT Head w/o Contrast   Final Result   IMPRESSION:    1. Interval decrease in thickness and extent of the subdural   collection along the left cerebral convexity since the  comparison   study.   2. No evidence for new or increasing intracranial hemorrhage.   3. Diffuse cerebral volume loss and cerebral white matter changes   consistent with chronic small vessel ischemic disease.          Radiation dose for this scan was reduced using automated exposure   control, adjustment of the mA and/or kV according to patient size, or   iterative reconstruction technique.      DEEP CHASE MD            SYSTEM ID:  [id number]       Abdomen Limited (RUQ)    (Results Pending)          Laboratory:  Labs Ordered and Resulted from Time of ED Arrival to Time of ED Departure   COMPREHENSIVE METABOLIC PANEL - Abnormal       Result Value    Sodium 140      Potassium 4.6      Carbon Dioxide (CO2) 20 (*)     Anion Gap 29 (*)     Urea Nitrogen 9.7      Creatinine 0.91      GFR Estimate >90      Calcium 10.4 (*)     Chloride 91 (*)     Glucose 215 (*)     Alkaline Phosphatase 96       (*)     ALT 85 (*)     Protein Total 8.8 (*)     Albumin 4.9      Bilirubin Total 2.2 (*)    MAGNESIUM - Abnormal    Magnesium 1.3 (*)    CBC WITH PLATELETS AND DIFFERENTIAL - Abnormal    WBC Count 8.1      RBC Count 4.55      Hemoglobin 14.9      Hematocrit 44.4      MCV 98      MCH 32.7      MCHC 33.6      RDW 13.9      Platelet Count 195      % Neutrophils 88      % Lymphocytes 3      % Monocytes 9      % Eosinophils 0      % Basophils 0      % Immature Granulocytes 0      NRBCs per 100 WBC 0      Absolute Neutrophils 7.1      Absolute Lymphocytes 0.3 (*)     Absolute Monocytes 0.8      Absolute Eosinophils 0.0      Absolute Basophils 0.0      Absolute Immature Granulocytes 0.0      Absolute NRBCs 0.0     ETHYL ALCOHOL LEVEL - Normal    Alcohol ethyl <0.01     LIPASE - Normal    Lipase 44     PHOSPHORUS - Normal    Phosphorus 3.8          Procedures   None    Emergency Department Course & Assessments:             Interventions:  Medications   sodium chloride 0.9% BOLUS 1,000 mL (has no administration in time range)    LORazepam (ATIVAN) injection 2 mg (has no administration in time range)   sodium chloride 0.9 % 1,000 mL with Infuvite Adult 10 mL, thiamine 100 mg, folic acid 1 mg infusion (has no administration in time range)   ondansetron (ZOFRAN) injection 4 mg (4 mg Intravenous $Given 1/22/24 0700)        Assessments:  0710 patient seen and examined by me  0945 patient updated on the findings and plan.    Independent Interpretation (X-rays, CTs, rhythm strip):  Head CT with no acute intracranial hemorrhage    Consultations/Discussion of Management or Tests:  0945 SEVEN [place] for Dr. Oconnor        Social Determinants of Health affecting care:   None    Disposition:  The patient was admitted to the hospital under the care of Dr. Oconnor.     Impression & Plan    CMS Diagnoses:       Medical Decision Making:  Joe Shah is a 48 year old male who presents for evaluation of alcohol abuse.  He is not  intoxicated here in ED by blood work.  Blood work otherwise looks ok; no signs of alcoholic ketoacidosis, significant liver impairment or acute alcoholic hepatitis.    He appears on exam to be going through acute alcohol withdrawal.  We discussed this and decided on treatment w/ benzodiazepines, see medications given in ED above.     There are no signs of co-ingestion including acetaminophen, drugs, medications, volatile alcohols. He has no signs of trauma related to alcohol use and no further workup is needed.  Given persistent vomiting and hypertension which is likely related to his alcohol withdrawal but given subdural hematoma in December CT head was obtained and was negative for any new hemorrhage or blood collection.    Admit to medicine for further cares given history, tachycardia and withdrawal here. Likely will be a prolonged withdrawal course given this and do not feel patient can safely manage at home nor a more ambulatory setting like detox.             Diagnosis:    ICD-10-CM    1. Alcohol withdrawal syndrome  without complication (H)  F10.930       2. Nausea and vomiting, unspecified vomiting type  R11.2            Discharge Medications:  New Prescriptions    No medications on file          1/22/2024   Alondra Ivy MD Neuner, Maria Bea, MD  01/22/24 0952

## 2024-01-22 NOTE — PROGRESS NOTES
RECEIVING UNIT ED HANDOFF REVIEW    ED Nurse Handoff Report was reviewed by: Balbina Cuadra RN on January 22, 2024 at 2:53 PM

## 2024-01-22 NOTE — ED NOTES
[facility]  ED Nurse Handoff Report    ED Chief complaint: Alcohol Intoxication (L SDH evacuated 1 month ago following headache.  Had drain left in for a few weeks.  750 ml vodka sat and Sunday.  Vomiting since yesterday am)      ED Diagnosis:   Final diagnoses:   Alcohol withdrawal syndrome without complication (H)   Nausea and vomiting, unspecified vomiting type       Code Status: Full Code    Allergies:   Allergies   Allergen Reactions    Morphine Nausea and Vomiting    Oxycodone Itching     1/22 Patient reports he can tolerate this     Patient Story:      Expand All Collapse All       History      Chief Complaint:   48 year old male history of subdural hematoma evacuated at the beginning of December who presents the emergency department following 24 hours of persistent vomiting.  Patient states he drank heavily on Friday and Saturday.  Yesterday he was in bed all day and began having just intractable vomiting and signs of alcohol withdrawal'  pt has abnormal labs and wishes to quit drinking.        Focused Assessment:    Results for orders placed or performed during the hospital encounter of 01/22/24   CT Head w/o Contrast     Status: None    Narrative    CT OF THE HEAD WITHOUT CONTRAST  1/22/2024 8:45 AM     COMPARISON: Head CT 1/18/2024.    HISTORY: Vomiting, recent SDH.    TECHNIQUE: 5 mm thick axial CT images of the head were acquired  without IV contrast material.    FINDINGS: Left frontal and left parietal agata holes again noted. There  has been an interval decrease in thickness and extent of the subdural  collection along the left cerebral convexity since the comparison  study. No evidence for new or increasing intracranial hemorrhage.    There is mild diffuse cerebral volume loss. There are subtle patchy  areas of decreased density in the cerebral white matter bilaterally  that are consistent with sequela of chronic small vessel ischemic  disease. The ventricles and basal cisterns are  within normal limits in  configuration given the degree of cerebral volume loss.  There is no  midline shift.     No intracranial mass or recent infarct.    The visualized paranasal sinuses are well-aerated. There is no  mastoiditis. There are no fractures of the visualized bones.       Impression    IMPRESSION:   1. Interval decrease in thickness and extent of the subdural  collection along the left cerebral convexity since the comparison  study.  2. No evidence for new or increasing intracranial hemorrhage.  3. Diffuse cerebral volume loss and cerebral white matter changes  consistent with chronic small vessel ischemic disease.       Radiation dose for this scan was reduced using automated exposure  control, adjustment of the mA and/or kV according to patient size, or  iterative reconstruction technique.    DEEP CHASE MD         SYSTEM ID:  [id number]   Alcohol level blood     Status: Normal   Result Value Ref Range    Alcohol ethyl <0.01 <=0.01 g/dL   Comprehensive metabolic panel     Status: Abnormal   Result Value Ref Range    Sodium 140 135 - 145 mmol/L    Potassium 4.6 3.4 - 5.3 mmol/L    Carbon Dioxide (CO2) 20 (L) 22 - 29 mmol/L    Anion Gap 29 (H) 7 - 15 mmol/L    Urea Nitrogen 9.7 6.0 - 20.0 mg/dL    Creatinine 0.91 0.67 - 1.17 mg/dL    GFR Estimate >90 >60 mL/min/1.73m2    Calcium 10.4 (H) 8.6 - 10.0 mg/dL    Chloride 91 (L) 98 - 107 mmol/L    Glucose 215 (H) 70 - 99 mg/dL    Alkaline Phosphatase 96 40 - 150 U/L     (H) 0 - 45 U/L    ALT 85 (H) 0 - 70 U/L    Protein Total 8.8 (H) 6.4 - 8.3 g/dL    Albumin 4.9 3.5 - 5.2 g/dL    Bilirubin Total 2.2 (H) <=1.2 mg/dL   Lipase     Status: Normal   Result Value Ref Range    Lipase 44 13 - 60 U/L   Magnesium     Status: Abnormal   Result Value Ref Range    Magnesium 1.3 (L) 1.7 - 2.3 mg/dL   CBC with platelets and differential     Status: Abnormal   Result Value Ref Range    WBC Count 8.1 4.0 - 11.0 10e3/uL    RBC Count 4.55 4.40 - 5.90 10e6/uL     Hemoglobin 14.9 13.3 - 17.7 g/dL    Hematocrit 44.4 40.0 - 53.0 %    MCV 98 78 - 100 fL    MCH 32.7 26.5 - 33.0 pg    MCHC 33.6 31.5 - 36.5 g/dL    RDW 13.9 10.0 - 15.0 %    Platelet Count 195 150 - 450 10e3/uL    % Neutrophils 88 %    % Lymphocytes 3 %    % Monocytes 9 %    % Eosinophils 0 %    % Basophils 0 %    % Immature Granulocytes 0 %    NRBCs per 100 WBC 0 <1 /100    Absolute Neutrophils 7.1 1.6 - 8.3 10e3/uL    Absolute Lymphocytes 0.3 (L) 0.8 - 5.3 10e3/uL    Absolute Monocytes 0.8 0.0 - 1.3 10e3/uL    Absolute Eosinophils 0.0 0.0 - 0.7 10e3/uL    Absolute Basophils 0.0 0.0 - 0.2 10e3/uL    Absolute Immature Granulocytes 0.0 <=0.4 10e3/uL    Absolute NRBCs 0.0 10e3/uL   Phosphorus     Status: Normal   Result Value Ref Range    Phosphorus 3.8 2.5 - 4.5 mg/dL   EKG 12-lead, tracing only     Status: None   Result Value Ref Range    Systolic Blood Pressure  mmHg    Diastolic Blood Pressure  mmHg    Ventricular Rate 150 BPM    Atrial Rate 150 BPM    AK Interval 118 ms    QRS Duration 68 ms     ms    QTc 505 ms    P Axis 50 degrees    R AXIS 50 degrees    T Axis 64 degrees    Interpretation ECG       ** Critical Test Result: High HR  Sinus tachycardia  Otherwise normal ECG  When compared with ECG of 29-NOV-2023 17:24,  No significant change was found  Confirmed by GENERATED REPORT, COMPUTER ([id number]),  Lenora Thayer ([id number]) on 1/22/2024 9:27:51 AM     CBC with platelets + differential     Status: Abnormal    Narrative    The following orders were created for panel order CBC with platelets + differential.  Procedure                               Abnormality         Status                     ---------                               -----------         ------                     CBC with platelets and d...[[id number]]  Abnormal            Final result                 Please view results for these tests on the individual orders.         Treatments and/or interventions provided: ativan  Patient's response to  treatments and/or interventions: relief of tremors and nausea    To be done/followed up on inpatient unit:  monitor CIWA, resources for quitting drinking    Does this patient have any cognitive concerns?: no    Activity level - Baseline/Home:  Independent  Activity Level - Current:   Independent    Patient's Preferred language: English   Needed?: No    Isolation: None  Infection: Not Applicable  Patient tested for COVID 19 prior to admission: NO  Bariatric?: No    Vital Signs:   Vitals:    01/22/24 0900 01/22/24 0930 01/22/24 1000 01/22/24 1030   BP: (!) 156/113 (!) 156/111 (!) 147/104 (!) 141/101   Pulse: 120 116 113 112   Resp: (!) 9 18 (!) 8 (!) 0   Temp:       TempSrc:       SpO2: 93% 95% 99% 97%       Cardiac Rhythm:Cardiac Rhythm: Sinus tachycardia    Was the PSS-3 completed:   Yes  What interventions are required if any?               Family Comments:   OBS brochure/video discussed/provided to patient/family:               Name of person given brochure if not patient:               Relationship to patient:     For the majority of the shift this patient's behavior was Green.   Behavioral interventions performed were .    ED NURSE PHONE NUMBER: [phone]

## 2024-01-22 NOTE — PROGRESS NOTES
Admission    Patient arrives to room 635 via cart from ED.  Care plan note: Alert and oriented x4. VSS on RA. Denies pain. CIWA 2. SBA due to unsteadiness and dizzyness. LR running @100 mL/hr. Lung sounds clear on auscultation. No numbness or tingling in the hands or feet. Double skin check completed with SS. Scattered scabs and bruises and cracked heels. Started on a clear liquid diet.    Inpatient nursing criteria listed below were met:    Did you put disposition on whiteboard and in sticky note: Yes  Full skin assessment done (add LDA if skin issue present). Initials of 2nd RN:Yes, SS  Isolation education started/completed NA  Patient allergies verified with patient: Yes  Fall Risk? (Care plan updated, Education given and documented) Yes  Primary Care Plan initiated: Yes  Home medications documented in belongings flowsheet: No  Patient belongings documented in belongings flowsheet: Yes  Reminder note (belongings/ medications) placed in discharge instructions:Yes  Admission profile/ required documentation complete: Yes  If patient is a 72 hour hold/Commitment are belongings removed from room and locked up? NA

## 2024-01-22 NOTE — PHARMACY-ADMISSION MEDICATION HISTORY
"Pharmacist Admission Medication History    Admission medication history is complete. The information provided in this note is only as accurate as the sources available at the time of the update.    Information Source(s): Patient and CareEverywhere/SureScripts via in-person    Pertinent Information:   -patient reports last doses likely 1/20/24.   -patient reports issues with medications causing stomach issues and issues with large pill sizes.   -folic acid: last filled 12/4 for 30-day supply per Surescripts. Patient reports he still has and takes.   -pantoprazole: filled 12/7/23 for 90-day supply. Patient reports he stopped this after ~a couple weeks as it may have caused stomach issues or been difficult to get the pill down. May benefit from a suspension if needing this medication.     Changes made to PTA medication list:  Added: multivitamin chew (gummy), pantoprazole (see note above, patient not taking currently but was written for 90-day supply 12/7/23), gabapentin, oxycodone  Deleted: methocarbamol, multivitamin tab  Changed: acetaminophen (activated from \"patient not taking,\" q8h --> q8h PRN), folic acid (activated from \"patient not taking\")    Medication Affordability: not assessed     Allergies reviewed with patient and updates made in EHR: yes    Medication History Completed By: Christine Rodney RPH 1/22/2024 10:33 AM    Prior to Admission medications    Medication Sig Last Dose Taking? Auth Provider Long Term End Date   acetaminophen (TYLENOL) 325 MG tablet Take 3 tablets (975 mg) by mouth every 8 hours  Patient taking differently: Take 975 mg by mouth every 8 hours as needed for pain  at PRN Yes Karen Cowan MD     folic acid (FOLVITE) 1 MG tablet Take 1 tablet (1 mg) by mouth daily Past Week Yes Karen Cowan MD     gabapentin (NEURONTIN) 300 MG capsule Take 300 mg by mouth 3 times daily as needed for neuropathic pain or other (itching)  at PRN Yes Unknown, Entered By History No  "   metoprolol succinate ER (TOPROL-XL) 50 MG 24 hr tablet Take 50 mg by mouth daily  Past Week Yes Reported, Patient Yes    Multiple Vitamins-Minerals (MULTIVITAMIN ADULT) CHEW Take 2 chew tab by mouth daily Centrum Past Week Yes Unknown, Entered By History     oxyCODONE (ROXICODONE) 5 MG tablet Take 5-10 mg by mouth every 6 hours as needed for pain  at PRN Yes Unknown, Entered By History No    pantoprazole (PROTONIX) 40 MG EC tablet Take 40 mg by mouth daily   Unknown, Entered By History       .     General

## 2024-01-23 LAB
ALBUMIN SERPL BCG-MCNC: 3.6 G/DL (ref 3.5–5.2)
ALP SERPL-CCNC: 62 U/L (ref 40–150)
ALT SERPL W P-5'-P-CCNC: 42 U/L (ref 0–70)
ANION GAP SERPL CALCULATED.3IONS-SCNC: 7 MMOL/L (ref 7–15)
AST SERPL W P-5'-P-CCNC: 65 U/L (ref 0–45)
BASOPHILS # BLD AUTO: 0 10E3/UL (ref 0–0.2)
BASOPHILS NFR BLD AUTO: 1 %
BILIRUB SERPL-MCNC: 1.4 MG/DL
BUN SERPL-MCNC: 13.7 MG/DL (ref 6–20)
CALCIUM SERPL-MCNC: 8.8 MG/DL (ref 8.6–10)
CHLORIDE SERPL-SCNC: 100 MMOL/L (ref 98–107)
CREAT SERPL-MCNC: 0.81 MG/DL (ref 0.67–1.17)
DEPRECATED HCO3 PLAS-SCNC: 27 MMOL/L (ref 22–29)
EGFRCR SERPLBLD CKD-EPI 2021: >90 ML/MIN/1.73M2
EOSINOPHIL # BLD AUTO: 0 10E3/UL (ref 0–0.7)
EOSINOPHIL NFR BLD AUTO: 0 %
ERYTHROCYTE [DISTWIDTH] IN BLOOD BY AUTOMATED COUNT: 13.8 % (ref 10–15)
GLUCOSE SERPL-MCNC: 114 MG/DL (ref 70–99)
HCT VFR BLD AUTO: 36.1 % (ref 40–53)
HGB BLD-MCNC: 11.8 G/DL (ref 13.3–17.7)
IMM GRANULOCYTES # BLD: 0 10E3/UL
IMM GRANULOCYTES NFR BLD: 1 %
LYMPHOCYTES # BLD AUTO: 0.8 10E3/UL (ref 0.8–5.3)
LYMPHOCYTES NFR BLD AUTO: 12 %
MAGNESIUM SERPL-MCNC: 2.1 MG/DL (ref 1.7–2.3)
MCH RBC QN AUTO: 32.8 PG (ref 26.5–33)
MCHC RBC AUTO-ENTMCNC: 32.7 G/DL (ref 31.5–36.5)
MCV RBC AUTO: 100 FL (ref 78–100)
MONOCYTES # BLD AUTO: 0.6 10E3/UL (ref 0–1.3)
MONOCYTES NFR BLD AUTO: 9 %
NEUTROPHILS # BLD AUTO: 5.3 10E3/UL (ref 1.6–8.3)
NEUTROPHILS NFR BLD AUTO: 77 %
NRBC # BLD AUTO: 0 10E3/UL
NRBC BLD AUTO-RTO: 0 /100
PLATELET # BLD AUTO: 126 10E3/UL (ref 150–450)
POTASSIUM SERPL-SCNC: 3.8 MMOL/L (ref 3.4–5.3)
PROT SERPL-MCNC: 6.2 G/DL (ref 6.4–8.3)
RBC # BLD AUTO: 3.6 10E6/UL (ref 4.4–5.9)
SODIUM SERPL-SCNC: 134 MMOL/L (ref 135–145)
WBC # BLD AUTO: 6.8 10E3/UL (ref 4–11)

## 2024-01-23 PROCEDURE — 85025 COMPLETE CBC W/AUTO DIFF WBC: CPT | Performed by: NURSE PRACTITIONER

## 2024-01-23 PROCEDURE — 83735 ASSAY OF MAGNESIUM: CPT | Performed by: NURSE PRACTITIONER

## 2024-01-23 PROCEDURE — 80053 COMPREHEN METABOLIC PANEL: CPT | Performed by: NURSE PRACTITIONER

## 2024-01-23 PROCEDURE — 120N000001 HC R&B MED SURG/OB

## 2024-01-23 PROCEDURE — 250N000013 HC RX MED GY IP 250 OP 250 PS 637: Performed by: NURSE PRACTITIONER

## 2024-01-23 PROCEDURE — 36415 COLL VENOUS BLD VENIPUNCTURE: CPT | Performed by: NURSE PRACTITIONER

## 2024-01-23 PROCEDURE — 99232 SBSQ HOSP IP/OBS MODERATE 35: CPT | Performed by: HOSPITALIST

## 2024-01-23 PROCEDURE — 258N000003 HC RX IP 258 OP 636: Performed by: NURSE PRACTITIONER

## 2024-01-23 RX ADMIN — METOPROLOL SUCCINATE 50 MG: 50 TABLET, EXTENDED RELEASE ORAL at 08:34

## 2024-01-23 RX ADMIN — CLONIDINE HYDROCHLORIDE 0.1 MG: 0.1 TABLET ORAL at 11:49

## 2024-01-23 RX ADMIN — SODIUM CHLORIDE, POTASSIUM CHLORIDE, SODIUM LACTATE AND CALCIUM CHLORIDE: 600; 310; 30; 20 INJECTION, SOLUTION INTRAVENOUS at 17:04

## 2024-01-23 RX ADMIN — GABAPENTIN 900 MG: 300 CAPSULE ORAL at 20:18

## 2024-01-23 RX ADMIN — SODIUM CHLORIDE, POTASSIUM CHLORIDE, SODIUM LACTATE AND CALCIUM CHLORIDE: 600; 310; 30; 20 INJECTION, SOLUTION INTRAVENOUS at 06:14

## 2024-01-23 RX ADMIN — ACETAMINOPHEN 650 MG: 325 TABLET, FILM COATED ORAL at 11:49

## 2024-01-23 RX ADMIN — FOLIC ACID 1 MG: 1 TABLET ORAL at 08:34

## 2024-01-23 RX ADMIN — GABAPENTIN 900 MG: 300 CAPSULE ORAL at 03:56

## 2024-01-23 RX ADMIN — CLONIDINE HYDROCHLORIDE 0.1 MG: 0.1 TABLET ORAL at 20:18

## 2024-01-23 RX ADMIN — Medication 1 TABLET: at 08:34

## 2024-01-23 RX ADMIN — FAMOTIDINE 20 MG: 20 TABLET ORAL at 20:18

## 2024-01-23 RX ADMIN — THIAMINE HCL TAB 100 MG 100 MG: 100 TAB at 08:34

## 2024-01-23 RX ADMIN — CLONIDINE HYDROCHLORIDE 0.1 MG: 0.1 TABLET ORAL at 03:55

## 2024-01-23 RX ADMIN — FAMOTIDINE 20 MG: 20 TABLET ORAL at 08:34

## 2024-01-23 RX ADMIN — ACETAMINOPHEN 650 MG: 325 TABLET, FILM COATED ORAL at 17:08

## 2024-01-23 RX ADMIN — GABAPENTIN 900 MG: 300 CAPSULE ORAL at 11:49

## 2024-01-23 ASSESSMENT — ACTIVITIES OF DAILY LIVING (ADL)
ADLS_ACUITY_SCORE: 23
ADLS_ACUITY_SCORE: 22
ADLS_ACUITY_SCORE: 23
ADLS_ACUITY_SCORE: 27
ADLS_ACUITY_SCORE: 27
ADLS_ACUITY_SCORE: 23
ADLS_ACUITY_SCORE: 22
ADLS_ACUITY_SCORE: 23
ADLS_ACUITY_SCORE: 27
ADLS_ACUITY_SCORE: 23
ADLS_ACUITY_SCORE: 23
ADLS_ACUITY_SCORE: 27
DEPENDENT_IADLS:: INDEPENDENT

## 2024-01-23 NOTE — PLAN OF CARE
Goal Outcome Evaluation:    Summary: Alcohol withdrawal    DATE & TIME: 1/23/24 5375-8011  Cognitive Concerns/ Orientation: A&O x4, neuro intact  BEHAVIOR & AGGRESSION TOOL COLOR: Green  CIWA SCORE: 3, 2 (for mild headache and tremor). PRN Tylenol given x1  ABNL VS/O2: VSS on RA  MOBILITY: SBA  PAIN MANAGMENT: Denies  DIET: Regular  BOWEL/BLADDER: Cont.   ABNL LAB/BG: ; AST 65; ALT 42  DRAIN/DEVICES: R PIV infusing LR @ 100 mL/hr  TELEMETRY RHYTHM: NSR  SKIN: scattered scabs and bruises, cracked heels.  TESTS/PROCEDURES: none new  D/C DAY/GOALS/PLACE: pending  OTHER IMPORTANT INFO: Chem Dep following. Q4 Neuro checks. Recent subdural hematoma status post cranial bur hole and hematoma evacuation on 12/1/2023

## 2024-01-23 NOTE — PLAN OF CARE
Goal Outcome Evaluation:  Summary: Alcohol withdrawal  Hx of  EtOH abuse, hypertension, gastroesophageal reflux disease depression, anxiety, and recent subdural hematoma status post cranial bur hole and hematoma evacuation on 12/1/2023    DATE & TIME: 1/22/24 0333-3091   Cognitive Concerns/ Orientation: A&O x4, neuro intact  BEHAVIOR & AGGRESSION TOOL COLOR: Green  CIWA SCORE: 2, recheck at 2300   ABNL VS/O2: VSS on RA  MOBILITY: SBA  PAIN MANAGMENT: c/o headache, prn Tylenol given    DIET: advance to regular as tolerated. Denies N/V  BOWEL/BLADDER: Cont.  ABNL LAB/BG: K 4.6; Mg 2.7, scheduled recheck in AM)  DRAIN/DEVICES: R PIV infusing LR @ 100 mL/hr  TELEMETRY RHYTHM:NSR   SKIN: scattered scabs and bruises, cracked heels.  TESTS/PROCEDURES: none new  D/C DAY/GOALS/PLACE: pending  OTHER IMPORTANT INFO: Chem Dep following. Q4 Neuro checks

## 2024-01-23 NOTE — PLAN OF CARE
Goal Outcome Evaluation:    Summary: Alcohol withdrawal    DATE & TIME: 1/22/24 7459-4654    Cognitive Concerns/ Orientation: A&O x4   BEHAVIOR & AGGRESSION TOOL COLOR: Green  CIWA SCORE: 2   ABNL VS/O2: VSS on RA, except HR elevated 106  MOBILITY: SBA  PAIN MANAGMENT: Denies  DIET: Clear liquid, advance as tolerated  BOWEL/BLADDER: Cont.  ABNL LAB/BG: K 4.6; Mg 1.3 (replaced, recheck in AM); ALT 85;   DRAIN/DEVICES: R PIV infusing LR @ 100 mL/hr  TELEMETRY RHYTHM: Sinus Tachy  SKIN: scattered scabs and bruises, cracked heels. Old scar on front left head from hematoma evac procedure 12/1/23  TESTS/PROCEDURES: none new  D/C DAY/GOALS/PLACE: pending  OTHER IMPORTANT INFO: Chem Dep following. Q4 Neuro checks

## 2024-01-23 NOTE — CONSULTS
"Triage & Transition Services, Extended Care       Patient: Ravi goes by \"Ravi,\" uses he/him pronouns  Date of Service: January 23, 2024  Site of Service: [place] [address] [facility]                             [id number]  Patient was seen      Mode of Assessment:      Patient is followed related to: other  Notable observations from today's encounter include: Pt. declined needing RAISSA sevices or TX at this time.  The care team is working towards the following:    Significant status changes: no  Case Management included: Case Management Included: collaborating with patient's support system  Details on Collaborating with Patient's Support System: Writer has notified pt's care team that consult has been completed.  Summary of Interaction: Writer met with pt. in attempt to complete a RAISSA evaluation. Pt reported he was not intersted in attending TX at this time due to needing to work and insurance not covering treatment services. Writer notified pt. that I would provide community resources on his AVS.    Recommendations:  To find a treatment program:     SAMHSA.gov   Click on find treatment   Enter your zip code and select your city/state.  The Substance Abuse and Mental Health Services Administration (SAMHSA) is the agency within  the U.S. Department of Health and Human Services that leads public health efforts to advance the  behavioral health of the nation. SAMHSA's mission is to reduce the impact of substance abuse and  mental illness on [place] communities.     [employer] select substance use disorder programs then select find services.  [employer] is a virtual community and health care connection resource. We connect  individuals, families, mental health and substance use disorder providers, physicians, care coordinators,  and others with a real-time, searchable directory of mental health and substance use disorder resources  and their availability within [place].      Summary:  Pt " declined RAISSA services and has been provided community resources.     Legal Status:  Voluntary        Christy Bettie [facility]  Extended Care  [phone]

## 2024-01-23 NOTE — PLAN OF CARE
Summary: Alcohol withdrawal  DATE & TIME: 1/22-1/23 overnight   Cognitive Concerns/ Orientation: A&O x4, neuro intact  BEHAVIOR & AGGRESSION TOOL COLOR: Green  CIWA SCORE: 0, 0   ABNL VS/O2: VSS on RA  MOBILITY: SBA  PAIN MANAGMENT: Denies  DIET: advance to regular as tolerated. Denies N/V  BOWEL/BLADDER: Cont. Up to bathroom  ABNL LAB/BG: K 4.6; Mg 2.7, scheduled recheck in AM)  DRAIN/DEVICES: R PIV infusing LR @ 100 mL/hr  TELEMETRY RHYTHM: NSR  SKIN: scattered scabs and bruises, cracked heels.  TESTS/PROCEDURES: none new  D/C DAY/GOALS/PLACE: pending  OTHER IMPORTANT INFO: Chem Dep following. Q4 Neuro checks. Hx of  EtOH abuse, hypertension, gastroesophageal reflux disease depression, anxiety, and recent subdural hematoma status post cranial bur hole and hematoma evacuation on 12/1/2023

## 2024-01-23 NOTE — CONSULTS
SW: Writer was consulted for chemical dependency assessment. This is the role of IP Chemical Dependency. Writer paged hospitalist to put in IP Chemical Dependency Consult.    [facility]

## 2024-01-23 NOTE — PROGRESS NOTES
[facility]    Hospitalist Progress Note    Date of Admission:  1/22/2024    Assessment & Plan   Joe Shah is a 48 year old male with a significant past medical history for EtOH abuse, hypertension, gastroesophageal reflux disease depression, anxiety, and recent subdural hematoma status post cranial bur hole and hematoma evacuation on 12/1/2023 who is admitted on 1/22/2024. He presented to the emergency department morning of 1/22/2024 with persistent vomiting after having heavy alcohol intake over the weekend Friday, Saturday, and last drink Sunday morning of vodka and orange juice.  Workup in the emergency department was notable for magnesium 1.3, bicarb 20, anion gap 29, and EtOH level 0.  Found to be quite tremulous with elevated CIWA scores, hypertensive, and tachycardic.  Repeat CT head was complete as well noting decrease of thickness and subdural collection along the left cerebral convex and no evidence of new ICH noted.  Request for admission to the hospital service for EtOH withdrawal.     Acute alcohol withdrawal -significant long family history for ETOH abuse in father, grandfather. Patient reports want to break the cycle with 2 sons at home. Does have good family support, wife rarely drinks. Interested in an inpatient program, does have sponsor.  ETOH protocol initiated to include gabapentin, clonidine, and ativan  Thiamine, folate, and mvi  Neuro checks given recent SDH  Social work/ and chem dep referrals/consult  1/23: Feeling better, CIWA scores low.  Continue supportive cares.  Acute metabolic acidosis with anion gap elevation, secondary to alcohol and emesis-resolved  Bicarb 20 anion gap 29-normalized with IVF  continue LR at 100 mL/h    Hypomagnesemia -resolved  Replace magnesium per protocol  Transaminitis -improving  Likely secondary to alcohol use slightly more elevated than previous however.  LFTs improving with IVF  Right upper quadrant ultrasound  showed fatty liver, no other acute findings  Abdominal pain -   Likely secondary to retching and vomiting however could be multifactorial  Does have some right upper quadrant pain with palpation most pain localized to lower quadrants however.  Hypertensive urgency with history of hypertension -resolved  Continue patient's home metoprolol  Hypertensive urgency secondary to EtOH withdrawal, is receiving clonidine per protocol  As needed hydralazine available  Sinus tachycardia -resolved  Secondary to #1  Continue cardiac monitor  Recent subdural hematoma -status post cranial bur hole and hematoma evacuation on 12/1/2024  Patient reports that there was not a fall.  Perhaps this was spontaneous with substance abuse history  Areas of agata holes appear to be healing, well-approximated  Mild headache reported  CT scan shows resolving and no new bleeding  Will have patient on neurochecks given current EtOH withdrawal and recent bleed  Tylenol for pain  Hyperglycemia, without history of diabetes -resolved  Likely reactionary stress response given retching nausea vomiting and EtOH abuse    Gastroesophageal reflux with GI bleed history  Reports unfavorable side effects from PPI, upset stomach  Is on famotidine  Depression and anxiety -  Endorses using alcohol as a response to depression or anxiety  Consult to psychiatry, currently not on any antidepressants  CODE STATUS - Patient wishes to be full code and this will be honored during this hospital stay.   Diet:  advance as tolerated  DVT Prophylaxis: VTE Prophylaxis contraindicated due to recent SDH, sequentials ordered  Power Catheter: Not present  Lines: None     Cardiac Monitoring: None        Medical Decision Making       Please see A&P for additional details of medical decision making.        Clinically Significant Risk Factors           # Hypercalcemia: Highest Ca = 10.4 mg/dL in last 2 days, will monitor as appropriate  # Hypomagnesemia: Lowest Mg = 1.3 mg/dL in last 2  days, will replace as needed  # Anion Gap Metabolic Acidosis: Highest Anion Gap = 29 mmol/L in last 2 days, will monitor and treat as appropriate    # Thrombocytopenia: Lowest platelets = 126 in last 2 days, will monitor for bleeding   # Hypertension: Noted on problem list            # Financial/Environmental Concerns: none           Lynne Cho MD  Text Page (7am - 6pm, M-F)  [facility]  Securely message with the Vocera Web Console (learn more here)  Text page via [employer] Paging/Directory      Interval History   Stable overnight.  Feeling better today.  Still feels shaky when ambulating.  Having mild headache.  No nausea or vomiting today.  Did eat some breakfast.    -Data reviewed today: I reviewed all new labs and imaging results over the last 24 hours. I personally reviewed CT scan with result as noted above    Physical Exam   Temp: 97.2  F (36.2  C) Temp src: Oral BP: 102/65 Pulse: 77   Resp: 18 SpO2: 96 % O2 Device: None (Room air)    There were no vitals filed for this visit.  Vital Signs with Ranges  Temp:  [97.1  F (36.2  C)-98.1  F (36.7  C)] 97.2  F (36.2  C)  Pulse:  [] 77  Resp:  [18-22] 18  BP: (102-122)/(65-86) 102/65  SpO2:  [94 %-99 %] 96 %  I/O last 3 completed shifts:  In: 1000 [I.V.:1000]  Out: -     Constitutional: Alert, appears comfortable, in no acute distress, mild tremors noted of outstretched hands  Respiratory: Non labored breathing, clear to auscultation bilaterally, no crackles or wheezes  Cardiovascular: Heart sounds regular rate and rhythm, no murmurs, no leg edema  GI: Abdomen is soft, non distended, non tender. Normal BS  Skin/Integumen: no rashes, no pressure sores  Neuro: alert, converses appropriately, moving all extremities, fluent speech, no facial asymmetry  Psych: mood and affect appropriate      Medications    lactated ringers 100 mL/hr at 01/23/24 0614      cloNIDine  0.1 mg Oral Q8H    famotidine  20 mg Oral BID    folic acid  1 mg Oral  Daily    [START ON 1/29/2024] gabapentin  100 mg Oral Q8H    [START ON 1/27/2024] gabapentin  300 mg Oral Q8H    [START ON 1/25/2024] gabapentin  600 mg Oral Q8H    gabapentin  900 mg Oral Q8H    metoprolol succinate ER  50 mg Oral Daily    multivitamin w/minerals  1 tablet Oral Daily    sodium chloride (PF)  3 mL Intracatheter Q8H    thiamine  100 mg Oral Daily       Data   Recent Labs   Lab 01/23/24  0630 01/22/24  0702 01/18/24  0008   WBC 6.8 8.1 4.6   HGB 11.8* 14.9 13.9    98 97   * 195 217   * 140 141   POTASSIUM 3.8 4.6 3.7   CHLORIDE 100 91* 100   CO2 27 20* 26   BUN 13.7 9.7 5.4*   CR 0.81 0.91 0.85   ANIONGAP 7 29* 15   ISAIAS 8.8 10.4* 9.2   * 215* 120*   ALBUMIN 3.6 4.9  --    PROTTOTAL 6.2* 8.8*  --    BILITOTAL 1.4* 2.2*  --    ALKPHOS 62 96  --    ALT 42 85*  --    AST 65* 194*  --    LIPASE  --  44  --        Imaging  No results found for this or any previous visit (from the past 24 hour(s)).

## 2024-01-23 NOTE — UTILIZATION REVIEW
Admission Status; Secondary Review Determination       Under the authority of the Utilization Management Committee, the utilization review process indicated a secondary review on the above patient. The review outcome is based on review of the medical records, discussions with staff, and applying clinical experience noted on the date of the review.     (x) Inpatient Status Appropriate - This patient's medical care is consistent with medical management for inpatient care and reasonable inpatient medical practice.     RATIONALE FOR DETERMINATION   48-year-old male with a history of alcohol abuse and recent subdural hematoma status post cranial bur hole evacuation was admitted for acute alcohol withdrawal, complicated by metabolic acidosis with anion gap elevation, hypomagnesemia, transaminitis, abdominal pain, hypertensive urgency, and sinus tachycardia. The patient is receiving appropriate treatment for alcohol withdrawal, fluid resuscitation, magnesium replacement, and management of hypertension. Further evaluation for transaminitis and abdominal pain is underway, and close monitoring is in place for the recent subdural hematoma, with neurochecks and pain management.  Inpatient admission is essential for this 48-year-old male with a history of alcohol abuse experiencing acute alcohol withdrawal, as outpatient management could lead to uncontrolled withdrawal symptoms, severe metabolic derangements, and increased risk of complications, including seizures or delirium tremens, potentially exacerbating his recent subdural hematoma and posing a life-threatening risk.        This document was produced using voice recognition software       The information on this document is developed by the utilization review team in order for the business office to ensure compliance. This only denotes the appropriateness of proper admission status and does not reflect the quality of care rendered.   The definitions of Inpatient Status  and Observation Status used in making the determination above are those provided in the CMS Coverage Manual, Chapter 1 and Chapter 6, section 70.4.   Sincerely,   CONNIE LYONS MD   System Medical Director   Utilization Management   [facility].

## 2024-01-24 LAB
ANION GAP SERPL CALCULATED.3IONS-SCNC: 7 MMOL/L (ref 7–15)
BUN SERPL-MCNC: 9.4 MG/DL (ref 6–20)
CALCIUM SERPL-MCNC: 8.9 MG/DL (ref 8.6–10)
CHLORIDE SERPL-SCNC: 101 MMOL/L (ref 98–107)
CREAT SERPL-MCNC: 0.73 MG/DL (ref 0.67–1.17)
DEPRECATED HCO3 PLAS-SCNC: 27 MMOL/L (ref 22–29)
EGFRCR SERPLBLD CKD-EPI 2021: >90 ML/MIN/1.73M2
GLUCOSE SERPL-MCNC: 102 MG/DL (ref 70–99)
MAGNESIUM SERPL-MCNC: 1.6 MG/DL (ref 1.7–2.3)
POTASSIUM SERPL-SCNC: 3.8 MMOL/L (ref 3.4–5.3)
SODIUM SERPL-SCNC: 135 MMOL/L (ref 135–145)

## 2024-01-24 PROCEDURE — 83735 ASSAY OF MAGNESIUM: CPT | Performed by: HOSPITALIST

## 2024-01-24 PROCEDURE — 99232 SBSQ HOSP IP/OBS MODERATE 35: CPT | Performed by: HOSPITALIST

## 2024-01-24 PROCEDURE — 250N000013 HC RX MED GY IP 250 OP 250 PS 637: Performed by: NURSE PRACTITIONER

## 2024-01-24 PROCEDURE — 120N000001 HC R&B MED SURG/OB

## 2024-01-24 PROCEDURE — 99222 1ST HOSP IP/OBS MODERATE 55: CPT

## 2024-01-24 PROCEDURE — 80048 BASIC METABOLIC PNL TOTAL CA: CPT | Performed by: HOSPITALIST

## 2024-01-24 PROCEDURE — 36415 COLL VENOUS BLD VENIPUNCTURE: CPT | Performed by: HOSPITALIST

## 2024-01-24 PROCEDURE — 258N000003 HC RX IP 258 OP 636: Performed by: NURSE PRACTITIONER

## 2024-01-24 RX ORDER — DIAZEPAM 10 MG/2ML
5-10 INJECTION, SOLUTION INTRAMUSCULAR; INTRAVENOUS EVERY 30 MIN PRN
Status: DISCONTINUED | OUTPATIENT
Start: 2024-01-24 | End: 2024-01-25 | Stop reason: HOSPADM

## 2024-01-24 RX ORDER — DIAZEPAM 5 MG
10 TABLET ORAL EVERY 30 MIN PRN
Status: DISCONTINUED | OUTPATIENT
Start: 2024-01-24 | End: 2024-01-25 | Stop reason: HOSPADM

## 2024-01-24 RX ADMIN — THIAMINE HCL TAB 100 MG 100 MG: 100 TAB at 08:00

## 2024-01-24 RX ADMIN — GABAPENTIN 900 MG: 300 CAPSULE ORAL at 04:04

## 2024-01-24 RX ADMIN — Medication 1 TABLET: at 08:00

## 2024-01-24 RX ADMIN — METOPROLOL SUCCINATE 50 MG: 50 TABLET, EXTENDED RELEASE ORAL at 08:00

## 2024-01-24 RX ADMIN — FAMOTIDINE 20 MG: 20 TABLET ORAL at 08:00

## 2024-01-24 RX ADMIN — CLONIDINE HYDROCHLORIDE 0.1 MG: 0.1 TABLET ORAL at 04:04

## 2024-01-24 RX ADMIN — FAMOTIDINE 20 MG: 20 TABLET ORAL at 19:54

## 2024-01-24 RX ADMIN — FOLIC ACID 1 MG: 1 TABLET ORAL at 08:00

## 2024-01-24 RX ADMIN — SODIUM CHLORIDE, POTASSIUM CHLORIDE, SODIUM LACTATE AND CALCIUM CHLORIDE: 600; 310; 30; 20 INJECTION, SOLUTION INTRAVENOUS at 12:40

## 2024-01-24 RX ADMIN — GABAPENTIN 900 MG: 300 CAPSULE ORAL at 11:58

## 2024-01-24 RX ADMIN — CLONIDINE HYDROCHLORIDE 0.1 MG: 0.1 TABLET ORAL at 19:54

## 2024-01-24 RX ADMIN — CLONIDINE HYDROCHLORIDE 0.1 MG: 0.1 TABLET ORAL at 11:58

## 2024-01-24 RX ADMIN — GABAPENTIN 900 MG: 300 CAPSULE ORAL at 19:54

## 2024-01-24 ASSESSMENT — ACTIVITIES OF DAILY LIVING (ADL)
ADLS_ACUITY_SCORE: 22

## 2024-01-24 NOTE — PLAN OF CARE
3-7 pm: A & O x 4. VSS on RA, soft BP. SBA with gait belt and IV pole, unsteady gait. Ambulated hallway x1. Tolerated dinner. CIWA, 4. PRN Tylenol x1. Neuro intact. TELE:

## 2024-01-24 NOTE — CONSULTS
Initial Psychiatric Consult   Consult date: January 24, 2024         Reason for Consult, requesting source:    Alcohol use disorder, anxiety, depression  Requesting source: [facility]    Labs and imaging reviewed. Discussed with nursing.        HPI:   Joe Shah, preferred name Ravi, is a 48 year old male with a history of alcohol abuse, HTN, GERD, anxiety, and recent SDH s/p agata hole and hematoma evacuation on 12/1/23 who was admitted on 1/22/24 for alcohol withdrawal after binge of heavy drinking in preceding 3 days.     I met with Ravi in his room, he is seen laying in bed. Cooperative with assessment. Denies feeling depressed and denies suicidal ideation. He does endorse ongoing anxiety that sounds like generalized anxiety disorder. Anxiety is at least partially managed by being kept busy with with work, family, friends, home projects but often finds that on days with nothing needing done, he drinks as a way of coping with anxiety. He drinks daily, but only 1-2 drinks most nights, more on weekends but not always as heavily as he was prior to admission. He reports that when he drinks heavily, the only way for him to get through withdrawal is to come into the hospital. He is worried about long term health effects and risk to relationships if he continues to drink. He expresses motivation for sobriety, plans to reconnect with his AA sponsor.         Past Psychiatric History:   Alcohol use disorder, generalized anxiety disorder. Took venlafaxine in the past which was helpful, stopped when he felt he was doing better. Thinks he tried an SSRI but cannot recall name, thinks it was in 2015.        Substance Use and History:   Alcohol abuse as above.        Past Medical History:   PAST MEDICAL HISTORY:   Past Medical History:   Diagnosis Date    Anxiety     Back pain     Depressive disorder     Hypertension     SDH (subdural hematoma) (H)     Substance abuse (H)        PAST SURGICAL HISTORY:   Past  Surgical History:   Procedure Laterality Date    FANTASMA HOLE(S) EVACUATE HEMATOMA SUBDURAL N/A 12/1/2023    Procedure: CREATION, CRANIAL FANTASMA HOLE, WITH SUBDURAL HEMATOMA EVACUATION;  Surgeon: Gio Phoenix MD;  Location:  OR    ENT SURGERY      ESOPHAGOSCOPY, GASTROSCOPY, DUODENOSCOPY (EGD), COMBINED N/A 9/12/2019    Procedure: ESOPHAGOGASTRODUODENOSCOPY (EGD);  Surgeon: Scott Mcrae MD;  Location:  GI    ESOPHAGOSCOPY, GASTROSCOPY, DUODENOSCOPY (EGD), COMBINED N/A 5/27/2023    Procedure: Esophagoscopy, gastroscopy, duodenoscopy (EGD), combined;  Surgeon: Scott Mcrae MD;  Location:  GI    NECK SURGERY               Family History:   FAMILY HISTORY:   Family History   Problem Relation Age of Onset    No Known Problems Maternal Grandmother     No Known Problems Maternal Grandfather     No Known Problems Paternal Grandmother     Substance Abuse Paternal Grandfather            Social History:   SOCIAL HISTORY:   Social History     Tobacco Use    Smoking status: Former    Smokeless tobacco: Current     Types: Chew   Substance Use Topics    Alcohol use: Yes     Comment: denies ETOH use              Physical ROS:   The 10 point Review of Systems is negative other than noted in the HPI or here.           Medications:      cloNIDine  0.1 mg Oral Q8H    famotidine  20 mg Oral BID    folic acid  1 mg Oral Daily    [START ON 1/29/2024] gabapentin  100 mg Oral Q8H    [START ON 1/27/2024] gabapentin  300 mg Oral Q8H    [START ON 1/25/2024] gabapentin  600 mg Oral Q8H    gabapentin  900 mg Oral Q8H    metoprolol succinate ER  50 mg Oral Daily    multivitamin w/minerals  1 tablet Oral Daily    sodium chloride (PF)  3 mL Intracatheter Q8H    thiamine  100 mg Oral Daily              Allergies:     Allergies   Allergen Reactions    Morphine Nausea and Vomiting    Oxycodone Itching     1/22 Patient reports he can tolerate this          Labs:     Recent Results (from the past 48 hour(s))   Magnesium     Collection Time: 01/22/24  8:24 PM   Result Value Ref Range    Magnesium 2.7 (H) 1.7 - 2.3 mg/dL   Magnesium    Collection Time: 01/23/24  6:30 AM   Result Value Ref Range    Magnesium 2.1 1.7 - 2.3 mg/dL   Comprehensive metabolic panel    Collection Time: 01/23/24  6:30 AM   Result Value Ref Range    Sodium 134 (L) 135 - 145 mmol/L    Potassium 3.8 3.4 - 5.3 mmol/L    Carbon Dioxide (CO2) 27 22 - 29 mmol/L    Anion Gap 7 7 - 15 mmol/L    Urea Nitrogen 13.7 6.0 - 20.0 mg/dL    Creatinine 0.81 0.67 - 1.17 mg/dL    GFR Estimate >90 >60 mL/min/1.73m2    Calcium 8.8 8.6 - 10.0 mg/dL    Chloride 100 98 - 107 mmol/L    Glucose 114 (H) 70 - 99 mg/dL    Alkaline Phosphatase 62 40 - 150 U/L    AST 65 (H) 0 - 45 U/L    ALT 42 0 - 70 U/L    Protein Total 6.2 (L) 6.4 - 8.3 g/dL    Albumin 3.6 3.5 - 5.2 g/dL    Bilirubin Total 1.4 (H) <=1.2 mg/dL   CBC with platelets and differential    Collection Time: 01/23/24  6:30 AM   Result Value Ref Range    WBC Count 6.8 4.0 - 11.0 10e3/uL    RBC Count 3.60 (L) 4.40 - 5.90 10e6/uL    Hemoglobin 11.8 (L) 13.3 - 17.7 g/dL    Hematocrit 36.1 (L) 40.0 - 53.0 %     78 - 100 fL    MCH 32.8 26.5 - 33.0 pg    MCHC 32.7 31.5 - 36.5 g/dL    RDW 13.8 10.0 - 15.0 %    Platelet Count 126 (L) 150 - 450 10e3/uL    % Neutrophils 77 %    % Lymphocytes 12 %    % Monocytes 9 %    % Eosinophils 0 %    % Basophils 1 %    % Immature Granulocytes 1 %    NRBCs per 100 WBC 0 <1 /100    Absolute Neutrophils 5.3 1.6 - 8.3 10e3/uL    Absolute Lymphocytes 0.8 0.8 - 5.3 10e3/uL    Absolute Monocytes 0.6 0.0 - 1.3 10e3/uL    Absolute Eosinophils 0.0 0.0 - 0.7 10e3/uL    Absolute Basophils 0.0 0.0 - 0.2 10e3/uL    Absolute Immature Granulocytes 0.0 <=0.4 10e3/uL    Absolute NRBCs 0.0 10e3/uL   Magnesium    Collection Time: 01/24/24 10:36 AM   Result Value Ref Range    Magnesium 1.6 (L) 1.7 - 2.3 mg/dL   Basic metabolic panel    Collection Time: 01/24/24 10:36 AM   Result Value Ref Range    Sodium 135 135 - 145  mmol/L    Potassium 3.8 3.4 - 5.3 mmol/L    Chloride 101 98 - 107 mmol/L    Carbon Dioxide (CO2) 27 22 - 29 mmol/L    Anion Gap 7 7 - 15 mmol/L    Urea Nitrogen 9.4 6.0 - 20.0 mg/dL    Creatinine 0.73 0.67 - 1.17 mg/dL    GFR Estimate >90 >60 mL/min/1.73m2    Calcium 8.9 8.6 - 10.0 mg/dL    Glucose 102 (H) 70 - 99 mg/dL          Physical and Psychiatric Examination:     BP (!) 127/91 (BP Location: Right arm)   Pulse 61   Temp 98.3  F (36.8  C) (Oral)   Resp 18   Wt 79.8 kg (176 lb)   SpO2 95%   BMI 24.55 kg/m    Weight is 176 lbs 0 oz  Body mass index is 24.55 kg/m .    Physical Exam:  I have reviewed the physical exam as documented by by the medical team and agree with findings and assessment and have no additional findings to add at this time.    Mental Status Exam:    Appearance: awake, alert and adequately groomed  Attitude:  cooperative  Eye Contact:  fair  Mood:  anxious  Affect:  appropriate and in normal range and mood congruent  Speech:  clear, coherent  Psychomotor Behavior:  no evidence of tardive dyskinesia, dystonia, or tics  Thought Process:  logical, linear, and goal oriented  Associations:  no loose associations  Thought Content:  no evidence of suicidal ideation or homicidal ideation and no evidence of psychotic thought  Insight:  good  Judgement:  intact  Oriented to:  time, person, and place  Attention Span and Concentration:  intact  Recent and Remote Memory:  intact               DSM-5 Diagnosis:   Alcohol use disorder, severe  Generalized anxiety disorder          Assessment:   Joe Shah is a 48 year old male with a history of alcohol use disorder, generalized anxiety disorder who presents with alcohol withdrawal. He expresses motivation for sobriety and supportive family. He is not interested in formal CD treatment at this time. He does describe drinking as a way to self-medicate anxiety. We discussed possible treatment options for anxiety. He took venlafaxine many years ago which  was helpful, he tapered off it when he was doing better. He is not sure that he wants to restart this medication or try any new medication for anxiety but plans to consider and will discuss with his PCP.     Recommend starting gabapentin for alcohol use disorder, anxiety. He was recently prescribed gabapentin 300mg for pain after his surgery, and reports it makes him excessively tired. Discussed option to start at lower dose of 100mg TID, and that if this was still too sedating then he could take 300mg at bedtime instead. He is currently on gabapentin taper as part of [facility] protocol here in the hospital. He anticipates discharge tomorrow, so I discussed with him that this medication can be sent to pharmacy at discharge.          Summary of Recommendations:   Recommend gabapentin 100mg TID to be prescribed at discharged. Discussed that if he still experiences excess daytime sedation, then he can take 300mg at bedtime.  He declines psychiatry outpatient follow up, prefers to continue seeing his PCP.      YUVAL Vargas [facility]    Consult/Liaison Psychiatry   [facility]    Contact information available via [facility] Paging/Directory  If I am not available, then SHAYLA EMMANUEL line ([phone]) should know who is covering our consult service.

## 2024-01-24 NOTE — PLAN OF CARE
Goal Outcome Evaluation:      Plan of Care Reviewed With: patient    Overall Patient Progress: improvingOverall Patient Progress: improving     Summary: Alcohol withdrawal  DATE & TIME: 01/23-01/24 5719-0812  Cognitive Concerns/ Orientation: A&O x4, neuro intact  BEHAVIOR & AGGRESSION TOOL COLOR: Green  CIWA SCORE: 1, 1,1  ABNL VS/O2: VSS on RA  MOBILITY: SBA- gait improving   PAIN MANAGMENT: Denies  DIET: Regular  BOWEL/BLADDER: Continent- up to bathroom. Loose BM x2.   ABNL LAB/BG: ; AST 65; ALT 42  DRAIN/DEVICES: R PIV infusing LR @ 100 mL/hr  TELEMETRY RHYTHM: NSR  SKIN: scattered scabs and bruises, cracked heels.  TESTS/PROCEDURES: none new  D/C DAY/GOALS/PLACE: pending  OTHER IMPORTANT INFO:  Q4 Neuro checks. Recent subdural hematoma status post cranial bur hole and hematoma evacuation on 12/1/2023. Patient refusing inpatient chem dep, wanting something outpatient. Psych consult pending.

## 2024-01-24 NOTE — PROGRESS NOTES
[facility]    Hospitalist Progress Note    Date of Admission:  1/22/2024    Assessment & Plan   Joe Shah is a 48 year old male with a significant past medical history for EtOH abuse, hypertension, gastroesophageal reflux disease depression, anxiety, and recent subdural hematoma status post cranial bur hole and hematoma evacuation on 12/1/2023 who is admitted on 1/22/2024. He presented to the emergency department morning of 1/22/2024 with persistent vomiting after having heavy alcohol intake over the weekend Friday, Saturday, and last drink Sunday morning of vodka and orange juice.  Workup in the emergency department was notable for magnesium 1.3, bicarb 20, anion gap 29, and EtOH level 0.  Found to be quite tremulous with elevated CIWA scores, hypertensive, and tachycardic.  Repeat CT head was complete as well noting decrease of thickness and subdural collection along the left cerebral convex and no evidence of new ICH noted.  Request for admission to the hospital service for EtOH withdrawal.     Acute alcohol withdrawal -significant long family history for ETOH abuse in father, grandfather. Patient reports want to break the cycle with 2 sons at home. Does have good family support, wife rarely drinks. Interested in an inpatient program, does have sponsor.  ETOH protocol initiated to include gabapentin, clonidine, and ativan  Thiamine, folate, and mvi  Neuro checks given recent SDH  Social work/ and chem dep referrals/consult  1/24: Feeling better, CIWA scores low.  Continue supportive cares.  Does not feel ready to discharge today.  Acute metabolic acidosis with anion gap elevation, secondary to alcohol and emesis-resolved  Bicarb 20 anion gap 29-normalized with IVF  Discontinue IVF as oral intake improved    Hypomagnesemia -resolved  Replace magnesium per protocol  Transaminitis -improving  Likely secondary to alcohol use slightly more elevated than previous  however.  LFTs improving with IVF  Right upper quadrant ultrasound showed fatty liver, no other acute findings  Abdominal pain -   Likely secondary to retching and vomiting however could be multifactorial  Does have some right upper quadrant pain with palpation most pain localized to lower quadrants however.  Hypertensive urgency with history of hypertension -resolved  Continue patient's home metoprolol  Hypertensive urgency secondary to EtOH withdrawal, is receiving clonidine per protocol  As needed hydralazine available  Sinus tachycardia -resolved  Secondary to #1  Continue cardiac monitor  Recent subdural hematoma -status post cranial bur hole and hematoma evacuation on 12/1/2024  Patient reports that there was not a fall.  Perhaps this was spontaneous with substance abuse history  Areas of agata holes appear to be healing, well-approximated  Mild headache reported  CT scan shows resolving and no new bleeding  Will have patient on neurochecks given current EtOH withdrawal and recent bleed  Tylenol for pain  Hyperglycemia, without history of diabetes -resolved  Likely reactionary stress response given retching nausea vomiting and EtOH abuse    Gastroesophageal reflux with GI bleed history  Reports unfavorable side effects from PPI, upset stomach  Is on famotidine  Depression and anxiety -  Endorses using alcohol as a response to depression or anxiety  Consult to psychiatry, currently not on any antidepressants.  This is pending.  CODE STATUS - Patient wishes to be full code and this will be honored during this hospital stay.   Diet:  advance as tolerated  DVT Prophylaxis: VTE Prophylaxis contraindicated due to recent SDH, sequentials ordered  Power Catheter: Not present  Lines: None     Cardiac Monitoring: None    Anticipate discharge to home tomorrow.    Medical Decision Making       Please see A&P for additional details of medical decision making.        Clinically Significant Risk Factors            #  Hypomagnesemia: Lowest Mg = 1.6 mg/dL in last 2 days, will replace as needed     # Thrombocytopenia: Lowest platelets = 126 in last 2 days, will monitor for bleeding   # Hypertension: Noted on problem list              # Financial/Environmental Concerns: none           Lynne Cho MD  Text Page (7am - 6pm, M-F)  [facility]  Securely message with the Vocera Web Console (learn more here)  Text page via [employer] Paging/Directory      Interval History   Stable overnight.  Feeling better today.  Shakes almost resolved but still not very steady while ambulating.  Oral intake improved.  Does not feel ready to discharge home yet today.  Awaiting psych consult.    -Data reviewed today: I reviewed all new labs and imaging results over the last 24 hours. I personally reviewed CT scan with result as noted above    Physical Exam   Temp: 97.9  F (36.6  C) Temp src: Oral BP: 122/80 Pulse: 66   Resp: 18 SpO2: 97 % O2 Device: None (Room air)    Vitals:    01/24/24 0700   Weight: 79.8 kg (176 lb)     Vital Signs with Ranges  Temp:  [97.5  F (36.4  C)-98.1  F (36.7  C)] 97.9  F (36.6  C)  Pulse:  [65-72] 66  Resp:  [16-18] 18  BP: ()/(66-88) 122/80  SpO2:  [96 %-97 %] 97 %  I/O last 3 completed shifts:  In: 3804 [P.O.:980; I.V.:2824]  Out: -     Constitutional: Alert, appears comfortable, in no acute distress, mild tremors noted of outstretched hands  Respiratory: Non labored breathing, clear to auscultation bilaterally, no crackles or wheezes  Cardiovascular: Heart sounds regular rate and rhythm, no murmurs, no leg edema  GI: Abdomen is soft, non distended, non tender. Normal BS  Skin/Integumen: no rashes, no pressure sores  Neuro: alert, converses appropriately, moving all extremities, fluent speech, no facial asymmetry  Psych: mood and affect appropriate      Medications        cloNIDine  0.1 mg Oral Q8H    famotidine  20 mg Oral BID    folic acid  1 mg Oral Daily    [START ON 1/29/2024] gabapentin   100 mg Oral Q8H    [START ON 1/27/2024] gabapentin  300 mg Oral Q8H    [START ON 1/25/2024] gabapentin  600 mg Oral Q8H    gabapentin  900 mg Oral Q8H    metoprolol succinate ER  50 mg Oral Daily    multivitamin w/minerals  1 tablet Oral Daily    sodium chloride (PF)  3 mL Intracatheter Q8H    thiamine  100 mg Oral Daily       Data   Recent Labs   Lab 01/24/24  1036 01/23/24  0630 01/22/24  0702 01/18/24  0008   WBC  --  6.8 8.1 4.6   HGB  --  11.8* 14.9 13.9   MCV  --  100 98 97   PLT  --  126* 195 217    134* 140 141   POTASSIUM 3.8 3.8 4.6 3.7   CHLORIDE 101 100 91* 100   CO2 27 27 20* 26   BUN 9.4 13.7 9.7 5.4*   CR 0.73 0.81 0.91 0.85   ANIONGAP 7 7 29* 15   ISAIAS 8.9 8.8 10.4* 9.2   * 114* 215* 120*   ALBUMIN  --  3.6 4.9  --    PROTTOTAL  --  6.2* 8.8*  --    BILITOTAL  --  1.4* 2.2*  --    ALKPHOS  --  62 96  --    ALT  --  42 85*  --    AST  --  65* 194*  --    LIPASE  --   --  44  --        Imaging  No results found for this or any previous visit (from the past 24 hour(s)).

## 2024-01-25 VITALS
OXYGEN SATURATION: 98 % | SYSTOLIC BLOOD PRESSURE: 109 MMHG | RESPIRATION RATE: 18 BRPM | TEMPERATURE: 98.1 F | DIASTOLIC BLOOD PRESSURE: 66 MMHG | BODY MASS INDEX: 24.55 KG/M2 | HEART RATE: 65 BPM | WEIGHT: 176 LBS

## 2024-01-25 LAB
MAGNESIUM SERPL-MCNC: 1.6 MG/DL (ref 1.7–2.3)
POTASSIUM SERPL-SCNC: 3.6 MMOL/L (ref 3.4–5.3)

## 2024-01-25 PROCEDURE — 84132 ASSAY OF SERUM POTASSIUM: CPT | Performed by: HOSPITALIST

## 2024-01-25 PROCEDURE — 250N000013 HC RX MED GY IP 250 OP 250 PS 637: Performed by: NURSE PRACTITIONER

## 2024-01-25 PROCEDURE — 83735 ASSAY OF MAGNESIUM: CPT | Performed by: HOSPITALIST

## 2024-01-25 PROCEDURE — 99239 HOSP IP/OBS DSCHRG MGMT >30: CPT | Performed by: INTERNAL MEDICINE

## 2024-01-25 PROCEDURE — 36415 COLL VENOUS BLD VENIPUNCTURE: CPT | Performed by: HOSPITALIST

## 2024-01-25 RX ORDER — LANOLIN ALCOHOL/MO/W.PET/CERES
100 CREAM (GRAM) TOPICAL DAILY
Qty: 3 TABLET | Refills: 0 | Status: SHIPPED | OUTPATIENT
Start: 2024-01-26 | End: 2024-02-08

## 2024-01-25 RX ORDER — GABAPENTIN 100 MG/1
100 CAPSULE ORAL 3 TIMES DAILY
Qty: 42 CAPSULE | Refills: 0 | Status: ON HOLD | OUTPATIENT
Start: 2024-01-25 | End: 2024-03-13

## 2024-01-25 RX ORDER — FAMOTIDINE 20 MG/1
20 TABLET, FILM COATED ORAL 2 TIMES DAILY
Qty: 28 TABLET | Refills: 0 | Status: ON HOLD | OUTPATIENT
Start: 2024-01-25 | End: 2024-03-13

## 2024-01-25 RX ADMIN — METOPROLOL SUCCINATE 50 MG: 50 TABLET, EXTENDED RELEASE ORAL at 08:32

## 2024-01-25 RX ADMIN — CLONIDINE HYDROCHLORIDE 0.1 MG: 0.1 TABLET ORAL at 12:47

## 2024-01-25 RX ADMIN — Medication 1 TABLET: at 08:32

## 2024-01-25 RX ADMIN — FOLIC ACID 1 MG: 1 TABLET ORAL at 08:32

## 2024-01-25 RX ADMIN — THIAMINE HCL TAB 100 MG 100 MG: 100 TAB at 08:32

## 2024-01-25 RX ADMIN — FAMOTIDINE 20 MG: 20 TABLET ORAL at 08:32

## 2024-01-25 RX ADMIN — CLONIDINE HYDROCHLORIDE 0.1 MG: 0.1 TABLET ORAL at 04:38

## 2024-01-25 RX ADMIN — GABAPENTIN 900 MG: 300 CAPSULE ORAL at 04:38

## 2024-01-25 RX ADMIN — GABAPENTIN 900 MG: 300 CAPSULE ORAL at 12:47

## 2024-01-25 ASSESSMENT — ACTIVITIES OF DAILY LIVING (ADL)
ADLS_ACUITY_SCORE: 22

## 2024-01-25 NOTE — PROGRESS NOTES
.Discharge    Patient discharged to home via car with wife   Care plan note complete    Listed belongings gathered and given to patient (including from security/pharmacy). Yes  Care Plan and Patient education resolved: Yes  Prescriptions if needed, hard copies sent with patient  Yes  Medication Bin checked and emptied on discharge Yes  SW/care coordinator/charge RN aware of discharge: Yes

## 2024-01-25 NOTE — CONSULTS
"Triage & Transition Services, Extended Care       Patient: Ravi goes by \"Ravi,\" uses he/him pronouns  Date of Service: January 25, 2024  Site of Service: [place] [address] [facility]                             [id number]  Patient was seen      Mode of Assessment:      Patient is followed related to: other  Notable observations from today's encounter include: Pt. declined needing RAISSA sevices or TX at this time.  The care team is working towards the following:    Significant status changes: no  Case Management included: Case Management Included: collaborating with patient's support system  Details on Collaborating with Patient's Support System: Writer has notified pt's care team that consult has been completed.  Summary of Interaction: Writer met with pt. in attempt to complete a RAISSA evaluation. Pt reported he was not intersted in attending TX at this time due to needing to work and insurance not covering treatment services. Writer notified pt. that I would provide community resources on his AVS.    Recommendations:    To find a treatment program:     SAMHSA.gov   Click on find treatment   Enter your zip code and select your city/state.  The Substance Abuse and Mental Health Services Administration (SAMHSA) is the agency within  the U.S. Department of Health and Human Services that leads public health efforts to advance the  behavioral health of the nation. SAMHSA's mission is to reduce the impact of substance abuse and  mental illness on [place] communities.     [employer] select substance use disorder programs then select find services.  [employer] is a virtual community and health care connection resource. We connect  individuals, families, mental health and substance use disorder providers, physicians, care coordinators,  and others with a real-time, searchable directory of mental health and substance use disorder resources  and their availability within [place]      Legal Status: " Voluntary        Christy Alexandre [facility]   Extended Care  [phone]

## 2024-01-25 NOTE — PLAN OF CARE
Goal Outcome Evaluation:    Summary: Alcohol withdrawal    DATE & TIME: 01/24/24 9587-2854  Cognitive Concerns/ Orientation: A&O x4, Neuro intact  BEHAVIOR & AGGRESSION TOOL COLOR: Green  CIWA SCORE: 2, 2, 1  ABNL VS/O2: VSS on RA  MOBILITY: Independent   PAIN MANAGMENT: C/O mild headache, declined PRN's  DIET: Regular  BOWEL/BLADDER: Continent, up to bathroom. BM x1 this shift  ABNL LAB/BG: ; AST 65; ALT 42  DRAIN/DEVICES: R PIV SL  TELEMETRY RHYTHM: NA  SKIN: scattered scabs and bruises, cracked heels.  TESTS/PROCEDURES: none new  D/C DAY/GOALS/PLACE: potentially 1/25 to home  OTHER IMPORTANT INFO: Q4 Neuro checks. Recent subdural hematoma status post cranial bur hole and hematoma evacuation on 12/1/2023. Patient refusing inpatient chem dep, wanting something outpatient. Psych consult pending.

## 2024-01-25 NOTE — PLAN OF CARE
Goal Outcome Evaluation:      Plan of Care Reviewed With: patient    Overall Patient Progress: no changeOverall Patient Progress: no change     Summary: Alcohol withdrawal    DATE & TIME: 01/24-01/25 6895-6190  Cognitive Concerns/ Orientation: A&O x4, Neuros intact  BEHAVIOR & AGGRESSION TOOL COLOR: Green  CIWA SCORE: 0,0  ABNL VS/O2: VSS on RA  MOBILITY: Independent   PAIN MANAGMENT: denies  DIET: Regular  BOWEL/BLADDER: Continent- up to bathroom. ABNL LAB/BG: ; AST 65; ALT 42  DRAIN/DEVICES: R PIV SL  TELEMETRY RHYTHM: NA  SKIN: scattered scabs and bruises, cracked heels.  TESTS/PROCEDURES: none new  D/C DAY/GOALS/PLACE: potentially 1/25 to home  OTHER IMPORTANT INFO: Q4 Neuro checks. Recent subdural hematoma status post cranial bur hole and hematoma evacuation on 12/1/2023. Patient refusing inpatient chem dep, wanting something outpatient. Psych consulted- he is motivated for sobriety with plans to reconnect with is AA sponsor

## 2024-01-25 NOTE — PROGRESS NOTES
Writer met with pt. (Second attempt) to complete a RAISSA evaluation. Pt declined stating he is not in need of treatment at this time. His plan is to reconnect with his sponsor and go back to AA meetings in his community. Pt was notified that I would provide community resources in his AVS.

## 2024-01-25 NOTE — PLAN OF CARE
Goal Outcome Evaluation:       Summary: Alcohol withdrawal    DATE & TIME: 01/25/24 [id number]  Cognitive Concerns/ Orientation: A&O x4, Neuros intact  BEHAVIOR & AGGRESSION TOOL COLOR: Green  CIWA SCORE: 0,0  ABNL VS/O2: VSS on RA  MOBILITY: Independent   PAIN MANAGMENT: denies  DIET: Regular- good appetite   BOWEL/BLADDER: Continent- up to bathroom.   DRAIN/DEVICES: R PIV SL  TELEMETRY RHYTHM: NA  SKIN: scattered scabs and bruises, cracked heels.  TESTS/PROCEDURES: none new  D/C DAY/GOALS/PLACE:1/25 to home  OTHER IMPORTANT INFO: Q4 Neuro checks. Recent subdural hematoma status post cranial bur hole and hematoma evacuation on 12/1/2023. Patient refusing inpatient chem dep, wanting something outpatient. Psych consulted- he is motivated for sobriety with plans to reconnect with is AA sponsor. K 3.6, Mg 1.6

## 2024-01-25 NOTE — DISCHARGE SUMMARY
[facility]    Discharge Summary  Hospitalist    Date of Admission:  1/22/2024  Date of Discharge:  1/25/2024  Discharging Provider:  JENNY Craft MD, FACP     Date of Service (when I saw the patient): 01/25/24    Discharge Diagnoses     Acute alcohol withdrawal  Acute metabolic acidosis with anion gap elevation, secondary to alcohol and emesis  Hypomagnesemia  Abnormal liver function tests/transaminitis   Abdominal pain  Hypertensive urgency with history of hypertension  Sinus tachycardia  Recent subdural hematoma  Hyperglycemia, without history of diabetes  Gastroesophageal reflux with GI bleed history  Depression and anxiety      History of Present Illness   Per 1/22 H & P:  Joe Shah is a pleasant 48 year old gentleman with history of EtOH abuse, hypertension, gastroesophageal reflux disease depression, anxiety, and recent subdural hematoma, status post cranial bur hole and hematoma evacuation on 12/1/2023.  He was admitted on 1/22/2024 after presenting to the emergency department morning of 1/22/2024 with persistent vomiting.  He reported heavy alcohol intake over the weekend Friday, Saturday, and last drink Sunday morning of vodka and orange juice.  Workup in the emergency department showed magnesium 1.3, bicarb 20, anion gap 29, and EtOH level 0.  Was quite tremulous with elevated CIWA scores, hypertensive, and tachycardic.  Repeat CT head was complete as well noting decrease of thickness and subdural collection along the left cerebral convex and no evidence of new ICH noted.     See H & P for other admitting Sx.  Ravi is quite active, and ambulates without difficulty; does not use any assistive devices, lives independently with his wife and 2 sons.      Hospital Course   Joe Shah was admitted on 1/22/2024.  The following problems were addressed during his hospitalization:    Acute alcohol withdrawal -significant long family history for ETOH abuse in father,  grandfather.  Ravi reports wanting to break the cycle with 2 sons at home. Does have good family support, wife rarely drinks. Interested in an inpatient program, does have sponsor.  - ETOH protocol initiated to include gabapentin, clonidine, and ativan  - received Thiamine, folate, and mvi  - initially on Neuro checks given recent SDH  - further evaluated with Social work/ and chem dep referrals/consult  - 1/24: Feeling better, CIWA scores low.  Did not feel ready to discharge yet;  - by day of discharge, is eager to return home; denies any ongoing symptoms.    Acute metabolic acidosis with anion gap elevation, secondary to alcohol and emesis; resolved  - treated with Bicarb 20 anion gap 29-normalized with IVF  - discontinued IVF as soon as oral intake improved     Hypomagnesemia - resolved  - replaced magnesium per protocol    Abnormal liver function tests/transaminitis - improving  - likely secondary to alcohol use slightly more elevated than previous however.  - Right upper quadrant ultrasound showed fatty liver, no other acute findings    Abdominal pain - attributed to retching and vomiting; however, could be multifactorial.  Not present on day of discharge.    Hypertensive urgency with history of hypertension; attributed to withdrawal - resolved  - continued home metoprolol  - received clonidine per protocol    Sinus tachycardia - resolved  - monitored on telemetry    Recent subdural hematoma - status post cranial bur hole and hematoma evacuation on 12/1/2024  - Ravi reports that there was not a fall.  Perhaps this was spontaneous with substance abuse history  - areas of agata holes appear to be healing, well-approximated  - Mild headache reported initially, then resolved.  - CT scan showed this was resolving and no new bleeding  - kept on neuro checks initially, given current EtOH withdrawal and recent bleed  - responded well to Tylenol for pain    Hyperglycemia, without history of diabetes -  resolved  - suspect reactive stress response given retching nausea vomiting and EtOH abuse     Gastroesophageal reflux with GI bleed history  - reported unfavorable side effects from PPI, upset stomach  - started on famotidine    Depression and anxiety -  - acknowledged using alcohol as a response to depression and/or anxiety  - Psychiatry was consulted; currently not on any antidepressants.  - see Psychiatry note; needs close follow up with his PCP         JENNY Craft MD, [facility]       Significant Results and Procedures   See below and in EHR.    Pending Results   None.    Code Status   Full Code       Primary Care Physician   Mike Rodney    Physical Exam     Vitals:    01/24/24 0700   Weight: 79.8 kg (176 lb)     98.1  F (36.7  C) 65 -- -- 109/66 18 98 % -- None (Room air)     Constitutional: awake, no apparent distress; lying in bed  HEENT: sclerae clear; MM's moist  Respiratory: fair a/e bilaterally, no wheezing or rhonchi  Cardiovascular: regular rate and rhythm, S1, S2 noted; no m/r/g  GI: abdomen flat, + bowel sounds; soft, non-tender, non-distended  Skin/Integumen: no rashes, no cyanosis, no jaundice  Musculoskeletal: no edema  Neurologic: follows directions well; no focal deficits     Discharge Disposition   Discharged to home  Condition at discharge: Stable    Consultations This Hospital Stay   CHEMICAL DEPENDENCY IP CONSULT  CARE MANAGEMENT / SOCIAL WORK IP CONSULT  CARE MANAGEMENT / SOCIAL WORK IP CONSULT  PSYCHIATRY IP CONSULT    Time Spent on this Encounter   I, Hao Craft MD, personally saw the patient today and spent greater than 30 minutes discharging this patient.    Discharge Orders      Reason for your hospital stay    You were admitted for abdominal symptoms and acute alcohol withdrawal, and have made good progress.     Follow-up and recommended labs and tests     1. Follow up with primary care provider, Dr. Sofia Rebollar MD ([facility], on  [address]), within 7 days to evaluate medication change, to evaluate treatment change, for hospital follow- up, and to follow up on results.    2. The following labs/tests are recommended: BMP, Magnesium -> to be done in next 3-4 days.     Activity    Your activity upon discharge: activity as tolerated and no driving for today     Diet    Follow this diet upon discharge: Orders Placed This Encounter      Regular Diet Adult     Discharge Medications   Discharge Medication List as of 1/25/2024  5:42 PM        START taking these medications    Details   famotidine (PEPCID) 20 MG tablet Take 1 tablet (20 mg) by mouth 2 times daily, Disp-28 tablet, R-0, E-Prescribe      thiamine (B-1) 100 MG tablet Take 1 tablet (100 mg) by mouth daily, Disp-3 tablet, R-0, E-Prescribe           CONTINUE these medications which have CHANGED    Details   gabapentin (NEURONTIN) 100 MG capsule Take 1 capsule (100 mg) by mouth 3 times daily, Disp-42 capsule, R-0, E-Prescribe           CONTINUE these medications which have NOT CHANGED    Details   folic acid (FOLVITE) 1 MG tablet Take 1 tablet (1 mg) by mouth daily, Disp-30 tablet, R-11, E-Prescribe      metoprolol succinate ER (TOPROL-XL) 50 MG 24 hr tablet Take 50 mg by mouth daily , Historical      Multiple Vitamins-Minerals (MULTIVITAMIN ADULT) CHEW Take 2 chew tab by mouth daily Centrum, Historical      pantoprazole (PROTONIX) 40 MG EC tablet Take 40 mg by mouth daily, Historical           STOP taking these medications       acetaminophen (TYLENOL) 325 MG tablet Comments:   Reason for Stopping:         oxyCODONE (ROXICODONE) 5 MG tablet Comments:   Reason for Stopping:             Allergies   Allergies   Allergen Reactions    Morphine Nausea and Vomiting    Oxycodone Itching     1/22 Patient reports he can tolerate this     Data   Most Recent 3 CBC's:  Recent Labs   Lab Test 01/23/24  0630 01/22/24  0702 01/18/24  0008   WBC 6.8 8.1 4.6   HGB 11.8* 14.9 13.9    98 97   * 195  217      Most Recent 3 BMP's:  Recent Labs   Lab Test 01/25/24  1020 01/24/24  1036 01/23/24  0630 01/22/24  0702   NA  --  135 134* 140   POTASSIUM 3.6 3.8 3.8 4.6   CHLORIDE  --  101 100 91*   CO2  --  27 27 20*   BUN  --  9.4 13.7 9.7   CR  --  0.73 0.81 0.91   ANIONGAP  --  7 7 29*   ISAIAS  --  8.9 8.8 10.4*   GLC  --  102* 114* 215*     Most Recent 2 LFT's:  Recent Labs   Lab Test 01/23/24  0630 01/22/24  0702   AST 65* 194*   ALT 42 85*   ALKPHOS 62 96   BILITOTAL 1.4* 2.2*       Most Recent 3 Troponin's:  Recent Labs   Lab Test 05/08/21  1344 02/12/19  1305   TROPI <0.015 <0.015     Most Recent Cholesterol Panel:No lab results found.    Most Recent TSH, T4 and A1c Labs:  Recent Labs   Lab Test 05/08/21  2142   TSH 1.37     Results for orders placed or performed during the hospital encounter of 01/22/24   CT Head w/o Contrast    Narrative    CT OF THE HEAD WITHOUT CONTRAST  1/22/2024 8:45 AM     COMPARISON: Head CT 1/18/2024.    HISTORY: Vomiting, recent SDH.    TECHNIQUE: 5 mm thick axial CT images of the head were acquired  without IV contrast material.    FINDINGS: Left frontal and left parietal agata holes again noted. There  has been an interval decrease in thickness and extent of the subdural  collection along the left cerebral convexity since the comparison  study. No evidence for new or increasing intracranial hemorrhage.    There is mild diffuse cerebral volume loss. There are subtle patchy  areas of decreased density in the cerebral white matter bilaterally  that are consistent with sequela of chronic small vessel ischemic  disease. The ventricles and basal cisterns are within normal limits in  configuration given the degree of cerebral volume loss.  There is no  midline shift.     No intracranial mass or recent infarct.    The visualized paranasal sinuses are well-aerated. There is no  mastoiditis. There are no fractures of the visualized bones.       Impression    IMPRESSION:   1. Interval decrease in  thickness and extent of the subdural  collection along the left cerebral convexity since the comparison  study.  2. No evidence for new or increasing intracranial hemorrhage.  3. Diffuse cerebral volume loss and cerebral white matter changes  consistent with chronic small vessel ischemic disease.       Radiation dose for this scan was reduced using automated exposure  control, adjustment of the mA and/or kV according to patient size, or  iterative reconstruction technique.    DEEP CHASE MD         SYSTEM ID:  [id number]   US Abdomen Limited (RUQ)    Narrative    ULTRASOUND ABDOMEN LIMITED  1/22/2024 11:06 AM    CLINICAL HISTORY: Elevated LFTs.    TECHNIQUE: Limited abdominal ultrasound.    COMPARISON: CT 5/8/2021. Ultrasound 2/3/2020.    FINDINGS:  GALLBLADDER: The gallbladder is normal. No gallstones, wall  thickening, or pericholecystic fluid. Negative sonographic Swanson's  sign.    BILE DUCTS: There is no biliary dilatation. The common duct measures 2  mm.    LIVER: Fatty liver. Hepatomegaly measuring 18.5 cm. No visible focal  lesion.    RIGHT KIDNEY: No hydronephrosis.    PANCREAS: The visualized portions of the pancreas are normal.    No ascites.      Impression    IMPRESSION:  1.  Fatty liver. Hepatomegaly.  2.  No acute abnormality identified.    LEIGH OLIVA MD         SYSTEM ID:  [id number]

## 2024-02-08 ENCOUNTER — HOSPITAL ENCOUNTER (OUTPATIENT)
Facility: CLINIC | Age: 49
Setting detail: OBSERVATION
Discharge: HOME | End: 2024-02-10
Attending: EMERGENCY MEDICINE | Admitting: INTERNAL MEDICINE
Payer: COMMERCIAL

## 2024-02-08 ENCOUNTER — APPOINTMENT (OUTPATIENT)
Dept: GENERAL RADIOLOGY | Facility: CLINIC | Age: 49
End: 2024-02-08
Attending: STUDENT IN AN ORGANIZED HEALTH CARE EDUCATION/TRAINING PROGRAM
Payer: COMMERCIAL

## 2024-02-08 DIAGNOSIS — E83.42 HYPOMAGNESEMIA: ICD-10-CM

## 2024-02-08 DIAGNOSIS — K92.2 UPPER GI BLEED: ICD-10-CM

## 2024-02-08 DIAGNOSIS — F10.930 ALCOHOL WITHDRAWAL SYNDROME WITHOUT COMPLICATION (H): ICD-10-CM

## 2024-02-08 LAB
ABO/RH(D): NORMAL
ALBUMIN SERPL BCG-MCNC: 4.5 G/DL (ref 3.5–5.2)
ALP SERPL-CCNC: 74 U/L (ref 40–150)
ALT SERPL W P-5'-P-CCNC: 46 U/L (ref 0–70)
ANION GAP SERPL CALCULATED.3IONS-SCNC: 18 MMOL/L (ref 7–15)
ANTIBODY SCREEN: NEGATIVE
APTT PPP: 29 SECONDS (ref 22–38)
AST SERPL W P-5'-P-CCNC: 104 U/L (ref 0–45)
ATRIAL RATE - MUSE: 119 BPM
BASOPHILS # BLD AUTO: 0 10E3/UL (ref 0–0.2)
BASOPHILS NFR BLD AUTO: 1 %
BILIRUB SERPL-MCNC: 1.2 MG/DL
BUN SERPL-MCNC: 8.7 MG/DL (ref 6–20)
CALCIUM SERPL-MCNC: 9.5 MG/DL (ref 8.6–10)
CHLORIDE SERPL-SCNC: 96 MMOL/L (ref 98–107)
CREAT SERPL-MCNC: 0.73 MG/DL (ref 0.67–1.17)
DEPRECATED HCO3 PLAS-SCNC: 21 MMOL/L (ref 22–29)
DIASTOLIC BLOOD PRESSURE - MUSE: NORMAL MMHG
EGFRCR SERPLBLD CKD-EPI 2021: >90 ML/MIN/1.73M2
EOSINOPHIL # BLD AUTO: 0 10E3/UL (ref 0–0.7)
EOSINOPHIL NFR BLD AUTO: 0 %
ERYTHROCYTE [DISTWIDTH] IN BLOOD BY AUTOMATED COUNT: 13.8 % (ref 10–15)
ETHANOL SERPL-MCNC: <0.01 G/DL
FLUAV RNA SPEC QL NAA+PROBE: NEGATIVE
FLUBV RNA RESP QL NAA+PROBE: NEGATIVE
GLUCOSE SERPL-MCNC: 115 MG/DL (ref 70–99)
HCT VFR BLD AUTO: 40.2 % (ref 40–53)
HGB BLD-MCNC: 12.6 G/DL (ref 13.3–17.7)
HGB BLD-MCNC: 13.4 G/DL (ref 13.3–17.7)
IMM GRANULOCYTES # BLD: 0 10E3/UL
IMM GRANULOCYTES NFR BLD: 0 %
INR PPP: 1.09 (ref 0.85–1.15)
INTERPRETATION ECG - MUSE: NORMAL
LYMPHOCYTES # BLD AUTO: 0.2 10E3/UL (ref 0.8–5.3)
LYMPHOCYTES NFR BLD AUTO: 4 %
MAGNESIUM SERPL-MCNC: 1.3 MG/DL (ref 1.7–2.3)
MAGNESIUM SERPL-MCNC: 1.9 MG/DL (ref 1.7–2.3)
MCH RBC QN AUTO: 31.9 PG (ref 26.5–33)
MCHC RBC AUTO-ENTMCNC: 33.3 G/DL (ref 31.5–36.5)
MCV RBC AUTO: 96 FL (ref 78–100)
MONOCYTES # BLD AUTO: 0.7 10E3/UL (ref 0–1.3)
MONOCYTES NFR BLD AUTO: 15 %
NEUTROPHILS # BLD AUTO: 3.8 10E3/UL (ref 1.6–8.3)
NEUTROPHILS NFR BLD AUTO: 80 %
NRBC # BLD AUTO: 0 10E3/UL
NRBC BLD AUTO-RTO: 0 /100
P AXIS - MUSE: 46 DEGREES
PHOSPHATE SERPL-MCNC: 2.9 MG/DL (ref 2.5–4.5)
PLATELET # BLD AUTO: 215 10E3/UL (ref 150–450)
POTASSIUM SERPL-SCNC: 4.1 MMOL/L (ref 3.4–5.3)
PR INTERVAL - MUSE: 148 MS
PROT SERPL-MCNC: 7.7 G/DL (ref 6.4–8.3)
QRS DURATION - MUSE: 68 MS
QT - MUSE: 326 MS
QTC - MUSE: 458 MS
R AXIS - MUSE: 34 DEGREES
RBC # BLD AUTO: 4.2 10E6/UL (ref 4.4–5.9)
RSV RNA SPEC NAA+PROBE: NEGATIVE
SARS-COV-2 RNA RESP QL NAA+PROBE: POSITIVE
SODIUM SERPL-SCNC: 135 MMOL/L (ref 135–145)
SPECIMEN EXPIRATION DATE: NORMAL
SYSTOLIC BLOOD PRESSURE - MUSE: NORMAL MMHG
T AXIS - MUSE: 48 DEGREES
VENTRICULAR RATE- MUSE: 119 BPM
WBC # BLD AUTO: 4.8 10E3/UL (ref 4–11)

## 2024-02-08 PROCEDURE — C9113 INJ PANTOPRAZOLE SODIUM, VIA: HCPCS | Performed by: STUDENT IN AN ORGANIZED HEALTH CARE EDUCATION/TRAINING PROGRAM

## 2024-02-08 PROCEDURE — 83735 ASSAY OF MAGNESIUM: CPT | Mod: 91 | Performed by: STUDENT IN AN ORGANIZED HEALTH CARE EDUCATION/TRAINING PROGRAM

## 2024-02-08 PROCEDURE — 93005 ELECTROCARDIOGRAM TRACING: CPT

## 2024-02-08 PROCEDURE — 85730 THROMBOPLASTIN TIME PARTIAL: CPT | Performed by: EMERGENCY MEDICINE

## 2024-02-08 PROCEDURE — 250N000013 HC RX MED GY IP 250 OP 250 PS 637: Performed by: STUDENT IN AN ORGANIZED HEALTH CARE EDUCATION/TRAINING PROGRAM

## 2024-02-08 PROCEDURE — 96376 TX/PRO/DX INJ SAME DRUG ADON: CPT

## 2024-02-08 PROCEDURE — 83735 ASSAY OF MAGNESIUM: CPT | Performed by: EMERGENCY MEDICINE

## 2024-02-08 PROCEDURE — 96375 TX/PRO/DX INJ NEW DRUG ADDON: CPT

## 2024-02-08 PROCEDURE — 71046 X-RAY EXAM CHEST 2 VIEWS: CPT

## 2024-02-08 PROCEDURE — 120N000001 HC R&B MED SURG/OB

## 2024-02-08 PROCEDURE — 85025 COMPLETE CBC W/AUTO DIFF WBC: CPT | Performed by: EMERGENCY MEDICINE

## 2024-02-08 PROCEDURE — 96361 HYDRATE IV INFUSION ADD-ON: CPT

## 2024-02-08 PROCEDURE — 258N000003 HC RX IP 258 OP 636: Performed by: EMERGENCY MEDICINE

## 2024-02-08 PROCEDURE — 80053 COMPREHEN METABOLIC PANEL: CPT | Performed by: EMERGENCY MEDICINE

## 2024-02-08 PROCEDURE — 36415 COLL VENOUS BLD VENIPUNCTURE: CPT | Performed by: EMERGENCY MEDICINE

## 2024-02-08 PROCEDURE — 84100 ASSAY OF PHOSPHORUS: CPT | Performed by: EMERGENCY MEDICINE

## 2024-02-08 PROCEDURE — 250N000011 HC RX IP 250 OP 636: Performed by: EMERGENCY MEDICINE

## 2024-02-08 PROCEDURE — 96366 THER/PROPH/DIAG IV INF ADDON: CPT

## 2024-02-08 PROCEDURE — 96365 THER/PROPH/DIAG IV INF INIT: CPT

## 2024-02-08 PROCEDURE — 99223 1ST HOSP IP/OBS HIGH 75: CPT | Performed by: STUDENT IN AN ORGANIZED HEALTH CARE EDUCATION/TRAINING PROGRAM

## 2024-02-08 PROCEDURE — 85018 HEMOGLOBIN: CPT | Performed by: EMERGENCY MEDICINE

## 2024-02-08 PROCEDURE — 87637 SARSCOV2&INF A&B&RSV AMP PRB: CPT | Performed by: STUDENT IN AN ORGANIZED HEALTH CARE EDUCATION/TRAINING PROGRAM

## 2024-02-08 PROCEDURE — 250N000009 HC RX 250: Performed by: EMERGENCY MEDICINE

## 2024-02-08 PROCEDURE — 250N000013 HC RX MED GY IP 250 OP 250 PS 637: Performed by: EMERGENCY MEDICINE

## 2024-02-08 PROCEDURE — 250N000011 HC RX IP 250 OP 636: Performed by: STUDENT IN AN ORGANIZED HEALTH CARE EDUCATION/TRAINING PROGRAM

## 2024-02-08 PROCEDURE — 86900 BLOOD TYPING SEROLOGIC ABO: CPT | Performed by: EMERGENCY MEDICINE

## 2024-02-08 PROCEDURE — 36415 COLL VENOUS BLD VENIPUNCTURE: CPT | Performed by: STUDENT IN AN ORGANIZED HEALTH CARE EDUCATION/TRAINING PROGRAM

## 2024-02-08 PROCEDURE — 82077 ASSAY SPEC XCP UR&BREATH IA: CPT | Performed by: EMERGENCY MEDICINE

## 2024-02-08 PROCEDURE — 85610 PROTHROMBIN TIME: CPT | Performed by: EMERGENCY MEDICINE

## 2024-02-08 PROCEDURE — 99285 EMERGENCY DEPT VISIT HI MDM: CPT | Mod: 25

## 2024-02-08 PROCEDURE — 258N000003 HC RX IP 258 OP 636: Performed by: STUDENT IN AN ORGANIZED HEALTH CARE EDUCATION/TRAINING PROGRAM

## 2024-02-08 PROCEDURE — C9113 INJ PANTOPRAZOLE SODIUM, VIA: HCPCS | Performed by: EMERGENCY MEDICINE

## 2024-02-08 RX ORDER — LIDOCAINE 40 MG/G
CREAM TOPICAL
Status: CANCELLED | OUTPATIENT
Start: 2024-02-08

## 2024-02-08 RX ORDER — DIAZEPAM 10 MG/2ML
5-10 INJECTION, SOLUTION INTRAMUSCULAR; INTRAVENOUS EVERY 30 MIN PRN
Status: DISCONTINUED | OUTPATIENT
Start: 2024-02-08 | End: 2024-02-10 | Stop reason: HOSPADM

## 2024-02-08 RX ORDER — MAGNESIUM SULFATE HEPTAHYDRATE 40 MG/ML
2 INJECTION, SOLUTION INTRAVENOUS ONCE
Status: COMPLETED | OUTPATIENT
Start: 2024-02-08 | End: 2024-02-08

## 2024-02-08 RX ORDER — GABAPENTIN 600 MG/1
1200 TABLET ORAL ONCE
Status: COMPLETED | OUTPATIENT
Start: 2024-02-08 | End: 2024-02-08

## 2024-02-08 RX ORDER — GABAPENTIN 300 MG/1
900 CAPSULE ORAL EVERY 8 HOURS
Status: DISCONTINUED | OUTPATIENT
Start: 2024-02-08 | End: 2024-02-10 | Stop reason: HOSPADM

## 2024-02-08 RX ORDER — ACETAMINOPHEN 325 MG/1
625 TABLET ORAL ONCE
Status: COMPLETED | OUTPATIENT
Start: 2024-02-08 | End: 2024-02-08

## 2024-02-08 RX ORDER — CLONIDINE HYDROCHLORIDE 0.1 MG/1
0.1 TABLET ORAL EVERY 8 HOURS
Status: DISCONTINUED | OUTPATIENT
Start: 2024-02-08 | End: 2024-02-10 | Stop reason: HOSPADM

## 2024-02-08 RX ORDER — ONDANSETRON 4 MG/1
4 TABLET, ORALLY DISINTEGRATING ORAL EVERY 6 HOURS PRN
Status: DISCONTINUED | OUTPATIENT
Start: 2024-02-08 | End: 2024-02-10 | Stop reason: HOSPADM

## 2024-02-08 RX ORDER — GABAPENTIN 300 MG/1
600 CAPSULE ORAL EVERY 8 HOURS
Status: DISCONTINUED | OUTPATIENT
Start: 2024-02-11 | End: 2024-02-10 | Stop reason: HOSPADM

## 2024-02-08 RX ORDER — METOPROLOL SUCCINATE 50 MG/1
50 TABLET, EXTENDED RELEASE ORAL DAILY
Status: DISCONTINUED | OUTPATIENT
Start: 2024-02-08 | End: 2024-02-10 | Stop reason: HOSPADM

## 2024-02-08 RX ORDER — PROCHLORPERAZINE MALEATE 10 MG
10 TABLET ORAL EVERY 6 HOURS PRN
Status: DISCONTINUED | OUTPATIENT
Start: 2024-02-08 | End: 2024-02-10 | Stop reason: HOSPADM

## 2024-02-08 RX ORDER — ACETAMINOPHEN 500 MG
500-1000 TABLET ORAL EVERY 6 HOURS PRN
Status: DISCONTINUED | OUTPATIENT
Start: 2024-02-08 | End: 2024-02-10 | Stop reason: HOSPADM

## 2024-02-08 RX ORDER — ACETAMINOPHEN 500 MG
500-1000 TABLET ORAL EVERY 6 HOURS PRN
COMMUNITY

## 2024-02-08 RX ORDER — AMOXICILLIN 250 MG
1 CAPSULE ORAL 2 TIMES DAILY PRN
Status: DISCONTINUED | OUTPATIENT
Start: 2024-02-08 | End: 2024-02-10 | Stop reason: HOSPADM

## 2024-02-08 RX ORDER — GABAPENTIN 100 MG/1
100 CAPSULE ORAL EVERY 8 HOURS
Status: DISCONTINUED | OUTPATIENT
Start: 2024-02-15 | End: 2024-02-10 | Stop reason: HOSPADM

## 2024-02-08 RX ORDER — LIDOCAINE 40 MG/G
CREAM TOPICAL
Status: DISCONTINUED | OUTPATIENT
Start: 2024-02-08 | End: 2024-02-10 | Stop reason: HOSPADM

## 2024-02-08 RX ORDER — GUAIFENESIN/DEXTROMETHORPHAN 100-10MG/5
10 SYRUP ORAL EVERY 4 HOURS PRN
Status: DISCONTINUED | OUTPATIENT
Start: 2024-02-08 | End: 2024-02-10 | Stop reason: HOSPADM

## 2024-02-08 RX ORDER — DIAZEPAM 5 MG
10 TABLET ORAL EVERY 30 MIN PRN
Status: DISCONTINUED | OUTPATIENT
Start: 2024-02-08 | End: 2024-02-10 | Stop reason: HOSPADM

## 2024-02-08 RX ORDER — AMOXICILLIN 250 MG
2 CAPSULE ORAL 2 TIMES DAILY PRN
Status: DISCONTINUED | OUTPATIENT
Start: 2024-02-08 | End: 2024-02-10 | Stop reason: HOSPADM

## 2024-02-08 RX ORDER — FLUMAZENIL 0.1 MG/ML
0.2 INJECTION, SOLUTION INTRAVENOUS
Status: DISCONTINUED | OUTPATIENT
Start: 2024-02-08 | End: 2024-02-10 | Stop reason: HOSPADM

## 2024-02-08 RX ORDER — CALCIUM CARBONATE 500 MG/1
1000 TABLET, CHEWABLE ORAL 4 TIMES DAILY PRN
Status: DISCONTINUED | OUTPATIENT
Start: 2024-02-08 | End: 2024-02-10 | Stop reason: HOSPADM

## 2024-02-08 RX ORDER — ONDANSETRON 2 MG/ML
4 INJECTION INTRAMUSCULAR; INTRAVENOUS EVERY 6 HOURS PRN
Status: DISCONTINUED | OUTPATIENT
Start: 2024-02-08 | End: 2024-02-10 | Stop reason: HOSPADM

## 2024-02-08 RX ORDER — GABAPENTIN 300 MG/1
300 CAPSULE ORAL EVERY 8 HOURS
Status: DISCONTINUED | OUTPATIENT
Start: 2024-02-13 | End: 2024-02-10 | Stop reason: HOSPADM

## 2024-02-08 RX ORDER — SODIUM CHLORIDE 9 MG/ML
INJECTION, SOLUTION INTRAVENOUS CONTINUOUS
Status: DISCONTINUED | OUTPATIENT
Start: 2024-02-08 | End: 2024-02-10

## 2024-02-08 RX ORDER — PROCHLORPERAZINE 25 MG
25 SUPPOSITORY, RECTAL RECTAL EVERY 12 HOURS PRN
Status: DISCONTINUED | OUTPATIENT
Start: 2024-02-08 | End: 2024-02-10 | Stop reason: HOSPADM

## 2024-02-08 RX ADMIN — SODIUM CHLORIDE: 9 INJECTION, SOLUTION INTRAVENOUS at 18:58

## 2024-02-08 RX ADMIN — PANTOPRAZOLE SODIUM 80 MG: 40 INJECTION, POWDER, FOR SOLUTION INTRAVENOUS at 10:35

## 2024-02-08 RX ADMIN — MAGNESIUM SULFATE HEPTAHYDRATE 2 G: 40 INJECTION, SOLUTION INTRAVENOUS at 09:33

## 2024-02-08 RX ADMIN — DIAZEPAM 10 MG: 10 INJECTION, SOLUTION INTRAMUSCULAR; INTRAVENOUS at 07:52

## 2024-02-08 RX ADMIN — ACETAMINOPHEN 650 MG: 325 TABLET, FILM COATED ORAL at 09:38

## 2024-02-08 RX ADMIN — GABAPENTIN 900 MG: 300 CAPSULE ORAL at 23:05

## 2024-02-08 RX ADMIN — METOPROLOL SUCCINATE 50 MG: 50 TABLET, EXTENDED RELEASE ORAL at 18:59

## 2024-02-08 RX ADMIN — FOLIC ACID: 5 INJECTION, SOLUTION INTRAMUSCULAR; INTRAVENOUS; SUBCUTANEOUS at 07:53

## 2024-02-08 RX ADMIN — ACETAMINOPHEN 1000 MG: 500 TABLET, FILM COATED ORAL at 18:59

## 2024-02-08 RX ADMIN — CLONIDINE HYDROCHLORIDE 0.1 MG: 0.1 TABLET ORAL at 07:52

## 2024-02-08 RX ADMIN — PANTOPRAZOLE SODIUM 40 MG: 40 INJECTION, POWDER, FOR SOLUTION INTRAVENOUS at 21:16

## 2024-02-08 RX ADMIN — CLONIDINE HYDROCHLORIDE 0.1 MG: 0.1 TABLET ORAL at 16:13

## 2024-02-08 RX ADMIN — GABAPENTIN 900 MG: 300 CAPSULE ORAL at 16:13

## 2024-02-08 RX ADMIN — DIAZEPAM 10 MG: 5 TABLET ORAL at 18:59

## 2024-02-08 RX ADMIN — CLONIDINE HYDROCHLORIDE 0.1 MG: 0.1 TABLET ORAL at 23:06

## 2024-02-08 RX ADMIN — GABAPENTIN 1200 MG: 600 TABLET, FILM COATED ORAL at 07:52

## 2024-02-08 ASSESSMENT — ACTIVITIES OF DAILY LIVING (ADL)
ADLS_ACUITY_SCORE: 37
ADLS_ACUITY_SCORE: 25
ADLS_ACUITY_SCORE: 25
ADLS_ACUITY_SCORE: 28
ADLS_ACUITY_SCORE: 37
ADLS_ACUITY_SCORE: 37

## 2024-02-08 ASSESSMENT — LIFESTYLE VARIABLES
AUDITORY DISTURBANCES: NOT PRESENT
ANXIETY: MILDLY ANXIOUS
ORIENTATION AND CLOUDING OF SENSORIUM: ORIENTED AND CAN DO SERIAL ADDITIONS
ORIENTATION AND CLOUDING OF SENSORIUM: ORIENTED AND CAN DO SERIAL ADDITIONS
HEADACHE, FULLNESS IN HEAD: MILD
PAROXYSMAL SWEATS: NO SWEAT VISIBLE
VISUAL DISTURBANCES: NOT PRESENT
TOTAL SCORE: 7
AGITATION: SOMEWHAT MORE THAN NORMAL ACTIVITY
TREMOR: SEVERE, EVEN WITH ARMS NOT EXTENDED
AUDITORY DISTURBANCES: NOT PRESENT
VISUAL DISTURBANCES: NOT PRESENT
NAUSEA AND VOMITING: NO NAUSEA AND NO VOMITING
TREMOR: MODERATE, WITH PATIENT'S ARMS EXTENDED
NAUSEA AND VOMITING: NO NAUSEA AND NO VOMITING
AGITATION: NORMAL ACTIVITY
HEADACHE, FULLNESS IN HEAD: SEVERE
ANXIETY: MODERATELY ANXIOUS, OR GUARDED, SO ANXIETY IS INFERRED
TOTAL SCORE: 17
PAROXYSMAL SWEATS: NO SWEAT VISIBLE

## 2024-02-08 NOTE — CONSULTS
[facility]  Gastroenterology Consultation         Joe Shah  [address] [address]  48 year old male    Admission Date/Time: 2/8/2024  Primary Care Provider: Sofia Rebollar  Referring / Attending Physician: Dr. Oconnor    We were asked to see the patient in consultation by Dr. Audrey altman for evaluation of acute hematemesis.      CC: Upper GI bleed    HPI:  Joe Shah is a 48 year old male who presented to the ER with multiple episodes of hematemesis patient woke up last night with episodes of coughing and choking sensation patient has significant severe bouts of cough leading to gagging subsequently episodes of hematemesis.  Patient's hemoglobin is in the range of 13 patient was hospitalized 6 months ago with similar symptoms patient is currently going through alcohol withdrawal patient drinks heavily patient has history of subdural hematoma s/p bur hole and evacuation in the past patient has been complaining of above-mentioned GI symptoms patient is still complaining of significant headache.  Patient is overall stable now patient is denying any fever chills chest pain shortness of breath.  Patient last endoscopy showed grade 4 esophagitis along with esophageal ulcers.  Patient's hemoglobin at the time of discharge was in the range of 11.  Patient is denying any other significant systemic complaint last drink was last night.  Clinically patient appears to be tachycardic shaky and possibly going through early alcohol withdrawal.    ROS: A comprehensive ten point review of systems was negative aside from those in mentioned in the HPI.      PAST MED HX:  I have reviewed this patient's medical history and updated it with pertinent information if needed.   Past Medical History:   Diagnosis Date     Anxiety      Back pain      Depressive disorder      Hypertension      SDH (subdural hematoma) (H)      Substance abuse (H)        MEDICATIONS:   Prior to  Admission Medications   Prescriptions Last Dose Informant Patient Reported? Taking?   Multiple Vitamins-Minerals (MULTIVITAMIN ADULT) CHEW 2/7/2024 at pm Self Yes Yes   Sig: Take 2 chew tab by mouth daily Centrum   acetaminophen (TYLENOL) 500 MG tablet  at prn  Yes Yes   Sig: Take 500-1,000 mg by mouth every 6 hours as needed for mild pain   famotidine (PEPCID) 20 MG tablet 2/7/2024 at pm  No Yes   Sig: Take 1 tablet (20 mg) by mouth 2 times daily   folic acid (FOLVITE) 1 MG tablet 2/7/2024 at am Self No Yes   Sig: Take 1 tablet (1 mg) by mouth daily   gabapentin (NEURONTIN) 100 MG capsule 2/7/2024 at pm  No Yes   Sig: Take 1 capsule (100 mg) by mouth 3 times daily   metoprolol succinate ER (TOPROL-XL) 50 MG 24 hr tablet 2/7/2024 at am Self Yes Yes   Sig: Take 50 mg by mouth daily    pantoprazole (PROTONIX) 40 MG EC tablet 2/7/2024 at am Self Yes Yes   Sig: Take 40 mg by mouth daily   vitamin B-Complex 2/7/2024 at am  Yes Yes   Sig: Take 1 tablet by mouth daily      Facility-Administered Medications: None       ALLERGIES:   Allergies   Allergen Reactions     Morphine Nausea and Vomiting     Oxycodone Itching     1/22 Patient reports he can tolerate this       SOCIAL HISTORY:  Social History     Tobacco Use     Smoking status: Former     Smokeless tobacco: Current     Types: Chew   Substance Use Topics     Alcohol use: Yes     Comment: denies ETOH use     Drug use: No       FAMILY HISTORY:  Family History   Problem Relation Age of Onset     No Known Problems Maternal Grandmother      No Known Problems Maternal Grandfather      No Known Problems Paternal Grandmother      Substance Abuse Paternal Grandfather        PHYSICAL EXAM:   General awake alert oriented comfortable  Vital Signs with Ranges  Temp: 99.5  F (37.5  C) Temp src: Oral BP: (!) 143/105 Pulse: 95   Resp: 21 SpO2: 97 % O2 Device: None (Room air)    No intake/output data recorded.    Cardiovascular: negative, PMI normal. No lifts, heaves, or thrills. RRR.  No murmurs, clicks gallops or rub  Respiratory: negative, Percussion normal. Good diaphragmatic excursion. Lungs clear  Head: Normocephalic. No masses, lesions, tenderness or abnormalities  Neck: Neck supple. No adenopathy. Thyroid symmetric, normal size,, Carotids without bruits.  Abdomen: Abdomen soft, non-tender. BS normal. No masses, organomegaly  SKIN: no suspicious lesions or rashes          ADDITIONAL COMMENTS:   I reviewed the patient's new clinical lab test results.   Recent Labs   Lab Test 02/08/24  1207 02/08/24  0742 01/23/24  0630 01/22/24  0702 12/03/23  0531 11/30/23  1747 05/02/20  0818 05/01/20  2218   WBC  --  4.8 6.8 8.1   < >  --    < > 8.2   HGB 12.6* 13.4 11.8* 14.9   < >  --    < > 15.2   MCV  --  96 100 98   < >  --    < > 100   PLT  --  215 126* 195   < >  --    < > 188   INR  --  1.09  --   --   --  1.08  --  1.02    < > = values in this interval not displayed.     Recent Labs   Lab Test 02/08/24  0742 01/25/24  1020 01/24/24  1036 01/23/24  0630   POTASSIUM 4.1 3.6 3.8 3.8   CHLORIDE 96*  --  101 100   CO2 21*  --  27 27   BUN 8.7  --  9.4 13.7   ANIONGAP 18*  --  7 7     Recent Labs   Lab Test 02/08/24  0742 01/23/24  0630 01/22/24  0702 05/28/23  0900 05/26/23  0044 09/17/22  0619 09/15/22  1659 05/09/21  0606 05/08/21  1413   ALBUMIN 4.5 3.6 4.9   < > 4.7   < > 4.2   < >  --    BILITOTAL 1.2 1.4* 2.2*   < > 1.7*   < > 2.0*   < >  --    ALT 46 42 85*   < > 63*   < > 113*   < >  --    * 65* 194*   < > 93*   < > 145*   < >  --    PROTEIN  --   --   --   --   --   --   --   --  50*   LIPASE  --   --  44  --  64*  --  95  --   --     < > = values in this interval not displayed.       I reviewed the patient's new imaging results.        CONSULTATION ASSESSMENT AND PLAN:    Principal Problem:    Alcohol withdrawal syndrome without complication (H)    Assessment: Very pleasant 48-year-old gentleman with complicated past medical history for history of subdural hematoma bur hole history of  heavy alcohol use in the past patient has been hospitalized with the alcohol withdrawal patient previous endoscopy during his last admission about 6 months ago since showed significantly advanced grade 4 esophagitis.  Patient now presented again with multiple episodes of nausea vomiting patient was sleeping patient was drinking last night patient woke up around midnight with episode of gagging and choking leading to significant cough which in turn led to gagging and episodes of vomiting patient finding seems to be quite suggestive of possible bad episode of reflux leading to possible aspiration and cough which subsequently led to hematemesis possible differential for hematemesis can include esophagitis peptic ulcer disease, Antonia-Galvez tear.  Patient is already going through alcohol withdrawal.  At this point I will recommend to continue to monitor closely continue on supportive care IV Protonix monitor for alcohol withdrawal plan to proceed with upper GI endoscopy once patient is stabilized.  If patient develop worsening of symptoms patient may need EGD sooner.  GI will continue to follow along.  Thank you very much for letting me participate in his care            Scott Mcrae MD, FACP  [facility].  Office: [phone]  Cell : [phone]      [facility]  Ph: [phone] Fax: [phone]                      1 year old male with a history of esophageal ulcers presenting to the ED for evaluation of dizziness and hematemesis. The patient reports that he woke up this morning violently coughing. He states that the coughing caused vomiting. He has experienced multiple episodes of hematemesis and dry heaving in the last few hours.   Hemodynamically table  Hb Stable  Serial H  I/V Protonix  Clar liquid diet  Monitor for ETOH withdrawal.  EGD when stable.    Full consult dictated.    Scott Mcrae MD FACP  [facility]

## 2024-02-08 NOTE — H&P
"[facility]    History and Physical - Hospitalist Service       Date of Admission:  2/8/2024    Assessment & Plan      Joe Shah is a 48 year old male with past medical history significant for alcohol use disorder and prior withdrawal, subdural hematoma s/p bur hole and evacuation, HTN admitted on 2/8/2024 with cough, hematemesis.     Hematemesis   Post-tussive emesis   Hx of esophageal ulcer   Pt presents to the ED with hematemesis. Pt reports having a cough for the past couple days. Today he had a very severe bout of coughing which caused some vomiting and he experienced multiple episodes of hematemesis.   * Pt had EGD in May 2023 which showed LA grade D reflux esophagitis with no bleeding. Esophageal ulcers  and non-bleeding gastric ulcers.   * Pt is hemodynamically stable. Hemoglobin is 13.4 on admission.   - Admit to inpatient  - Serial hemoglobin   - [facility] consult   - IV protonix   - OK for clears per Dr. Mcrae   - EGD when stable per Dr. Mcrae     Cough  Pt has noted a severe cough for the past couple days. Some associated URI symptoms as well. No fevers, but he is having some chills in the ED.   - Check CXR  - Covid, influenza, and RSV swab   - Anti-tussives PRN     Alcohol Withdrawal   Alcohol use disorder, severe   Hypomagnesemia   Clinically appeared to be in early alcohol withdrawal in the ED. On my examination pt's withdrawal appears quite mild.   * Pt with similar admission with n/v in January and found to have severe alcohol withdrawal. He reports since that admission he has cut back significantly on \"hard liquor.\" He only has 1-2 beers daily now.   - CIWA with PRN benzos   - Gabapentin taper  - Clonidine   - Thiamine/folate   - Monitor and replace electrolytes as needed      HTN  Continue PTA metoprolol     Hx of subdural hematoma s/p bur hole and hematoma evacuation (Dec 2023)  Noted     Diet:Clears   DVT Prophylaxis: Pneumatic Compression Devices  Power " Catheter: Not present  Lines: None     Cardiac Monitoring: None  Code Status: Full Code     Clinically Significant Risk Factors Present on Admission            # Hypomagnesemia: Lowest Mg = 1.3 mg/dL in last 2 days, will replace as needed       # Hypertension: Noted on problem list          # Financial/Environmental Concerns:           Disposition Plan      Expected Discharge Date: 02/10/2024                  Gi Oconnor MD  Hospitalist Service  [facility]  Securely message with [employer] (more info)  Text page via [employer] Paging/Directory     ______________________________________________________________________    Chief Complaint   Cough, vomiting, hematemesis     History is obtained from the patient    History of Present Illness   Joe Shah is a 48 year old male who presents to the ED with hematemesis. Pt reports having a cough and sore throat for the past couple days. Today he had a very severe bout of coughing which caused some vomiting and he experienced multiple episodes of hematemesis. He initially reported bright red blood, but then turned into more of a coffee ground emesis.     He does not currently feel like he's withdrawing. Since his last hospitalization he has cut back on drinking, but still consumes 1-2 beers daily. Last drink was 2/7.     Past Medical History    Past Medical History:   Diagnosis Date    Anxiety     Back pain     Depressive disorder     Hypertension     SDH (subdural hematoma) (H)     Substance abuse (H)        Past Surgical History   Past Surgical History:   Procedure Laterality Date    FANTASMA HOLE(S) EVACUATE HEMATOMA SUBDURAL N/A 12/1/2023    Procedure: CREATION, CRANIAL FANTASMA HOLE, WITH SUBDURAL HEMATOMA EVACUATION;  Surgeon: Gio Phoenix MD;  Location: [facility] OR    ENT SURGERY      ESOPHAGOSCOPY, GASTROSCOPY, DUODENOSCOPY (EGD), COMBINED N/A 9/12/2019    Procedure: ESOPHAGOGASTRODUODENOSCOPY (EGD);  Surgeon: Scott Mcrae MD;   Location:  GI    ESOPHAGOSCOPY, GASTROSCOPY, DUODENOSCOPY (EGD), COMBINED N/A 5/27/2023    Procedure: Esophagoscopy, gastroscopy, duodenoscopy (EGD), combined;  Surgeon: Scott Mcrae MD;  Location:  GI    NECK SURGERY         Prior to Admission Medications   Prior to Admission Medications   Prescriptions Last Dose Informant Patient Reported? Taking?   Multiple Vitamins-Minerals (MULTIVITAMIN ADULT) CHEW 2/7/2024 at pm Self Yes Yes   Sig: Take 2 chew tab by mouth daily Centrum   acetaminophen (TYLENOL) 500 MG tablet  at prn  Yes Yes   Sig: Take 500-1,000 mg by mouth every 6 hours as needed for mild pain   famotidine (PEPCID) 20 MG tablet 2/7/2024 at pm  No Yes   Sig: Take 1 tablet (20 mg) by mouth 2 times daily   folic acid (FOLVITE) 1 MG tablet 2/7/2024 at am Self No Yes   Sig: Take 1 tablet (1 mg) by mouth daily   gabapentin (NEURONTIN) 100 MG capsule 2/7/2024 at pm  No Yes   Sig: Take 1 capsule (100 mg) by mouth 3 times daily   metoprolol succinate ER (TOPROL-XL) 50 MG 24 hr tablet 2/7/2024 at am Self Yes Yes   Sig: Take 50 mg by mouth daily    pantoprazole (PROTONIX) 40 MG EC tablet 2/7/2024 at am Self Yes Yes   Sig: Take 40 mg by mouth daily   vitamin B-Complex 2/7/2024 at am  Yes Yes   Sig: Take 1 tablet by mouth daily      Facility-Administered Medications: None        Review of Systems    The 10 point Review of Systems is negative other than noted in the HPI or here.     Physical Exam   Vital Signs: Temp: 98.5  F (36.9  C) Temp src: Temporal BP: 135/89 Pulse: 111   Resp: 21 SpO2: 94 % O2 Device: None (Room air)    Weight: 0 lbs 0 oz    Constitutional: Awake, alert, cooperative, no apparent distress.  Eyes: Conjunctiva and pupils examined and normal.  HEENT: Moist mucous membranes, normal dentition.  Respiratory: Clear to auscultation bilaterally, no crackles or wheezing.  Cardiovascular: Regular rhythm, tachycardic, normal S1 and S2, and no murmur noted.  Skin: No rashes, no cyanosis, no  edema.  Musculoskeletal: No joint swelling, erythema or tenderness.  Neurologic: Cranial nerves 2-12 intact, normal strength and sensation. No tremors   Psychiatric: Alert, oriented to person, place and time, no obvious anxiety or depression.      Medical Decision Making       75 MINUTES SPENT BY ME on the date of service doing chart review, history, exam, documentation & further activities per the note.      Data     I have personally reviewed the following data over the past 24 hrs:    4.8  \   12.6 (L)   / 215     135 96 (L) 8.7 /  115 (H)   4.1 21 (L) 0.73 \     ALT: 46 AST: 104 (H) AP: 74 TBILI: 1.2   ALB: 4.5 TOT PROTEIN: 7.7 LIPASE: N/A     INR:  1.09 PTT:  29   D-dimer:  N/A Fibrinogen:  N/A       Imaging results reviewed over the past 24 hrs:   No results found for this or any previous visit (from the past 24 hour(s)).

## 2024-02-08 NOTE — ED NOTES
[facility]  ED Nurse Handoff Report    ED Chief complaint: Dizziness and Hematemesis      ED Diagnosis:   Final diagnoses:   Unspecified intracranial injury with loss of consciousness status unknown, initial encounter (S06.9XAA)       Code Status: MD to address     Allergies:   Allergies   Allergen Reactions    Morphine Nausea and Vomiting    Oxycodone Itching     1/22 Patient reports he can tolerate this       Patient Story: Pt arrives via EMS from work. Pt complains of intermittent dizziness and vertigo-like symptoms. Pt with hematemesis a few times yesterday. Pt endorses smaller amounts of blood in kleenex. History of HTN. Per EMS: SBP 140s-160s, pt has not taken cardiac meds yet this morning, >120>110s. Zofran given x1, 400ml NS bolus, . Pt endorses having a couple beers last night, tremor and headache noted per EMS.   Focused Assessment:  Tremulous, headache, anxiety. Denies nausea at this time. Aox4.    Treatments and/or interventions provided: Fluids, labs, cardiac monitoring, meds  Patient's response to treatments and/or interventions: Improving     To be done/followed up on inpatient unit:  n/a    Does this patient have any cognitive concerns?: Hx Head Trauma    Activity level - Baseline/Home:  Independent  Activity Level - Current:   Stand with Assist    Patient's Preferred language: English   Needed?: No    Isolation: None  Infection: Not Applicable  Patient tested for COVID 19 prior to admission: NO  Bariatric?: No    Vital Signs:   Vitals:    02/08/24 0813 02/08/24 0827 02/08/24 0855 02/08/24 0857   BP:  (!) 151/105  (!) 155/101   Pulse: 120 116  (!) 125   Resp: 19 27 19   Temp:   98.5  F (36.9  C)    TempSrc:   Temporal    SpO2: 96%   94%       Cardiac Rhythm:Cardiac Rhythm: Sinus tachycardia    Was the PSS-3 completed:   Yes  OBS brochure/video discussed/provided to patient/family: N/A            For the majority of the shift this patient's behavior was Green.    Behavioral interventions performed were n/a.    ED NURSE PHONE NUMBER: [phone]

## 2024-02-08 NOTE — ED TRIAGE NOTES
Pt arrives via EMS from work. Pt complains of intermittent dizziness and vertigo-like symptoms. Pt with hematemesis a few times yesterday. Pt endorses smaller amounts of blood in kleenex. History of HTN. Per EMS: SBP 140s-160s, pt has not taken cardiac meds yet this morning, >120>110s. Zofran given x1, 400ml NS bolus, . Pt endorses having a couple beers last night, tremor and headache noted per EMS.      Triage Assessment (Adult)       Row Name 02/08/24 0705          Triage Assessment    Airway WDL WDL        Respiratory WDL    Respiratory WDL WDL        Skin Circulation/Temperature WDL    Skin Circulation/Temperature WDL WDL        Cardiac WDL    Cardiac WDL X  hypertensive and tachycardic 150>120>110s per EMS        Peripheral/Neurovascular WDL    Peripheral Neurovascular WDL WDL        Cognitive/Neuro/Behavioral WDL    Cognitive/Neuro/Behavioral WDL WDL

## 2024-02-08 NOTE — ED PROVIDER NOTES
History     Chief Complaint:  Dizziness and Hematemesis       The history is provided by the patient.      Jeo Shah is a 48 year old male with a history of esophageal ulcers presenting to the ED for evaluation of dizziness and hematemesis. The patient reports that he woke up this morning violently coughing. He states that the coughing caused vomiting. He has experienced multiple episodes of hematemesis and dry heaving in the last few hours. He described the first episode of hematemesis as bright red. The patient also endorses dizziness and a headache. He denies chest pain or bloody stool. He had two beers after work last night and does not believe his symptoms are related to withdrawal.     Review of External Notes:   Endoscopy notes reviewed from May 26, 2023.  Patient was noted to have esophageal ulcer.    Medications:    Famotidine  Folvite  Neurontin  Toprol-XL  Protonix  Thiamine    Past Medical History:    Anxiety  Back pain  Depression  Hypertension  Subdural hematoma  Substance abuse  Alcohol withdrawal  Hydronephrosis  Renal colic  Alcoholic hepatitis  Leukopenia  Lumbar radiculopathy  Primary insomnia  Upper GI bleed  Tremor  Spondylolisthesis of lumbosacral region  Facet arthropathy  Spondylolysis    Past Surgical History:    Raphael holes  ENT surgery  Esophagogastroduodenoscopy x2  Neck surgery    Physical Exam   Patient Vitals for the past 24 hrs:   BP Temp Temp src Pulse Resp SpO2   02/08/24 1029 -- -- -- 111 21 --   02/08/24 0959 135/89 -- -- 111 24 --   02/08/24 0929 (!) 146/92 -- -- (!) 124 12 94 %   02/08/24 0900 (!) 136/99 -- -- (!) 123 22 95 %   02/08/24 0857 (!) 155/101 -- -- (!) 125 19 94 %   02/08/24 0855 -- 98.5  F (36.9  C) Temporal -- -- --   02/08/24 0827 (!) 151/105 -- -- 116 27 --   02/08/24 0813 -- -- -- 120 19 96 %   02/08/24 0810 -- -- -- (!) 129 18 95 %   02/08/24 0800 (!) 133/106 -- -- (!) 128 10 93 %   02/08/24 0745 (!) 139/109 -- -- (!) 123 10 97 %        Physical  Exam  Constitutional:       General: He is not in acute distress.     Appearance: Normal appearance. He is not toxic-appearing.   HENT:      Head: Atraumatic.      Right Ear: External ear normal.      Left Ear: External ear normal.      Mouth/Throat:      Mouth: Mucous membranes are moist.      Pharynx: Oropharynx is clear.   Eyes:      General: No scleral icterus.     Conjunctiva/sclera: Conjunctivae normal.   Cardiovascular:      Rate and Rhythm: Normal rate and regular rhythm.      Heart sounds: Normal heart sounds.   Pulmonary:      Effort: Pulmonary effort is normal. No respiratory distress.      Breath sounds: Normal breath sounds.   Abdominal:      General: There is no distension.      Palpations: Abdomen is soft.      Tenderness: There is no abdominal tenderness.   Musculoskeletal:         General: No deformity.      Cervical back: Neck supple.      Right lower leg: No edema.      Left lower leg: No edema.   Skin:     General: Skin is warm.      Capillary Refill: Capillary refill takes less than 2 seconds.   Neurological:      General: No focal deficit present.      Mental Status: He is alert and oriented to person, place, and time.   Psychiatric:         Mood and Affect: Mood normal.         Behavior: Behavior normal.           Emergency Department Course   ECG  ECG taken at 0750, ECG read at 0755  Sinus tachycardia   No significant changes as compared to prior, dated 1/22/24.  Rate 119 bpm. ND interval 148 ms. QRS duration 68 ms. QT/QTc 326/458 ms. P-R-T axes 46 34 48.        Laboratory:  Labs Ordered and Resulted from Time of ED Arrival to Time of ED Departure   COMPREHENSIVE METABOLIC PANEL - Abnormal       Result Value    Sodium 135      Potassium 4.1      Carbon Dioxide (CO2) 21 (*)     Anion Gap 18 (*)     Urea Nitrogen 8.7      Creatinine 0.73      GFR Estimate >90      Calcium 9.5      Chloride 96 (*)     Glucose 115 (*)     Alkaline Phosphatase 74       (*)     ALT 46      Protein Total 7.7       Albumin 4.5      Bilirubin Total 1.2     MAGNESIUM - Abnormal    Magnesium 1.3 (*)    CBC WITH PLATELETS AND DIFFERENTIAL - Abnormal    WBC Count 4.8      RBC Count 4.20 (*)     Hemoglobin 13.4      Hematocrit 40.2      MCV 96      MCH 31.9      MCHC 33.3      RDW 13.8      Platelet Count 215      % Neutrophils 80      % Lymphocytes 4      % Monocytes 15      % Eosinophils 0      % Basophils 1      % Immature Granulocytes 0      NRBCs per 100 WBC 0      Absolute Neutrophils 3.8      Absolute Lymphocytes 0.2 (*)     Absolute Monocytes 0.7      Absolute Eosinophils 0.0      Absolute Basophils 0.0      Absolute Immature Granulocytes 0.0      Absolute NRBCs 0.0     INR - Normal    INR 1.09     PARTIAL THROMBOPLASTIN TIME - Normal    aPTT 29     ETHYL ALCOHOL LEVEL - Normal    Alcohol ethyl <0.01     PHOSPHORUS - Normal    Phosphorus 2.9     HEMOGLOBIN   TYPE AND SCREEN, ADULT    ABO/RH(D) O POS      Antibody Screen Negative      SPECIMEN EXPIRATION DATE [id number]     ABO/RH TYPE AND SCREEN        Emergency Department Course & Assessments:    Interventions:  Medications   cloNIDine (CATAPRES) tablet 0.1 mg (0.1 mg Oral $Given 2/8/24 0752)   flumazenil (ROMAZICON) injection 0.2 mg (has no administration in time range)   melatonin tablet 5 mg (has no administration in time range)   gabapentin (NEURONTIN) capsule 900 mg (has no administration in time range)   gabapentin (NEURONTIN) capsule 600 mg (has no administration in time range)   gabapentin (NEURONTIN) capsule 300 mg (has no administration in time range)   gabapentin (NEURONTIN) capsule 100 mg (has no administration in time range)   diazepam (VALIUM) tablet 10 mg ( Oral See Alternative 2/8/24 0752)     Or   diazepam (VALIUM) injection 5-10 mg (10 mg Intravenous $Given 2/8/24 0752)   sodium chloride 0.9 % 1,000 mL with Infuvite Adult 10 mL, thiamine 100 mg, folic acid 1 mg infusion ( Intravenous $New Bag 2/8/24 6083)   gabapentin (NEURONTIN) tablet 1,200 mg  (1,200 mg Oral $Given 2/8/24 0752)   magnesium sulfate 2 g in 50 mL sterile water intermittent infusion (2 g Intravenous $New Bag 2/8/24 0933)   acetaminophen (TYLENOL) tablet 650 mg (650 mg Oral $Given 2/8/24 0938)   pantoprazole (PROTONIX) IV push injection 80 mg (80 mg Intravenous $Given 2/8/24 1035)      Assessments/Consultations/Discussion of Management or Tests:   ED Course as of 02/08/24 1145   u Feb 08, 2024   0708 I obtained history and examined the patient as noted above.     1136 I spoke with Dr. Oconnor, hospitalist, regarding the patient's history and presentation in the emergency department today. Dr. Oconnor accepted the patient for admission.        Social Determinants of Health affecting care:   Alcohol abuse    Disposition:  The patient was admitted to the hospital under the care of Dr. Oconnor.     Impression & Plan      Medical Decision Making:  This patient is a 48-year-old man who presents to the emergency department with hematemesis.  He does have a history of alcohol abuse.  He appears to be in early alcohol withdrawal.  He is improving though with management per the alcohol withdrawal protocol.    He does have a known history of esophageal ulcers and continues to drink alcohol.  Hemoglobin though looks good.  He has not had any further hematemesis here.    GI has been consulted.  He likely will need repeat endoscopy.  He was given Protonix.  Magnesium was replaced.    Diagnosis:    ICD-10-CM    1. Upper GI bleed  K92.2       2. Alcohol withdrawal syndrome without complication (H)  F10.930       3. Hypomagnesemia  E83.42             Scribe Disclosure:  I, Payal Ybarra, am serving as a scribe at 7:13 AM on 2/8/2024 to document services personally performed by Mynor Carpio MD based on my observations and the provider's statements to me.     2/8/2024   Mynor Carpio MD McRoberts, Sean Edward, MD  02/08/24 1683

## 2024-02-08 NOTE — PHARMACY-ADMISSION MEDICATION HISTORY
Pharmacist Admission Medication History    Admission medication history is complete. The information provided in this note is only as accurate as the sources available at the time of the update.    Information Source(s): Patient and CareEverywhere/SureScripts via in-person    Pertinent Information: none    Changes made to PTA medication list:  Added: B complex, Tylenol   Deleted: Thiamine  Changed: None    Allergies reviewed with patient and updates made in EHR: no, done by RN    Medication History Completed By: Candida Valenzuela [facility] 2/8/2024 8:14 AM    Prior to Admission medications    Medication Sig Last Dose Taking? Auth Provider Long Term End Date   acetaminophen (TYLENOL) 500 MG tablet Take 500-1,000 mg by mouth every 6 hours as needed for mild pain  at prn Yes Unknown, Entered By History No    famotidine (PEPCID) 20 MG tablet Take 1 tablet (20 mg) by mouth 2 times daily 2/7/2024 at pm Yes Hao Craft MD     folic acid (FOLVITE) 1 MG tablet Take 1 tablet (1 mg) by mouth daily 2/7/2024 at am Yes Karen Cowan MD     gabapentin (NEURONTIN) 100 MG capsule Take 1 capsule (100 mg) by mouth 3 times daily 2/7/2024 at pm Yes Hao Craft MD Yes    metoprolol succinate ER (TOPROL-XL) 50 MG 24 hr tablet Take 50 mg by mouth daily  2/7/2024 at am Yes Reported, Patient Yes    Multiple Vitamins-Minerals (MULTIVITAMIN ADULT) CHEW Take 2 chew tab by mouth daily Centrum 2/7/2024 at pm Yes Unknown, Entered By History     pantoprazole (PROTONIX) 40 MG EC tablet Take 40 mg by mouth daily 2/7/2024 at am Yes Unknown, Entered By History     vitamin B-Complex Take 1 tablet by mouth daily 2/7/2024 at am Yes Unknown, Entered By History

## 2024-02-08 NOTE — ED NOTES
Bed: ED13  Expected date: 2/8/24  Expected time: 6:45 AM  Means of arrival: Ambulance  Comments:  HEMS 427 48M dizzy

## 2024-02-09 PROBLEM — E83.42 HYPOMAGNESEMIA: Status: ACTIVE | Noted: 2024-02-09

## 2024-02-09 LAB
ANION GAP SERPL CALCULATED.3IONS-SCNC: 10 MMOL/L (ref 7–15)
BUN SERPL-MCNC: 9.7 MG/DL (ref 6–20)
CALCIUM SERPL-MCNC: 8 MG/DL (ref 8.6–10)
CHLORIDE SERPL-SCNC: 102 MMOL/L (ref 98–107)
CREAT SERPL-MCNC: 0.82 MG/DL (ref 0.67–1.17)
DEPRECATED HCO3 PLAS-SCNC: 23 MMOL/L (ref 22–29)
EGFRCR SERPLBLD CKD-EPI 2021: >90 ML/MIN/1.73M2
ERYTHROCYTE [DISTWIDTH] IN BLOOD BY AUTOMATED COUNT: 13.9 % (ref 10–15)
GLUCOSE SERPL-MCNC: 99 MG/DL (ref 70–99)
HCT VFR BLD AUTO: 35.9 % (ref 40–53)
HGB BLD-MCNC: 11.6 G/DL (ref 13.3–17.7)
MCH RBC QN AUTO: 31.5 PG (ref 26.5–33)
MCHC RBC AUTO-ENTMCNC: 32.3 G/DL (ref 31.5–36.5)
MCV RBC AUTO: 98 FL (ref 78–100)
PLATELET # BLD AUTO: 153 10E3/UL (ref 150–450)
POTASSIUM SERPL-SCNC: 3.8 MMOL/L (ref 3.4–5.3)
RBC # BLD AUTO: 3.68 10E6/UL (ref 4.4–5.9)
SODIUM SERPL-SCNC: 135 MMOL/L (ref 135–145)
UPPER GI ENDOSCOPY: NORMAL
WBC # BLD AUTO: 3.4 10E3/UL (ref 4–11)

## 2024-02-09 PROCEDURE — 250N000011 HC RX IP 250 OP 636: Performed by: INTERNAL MEDICINE

## 2024-02-09 PROCEDURE — G0378 HOSPITAL OBSERVATION PER HR: HCPCS

## 2024-02-09 PROCEDURE — 96361 HYDRATE IV INFUSION ADD-ON: CPT | Mod: 59

## 2024-02-09 PROCEDURE — 250N000009 HC RX 250: Performed by: INTERNAL MEDICINE

## 2024-02-09 PROCEDURE — 36415 COLL VENOUS BLD VENIPUNCTURE: CPT | Performed by: STUDENT IN AN ORGANIZED HEALTH CARE EDUCATION/TRAINING PROGRAM

## 2024-02-09 PROCEDURE — 250N000011 HC RX IP 250 OP 636: Performed by: STUDENT IN AN ORGANIZED HEALTH CARE EDUCATION/TRAINING PROGRAM

## 2024-02-09 PROCEDURE — 258N000003 HC RX IP 258 OP 636: Performed by: EMERGENCY MEDICINE

## 2024-02-09 PROCEDURE — 250N000013 HC RX MED GY IP 250 OP 250 PS 637: Performed by: STUDENT IN AN ORGANIZED HEALTH CARE EDUCATION/TRAINING PROGRAM

## 2024-02-09 PROCEDURE — 999N000099 HC STATISTIC MODERATE SEDATION < 10 MIN: Performed by: INTERNAL MEDICINE

## 2024-02-09 PROCEDURE — 85027 COMPLETE CBC AUTOMATED: CPT | Performed by: STUDENT IN AN ORGANIZED HEALTH CARE EDUCATION/TRAINING PROGRAM

## 2024-02-09 PROCEDURE — C9113 INJ PANTOPRAZOLE SODIUM, VIA: HCPCS | Performed by: STUDENT IN AN ORGANIZED HEALTH CARE EDUCATION/TRAINING PROGRAM

## 2024-02-09 PROCEDURE — 43235 EGD DIAGNOSTIC BRUSH WASH: CPT | Performed by: INTERNAL MEDICINE

## 2024-02-09 PROCEDURE — 82374 ASSAY BLOOD CARBON DIOXIDE: CPT | Performed by: STUDENT IN AN ORGANIZED HEALTH CARE EDUCATION/TRAINING PROGRAM

## 2024-02-09 PROCEDURE — 250N000009 HC RX 250: Performed by: EMERGENCY MEDICINE

## 2024-02-09 PROCEDURE — 99232 SBSQ HOSP IP/OBS MODERATE 35: CPT | Performed by: STUDENT IN AN ORGANIZED HEALTH CARE EDUCATION/TRAINING PROGRAM

## 2024-02-09 PROCEDURE — 250N000013 HC RX MED GY IP 250 OP 250 PS 637: Performed by: EMERGENCY MEDICINE

## 2024-02-09 PROCEDURE — 250N000011 HC RX IP 250 OP 636: Performed by: EMERGENCY MEDICINE

## 2024-02-09 PROCEDURE — 96376 TX/PRO/DX INJ SAME DRUG ADON: CPT

## 2024-02-09 RX ORDER — FENTANYL CITRATE 50 UG/ML
INJECTION, SOLUTION INTRAMUSCULAR; INTRAVENOUS PRN
Status: DISCONTINUED | OUTPATIENT
Start: 2024-02-09 | End: 2024-02-09 | Stop reason: HOSPADM

## 2024-02-09 RX ADMIN — METOPROLOL SUCCINATE 50 MG: 50 TABLET, EXTENDED RELEASE ORAL at 09:51

## 2024-02-09 RX ADMIN — GABAPENTIN 900 MG: 300 CAPSULE ORAL at 06:50

## 2024-02-09 RX ADMIN — CLONIDINE HYDROCHLORIDE 0.1 MG: 0.1 TABLET ORAL at 23:09

## 2024-02-09 RX ADMIN — PANTOPRAZOLE SODIUM 40 MG: 40 INJECTION, POWDER, FOR SOLUTION INTRAVENOUS at 09:44

## 2024-02-09 RX ADMIN — GABAPENTIN 900 MG: 300 CAPSULE ORAL at 16:17

## 2024-02-09 RX ADMIN — GABAPENTIN 900 MG: 300 CAPSULE ORAL at 23:09

## 2024-02-09 RX ADMIN — CLONIDINE HYDROCHLORIDE 0.1 MG: 0.1 TABLET ORAL at 16:17

## 2024-02-09 RX ADMIN — FOLIC ACID: 5 INJECTION, SOLUTION INTRAMUSCULAR; INTRAVENOUS; SUBCUTANEOUS at 06:57

## 2024-02-09 RX ADMIN — PANTOPRAZOLE SODIUM 40 MG: 40 INJECTION, POWDER, FOR SOLUTION INTRAVENOUS at 19:22

## 2024-02-09 RX ADMIN — CLONIDINE HYDROCHLORIDE 0.1 MG: 0.1 TABLET ORAL at 06:50

## 2024-02-09 ASSESSMENT — ACTIVITIES OF DAILY LIVING (ADL)
ADLS_ACUITY_SCORE: 28

## 2024-02-09 NOTE — PROGRESS NOTES
[facility]  Gastroenterology Progress Note     Joe Shah MRN# [id number]   YOB: 1975 Age: 48 year old          Assessment and Plan:      Alcohol withdrawal syndrome without complication (H)  Hematemesis  Joe Shah is a very pleasant 48-year-old gentleman with complicated past medical history for history of subdural hematoma bur hole history of heavy alcohol use in the past patient has been hospitalized with the alcohol withdrawal patient previous endoscopy during his last admission about 6 months ago since showed significantly advanced grade 4 esophagitis.  Patient now presented again with multiple episodes of nausea vomiting patient was sleeping patient was drinking last night patient woke up around midnight with episode of gagging and choking leading to significant cough which in turn led to gagging and episodes of vomiting patient finding seems to be quite suggestive of possible bad episode of reflux leading to possible aspiration and cough which subsequently led to hematemesis possible differential for hematemesis can include esophagitis peptic ulcer disease, Antonia-Galvez tear.    Patient is going through alcohol withdrawal, but now scoring minimally on CIWA.    --Continue to monitor closely  --Continue on supportive care for alcohol withdrawa  --IV Protonix monitor   --NPO  --Pt stable, will proceed with EGD this afternoon  --GI will continue to follow along.            Upper GI bleed  Alcohol withdrawal syndrome without complication (H)  Hypomagnesemia      Interval History:     Doing well, denies withdrawal symptoms.  Mildly tremulous.  Feels hungry.  No longer coughing up blood and no n/v.              Review of Systems:     C: NEGATIVE for fever, chills, change in weight  E/M: NEGATIVE for ear, mouth and throat problems  R: NEGATIVE for significant cough or SOB  CV: NEGATIVE for chest pain, palpitations or peripheral edema             Medications:   I  have reviewed this patient's current medications   cloNIDine  0.1 mg Oral Q8H    [START ON 2/15/2024] gabapentin  100 mg Oral Q8H    [START ON 2/13/2024] gabapentin  300 mg Oral Q8H    [START ON 2/11/2024] gabapentin  600 mg Oral Q8H    gabapentin  900 mg Oral Q8H    metoprolol succinate ER  50 mg Oral Daily    pantoprazole  40 mg Intravenous BID    sodium chloride (PF)  3 mL Intracatheter Q8H    sodium chloride 0.9 % 1,000 mL with Infuvite Adult 10 mL, thiamine 100 mg, folic acid 1 mg infusion   Intravenous Q24H                  Physical Exam:   Vitals were reviewed  Vital Signs with Ranges  Temp:  [98.1  F (36.7  C)-99.5  F (37.5  C)] 99.3  F (37.4  C)  Pulse:  [] 71  Resp:  [16-18] 18  BP: (101-147)/() 109/72  SpO2:  [95 %-98 %] 95 %  I/O last 3 completed shifts:  In: 1511.66 [I.V.:1511.66]  Out: 500 [Urine:500]  Constitutional: Alert, oriented to person, place, date, situation.  Cooperative, lying in bed in NAD.  Mildly tremulous.  Respiratory:  Lungs CTAB.  No crackles, wheezes, or rhonchi, no labored breathing.  Cardiovascular:  Heart RRR, no edema.  GI:  Abdomen soft, NT/ND and with normoactive BS  Skin/Integumen:  Warm, dry, non-diaphoretic.  MSK: CMS x4 grossly intact.             Data:   I reviewed the patient's new clinical lab test results.   Recent Labs   Lab Test 02/09/24  0851 02/08/24  1207 02/08/24  0742 01/23/24  0630 12/03/23  0531 11/30/23  1747 05/02/20  0818 05/01/20  2218   WBC 3.4*  --  4.8 6.8   < >  --    < > 8.2   HGB 11.6* 12.6* 13.4 11.8*   < >  --    < > 15.2   MCV 98  --  96 100   < >  --    < > 100     --  215 126*   < >  --    < > 188   INR  --   --  1.09  --   --  1.08  --  1.02    < > = values in this interval not displayed.     Recent Labs   Lab Test 02/09/24  0851 02/08/24  0742 01/25/24  1020 01/24/24  1036   POTASSIUM 3.8 4.1 3.6 3.8   CHLORIDE 102 96*  --  101   CO2 23 21*  --  27   BUN 9.7 8.7  --  9.4   ANIONGAP 10 18*  --  7     Recent Labs   Lab Test  02/08/24  0742 01/23/24  0630 01/22/24  0702 05/28/23  0900 05/26/23  0044 09/17/22  0619 09/15/22  1659 05/09/21  0606 05/08/21  1413   ALBUMIN 4.5 3.6 4.9   < > 4.7   < > 4.2   < >  --    BILITOTAL 1.2 1.4* 2.2*   < > 1.7*   < > 2.0*   < >  --    ALT 46 42 85*   < > 63*   < > 113*   < >  --    * 65* 194*   < > 93*   < > 145*   < >  --    PROTEIN  --   --   --   --   --   --   --   --  50*   LIPASE  --   --  44  --  64*  --  95  --   --     < > = values in this interval not displayed.       I reviewed the patient's new imaging results.    All laboratory data reviewed  All imaging studies reviewed by me.    SEVEN Paulino,  2/9/2024  [facility]  Office : [phone]  Cell: [phone] (Dr. Mcrae)  Cell: [phone] (Jarvis Jeffries PA-C)

## 2024-02-09 NOTE — PROGRESS NOTES
"[facility]    Medicine Progress Note - Hospitalist Service    Date of Admission:  2/8/2024    Assessment & Plan   Joe Shah is a 48 year old male with past medical history significant for alcohol use disorder and prior withdrawal, subdural hematoma s/p bur hole and evacuation, HTN admitted on 2/8/2024 with cough, hematemesis. Patient went for EGD 2/9.      Hematemesis, resolved  Post-tussive emesis   Hx of esophageal ulcer   Pt presents to the ED with hematemesis. Pt reports having a cough for the past couple days. Today he had a very severe bout of coughing which caused some vomiting and he experienced multiple episodes of hematemesis.   * Pt had EGD in May 2023 which showed LA grade D reflux esophagitis with no bleeding. Esophageal ulcers  and non-bleeding gastric ulcers.   * Pt is hemodynamically stable. Hemoglobin is 13.4 on admission.   - Serial hemoglobin   - [facility] consult   - IV protonix   - EGD planned 2/9, delayed to end of day due to COVID status  - resume diet      Cough  COVID, minimally symptomatic  Pt has noted a severe cough for the past couple days. Some associated URI symptoms as well. No fevers, but he is having some chills in the ED.   - no need for specific therapy for COVID     Alcohol Withdrawal, improving  Alcohol use disorder, severe   Hypomagnesemia   Clinically appeared to be in early alcohol withdrawal in the ED. On my examination pt's withdrawal appears quite mild.   * Pt with similar admission with n/v in January and found to have severe alcohol withdrawal. He reports since that admission he has cut back significantly on \"hard liquor.\" He only has 1-2 beers daily now.   - CIWA with PRN benzos   - Gabapentin taper  - Clonidine   - Thiamine/folate   - Monitor and replace electrolytes as needed      HTN  Continue PTA metoprolol      Hx of subdural hematoma s/p bur hole and hematoma evacuation (Dec 2023)  Noted           Diet: NPO per Anesthesia " "Guidelines for Procedure/Surgery Except for: Meds    DVT Prophylaxis: Pneumatic Compression Devices  Power Catheter: Not present  Lines: None     Cardiac Monitoring: ACTIVE order. Indication: Tachyarrhythmias, acute (48 hours)  Code Status: Full Code      Clinically Significant Risk Factors          # Hypocalcemia: Lowest Ca = 8 mg/dL in last 2 days, will monitor and replace as appropriate   # Hypomagnesemia: Lowest Mg = 1.3 mg/dL in last 2 days, will replace as needed       # Hypertension: Noted on problem list        # Overweight: Estimated body mass index is 25.52 kg/m  as calculated from the following:    Height as of this encounter: 1.803 m (5' 11\").    Weight as of this encounter: 83 kg (183 lb)., PRESENT ON ADMISSION     # Financial/Environmental Concerns:           Disposition Plan      Expected Discharge Date: 02/10/2024                    Freddie Lester MD  Hospitalist Service  [facility]  Securely message with [employer] (more info)  Text page via [employer] Paging/Directory   ______________________________________________________________________    Interval History   He feels well. Has a mild tremor but does not feel like he is in EtOH withdrawal or if he is it is not symptomatic. He is hungry. No further n/v/hematemasis. No BM. Passing gas     Physical Exam   Vital Signs: Temp: 97.7  F (36.5  C) Temp src: Oral BP: (!) 124/90 Pulse: 70   Resp: 16 SpO2: 99 % O2 Device: Nasal cannula Oxygen Delivery: 4 LPM  Weight: 183 lbs 0 oz    Constitutional: Awake, alert, cooperative, no apparent distress  Respiratory: Clear to auscultation bilaterally, no crackles or wheezing  Cardiovascular: Regular rate and rhythm, normal S1 and S2, and no murmur noted  GI: Normal bowel sounds, soft, non-distended, non-tender  Skin/Integumen: No rashes, no cyanosis, no edema  Other: Fine resting but not intentiontremor of hands. No tongue fasiculations      Medical Decision Making       40 MINUTES SPENT BY ME " on the date of service doing chart review, history, exam, documentation & further activities per the note.      Data   ------------------------- PAST 24 HR DATA REVIEWED -----------------------------------------------    I have personally reviewed the following data over the past 24 hrs:    3.4 (L)  \   11.6 (L)   / 153     135 102 9.7 /  99   3.8 23 0.82 \       Imaging results reviewed over the past 24 hrs:   Recent Results (from the past 24 hour(s))   XR Chest 2 Views    Narrative    EXAM: XR CHEST 2 VIEWS  LOCATION: [facility]  DATE: 2/8/2024    INDICATION: Cough.  COMPARISON: 11/29/2023.      Impression    IMPRESSION: Negative chest. Lungs are clear. Small hiatus hernia.

## 2024-02-09 NOTE — PLAN OF CARE
Goal Outcome Evaluation:      Plan of Care Reviewed With: patient    Overall Patient Progress: improvingOverall Patient Progress: improving     Summary: vomiting blood, alcohol withdrawal, possible upper GI bleed  DATE & TIME: 02/09/24 1729-3609    Cognitive Concerns/ Orientation : A&O x 4   BEHAVIOR & AGGRESSION TOOL COLOR: green  CIWA SCORE: 1, 2    ABNL VS/O2: VSS on RA  MOBILITY: SBA  PAIN MANAGMENT: denies  DIET: NPO since midnight  BOWEL/BLADDER: continent, up to BR  ABNL LAB/BG next mag, Phos, K recheck ordered for 6 am, hgb 11.6  DRAIN/DEVICES: L PIV SL, R PIV infusing banana bag @ 100ml/hr  TELEMETRY RHYTHM: NSR  SKIN: WDL  TESTS/PROCEDURES: awaiting EGD 2/9  D/C DAY/GOALS/PLACE: pending improvements  OTHER IMPORTANT INFO: NA

## 2024-02-09 NOTE — UTILIZATION REVIEW
"    Admission Status; Secondary Review Determination         Under the authority of the Utilization Management Committee, the utilization review process indicated a secondary review on the above patient.  The review outcome is based on review of the medical records, discussions with staff, and applying clinical experience noted on the date of the review.          (x) Observation Status Appropriate - This patient does not meet hospital inpatient criteria and is placed in observation status. If this patient's primary payer is Medicare and was admitted as an inpatient, Condition Code 44 should be used and patient status changed to \"observation\".     RATIONALE FOR DETERMINATION            The Patient is 49 y/o  man with PMHx significant for alcohol use disorder and prior withdrawal, subdural hematoma s/p bur hole and evacuation, HTN and  esophageal ulcers presented to the ED for evaluation of dizziness and hematemesis. The patient reports that he woke up with violent cough that vomiting. He has experienced multiple episodes of hematemesis and dry heaving in the last few hours. He described the first episode of hematemesis as bright red.  He also was found with alcohol withdrawal and started on protocol.    Hemoglobin was monitored and it dropped to 11.6 from 13.4 on admission day.  The patient has not experienced further hematemesis, did not require blood transfusion.   The patient was placed on CIWA protocol and the numbers have been between 1 and 2.  Vital signs have been stable.  Patient had low potassium which was repleted.    48-year-old man with history of alcohol use disorder woke up with a violent cough (possible aspiration) causing vomiting with hematemesis.  Mild drop in hemoglobin, no required transfusion and no further hematemesis.  He is on CIWA protocol with the scores 1-2.      The severity of illness, intensity of service provided, expected LOS and risk for adverse outcome make the care appropriate for " further observation; however, doesn't meet criteria for hospital inpatient admission. Dr. Lester notified of this determination.    This document was produced using voice recognition software.      The information on this document is developed by the utilization review team in order for the business office to ensure compliance.  This only denotes the appropriateness of proper admission status and does not reflect the quality of care rendered.         The definitions of Inpatient Status and Observation Status used in making the determination above are those provided in the CMS Coverage Manual, Chapter 1 and Chapter 6, section 70.4.      Sincerely,     FRANKY WILKS MD   Utilization Review  Physician Advisor  [facility]

## 2024-02-09 NOTE — PLAN OF CARE
Goal Outcome Evaluation:    Diagnosis: Hematemesis, Alcohol Withdrawal, COVID-19    Mental Status:A & O x4, forgetful at times, brain surgery in December per pt  Activity/dangle: Up with SBA  Diet:Clear liquid, NPO after midnight for possible procedure tomorrow  Pain:ES Tylenol for headache  Power/Voiding:Continent of bowel and bladder  Tele/Restraints/Iso:COVID isolation, Tele= NSR  02/LDA:NS infusing @ 100cc/hr  D/C Date:pending  Other Info:  CIWA score was 8-received 10mg of valium po, recheck score was 5. BP was elevated- received schedule BP med. On K+, Phos and mg++ protocol. K+ and Phos recheck in am, MG ++ was replaced in ED, recheck lab pending. Had chest xray done tonight.

## 2024-02-09 NOTE — PLAN OF CARE
Goal Outcome Evaluation:     Summary: vomiting blood, alcohol withdrawal, possible upper GI bleed  DATE & TIME: 2/9/24 4754-6442    Cognitive Concerns/ Orientation : A&O x 4   BEHAVIOR & AGGRESSION TOOL COLOR: green  CIWA SCORE: 2, 0, 1 (headache)   ABNL VS/O2: VSS on RA  MOBILITY: SBA  PAIN MANAGMENT: pt states mild headache   DIET: NPO since midnight  BOWEL/BLADDER: continent, up to BR  ABNL LAB/BG: Mag recheck 1.9, next recheck ordered for 6 am, .  DRAIN/DEVICES: 2 PIVs, R PIV infusing NS @ 100ml/hr  TELEMETRY RHYTHM: NSR  SKIN: WDL  TESTS/PROCEDURES: possible EGD 2/9  D/C DAY/GOALS/PLACE: pending improvements  OTHER IMPORTANT INFO: NA

## 2024-02-10 VITALS
HEART RATE: 68 BPM | TEMPERATURE: 98 F | OXYGEN SATURATION: 96 % | SYSTOLIC BLOOD PRESSURE: 110 MMHG | WEIGHT: 183 LBS | HEIGHT: 71 IN | BODY MASS INDEX: 25.62 KG/M2 | DIASTOLIC BLOOD PRESSURE: 70 MMHG | RESPIRATION RATE: 14 BRPM

## 2024-02-10 LAB
MAGNESIUM SERPL-MCNC: 1.7 MG/DL (ref 1.7–2.3)
PHOSPHATE SERPL-MCNC: 3.1 MG/DL (ref 2.5–4.5)
POTASSIUM SERPL-SCNC: 3.9 MMOL/L (ref 3.4–5.3)

## 2024-02-10 PROCEDURE — 250N000009 HC RX 250: Performed by: EMERGENCY MEDICINE

## 2024-02-10 PROCEDURE — 96361 HYDRATE IV INFUSION ADD-ON: CPT

## 2024-02-10 PROCEDURE — 84132 ASSAY OF SERUM POTASSIUM: CPT | Performed by: INTERNAL MEDICINE

## 2024-02-10 PROCEDURE — 36415 COLL VENOUS BLD VENIPUNCTURE: CPT | Performed by: INTERNAL MEDICINE

## 2024-02-10 PROCEDURE — 250N000013 HC RX MED GY IP 250 OP 250 PS 637: Performed by: EMERGENCY MEDICINE

## 2024-02-10 PROCEDURE — G0378 HOSPITAL OBSERVATION PER HR: HCPCS

## 2024-02-10 PROCEDURE — 250N000011 HC RX IP 250 OP 636: Performed by: EMERGENCY MEDICINE

## 2024-02-10 PROCEDURE — 96376 TX/PRO/DX INJ SAME DRUG ADON: CPT

## 2024-02-10 PROCEDURE — 250N000013 HC RX MED GY IP 250 OP 250 PS 637: Performed by: PHYSICIAN ASSISTANT

## 2024-02-10 PROCEDURE — 250N000013 HC RX MED GY IP 250 OP 250 PS 637: Performed by: INTERNAL MEDICINE

## 2024-02-10 PROCEDURE — 258N000003 HC RX IP 258 OP 636: Performed by: STUDENT IN AN ORGANIZED HEALTH CARE EDUCATION/TRAINING PROGRAM

## 2024-02-10 PROCEDURE — 83735 ASSAY OF MAGNESIUM: CPT | Performed by: INTERNAL MEDICINE

## 2024-02-10 PROCEDURE — 250N000013 HC RX MED GY IP 250 OP 250 PS 637: Performed by: STUDENT IN AN ORGANIZED HEALTH CARE EDUCATION/TRAINING PROGRAM

## 2024-02-10 PROCEDURE — 258N000003 HC RX IP 258 OP 636: Performed by: EMERGENCY MEDICINE

## 2024-02-10 PROCEDURE — 99238 HOSP IP/OBS DSCHRG MGMT 30/<: CPT | Performed by: INTERNAL MEDICINE

## 2024-02-10 PROCEDURE — 84100 ASSAY OF PHOSPHORUS: CPT | Performed by: INTERNAL MEDICINE

## 2024-02-10 RX ORDER — LOPERAMIDE HCL 2 MG
2 CAPSULE ORAL ONCE
Status: COMPLETED | OUTPATIENT
Start: 2024-02-10 | End: 2024-02-10

## 2024-02-10 RX ORDER — PANTOPRAZOLE SODIUM 40 MG/1
40 TABLET, DELAYED RELEASE ORAL
Status: DISCONTINUED | OUTPATIENT
Start: 2024-02-10 | End: 2024-02-10 | Stop reason: HOSPADM

## 2024-02-10 RX ADMIN — CLONIDINE HYDROCHLORIDE 0.1 MG: 0.1 TABLET ORAL at 06:54

## 2024-02-10 RX ADMIN — FOLIC ACID: 5 INJECTION, SOLUTION INTRAMUSCULAR; INTRAVENOUS; SUBCUTANEOUS at 06:55

## 2024-02-10 RX ADMIN — CLONIDINE HYDROCHLORIDE 0.1 MG: 0.1 TABLET ORAL at 15:35

## 2024-02-10 RX ADMIN — PANTOPRAZOLE SODIUM 40 MG: 40 TABLET, DELAYED RELEASE ORAL at 08:48

## 2024-02-10 RX ADMIN — SODIUM CHLORIDE: 9 INJECTION, SOLUTION INTRAVENOUS at 04:16

## 2024-02-10 RX ADMIN — LOPERAMIDE HYDROCHLORIDE 2 MG: 2 CAPSULE ORAL at 04:21

## 2024-02-10 RX ADMIN — METOPROLOL SUCCINATE 50 MG: 50 TABLET, EXTENDED RELEASE ORAL at 08:48

## 2024-02-10 RX ADMIN — GABAPENTIN 900 MG: 300 CAPSULE ORAL at 06:54

## 2024-02-10 RX ADMIN — GABAPENTIN 900 MG: 300 CAPSULE ORAL at 15:35

## 2024-02-10 ASSESSMENT — ACTIVITIES OF DAILY LIVING (ADL)
ADLS_ACUITY_SCORE: 27

## 2024-02-10 NOTE — DISCHARGE SUMMARY
[facility]  Hospitalist Discharge Summary      Date of Admission:  2/8/2024  Date of Discharge:  2/10/2024  Discharging Provider: Pedro Campo MD  Discharge Service: Hospitalist Service    Discharge Diagnoses        Hematemesis, resolved  Post-tussive emesis   Hx of esophageal ulcer         Cough  COVID, minimally symptomatic       Alcohol Withdrawal, improving  Alcohol use disorder, severe   Hypomagnesemia        HTN       Hx of subdural hematoma s/p bur hole and hematoma evacuation (Dec 2023)         Clinically Significant Risk Factors             Follow-ups Needed After Discharge   Follow-up Appointments     Follow-up and recommended labs and tests       Follow up with PCP as directed    Follow up with outpatient CD evaluation and Tx    Follow up with GI as directed        {Additional follow-up instructions/to-do's for PCP         Unresulted Labs Ordered in the Past 30 Days of this Admission       No orders found from 1/9/2024 to 2/9/2024.        These results will be followed up by PCP    Discharge Disposition   Discharged to home  Condition at discharge: Stable    Hospital Course   Joe Shah is a 48 year old male with past medical history significant for alcohol use disorder and prior withdrawal, subdural hematoma s/p bur hole and evacuation, HTN admitted on 2/8/2024 with cough, hematemesis. Patient went for EGD 2/9.      Hematemesis, resolved  Post-tussive emesis   Hx of esophageal ulcer   Pt presents to the ED with hematemesis. Pt reports having a cough for the past couple days. Today he had a very severe bout of coughing which caused some vomiting and he experienced multiple episodes of hematemesis.   * Pt had EGD in May 2023 which showed LA grade D reflux esophagitis with no bleeding. Esophageal ulcers  and non-bleeding gastric ulcers.   * Pt is hemodynamically stable. Hemoglobin is 13.4 on admission.   - Serial hemoglobin stable  - [facility] consult   - IV protonix  "  - EGD   - LA Grade B esophagitis with no bleeding.                             - Scar at the gastroesophageal junction.                             - Medium-sized hiatal hernia.                             - Normal stomach.                             - Normal first portion of the duodenum and second                             portion of the duodenum.                             - No specimens collected.                             - Esophagitis and scar from prior healed ulcer,                             patient either has very minimal bleeding or                             actually has hemoptysis.   - resume diet      Cough  COVID, minimally symptomatic  Pt has noted a severe cough for the past couple days. Some associated URI symptoms as well. No fevers, but he is having some chills in the ED.   - no need for specific therapy for COVID     Alcohol Withdrawal, improving  Alcohol use disorder, severe   Hypomagnesemia   Clinically appeared to be in early alcohol withdrawal in the ED. On my examination pt's withdrawal appears quite mild.   * Pt with similar admission with n/v in January and found to have severe alcohol withdrawal. He reports since that admission he has cut back significantly on \"hard liquor.\" He only has 1-2 beers daily now.   - not going through any withdrawal symptoms  - Thiamine/folate   - Monitor and replace electrolytes as needed      HTN  Continue PTA metoprolol      Hx of subdural hematoma s/p bur hole and hematoma evacuation (Dec 2023)  Noted        Diet: regular diet  DVT Prophylaxis: Pneumatic Compression Devices  Power Catheter: Not present  Lines: None     Cardiac Monitoring: ACTIVE order. Indication: Tachyarrhythmias, acute (48 hours)  Code Status: Full Code          Clinically Significant Risk Factors           # Hypocalcemia: Lowest Ca = 8 mg/dL in last 2 days, will monitor and replace as appropriate   # Hypomagnesemia: Lowest Mg = 1.3 mg/dL in last 2 days, will replace as needed  " "     # Hypertension: Noted on problem list        # Overweight: Estimated body mass index is 25.52 kg/m  as calculated from the following:    Height as of this encounter: 1.803 m (5' 11\").    Weight as of this encounter: 83 kg (183 lb)., PRESENT ON ADMISSION     # Financial/Environmental Concerns:                  Disposition Plan     Expected Discharge Date: 02/10/2024    Consultations This Hospital Stay   GASTROENTEROLOGY IP CONSULT  CARE MANAGEMENT / SOCIAL WORK IP CONSULT    Code Status   Full Code    Time Spent on this Encounter   I, Pedro Campo MD, personally saw the patient today and spent less than or equal to 30 minutes discharging this patient.       Pedro Campo MD  [place] [address] [facility]  [address] [place] [place] [address]  Phone: [phone]  ______________________________________________________________________    Physical Exam   Vital Signs: Temp: 98  F (36.7  C) Temp src: Oral BP: 109/78 Pulse: 66   Resp: 14 SpO2: 96 % O2 Device: None (Room air) Oxygen Delivery: 4 LPM  Weight: 183 lbs 0 oz         Primary Care Physician   Sofia Rebollar, DO    Discharge Orders      Reason for your hospital stay    Admit with severe esophagitis from etoh     Follow-up and recommended labs and tests     Follow up with PCP as directed    Follow up with outpatient CD evaluation and Tx    Follow up with GI as directed     Activity    Your activity upon discharge: activity as tolerated     Diet    Follow this diet upon discharge: Orders Placed This Encounter      Regular Diet Adult       Significant Results and Procedures   Most Recent 3 CBC's:  Recent Labs   Lab Test 02/09/24  0851 02/08/24  1207 02/08/24  0742 01/23/24  0630   WBC 3.4*  --  4.8 6.8   HGB 11.6* 12.6* 13.4 11.8*   MCV 98  --  96 100     --  215 126*     Most Recent 3 BMP's:  Recent Labs   Lab Test 02/10/24  1103 02/09/24  0851 02/08/24  0742 01/25/24  1020 01/24/24  1036   NA  --  135 135  --  135 "   POTASSIUM 3.9 3.8 4.1   < > 3.8   CHLORIDE  --  102 96*  --  101   CO2  --  23 21*  --  27   BUN  --  9.7 8.7  --  9.4   CR  --  0.82 0.73  --  0.73   ANIONGAP  --  10 18*  --  7   ISAIAS  --  8.0* 9.5  --  8.9   GLC  --  99 115*  --  102*    < > = values in this interval not displayed.   ,   Results for orders placed or performed during the hospital encounter of 02/08/24   XR Chest 2 Views    Narrative    EXAM: XR CHEST 2 VIEWS  LOCATION: [facility]  DATE: 2/8/2024    INDICATION: Cough.  COMPARISON: 11/29/2023.      Impression    IMPRESSION: Negative chest. Lungs are clear. Small hiatus hernia.         Discharge Medications   Current Discharge Medication List        CONTINUE these medications which have NOT CHANGED    Details   acetaminophen (TYLENOL) 500 MG tablet Take 500-1,000 mg by mouth every 6 hours as needed for mild pain      famotidine (PEPCID) 20 MG tablet Take 1 tablet (20 mg) by mouth 2 times daily  Qty: 28 tablet, Refills: 0    Associated Diagnoses: Gastroesophageal reflux disease without esophagitis      folic acid (FOLVITE) 1 MG tablet Take 1 tablet (1 mg) by mouth daily  Qty: 30 tablet, Refills: 11    Associated Diagnoses: Alcohol withdrawal syndrome without complication (H)      gabapentin (NEURONTIN) 100 MG capsule Take 1 capsule (100 mg) by mouth 3 times daily  Qty: 42 capsule, Refills: 0    Associated Diagnoses: Alcohol withdrawal syndrome without complication (H)      metoprolol succinate ER (TOPROL-XL) 50 MG 24 hr tablet Take 50 mg by mouth daily       Multiple Vitamins-Minerals (MULTIVITAMIN ADULT) CHEW Take 2 chew tab by mouth daily Centrum      pantoprazole (PROTONIX) 40 MG EC tablet Take 40 mg by mouth daily      vitamin B-Complex Take 1 tablet by mouth daily           Allergies   Allergies   Allergen Reactions    Morphine Nausea and Vomiting    Oxycodone Itching     1/22 Patient reports he can tolerate this

## 2024-02-10 NOTE — PLAN OF CARE
Goal Outcome Evaluation:     Summary: vomiting blood, alcohol withdrawal, possible upper GI bleed  DATE & TIME: 02/09/24-2/10/24 6691-6323   Cognitive Concerns/ Orientation : A&O x 4   BEHAVIOR & AGGRESSION TOOL COLOR: green  CIWA SCORE: 0,0,0  ABNL VS/O2: VSS on RA  MOBILITY: independent, steady    PAIN MANAGMENT: denies  DIET: advanced to regular diet, tolerating  BOWEL/BLADDER: continent, up to BR - complained of multiple loose stools since regular diet restarted, no stomach pain - Immodium ordered x1 - effective.   ABNL LAB/BG next mag, Phos, K recheck ordered for am, hgb 11.6  DRAIN/DEVICES: L PIV SL, R PIV infusing NS @ 100 mL/hr   TELEMETRY RHYTHM: NSR  SKIN: WDL  TESTS/PROCEDURES:  EGD completed this evening, tolerated well.   D/C DAY/GOALS/PLACE: 2/10  OTHER IMPORTANT INFO: No active signs of bleeding at this time.

## 2024-02-10 NOTE — PLAN OF CARE
Goal Outcome Evaluation:      Plan of Care Reviewed With: patient    Overall Patient Progress: improvingOverall Patient Progress: improving       Summary: vomiting blood, alcohol withdrawal, possible upper GI bleed  DATE & TIME: 02/10/24 8454-0641   Cognitive Concerns/ Orientation : A&O x 4   BEHAVIOR & AGGRESSION TOOL COLOR: green  CIWA SCORE: 0,0,  ABNL VS/O2: VSS on RA  MOBILITY: independent, steady    PAIN MANAGMENT: denies  DIET: advanced to regular diet, tolerating  BOWEL/BLADDER: continent B/B  ABNL LAB/BG next mag, Phos, K recheck ordered for am,   DRAIN/DEVICES: L PIV SL, R PIV SL  TELEMETRY RHYTHM: NSR  SKIN: WDL  TESTS/PROCEDURES: no new   D/C DAY/GOALS/PLACE: 02/10  OTHER IMPORTANT INFO: No active signs of bleeding at this time. GI signed off for discharge.

## 2024-02-10 NOTE — PLAN OF CARE
Discharge    Patient discharged to home via private transportation with his wife.    Care plan note: Patient has not been showing any s/sx of ETOH withdrawal. CIWA-Ar scores were 0. VSS on RA. LSC with infrequent dry, nonproductive cough. Patient denies pain/N/V/SOB. No new complaints. Discharge instructions were provided. Patient verbalized understanding of all information received.    Listed belongings gathered and given to patient (including from security/pharmacy). Yes  Care Plan and Patient education resolved: Yes  Prescriptions if needed, hard copies sent with patient  NA  Medication Bin checked and emptied on discharge Yes  SW/care coordinator/charge RN aware of discharge: Yes

## 2024-02-10 NOTE — PLAN OF CARE
Goal Outcome Evaluation:      Plan of Care Reviewed With: patient    Summary: vomiting blood, alcohol withdrawal, possible upper GI bleed  DATE & TIME: 02/09/24, [id number]   Cognitive Concerns/ Orientation : A&O x 4   BEHAVIOR & AGGRESSION TOOL COLOR: green  CIWA SCORE: 2, 2- mild tremor  ABNL VS/O2: VSS on RA  MOBILITY: independent, steady    PAIN MANAGMENT: denies  DIET: advanced to regular diet, tolerating  BOWEL/BLADDER: continent, up to BR  ABNL LAB/BG: next mag, Phos, K recheck ordered for am, hgb 11.6, WBC 3.4, calcium 8.0   DRAIN/DEVICES: L PIV SL, R PIV infusing NS @ 100 mL/hr   TELEMETRY RHYTHM: NSR  SKIN: WDL  TESTS/PROCEDURES:  EGD completed this evening, tolerated well.   D/C DAY/GOALS/PLACE: pending improvements  OTHER IMPORTANT INFO: No active signs of bleeding at this time. Social work/care coordinator consulted.

## 2024-02-10 NOTE — PROGRESS NOTES
[facility]  Gastroenterology Progress Note     Joe Shah MRN# [id number]   YOB: 1975 Age: 48 year old          Assessment and Plan:     Joe hSah is a 48 year old male with past medical history significant for alcohol use disorder and prior withdrawal, subdural hematoma s/p bur hole and evacuation, HTN admitted on 2/8/2024 with cough, hematemesis. Patient went for EGD 2/9.      Alcohol withdrawal syndrome without complication (H)  Post-tussive emesis   Hematemesis  Previous endoscopy during his last admission about 6 months ago since showed significantly advanced grade 4 esophagitis.    Presented with  nausea vomiting -subsequently led to hematemesis  Hgb 13.4 g/dL on admission , 11.6 g/dL on 2/9  DDx hematemesis can include esophagitis peptic ulcer disease, Antonia-Galvez tear.    2/9 EGD noted grde B esophagitis and medium sized hiatal hernia    -- Daily oral PPi  -- diet as tolerated  - alcohol abstinence  - Ok with GI to discharge patient                Interval History:     no new complaints and doing well; no cp, sob, n/v/d, or abd pain.              Review of Systems:     C: NEGATIVE for fever, chills, change in weight  E/M: NEGATIVE for ear, mouth and throat problems  R: NEGATIVE for significant cough or SOB  CV: NEGATIVE for chest pain, palpitations or peripheral edema             Medications:   I have reviewed this patient's current medications   cloNIDine  0.1 mg Oral Q8H    [START ON 2/15/2024] gabapentin  100 mg Oral Q8H    [START ON 2/13/2024] gabapentin  300 mg Oral Q8H    [START ON 2/11/2024] gabapentin  600 mg Oral Q8H    gabapentin  900 mg Oral Q8H    metoprolol succinate ER  50 mg Oral Daily    pantoprazole  40 mg Intravenous BID    sodium chloride (PF)  3 mL Intracatheter Q8H    sodium chloride 0.9 % 1,000 mL with Infuvite Adult 10 mL, thiamine 100 mg, folic acid 1 mg infusion   Intravenous Q24H                  Physical Exam:   Vitals were  reviewed  Vital Signs with Ranges  Temp:  [97.5  F (36.4  C)-99.3  F (37.4  C)] 97.5  F (36.4  C)  Pulse:  [68-82] 82  Resp:  [9-50] 11  BP: (101-148)/() 118/79  SpO2:  [93 %-100 %] 95 %  I/O last 3 completed shifts:  In: 1113 [P.O.:240; I.V.:873]  Out: -   Constitutional: healthy, alert, and no distress   Cardiovascular: negative, PMI normal. No lifts, heaves, or thrills. RRR. No murmurs, clicks gallops or rub  Respiratory: negative, Percussion normal. Good diaphragmatic excursion. Lungs clear  Abdomen: Abdomen soft, non-tender. BS normal. No masses, organomegaly           Data:   I reviewed the patient's new clinical lab test results.   Recent Labs   Lab Test 02/09/24  0851 02/08/24  1207 02/08/24  0742 01/23/24  0630 12/03/23  0531 11/30/23  1747 05/02/20  0818 05/01/20  2218   WBC 3.4*  --  4.8 6.8   < >  --    < > 8.2   HGB 11.6* 12.6* 13.4 11.8*   < >  --    < > 15.2   MCV 98  --  96 100   < >  --    < > 100     --  215 126*   < >  --    < > 188   INR  --   --  1.09  --   --  1.08  --  1.02    < > = values in this interval not displayed.     Recent Labs   Lab Test 02/09/24  0851 02/08/24  0742 01/25/24  1020 01/24/24  1036   POTASSIUM 3.8 4.1 3.6 3.8   CHLORIDE 102 96*  --  101   CO2 23 21*  --  27   BUN 9.7 8.7  --  9.4   ANIONGAP 10 18*  --  7     Recent Labs   Lab Test 02/08/24  0742 01/23/24  0630 01/22/24  0702 05/28/23  0900 05/26/23  0044 09/17/22  0619 09/15/22  1659 05/09/21  0606 05/08/21  1413   ALBUMIN 4.5 3.6 4.9   < > 4.7   < > 4.2   < >  --    BILITOTAL 1.2 1.4* 2.2*   < > 1.7*   < > 2.0*   < >  --    ALT 46 42 85*   < > 63*   < > 113*   < >  --    * 65* 194*   < > 93*   < > 145*   < >  --    PROTEIN  --   --   --   --   --   --   --   --  50*   LIPASE  --   --  44  --  64*  --  95  --   --     < > = values in this interval not displayed.       I reviewed the patient's new imaging results.    All laboratory data reviewed  All imaging studies reviewed by me.    Ester Tobar,  ORLF,  2/10/2024  [facility]  Office : [phone]  Cell: [phone] (Dr. Mcrae)  Cell: [phone] (Ester Tobar PA-C)

## 2024-02-13 ENCOUNTER — PATIENT OUTREACH (OUTPATIENT)
Dept: CARE COORDINATION | Facility: CLINIC | Age: 49
End: 2024-02-13
Payer: COMMERCIAL

## 2024-02-13 NOTE — PROGRESS NOTES
[facility] Care Resource Center Contact  [facility]/Voicemail     Clinical Data: Post-Discharge Outreach     Outreach attempted x 2.  Left message on patient's voicemail, providing [facility]'s central phone number of [phone] ([phone]) for questions/concerns and/or to schedule an appt with an [facility] provider, if they do not have a PCP.      Plan:  [facility] will do no further outreaches at this time.     SANDY Gaitan  [phone]  [facility]     *Connected Care Resource Team does NOT follow patient ongoing. Referrals are identified based on internal discharge reports and the outreach is to ensure patient has an understanding of their discharge instructions.

## 2024-03-04 ENCOUNTER — APPOINTMENT (OUTPATIENT)
Dept: CT IMAGING | Facility: CLINIC | Age: 49
End: 2024-03-04
Attending: EMERGENCY MEDICINE
Payer: COMMERCIAL

## 2024-03-04 ENCOUNTER — APPOINTMENT (OUTPATIENT)
Dept: GENERAL RADIOLOGY | Facility: CLINIC | Age: 49
End: 2024-03-04
Attending: EMERGENCY MEDICINE
Payer: COMMERCIAL

## 2024-03-04 ENCOUNTER — HOSPITAL ENCOUNTER (EMERGENCY)
Facility: CLINIC | Age: 49
Discharge: HOME | End: 2024-03-04
Attending: EMERGENCY MEDICINE | Admitting: EMERGENCY MEDICINE
Payer: COMMERCIAL

## 2024-03-04 VITALS
OXYGEN SATURATION: 97 % | DIASTOLIC BLOOD PRESSURE: 109 MMHG | TEMPERATURE: 97.5 F | WEIGHT: 180 LBS | BODY MASS INDEX: 25.1 KG/M2 | SYSTOLIC BLOOD PRESSURE: 149 MMHG | RESPIRATION RATE: 16 BRPM | HEART RATE: 89 BPM

## 2024-03-04 DIAGNOSIS — R52 BODY ACHES: ICD-10-CM

## 2024-03-04 LAB
ALBUMIN SERPL BCG-MCNC: 4.5 G/DL (ref 3.5–5.2)
ALP SERPL-CCNC: 81 U/L (ref 40–150)
ALT SERPL W P-5'-P-CCNC: 28 U/L (ref 0–70)
ANION GAP SERPL CALCULATED.3IONS-SCNC: 18 MMOL/L (ref 7–15)
AST SERPL W P-5'-P-CCNC: 51 U/L (ref 0–45)
BASOPHILS # BLD AUTO: 0 10E3/UL (ref 0–0.2)
BASOPHILS NFR BLD AUTO: 1 %
BILIRUB DIRECT SERPL-MCNC: <0.2 MG/DL (ref 0–0.3)
BILIRUB SERPL-MCNC: 0.6 MG/DL
BUN SERPL-MCNC: 6.7 MG/DL (ref 6–20)
CALCIUM SERPL-MCNC: 9.6 MG/DL (ref 8.6–10)
CHLORIDE SERPL-SCNC: 102 MMOL/L (ref 98–107)
CK SERPL-CCNC: 130 U/L (ref 39–308)
CREAT SERPL-MCNC: 0.79 MG/DL (ref 0.67–1.17)
CRP SERPL-MCNC: <3 MG/L
DEPRECATED HCO3 PLAS-SCNC: 23 MMOL/L (ref 22–29)
EGFRCR SERPLBLD CKD-EPI 2021: >90 ML/MIN/1.73M2
EOSINOPHIL # BLD AUTO: 0.1 10E3/UL (ref 0–0.7)
EOSINOPHIL NFR BLD AUTO: 1 %
ERYTHROCYTE [DISTWIDTH] IN BLOOD BY AUTOMATED COUNT: 14.3 % (ref 10–15)
ERYTHROCYTE [SEDIMENTATION RATE] IN BLOOD BY WESTERGREN METHOD: 2 MM/HR (ref 0–20)
ETHANOL SERPL-MCNC: 0.35 G/DL
FLUAV RNA SPEC QL NAA+PROBE: NEGATIVE
FLUBV RNA RESP QL NAA+PROBE: NEGATIVE
GLUCOSE SERPL-MCNC: 105 MG/DL (ref 70–99)
HCT VFR BLD AUTO: 44.4 % (ref 40–53)
HGB BLD-MCNC: 14.9 G/DL (ref 13.3–17.7)
HOLD SPECIMEN: 0
HOLD SPECIMEN: 0
IMM GRANULOCYTES # BLD: 0 10E3/UL
IMM GRANULOCYTES NFR BLD: 0 %
LYMPHOCYTES # BLD AUTO: 1.9 10E3/UL (ref 0.8–5.3)
LYMPHOCYTES NFR BLD AUTO: 34 %
MAGNESIUM SERPL-MCNC: 1.8 MG/DL (ref 1.7–2.3)
MCH RBC QN AUTO: 31.2 PG (ref 26.5–33)
MCHC RBC AUTO-ENTMCNC: 33.6 G/DL (ref 31.5–36.5)
MCV RBC AUTO: 93 FL (ref 78–100)
MONOCYTES # BLD AUTO: 0.5 10E3/UL (ref 0–1.3)
MONOCYTES NFR BLD AUTO: 9 %
NEUTROPHILS # BLD AUTO: 3.1 10E3/UL (ref 1.6–8.3)
NEUTROPHILS NFR BLD AUTO: 55 %
NRBC # BLD AUTO: 0 10E3/UL
NRBC BLD AUTO-RTO: 0 /100
PHOSPHATE SERPL-MCNC: 4.1 MG/DL (ref 2.5–4.5)
PLATELET # BLD AUTO: 147 10E3/UL (ref 150–450)
POTASSIUM SERPL-SCNC: 4.1 MMOL/L (ref 3.4–5.3)
PROT SERPL-MCNC: 8.2 G/DL (ref 6.4–8.3)
RBC # BLD AUTO: 4.77 10E6/UL (ref 4.4–5.9)
RSV RNA SPEC NAA+PROBE: NEGATIVE
SARS-COV-2 RNA RESP QL NAA+PROBE: NEGATIVE
SODIUM SERPL-SCNC: 143 MMOL/L (ref 135–145)
WBC # BLD AUTO: 5.6 10E3/UL (ref 4–11)

## 2024-03-04 PROCEDURE — 82248 BILIRUBIN DIRECT: CPT | Performed by: EMERGENCY MEDICINE

## 2024-03-04 PROCEDURE — 73030 X-RAY EXAM OF SHOULDER: CPT | Mod: RT

## 2024-03-04 PROCEDURE — 70450 CT HEAD/BRAIN W/O DYE: CPT

## 2024-03-04 PROCEDURE — 36415 COLL VENOUS BLD VENIPUNCTURE: CPT | Performed by: EMERGENCY MEDICINE

## 2024-03-04 PROCEDURE — 86140 C-REACTIVE PROTEIN: CPT | Performed by: EMERGENCY MEDICINE

## 2024-03-04 PROCEDURE — 96374 THER/PROPH/DIAG INJ IV PUSH: CPT

## 2024-03-04 PROCEDURE — 83735 ASSAY OF MAGNESIUM: CPT | Performed by: EMERGENCY MEDICINE

## 2024-03-04 PROCEDURE — 80053 COMPREHEN METABOLIC PANEL: CPT | Performed by: EMERGENCY MEDICINE

## 2024-03-04 PROCEDURE — 82077 ASSAY SPEC XCP UR&BREATH IA: CPT | Performed by: EMERGENCY MEDICINE

## 2024-03-04 PROCEDURE — 87637 SARSCOV2&INF A&B&RSV AMP PRB: CPT | Performed by: EMERGENCY MEDICINE

## 2024-03-04 PROCEDURE — 80048 BASIC METABOLIC PNL TOTAL CA: CPT | Performed by: EMERGENCY MEDICINE

## 2024-03-04 PROCEDURE — 250N000011 HC RX IP 250 OP 636: Performed by: EMERGENCY MEDICINE

## 2024-03-04 PROCEDURE — 82550 ASSAY OF CK (CPK): CPT | Performed by: EMERGENCY MEDICINE

## 2024-03-04 PROCEDURE — 99285 EMERGENCY DEPT VISIT HI MDM: CPT | Mod: 25

## 2024-03-04 PROCEDURE — 85025 COMPLETE CBC W/AUTO DIFF WBC: CPT | Performed by: EMERGENCY MEDICINE

## 2024-03-04 PROCEDURE — 85652 RBC SED RATE AUTOMATED: CPT | Performed by: EMERGENCY MEDICINE

## 2024-03-04 PROCEDURE — 84100 ASSAY OF PHOSPHORUS: CPT | Performed by: EMERGENCY MEDICINE

## 2024-03-04 RX ORDER — NAPROXEN 500 MG/1
500 TABLET ORAL 2 TIMES DAILY WITH MEALS
Qty: 24 TABLET | Refills: 0 | Status: ON HOLD | OUTPATIENT
Start: 2024-03-04 | End: 2024-03-13

## 2024-03-04 RX ORDER — KETOROLAC TROMETHAMINE 15 MG/ML
15 INJECTION, SOLUTION INTRAMUSCULAR; INTRAVENOUS ONCE
Status: COMPLETED | OUTPATIENT
Start: 2024-03-04 | End: 2024-03-04

## 2024-03-04 RX ADMIN — KETOROLAC TROMETHAMINE 15 MG: 15 INJECTION, SOLUTION INTRAMUSCULAR; INTRAVENOUS at 08:36

## 2024-03-04 ASSESSMENT — COLUMBIA-SUICIDE SEVERITY RATING SCALE - C-SSRS
1. IN THE PAST MONTH, HAVE YOU WISHED YOU WERE DEAD OR WISHED YOU COULD GO TO SLEEP AND NOT WAKE UP?: NO
6. HAVE YOU EVER DONE ANYTHING, STARTED TO DO ANYTHING, OR PREPARED TO DO ANYTHING TO END YOUR LIFE?: NO
2. HAVE YOU ACTUALLY HAD ANY THOUGHTS OF KILLING YOURSELF IN THE PAST MONTH?: NO

## 2024-03-04 ASSESSMENT — ACTIVITIES OF DAILY LIVING (ADL)
ADLS_ACUITY_SCORE: 37

## 2024-03-04 NOTE — ED PROVIDER NOTES
History     Chief Complaint:  Generalized Body Aches       HPI   Joe Shah is a 48 year old male with a past medical history significant for alcohol abuse/dependence, subdural hematoma who presents to the ED via/accompanied by self with a chief complaint of 5 days of severe bodyaches, waxing waning and today worse at night.  Patient reports that he had a fall 5 days ago landed on his right shoulder which has been hurting since.  He denies head trauma or loss of consciousness, shortness of breath, cough, fevers, chills, changes in urination, changes to bowel movements, abdominal pain.  Patient denies any recent alcohol use.      Independent Historian: history provided by patient    Review of External Notes: See MDM        Allergies:  Morphine  Oxycodone     Medications:    naproxen (NAPROSYN) 500 MG tablet  acetaminophen (TYLENOL) 500 MG tablet  famotidine (PEPCID) 20 MG tablet  folic acid (FOLVITE) 1 MG tablet  gabapentin (NEURONTIN) 100 MG capsule  metoprolol succinate ER (TOPROL-XL) 50 MG 24 hr tablet  Multiple Vitamins-Minerals (MULTIVITAMIN ADULT) CHEW  pantoprazole (PROTONIX) 40 MG EC tablet  vitamin B-Complex        Past Medical History:    Past Medical History:   Diagnosis Date    Anxiety     Back pain     Depressive disorder     Hypertension     SDH (subdural hematoma) (H)     Substance abuse (H)        Past Surgical History:    Past Surgical History:   Procedure Laterality Date    FANTASMA HOLE(S) EVACUATE HEMATOMA SUBDURAL N/A 12/1/2023    Procedure: CREATION, CRANIAL FANTASMA HOLE, WITH SUBDURAL HEMATOMA EVACUATION;  Surgeon: Gio Phoenix MD;  Location:  OR    ENT SURGERY      ESOPHAGOSCOPY, GASTROSCOPY, DUODENOSCOPY (EGD), COMBINED N/A 9/12/2019    Procedure: ESOPHAGOGASTRODUODENOSCOPY (EGD);  Surgeon: Scott Mcrae MD;  Location:  GI    ESOPHAGOSCOPY, GASTROSCOPY, DUODENOSCOPY (EGD), COMBINED N/A 5/27/2023    Procedure: Esophagoscopy, gastroscopy, duodenoscopy (EGD), combined;   Surgeon: Scott Mcrae MD;  Location:  GI    ESOPHAGOSCOPY, GASTROSCOPY, DUODENOSCOPY (EGD), COMBINED N/A 2/9/2024    Procedure: Esophagoscopy, gastroscopy, duodenoscopy (EGD), combined;  Surgeon: Nakul Arguelles MD;  Location:  GI    NECK SURGERY          Family History:    family history includes No Known Problems in his maternal grandfather, maternal grandmother, and paternal grandmother; Substance Abuse in his paternal grandfather.    Social History:   reports that he has quit smoking. His smokeless tobacco use includes chew. He reports current alcohol use. He reports that he does not use drugs.  PCP: Sofia Rebollar     Physical Exam   Patient Vitals for the past 24 hrs:   BP Temp Temp src Pulse Resp SpO2 Weight   03/04/24 0615 (!) 149/109 97.5  F (36.4  C) Temporal 89 16 97 % 81.6 kg (180 lb)        Physical Exam  Constitutional: Well developed, nontox appearance  Head: Atraumatic.   Mouth/Throat: Oropharynx is clear and moist.   Neck:  no stridor, no C-spine tenderness, full range of motion  Eyes: no scleral icterus  Cardiovascular: RRR, 2+ bilat radial pulses  Pulmonary/Chest: nml resp effort, Clear BS bilat  Abdominal: ND, soft, NT, no rebound or guarding   Back: No discrete T or L-spine tenderness  Ext: Warm, well perfused, no edema  RUE: Mild tenderness to anterior shoulder and clavicle without obvious deformity or crepitance, full range of motion, distal CMS intact  Neurological: A&O, symmetric facies, moves ext x4  Skin: Skin is warm and dry.   Psychiatric: Behavior is normal. Thought content normal.   Nursing note and vitals reviewed.    Emergency Department Course   ECG:  ECG results from 02/08/24   EKG 12-lead, tracing only     Value    Systolic Blood Pressure     Diastolic Blood Pressure     Ventricular Rate 119    Atrial Rate 119    VA Interval 148    QRS Duration 68        QTc 458    P Axis 46    R AXIS 34    T Axis 48    Interpretation ECG      Sinus tachycardia  Otherwise  normal ECG  When compared with ECG of 22-JAN-2024 06:25,  No significant change was found  Confirmed by GENERATED REPORT, COMPUTER ([id number]),  ALEXIA WALDEN ([id number]) on 2/8/2024 2:54:19 PM         Imaging:  CT Head w/o Contrast   Final Result   IMPRESSION:   No acute extra-axial hemorrhage. No acute intracranial   abnormality.         JULIANN VELARDE MD            SYSTEM ID:  [id number]      XR Shoulder Right G/E 3 Views   Final Result   IMPRESSION: Anatomic alignment right shoulder. No acute displaced   right shoulder fracture is identified. Mild degenerative   osteoarthritis at the acromioclavicular joint. No significant   glenohumeral joint space narrowing. Visualized right lung is grossly   clear.      STELLA CALVERT MD            SYSTEM ID:  [id number]           Report per radiology unless otherwise specified in report or noted in MDM    Laboratory:  Labs Ordered and Resulted from Time of ED Arrival to Time of ED Departure   BASIC METABOLIC PANEL - Abnormal       Result Value    Sodium 143      Potassium 4.1      Chloride 102      Carbon Dioxide (CO2) 23      Anion Gap 18 (*)     Urea Nitrogen 6.7      Creatinine 0.79      GFR Estimate >90      Calcium 9.6      Glucose 105 (*)    CBC WITH PLATELETS AND DIFFERENTIAL - Abnormal    WBC Count 5.6      RBC Count 4.77      Hemoglobin 14.9      Hematocrit 44.4      MCV 93      MCH 31.2      MCHC 33.6      RDW 14.3      Platelet Count 147 (*)     % Neutrophils 55      % Lymphocytes 34      % Monocytes 9      % Eosinophils 1      % Basophils 1      % Immature Granulocytes 0      NRBCs per 100 WBC 0      Absolute Neutrophils 3.1      Absolute Lymphocytes 1.9      Absolute Monocytes 0.5      Absolute Eosinophils 0.1      Absolute Basophils 0.0      Absolute Immature Granulocytes 0.0      Absolute NRBCs 0.0     HEPATIC FUNCTION PANEL - Abnormal    Protein Total 8.2      Albumin 4.5      Bilirubin Total 0.6      Alkaline Phosphatase 81      AST 51 (*)     ALT 28       Bilirubin Direct <0.20     ETHYL ALCOHOL LEVEL - Abnormal    Alcohol ethyl 0.35 (*)    INFLUENZA A/B, RSV, & SARS-COV2 PCR - Normal    Influenza A PCR Negative      Influenza B PCR Negative      RSV PCR Negative      SARS CoV2 PCR Negative     CK TOTAL - Normal         ERYTHROCYTE SEDIMENTATION RATE AUTO - Normal    Erythrocyte Sedimentation Rate 2     CRP INFLAMMATION - Normal    CRP Inflammation <3.00     MAGNESIUM - Normal    Magnesium 1.8     PHOSPHORUS - Normal    Phosphorus 4.1          Procedures       Emergency Department Course & Assessments:             Interventions:  Medications   ketorolac (TORADOL) injection 15 mg (15 mg Intravenous $Given 3/4/24 7900)        Independent Interpretation (X-rays, CTs, rhythm strip):  See MDM    Consultations/Discussion of Management or Tests:  None    Social Determinants of Health affecting care:  See MDM    Disposition:  The patient was discharged to home.     Impression & Plan    CMS Diagnoses: None  Medical Decision Makin year old male presenting w/ bodyaches    Social determinants affecting patient's health include: History of alcohol abuse and dependence increasing risk for presentation to the emergency department     I reviewed medical records from hospital admission on 2024    DDx includes bodyaches NOS, viral syndrome, electrolyte abnormality, dehydration, fracture, dislocation, intracranial hemorrhage, alcohol abuse, alcohol dependence, rhabdomyolysis.  Doubt meningitis, encephalitis, spinal cord injury given history and physical exam.  Labs significant for elevated alcohol level, minimal transaminitis system for patient's known alcohol abuse, normal hemoglobin and WBC.  Imaging sig for no acute traumatic abnormality as noted above, no clavicle fracture or shoulder dislocation on my independent interpretation.  Interventions as noted above with without significant improvement in symptoms.  Unknown etiology of the patient's body aches symptoms  workup is reassuring.  This may be a viral illness and may be secondary to his multiple falls given his persistent alcohol use even though he initially denied this during the initial evaluation.  Given reassuring workup, at this time I feel the pt is safe for discharge.  Recommendations given regarding follow up with PCP and return to the emergency department as needed for new or worsening symptoms.  Pt  counseled on all results, disposition and diagnosis.  They are understanding and agreeable to plan. Patient discharged in stable condition.  Prior to discharge, discussed patient's elevated alcohol level all labs and offered resources.      Diagnosis:    ICD-10-CM    1. Body aches  R52            Discharge Medications:  Discharge Medication List as of 3/4/2024 10:53 AM        START taking these medications    Details   naproxen (NAPROSYN) 500 MG tablet Take 1 tablet (500 mg) by mouth 2 times daily (with meals) for 8 days, Disp-24 tablet, R-0, E-Prescribe              3/4/2024   Waldemar Mcconnell MD Vaughn, Christopher E, MD  03/04/24 [id number]

## 2024-03-04 NOTE — DISCHARGE INSTRUCTIONS
Take acetaminophen 500 to 1000 mg by mouth every 4 to 6 hours as needed for pain or fever.  Do not take more than 4000 mg in 24 hours.  Do not take within 6 hours of another acetaminophen containing medication such as norco (vicodin) or percocet.    Take naproxen as prescribed or you may take ibuprofen as noted below.  Please do not take both.    Take ibuprofen 600 to 800 mg by mouth every 6 to 8 hours as needed for pain or fever    Please return to the emergency department as needed for new or worsening symptoms including severe and uncontrolled pain, vomiting unable to keep anything down, severe confusion, seizures, fever again 100.4  F, any other concerning symptoms.

## 2024-03-04 NOTE — ED TRIAGE NOTES
Pt reports 5 days of generalized body aches.     Triage Assessment (Adult)       Row Name 03/04/24 0618          Triage Assessment    Airway WDL WDL        Respiratory WDL    Respiratory WDL WDL        Skin Circulation/Temperature WDL    Skin Circulation/Temperature WDL WDL        Cardiac WDL    Cardiac WDL WDL        Peripheral/Neurovascular WDL    Peripheral Neurovascular WDL WDL        Cognitive/Neuro/Behavioral WDL    Cognitive/Neuro/Behavioral WDL WDL

## 2024-03-08 ENCOUNTER — HOSPITAL ENCOUNTER (EMERGENCY)
Facility: CLINIC | Age: 49
Discharge: LEFT BEFORE DISCHARGE | End: 2024-03-08
Admitting: EMERGENCY MEDICINE
Payer: COMMERCIAL

## 2024-03-08 VITALS
HEART RATE: 131 BPM | BODY MASS INDEX: 25.2 KG/M2 | SYSTOLIC BLOOD PRESSURE: 131 MMHG | HEIGHT: 71 IN | DIASTOLIC BLOOD PRESSURE: 100 MMHG | TEMPERATURE: 99 F | OXYGEN SATURATION: 95 % | RESPIRATION RATE: 20 BRPM | WEIGHT: 180 LBS

## 2024-03-08 LAB
ALBUMIN SERPL BCG-MCNC: 4.3 G/DL (ref 3.5–5.2)
ALP SERPL-CCNC: 86 U/L (ref 40–150)
ALT SERPL W P-5'-P-CCNC: 29 U/L (ref 0–70)
ANION GAP SERPL CALCULATED.3IONS-SCNC: 19 MMOL/L (ref 7–15)
AST SERPL W P-5'-P-CCNC: 68 U/L (ref 0–45)
BASOPHILS # BLD AUTO: 0 10E3/UL (ref 0–0.2)
BASOPHILS NFR BLD AUTO: 1 %
BILIRUB SERPL-MCNC: 0.4 MG/DL
BUN SERPL-MCNC: 4.9 MG/DL (ref 6–20)
CALCIUM SERPL-MCNC: 9.4 MG/DL (ref 8.6–10)
CHLORIDE SERPL-SCNC: 102 MMOL/L (ref 98–107)
CREAT SERPL-MCNC: 0.74 MG/DL (ref 0.67–1.17)
DEPRECATED HCO3 PLAS-SCNC: 20 MMOL/L (ref 22–29)
EGFRCR SERPLBLD CKD-EPI 2021: >90 ML/MIN/1.73M2
EOSINOPHIL # BLD AUTO: 0.2 10E3/UL (ref 0–0.7)
EOSINOPHIL NFR BLD AUTO: 2 %
ERYTHROCYTE [DISTWIDTH] IN BLOOD BY AUTOMATED COUNT: 14.9 % (ref 10–15)
ETHANOL SERPL-MCNC: 0.43 G/DL
GLUCOSE SERPL-MCNC: 129 MG/DL (ref 70–99)
HCT VFR BLD AUTO: 41.8 % (ref 40–53)
HGB BLD-MCNC: 14 G/DL (ref 13.3–17.7)
HOLD SPECIMEN: NORMAL
IMM GRANULOCYTES # BLD: 0 10E3/UL
IMM GRANULOCYTES NFR BLD: 0 %
LYMPHOCYTES # BLD AUTO: 1.3 10E3/UL (ref 0.8–5.3)
LYMPHOCYTES NFR BLD AUTO: 20 %
MCH RBC QN AUTO: 30.4 PG (ref 26.5–33)
MCHC RBC AUTO-ENTMCNC: 33.5 G/DL (ref 31.5–36.5)
MCV RBC AUTO: 91 FL (ref 78–100)
MONOCYTES # BLD AUTO: 0.4 10E3/UL (ref 0–1.3)
MONOCYTES NFR BLD AUTO: 7 %
NEUTROPHILS # BLD AUTO: 4.3 10E3/UL (ref 1.6–8.3)
NEUTROPHILS NFR BLD AUTO: 70 %
NRBC # BLD AUTO: 0 10E3/UL
NRBC BLD AUTO-RTO: 0 /100
PLATELET # BLD AUTO: 162 10E3/UL (ref 150–450)
POTASSIUM SERPL-SCNC: 4.1 MMOL/L (ref 3.4–5.3)
PROT SERPL-MCNC: 8.1 G/DL (ref 6.4–8.3)
RBC # BLD AUTO: 4.6 10E6/UL (ref 4.4–5.9)
SODIUM SERPL-SCNC: 141 MMOL/L (ref 135–145)
WBC # BLD AUTO: 6.2 10E3/UL (ref 4–11)

## 2024-03-08 PROCEDURE — 99281 EMR DPT VST MAYX REQ PHY/QHP: CPT

## 2024-03-08 PROCEDURE — 82077 ASSAY SPEC XCP UR&BREATH IA: CPT | Performed by: EMERGENCY MEDICINE

## 2024-03-08 PROCEDURE — 85025 COMPLETE CBC W/AUTO DIFF WBC: CPT | Performed by: EMERGENCY MEDICINE

## 2024-03-08 PROCEDURE — 36415 COLL VENOUS BLD VENIPUNCTURE: CPT | Performed by: EMERGENCY MEDICINE

## 2024-03-08 PROCEDURE — 80053 COMPREHEN METABOLIC PANEL: CPT | Performed by: EMERGENCY MEDICINE

## 2024-03-08 ASSESSMENT — COLUMBIA-SUICIDE SEVERITY RATING SCALE - C-SSRS
6. HAVE YOU EVER DONE ANYTHING, STARTED TO DO ANYTHING, OR PREPARED TO DO ANYTHING TO END YOUR LIFE?: NO
2. HAVE YOU ACTUALLY HAD ANY THOUGHTS OF KILLING YOURSELF IN THE PAST MONTH?: NO
1. IN THE PAST MONTH, HAVE YOU WISHED YOU WERE DEAD OR WISHED YOU COULD GO TO SLEEP AND NOT WAKE UP?: NO

## 2024-03-09 ENCOUNTER — HOSPITAL ENCOUNTER (INPATIENT)
Facility: CLINIC | Age: 49
LOS: 5 days | Discharge: NURSING, REHAB OR LONG-TERM CARE | DRG: 897 | End: 2024-03-14
Attending: PSYCHIATRY & NEUROLOGY | Admitting: PSYCHIATRY & NEUROLOGY
Payer: COMMERCIAL

## 2024-03-09 ENCOUNTER — HOSPITAL ENCOUNTER (EMERGENCY)
Facility: CLINIC | Age: 49
Discharge: ANOTHER HOSPITAL | End: 2024-03-09
Attending: EMERGENCY MEDICINE | Admitting: EMERGENCY MEDICINE
Payer: COMMERCIAL

## 2024-03-09 ENCOUNTER — TELEPHONE (OUTPATIENT)
Dept: BEHAVIORAL HEALTH | Facility: CLINIC | Age: 49
End: 2024-03-09

## 2024-03-09 VITALS
SYSTOLIC BLOOD PRESSURE: 125 MMHG | DIASTOLIC BLOOD PRESSURE: 116 MMHG | OXYGEN SATURATION: 96 % | HEART RATE: 98 BPM | TEMPERATURE: 98.4 F | RESPIRATION RATE: 16 BRPM

## 2024-03-09 DIAGNOSIS — F10.239 ALCOHOL DEPENDENCE WITH WITHDRAWAL WITH COMPLICATION (H): Primary | ICD-10-CM

## 2024-03-09 DIAGNOSIS — F10.930 ALCOHOL WITHDRAWAL SYNDROME WITHOUT COMPLICATION (H): ICD-10-CM

## 2024-03-09 DIAGNOSIS — K21.9 GASTROESOPHAGEAL REFLUX DISEASE WITHOUT ESOPHAGITIS: ICD-10-CM

## 2024-03-09 DIAGNOSIS — F39 MOOD DISORDER (H): ICD-10-CM

## 2024-03-09 DIAGNOSIS — F10.920 ALCOHOLIC INTOXICATION WITHOUT COMPLICATION (H): ICD-10-CM

## 2024-03-09 PROBLEM — Z92.89 S/P ALCOHOL DETOXIFICATION: Status: ACTIVE | Noted: 2024-03-09

## 2024-03-09 LAB
ALBUMIN SERPL BCG-MCNC: 4.3 G/DL (ref 3.5–5.2)
ALP SERPL-CCNC: 85 U/L (ref 40–150)
ALT SERPL W P-5'-P-CCNC: 32 U/L (ref 0–70)
AMPHETAMINES UR QL SCN: NORMAL
ANION GAP SERPL CALCULATED.3IONS-SCNC: 18 MMOL/L (ref 7–15)
AST SERPL W P-5'-P-CCNC: 88 U/L (ref 0–45)
BARBITURATES UR QL SCN: NORMAL
BASOPHILS # BLD AUTO: 0 10E3/UL (ref 0–0.2)
BASOPHILS NFR BLD AUTO: 1 %
BENZODIAZ UR QL SCN: NORMAL
BILIRUB SERPL-MCNC: 0.5 MG/DL
BUN SERPL-MCNC: 5.4 MG/DL (ref 6–20)
BZE UR QL SCN: NORMAL
CALCIUM SERPL-MCNC: 9.3 MG/DL (ref 8.6–10)
CANNABINOIDS UR QL SCN: NORMAL
CHLORIDE SERPL-SCNC: 101 MMOL/L (ref 98–107)
CREAT SERPL-MCNC: 0.81 MG/DL (ref 0.67–1.17)
DEPRECATED HCO3 PLAS-SCNC: 22 MMOL/L (ref 22–29)
EGFRCR SERPLBLD CKD-EPI 2021: >90 ML/MIN/1.73M2
EOSINOPHIL # BLD AUTO: 0.1 10E3/UL (ref 0–0.7)
EOSINOPHIL NFR BLD AUTO: 2 %
ERYTHROCYTE [DISTWIDTH] IN BLOOD BY AUTOMATED COUNT: 15.1 % (ref 10–15)
ETHANOL SERPL-MCNC: 0.38 G/DL
ETHANOL SERPL-MCNC: 0.47 G/DL
FENTANYL UR QL: NORMAL
GLUCOSE SERPL-MCNC: 134 MG/DL (ref 70–99)
HCT VFR BLD AUTO: 42.7 % (ref 40–53)
HGB BLD-MCNC: 14.3 G/DL (ref 13.3–17.7)
IMM GRANULOCYTES # BLD: 0 10E3/UL
IMM GRANULOCYTES NFR BLD: 0 %
LYMPHOCYTES # BLD AUTO: 1.3 10E3/UL (ref 0.8–5.3)
LYMPHOCYTES NFR BLD AUTO: 21 %
MAGNESIUM SERPL-MCNC: 1.8 MG/DL (ref 1.7–2.3)
MCH RBC QN AUTO: 31 PG (ref 26.5–33)
MCHC RBC AUTO-ENTMCNC: 33.5 G/DL (ref 31.5–36.5)
MCV RBC AUTO: 92 FL (ref 78–100)
MONOCYTES # BLD AUTO: 0.5 10E3/UL (ref 0–1.3)
MONOCYTES NFR BLD AUTO: 9 %
NEUTROPHILS # BLD AUTO: 4.1 10E3/UL (ref 1.6–8.3)
NEUTROPHILS NFR BLD AUTO: 67 %
NRBC # BLD AUTO: 0 10E3/UL
NRBC BLD AUTO-RTO: 0 /100
OPIATES UR QL SCN: NORMAL
PCP QUAL URINE (ROCHE): NORMAL
PLATELET # BLD AUTO: 171 10E3/UL (ref 150–450)
POTASSIUM SERPL-SCNC: 4.3 MMOL/L (ref 3.4–5.3)
PROT SERPL-MCNC: 8 G/DL (ref 6.4–8.3)
RBC # BLD AUTO: 4.62 10E6/UL (ref 4.4–5.9)
SODIUM SERPL-SCNC: 141 MMOL/L (ref 135–145)
WBC # BLD AUTO: 6 10E3/UL (ref 4–11)

## 2024-03-09 PROCEDURE — 83735 ASSAY OF MAGNESIUM: CPT | Performed by: EMERGENCY MEDICINE

## 2024-03-09 PROCEDURE — 80053 COMPREHEN METABOLIC PANEL: CPT | Performed by: STUDENT IN AN ORGANIZED HEALTH CARE EDUCATION/TRAINING PROGRAM

## 2024-03-09 PROCEDURE — 250N000013 HC RX MED GY IP 250 OP 250 PS 637: Performed by: REGISTERED NURSE

## 2024-03-09 PROCEDURE — 82077 ASSAY SPEC XCP UR&BREATH IA: CPT | Performed by: EMERGENCY MEDICINE

## 2024-03-09 PROCEDURE — 80053 COMPREHEN METABOLIC PANEL: CPT | Performed by: EMERGENCY MEDICINE

## 2024-03-09 PROCEDURE — 99285 EMERGENCY DEPT VISIT HI MDM: CPT

## 2024-03-09 PROCEDURE — 128N000004 HC R&B CD ADULT

## 2024-03-09 PROCEDURE — 80307 DRUG TEST PRSMV CHEM ANLYZR: CPT | Performed by: EMERGENCY MEDICINE

## 2024-03-09 PROCEDURE — 36415 COLL VENOUS BLD VENIPUNCTURE: CPT | Performed by: STUDENT IN AN ORGANIZED HEALTH CARE EDUCATION/TRAINING PROGRAM

## 2024-03-09 PROCEDURE — HZ2ZZZZ DETOXIFICATION SERVICES FOR SUBSTANCE ABUSE TREATMENT: ICD-10-PCS | Performed by: PSYCHIATRY & NEUROLOGY

## 2024-03-09 PROCEDURE — 36415 COLL VENOUS BLD VENIPUNCTURE: CPT | Performed by: EMERGENCY MEDICINE

## 2024-03-09 PROCEDURE — 85025 COMPLETE CBC W/AUTO DIFF WBC: CPT | Performed by: EMERGENCY MEDICINE

## 2024-03-09 PROCEDURE — 85025 COMPLETE CBC W/AUTO DIFF WBC: CPT | Performed by: STUDENT IN AN ORGANIZED HEALTH CARE EDUCATION/TRAINING PROGRAM

## 2024-03-09 PROCEDURE — 82077 ASSAY SPEC XCP UR&BREATH IA: CPT | Performed by: STUDENT IN AN ORGANIZED HEALTH CARE EDUCATION/TRAINING PROGRAM

## 2024-03-09 RX ORDER — DIAZEPAM 5 MG
5-20 TABLET ORAL EVERY 30 MIN PRN
Status: DISCONTINUED | OUTPATIENT
Start: 2024-03-09 | End: 2024-03-13

## 2024-03-09 RX ORDER — MAGNESIUM HYDROXIDE/ALUMINUM HYDROXICE/SIMETHICONE 120; 1200; 1200 MG/30ML; MG/30ML; MG/30ML
30 SUSPENSION ORAL EVERY 4 HOURS PRN
Status: DISCONTINUED | OUTPATIENT
Start: 2024-03-09 | End: 2024-03-09

## 2024-03-09 RX ORDER — HYDROXYZINE HYDROCHLORIDE 25 MG/1
25 TABLET, FILM COATED ORAL EVERY 4 HOURS PRN
Status: CANCELLED | OUTPATIENT
Start: 2024-03-09

## 2024-03-09 RX ORDER — LOPERAMIDE HCL 2 MG
2 CAPSULE ORAL 4 TIMES DAILY PRN
Status: CANCELLED | OUTPATIENT
Start: 2024-03-09

## 2024-03-09 RX ORDER — ONDANSETRON 4 MG/1
4 TABLET, FILM COATED ORAL EVERY 6 HOURS PRN
Status: CANCELLED | OUTPATIENT
Start: 2024-03-09

## 2024-03-09 RX ORDER — ONDANSETRON 4 MG/1
4 TABLET, FILM COATED ORAL EVERY 6 HOURS PRN
Status: DISCONTINUED | OUTPATIENT
Start: 2024-03-09 | End: 2024-03-14 | Stop reason: HOSPADM

## 2024-03-09 RX ORDER — AMOXICILLIN 250 MG
1 CAPSULE ORAL 2 TIMES DAILY PRN
Status: DISCONTINUED | OUTPATIENT
Start: 2024-03-09 | End: 2024-03-14 | Stop reason: HOSPADM

## 2024-03-09 RX ORDER — HYDROXYZINE HYDROCHLORIDE 25 MG/1
25 TABLET, FILM COATED ORAL EVERY 4 HOURS PRN
Status: DISCONTINUED | OUTPATIENT
Start: 2024-03-09 | End: 2024-03-09

## 2024-03-09 RX ORDER — TRAZODONE HYDROCHLORIDE 50 MG/1
50 TABLET, FILM COATED ORAL
Status: DISCONTINUED | OUTPATIENT
Start: 2024-03-09 | End: 2024-03-10

## 2024-03-09 RX ORDER — MULTIPLE VITAMINS W/ MINERALS TAB 9MG-400MCG
1 TAB ORAL DAILY
Status: DISCONTINUED | OUTPATIENT
Start: 2024-03-10 | End: 2024-03-14 | Stop reason: HOSPADM

## 2024-03-09 RX ORDER — AMOXICILLIN 250 MG
1 CAPSULE ORAL 2 TIMES DAILY PRN
Status: CANCELLED | OUTPATIENT
Start: 2024-03-09

## 2024-03-09 RX ORDER — ONDANSETRON 4 MG/1
4 TABLET, FILM COATED ORAL EVERY 6 HOURS PRN
Status: DISCONTINUED | OUTPATIENT
Start: 2024-03-09 | End: 2024-03-09

## 2024-03-09 RX ORDER — MAGNESIUM HYDROXIDE/ALUMINUM HYDROXICE/SIMETHICONE 120; 1200; 1200 MG/30ML; MG/30ML; MG/30ML
30 SUSPENSION ORAL EVERY 4 HOURS PRN
Status: DISCONTINUED | OUTPATIENT
Start: 2024-03-09 | End: 2024-03-14 | Stop reason: HOSPADM

## 2024-03-09 RX ORDER — TRAZODONE HYDROCHLORIDE 50 MG/1
50 TABLET, FILM COATED ORAL
Status: CANCELLED | OUTPATIENT
Start: 2024-03-09

## 2024-03-09 RX ORDER — FOLIC ACID 1 MG/1
1 TABLET ORAL DAILY
Status: DISCONTINUED | OUTPATIENT
Start: 2024-03-10 | End: 2024-03-14 | Stop reason: HOSPADM

## 2024-03-09 RX ORDER — LOPERAMIDE HCL 2 MG
2 CAPSULE ORAL 4 TIMES DAILY PRN
Status: DISCONTINUED | OUTPATIENT
Start: 2024-03-09 | End: 2024-03-09

## 2024-03-09 RX ORDER — TRAZODONE HYDROCHLORIDE 50 MG/1
50 TABLET, FILM COATED ORAL
Status: DISCONTINUED | OUTPATIENT
Start: 2024-03-09 | End: 2024-03-09

## 2024-03-09 RX ORDER — LOPERAMIDE HCL 2 MG
2 CAPSULE ORAL 4 TIMES DAILY PRN
Status: DISCONTINUED | OUTPATIENT
Start: 2024-03-09 | End: 2024-03-14 | Stop reason: HOSPADM

## 2024-03-09 RX ORDER — HYDROXYZINE HYDROCHLORIDE 25 MG/1
25 TABLET, FILM COATED ORAL EVERY 4 HOURS PRN
Status: DISCONTINUED | OUTPATIENT
Start: 2024-03-09 | End: 2024-03-14 | Stop reason: HOSPADM

## 2024-03-09 RX ORDER — MAGNESIUM HYDROXIDE/ALUMINUM HYDROXICE/SIMETHICONE 120; 1200; 1200 MG/30ML; MG/30ML; MG/30ML
30 SUSPENSION ORAL EVERY 4 HOURS PRN
Status: CANCELLED | OUTPATIENT
Start: 2024-03-09

## 2024-03-09 RX ORDER — AMOXICILLIN 250 MG
1 CAPSULE ORAL 2 TIMES DAILY PRN
Status: DISCONTINUED | OUTPATIENT
Start: 2024-03-09 | End: 2024-03-09

## 2024-03-09 RX ADMIN — TRAZODONE HYDROCHLORIDE 50 MG: 50 TABLET ORAL at 22:26

## 2024-03-09 RX ADMIN — DIAZEPAM 10 MG: 5 TABLET ORAL at 22:26

## 2024-03-09 RX ADMIN — HYDROXYZINE HYDROCHLORIDE 25 MG: 25 TABLET, FILM COATED ORAL at 23:44

## 2024-03-09 RX ADMIN — TRAZODONE HYDROCHLORIDE 50 MG: 50 TABLET ORAL at 23:43

## 2024-03-09 ASSESSMENT — LIFESTYLE VARIABLES
AUDITORY DISTURBANCES: NOT PRESENT
VISUAL DISTURBANCES: NOT PRESENT
NAUSEA AND VOMITING: NO NAUSEA AND NO VOMITING
VISUAL DISTURBANCES: NOT PRESENT
TREMOR: NOT VISIBLE, BUT CAN BE FELT FINGERTIP TO FINGERTIP
TOTAL SCORE: 0
ORIENTATION AND CLOUDING OF SENSORIUM: ORIENTED AND CAN DO SERIAL ADDITIONS
AGITATION: NORMAL ACTIVITY
HEADACHE, FULLNESS IN HEAD: NOT PRESENT
PAROXYSMAL SWEATS: BARELY PERCEPTIBLE SWEATING, PALMS MOIST
ORIENTATION AND CLOUDING OF SENSORIUM: ORIENTED AND CAN DO SERIAL ADDITIONS
TREMOR: NO TREMOR
TOTAL SCORE: 3
SKIP TO QUESTIONS 9-10: 0
PAROXYSMAL SWEATS: NO SWEAT VISIBLE
AGITATION: SOMEWHAT MORE THAN NORMAL ACTIVITY
ANXIETY: NO ANXIETY, AT EASE
AUDITORY DISTURBANCES: NOT PRESENT
HEADACHE, FULLNESS IN HEAD: NOT PRESENT
NAUSEA AND VOMITING: NO NAUSEA AND NO VOMITING
ANXIETY: NO ANXIETY, AT EASE

## 2024-03-09 ASSESSMENT — ACTIVITIES OF DAILY LIVING (ADL)
ADLS_ACUITY_SCORE: 37
ADLS_ACUITY_SCORE: 37
DRESS: INDEPENDENT
LAUNDRY: WITH SUPERVISION
ADLS_ACUITY_SCORE: 57
ADLS_ACUITY_SCORE: 42
HYGIENE/GROOMING: INDEPENDENT
ADLS_ACUITY_SCORE: 37
ORAL_HYGIENE: INDEPENDENT

## 2024-03-09 ASSESSMENT — PATIENT HEALTH QUESTIONNAIRE - PHQ9: SUM OF ALL RESPONSES TO PHQ9 QUESTIONS 1 & 2: 0

## 2024-03-09 NOTE — ED PROVIDER NOTES
History     Chief Complaint:  Alcohol Problem       HPI   Joe Shah is a 48 year old male who presents with his wife with chief complaint alcohol intoxication and desire for detox.  Patient reports a long history of alcohol abuse drinking a 1.75 of vodka daily for several years.  Last drink was before arrival.  He denies history of alcohol withdrawal seizures.  He states it often takes him 2 days after his last drink before he starts having symptoms.  He states he often starts vomiting due to his alcohol use and he has been hospitalized several times for hematemesis.  He denies any recent trauma.  He reached out to [facility] who told him that he needed to go through detox before he could be accepted into their program.  He attempted to get into [facility] detox, but they stated they would not have a bed for him for about a week.      Independent Historian:   Wife @ bedside.     Review of External Notes:   Reviewed multiple ED visits and hospitalizations for alcohol intoxication, hematemesis.  During course of hospital stay, does not appear to have severe withdrawal symptoms and has not needed ICU admission or large amounts of IV benzodiazepines.      Medications:    Pepcid  Folvite  Neurontin  Toprol  Naprosyn  Protonix    Past Medical History:    Anxiety  Depression  Hypertension  Subdural hematoma  Substance abuse    Past Surgical History:    Geronimo hole(s) evacuate hematoma subdural  Ent surgery  EGD x 3      Physical Exam     Physical Exam  Nursing note and vitals reviewed.  HENT:   Mouth/Throat: Moist mucous membranes.   No tongue fasciculations.  Eyes: EOMI, nonicteric sclera  Cardiovascular: Normal rate, regular rhythm, no murmurs, rubs, or gallops  Pulmonary/Chest: Effort normal and breath sounds normal. No respiratory distress. No wheezes. No rales.   Abdominal: Soft. Nontender, nondistended, no guarding or rigidity.   Musculoskeletal: Normal range of motion.   Neurological: Alert. Moves all extremities  spontaneously.  No tremor.  Skin: Skin is warm and dry. No rash noted.         Emergency Department Course       Laboratory:  Labs Ordered and Resulted from Time of ED Arrival to Time of ED Departure   ETHYL ALCOHOL LEVEL - Abnormal       Result Value    Alcohol ethyl 0.47 (*)    COMPREHENSIVE METABOLIC PANEL - Abnormal    Sodium 141      Potassium 4.3      Carbon Dioxide (CO2) 22      Anion Gap 18 (*)     Urea Nitrogen 5.4 (*)     Creatinine 0.81      GFR Estimate >90      Calcium 9.3      Chloride 101      Glucose 134 (*)     Alkaline Phosphatase 85      AST 88 (*)     ALT 32      Protein Total 8.0      Albumin 4.3      Bilirubin Total 0.5     CBC WITH PLATELETS AND DIFFERENTIAL - Abnormal    WBC Count 6.0      RBC Count 4.62      Hemoglobin 14.3      Hematocrit 42.7      MCV 92      MCH 31.0      MCHC 33.5      RDW 15.1 (*)     Platelet Count 171      % Neutrophils 67      % Lymphocytes 21      % Monocytes 9      % Eosinophils 2      % Basophils 1      % Immature Granulocytes 0      NRBCs per 100 WBC 0      Absolute Neutrophils 4.1      Absolute Lymphocytes 1.3      Absolute Monocytes 0.5      Absolute Eosinophils 0.1      Absolute Basophils 0.0      Absolute Immature Granulocytes 0.0      Absolute NRBCs 0.0              Emergency Department Course & Assessments:    Interventions:  Medications - No data to display         Independent Interpretation (X-rays, CTs, rhythm strip):  None        Social Determinants of Health affecting care:   Healthcare Access/Compliance    Disposition:  The patient was transferred to [facility] via EMS.        Impression & Plan        Medical Decision Making:  Joe Shah is a 48 year old male who presents for evaluation of alcohol abuse. He is intoxicated here in ED by blood work.  Blood work otherwise looks ok; no signs of alcoholic ketoacidosis, significant liver impairment or acute alcoholic hepatitis. He has no history of DT's or alcohol withdrawal seizures.  There are no signs of co-ingestion including acetaminophen, drugs, medications, volatile alcohols. He has no signs of trauma related to alcohol use and no further workup is needed including head CT. Will arrange for inpatient detox.  [facility] has bed available.  EtOH downtrending on recheck and he is safe/stable for transfer.  He and his wife are in agreement with the plan.    Diagnosis:    ICD-10-CM    1. Alcoholic intoxication without complication (H24)  F10.920                  Scribe Disclosure:  I, Katerin Champagne, am serving as a scribe at 3:32 PM on 3/9/2024 to document services personally performed by Tres Stuart MD based on my observations and the provider's statements to me.          Tres Stuart MD  03/10/24 0117

## 2024-03-09 NOTE — TELEPHONE ENCOUNTER
S: [facility] ED , Provider Sharlene HALL calling at 3:57 PM  with clinical on a 48 year old/Male presenting for alcohol detox.     B: Pt presents for ETOH detox.   Currently reports drinking 1.75 vodka daily for years.    Patient reports last use was this morning.  Pt BAC: .47  Pt  endorses hx of DT  Pt  denies hx of seizures. Last seizure: N/A  Pt endorsing the following symptoms of withdrawal:  still too intoxication  MSSA Score: CIWA - still too intoxication    Pt denies acute mental health or medical concerns.   Pt denies other drug use: None Amount/frequency: N/A    Does Pt have a detox care plan in [facility]? No  Does pt present with specific needs, assistive devices, or exclusionary criteria? None  Is the patient ambulating, eating and drinking in the ED? Yes    A: Pt meets criteria to be presented for IP detox admission. Patient is voluntary    COVID Symptoms: No  If yes, COVID test required   Utox: Ordered, not yet collected  Magnesium: In process  CMP: Abnormalities:     AST is 88  CBC: WNL  HCG: N/A     R: Patient cleared and ready for behavioral bed placement: Yes    Pt is meeting criteria for presentation to 3A/CD    Does Patient need a Transfer Center request created? Yes, writer completed Transfer Center request at:  4:06 PM     4:43 PM Paged 3A On Call Provider     5:09 PM Discussed Pt with Kimberly and she requested a re-check of Pts BAC to ensure it is going down    5:13 PM Called [facility] ED and requested a BAC redo    7:09 PM Paged 3A On Call Provider    7:17 PM Kimberly accepted for 3A/CD/Kimberly    Updated worklist and added to the admit board and did Transfer Center and Bed Planning    7:41 PM pdated 3A    7:43 PM Updated [facility] ED

## 2024-03-09 NOTE — ED NOTES
Patient has a phone interview with [facility] at 1300 3/10.  He has his phone with him and they will call his personal phone.

## 2024-03-09 NOTE — ED NOTES
Patient arrived voluntarily seeking ETOH treatment and now wants to leave AMA. Was on phone with spouse who has agreed to pick him up.

## 2024-03-09 NOTE — ED NOTES
Spouse is leaving at this time. She can be reached via phone for updates, or collateral infor.  Yuki Evangelista RN,.......................................... 3/8/2024   9:48 PM

## 2024-03-09 NOTE — ED NOTES
Bed: ED17  Expected date: 3/9/24  Expected time: 3:05 PM  Means of arrival:   Comments:  Needs mop

## 2024-03-09 NOTE — ED TRIAGE NOTES
Patient is scheduled to go to rehab for ETOH abuse, he is here for DETOX as the facility wants him to detox before he attends there program.     Patient drinks a .75  vodka a day after work, he drinks it with Orange juice. 2019 was in treatment at [facility].  About 1.75 every two days.    Pts last drink was right before he came her ETOH level is 0.47, he is a functional alcohalic, he is walking and talking without issue.

## 2024-03-09 NOTE — ED TRIAGE NOTES
[facility]  ED Arrival Note    Pt presented to triage accompanied by spouse. Pt states that he is voluntarily seeking help with detox during this visit. Pt has plans in place to go to [facility] early next week. Pt states that he drinks 750 ml vodka daily, and his last drink was 1 hr PTA. He also c/o generalized body pain from the neck down to his feet.      Visitors during triage: Spouse      Triage Interventions: IV and labs    Ambulatory: Yes    Directed to: Triage Lobby    Pronouns: he/him

## 2024-03-10 PROCEDURE — 250N000011 HC RX IP 250 OP 636: Performed by: REGISTERED NURSE

## 2024-03-10 PROCEDURE — 250N000013 HC RX MED GY IP 250 OP 250 PS 637: Performed by: REGISTERED NURSE

## 2024-03-10 PROCEDURE — 99223 1ST HOSP IP/OBS HIGH 75: CPT | Performed by: PSYCHIATRY & NEUROLOGY

## 2024-03-10 PROCEDURE — 250N000013 HC RX MED GY IP 250 OP 250 PS 637: Performed by: PHYSICIAN ASSISTANT

## 2024-03-10 PROCEDURE — 250N000013 HC RX MED GY IP 250 OP 250 PS 637: Performed by: PSYCHIATRY & NEUROLOGY

## 2024-03-10 PROCEDURE — 128N000004 HC R&B CD ADULT

## 2024-03-10 RX ORDER — GABAPENTIN 300 MG/1
300 CAPSULE ORAL
Status: DISCONTINUED | OUTPATIENT
Start: 2024-03-10 | End: 2024-03-14 | Stop reason: HOSPADM

## 2024-03-10 RX ORDER — METOPROLOL SUCCINATE 50 MG/1
50 TABLET, EXTENDED RELEASE ORAL DAILY
Status: DISCONTINUED | OUTPATIENT
Start: 2024-03-10 | End: 2024-03-14 | Stop reason: HOSPADM

## 2024-03-10 RX ORDER — HYDRALAZINE HYDROCHLORIDE 10 MG/1
10 TABLET, FILM COATED ORAL 4 TIMES DAILY PRN
Status: DISCONTINUED | OUTPATIENT
Start: 2024-03-10 | End: 2024-03-14 | Stop reason: HOSPADM

## 2024-03-10 RX ORDER — VENLAFAXINE HYDROCHLORIDE 75 MG/1
75 CAPSULE, EXTENDED RELEASE ORAL
Status: DISCONTINUED | OUTPATIENT
Start: 2024-03-10 | End: 2024-03-14 | Stop reason: HOSPADM

## 2024-03-10 RX ORDER — PANTOPRAZOLE SODIUM 40 MG/1
40 TABLET, DELAYED RELEASE ORAL
Status: DISCONTINUED | OUTPATIENT
Start: 2024-03-10 | End: 2024-03-14 | Stop reason: HOSPADM

## 2024-03-10 RX ORDER — FAMOTIDINE 20 MG/1
20 TABLET, FILM COATED ORAL 2 TIMES DAILY
Status: DISCONTINUED | OUTPATIENT
Start: 2024-03-10 | End: 2024-03-14 | Stop reason: HOSPADM

## 2024-03-10 RX ORDER — HYDRALAZINE HYDROCHLORIDE 10 MG/1
10 TABLET, FILM COATED ORAL 4 TIMES DAILY PRN
Status: DISCONTINUED | OUTPATIENT
Start: 2024-03-10 | End: 2024-03-10

## 2024-03-10 RX ORDER — GABAPENTIN 100 MG/1
100 CAPSULE ORAL 3 TIMES DAILY
Status: DISCONTINUED | OUTPATIENT
Start: 2024-03-10 | End: 2024-03-14 | Stop reason: HOSPADM

## 2024-03-10 RX ORDER — ACETAMINOPHEN 325 MG/1
975 TABLET ORAL EVERY 4 HOURS PRN
Status: DISCONTINUED | OUTPATIENT
Start: 2024-03-10 | End: 2024-03-14 | Stop reason: HOSPADM

## 2024-03-10 RX ADMIN — DIAZEPAM 10 MG: 5 TABLET ORAL at 10:04

## 2024-03-10 RX ADMIN — GABAPENTIN 300 MG: 300 CAPSULE ORAL at 22:52

## 2024-03-10 RX ADMIN — ACETAMINOPHEN 975 MG: 325 TABLET, FILM COATED ORAL at 16:33

## 2024-03-10 RX ADMIN — DIAZEPAM 10 MG: 5 TABLET ORAL at 11:34

## 2024-03-10 RX ADMIN — FOLIC ACID 1 MG: 1 TABLET ORAL at 11:34

## 2024-03-10 RX ADMIN — PANTOPRAZOLE SODIUM 40 MG: 40 TABLET, DELAYED RELEASE ORAL at 08:59

## 2024-03-10 RX ADMIN — DIAZEPAM 10 MG: 5 TABLET ORAL at 13:46

## 2024-03-10 RX ADMIN — DIAZEPAM 10 MG: 5 TABLET ORAL at 08:59

## 2024-03-10 RX ADMIN — GABAPENTIN 100 MG: 100 CAPSULE ORAL at 20:36

## 2024-03-10 RX ADMIN — THIAMINE HCL TAB 100 MG 100 MG: 100 TAB at 11:34

## 2024-03-10 RX ADMIN — ONDANSETRON 4 MG: 4 TABLET ORAL at 16:33

## 2024-03-10 RX ADMIN — DIAZEPAM 10 MG: 5 TABLET ORAL at 16:33

## 2024-03-10 RX ADMIN — FAMOTIDINE 20 MG: 20 TABLET ORAL at 20:36

## 2024-03-10 RX ADMIN — DIAZEPAM 10 MG: 5 TABLET ORAL at 04:12

## 2024-03-10 RX ADMIN — METOPROLOL SUCCINATE 50 MG: 50 TABLET, EXTENDED RELEASE ORAL at 08:59

## 2024-03-10 RX ADMIN — GABAPENTIN 100 MG: 100 CAPSULE ORAL at 08:59

## 2024-03-10 RX ADMIN — FAMOTIDINE 20 MG: 20 TABLET ORAL at 08:59

## 2024-03-10 RX ADMIN — VENLAFAXINE HYDROCHLORIDE 75 MG: 75 CAPSULE, EXTENDED RELEASE ORAL at 11:34

## 2024-03-10 RX ADMIN — Medication 1 TABLET: at 11:34

## 2024-03-10 RX ADMIN — ACETAMINOPHEN 975 MG: 325 TABLET, FILM COATED ORAL at 11:34

## 2024-03-10 RX ADMIN — GABAPENTIN 100 MG: 100 CAPSULE ORAL at 13:46

## 2024-03-10 RX ADMIN — DIAZEPAM 10 MG: 5 TABLET ORAL at 17:35

## 2024-03-10 RX ADMIN — DIAZEPAM 10 MG: 5 TABLET ORAL at 20:36

## 2024-03-10 ASSESSMENT — ACTIVITIES OF DAILY LIVING (ADL)
ADLS_ACUITY_SCORE: 42
LAUNDRY: WITH SUPERVISION
DRESS: INDEPENDENT
ADLS_ACUITY_SCORE: 42
ORAL_HYGIENE: INDEPENDENT
ADLS_ACUITY_SCORE: 42
HYGIENE/GROOMING: INDEPENDENT
ADLS_ACUITY_SCORE: 42
HYGIENE/GROOMING: INDEPENDENT
ADLS_ACUITY_SCORE: 42
ORAL_HYGIENE: INDEPENDENT
ADLS_ACUITY_SCORE: 42
DRESS: SCRUBS (BEHAVIORAL HEALTH)
ADLS_ACUITY_SCORE: 42

## 2024-03-10 NOTE — PLAN OF CARE
Behavioral Team Discussion: (3/10/2024)    Continued Stay Criteria/Rationale: Patient admitted for  Alcohol Use Disorder.  Plan: The following services will be provided to the patient; psychiatric assessment, medication management, therapeutic milieu, individual and group support, and skills groups.   Participants: 3A Provider: Dr. Rhea Harris MD; 3A RN: Jarvis Hidalgo, RN; 3A CM's: Samantha Moore.  Summary/Recommendation: Providers will assess today for treatment recommendations, discharge planning, and aftercare plans. CM will meet with pt for discharge planning.   Medical/Physical: Patient denies any medical or physical concerns at this time.  Precautions:   Behavioral Orders   Procedures    Code 1 - Restrict to Unit    Routine Programming     As clinically indicated    Status 15     Every 15 minutes.    Withdrawal precautions     Rationale for change in precautions or plan: N/A  Progress: Initial.    ASAM Dimension Scale Ratings:  Dimension 1: 3 Client tolerates and radha with withdrawal discomfort poorly. Client has severe intoxication, such that the client endangers self or others, or intoxication has not abated with less intensive levels of services. Client displays severe signs and symptoms; or risk of severe, but manageable withdrawal; or withdrawal worsening despite detox at less intensive level.  Dimension 2: 1 Client tolerates and radha with physical discomfort and is able to get the services that the client needs.  Dimension 3: 2 Client has difficulty with impulse control and lacks coping skills. Client has thoughts of suicide or harm to others without means; however, the thoughts may interfere with participation in some treatment activities. Client has difficulty functioning in significant life areas. Client has moderate symptoms of emotional, behavioral, or cognitive problems. Client is able to participate in most treatment activities.  Dimension 4: 3 Client displays inconsistent compliance,  minimal awareness of either the client's addiction or mental disorder, and is minimally cooperative.  Dimension 5: 3 Client has poor recognition and understanding of relapse and recidivism issues and displays moderately high vulnerability for further substance use or mental health problems. Client has few coping skills and rarely applies coping skills.  Dimension 6: 3 Client is not engaged in structured, meaningful activity and the client's peers, family, significant other, and living environment are unsupportive, or there is significant criminal justice system involvement.

## 2024-03-10 NOTE — CONSULTS
Circumstances of recent discharge and re-admittance noted. Please refer to recent medicine admission in chart dated 2/10/24, which was reviewed.  Patient was admitted to [facility] for alcohol withdrawal and hematemesis. Patient has longstanding history of hematemesis when he is drinking. EGD 2/8/24 with LA grade B esophagitis with no active bleeding. Per discharge summary, alcohol withdrawal symptoms were mild.  His hospital course was complicated by minimally symptomatic COVID, did not require Paxlovid, remdesivir or dexamethasone.     Patient's PTA medications were reviewed and ordered if appropriate.     No further recommendations at this time.   Please page medicine with any concerns.    Diandra Flores PA-C  Hospitalist Service  Contact information available via [facility] Paging/Directory

## 2024-03-10 NOTE — PLAN OF CARE
Goal Outcome Evaluation:    Plan of Care Reviewed With: patient Plan of Care Reviewed With: patient    Overall Patient Progress: improvingOverall Patient Progress: improving    Outcome Evaluation: Remains in moderately high alcohol withdrawal requiring valium to treat.    MSSA scores today are 17, 15, 10 and 14. Pt is highly tremulous, appears mildly sweaty, reports overall no appetite with breakfast but did eat some fruit and drank some juice. Similar appetite with lunch. He denies hallucinations. Initially tachycardic with pulse of 123, subsequent pulses of 99, 107 and 95. Total valium administered since arrival to the hospital = 60mg (all administrations on 3A).     Pt completed a phone interview today at 1300 with [facility]. Pt interested in door to door transfer to [facility] to begin treatment. States attended [facility] in the past and was able to maintain sobriety for about 6 months afterward and did indicate attending some support group meetings and working with a sponsor.     Pt endorses anxiety 7 of 10 in severity. Denies depression. Denies suicidal ideation plans or intent. Denies any lifetime suicidal actions. Endorses feeling hopeful.    Pt in bed most of the shift. Pt was up independently but states feeling a little bit wobbly. Pt able to ambulate independently. Encouraged pt to go slowly from sitting to standing in case he develops any dizziness or light headedness. Pt denies any current light headedness or dizziness.     Will continue to monitor and intervene as warranted.     Vitals:    03/10/24 0828 03/10/24 0949 03/10/24 1120 03/10/24 1330   BP: 123/76 (!) 141/95 (!) 145/99 (!) 141/99   BP Location:    Right arm   Patient Position:    Supine   Cuff Size:    Adult Regular   Pulse: (!) 123 99 107 95   Resp: 16 16 16 16   Temp: 98.1  F (36.7  C) 97.7  F (36.5  C) 97.5  F (36.4  C) 97.9  F (36.6  C)   TempSrc: Oral Temporal Temporal Oral   SpO2: 97% 92% 97% 95%   Weight:       Height:

## 2024-03-10 NOTE — H&P
Psychiatry History and Physical    Joe Shah MRN# [id number]   Age: 48 year old YOB: 1975     Date of Admission:  3/9/2024          Assessment:   This patient is a 48 year old  male with history of alcohol use disorder who presented to ED seeking detox from alcohol. Family history significant for alcoholism in father and grandfather. Psychosocial stressors include: inability to complete tasks at home due to excessive alcohol use. Patient was admitted on a voluntary basis to [facility] detox. Patient was started on MSSA protocol using Valium. Pt  does have a history of DTs, but not seizures. Thiamine, folic acid, and multivitamin were ordered on admission. IM consult placed to address acute medical concerns. Symptoms and presentation at this time is most consistent with Alcohol Use Disorder, severe, in withdrawal, complicated by mild transaminitis and hx of DTs. Pt reporting depressive symptoms and noted improvement in the past from Effexor. I have discussed the risks, benefits, and alternatives of PTA medication regimen, which will be continued at this time. Patient will likely benefit from CD treatment upon discharge. CTC will be meeting with patient to discuss dispo plan. Will consider anti-craving medications prior to discharge. Inpatient psychiatric hospitalization is warranted at this time for safety, stabilization, and possible adjustment in medications.         Diagnoses:     Alcohol Use Disorder, severe, in withdrawal, complicated by mild transaminitis and hx of DTs  Nicotine Use Disorder, moderate  MDD, recurrent, moderate to severe    Clinically Significant Risk Factors Present on Admission             # Anion Gap Metabolic Acidosis: Highest Anion Gap = 19 mmol/L in last 2 days, will monitor and treat as appropriate      # Hypertension: Noted on problem list      # Overweight: Estimated body mass index is 25.1 kg/m  as calculated from the following:    Height as of this  "encounter: 1.803 m (5' 11\").    Weight as of this encounter: 81.6 kg (180 lb).       # Financial/Environmental Concerns:                 Plan:   Psychiatric treatment/inteventions:  Alcohol Withdrawal:  - Continue MSSA protocol using Valium for management of alcohol withdrawal  - Continue thiamine, folate, and multivitamin daily  - Consider anti-craving medications prior to discharge. Pt willing to review additional information about anti-craving medications prior to discharge.  - Continue prn Zofran as needed for nausea   - Withdrawal precautions in place    Depression/Anxiety:  - Start Effexor XR 75 mg daily to target depressive sx  - Continue PTA Gabapentin 100 mg TID  - Continue hydroxyzine 25 mg q4h prn for acute anxiety  - Continue PTA Gabapentin 300 mg at bedtime prn for sleep disturbances     Patient will be treated in therapeutic milieu with appropriate individual and group therapies as described.    Nicotine Use Disorder: Replacement available    Medical treatment/interventions:  Medical concerns: Alcohol withdrawal  Labs: Reviewed. Will add lipid profile, GGT, HbA1c, TSH  Consults: Routine IM consult placed. Appreciate assistance.  Per IM Note dated 3/10/24:  Circumstances of recent discharge and re-admittance noted. Please refer to recent medicine admission in chart dated 2/10/24, which was reviewed.  Patient was admitted to [facility] for alcohol withdrawal and hematemesis. Patient has longstanding history of hematemesis when he is drinking. EGD 2/8/24 with LA grade B esophagitis with no active bleeding. Per discharge summary, alcohol withdrawal symptoms were mild.  His hospital course was complicated by minimally symptomatic COVID, did not require Paxlovid, remdesivir or dexamethasone.      Patient's PTA medications were reviewed and ordered if appropriate.      No further recommendations at this time.   Please page medicine with any concerns.     Diandra Flores PA-C      Legal Status: " Voluntary    Safety Assessment:   Checks: Status 15  Pt has not required locked seclusion or restraints in the past 24 hours to maintain safety, please refer to RN documentation for further details.    The risks, benefits, alternatives and side effects have been discussed and are understood by the patient.    Disposition Plan   Reason for ongoing admission: requires detoxification from substance that poses a risk of bodily harm during withdrawal period  Discharge location: D. Patient would benefit from increased CD supports.   Discharge Medications: not ordered  Follow-up Appointments: not scheduled  Anticipated length of stay: 2-3 days    Entered by: Anika Montano MD on 3/10/2024 at 9:06 AM              Chief Complaint:     Alcohol Detox         History of Present Illness:     Per ED Provider Note dated 3/9/24:    Joe Shah is a 48 year old male who presents with his wife with chief complaint alcohol intoxication and desire for detox.  Patient reports a long history of alcohol abuse drinking a 1.75 of vodka daily for several years.  Last drink was before arrival.  He denies history of alcohol withdrawal seizures.  He states it often takes him 2 days after his last drink before he starts having symptoms.  He states he often starts vomiting due to his alcohol use and he has been hospitalized several times for hematemesis.  He denies any recent trauma.  He reached out to [facility] who told him that he needed to go through detox before he could be accepted into their program.  He attempted to get into [facility] detox, but they stated they would not have a bed for him for about a week.     Reviewed multiple ED visits and hospitalizations for alcohol intoxication, hematemesis. During course of hospital stay, does not appear to have severe withdrawal symptoms and has not needed ICU admission or large amounts of IV benzodiazepines.     Joe Shah is a 48 year old male who presents for evaluation of  "alcohol abuse. He is intoxicated here in ED by blood work.  Blood work otherwise looks ok; no signs of alcoholic ketoacidosis, significant liver impairment or acute alcoholic hepatitis. He has no history of DT's or alcohol withdrawal seizures. There are no signs of co-ingestion including acetaminophen, drugs, medications, volatile alcohols. He has no signs of trauma related to alcohol use and no further workup is needed including head CT. Will arrange for inpatient detox.  [facility] bed available.  EtOH downtrending on recheck and he is safe/stable for transfer.  He and his wife are in agreement with the plan.     Per my interview with patient:    Onset of alcohol use at age 13. Became most problematic 10 years ago when his best friend .  Alcohol quantity: Consuming 1.75 L of vodka over three days. Drinking daily.   Alcohol use duration: Has been drinking daily for the past few years  Longest period of sobriety was: 2018 for approximately 6 months  Estimated number of detoxes: \"too many to count\"  History of CD treatment: Multiple prior CD treatments  BAL in ED: 0.47   Urine drug screen: NEGATIVE  Anti-craving medications: Naltrexone in the past, and he believes it was ineffective. Has not tried Campral or Antabuse.  Recent stressors: \"Just not getting stuff done around the house because I am not coordinated or sound minded enough.\"     Patient has tolerance, withdrawal, progressive use, loss of control, spending more time and more amount than intended. Patient has made attempts to quit, is experiencing cravings, and reports negative consequences.  Patient does not ramirez.    Patient does not have a history of seizures.  Patient does have a history of delirium tremens.    Patient does not have a history of overdose.  Patient does not have a history of IV use.  Patient does not have a history of HIV, Hep B or C.    For MH: Pt reporting intermittent depression since the death of his friend 10 years " "ago. Historically, he has improved on Effexor. No history of suicide attempts.                 Psychiatric Review of Systems:   Depression:   Reports: depressed mood, decreased interest, changes in sleep, changes in appetite, guilt, impaired concentration, some hopelessness, decreased energy, irritability.   Denies: suicidal ideation, helplessness  Kallie:   Denies: sleeplessness, increased goal-directed activities, abrupt increase in energy, pressured speech  Psychosis:   Denies: visual hallucinations, auditory hallucinations, paranoia, grandiosity, ideas of reference, thought insertions, thought broadcasting.  Anxiety:   Reports: panic attack in context of possible withdrawal approximately 3 weeks ago  Denies: worries that are difficult to control for the past 6 months  PTSD:   Denies: re-experiencing past trauma, nightmares, increased arousal, avoidance of traumatic stimuli, impaired function.  OCD:   Denies: obsessions, checking, symmetry, cleaning, skin picking.  ED:   Denies: restriction, binging, purging.           Medical Review of Systems:     Review of systems positive for mild tremor and nausea  10 point review of systems is otherwise negative unless noted above.            Psychiatric History:   Mental health diagnoses: Diagnosed with depression after best friend passed away 10 years ago  Psychiatric Hospitalizations: None  History of Psychosis: None  Prior ECT: None  Court Commitment: None  Suicide Attempts: None  Self-injurious Behavior: None  Violence toward others: None  Use of Psychotropics: Effexor         Substance Use History:     Alcohol: See HPI  Cannabis: Problematic in college, but not currently  Nicotine: Patient reports he vapes a 3% nicotine replacement.   Cocaine: Problematic in college, but not currently (\"it was a weekend recreational thing\")  Methamphetamine: none  Opiates/Heroin: none  Benzodiazepines: none  Hallucinogens: none  Inhalants: none           Social History:   Upbringing: " "Pt was born and raised in [place] and [place]. Raised by both parents. Has one older sister. Described childhood as \"good, great. My dad had his moments, and was tough on boys. Not physically, but always had a list of chores and stuff to do.\"   Educational History: two years of college  Relationships:  x 25 years. \"High school sweethearts.\" She is sober and supportive.   Children: One son age 16  Current Living Situation: Living with wife and son in [place]  Occupational History: Employed at an [employer] company, full time.   Financial Support: Self  Legal History: None  Abuse/Trauma History: Best friend passed away 10 years ago         Family History:   Paternal grandfather and father with alcoholism  H/o attempted or completed suicides in family: None         Past Medical History:     Past Medical History:   Diagnosis Date    Anxiety     Back pain     Depressive disorder     Hypertension     SDH (subdural hematoma) (H)     Substance abuse (H)               Past Surgical History:     Past Surgical History:   Procedure Laterality Date    FANTASMA HOLE(S) EVACUATE HEMATOMA SUBDURAL N/A 12/1/2023    Procedure: CREATION, CRANIAL FANTASMA HOLE, WITH SUBDURAL HEMATOMA EVACUATION;  Surgeon: Gio Phoenix MD;  Location:  OR    ENT SURGERY      ESOPHAGOSCOPY, GASTROSCOPY, DUODENOSCOPY (EGD), COMBINED N/A 9/12/2019    Procedure: ESOPHAGOGASTRODUODENOSCOPY (EGD);  Surgeon: Scott Mcrae MD;  Location:  GI    ESOPHAGOSCOPY, GASTROSCOPY, DUODENOSCOPY (EGD), COMBINED N/A 5/27/2023    Procedure: Esophagoscopy, gastroscopy, duodenoscopy (EGD), combined;  Surgeon: Scott Mcrae MD;  Location:  GI    ESOPHAGOSCOPY, GASTROSCOPY, DUODENOSCOPY (EGD), COMBINED N/A 2/9/2024    Procedure: Esophagoscopy, gastroscopy, duodenoscopy (EGD), combined;  Surgeon: Nakul Arguelles MD;  Location:  GI    NECK SURGERY                Allergies:      Allergies   Allergen Reactions    Morphine Nausea and Vomiting    " Oxycodone Itching     1/22 Patient reports he can tolerate this              Medications:   I have reviewed this patient's current medications  Medications Prior to Admission   Medication Sig Dispense Refill Last Dose    acetaminophen (TYLENOL) 500 MG tablet Take 500-1,000 mg by mouth every 6 hours as needed for mild pain   Past Week    famotidine (PEPCID) 20 MG tablet Take 1 tablet (20 mg) by mouth 2 times daily 28 tablet 0 More than a month    folic acid (FOLVITE) 1 MG tablet Take 1 tablet (1 mg) by mouth daily 30 tablet 11 More than a month    gabapentin (NEURONTIN) 100 MG capsule Take 1 capsule (100 mg) by mouth 3 times daily 42 capsule 0 3/8/2024    metoprolol succinate ER (TOPROL-XL) 50 MG 24 hr tablet Take 50 mg by mouth daily    3/8/2024    Multiple Vitamins-Minerals (MULTIVITAMIN ADULT) CHEW Take 2 chew tab by mouth daily Centrum   3/8/2024    naproxen (NAPROSYN) 500 MG tablet Take 1 tablet (500 mg) by mouth 2 times daily (with meals) for 8 days 24 tablet 0 Past Week    pantoprazole (PROTONIX) 40 MG EC tablet Take 40 mg by mouth daily   3/8/2024    vitamin B-Complex Take 1 tablet by mouth daily   Past Week             Labs:     Recent Results (from the past 24 hour(s))   Alcohol level blood    Collection Time: 03/09/24  2:38 PM   Result Value Ref Range    Alcohol ethyl 0.47 (HH) <=0.01 g/dL   Comprehensive metabolic panel    Collection Time: 03/09/24  2:38 PM   Result Value Ref Range    Sodium 141 135 - 145 mmol/L    Potassium 4.3 3.4 - 5.3 mmol/L    Carbon Dioxide (CO2) 22 22 - 29 mmol/L    Anion Gap 18 (H) 7 - 15 mmol/L    Urea Nitrogen 5.4 (L) 6.0 - 20.0 mg/dL    Creatinine 0.81 0.67 - 1.17 mg/dL    GFR Estimate >90 >60 mL/min/1.73m2    Calcium 9.3 8.6 - 10.0 mg/dL    Chloride 101 98 - 107 mmol/L    Glucose 134 (H) 70 - 99 mg/dL    Alkaline Phosphatase 85 40 - 150 U/L    AST 88 (H) 0 - 45 U/L    ALT 32 0 - 70 U/L    Protein Total 8.0 6.4 - 8.3 g/dL    Albumin 4.3 3.5 - 5.2 g/dL    Bilirubin Total 0.5  "<=1.2 mg/dL   CBC with platelets and differential    Collection Time: 03/09/24  2:38 PM   Result Value Ref Range    WBC Count 6.0 4.0 - 11.0 10e3/uL    RBC Count 4.62 4.40 - 5.90 10e6/uL    Hemoglobin 14.3 13.3 - 17.7 g/dL    Hematocrit 42.7 40.0 - 53.0 %    MCV 92 78 - 100 fL    MCH 31.0 26.5 - 33.0 pg    MCHC 33.5 31.5 - 36.5 g/dL    RDW 15.1 (H) 10.0 - 15.0 %    Platelet Count 171 150 - 450 10e3/uL    % Neutrophils 67 %    % Lymphocytes 21 %    % Monocytes 9 %    % Eosinophils 2 %    % Basophils 1 %    % Immature Granulocytes 0 %    NRBCs per 100 WBC 0 <1 /100    Absolute Neutrophils 4.1 1.6 - 8.3 10e3/uL    Absolute Lymphocytes 1.3 0.8 - 5.3 10e3/uL    Absolute Monocytes 0.5 0.0 - 1.3 10e3/uL    Absolute Eosinophils 0.1 0.0 - 0.7 10e3/uL    Absolute Basophils 0.0 0.0 - 0.2 10e3/uL    Absolute Immature Granulocytes 0.0 <=0.4 10e3/uL    Absolute NRBCs 0.0 10e3/uL   Magnesium    Collection Time: 03/09/24  2:38 PM   Result Value Ref Range    Magnesium 1.8 1.7 - 2.3 mg/dL   Urine Drug Screen Panel    Collection Time: 03/09/24  4:19 PM   Result Value Ref Range    Amphetamines Urine Screen Negative Screen Negative    Barbituates Urine Screen Negative Screen Negative    Benzodiazepine Urine Screen Negative Screen Negative    Cannabinoids Urine Screen Negative Screen Negative    Cocaine Urine Screen Negative Screen Negative    Fentanyl Qual Urine Screen Negative Screen Negative    Opiates Urine Screen Negative Screen Negative    PCP Urine Screen Negative Screen Negative   Alcohol level blood    Collection Time: 03/09/24  6:45 PM   Result Value Ref Range    Alcohol ethyl 0.38 (HH) <=0.01 g/dL       /76   Pulse (!) 123   Temp 98.1  F (36.7  C) (Oral)   Resp 16   Ht 1.803 m (5' 11\")   Wt 81.6 kg (180 lb)   SpO2 97%   BMI 25.10 kg/m    Weight is 180 lbs 0 oz  Body mass index is 25.1 kg/m .         Psychiatric Mental Status Examination:   Appearance: awake, alert  Attitude: cooperative and pleasant  Eye Contact: " "good  Mood:  \"not so good\"  Affect: mood congruent and full range  Speech:  clear, coherent and normal prosody  Language: fluent in English  Psychomotor Behavior:  no evidence of tardive dyskinesia, dystonia, or tics. Pronounced bilateral hand tremor.   Gait/Station: normal  Thought Process:  linear, logical, goal oriented  Associations:  no loose associations  Thought Content:  Denying SI/HI/AVH; no evidence of psychotic thinking  Insight:  good  Judgement: intact  Oriented to:  time, person, and place  Attention Span and Concentration:  intact  Recent and Remote Memory:  intact  Fund of Knowledge: appropriate           Physical Exam:   Please refer to physical exam completed by ED provider, Tres Stuart MD, on 3/9/24. I agree with the findings and assessment and have no additional findings to add at this time.     "

## 2024-03-10 NOTE — COMMUNITY RESOURCES LIST (ENGLISH)
03/10/2024   [facility]  N/A  For questions about this resource list or additional care needs, please contact your primary care clinic or care manager.  Phone: [phone]   Email: N/A   Address: [address] [place] [id number]   Hours: N/A        Substance Use       Outpatient substance use treatment  1  [facility] Distance: 2.57 miles      In-Person   [address] [place] [address]  Language: English  Hours: Mon - Fri 8:00 AM - 4:00 PM  Fees: Insurance, Self Pay   Phone: (567) 556-4613 Email: [email or web address] Website: [email or web address]     2  [facility] Distance: 2.66 miles      In-Person, Phone/Virtual   [address] [address]  Language: English  Hours: Mon - Thu 7:00 AM - 9:00 PM , Fri 7:00 AM - 5:00 PM , Sat 8:00 AM - 6:00 PM  Fees: Insurance, Self Pay   Phone: (524) 711-5907 Email: info@[facility].com Website: http://[email or web address] [facility]     Substance use support group  3  [facility] Distance: 0.9 miles      Phone/Virtual   [address] [address]  Language: English  Hours: Mon - Fri 8:00 AM - 5:00 PM  Fees: Insurance, Self Pay   Phone: (341) 407-5896 Email: info@[employer] Website: http://www.[employer]/     4  [facility] Distance: 3.09 miles      Phone/Virtual   [address] [place] [address]  Language: English  Hours: Mon 4:00 PM - 6:00 PM  Fees: Insurance, Self Pay   Phone: (615) 329-8043 Website: http://[email or web address].[email or web address]/          Important Numbers & Websites       Emergency Services   911  [facility]   311  Poison Control   (808) 562-8267  Suicide Prevention Lifeline   (545) 108-8428 (TALK)  Child Abuse Hotline   (594) 275-1489 (4-A-Child)  Sexual Assault Hotline   (593) 342-4582 (HOPE)  National Runaway Safeline   (383) 444-2471 (RUNAWAY)  All-Options  Talkline   (988) 256-9770  Substance Abuse Referral   (514) 814-1252 (HELP)

## 2024-03-10 NOTE — DISCHARGE INSTRUCTIONS
Behavioral Discharge Planning and Instructions  THANK YOU FOR CHOOSING [facility]  3A  [phone]    Summary: You were admitted to [facility] on 3/9/2024 for detoxification from alcohol.  A medical exam was performed that included lab work. You have met with a  and opted to discharge to [facility].  Please take care and make your recovery a daily priority, Joe!  It was a pleasure working with you and the entire treatment team here wishes you the very best in your recovery!     Recommendation:  Discharge to [facility] and work closely with your peers and staff to develop a successful recovery plan. Please return to [place] if a higher level of care is needed.     Dedra (Spouse) [phone]   at 11am on 3/14     [facility]  Address: [address] [place] [address]  Phone: [phone]  Date: 3/14  Time: 1pm    Main Diagnoses:  Per Dr. Rhea Harris MD;  303.90 (F10.20) Alcohol Use Disorder Severe  Alcohol Use Disorder, severe, in withdrawal, complicated by mild transaminitis and hx of DTs  Nicotine Use Disorder, moderate  MDD, recurrent, moderate to severe    Major Treatments, Procedures and Findings:  You were treated for alcohol detoxification using valium administered based on the [facility] protocol. You did not complete a chemical dependency assessment. You had labs drawn and those results were reviewed with you. Please take a copy of your lab work with you to your next primary care provider appointment.    Symptoms to Report:  If you experience more anxiety, confusion, sleeplessness, deep sadness or thoughts of suicide, notify your treatment team or notify your primary care provider. IF ANY OF THE SYMPTOMS YOU ARE EXPERIENCING ARE A MEDICAL EMERGENCY CALL 911 IMMEDIATELY.     Lifestyle Adjustment: Adjust your lifestyle to get enough sleep, relaxation, exercise and good nutrition. Continue to develop healthy coping skills to decrease  stress and promote a sober living environment. Do not use mood altering substances including alcohol, illegal drugs or addictive medications other than what is currently prescribed.     Disposition: [facility]    [email or web address]  [facility]  [address], [place] [address]     [phone]  Facts about COVID19 at www.cdc.gov/COVID19 and www.[place].gov/covid19    Keeping hands clean is one of the most important steps we can take to avoid getting sick and spreading germs to others.  Please wash your hands frequently and lather with soap for at least 20 seconds!    Follow-up Appointment:     The patient is seeking inpatient treatment and will work with his care team to schedule therapy and psychiatry appointments.         Recovery apps for your phone for educational purposes and to locate in person and zoom recovery meetings  Everything AA - educational purpose and dez is a great resource  12 Step Toolkit - educational purpose learning about the 12 steps to recovery  [place] Cloud - meeting dez  AA  - meeting dez  Meeting guide - meeting dez  Quick NA meeting - meeting dez  Claudia- has various apps    Resources:  Some AA/NA meetings are being held online however most have returned to in-person or a hybrid combination please check online to verify*  Need Support Now? If you or someone you know is struggling or in crisis, help is available. Call or text Timeet or chat [employer].org  AA meetings search for them at: https://aa-intergroup.org (worldwide meeting listings)  AA meetings for [place] area can be found online at: https://a[place].org (click local online meetings listings)  NA meetings for [place] area can be found online at: https://www.nam[place].org  (click find a meeting)  AA and NA Sponsors are excellent resources for support and you can find one at any support group meeting.   Alcoholics Anonymous (https://aa.org/): for information 24 hours/day  AA  "Intergroup service office in [place] (http://www.aastpaul.org/) 592.281.1358  AA Intergroup service office in [facility]: 927.764.6432. (http://www.a[place].org/)  Narcotics Anonymous (www.nam[place].org) (443) 614-5976  https://aafairviewriverside.org/meetings  SMART Recovery - self management for addiction recovery:  www.smartrecovery.org  Pathways ~ A Health Crisis Resource & Support Center:  110.754.1972.  https://prescribetoprevent.org/patient-education/videos/  http://www.harmreduction.org  Crisis Text Line  Text 672761  You will be connected with a trained live crisis counselor to provide support. Por espanol, texto  FLORY a 506320 o texto a 442-AYUDAME en WhatsApp  [place] Hope Line  1.816.SUICIDE [9971505]  [facility] 139-654-1501  Support Group:  AA/NA and Sponsor/support.  Fast Tracker  Linking people to mental health and substance use disorder resources  [place] [employer].org   [place] Mental Health Warm Line  Peer to peer support  Monday thru Saturday, 12 pm to 10 pm  474.588.1817 or 3.039.278.8556  Text \"Support\" to 97019  National Emmalena on Mental Illness (KAITLIN)  627.046.1790 or 1.888.KAITLIN.HELPS  Alcoholics Anonymous (www.alcoholics-anonymous.org): Check your phone book for your local chapter.  Suicide Awareness Voices of Education (SAVE) (www.save.org): 234-546-YALA (3083)  National Suicide Prevention Line (www.mentalhealthmn.org): 391-158-ZXOT (1153)  Mental Health Consumer/Survivor Network of [place] (www.mhcsn.net): 316.981.7215 or 955-626-3734  Mental Health Association of [place] (www.mentalhealth.org): 297.986.3636 or 312-125-1321   Substance Abuse and Mental Health Services (www.samhsa.gov)  [place] Opioid Prevention Coalition: www.opioidcoalition.org    [place] Recovery Connection (MRC)  [facility] connects people seeking recovery to resources that help foster and sustain long-term recovery.  Whether you are seeking resources for treatment, transportation, " housing, job training, education, health care or other pathways to recovery, [facility] is a great place to start.  217.382.1597.  www.[facility].org    Great Pod casts for nutrition and wellness  Listen on Apple Podcasts  Dishing Up Nutrition   [employer]   Nutrition       Understand the connection between what you eat and how you feel. Hosted by licensed nutritionists and dietitians from [employer] we share practical, real-life solutions for healthier living through nutrition.     General Medication Instructions:   See your medication sheet(s) for instructions.   Take all medications as prescribed.  Make no changes unless your primary care provider suggests them.   Go to all your primary care provider visits.  Be sure to have all your required lab tests. This way, your medicines can be refilled on time.  Do not use any forms of alcohol.    Please Note:  If you have any questions at anytime after you are discharged please call [facility] detox unit 3AW at [phone].  [facility], Behavioral Intake [phone]  Medical Records call [phone]  Outpatient Behavioral Intake call [phone]  LP+ Wait List/Bed Availability call [phone]    Please remember to take all of your behavioral discharge planning and lab paperwork to any follow up appointments, it contains your lab results, diagnosis, medication list and discharge recommendations.      THANK YOU FOR CHOOSING [facility]

## 2024-03-10 NOTE — PROGRESS NOTES
03/09/24 2132   Patient Belongings   Did you bring any home meds/supplements to the hospital?  No   Patient Belongings other (see comments)  (storage bin, med room bin)   Belongings Search Yes   Clothing Search Yes   Second Staff Josh Blandon     Storage bin: tote bag, cap, pants, vape  Med room bin: cell phone  NO WALLET  A               Admission:  I am responsible for any personal items that are not sent to the safe or pharmacy.  [facility] is not responsible for loss, theft or damage of any property in my possession.    Signature:  _________________________________ Date: _______  Time: _____                                              Staff Signature:  ____________________________ Date: ________  Time: _____      2nd Staff person, if patient is unable/unwilling to sign:    Signature: ________________________________ Date: ________  Time: _____     Discharge:  [facility] has returned all of my personal belongings:    Signature: _________________________________ Date: ________  Time: _____                                          Staff Signature:  ____________________________ Date: ________  Time: _____

## 2024-03-10 NOTE — PLAN OF CARE
"  Problem: Alcohol Withdrawal  Goal: Alcohol Withdrawal Symptom Control  Outcome: Progressing   Goal Outcome Evaluation:    Plan of Care Reviewed With: patient                 Patient alert on approach. He came out of his room, vitals assessed and writer met with patient. Patient tremulous, reports moist skin, feeling nausea, not able to eat-had apple and juice and will try to eat rice crisp bar, however, has been trying to drink water. Vitals elevated-see copied flowsheet.        03/10/24 1618 03/10/24 1630 03/10/24 1727   Vital Signs   Temp 97.1  F (36.2  C)  --  98.6  F (37  C)   Temp src Skin  --  Oral   Resp 16  --  18   Pulse 111 112 88   Pulse Rate Source Monitor Monitor Monitor   BP (!) 171/130 (!) 170/116 (!) 158/110   BP Location Left arm Right arm Right arm   Patient Position  --  Sitting Left side   Cuff Size  --  Adult Regular Adult Regular   Oxygen Therapy   SpO2 96 %  --  97 %   O2 Device None (Room air)  --  None (Room air)         Writer paged internal medicine provider at 1651 and orders were placed for increasing BP with only parameter for SBP. Writer paged again asking for DBP parameter for hydralazine prn twice and there was no response / changes made to medication (last paged was at 1707). BP reassessed and patient medicated twice within 1-2 hours due to MSSA scores of 15 and 12. Prn valium 10 mg given times at 1633 and 1735. Patient also given prn Zofran for nausea and prn tylenol for pain at 1633 975 mg tylenol for generalized pain at 8/10 and Zofran 4 mg at 1633.    IM provider Attila called at about 2035 after being paged due to vitals assessment and patient's BP remaining high DBP. She noted that 121 was okay and patient should continued to be monitored. Vitals noted at BP (!) 158/121 (BP Location: Right arm, Patient Position: Sitting, Cuff Size: Adult Regular)   Pulse 109   Temp 97.3  F (36.3  C) (Oral)   Resp 18   Ht 1.803 m (5' 11\")   Wt 81.6 kg (180 lb)   SpO2 95%   BMI 25.10 " Message  Called patient regarding missed appointment. LMTCB to reschedule     Signatures   Electronically signed by : Abena Adhikari CMA; Sep 19 2017  9:29AM CST     kg/m    She also added a parameter for DBP with use of hydralazine. MSSA assessment completed and patient scored 14-he has not been eating, he noted the Zofran helped but he was still not feeling well. Patient given prn valium 10 mg along with his scheduled medications and he went back to bed to rest. No further concerns at this time. Patient denied headaches, blurry vision or any other increasing BP symptoms. No pain. He has been safe on the unit.     Patient came out of his room and requested help due to not being able to sleep. He has been sleeping on and off throughout the shift. Writer administered 300 mg gabapentin for sleep prn-see mar. Patient was thankful and said that is what he takes right before he sleeps at home. He went back to his room to get some rest and was reminded by writer to come out if he needs anything.

## 2024-03-10 NOTE — PROGRESS NOTES
Case Management Encounter: Met with the patient today to discuss aftercare plans. The patient was very short and brief with his conversation due to him just waking up. The patient did state that he has an interview with Caludia at 1pm on today. The patient has been to [facility] in the past.    Insurance: [employer]     Legal Status: Voluntary     SUDs Assessment Status: None. Has an appointment with Claudia today    ROIs on file: None          RN updated.    Subhash Maldonado  [facility] - Adult Inpatient Addiction Psychiatry Unit

## 2024-03-10 NOTE — PROGRESS NOTES
This writer called Claudia again at [phone] was transferred to a Babs and let them know to contact us at [phone] at 1pm for his interview.   2 Voicemails have been left. If interview does not occur, a follow up with Claudia will have to be completed on Monday.

## 2024-03-10 NOTE — PLAN OF CARE
"TAMIKO      Joe Shah is a 48 year old year old male with a chief complaint of alcohol problem        S = Situation:     Patient admitted from [facility] as a voluntary admit seeking alcohol detox.    B  = Background:   Patient reports he goes through 1.75 L of vodka every three days. Patient drinks daily. Denies any history of withdrawal seizures. Endorses a history of delirium tremens. Patient reports he vapes a 3% nicotine replacement. Denies any other substance use. JAKE in [facility] ER was 0.47 and 0.38. Urine drug screen negative. Patient reports he has been to [facility] for CD treatment once before.   Past Medical History:   Diagnosis Date    Anxiety     Back pain     Depressive disorder     Hypertension     SDH (subdural hematoma) (H)     Substance abuse (H)         A  =  Assessment:   Patient affect full range, mood is calm. Denies SI, SIB, HI, or hallucinations. Patient reports he just can't stop drinking alcohol and needs help to stop. Denies any current stressors contributing to drinking. Patient has an interview set up with Claudia tomorrow at 1 pm he would like to take.  MSSA score 9 upon admission. BP (!) 139/93 (BP Location: Left arm)   Pulse 98   Temp 97.9  F (36.6  C) (Oral)   Resp 16   Ht 1.803 m (5' 11\")   Wt 81.6 kg (180 lb)   SpO2 93%   BMI 25.10 kg/m       R =   Request or Recommendation:     Patient is on MSSA monitoring with Valium for alcohol withdrawal. Is on withdrawal precautions.   Patient to be assessed in am by psychiatry, internal medicine, and case management.                "

## 2024-03-10 NOTE — PROGRESS NOTES
Pt awake at the start of the shift, having a hard time sleeping, prn of Trazodone given, see MAR.  MSSA scores=6, 10- 10 mg Valium given x 1, see MAR. Pt slightly tremulous, diaphoretic and mildly tachycardic. No acute events noted.

## 2024-03-10 NOTE — PHARMACY-ADMISSION MEDICATION HISTORY
Pharmacist Admission Medication History    Admission medication history is complete. The information provided in this note is only as accurate as the sources available at the time of the update.    Information Source(s): Patient and CareEverywhere/SureScripts via phone    Pertinent Information: None     Changes made to PTA medication list:  Added: None  Deleted: None  Changed: None    Allergies reviewed with patient and updates made in EHR: yes    Medication History Completed By: Chuck Byrd PharmD 3/10/2024 11:29 AM    PTA Med List   Medication Sig Last Dose    acetaminophen (TYLENOL) 500 MG tablet Take 500-1,000 mg by mouth every 6 hours as needed for mild pain PRN at PRN    famotidine (PEPCID) 20 MG tablet Take 1 tablet (20 mg) by mouth 2 times daily 3/8/2024 at Unknown time    folic acid (FOLVITE) 1 MG tablet Take 1 tablet (1 mg) by mouth daily 3/8/2024 at Unknown time    gabapentin (NEURONTIN) 100 MG capsule Take 1 capsule (100 mg) by mouth 3 times daily 3/8/2024 at Unknown time    metoprolol succinate ER (TOPROL-XL) 50 MG 24 hr tablet Take 50 mg by mouth daily  3/8/2024 at Unkown time    Multiple Vitamins-Minerals (MULTIVITAMIN ADULT) CHEW Take 2 chew tab by mouth daily Centrum 3/8/2024 at Unknown time    naproxen (NAPROSYN) 500 MG tablet Take 1 tablet (500 mg) by mouth 2 times daily (with meals) for 8 days Past Week at Unknown time    pantoprazole (PROTONIX) 40 MG EC tablet Take 40 mg by mouth daily 3/8/2024 at Unknown time    vitamin B-Complex Take 1 tablet by mouth daily Past Week at Unknown time

## 2024-03-10 NOTE — PROGRESS NOTES
Mari Taylor  at [phone] was transferred to a Babs and let them know to contact us at [phone] at 1pm for his interview.

## 2024-03-11 LAB
ANION GAP SERPL CALCULATED.3IONS-SCNC: 16 MMOL/L (ref 7–15)
BUN SERPL-MCNC: 10 MG/DL (ref 6–20)
CALCIUM SERPL-MCNC: 9.5 MG/DL (ref 8.6–10)
CHLORIDE SERPL-SCNC: 91 MMOL/L (ref 98–107)
CHOLEST SERPL-MCNC: 251 MG/DL
CREAT SERPL-MCNC: 0.89 MG/DL (ref 0.67–1.17)
DEPRECATED HCO3 PLAS-SCNC: 24 MMOL/L (ref 22–29)
EGFRCR SERPLBLD CKD-EPI 2021: >90 ML/MIN/1.73M2
GGT SERPL-CCNC: 246 U/L (ref 8–61)
GLUCOSE SERPL-MCNC: 100 MG/DL (ref 70–99)
HBA1C MFR BLD: 5.6 %
HDLC SERPL-MCNC: 89 MG/DL
LDLC SERPL CALC-MCNC: 135 MG/DL
NONHDLC SERPL-MCNC: 162 MG/DL
POTASSIUM SERPL-SCNC: 3.9 MMOL/L (ref 3.4–5.3)
SODIUM SERPL-SCNC: 131 MMOL/L (ref 135–145)
TRIGL SERPL-MCNC: 136 MG/DL
TSH SERPL DL<=0.005 MIU/L-ACNC: 3.62 UIU/ML (ref 0.3–4.2)

## 2024-03-11 PROCEDURE — 82977 ASSAY OF GGT: CPT | Performed by: PSYCHIATRY & NEUROLOGY

## 2024-03-11 PROCEDURE — 128N000004 HC R&B CD ADULT

## 2024-03-11 PROCEDURE — 80061 LIPID PANEL: CPT | Performed by: PSYCHIATRY & NEUROLOGY

## 2024-03-11 PROCEDURE — 250N000013 HC RX MED GY IP 250 OP 250 PS 637: Performed by: REGISTERED NURSE

## 2024-03-11 PROCEDURE — 250N000013 HC RX MED GY IP 250 OP 250 PS 637: Performed by: PHYSICIAN ASSISTANT

## 2024-03-11 PROCEDURE — 99233 SBSQ HOSP IP/OBS HIGH 50: CPT | Performed by: PSYCHIATRY & NEUROLOGY

## 2024-03-11 PROCEDURE — 250N000013 HC RX MED GY IP 250 OP 250 PS 637: Performed by: PSYCHIATRY & NEUROLOGY

## 2024-03-11 PROCEDURE — 84443 ASSAY THYROID STIM HORMONE: CPT | Performed by: PSYCHIATRY & NEUROLOGY

## 2024-03-11 PROCEDURE — 80048 BASIC METABOLIC PNL TOTAL CA: CPT | Performed by: PSYCHIATRY & NEUROLOGY

## 2024-03-11 PROCEDURE — 36415 COLL VENOUS BLD VENIPUNCTURE: CPT | Performed by: PSYCHIATRY & NEUROLOGY

## 2024-03-11 PROCEDURE — 83036 HEMOGLOBIN GLYCOSYLATED A1C: CPT | Performed by: PSYCHIATRY & NEUROLOGY

## 2024-03-11 RX ADMIN — FAMOTIDINE 20 MG: 20 TABLET ORAL at 20:42

## 2024-03-11 RX ADMIN — DIAZEPAM 10 MG: 5 TABLET ORAL at 11:23

## 2024-03-11 RX ADMIN — FAMOTIDINE 20 MG: 20 TABLET ORAL at 08:25

## 2024-03-11 RX ADMIN — DIAZEPAM 10 MG: 5 TABLET ORAL at 04:52

## 2024-03-11 RX ADMIN — THIAMINE HCL TAB 100 MG 100 MG: 100 TAB at 08:26

## 2024-03-11 RX ADMIN — DIAZEPAM 10 MG: 5 TABLET ORAL at 16:53

## 2024-03-11 RX ADMIN — VENLAFAXINE HYDROCHLORIDE 75 MG: 75 CAPSULE, EXTENDED RELEASE ORAL at 08:25

## 2024-03-11 RX ADMIN — DIAZEPAM 10 MG: 5 TABLET ORAL at 08:28

## 2024-03-11 RX ADMIN — GABAPENTIN 100 MG: 100 CAPSULE ORAL at 14:22

## 2024-03-11 RX ADMIN — DIAZEPAM 10 MG: 5 TABLET ORAL at 00:50

## 2024-03-11 RX ADMIN — HYDROXYZINE HYDROCHLORIDE 25 MG: 25 TABLET, FILM COATED ORAL at 21:26

## 2024-03-11 RX ADMIN — METOPROLOL SUCCINATE 50 MG: 50 TABLET, EXTENDED RELEASE ORAL at 08:27

## 2024-03-11 RX ADMIN — FOLIC ACID 1 MG: 1 TABLET ORAL at 08:27

## 2024-03-11 RX ADMIN — GABAPENTIN 100 MG: 100 CAPSULE ORAL at 20:42

## 2024-03-11 RX ADMIN — HYDROXYZINE HYDROCHLORIDE 25 MG: 25 TABLET, FILM COATED ORAL at 12:50

## 2024-03-11 RX ADMIN — DIAZEPAM 10 MG: 5 TABLET ORAL at 20:42

## 2024-03-11 RX ADMIN — GABAPENTIN 100 MG: 100 CAPSULE ORAL at 08:26

## 2024-03-11 RX ADMIN — Medication 1 TABLET: at 08:25

## 2024-03-11 RX ADMIN — PANTOPRAZOLE SODIUM 40 MG: 40 TABLET, DELAYED RELEASE ORAL at 08:27

## 2024-03-11 ASSESSMENT — ACTIVITIES OF DAILY LIVING (ADL)
ADLS_ACUITY_SCORE: 42
ADLS_ACUITY_SCORE: 42
LAUNDRY: WITH SUPERVISION
ADLS_ACUITY_SCORE: 42
ORAL_HYGIENE: INDEPENDENT
ADLS_ACUITY_SCORE: 42
DRESS: SCRUBS (BEHAVIORAL HEALTH)
ADLS_ACUITY_SCORE: 42
ORAL_HYGIENE: INDEPENDENT
ADLS_ACUITY_SCORE: 42
HYGIENE/GROOMING: INDEPENDENT
ADLS_ACUITY_SCORE: 42
HYGIENE/GROOMING: INDEPENDENT
ADLS_ACUITY_SCORE: 42
ADLS_ACUITY_SCORE: 42
DRESS: SCRUBS (BEHAVIORAL HEALTH)
ADLS_ACUITY_SCORE: 42
ADLS_ACUITY_SCORE: 42

## 2024-03-11 NOTE — PROGRESS NOTES
"[facility], [place]   Psychiatric Progress Note  Hospital Day: 2        Interim History:   The patient's care was discussed with the treatment team during the daily team meeting and/or staff's chart notes were reviewed.  Staff report patient has been visible in the milieu, highly tremulous yesterday showing acute withdrawal symptoms, sweaty, limited appetite/intake, completed a phone interview with [facility] yesterday, plan is to go [id number] to  treatment, reporting some anxiety, denying depression, denies SI, taking medications as prescribed, MSSA scores 8 or great in past 24 hours, still in detox status.     Upon interview, the patient reports he is feeling better today, did not get much sleep last night due to his roommate snoring and talking in his sleep, requested to be moved to another room, states that he has no problems with his roommate but difficult for him to get in the last remains disruptiveness during the night, writer will discuss with RN.  Reports he also was having some \"weird dreams\".  He denies having any other withdrawal symptoms, was able to eat some this morning, when asked about tremor he stated that he has had a tremor in his left side after a back injury and subsequent surgeries, when he held out his hand it was notable that he had tremors on both hands, he does state that this likely is related to his alcohol use.  Appeared to minimize Amias his drinking and withdrawal symptoms.  Reports his mood has been stable, denies having any SI, HI or AVH.  Reports his plan continues to be to discharge to [facility], states that they are \"ready to take me whenever\" reviewed plan would be for him the likely discharge on Wednesday if medically stable so that he can start the treatment program on Thursday which is the day [facility] said they had a spot for patient.  Will continue to coordinate with [facility].  No additional concerns.         Medications:      famotidine  20 mg " Oral BID    folic acid  1 mg Oral Daily    gabapentin  100 mg Oral TID    metoprolol succinate ER  50 mg Oral Daily    multivitamin w/minerals  1 tablet Oral Daily    pantoprazole  40 mg Oral QAM AC    thiamine  100 mg Oral Daily    venlafaxine  75 mg Oral Daily with breakfast          Allergies:     Allergies   Allergen Reactions    Morphine Nausea and Vomiting    Oxycodone Itching     1/22 Patient reports he can tolerate this          Labs:     Recent Results (from the past 48 hour(s))   Alcohol level blood    Collection Time: 03/09/24  2:38 PM   Result Value Ref Range    Alcohol ethyl 0.47 (HH) <=0.01 g/dL   Comprehensive metabolic panel    Collection Time: 03/09/24  2:38 PM   Result Value Ref Range    Sodium 141 135 - 145 mmol/L    Potassium 4.3 3.4 - 5.3 mmol/L    Carbon Dioxide (CO2) 22 22 - 29 mmol/L    Anion Gap 18 (H) 7 - 15 mmol/L    Urea Nitrogen 5.4 (L) 6.0 - 20.0 mg/dL    Creatinine 0.81 0.67 - 1.17 mg/dL    GFR Estimate >90 >60 mL/min/1.73m2    Calcium 9.3 8.6 - 10.0 mg/dL    Chloride 101 98 - 107 mmol/L    Glucose 134 (H) 70 - 99 mg/dL    Alkaline Phosphatase 85 40 - 150 U/L    AST 88 (H) 0 - 45 U/L    ALT 32 0 - 70 U/L    Protein Total 8.0 6.4 - 8.3 g/dL    Albumin 4.3 3.5 - 5.2 g/dL    Bilirubin Total 0.5 <=1.2 mg/dL   CBC with platelets and differential    Collection Time: 03/09/24  2:38 PM   Result Value Ref Range    WBC Count 6.0 4.0 - 11.0 10e3/uL    RBC Count 4.62 4.40 - 5.90 10e6/uL    Hemoglobin 14.3 13.3 - 17.7 g/dL    Hematocrit 42.7 40.0 - 53.0 %    MCV 92 78 - 100 fL    MCH 31.0 26.5 - 33.0 pg    MCHC 33.5 31.5 - 36.5 g/dL    RDW 15.1 (H) 10.0 - 15.0 %    Platelet Count 171 150 - 450 10e3/uL    % Neutrophils 67 %    % Lymphocytes 21 %    % Monocytes 9 %    % Eosinophils 2 %    % Basophils 1 %    % Immature Granulocytes 0 %    NRBCs per 100 WBC 0 <1 /100    Absolute Neutrophils 4.1 1.6 - 8.3 10e3/uL    Absolute Lymphocytes 1.3 0.8 - 5.3 10e3/uL    Absolute Monocytes 0.5 0.0 - 1.3 10e3/uL     Absolute Eosinophils 0.1 0.0 - 0.7 10e3/uL    Absolute Basophils 0.0 0.0 - 0.2 10e3/uL    Absolute Immature Granulocytes 0.0 <=0.4 10e3/uL    Absolute NRBCs 0.0 10e3/uL   Magnesium    Collection Time: 03/09/24  2:38 PM   Result Value Ref Range    Magnesium 1.8 1.7 - 2.3 mg/dL   Urine Drug Screen Panel    Collection Time: 03/09/24  4:19 PM   Result Value Ref Range    Amphetamines Urine Screen Negative Screen Negative    Barbituates Urine Screen Negative Screen Negative    Benzodiazepine Urine Screen Negative Screen Negative    Cannabinoids Urine Screen Negative Screen Negative    Cocaine Urine Screen Negative Screen Negative    Fentanyl Qual Urine Screen Negative Screen Negative    Opiates Urine Screen Negative Screen Negative    PCP Urine Screen Negative Screen Negative   Alcohol level blood    Collection Time: 03/09/24  6:45 PM   Result Value Ref Range    Alcohol ethyl 0.38 (HH) <=0.01 g/dL   Lipid Profile    Collection Time: 03/11/24  9:47 AM   Result Value Ref Range    Cholesterol 251 (H) <200 mg/dL    Triglycerides 136 <150 mg/dL    Direct Measure HDL 89 >=40 mg/dL    LDL Cholesterol Calculated 135 (H) <=100 mg/dL    Non HDL Cholesterol 162 (H) <130 mg/dL   TSH with free T4 reflex    Collection Time: 03/11/24  9:47 AM   Result Value Ref Range    TSH 3.62 0.30 - 4.20 uIU/mL   Hemoglobin A1c    Collection Time: 03/11/24  9:47 AM   Result Value Ref Range    Hemoglobin A1C 5.6 <5.7 %   GGT    Collection Time: 03/11/24  9:47 AM   Result Value Ref Range     (H) 8 - 61 U/L   Basic metabolic panel    Collection Time: 03/11/24  9:47 AM   Result Value Ref Range    Sodium 131 (L) 135 - 145 mmol/L    Potassium 3.9 3.4 - 5.3 mmol/L    Chloride 91 (L) 98 - 107 mmol/L    Carbon Dioxide (CO2) 24 22 - 29 mmol/L    Anion Gap 16 (H) 7 - 15 mmol/L    Urea Nitrogen 10.0 6.0 - 20.0 mg/dL    Creatinine 0.89 0.67 - 1.17 mg/dL    GFR Estimate >90 >60 mL/min/1.73m2    Calcium 9.5 8.6 - 10.0 mg/dL    Glucose 100 (H) 70 - 99  "mg/dL          Psychiatric Examination:     BP (!) 151/106 (BP Location: Right arm, Patient Position: Supine, Cuff Size: Adult Regular)   Pulse 92   Temp 97.6  F (36.4  C) (Oral)   Resp 16   Ht 1.803 m (5' 11\")   Wt 81.6 kg (180 lb)   SpO2 97%   BMI 25.10 kg/m    Weight is 180 lbs 0 oz  Body mass index is 25.1 kg/m .    Orthostatic Vitals       None            Appearance: awake, alert and adequately groomed  Attitude:  cooperative  Eye Contact:  good  Mood:   \"doing good\"  Affect:  appropriate and in normal range and mood congruent  Speech:  clear, coherent and normal prosody  Language: fluent and intact in English  Psychomotor, Gait, Musculoskeletal:  no evidence of tardive dyskinesia, dystonia, or tics, tremor noted in hands  Thought Process:  goal oriented  Associations:  no loose associations  Thought Content:  no evidence of suicidal ideation or homicidal ideation and no evidence of psychotic thought, some minimizing of use and withdrawal symptoms   Insight:  fair  Judgement:  fair  Oriented to:  time, person, and place  Attention Span and Concentration:  intact  Recent and Remote Memory:  intact  Fund of Knowledge:  appropriate           Precautions:     Behavioral Orders   Procedures    Code 1 - Restrict to Unit    Routine Programming     As clinically indicated    Status 15     Every 15 minutes.    Withdrawal precautions          Diagnoses:   Alcohol Use Disorder, severe, in withdrawal, complicated by mild transaminitis and hx of DTs  Nicotine Use Disorder, moderate  MDD, recurrent, moderate to severe  Hyponatremia   High Anion Gap metabolic acidosis   Hyperlipidemia   Elevated LFTs    Clinically Significant Risk Factors                  # Hypertension: Noted on problem list        # Overweight: Estimated body mass index is 25.1 kg/m  as calculated from the following:    Height as of this encounter: 1.803 m (5' 11\").    Weight as of this encounter: 81.6 kg (180 lb)., PRESENT ON ADMISSION       # " Financial/Environmental Concerns:                Assessment & Plan:   Assessment and hospital summary:  This patient is a 48 year old  male with history of alcohol use disorder who presented to ED seeking detox from alcohol. Family history significant for alcoholism in father and grandfather. Psychosocial stressors include: inability to complete tasks at home due to excessive alcohol use. Patient was admitted on a voluntary basis to [facility] detox. Patient was started on MSSA protocol using Valium. Pt  does have a history of DTs, but not seizures. Thiamine, folic acid, and multivitamin were ordered on admission. IM consult placed to address acute medical concerns. Symptoms and presentation at this time is most consistent with Alcohol Use Disorder, severe, in withdrawal, complicated by mild transaminitis and hx of DTs. Pt reporting depressive symptoms and noted improvement in the past from Effexor. I have discussed the risks, benefits, and alternatives of PTA medication regimen, which will be continued at this time. Patient will likely benefit from CD treatment upon discharge. CTC will be meeting with patient to discuss dispo plan. Will consider anti-craving medications prior to discharge.     Inpatient psychiatric hospitalization is warranted at this time for safety, stabilization, and possible adjustment in medications.     Psychiatric treatment/inteventions:  Medications:   -continue MSSA with diazepam, thiamine, folic acid, multivitamin for alcohol withdrawal   -consider MAT for AUD prior to discharge   -continue Effexor XR 75mg daily for mood  -continue PTA gabapentin 100mg TID for anxiety and 300mg at bedtime for sleep   -PRN hydroxyzine 25mg every 4 hours for anxiety  -continue PRN NRT for nicotine dependence    -pt plans to attend residential CD tx at [facility], likely discharge Wednesday   -alerted IM to pts abnormal labs and requested any additional orders/monitoring as indicated      Laboratory/Imaging: reviewed since admission,this AM: BMP with sodium 131 (L), chloride 91 (L), anion gap 16 (H), glucose 100 (H) otherwise WNL; lipid panel cholesterol 251 (H),  (H), non- (H) otherwise WNL; TSH WNL; hemoglobin A1c WNL;  (H); will repeat BMP in a.m. to monitor sodium and anion gap    Patient will be treated in therapeutic milieu with appropriate individual and group therapies as described.    Medical treatment/interventions:  Medical concerns:   1) Alcohol withdrawal  -MSSA as above  -PRN zofran and imodium for GI distress related to withdrawal   -withdrawal precautions    2) IM consult placed for management of other medical concerns, per consult note on 3/10/24:   Circumstances of recent discharge and re-admittance noted. Please refer to recent medicine admission in chart dated 2/10/24, which was reviewed.  Patient was admitted to [facility] for alcohol withdrawal and hematemesis. Patient has longstanding history of hematemesis when he is drinking. EGD 2/8/24 with LA grade B esophagitis with no active bleeding. Per discharge summary, alcohol withdrawal symptoms were mild.  His hospital course was complicated by minimally symptomatic COVID, did not require Paxlovid, remdesivir or dexamethasone.      Patient's PTA medications were reviewed and ordered if appropriate.      No further recommendations at this time.   Please page medicine with any concerns.     Diandra Flores PA-C  Hospitalist Service          Disposition Plan   Reason for ongoing admission: requires detoxification from substance that poses a risk of bodily harm during withdrawal period  Discharge location: Chemical dependency treatment facility  Discharge Medications: not ordered  Follow-up Appointments: not scheduled  Legal Status: voluntary    >50 min total time that was spent in counseling and coordination of care with staff, reviewing medical record, educating patient about treatment options, side effects  and benefits and alternative treatments for medications, providing supportive therapy and redirection regarding above symptoms.     This document is created with the help of Dragon dictation system.  All grammatical/typing errors or context distortion are unintentional and inherent to software.    Patient has been seen and evaluated by Rhea perez DO.

## 2024-03-11 NOTE — PLAN OF CARE
"Goal Outcome Evaluation:    Plan of Care Reviewed With: patient Plan of Care Reviewed With: patient    Overall Patient Progress: improvingOverall Patient Progress: improving    Outcome Evaluation: pt remains in alcohol withdrawal monitoring, improving overall    MSSA scores 15, 9, and 6 today. 10mg valium administered x2 this shift. Pt is moderately tremulous and has a high pulse. Remains mainly tachycardic throughout shift with pulses of 131, 119, 127, and 92. Encouraged PO water intake. Also presents with a moderate appetite but does not usually eat breakfast. Denies any nausea, sweatiness, and hallucinations. Total valium administered since arrival to hospital = 130mg (all administered on 3A).    Pt reports anxiety of 5/10 as baseline for him and denies any depression. Also denies powerlessness, loneliness, and sadness. Reports feeling hopeful about \"getting back to work\".     Plan is to go to [facility] once out of detox. Will continue to monitor and intervene as warranted.  "

## 2024-03-11 NOTE — PROGRESS NOTES
Met with the patient today to discuss aftercare plans. The patients plan is to discharge and admitt directly to [facility] in [place]. This writer will set up transportation and contact the wife once the time is established that the patient will be out of detox.

## 2024-03-11 NOTE — PLAN OF CARE
Problem: Adult Behavioral Health Plan of Care  Goal: Plan of Care Review  Outcome: Progressing     Problem: Alcohol Withdrawal  Goal: Alcohol Withdrawal Symptom Control  Outcome: Progressing   Goal Outcome Evaluation:       Pt continues to seek detox from alcohol. Pt scored 8 and 8 on MSSA assessment scale. Diazepam administered X 2 with relief. BP continues to be high. However, it does not meet criteria for treatment with PRN Hydralazine. Pt approach this writer at the desk and request to check messages on his phone. He stated that he wants to know when his wife will be coming to pick to go residential treatment. No other consent noted. Will continue with same plan of care.

## 2024-03-12 LAB
AMMONIA PLAS-SCNC: 38 UMOL/L (ref 16–60)
ANION GAP SERPL CALCULATED.3IONS-SCNC: 15 MMOL/L (ref 7–15)
ANION GAP SERPL CALCULATED.3IONS-SCNC: 18 MMOL/L (ref 7–15)
BUN SERPL-MCNC: 10.6 MG/DL (ref 6–20)
BUN SERPL-MCNC: 11.7 MG/DL (ref 6–20)
CALCIUM SERPL-MCNC: 8.8 MG/DL (ref 8.6–10)
CALCIUM SERPL-MCNC: 9 MG/DL (ref 8.6–10)
CHLORIDE SERPL-SCNC: 87 MMOL/L (ref 98–107)
CHLORIDE SERPL-SCNC: 93 MMOL/L (ref 98–107)
CREAT SERPL-MCNC: 0.82 MG/DL (ref 0.67–1.17)
CREAT SERPL-MCNC: 0.92 MG/DL (ref 0.67–1.17)
DEPRECATED HCO3 PLAS-SCNC: 22 MMOL/L (ref 22–29)
DEPRECATED HCO3 PLAS-SCNC: 22 MMOL/L (ref 22–29)
EGFRCR SERPLBLD CKD-EPI 2021: >90 ML/MIN/1.73M2
EGFRCR SERPLBLD CKD-EPI 2021: >90 ML/MIN/1.73M2
GLUCOSE SERPL-MCNC: 74 MG/DL (ref 70–99)
GLUCOSE SERPL-MCNC: 80 MG/DL (ref 70–99)
HOLD SPECIMEN: NORMAL
POTASSIUM SERPL-SCNC: 3.7 MMOL/L (ref 3.4–5.3)
POTASSIUM SERPL-SCNC: 3.7 MMOL/L (ref 3.4–5.3)
SODIUM SERPL-SCNC: 127 MMOL/L (ref 135–145)
SODIUM SERPL-SCNC: 130 MMOL/L (ref 135–145)
SODIUM SERPL-SCNC: 133 MMOL/L (ref 135–145)

## 2024-03-12 PROCEDURE — 258N000003 HC RX IP 258 OP 636: Performed by: PHYSICIAN ASSISTANT

## 2024-03-12 PROCEDURE — 99233 SBSQ HOSP IP/OBS HIGH 50: CPT | Performed by: PSYCHIATRY & NEUROLOGY

## 2024-03-12 PROCEDURE — 80048 BASIC METABOLIC PNL TOTAL CA: CPT | Performed by: PSYCHIATRY & NEUROLOGY

## 2024-03-12 PROCEDURE — 250N000013 HC RX MED GY IP 250 OP 250 PS 637: Performed by: REGISTERED NURSE

## 2024-03-12 PROCEDURE — H2032 ACTIVITY THERAPY, PER 15 MIN: HCPCS

## 2024-03-12 PROCEDURE — 36415 COLL VENOUS BLD VENIPUNCTURE: CPT | Performed by: PHYSICIAN ASSISTANT

## 2024-03-12 PROCEDURE — 84295 ASSAY OF SERUM SODIUM: CPT | Performed by: PHYSICIAN ASSISTANT

## 2024-03-12 PROCEDURE — 36415 COLL VENOUS BLD VENIPUNCTURE: CPT | Performed by: PSYCHIATRY & NEUROLOGY

## 2024-03-12 PROCEDURE — 36415 COLL VENOUS BLD VENIPUNCTURE: CPT | Performed by: STUDENT IN AN ORGANIZED HEALTH CARE EDUCATION/TRAINING PROGRAM

## 2024-03-12 PROCEDURE — 82140 ASSAY OF AMMONIA: CPT | Performed by: STUDENT IN AN ORGANIZED HEALTH CARE EDUCATION/TRAINING PROGRAM

## 2024-03-12 PROCEDURE — 80048 BASIC METABOLIC PNL TOTAL CA: CPT | Performed by: STUDENT IN AN ORGANIZED HEALTH CARE EDUCATION/TRAINING PROGRAM

## 2024-03-12 PROCEDURE — 258N000003 HC RX IP 258 OP 636: Performed by: STUDENT IN AN ORGANIZED HEALTH CARE EDUCATION/TRAINING PROGRAM

## 2024-03-12 PROCEDURE — 99232 SBSQ HOSP IP/OBS MODERATE 35: CPT | Performed by: PHYSICIAN ASSISTANT

## 2024-03-12 PROCEDURE — 250N000013 HC RX MED GY IP 250 OP 250 PS 637: Performed by: PSYCHIATRY & NEUROLOGY

## 2024-03-12 PROCEDURE — 128N000004 HC R&B CD ADULT

## 2024-03-12 PROCEDURE — 250N000013 HC RX MED GY IP 250 OP 250 PS 637: Performed by: PHYSICIAN ASSISTANT

## 2024-03-12 RX ORDER — LIDOCAINE 40 MG/G
CREAM TOPICAL
Status: DISCONTINUED | OUTPATIENT
Start: 2024-03-12 | End: 2024-03-14 | Stop reason: HOSPADM

## 2024-03-12 RX ADMIN — FOLIC ACID 1 MG: 1 TABLET ORAL at 08:18

## 2024-03-12 RX ADMIN — SODIUM CHLORIDE 1000 ML: 9 INJECTION, SOLUTION INTRAVENOUS at 09:22

## 2024-03-12 RX ADMIN — HYDROXYZINE HYDROCHLORIDE 25 MG: 25 TABLET, FILM COATED ORAL at 14:54

## 2024-03-12 RX ADMIN — METOPROLOL SUCCINATE 50 MG: 50 TABLET, EXTENDED RELEASE ORAL at 08:18

## 2024-03-12 RX ADMIN — VENLAFAXINE HYDROCHLORIDE 75 MG: 75 CAPSULE, EXTENDED RELEASE ORAL at 08:18

## 2024-03-12 RX ADMIN — PANTOPRAZOLE SODIUM 40 MG: 40 TABLET, DELAYED RELEASE ORAL at 08:17

## 2024-03-12 RX ADMIN — Medication 1 TABLET: at 08:17

## 2024-03-12 RX ADMIN — DIAZEPAM 5 MG: 5 TABLET ORAL at 04:12

## 2024-03-12 RX ADMIN — GABAPENTIN 100 MG: 100 CAPSULE ORAL at 20:21

## 2024-03-12 RX ADMIN — FAMOTIDINE 20 MG: 20 TABLET ORAL at 20:21

## 2024-03-12 RX ADMIN — GABAPENTIN 100 MG: 100 CAPSULE ORAL at 08:17

## 2024-03-12 RX ADMIN — THIAMINE HCL TAB 100 MG 100 MG: 100 TAB at 08:18

## 2024-03-12 RX ADMIN — DIAZEPAM 10 MG: 5 TABLET ORAL at 00:19

## 2024-03-12 RX ADMIN — GABAPENTIN 300 MG: 300 CAPSULE ORAL at 23:01

## 2024-03-12 RX ADMIN — SODIUM CHLORIDE 1000 ML: 9 INJECTION, SOLUTION INTRAVENOUS at 05:40

## 2024-03-12 RX ADMIN — DIAZEPAM 10 MG: 5 TABLET ORAL at 06:36

## 2024-03-12 RX ADMIN — ACETAMINOPHEN 975 MG: 325 TABLET, FILM COATED ORAL at 20:22

## 2024-03-12 RX ADMIN — GABAPENTIN 100 MG: 100 CAPSULE ORAL at 13:33

## 2024-03-12 RX ADMIN — FAMOTIDINE 20 MG: 20 TABLET ORAL at 08:17

## 2024-03-12 ASSESSMENT — ACTIVITIES OF DAILY LIVING (ADL)
ADLS_ACUITY_SCORE: 42
ADLS_ACUITY_SCORE: 42
LAUNDRY: UNABLE TO COMPLETE
HYGIENE/GROOMING: INDEPENDENT
ADLS_ACUITY_SCORE: 42
DRESS: INDEPENDENT
ORAL_HYGIENE: INDEPENDENT
ADLS_ACUITY_SCORE: 42

## 2024-03-12 NOTE — PLAN OF CARE
Problem: Adult Behavioral Health Plan of Care  Goal: Plan of Care Review  Recent Flowsheet Documentation  Taken 3/12/2024 1100 by Odette Dutta RN  Patient Agreement with Plan of Care: agrees   Goal Outcome Evaluation:    Plan of Care Reviewed With: patient        Pt remains intermittently confused and unsteady, continued SIO for safety, pt endorsing a good appetite, MSSA 6/6 , denies pain, SI/HI, Na 130, repeat IL of NS bolus, patient encouraged to drink more fluids.  Redirected to room effectively, pt isolative to self.       Repeat draw of Na 133, IV hydration helpful, updated IM, discontinued peripheral IV on Lt arm, pt will have a repeat  Na + draw in the AM.     Hydroxyzine given for anxiety.

## 2024-03-12 NOTE — PROGRESS NOTES
Called regarding some confusion. Vitals stable. Bautista ammonia and AM labs. Ammonia normal, sodium 127. Give 1L normal saline and repeat labs. If sodium not improved or if confusion worsening consider bringing to medicine service.     MILENA Chowdhury MD  Internal Medicine-Pediatrics

## 2024-03-12 NOTE — PLAN OF CARE
Problem: Alcohol Withdrawal  Goal: Alcohol Withdrawal Symptom Control  3/12/2024 1833 by Deanne Grewal, RN  Outcome: Not Progressing   Goal Outcome Evaluation:    Pt is on SIO for confusion.   He continues in detox for alcohol with prn valium.  Last valium 3/12 at 0637, total valium since admission 175mg.  MSSA 6, mostly due to shakes and heart rate in the 90's.    Pt is hyponatremic with a sodium level of 133 now from 127  and ivf overnight.  Will repeat BMP in AM.     Pt continues to be intermittently confused. When asked to tell me where he was, pt said [employer] [employer] working in a [employer] store, and said what brought him here was his son plays good football. However, told me exactly what he ate for breakfast. Did not remember what he had for lunch.    Attended, but did not participate in groups. Sometimes stood around at the nursing station. Asked for his phone at one point to look up numbers. Then was in the lounge looking for his phone he had given it back. Needed a lot of redirection.

## 2024-03-12 NOTE — PROGRESS NOTES
"[facility], [place]   Psychiatric Progress Note  Hospital Day: 3        Interim History:   The patient's care was discussed with the treatment team during the daily team meeting and/or staff's chart notes were reviewed.  Staff report patient appeared to be increasingly confused last night, odd behaviors, putting underwear in garbage, trying to go into others rooms, was placed on SIO, IM was notified, labs were completed Na level lower, given fluids with plan to repeat labs in AM, MSSA scores less than 8 overnight.     Upon interview, patient was in his room with staff present, was getting at her IV fluids started, he did not appear to remember meeting with writer, writer introduced self, patient asked when his \"procedure would be\" checked orientation, patient initially stated the year was 1824 and then when asked again stated 1924 and then later able to state 2024.  He thought he was at [facility], he is aware he is in the hospital, had [facility] city of [place].  He states he is in the hospital because his wife brought him in last night \"for no reason\" he is provided orientation to situation that he has been on this unit for more than one night.  He reports stability in his mood, denies any SI or HI.  Denies having any hallucinations.  He states that he has some confusion due to some other medical history, reviewed that his wife had talked to him earlier in the day and expressed concern with staff about his level of confusion as well and that this does not appear to be consistent with his baseline.  Reviewed that internal medicine is aware and following lab work and he will be monitored closely.  He appeared confused about timeline for going to CD treatment, informed patient that there was a spot available for him Thursday and that team will see how he is doing and his medical stability about possible discharge tomorrow or Thursday to CD treatment.  He expressed understanding. "  No additional concerns.         Medications:      famotidine  20 mg Oral BID    folic acid  1 mg Oral Daily    gabapentin  100 mg Oral TID    metoprolol succinate ER  50 mg Oral Daily    multivitamin w/minerals  1 tablet Oral Daily    pantoprazole  40 mg Oral QAM AC    sodium chloride (PF)  3 mL Intracatheter Q8H    thiamine  100 mg Oral Daily    venlafaxine  75 mg Oral Daily with breakfast          Allergies:     Allergies   Allergen Reactions    Morphine Nausea and Vomiting    Oxycodone Itching     1/22 Patient reports he can tolerate this          Labs:     Recent Results (from the past 48 hour(s))   Lipid Profile    Collection Time: 03/11/24  9:47 AM   Result Value Ref Range    Cholesterol 251 (H) <200 mg/dL    Triglycerides 136 <150 mg/dL    Direct Measure HDL 89 >=40 mg/dL    LDL Cholesterol Calculated 135 (H) <=100 mg/dL    Non HDL Cholesterol 162 (H) <130 mg/dL   TSH with free T4 reflex    Collection Time: 03/11/24  9:47 AM   Result Value Ref Range    TSH 3.62 0.30 - 4.20 uIU/mL   Hemoglobin A1c    Collection Time: 03/11/24  9:47 AM   Result Value Ref Range    Hemoglobin A1C 5.6 <5.7 %   GGT    Collection Time: 03/11/24  9:47 AM   Result Value Ref Range     (H) 8 - 61 U/L   Basic metabolic panel    Collection Time: 03/11/24  9:47 AM   Result Value Ref Range    Sodium 131 (L) 135 - 145 mmol/L    Potassium 3.9 3.4 - 5.3 mmol/L    Chloride 91 (L) 98 - 107 mmol/L    Carbon Dioxide (CO2) 24 22 - 29 mmol/L    Anion Gap 16 (H) 7 - 15 mmol/L    Urea Nitrogen 10.0 6.0 - 20.0 mg/dL    Creatinine 0.89 0.67 - 1.17 mg/dL    GFR Estimate >90 >60 mL/min/1.73m2    Calcium 9.5 8.6 - 10.0 mg/dL    Glucose 100 (H) 70 - 99 mg/dL   Basic metabolic panel    Collection Time: 03/12/24  2:54 AM   Result Value Ref Range    Sodium 127 (L) 135 - 145 mmol/L    Potassium 3.7 3.4 - 5.3 mmol/L    Chloride 87 (L) 98 - 107 mmol/L    Carbon Dioxide (CO2) 22 22 - 29 mmol/L    Anion Gap 18 (H) 7 - 15 mmol/L    Urea Nitrogen 11.7 6.0 -  "20.0 mg/dL    Creatinine 0.92 0.67 - 1.17 mg/dL    GFR Estimate >90 >60 mL/min/1.73m2    Calcium 9.0 8.6 - 10.0 mg/dL    Glucose 80 70 - 99 mg/dL   Ammonia    Collection Time: 03/12/24  2:54 AM   Result Value Ref Range    Ammonia 38 16 - 60 umol/L   Basic metabolic panel    Collection Time: 03/12/24  7:41 AM   Result Value Ref Range    Sodium 130 (L) 135 - 145 mmol/L    Potassium 3.7 3.4 - 5.3 mmol/L    Chloride 93 (L) 98 - 107 mmol/L    Carbon Dioxide (CO2) 22 22 - 29 mmol/L    Anion Gap 15 7 - 15 mmol/L    Urea Nitrogen 10.6 6.0 - 20.0 mg/dL    Creatinine 0.82 0.67 - 1.17 mg/dL    GFR Estimate >90 >60 mL/min/1.73m2    Calcium 8.8 8.6 - 10.0 mg/dL    Glucose 74 70 - 99 mg/dL   Extra Purple Top Tube    Collection Time: 03/12/24  7:41 AM   Result Value Ref Range    Hold Specimen JIC           Psychiatric Examination:     BP (!) 136/100   Pulse 101   Temp 97.4  F (36.3  C) (Oral)   Resp 16   Ht 1.803 m (5' 11\")   Wt 81.6 kg (180 lb)   SpO2 98%   BMI 25.10 kg/m    Weight is 180 lbs 0 oz  Body mass index is 25.1 kg/m .    Orthostatic Vitals       None            Appearance: awake, alert and adequately groomed  Attitude:  cooperative  Eye Contact:  good  Mood:   \"just fine\"  Affect:  appropriate and in normal range and mood congruent  Speech:  clear, coherent and normal prosody  Language: fluent and intact in English  Psychomotor, Gait, Musculoskeletal:  no evidence of tardive dyskinesia, dystonia, or tics, tremor still noted in BUE  Thought Process:   tangential, illogical at times  Associations:  no loose associations  Thought Content:  no evidence of suicidal ideation or homicidal ideation and no evidence of psychotic thought, appears confused  Insight:  partial  Judgement:  limited  Oriented to:   person, state, city, month, reported it was \"1824\" and that he was at [facility]   Attention Span and Concentration:  limited  Recent and Remote Memory:  limited  Fund of Knowledge:  " "appropriate           Precautions:     Behavioral Orders   Procedures    Code 1 - Restrict to Unit    Routine Programming     As clinically indicated    Status 15     Every 15 minutes.    Status Individual Observation     Patient SIO status reviewed with team/RN.  Please also refer to RN/team documentation for add'l detail.     -SIO staff to monitor following which have contributed to patient being on SIO:  Pt very confused, needing redirection   -Possible interventions SIO staff could use to support patient's treatment progress:  Redirect pt  -When following observed, team will review discontinuation of SIO:  When pt able to navigate milieu safely     Order Specific Question:   CONTINUOUS 24 hours / day     Answer:   5 feet     Order Specific Question:   Indications for SIO     Answer:   Severe intrusiveness    Withdrawal precautions          Diagnoses:   Alcohol Use Disorder, severe, in withdrawal, complicated by mild transaminitis and hx of DTs  Nicotine Use Disorder, moderate  MDD, recurrent, moderate to severe  Hyponatremia   High Anion Gap metabolic acidosis   Hyperlipidemia   Elevated LFTs  AMS    Clinically Significant Risk Factors         # Hyponatremia: Lowest Na = 127 mmol/L in last 2 days, will monitor as appropriate          # Hypertension: Noted on problem list        # Overweight: Estimated body mass index is 25.1 kg/m  as calculated from the following:    Height as of this encounter: 1.803 m (5' 11\").    Weight as of this encounter: 81.6 kg (180 lb)., PRESENT ON ADMISSION       # Financial/Environmental Concerns:                Assessment & Plan:   Assessment and hospital summary:  This patient is a 48 year old  male with history of alcohol use disorder who presented to ED seeking detox from alcohol. Family history significant for alcoholism in father and grandfather. Psychosocial stressors include: inability to complete tasks at home due to excessive alcohol use. Patient was admitted on a " voluntary basis to [facility] detox. Patient was started on MSSA protocol using Valium. Pt  does have a history of DTs, but not seizures. Thiamine, folic acid, and multivitamin were ordered on admission. IM consult placed to address acute medical concerns. Symptoms and presentation at this time is most consistent with Alcohol Use Disorder, severe, in withdrawal, complicated by mild transaminitis and hx of DTs. Pt reporting depressive symptoms and noted improvement in the past from Effexor. I have discussed the risks, benefits, and alternatives of PTA medication regimen, which will be continued at this time. Patient will likely benefit from CD treatment upon discharge. CTC will be meeting with patient to discuss dispo plan. Will consider anti-craving medications prior to discharge. Pt having low sodium, discussed with IM on 3/11, recommended encouraging intake and to alert them in AM on 3/12 if repeat labs not improving, in evening of 3/11 pt became increasingly confused, having odd behaviors, was placed on SIO 1:1 staffing for safety, IM notified, labs obtained, Na dropping, was started on IVF with repeat labs ordered for AM of 3/12.     Inpatient psychiatric hospitalization is warranted at this time for safety, stabilization, and possible adjustment in medications.     Psychiatric treatment/inteventions:  Medications:   -continue MSSA with diazepam, thiamine, folic acid, multivitamin for alcohol withdrawal   -consider MAT for AUD prior to discharge, pt too disoriented to discuss today  -continue Effexor XR 75mg daily for mood  -continue PTA gabapentin 100mg TID for anxiety and 300mg at bedtime for sleep   -PRN hydroxyzine 25mg every 4 hours for anxiety  -continue PRN NRT for nicotine dependence    -pt plans to attend residential CD tx at [facility], likely discharge Wednesday or Thursday pending stabilization     Laboratory/Imaging: BMP obtained at 2:54 AM with sodium 127 (L), chloride 87 (L), anion gap 18 (H)  otherwise WNL; ammonia WNL at 38; repeat BMP this a.m. with sodium 130 (L), chloride 93 (L) otherwise WNL    Patient will be treated in therapeutic milieu with appropriate individual and group therapies as described.    Medical treatment/interventions:  Medical concerns:   1) Alcohol withdrawal  -MSSA as above  -PRN zofran and imodium for GI distress related to withdrawal   -withdrawal precautions    2) IM consult placed for management of other medical concerns, per consult note on 3/10/24:   Circumstances of recent discharge and re-admittance noted. Please refer to recent medicine admission in chart dated 2/10/24, which was reviewed.  Patient was admitted to [facility] for alcohol withdrawal and hematemesis. Patient has longstanding history of hematemesis when he is drinking. EGD 2/8/24 with LA grade B esophagitis with no active bleeding. Per discharge summary, alcohol withdrawal symptoms were mild.  His hospital course was complicated by minimally symptomatic COVID, did not require Paxlovid, remdesivir or dexamethasone.      Patient's PTA medications were reviewed and ordered if appropriate.      No further recommendations at this time.   Please page medicine with any concerns.     Diandra Flores PA-C  Hospitalist Service    3) Discussed abnormal labs with IM on 3/11 and again 3/12, additional orders placed by IM on 3/12 including fluids and they will see patient this AM, appreciate assistance.           Disposition Plan   Reason for ongoing admission: requires detoxification from substance that poses a risk of bodily harm during withdrawal period  [facility] if medically stabilized  Discharge Medications: not ordered  Follow-up Appointments: not scheduled  Legal Status: voluntary    >50 min total time that was spent in counseling and coordination of care with staff, reviewing medical record, educating patient about treatment options,  side effects and benefits and alternative treatments for medications, providing supportive therapy and redirection regarding above symptoms.     This document is created with the help of Dragon dictation system.  All grammatical/typing errors or context distortion are unintentional and inherent to software.    Patient has been seen and evaluated by Rhea perez DO.

## 2024-03-12 NOTE — PROGRESS NOTES
"       03/12/24 1800     Group Therapy Session   Time Session Began 1645   Time Session Ended 1740   Total Time (minutes) 55   Total # Attendees 3   Group Type expressive therapy   Group Topic Covered disease of addiction/choices in recovery;relapse prevention   Group Session Detail \"Hold On\"   Patient Response/Contribution cooperative with task   Patient Participation Detail Topics of Acceptance and Change were explored through live, original songs played by MT, followed by reflecting writing and sharing by pts.  Pt wrote on Change in their life and shared with the group.  Pt expressed emotions and frustrations at his addiction. Engaged well in group process.           "

## 2024-03-12 NOTE — PROGRESS NOTES
[facility]    Medicine Progress Note - Hospitalist Service    Date of Admission:  3/9/2024    Assessment & Plan   colleen Shah is a 48 year old male with past medical history significant for alcohol use disorder and prior withdrawal, subdural hematoma s/p bur hole and evacuation, HTN who is admitted to [facility] 3A for detox from alcohol. Detox complicated by hyponatremia.     Alcohol use disorder   Alcohol withdrawal   Patient has history of alcohol use disorder.  Patient denies any history of DTs or withdrawal seizures.  On admission patient reported drinking 1.75 of vodka daily for several years.  -Agree with multivitamin, folic acid and thiamine   -Agree with MSSA with valium per protocol     Hyponatremia   Sodium 141 on admission Down to 131 on 3/11. Further decreased to 127 this a.m. status post 1 L normal saline with repeat sodium 130.  Patient was noted to have some confusion overnight.  On assessment this a.m. patient oriented to person, place, date, situation.  Neuro exam within normal limits.  -Additional 1 L normal saline today  -Repeat sodium at 1400  -Repeat BMP in a.m.    Elevated anion gap, resolved  And anion gap elevated on admission at 18.  Improved with bolus of normal saline earlier this a.m.  -Additional IV fluids as above  -Encourage adequate p.o. intake    Hx of Esophagitis   Recent admission 2/8/24 for hematemesis. EGD with Grade B Esophagitis. Hgb stable.    -Noted     HTN   -Continue PTA metoprolol     Dyslipidemia  Recent Labs   Lab Test 03/11/24  0947   CHOL 251*   HDL 89   *   TRIG 136   -Recommend lifestyle modifications  -Follow-up with PCP for ongoing monitoring and management      Hx of subdural hematoma s/p bur hole and hematoma evacuation (Dec 2023)  Patient notes some cognitive decline since surgery. Report short term memory loss at time.      Overweight: Estimated body mass index is 25.1 kg/m  as calculated from the  "following:    Height as of this encounter: 1.803 m (5' 11\").    Weight as of this encounter: 81.6 kg (180 lb).      Clinically Significant Risk Factors         # Hyponatremia: Lowest Na = 127 mmol/L in last 2 days, will monitor as appropriate          # Hypertension: Noted on problem list      , PRESENT ON ADMISSION     # Financial/Environmental Concerns:              The patient's care was discussed with the Patient and Primary team.    SEVEN Lowery  Hospitalist Service  [facility]  Securely message with [employer] (more info)  Text page via [facility] Paging/Directory   ______________________________________________________________________    Interval History   Patient seen and examined.  Patient reports drinking 750 mL of vodka per day prior to admission.  He notes intermittently headaches with nausea during withdrawal.  Denies any history of seizures or hallucinations.  Patient reports some short-term memory issues with cognitive decline since January after being treated for subdural hematoma in December 2023.  Reports he feels his cognition is at baseline currently.  He notes he was able to sleep well last night.  Reports tolerating diet yesterday and this a.m.  Denies any sweats, chills, chest pain, shortness of breath, abdominal pain, bowel or bladder issues, numbness or tingling.    Physical Exam   Blood pressure (!) 144/105, pulse 98, temperature 97.8  F (36.6  C), temperature source Oral, resp. rate 18, height 1.803 m (5' 11\"), weight 81.6 kg (180 lb), SpO2 98%.  GENERAL: Alert and oriented x 3. NAD.  HEENT: Anicteric sclera. Mucous membranes moist and without lesions.   CV: RRR. S1, S2. No murmurs appreciated.   RESPIRATORY: Effort normal on RA. Lungs CTAB with no wheezing, rales, rhonchi.   GI: Abdomen soft and non distended, bowel sounds present. No tenderness, rebound, guarding.   MUSCULOSKELETAL: No joint swelling or tenderness. Moves all extremities. "   NEUROLOGICAL: Cranial nerves II through XII intact. Strength 5/5 bilaterally in upper and lower extremities.  Sensation intact throughout.  Finger-to-nose test intact. Rapid alternating movements intact.  Gait steady with use of IV pole for balance.  EXTREMITIES: No peripheral edema. Intact bilateral pedal pulses.   SKIN: No jaundice. No rashes.       Medical Decision Making       45 MINUTES SPENT BY ME on the date of service doing chart review, history, exam, documentation & further activities per the note.      Data     I have personally reviewed the following data over the past 24 hrs:    N/A  \   N/A   / N/A     130 (L) 93 (L) 10.6 /  74   3.7 22 0.82 \
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
## 2024-03-12 NOTE — PLAN OF CARE
"  Problem: Adult Behavioral Health Plan of Care  Goal: Adheres to Safety Considerations for Self and Others  Outcome: Progressing  Intervention: Develop and Maintain Individualized Safety Plan  Recent Flowsheet Documentation  Taken 3/11/2024 9918 by Dale Díaz RN  Safety Measures:   clinical history reviewed   environmental rounds completed   safety rounds completed   self-directed behavior promoted   suicide assessment completed   suicide check-in completed     Problem: Adult Behavioral Health Plan of Care  Goal: Optimized Coping Skills in Response to Life Stressors  Outcome: Progressing   Goal Outcome Evaluation:         Patient had flat affect. Mood was noted anxious and depressed. Was isolative to self, paced on the hallway, and he had minimal socialization with peers. Patient noted confused and have difficulty processing information. Patient was informed that he had withdrawal process going on and he will not be discharged today. Patient continued to repeat the same question, \"can I be discharged today\". Patient response to assessment question most often does not correlate. Was guarded and was focused on discharge. Did not participate in group activities. MSSA 8 & 10. Patient denied pain, anxiety, depression, covid 19 symptoms, SI/HI/SIB/AH/VH, and contracted for safety. Was given Hydroxyzine 25 mg for anxiety, Valium 10 mg x 2 and scheduled medications compliant and had good appetite. Will continue to monitor patient.                  "

## 2024-03-13 LAB
ANION GAP SERPL CALCULATED.3IONS-SCNC: 11 MMOL/L (ref 7–15)
BUN SERPL-MCNC: 9.8 MG/DL (ref 6–20)
CALCIUM SERPL-MCNC: 9.3 MG/DL (ref 8.6–10)
CHLORIDE SERPL-SCNC: 98 MMOL/L (ref 98–107)
CREAT SERPL-MCNC: 0.77 MG/DL (ref 0.67–1.17)
DEPRECATED HCO3 PLAS-SCNC: 25 MMOL/L (ref 22–29)
EGFRCR SERPLBLD CKD-EPI 2021: >90 ML/MIN/1.73M2
GLUCOSE SERPL-MCNC: 99 MG/DL (ref 70–99)
POTASSIUM SERPL-SCNC: 3.7 MMOL/L (ref 3.4–5.3)
SODIUM SERPL-SCNC: 134 MMOL/L (ref 135–145)

## 2024-03-13 PROCEDURE — 80048 BASIC METABOLIC PNL TOTAL CA: CPT | Performed by: PHYSICIAN ASSISTANT

## 2024-03-13 PROCEDURE — 99207 PR NO BILLABLE SERVICE THIS VISIT: CPT | Performed by: PHYSICIAN ASSISTANT

## 2024-03-13 PROCEDURE — 99233 SBSQ HOSP IP/OBS HIGH 50: CPT | Performed by: PSYCHIATRY & NEUROLOGY

## 2024-03-13 PROCEDURE — 250N000013 HC RX MED GY IP 250 OP 250 PS 637: Performed by: REGISTERED NURSE

## 2024-03-13 PROCEDURE — 250N000013 HC RX MED GY IP 250 OP 250 PS 637: Performed by: PHYSICIAN ASSISTANT

## 2024-03-13 PROCEDURE — 250N000013 HC RX MED GY IP 250 OP 250 PS 637: Performed by: PSYCHIATRY & NEUROLOGY

## 2024-03-13 PROCEDURE — 36415 COLL VENOUS BLD VENIPUNCTURE: CPT | Performed by: PHYSICIAN ASSISTANT

## 2024-03-13 PROCEDURE — 128N000004 HC R&B CD ADULT

## 2024-03-13 RX ORDER — VENLAFAXINE HYDROCHLORIDE 75 MG/1
75 CAPSULE, EXTENDED RELEASE ORAL
Qty: 30 CAPSULE | Refills: 0 | Status: ON HOLD | OUTPATIENT
Start: 2024-03-14 | End: 2024-05-24

## 2024-03-13 RX ORDER — FAMOTIDINE 20 MG/1
20 TABLET, FILM COATED ORAL 2 TIMES DAILY
Qty: 60 TABLET | Refills: 0 | Status: ON HOLD | OUTPATIENT
Start: 2024-03-13 | End: 2024-05-24

## 2024-03-13 RX ORDER — NALTREXONE HYDROCHLORIDE 50 MG/1
50 TABLET, FILM COATED ORAL DAILY
Qty: 30 TABLET | Refills: 0 | Status: SHIPPED | OUTPATIENT
Start: 2024-03-14

## 2024-03-13 RX ORDER — PANTOPRAZOLE SODIUM 40 MG/1
40 TABLET, DELAYED RELEASE ORAL DAILY
Qty: 30 TABLET | Refills: 0 | Status: ON HOLD | OUTPATIENT
Start: 2024-03-13 | End: 2024-05-24

## 2024-03-13 RX ORDER — NALTREXONE HYDROCHLORIDE 50 MG/1
50 TABLET, FILM COATED ORAL DAILY
Status: DISCONTINUED | OUTPATIENT
Start: 2024-03-14 | End: 2024-03-14 | Stop reason: HOSPADM

## 2024-03-13 RX ORDER — BIOTIN 10 MG
2 TABLET ORAL DAILY
Qty: 60 TABLET | Refills: 0 | Status: SHIPPED | OUTPATIENT
Start: 2024-03-13

## 2024-03-13 RX ORDER — LANOLIN ALCOHOL/MO/W.PET/CERES
100 CREAM (GRAM) TOPICAL DAILY
Qty: 30 TABLET | Refills: 0 | Status: SHIPPED | OUTPATIENT
Start: 2024-03-14 | End: 2024-08-20

## 2024-03-13 RX ORDER — GABAPENTIN 300 MG/1
300 CAPSULE ORAL
Qty: 30 CAPSULE | Refills: 0 | Status: ON HOLD | OUTPATIENT
Start: 2024-03-13 | End: 2024-06-15

## 2024-03-13 RX ORDER — METOPROLOL SUCCINATE 50 MG/1
50 TABLET, EXTENDED RELEASE ORAL DAILY
Qty: 30 TABLET | Refills: 0 | Status: SHIPPED | OUTPATIENT
Start: 2024-03-13

## 2024-03-13 RX ORDER — FOLIC ACID 1 MG/1
1 TABLET ORAL DAILY
Qty: 30 TABLET | Refills: 0 | Status: SHIPPED | OUTPATIENT
Start: 2024-03-13 | End: 2024-08-20

## 2024-03-13 RX ORDER — GABAPENTIN 100 MG/1
100 CAPSULE ORAL 3 TIMES DAILY
Qty: 90 CAPSULE | Refills: 0 | Status: ON HOLD | OUTPATIENT
Start: 2024-03-13 | End: 2024-06-14

## 2024-03-13 RX ADMIN — METOPROLOL SUCCINATE 50 MG: 50 TABLET, EXTENDED RELEASE ORAL at 08:19

## 2024-03-13 RX ADMIN — PANTOPRAZOLE SODIUM 40 MG: 40 TABLET, DELAYED RELEASE ORAL at 08:19

## 2024-03-13 RX ADMIN — GABAPENTIN 100 MG: 100 CAPSULE ORAL at 14:23

## 2024-03-13 RX ADMIN — THIAMINE HCL TAB 100 MG 100 MG: 100 TAB at 08:19

## 2024-03-13 RX ADMIN — VENLAFAXINE HYDROCHLORIDE 75 MG: 75 CAPSULE, EXTENDED RELEASE ORAL at 08:19

## 2024-03-13 RX ADMIN — GABAPENTIN 100 MG: 100 CAPSULE ORAL at 08:19

## 2024-03-13 RX ADMIN — FAMOTIDINE 20 MG: 20 TABLET ORAL at 20:26

## 2024-03-13 RX ADMIN — GABAPENTIN 300 MG: 300 CAPSULE ORAL at 20:29

## 2024-03-13 RX ADMIN — FOLIC ACID 1 MG: 1 TABLET ORAL at 08:19

## 2024-03-13 RX ADMIN — Medication 1 TABLET: at 08:19

## 2024-03-13 RX ADMIN — HYDROXYZINE HYDROCHLORIDE 25 MG: 25 TABLET, FILM COATED ORAL at 20:29

## 2024-03-13 RX ADMIN — GABAPENTIN 100 MG: 100 CAPSULE ORAL at 20:26

## 2024-03-13 RX ADMIN — FAMOTIDINE 20 MG: 20 TABLET ORAL at 08:19

## 2024-03-13 ASSESSMENT — ACTIVITIES OF DAILY LIVING (ADL)
ADLS_ACUITY_SCORE: 42
DRESS: INDEPENDENT
ADLS_ACUITY_SCORE: 42
ADLS_ACUITY_SCORE: 42
HYGIENE/GROOMING: INDEPENDENT
ADLS_ACUITY_SCORE: 42
HYGIENE/GROOMING: SHOWER
ADLS_ACUITY_SCORE: 42
ORAL_HYGIENE: INDEPENDENT
ADLS_ACUITY_SCORE: 42

## 2024-03-13 NOTE — PLAN OF CARE
Problem: Alcohol Withdrawal  Goal: Alcohol Withdrawal Symptom Control  3/13/2024 0048 by Ann-Marie Florence RN  Outcome: Progressing     Problem: Sleep Disturbance  Goal: Adequate Sleep/Rest  Outcome: Progressing     Problem: Fall Injury Risk  Goal: Absence of Fall and Fall-Related Injury  Outcome: Progressing   Goal Outcome Evaluation:    The Patient's MSSA score was 6. He slept well; the 15-minute safety checks are in progress with no related incidents. The Patient did not receive medication for withdrawal symptoms. They did receive medication for alcohol withdrawal symptoms on 3/12/24 at 06:36. As per policy; the Writer took the Patient out of detox status since they had not taken any medication for withdrawal symptoms for 24 hours. Due to severe intrusiveness and confusion, he continues to have an SIO 1:1 sitter at bedside safety. The sitter also provided hands on assistance to get him to the toilet due to unsteadiness.

## 2024-03-13 NOTE — PROGRESS NOTES
Brief Medicine Note    Chart reviewed. Repeat Na 134 today. Medicine will continue to follow peripherally. No new recommendations. Please contact on-call provider if any new concerns.     Tres Gutierrez PA-C  [facility]  Page: Jazmyn or DARLENE

## 2024-03-13 NOTE — PLAN OF CARE
"  Problem: Adult Behavioral Health Plan of Care  Goal: Plan of Care Review  Outcome: Progressing  Flowsheets (Taken 3/13/2024 1000)  Patient Agreement with Plan of Care: agrees     Problem: Fall Injury Risk  Goal: Absence of Fall and Fall-Related Injury  Outcome: Progressing   Goal Outcome Evaluation:    Plan of Care Reviewed With: patient               Patient alert on approach. Reports that he slept all night. Ate about 50% of his breakfast and went back to his room to rest. He was compliant with medications and pleasant. Noted he stays safe on the unit. Vitals noted as BP (!) 140/96   Pulse 111   Temp 97  F (36.1  C) (Temporal)   Resp 16   Ht 1.803 m (5' 11\")   Wt 81.6 kg (180 lb)   SpO2 95%   BMI 25.10 kg/m    No further concerns noted. Patient remains on an SIO 5 feet due to severe intrusiveness.    Per PA staffs that completed time on SIO with patient, patient is noted as clearer in speech and more oriented-no confusion noted since he woke up from long nap this morning. Writer completed orientation assessment with patient and he is alert and oriented times 4. He is having clear and regular conversations with staffs, no confusion noted. Provider placed order for patient to continued to be monitored and SIO can be discontinued at about 1900 if (see order for details). Writer spoke with provider and the above plan was agreed upon. Writer will report this over to next nurse.   Patient compliant with his medications.    Writer spoke with patient's wife after he completed ROGERIO for Dedra/pt's wife. At 1240, writer updated patient's wife of how he is doing much better on the unit today, he had rested and was up conversing with PA staff at the dinning table after he had lunch. She is aware of patient's discharge tomorrow and intake at 1300. She is hoping to pick patient up at 1100 tomorrow's morning so that they can spend some time together and she can later take him to [facility] for his intake at 1300.   Patient " is aware and he noted that he feels better today and he showered-he will call his wife later today and speak with her after writer noted that his wife was looking forward to speaking with him.   Report will be given to next shift's nurse.

## 2024-03-13 NOTE — PROGRESS NOTES
"[facility], [place]   Psychiatric Progress Note  Hospital Day: 4        Interim History:   The patient's care was discussed with the treatment team during the daily team meeting and/or staff's chart notes were reviewed.  Staff report patient has been visible in the milieu, continues to have intermittent confusion, believing he was in a hardware store in [place] or watching son playing football, other times appeared oriented, taking medications as prescribed, MSSA scores have been less than 8 for 24 hours, out of detox status, has remained on SIO for safety and has continued to need redirection.     Upon interview, patient was lying in bed resting, his one-to-one staff was present, he reports that he is feeling \"fine\" denies having any withdrawal symptoms currently, reports no concerns with his mood, reported euthymia.  Appeared somewhat dismissive when writer was discussing the concerns regarding his confusion and withdrawal symptoms in the past few days.  He states that he feels he is clear thinking clearly today and does not have any concerns about confusion, he was fully oriented today, much improved from the past 2 days.  Discussed that the plan was for him to admit to [facility] for CD treatment tomorrow, writer will coordinate with [facility].  Patient reports he is tolerating medications, discussed possible medications to help support his sobriety upon discharge, he is open to trial of naltrexone.  This will be ordered along with other medications for discharge.  He denies having safety concerns including SI or HI, denies AVH.  No additional concerns.    Discussed with pts RN the improvement noted in pts confusion/orientation, will continue to monitor throughout afternoon and if no further episodes of confusion with discontinue SIO at 7pm.     Sent page to IM alerting them of likely discharge planned for tomorrow AM, appreciate assistance.          Medications:      famotidine  20 mg " Oral BID    folic acid  1 mg Oral Daily    gabapentin  100 mg Oral TID    metoprolol succinate ER  50 mg Oral Daily    multivitamin w/minerals  1 tablet Oral Daily    pantoprazole  40 mg Oral QAM AC    thiamine  100 mg Oral Daily    venlafaxine  75 mg Oral Daily with breakfast          Allergies:     Allergies   Allergen Reactions    Morphine Nausea and Vomiting    Oxycodone Itching     1/22 Patient reports he can tolerate this          Labs:     Recent Results (from the past 48 hour(s))   Lipid Profile    Collection Time: 03/11/24  9:47 AM   Result Value Ref Range    Cholesterol 251 (H) <200 mg/dL    Triglycerides 136 <150 mg/dL    Direct Measure HDL 89 >=40 mg/dL    LDL Cholesterol Calculated 135 (H) <=100 mg/dL    Non HDL Cholesterol 162 (H) <130 mg/dL   TSH with free T4 reflex    Collection Time: 03/11/24  9:47 AM   Result Value Ref Range    TSH 3.62 0.30 - 4.20 uIU/mL   Hemoglobin A1c    Collection Time: 03/11/24  9:47 AM   Result Value Ref Range    Hemoglobin A1C 5.6 <5.7 %   GGT    Collection Time: 03/11/24  9:47 AM   Result Value Ref Range     (H) 8 - 61 U/L   Basic metabolic panel    Collection Time: 03/11/24  9:47 AM   Result Value Ref Range    Sodium 131 (L) 135 - 145 mmol/L    Potassium 3.9 3.4 - 5.3 mmol/L    Chloride 91 (L) 98 - 107 mmol/L    Carbon Dioxide (CO2) 24 22 - 29 mmol/L    Anion Gap 16 (H) 7 - 15 mmol/L    Urea Nitrogen 10.0 6.0 - 20.0 mg/dL    Creatinine 0.89 0.67 - 1.17 mg/dL    GFR Estimate >90 >60 mL/min/1.73m2    Calcium 9.5 8.6 - 10.0 mg/dL    Glucose 100 (H) 70 - 99 mg/dL   Basic metabolic panel    Collection Time: 03/12/24  2:54 AM   Result Value Ref Range    Sodium 127 (L) 135 - 145 mmol/L    Potassium 3.7 3.4 - 5.3 mmol/L    Chloride 87 (L) 98 - 107 mmol/L    Carbon Dioxide (CO2) 22 22 - 29 mmol/L    Anion Gap 18 (H) 7 - 15 mmol/L    Urea Nitrogen 11.7 6.0 - 20.0 mg/dL    Creatinine 0.92 0.67 - 1.17 mg/dL    GFR Estimate >90 >60 mL/min/1.73m2    Calcium 9.0 8.6 - 10.0 mg/dL  "   Glucose 80 70 - 99 mg/dL   Ammonia    Collection Time: 03/12/24  2:54 AM   Result Value Ref Range    Ammonia 38 16 - 60 umol/L   Basic metabolic panel    Collection Time: 03/12/24  7:41 AM   Result Value Ref Range    Sodium 130 (L) 135 - 145 mmol/L    Potassium 3.7 3.4 - 5.3 mmol/L    Chloride 93 (L) 98 - 107 mmol/L    Carbon Dioxide (CO2) 22 22 - 29 mmol/L    Anion Gap 15 7 - 15 mmol/L    Urea Nitrogen 10.6 6.0 - 20.0 mg/dL    Creatinine 0.82 0.67 - 1.17 mg/dL    GFR Estimate >90 >60 mL/min/1.73m2    Calcium 8.8 8.6 - 10.0 mg/dL    Glucose 74 70 - 99 mg/dL   Extra Purple Top Tube    Collection Time: 03/12/24  7:41 AM   Result Value Ref Range    Hold Specimen JIC    Sodium    Collection Time: 03/12/24 12:46 PM   Result Value Ref Range    Sodium 133 (L) 135 - 145 mmol/L          Psychiatric Examination:     BP (!) 140/96   Pulse 111   Temp 97  F (36.1  C) (Temporal)   Resp 16   Ht 1.803 m (5' 11\")   Wt 81.6 kg (180 lb)   SpO2 95%   BMI 25.10 kg/m    Weight is 180 lbs 0 oz  Body mass index is 25.1 kg/m .    Orthostatic Vitals       None            Appearance: awake, alert and adequately groomed  Attitude:  cooperative  Eye Contact:  good  Mood:   \"okay\"  Affect:  appropriate and in normal range and mood congruent  Speech:  clear, coherent and normal prosody  Language: fluent and intact in English  Psychomotor, Gait, Musculoskeletal:  no evidence of tardive dyskinesia, dystonia, or tics, tremor not noted in BUE  Thought Process:   goal oriented, logical   Associations:  no loose associations  Thought Content:  no evidence of suicidal ideation or homicidal ideation and no evidence of psychotic thought, confusion improved  Insight:  partial  Judgement:  fair  Oriented to:   person, place and time  Attention Span and Concentration:  fair, improved  Recent and Remote Memory:  fair, improved  Fund of Knowledge:  appropriate           Precautions:     Behavioral Orders   Procedures    Code 1 - Restrict to Unit    " "Routine Programming     As clinically indicated    Status 15     Every 15 minutes.    Status Individual Observation     Patient SIO status reviewed with team/RN.  Please also refer to RN/team documentation for add'l detail.     -SIO staff to monitor following which have contributed to patient being on SIO:  Pt very confused, needing redirection   -Possible interventions SIO staff could use to support patient's treatment progress:  Redirect pt  -When following observed, team will review discontinuation of SIO:  When pt able to navigate milieu safely     Order Specific Question:   CONTINUOUS 24 hours / day     Answer:   5 feet     Order Specific Question:   Indications for SIO     Answer:   Severe intrusiveness    Withdrawal precautions          Diagnoses:   Alcohol Use Disorder, severe, in withdrawal, complicated by mild transaminitis and hx of DTs  Nicotine Use Disorder, moderate  MDD, recurrent, moderate to severe  Hyponatremia   High Anion Gap metabolic acidosis   Hyperlipidemia   Elevated LFTs  AMS    Clinically Significant Risk Factors         # Hyponatremia: Lowest Na = 127 mmol/L in last 2 days, will monitor as appropriate          # Hypertension: Noted on problem list        # Overweight: Estimated body mass index is 25.1 kg/m  as calculated from the following:    Height as of this encounter: 1.803 m (5' 11\").    Weight as of this encounter: 81.6 kg (180 lb)., PRESENT ON ADMISSION       # Financial/Environmental Concerns:                Assessment & Plan:   Assessment and hospital summary:  This patient is a 48 year old  male with history of alcohol use disorder who presented to ED seeking detox from alcohol. Family history significant for alcoholism in father and grandfather. Psychosocial stressors include: inability to complete tasks at home due to excessive alcohol use. Patient was admitted on a voluntary basis to [facility] detox. Patient was started on MSSA protocol using Valium. Pt  does have a " history of DTs, but not seizures. Thiamine, folic acid, and multivitamin were ordered on admission. IM consult placed to address acute medical concerns. Symptoms and presentation at this time is most consistent with Alcohol Use Disorder, severe, in withdrawal, complicated by mild transaminitis and hx of DTs. Pt reporting depressive symptoms and noted improvement in the past from Effexor. I have discussed the risks, benefits, and alternatives of PTA medication regimen, which will be continued at this time. Patient will likely benefit from CD treatment upon discharge. CTC will be meeting with patient to discuss dispo plan. Will consider anti-craving medications prior to discharge. Pt having low sodium, discussed with IM on 3/11, recommended encouraging intake and to alert them in AM on 3/12 if repeat labs not improving, in evening of 3/11 pt became increasingly confused, having odd behaviors, was placed on SIO 1:1 staffing for safety, IM notified, labs obtained, Na dropping, was started on IVF with repeat labs ordered for AM of 3/12. Pt receiving IV fluids and Na improving, near normal at 134 on 3/13 and his disorientation and confusion improving as well, plan for pt to discharge to [facility] CD treatment on 3/14.     Inpatient psychiatric hospitalization is warranted at this time for safety, stabilization, and possible adjustment in medications.     Psychiatric treatment/inteventions:  Medications:   -discontinued MSSA with diazepam, pt out of detox  -continue thiamine, folic acid, multivitamin for AUD  -start naltrexone 3/14 for MAT for AUD  -continue Effexor XR 75mg daily for mood  -continue PTA gabapentin 100mg TID for anxiety and 300mg at bedtime for sleep   -PRN hydroxyzine 25mg every 4 hours for anxiety  -continue PRN NRT for nicotine dependence    -pt plans to attend residential CD tx at [facility], likely discharge Wednesday or Thursday pending stabilization     -Discussed with pts RN the improvement noted in  pts confusion/orientation, will continue to monitor throughout afternoon and if no further episodes of confusion with discontinue SIO at 7pm.     Laboratory/Imaging: BMP with Na 134(L) otherwise WNL    Patient will be treated in therapeutic milieu with appropriate individual and group therapies as described.    Medical treatment/interventions:  Medical concerns:   1) Alcohol withdrawal, improved  -discontinued MSSA as above  -PRN zofran and imodium for GI distress related to withdrawal   -withdrawal precautions    2) IM consult placed for management of other medical concerns, per consult note on 3/10/24:   Circumstances of recent discharge and re-admittance noted. Please refer to recent medicine admission in chart dated 2/10/24, which was reviewed.  Patient was admitted to [facility] for alcohol withdrawal and hematemesis. Patient has longstanding history of hematemesis when he is drinking. EGD 2/8/24 with LA grade B esophagitis with no active bleeding. Per discharge summary, alcohol withdrawal symptoms were mild.  His hospital course was complicated by minimally symptomatic COVID, did not require Paxlovid, remdesivir or dexamethasone.      Patient's PTA medications were reviewed and ordered if appropriate.      No further recommendations at this time.   Please page medicine with any concerns.     Diandra Flores PA-C  Hospitalist Service    3) Discussed abnormal labs with IM on 3/11 and again 3/12, additional orders placed by IM on 3/12 including fluids and they will see patient this AM, appreciate assistance, per note 3/12:   Joe Shah is a 48 year old male with past medical history significant for alcohol use disorder and prior withdrawal, subdural hematoma s/p bur hole and evacuation, HTN who is admitted to [facility] for detox from alcohol. Detox complicated by hyponatremia.      Alcohol use disorder   Alcohol withdrawal   Patient has history of alcohol use disorder.  Patient denies any history of DTs or  "withdrawal seizures.  On admission patient reported drinking 1.75 of vodka daily for several years.  -Agree with multivitamin, folic acid and thiamine   -Agree with MSSA with valium per protocol      Hyponatremia   Sodium 141 on admission Down to 131 on 3/11. Further decreased to 127 this a.m. status post 1 L normal saline with repeat sodium 130.  Patient was noted to have some confusion overnight.  On assessment this a.m. patient oriented to person, place, date, situation.  Neuro exam within normal limits.  -Additional 1 L normal saline today  -Repeat sodium at 1400  -Repeat BMP in a.m.     Elevated anion gap, resolved  And anion gap elevated on admission at 18.  Improved with bolus of normal saline earlier this a.m.  -Additional IV fluids as above  -Encourage adequate p.o. intake     Hx of Esophagitis   Recent admission 2/8/24 for hematemesis. EGD with Grade B Esophagitis. Hgb stable.    -Noted      HTN   -Continue PTA metoprolol      Dyslipidemia      Recent Labs   Lab Test 03/11/24  0947   CHOL 251*   HDL 89   *   TRIG 136   -Recommend lifestyle modifications  -Follow-up with PCP for ongoing monitoring and management        Hx of subdural hematoma s/p bur hole and hematoma evacuation (Dec 2023)  Patient notes some cognitive decline since surgery. Report short term memory loss at time.         Overweight: Estimated body mass index is 25.1 kg/m  as calculated from the following:    Height as of this encounter: 1.803 m (5' 11\").    Weight as of this encounter: 81.6 kg (180 lb).           Clinically Significant Risk Factors          # Hyponatremia: Lowest Na = 127 mmol/L in last 2 days, will monitor as appropriate          # Hypertension: Noted on problem list      , PRESENT ON ADMISSION     # Financial/Environmental Concerns:               The patient's care was discussed with the Patient and Primary team.     SEVEN Lowery  Hospitalist Service        Disposition Plan   Reason for ongoing admission: " requires detoxification from substance that poses a risk of bodily harm during withdrawal period  Discharge location: Chemical dependency treatment facility, [facility] tomorrow, discharge set for 11am   Discharge Medications: ordered.  Follow-up Appointments: not scheduled  Legal Status: voluntary    >50 min total time that was spent in counseling and coordination of care with staff, reviewing medical record, educating patient about treatment options, side effects and benefits and alternative treatments for medications, providing supportive therapy and redirection regarding above symptoms.     This document is created with the help of Dragon dictation system.  All grammatical/typing errors or context distortion are unintentional and inherent to software.    Patient has been seen and evaluated by Rhea perez DO.

## 2024-03-13 NOTE — PROGRESS NOTES
Spoke with the patients spouse today about discharge. The patient is being picked up by his spouse at 11am on 3/14. The patient has an intake at [facility] at 1pm. The patient has been made aware.     Dedra (Spouse) [phone]   at 11am on 3/14    [facility]  Address: [address] [place] [address]  Phone: [phone]  Date: 3/14  Time: 1pm

## 2024-03-14 VITALS
WEIGHT: 180 LBS | TEMPERATURE: 97.5 F | BODY MASS INDEX: 25.2 KG/M2 | RESPIRATION RATE: 16 BRPM | SYSTOLIC BLOOD PRESSURE: 122 MMHG | HEART RATE: 117 BPM | DIASTOLIC BLOOD PRESSURE: 92 MMHG | HEIGHT: 71 IN | OXYGEN SATURATION: 96 %

## 2024-03-14 PROCEDURE — 250N000013 HC RX MED GY IP 250 OP 250 PS 637: Performed by: REGISTERED NURSE

## 2024-03-14 PROCEDURE — 250N000013 HC RX MED GY IP 250 OP 250 PS 637: Performed by: PSYCHIATRY & NEUROLOGY

## 2024-03-14 PROCEDURE — 99239 HOSP IP/OBS DSCHRG MGMT >30: CPT | Performed by: PSYCHIATRY & NEUROLOGY

## 2024-03-14 PROCEDURE — 250N000013 HC RX MED GY IP 250 OP 250 PS 637: Performed by: PHYSICIAN ASSISTANT

## 2024-03-14 RX ADMIN — THIAMINE HCL TAB 100 MG 100 MG: 100 TAB at 09:30

## 2024-03-14 RX ADMIN — FOLIC ACID 1 MG: 1 TABLET ORAL at 09:30

## 2024-03-14 RX ADMIN — Medication 1 TABLET: at 09:30

## 2024-03-14 RX ADMIN — VENLAFAXINE HYDROCHLORIDE 75 MG: 75 CAPSULE, EXTENDED RELEASE ORAL at 09:30

## 2024-03-14 RX ADMIN — PANTOPRAZOLE SODIUM 40 MG: 40 TABLET, DELAYED RELEASE ORAL at 09:30

## 2024-03-14 RX ADMIN — FAMOTIDINE 20 MG: 20 TABLET ORAL at 09:30

## 2024-03-14 RX ADMIN — NALTREXONE HYDROCHLORIDE 50 MG: 50 TABLET, FILM COATED ORAL at 09:30

## 2024-03-14 RX ADMIN — METOPROLOL SUCCINATE 50 MG: 50 TABLET, EXTENDED RELEASE ORAL at 09:30

## 2024-03-14 RX ADMIN — GABAPENTIN 100 MG: 100 CAPSULE ORAL at 09:30

## 2024-03-14 RX ADMIN — ACETAMINOPHEN 975 MG: 325 TABLET, FILM COATED ORAL at 10:20

## 2024-03-14 ASSESSMENT — ACTIVITIES OF DAILY LIVING (ADL)
ADLS_ACUITY_SCORE: 42
HYGIENE/GROOMING: INDEPENDENT
ADLS_ACUITY_SCORE: 42
ORAL_HYGIENE: INDEPENDENT
ADLS_ACUITY_SCORE: 42
DRESS: INDEPENDENT
ADLS_ACUITY_SCORE: 42

## 2024-03-14 NOTE — DISCHARGE SUMMARY
Psychiatric Discharge Summary    Joe Shah MRN# [id number]   Age: 48 year old YOB: 1975     Date of Admission:  3/9/2024  Date of Discharge:  3/14/2024  Admitting Physician:  Jony Stephens MD  Discharge Physician:  Anika Montano MD (Contact: [phone])         Event Leading to Hospitalization:   Per H&P:    Per ED Provider Note dated 3/9/24:     Joe hSah is a 48 year old male who presents with his wife with chief complaint alcohol intoxication and desire for detox.  Patient reports a long history of alcohol abuse drinking a 1.75 of vodka daily for several years.  Last drink was before arrival.  He denies history of alcohol withdrawal seizures.  He states it often takes him 2 days after his last drink before he starts having symptoms.  He states he often starts vomiting due to his alcohol use and he has been hospitalized several times for hematemesis.  He denies any recent trauma.  He reached out to [facility] who told him that he needed to go through detox before he could be accepted into their program.  He attempted to get into [facility] detox, but they stated they would not have a bed for him for about a week.      Reviewed multiple ED visits and hospitalizations for alcohol intoxication, hematemesis. During course of hospital stay, does not appear to have severe withdrawal symptoms and has not needed ICU admission or large amounts of IV benzodiazepines.      Joe Shah is a 48 year old male who presents for evaluation of alcohol abuse. He is intoxicated here in ED by blood work.  Blood work otherwise looks ok; no signs of alcoholic ketoacidosis, significant liver impairment or acute alcoholic hepatitis. He has no history of DT's or alcohol withdrawal seizures. There are no signs of co-ingestion including acetaminophen, drugs, medications, volatile alcohols. He has no signs of trauma related to alcohol use and no further workup is needed including head CT. Will  "arrange for inpatient detox.  [facility] has bed available.  EtOH downtrending on recheck and he is safe/stable for transfer.  He and his wife are in agreement with the plan.      Per my interview with patient:     Onset of alcohol use at age 13. Became most problematic 10 years ago when his best friend .  Alcohol quantity: Consuming 1.75 L of vodka over three days. Drinking daily.   Alcohol use duration: Has been drinking daily for the past few years  Longest period of sobriety was: 2018 for approximately 6 months  Estimated number of detoxes: \"too many to count\"  History of CD treatment: Multiple prior CD treatments  BAL in ED: 0.47   Urine drug screen: NEGATIVE  Anti-craving medications: Naltrexone in the past, and he believes it was ineffective. Has not tried Campral or Antabuse.  Recent stressors: \"Just not getting stuff done around the house because I am not coordinated or sound minded enough.\"      Patient has tolerance, withdrawal, progressive use, loss of control, spending more time and more amount than intended. Patient has made attempts to quit, is experiencing cravings, and reports negative consequences.  Patient does not ramirez.     Patient does not have a history of seizures.  Patient does have a history of delirium tremens.     Patient does not have a history of overdose.  Patient does not have a history of IV use.  Patient does not have a history of HIV, Hep B or C.     For MH: Pt reporting intermittent depression since the death of his friend 10 years ago. Historically, he has improved on Effexor. No history of suicide attempts.              See Admission note by Anika Montano MD on 3/10/24 for additional details.          Diagnoses:     Alcohol Use Disorder, severe, in withdrawal, complicated by mild transaminitis and hx of DTs  Nicotine Use Disorder, moderate  MDD, recurrent, moderate to severe  Hyponatremia   High Anion Gap metabolic acidosis   Hyperlipidemia   Elevated " LFTs  AMS, resolved            Labs:     Recent Results (from the past 168 hour(s))   Comprehensive metabolic panel    Collection Time: 03/08/24  9:40 PM   Result Value Ref Range    Sodium 141 135 - 145 mmol/L    Potassium 4.1 3.4 - 5.3 mmol/L    Carbon Dioxide (CO2) 20 (L) 22 - 29 mmol/L    Anion Gap 19 (H) 7 - 15 mmol/L    Urea Nitrogen 4.9 (L) 6.0 - 20.0 mg/dL    Creatinine 0.74 0.67 - 1.17 mg/dL    GFR Estimate >90 >60 mL/min/1.73m2    Calcium 9.4 8.6 - 10.0 mg/dL    Chloride 102 98 - 107 mmol/L    Glucose 129 (H) 70 - 99 mg/dL    Alkaline Phosphatase 86 40 - 150 U/L    AST 68 (H) 0 - 45 U/L    ALT 29 0 - 70 U/L    Protein Total 8.1 6.4 - 8.3 g/dL    Albumin 4.3 3.5 - 5.2 g/dL    Bilirubin Total 0.4 <=1.2 mg/dL   Alcohol level blood    Collection Time: 03/08/24  9:40 PM   Result Value Ref Range    Alcohol ethyl 0.43 (HH) <=0.01 g/dL   CBC with platelets and differential    Collection Time: 03/08/24  9:40 PM   Result Value Ref Range    WBC Count 6.2 4.0 - 11.0 10e3/uL    RBC Count 4.60 4.40 - 5.90 10e6/uL    Hemoglobin 14.0 13.3 - 17.7 g/dL    Hematocrit 41.8 40.0 - 53.0 %    MCV 91 78 - 100 fL    MCH 30.4 26.5 - 33.0 pg    MCHC 33.5 31.5 - 36.5 g/dL    RDW 14.9 10.0 - 15.0 %    Platelet Count 162 150 - 450 10e3/uL    % Neutrophils 70 %    % Lymphocytes 20 %    % Monocytes 7 %    % Eosinophils 2 %    % Basophils 1 %    % Immature Granulocytes 0 %    NRBCs per 100 WBC 0 <1 /100    Absolute Neutrophils 4.3 1.6 - 8.3 10e3/uL    Absolute Lymphocytes 1.3 0.8 - 5.3 10e3/uL    Absolute Monocytes 0.4 0.0 - 1.3 10e3/uL    Absolute Eosinophils 0.2 0.0 - 0.7 10e3/uL    Absolute Basophils 0.0 0.0 - 0.2 10e3/uL    Absolute Immature Granulocytes 0.0 <=0.4 10e3/uL    Absolute NRBCs 0.0 10e3/uL   Extra Red Top Tube    Collection Time: 03/08/24  9:40 PM   Result Value Ref Range    Hold Specimen [facility]    Alcohol level blood    Collection Time: 03/09/24  2:38 PM   Result Value Ref Range    Alcohol ethyl 0.47 (HH) <=0.01 g/dL    Comprehensive metabolic panel    Collection Time: 03/09/24  2:38 PM   Result Value Ref Range    Sodium 141 135 - 145 mmol/L    Potassium 4.3 3.4 - 5.3 mmol/L    Carbon Dioxide (CO2) 22 22 - 29 mmol/L    Anion Gap 18 (H) 7 - 15 mmol/L    Urea Nitrogen 5.4 (L) 6.0 - 20.0 mg/dL    Creatinine 0.81 0.67 - 1.17 mg/dL    GFR Estimate >90 >60 mL/min/1.73m2    Calcium 9.3 8.6 - 10.0 mg/dL    Chloride 101 98 - 107 mmol/L    Glucose 134 (H) 70 - 99 mg/dL    Alkaline Phosphatase 85 40 - 150 U/L    AST 88 (H) 0 - 45 U/L    ALT 32 0 - 70 U/L    Protein Total 8.0 6.4 - 8.3 g/dL    Albumin 4.3 3.5 - 5.2 g/dL    Bilirubin Total 0.5 <=1.2 mg/dL   CBC with platelets and differential    Collection Time: 03/09/24  2:38 PM   Result Value Ref Range    WBC Count 6.0 4.0 - 11.0 10e3/uL    RBC Count 4.62 4.40 - 5.90 10e6/uL    Hemoglobin 14.3 13.3 - 17.7 g/dL    Hematocrit 42.7 40.0 - 53.0 %    MCV 92 78 - 100 fL    MCH 31.0 26.5 - 33.0 pg    MCHC 33.5 31.5 - 36.5 g/dL    RDW 15.1 (H) 10.0 - 15.0 %    Platelet Count 171 150 - 450 10e3/uL    % Neutrophils 67 %    % Lymphocytes 21 %    % Monocytes 9 %    % Eosinophils 2 %    % Basophils 1 %    % Immature Granulocytes 0 %    NRBCs per 100 WBC 0 <1 /100    Absolute Neutrophils 4.1 1.6 - 8.3 10e3/uL    Absolute Lymphocytes 1.3 0.8 - 5.3 10e3/uL    Absolute Monocytes 0.5 0.0 - 1.3 10e3/uL    Absolute Eosinophils 0.1 0.0 - 0.7 10e3/uL    Absolute Basophils 0.0 0.0 - 0.2 10e3/uL    Absolute Immature Granulocytes 0.0 <=0.4 10e3/uL    Absolute NRBCs 0.0 10e3/uL   Magnesium    Collection Time: 03/09/24  2:38 PM   Result Value Ref Range    Magnesium 1.8 1.7 - 2.3 mg/dL   Urine Drug Screen Panel    Collection Time: 03/09/24  4:19 PM   Result Value Ref Range    Amphetamines Urine Screen Negative Screen Negative    Barbituates Urine Screen Negative Screen Negative    Benzodiazepine Urine Screen Negative Screen Negative    Cannabinoids Urine Screen Negative Screen Negative    Cocaine Urine Screen Negative  Screen Negative    Fentanyl Qual Urine Screen Negative Screen Negative    Opiates Urine Screen Negative Screen Negative    PCP Urine Screen Negative Screen Negative   Alcohol level blood    Collection Time: 03/09/24  6:45 PM   Result Value Ref Range    Alcohol ethyl 0.38 (HH) <=0.01 g/dL   Lipid Profile    Collection Time: 03/11/24  9:47 AM   Result Value Ref Range    Cholesterol 251 (H) <200 mg/dL    Triglycerides 136 <150 mg/dL    Direct Measure HDL 89 >=40 mg/dL    LDL Cholesterol Calculated 135 (H) <=100 mg/dL    Non HDL Cholesterol 162 (H) <130 mg/dL   TSH with free T4 reflex    Collection Time: 03/11/24  9:47 AM   Result Value Ref Range    TSH 3.62 0.30 - 4.20 uIU/mL   Hemoglobin A1c    Collection Time: 03/11/24  9:47 AM   Result Value Ref Range    Hemoglobin A1C 5.6 <5.7 %   GGT    Collection Time: 03/11/24  9:47 AM   Result Value Ref Range     (H) 8 - 61 U/L   Basic metabolic panel    Collection Time: 03/11/24  9:47 AM   Result Value Ref Range    Sodium 131 (L) 135 - 145 mmol/L    Potassium 3.9 3.4 - 5.3 mmol/L    Chloride 91 (L) 98 - 107 mmol/L    Carbon Dioxide (CO2) 24 22 - 29 mmol/L    Anion Gap 16 (H) 7 - 15 mmol/L    Urea Nitrogen 10.0 6.0 - 20.0 mg/dL    Creatinine 0.89 0.67 - 1.17 mg/dL    GFR Estimate >90 >60 mL/min/1.73m2    Calcium 9.5 8.6 - 10.0 mg/dL    Glucose 100 (H) 70 - 99 mg/dL   Basic metabolic panel    Collection Time: 03/12/24  2:54 AM   Result Value Ref Range    Sodium 127 (L) 135 - 145 mmol/L    Potassium 3.7 3.4 - 5.3 mmol/L    Chloride 87 (L) 98 - 107 mmol/L    Carbon Dioxide (CO2) 22 22 - 29 mmol/L    Anion Gap 18 (H) 7 - 15 mmol/L    Urea Nitrogen 11.7 6.0 - 20.0 mg/dL    Creatinine 0.92 0.67 - 1.17 mg/dL    GFR Estimate >90 >60 mL/min/1.73m2    Calcium 9.0 8.6 - 10.0 mg/dL    Glucose 80 70 - 99 mg/dL   Ammonia    Collection Time: 03/12/24  2:54 AM   Result Value Ref Range    Ammonia 38 16 - 60 umol/L   Basic metabolic panel    Collection Time: 03/12/24  7:41 AM   Result  Value Ref Range    Sodium 130 (L) 135 - 145 mmol/L    Potassium 3.7 3.4 - 5.3 mmol/L    Chloride 93 (L) 98 - 107 mmol/L    Carbon Dioxide (CO2) 22 22 - 29 mmol/L    Anion Gap 15 7 - 15 mmol/L    Urea Nitrogen 10.6 6.0 - 20.0 mg/dL    Creatinine 0.82 0.67 - 1.17 mg/dL    GFR Estimate >90 >60 mL/min/1.73m2    Calcium 8.8 8.6 - 10.0 mg/dL    Glucose 74 70 - 99 mg/dL   Extra Purple Top Tube    Collection Time: 03/12/24  7:41 AM   Result Value Ref Range    Hold Specimen [facility]    Sodium    Collection Time: 03/12/24 12:46 PM   Result Value Ref Range    Sodium 133 (L) 135 - 145 mmol/L   Basic metabolic panel    Collection Time: 03/13/24  7:45 AM   Result Value Ref Range    Sodium 134 (L) 135 - 145 mmol/L    Potassium 3.7 3.4 - 5.3 mmol/L    Chloride 98 98 - 107 mmol/L    Carbon Dioxide (CO2) 25 22 - 29 mmol/L    Anion Gap 11 7 - 15 mmol/L    Urea Nitrogen 9.8 6.0 - 20.0 mg/dL    Creatinine 0.77 0.67 - 1.17 mg/dL    GFR Estimate >90 >60 mL/min/1.73m2    Calcium 9.3 8.6 - 10.0 mg/dL    Glucose 99 70 - 99 mg/dL          Consults:   Consultation during this admission received from internal medicine on 3/10/24:    Circumstances of recent discharge and re-admittance noted. Please refer to recent medicine admission in chart dated 2/10/24, which was reviewed.  Patient was admitted to [facility] for alcohol withdrawal and hematemesis. Patient has longstanding history of hematemesis when he is drinking. EGD 2/8/24 with LA grade B esophagitis with no active bleeding. Per discharge summary, alcohol withdrawal symptoms were mild.  His hospital course was complicated by minimally symptomatic COVID, did not require Paxlovid, remdesivir or dexamethasone.      Patient's PTA medications were reviewed and ordered if appropriate.      No further recommendations at this time.   Please page medicine with any concerns.    Diandra Flores PA-C      Consultation during this admission received from internal medicine on 3/12/24:    Assessment &  "Fiona Shah is a 48 year old male with past medical history significant for alcohol use disorder and prior withdrawal, subdural hematoma s/p bur hole and evacuation, HTN who is admitted to [facility] for detox from alcohol. Detox complicated by hyponatremia.      Alcohol use disorder   Alcohol withdrawal   Patient has history of alcohol use disorder.  Patient denies any history of DTs or withdrawal seizures.  On admission patient reported drinking 1.75 of vodka daily for several years.  -Agree with multivitamin, folic acid and thiamine   -Agree with MSSA with valium per protocol      Hyponatremia   Sodium 141 on admission Down to 131 on 3/11. Further decreased to 127 this a.m. status post 1 L normal saline with repeat sodium 130.  Patient was noted to have some confusion overnight.  On assessment this a.m. patient oriented to person, place, date, situation.  Neuro exam within normal limits.  -Additional 1 L normal saline today  -Repeat sodium at 1400  -Repeat BMP in a.m.     Elevated anion gap, resolved  And anion gap elevated on admission at 18.  Improved with bolus of normal saline earlier this a.m.  -Additional IV fluids as above  -Encourage adequate p.o. intake     Hx of Esophagitis   Recent admission 2/8/24 for hematemesis. EGD with Grade B Esophagitis. Hgb stable.    -Noted      HTN   -Continue PTA metoprolol      Dyslipidemia      Recent Labs   Lab Test 03/11/24  0947   CHOL 251*   HDL 89   *   TRIG 136   -Recommend lifestyle modifications  -Follow-up with PCP for ongoing monitoring and management        Hx of subdural hematoma s/p bur hole and hematoma evacuation (Dec 2023)  Patient notes some cognitive decline since surgery. Report short term memory loss at time.         Overweight: Estimated body mass index is 25.1 kg/m  as calculated from the following:    Height as of this encounter: 1.803 m (5' 11\").    Weight as of this encounter: 81.6 kg (180 lb).           Clinically Significant Risk " Factors          # Hyponatremia: Lowest Na = 127 mmol/L in last 2 days, will monitor as appropriate          # Hypertension: Noted on problem list      , PRESENT ON ADMISSION     # Financial/Environmental Concerns:               The patient's care was discussed with the Patient and Primary team.     SEVEN Lowery    Brief Medicine Note 3/13/24     Chart reviewed. Repeat Na 134 today. Medicine will continue to follow peripherally. No new recommendations. Please contact on-call provider if any new concerns.      Tres Gutierrez PA-C    3/14/24: Notified IM of planned discharge in AM. No new recommendations or voiced concerns.            Hospital Course:   Patient was admitted to [facility] with attending Anika Montano MD as a voluntary patient. The patient was placed under status 15 (15 minute checks) to ensure patient safety.     MSSA protocol was initiated due to the patient's history of alcohol abuse and concern for withdrawal symptoms.  Over the course of hospitalization, patient was treated with Valium to manage withdrawal. Last dose of Valium was given on 3/12. He completed detox on 3/13. He required a TOTAL of 175 mg of Valium since arrival in ED. He has not experienced any significant symptoms of withdrawal since that time. He experienced no complications associated with withdrawal. Hydroxyzine and Trazodone were utilized prn for management of anxiety and sleep, respectively. Patient was treated with multivitamin, thiamine, and folic acid daily. He was evaluated by IM (see above). He was medically cleared at time of discharge. Anti-craving medications were discussed, and naltrexone was initiated after R/B/A were reviewed with patient. He tolerated medications well without reported side effects.     Depression: Reporting depressive sx on admission. Effexor was started at 75 mg daily with noted overall improvement. PTA gabapentin 100mg TID continued for anxiety and 300mg at bedtime continued  for sleep.     Per CTC note dated 3/13/24, treatment plan includes:     Spoke with the patients spouse today about discharge. The patient is being picked up by his spouse at 11am on 3/14. The patient has an intake at [facility] at 1pm. The patient has been made aware.      Dedra (Spouse) [phone]   at 11am on 3/14     [facility]  Address: [address] [place] [address]  Phone: [phone]  Date: 3/14  Time: 1pm    Patient denied alcohol cravings at time of discharge. He understands risks associated with relapse. Appears to be motivated to maintain sobriety upon discharge.      He did participate in groups and was visible in the milieu.     The patient's symptoms of withdrawal fully resolved.     Patient was released to [facility] inpatient CD program. At the time of discharge, patient was determined to not be a danger to himself or others. He consistently denied SI/HI, and remained future oriented. Denied access to firearms.     Because this patient meets criteria for an Alcohol Use Disorder, I performed the following brief intervention on the date of this note:              1) Expressed concern that the patient is drinking at unhealthy levels known to increase their risk of alcohol related problems              2) Gave feedback linking alcohol use and health, including personalized feedback explaining how alcohol use can interact with their medical and/or psychiatric problems, and with prescribed medications.              3) Advised patient to abstain.          Discharge Medications:     Current Discharge Medication List        START taking these medications    Details   naltrexone (DEPADE/REVIA) 50 MG tablet Take 1 tablet (50 mg) by mouth daily  Qty: 30 tablet, Refills: 0    Associated Diagnoses: Alcohol dependence with withdrawal with complication (H)      thiamine (B-1) 100 MG tablet Take 1 tablet (100 mg) by mouth daily  Qty: 30 tablet, Refills: 0    Associated  Diagnoses: Alcohol dependence with withdrawal with complication (H)      venlafaxine (EFFEXOR XR) 75 MG 24 hr capsule Take 1 capsule (75 mg) by mouth daily (with breakfast)  Qty: 30 capsule, Refills: 0    Associated Diagnoses: Mood disorder (H24)           CONTINUE these medications which have CHANGED    Details   famotidine (PEPCID) 20 MG tablet Take 1 tablet (20 mg) by mouth 2 times daily  Qty: 60 tablet, Refills: 0    Associated Diagnoses: Gastroesophageal reflux disease without esophagitis      folic acid (FOLVITE) 1 MG tablet Take 1 tablet (1 mg) by mouth daily  Qty: 30 tablet, Refills: 0    Associated Diagnoses: Alcohol withdrawal syndrome without complication (H)      !! gabapentin (NEURONTIN) 100 MG capsule Take 1 capsule (100 mg) by mouth 3 times daily  Qty: 90 capsule, Refills: 0    Associated Diagnoses: Alcohol withdrawal syndrome without complication (H)      !! gabapentin (NEURONTIN) 300 MG capsule Take 1 capsule (300 mg) by mouth nightly as needed (sleep)  Qty: 30 capsule, Refills: 0    Associated Diagnoses: Alcohol dependence with withdrawal with complication (H)      metoprolol succinate ER (TOPROL XL) 50 MG 24 hr tablet Take 1 tablet (50 mg) by mouth daily  Qty: 30 tablet, Refills: 0    Associated Diagnoses: Alcohol dependence with withdrawal with complication (H)      Multiple Vitamins-Minerals (MULTIVITAMIN ADULT) CHEW Take 2 chew tab by mouth daily Centrum  Qty: 60 tablet, Refills: 0    Associated Diagnoses: Alcohol dependence with withdrawal with complication (H)      pantoprazole (PROTONIX) 40 MG EC tablet Take 1 tablet (40 mg) by mouth daily  Qty: 30 tablet, Refills: 0    Associated Diagnoses: Alcohol dependence with withdrawal with complication (H)       !! - Potential duplicate medications found. Please discuss with provider.        CONTINUE these medications which have NOT CHANGED    Details   acetaminophen (TYLENOL) 500 MG tablet Take 500-1,000 mg by mouth every 6 hours as needed for mild  pain      vitamin B-Complex Take 1 tablet by mouth daily           STOP taking these medications       naproxen (NAPROSYN) 500 MG tablet Comments:   Reason for Stopping:                    Psychiatric Examination:   Appearance:  awake, alert and adequately groomed  Attitude:  cooperative  Eye Contact:  good  Mood:  better  Affect:  appropriate and in normal range  Speech:  clear, coherent  Psychomotor Behavior:  no evidence of tardive dyskinesia, dystonia, or tics  Thought Process:  logical, linear, and goal oriented  Associations:  no loose associations  Thought Content:  no evidence of suicidal ideation or homicidal ideation and no evidence of psychotic thought  Insight:  good  Judgment:  intact  Oriented to:  time, person, and place  Attention Span and Concentration:  intact  Recent and Remote Memory:  intact  Language: Able to name objects, Able to repeat phrases, and Able to read and write  Fund of Knowledge: appropriate  Muscle Strength and Tone: normal  Gait and Station: Normal         Discharge Plan:   Summary: You were admitted to [facility] on 3/9/2024 for detoxification from alcohol.  A medical exam was performed that included lab work. You have met with a  and opted to discharge to [facility].  Please take care and make your recovery a daily priority, Joe!  It was a pleasure working with you and the entire treatment team here wishes you the very best in your recovery!      Recommendation:  Discharge to [facility] and work closely with your peers and staff to develop a successful recovery plan. Please return to [place] if a higher level of care is needed.      Dedra (Spouse) [phone]   at 11am on 3/14     [facility]  Address: [address] [place] [address]  Phone: [phone]  Date: 3/14  Time: 1pm     Main Diagnoses:  Per Dr. Rhea Harris MD;  303.90 (F10.20) Alcohol Use Disorder Severe  Alcohol Use Disorder, severe, in  withdrawal, complicated by mild transaminitis and hx of DTs  Nicotine Use Disorder, moderate  MDD, recurrent, moderate to severe     Major Treatments, Procedures and Findings:  You were treated for alcohol detoxification using valium administered based on the [facility] protocol. You did not complete a chemical dependency assessment. You had labs drawn and those results were reviewed with you. Please take a copy of your lab work with you to your next primary care provider appointment.     Symptoms to Report:  If you experience more anxiety, confusion, sleeplessness, deep sadness or thoughts of suicide, notify your treatment team or notify your primary care provider. IF ANY OF THE SYMPTOMS YOU ARE EXPERIENCING ARE A MEDICAL EMERGENCY CALL 911 IMMEDIATELY.      Lifestyle Adjustment: Adjust your lifestyle to get enough sleep, relaxation, exercise and good nutrition. Continue to develop healthy coping skills to decrease stress and promote a sober living environment. Do not use mood altering substances including alcohol, illegal drugs or addictive medications other than what is currently prescribed.      Disposition: Brittany Villagomezd     [email or web address]  [facility]any [facility]  [address] [place] [address]     [phone]  Facts about COVID19 at www.cdc.gov/COVID19 and www.[place].gov/covid19     Keeping hands clean is one of the most important steps we can take to avoid getting sick and spreading germs to others.  Please wash your hands frequently and lather with soap for at least 20 seconds!     Follow-up Appointment:      The patient is seeking inpatient treatment and will work with his care team to schedule therapy and psychiatry appointments.          Recovery apps for your phone for educational purposes and to locate in person and zoom recovery meetings  Everything AA - educational purpose and dez is a great resource  12 Step Toolkit - educational purpose learning about the 12 steps  to recovery  P[employer] Cloud - meeting dez  AA  - meeting dez  Meeting guide - meeting dez  Quick NA meeting - meeting dez  Claudia- has various apps     Resources:  Some AA/NA meetings are being held online however most have returned to in-person or a hybrid combination please check online to verify*  Need Support Now? If you or someone you know is struggling or in crisis, help is available. Call or text 864 or chat [email or web address]  AA meetings search for them at: https://aa-intergroup.org (worldwide meeting listings)  AA meetings for [place] area can be found online at: https://[place].org (click local online meetings listings)  NA meetings for [place] area can be found online at: https://www.naminMobileX Labsota.org  (click find a meeting)  AA and NA Sponsors are excellent resources for support and you can find one at any support group meeting.   Alcoholics Anonymous (https://aa.org/): for information 24 hours/day  AA Intergroup service office in [place] (http://www.aastpaul.org/) [phone]  AA Intergroup service office in [facility]: [phone]. (http://www.aaDeep-Secureis.org/)  Narcotics Anonymous (www.nam[place].org) (934) 436-7053  https://aafairviewriverside.org/meetings  SMART Recovery - self management for addiction recovery:  www.smartrecovery.org  Pathways ~ A Health Crisis Resource & Support Center:  577.510.3890.  https://prescribetoprevent.org/patient-education/videos/  http://www.harmreduction.org  Crisis Text Line  Text 870247  You will be connected with a trained live crisis counselor to provide support. Por espanol, texto  FLORY a 118798 o texto a 442-AYUDAME en [facility] Hope Line  1.800.SUICIDE [9474155]  [facility] [phone]  Support Group:  AA/NA and Sponsor/support.  Fast Tracker  Linking people to mental health and substance use disorder resources  [employer] [employer]   [place] Mental [facility] Warm Line  Peer to peer support  Monday thru Saturday, 12  "pm to 10 pm  919.463.6714 or 0.127.983.8215  Text \"Support\" to 26837  National [place] on Mental Illness (KAITLIN)  589.294.8064 or 1.888.KAITLIN.HELPS  Alcoholics Anonymous (www.alcoholics-anonymous.org): Check your phone book for your local chapter.  Suicide Awareness Voices of Education (SAVE) (www.save.org): 751-754-OEEK (0873)  National Suicide Prevention Line (www.mentalhealthmn.org): 743-437-LYJY (6090)  Mental Health Consumer/Survivor Network of [place] (www.mhcsn.net): 789.731.1358 or 048-321-9431  Mental Health Association of [place] (www.mentalhealth.org): 822.717.1456 or 596-711-0857   Substance Abuse and Mental Health Services (www.samhsa.gov)  [place] Opioid Prevention Coalition: www.opioidcoalition.org     [place] Recovery Connection (MRC)  [facility] connects people seeking recovery to resources that help foster and sustain long-term recovery.  Whether you are seeking resources for treatment, transportation, housing, job training, education, health care or other pathways to recovery, [facility] is a great place to start.  758.533.6773.  www.[place].org     Great Pod casts for nutrition and wellness  Listen on Apple Podcasts  [employer]ishing [employer] Nutrition   [facility]   Nutrition       Understand the connection between what you eat and how you feel. Hosted by licensed nutritionists and dietitians from [facility] we share practical, real-life solutions for healthier living through nutrition.      General Medication Instructions:   See your medication sheet(s) for instructions.   Take all medications as prescribed.  Make no changes unless your primary care provider suggests them.   Go to all your primary care provider visits.  Be sure to have all your required lab tests. This way, your medicines can be refilled on time.  Do not use any forms of alcohol.     Please Note:  If you have any questions at anytime after you are discharged please call [facility] detox unit 3AW at " [phone].  [facility], Behavioral Intake [phone]  Medical Records call [phone]  Outpatient Behavioral Intake call [phone]  LP+ Wait List/Bed Availability call [phone]    Please remember to take all of your behavioral discharge planning and lab paperwork to any follow up appointments, it contains your lab results, diagnosis, medication list and discharge recommendations.       THANK YOU FOR CHOOSING [facility]     Attestation:  The patient has been seen and evaluated by me,  Anika Montano MD     > 40 minutes total time that was spent and over 50% of this time was spent in counseling and coordination of care with staff, reviewing medical record, educating patient about treatment options, side effects and benefits and alternative treatments for medications, providing supportive therapy and redirection regarding above symptoms.     This document is created with the help of Dragon dictation system.  All grammatical/typing errors or context distortion are unintentional and inherent to software.

## 2024-03-14 NOTE — PLAN OF CARE
"  Problem: Adult Behavioral Health Plan of Care  Goal: Plan of Care Review  Outcome: Met  Flowsheets (Taken 3/14/2024 1010)  Patient Agreement with Plan of Care: agrees  Goal: Patient-Specific Goal (Individualization)  Description: You can add care plan individualizations to a care plan. Examples of Individualization might be:  \"Parent requests to be called daily at 9am for status\", \"I have a hard time hearing out of my right ear\", or \"Do not touch me to wake me up as it startles  me\".  Outcome: Met  Goal: Adheres to Safety Considerations for Self and Others  Outcome: Met  Intervention: Develop and Maintain Individualized Safety Plan  Recent Flowsheet Documentation  Taken 3/14/2024 1010 by Sheron Pennington, RN  Safety Measures:   safety rounds completed   suicide check-in completed  Goal: Absence of New-Onset Illness or Injury  Outcome: Met  Intervention: Identify and Manage Fall Risk  Recent Flowsheet Documentation  Taken 3/14/2024 1010 by Sheron Pennington, RN  Safety Measures:   safety rounds completed   suicide check-in completed  Goal: Optimized Coping Skills in Response to Life Stressors  Outcome: Met  Goal: Develops/Participates in Therapeutic [place] to Support Successful Transition  Outcome: Met  Intervention: Foster Therapeutic [place]  Recent Flowsheet Documentation  Taken 3/14/2024 1010 by Sheron Pennington, RN  Trust Relationship/Rapport: care explained     Problem: Alcohol Withdrawal  Goal: Alcohol Withdrawal Symptom Control  Outcome: Met  Goal: Optimal Neurologic Function  Outcome: Met  Goal: Readiness for Change Identified  Outcome: Met     Problem: Comorbidity Management  Goal: Maintenance of Asthma Control  Outcome: Met  Goal: Maintenance of Behavioral Health Symptom Control  Outcome: Met  Goal: Maintenance of COPD Symptom Control  Outcome: Met  Goal: Blood Glucose Levels Within Targeted Range  Outcome: Met  Goal: Maintenance of Heart Failure Symptom Control  Outcome: Met  Goal: Blood Pressure in " "Desired Range  Outcome: Met  Goal: Maintenance of Osteoarthritis Symptom Control  Outcome: Met  Goal: Bariatric Home Regimen Maintained  Outcome: Met  Goal: Maintenance of Seizure Control  Outcome: Met     Problem: Sleep Disturbance  Goal: Adequate Sleep/Rest  Outcome: Met     Problem: Fall Injury Risk  Goal: Absence of Fall and Fall-Related Injury  Outcome: Met   Goal Outcome Evaluation:    Plan of Care Reviewed With: patient                 Patient alert and oriented at approach. He had breakfast and scheduled medications were administered. Patient reported pain in his legs rating it about 7/10-prn tylenol administered at 1020.  Patient denied all psych symptoms and contracted for safety. He is engaging on approach. Patient aware of his discharge and that his wife was picking him up from the hospital at 1100.   Patient prepared for discharge. AVS completed, printed out and reviewed with patient. Discharge medications also reviewed with patient and his questions / concerns were address. Patient vitals noted as follow BP (!) 122/92 (BP Location: Left arm)   Pulse 117   Temp 97.5  F (36.4  C) (Oral)   Resp 16   Ht 1.803 m (5' 11\")   Wt 81.6 kg (180 lb)   SpO2 96%   BMI 25.10 kg/m      His belongings were reviewed by the unit's Psych Associate and given to patient along with his discharge medications etc. No further concerns noted and patient was escorted off the unit at about 1100 to meet his wife at the [facility] west entrance /exist. Patient was safe at discharge with no further concerns.   "

## 2024-03-14 NOTE — PLAN OF CARE
Problem: Sleep Disturbance  Goal: Adequate Sleep/Rest  Outcome: Progressing     Problem: Fall Injury Risk  Goal: Absence of Fall and Fall-Related Injury  Outcome: Progressing   Goal Outcome Evaluation:    The Patient slept okay. He is out of detox. His spouse will pick him up at 11 am on 3/14, as he has an intake appointment at [facility] at 1 pm. The Patient has been made aware.

## 2024-03-14 NOTE — PLAN OF CARE
Problem: Alcohol Withdrawal  Goal: Alcohol Withdrawal Symptom Control  Outcome: Progressing           VSS were completed this shift and they were all WNL. Patient was med compliant took all meds as ordered. Pt observed to have flat blunted affect. Pt behavior observed as calm and cooperative. Pt was visible on the unit and attended unit programming.  Pt was A/O  x 3 and thought content observed to be clear and better organized. Speech was appropriate and polite. Pt ate 100% of his dinner. Pt rated anxiety 0/10, depression  2/10, and denied pain . PRN Gabapentin given per Pt request for leg pain. Pt denied having SI/ HI,hallucinations and all other psych symptoms. Pt contractedfor safety. No abnormal behavior observed this shift. Patient's SIO was discontinued at 7 pm. Will continue to monitor and assist as needed.

## 2024-05-23 ENCOUNTER — APPOINTMENT (OUTPATIENT)
Dept: GENERAL RADIOLOGY | Facility: CLINIC | Age: 49
DRG: 896 | End: 2024-05-23
Payer: COMMERCIAL

## 2024-05-23 ENCOUNTER — HOSPITAL ENCOUNTER (INPATIENT)
Facility: CLINIC | Age: 49
LOS: 1 days | Discharge: HOME | DRG: 896 | End: 2024-05-24
Attending: EMERGENCY MEDICINE | Admitting: HOSPITALIST
Payer: COMMERCIAL

## 2024-05-23 DIAGNOSIS — F10.239 ALCOHOL DEPENDENCE WITH WITHDRAWAL WITH COMPLICATION (H): ICD-10-CM

## 2024-05-23 DIAGNOSIS — F10.939 ALCOHOL WITHDRAWAL, WITH UNSPECIFIED COMPLICATION (H): ICD-10-CM

## 2024-05-23 DIAGNOSIS — E87.20 ACIDOSIS: ICD-10-CM

## 2024-05-23 LAB
ALBUMIN SERPL BCG-MCNC: 4.6 G/DL (ref 3.5–5.2)
ALBUMIN UR-MCNC: 30 MG/DL
ALP SERPL-CCNC: 97 U/L (ref 40–150)
ALT SERPL W P-5'-P-CCNC: 16 U/L (ref 0–70)
ANION GAP SERPL CALCULATED.3IONS-SCNC: 24 MMOL/L (ref 7–15)
ANION GAP SERPL CALCULATED.3IONS-SCNC: 31 MMOL/L (ref 7–15)
APPEARANCE UR: CLEAR
AST SERPL W P-5'-P-CCNC: 28 U/L (ref 0–45)
B-OH-BUTYR SERPL-SCNC: 5.1 MMOL/L
BASE EXCESS BLDV CALC-SCNC: -10.4 MMOL/L (ref -3–3)
BASOPHILS # BLD AUTO: 0 10E3/UL (ref 0–0.2)
BASOPHILS NFR BLD AUTO: 0 %
BILIRUB SERPL-MCNC: 1.3 MG/DL
BILIRUB UR QL STRIP: NEGATIVE
BUN SERPL-MCNC: 10.5 MG/DL (ref 6–20)
BUN SERPL-MCNC: 12 MG/DL (ref 6–20)
CALCIUM SERPL-MCNC: 8.4 MG/DL (ref 8.6–10)
CALCIUM SERPL-MCNC: 9.2 MG/DL (ref 8.6–10)
CHLORIDE SERPL-SCNC: 100 MMOL/L (ref 98–107)
CHLORIDE SERPL-SCNC: 99 MMOL/L (ref 98–107)
COLOR UR AUTO: ABNORMAL
CREAT SERPL-MCNC: 0.83 MG/DL (ref 0.67–1.17)
CREAT SERPL-MCNC: 0.87 MG/DL (ref 0.67–1.17)
DEPRECATED HCO3 PLAS-SCNC: 10 MMOL/L (ref 22–29)
DEPRECATED HCO3 PLAS-SCNC: 12 MMOL/L (ref 22–29)
EGFRCR SERPLBLD CKD-EPI 2021: >90 ML/MIN/1.73M2
EGFRCR SERPLBLD CKD-EPI 2021: >90 ML/MIN/1.73M2
EOSINOPHIL # BLD AUTO: 0 10E3/UL (ref 0–0.7)
EOSINOPHIL NFR BLD AUTO: 0 %
ERYTHROCYTE [DISTWIDTH] IN BLOOD BY AUTOMATED COUNT: 13.9 % (ref 10–15)
ETHANOL SERPL-MCNC: <0.01 G/DL
GLUCOSE BLDC GLUCOMTR-MCNC: 169 MG/DL (ref 70–99)
GLUCOSE SERPL-MCNC: 147 MG/DL (ref 70–99)
GLUCOSE SERPL-MCNC: 157 MG/DL (ref 70–99)
GLUCOSE UR STRIP-MCNC: NEGATIVE MG/DL
HCO3 BLDV-SCNC: 14 MMOL/L (ref 21–28)
HCT VFR BLD AUTO: 43 % (ref 40–53)
HGB BLD-MCNC: 13.9 G/DL (ref 13.3–17.7)
HGB UR QL STRIP: NEGATIVE
HYALINE CASTS: 3 /LPF
IMM GRANULOCYTES # BLD: 0.1 10E3/UL
IMM GRANULOCYTES NFR BLD: 0 %
KETONES UR STRIP-MCNC: >150 MG/DL
LACTATE SERPL-SCNC: 2.5 MMOL/L (ref 0.7–2)
LACTATE SERPL-SCNC: 4.8 MMOL/L (ref 0.7–2)
LEUKOCYTE ESTERASE UR QL STRIP: NEGATIVE
LIPASE SERPL-CCNC: 40 U/L (ref 13–60)
LYMPHOCYTES # BLD AUTO: 0.5 10E3/UL (ref 0.8–5.3)
LYMPHOCYTES NFR BLD AUTO: 4 %
MAGNESIUM SERPL-MCNC: 1.5 MG/DL (ref 1.7–2.3)
MAGNESIUM SERPL-MCNC: 1.7 MG/DL (ref 1.7–2.3)
MCH RBC QN AUTO: 29.7 PG (ref 26.5–33)
MCHC RBC AUTO-ENTMCNC: 32.3 G/DL (ref 31.5–36.5)
MCV RBC AUTO: 92 FL (ref 78–100)
MONOCYTES # BLD AUTO: 0.5 10E3/UL (ref 0–1.3)
MONOCYTES NFR BLD AUTO: 3 %
MUCOUS THREADS #/AREA URNS LPF: PRESENT /LPF
NEUTROPHILS # BLD AUTO: 13.1 10E3/UL (ref 1.6–8.3)
NEUTROPHILS NFR BLD AUTO: 93 %
NITRATE UR QL: NEGATIVE
NRBC # BLD AUTO: 0 10E3/UL
NRBC BLD AUTO-RTO: 0 /100
O2/TOTAL GAS SETTING VFR VENT: 20 %
OXYHGB MFR BLDV: 68 % (ref 70–75)
PCO2 BLDV: 27 MM HG (ref 40–50)
PH BLDV: 7.33 [PH] (ref 7.32–7.43)
PH UR STRIP: 5.5 [PH] (ref 5–7)
PHOSPHATE SERPL-MCNC: 4.4 MG/DL (ref 2.5–4.5)
PLATELET # BLD AUTO: 203 10E3/UL (ref 150–450)
PO2 BLDV: 38 MM HG (ref 25–47)
POTASSIUM SERPL-SCNC: 4.7 MMOL/L (ref 3.4–5.3)
POTASSIUM SERPL-SCNC: 4.9 MMOL/L (ref 3.4–5.3)
PROT SERPL-MCNC: 7.8 G/DL (ref 6.4–8.3)
RBC # BLD AUTO: 4.68 10E6/UL (ref 4.4–5.9)
RBC URINE: 1 /HPF
SAO2 % BLDV: 69.9 % (ref 70–75)
SODIUM SERPL-SCNC: 136 MMOL/L (ref 135–145)
SODIUM SERPL-SCNC: 140 MMOL/L (ref 135–145)
SP GR UR STRIP: 1.02 (ref 1–1.03)
SQUAMOUS EPITHELIAL: <1 /HPF
TROPONIN T SERPL HS-MCNC: 11 NG/L
TROPONIN T SERPL HS-MCNC: 7 NG/L
UROBILINOGEN UR STRIP-MCNC: NORMAL MG/DL
WBC # BLD AUTO: 14.1 10E3/UL (ref 4–11)
WBC URINE: <1 /HPF

## 2024-05-23 PROCEDURE — 84484 ASSAY OF TROPONIN QUANT: CPT

## 2024-05-23 PROCEDURE — 99285 EMERGENCY DEPT VISIT HI MDM: CPT | Mod: 25

## 2024-05-23 PROCEDURE — 83605 ASSAY OF LACTIC ACID: CPT | Performed by: EMERGENCY MEDICINE

## 2024-05-23 PROCEDURE — 93005 ELECTROCARDIOGRAM TRACING: CPT

## 2024-05-23 PROCEDURE — 83735 ASSAY OF MAGNESIUM: CPT

## 2024-05-23 PROCEDURE — 81001 URINALYSIS AUTO W/SCOPE: CPT | Performed by: EMERGENCY MEDICINE

## 2024-05-23 PROCEDURE — 250N000011 HC RX IP 250 OP 636: Performed by: EMERGENCY MEDICINE

## 2024-05-23 PROCEDURE — 85025 COMPLETE CBC W/AUTO DIFF WBC: CPT | Performed by: EMERGENCY MEDICINE

## 2024-05-23 PROCEDURE — 250N000013 HC RX MED GY IP 250 OP 250 PS 637: Performed by: EMERGENCY MEDICINE

## 2024-05-23 PROCEDURE — 87040 BLOOD CULTURE FOR BACTERIA: CPT

## 2024-05-23 PROCEDURE — 82010 KETONE BODYS QUAN: CPT

## 2024-05-23 PROCEDURE — 82077 ASSAY SPEC XCP UR&BREATH IA: CPT | Performed by: EMERGENCY MEDICINE

## 2024-05-23 PROCEDURE — 84484 ASSAY OF TROPONIN QUANT: CPT | Performed by: EMERGENCY MEDICINE

## 2024-05-23 PROCEDURE — 120N000013 HC R&B IMCU

## 2024-05-23 PROCEDURE — 82040 ASSAY OF SERUM ALBUMIN: CPT | Performed by: EMERGENCY MEDICINE

## 2024-05-23 PROCEDURE — 83735 ASSAY OF MAGNESIUM: CPT | Performed by: EMERGENCY MEDICINE

## 2024-05-23 PROCEDURE — HZ2ZZZZ DETOXIFICATION SERVICES FOR SUBSTANCE ABUSE TREATMENT: ICD-10-PCS

## 2024-05-23 PROCEDURE — 36415 COLL VENOUS BLD VENIPUNCTURE: CPT | Performed by: EMERGENCY MEDICINE

## 2024-05-23 PROCEDURE — 36415 COLL VENOUS BLD VENIPUNCTURE: CPT

## 2024-05-23 PROCEDURE — 82565 ASSAY OF CREATININE: CPT

## 2024-05-23 PROCEDURE — C9113 INJ PANTOPRAZOLE SODIUM, VIA: HCPCS

## 2024-05-23 PROCEDURE — 84100 ASSAY OF PHOSPHORUS: CPT

## 2024-05-23 PROCEDURE — 250N000013 HC RX MED GY IP 250 OP 250 PS 637

## 2024-05-23 PROCEDURE — 258N000003 HC RX IP 258 OP 636: Performed by: EMERGENCY MEDICINE

## 2024-05-23 PROCEDURE — 83690 ASSAY OF LIPASE: CPT | Performed by: EMERGENCY MEDICINE

## 2024-05-23 PROCEDURE — 99222 1ST HOSP IP/OBS MODERATE 55: CPT

## 2024-05-23 PROCEDURE — 71046 X-RAY EXAM CHEST 2 VIEWS: CPT

## 2024-05-23 PROCEDURE — 82805 BLOOD GASES W/O2 SATURATION: CPT

## 2024-05-23 PROCEDURE — 258N000003 HC RX IP 258 OP 636

## 2024-05-23 PROCEDURE — 96360 HYDRATION IV INFUSION INIT: CPT

## 2024-05-23 PROCEDURE — 250N000011 HC RX IP 250 OP 636

## 2024-05-23 RX ORDER — CALCIUM CARBONATE 500 MG/1
1000 TABLET, CHEWABLE ORAL 4 TIMES DAILY PRN
Status: DISCONTINUED | OUTPATIENT
Start: 2024-05-23 | End: 2024-05-24

## 2024-05-23 RX ORDER — AMOXICILLIN 250 MG
1 CAPSULE ORAL 2 TIMES DAILY PRN
Status: DISCONTINUED | OUTPATIENT
Start: 2024-05-23 | End: 2024-05-24 | Stop reason: HOSPADM

## 2024-05-23 RX ORDER — GABAPENTIN 300 MG/1
900 CAPSULE ORAL EVERY 8 HOURS
Qty: 27 CAPSULE | Refills: 0 | Status: DISCONTINUED | OUTPATIENT
Start: 2024-05-24 | End: 2024-05-24 | Stop reason: HOSPADM

## 2024-05-23 RX ORDER — PROCHLORPERAZINE MALEATE 10 MG
10 TABLET ORAL EVERY 6 HOURS PRN
Status: DISCONTINUED | OUTPATIENT
Start: 2024-05-23 | End: 2024-05-24 | Stop reason: HOSPADM

## 2024-05-23 RX ORDER — OLANZAPINE 5 MG/1
5-10 TABLET, ORALLY DISINTEGRATING ORAL EVERY 6 HOURS PRN
Status: DISCONTINUED | OUTPATIENT
Start: 2024-05-23 | End: 2024-05-24 | Stop reason: HOSPADM

## 2024-05-23 RX ORDER — GABAPENTIN 300 MG/1
1200 CAPSULE ORAL ONCE
Status: COMPLETED | OUTPATIENT
Start: 2024-05-23 | End: 2024-05-23

## 2024-05-23 RX ORDER — ONDANSETRON 2 MG/ML
4 INJECTION INTRAMUSCULAR; INTRAVENOUS EVERY 6 HOURS PRN
Status: DISCONTINUED | OUTPATIENT
Start: 2024-05-23 | End: 2024-05-24 | Stop reason: HOSPADM

## 2024-05-23 RX ORDER — DIAZEPAM 10 MG/2ML
5-10 INJECTION, SOLUTION INTRAMUSCULAR; INTRAVENOUS EVERY 30 MIN PRN
Status: DISCONTINUED | OUTPATIENT
Start: 2024-05-23 | End: 2024-05-24 | Stop reason: HOSPADM

## 2024-05-23 RX ORDER — FOLIC ACID 1 MG/1
1 TABLET ORAL DAILY
Status: DISCONTINUED | OUTPATIENT
Start: 2024-05-24 | End: 2024-05-24 | Stop reason: HOSPADM

## 2024-05-23 RX ORDER — FLUMAZENIL 0.1 MG/ML
0.2 INJECTION, SOLUTION INTRAVENOUS
Status: DISCONTINUED | OUTPATIENT
Start: 2024-05-23 | End: 2024-05-24 | Stop reason: HOSPADM

## 2024-05-23 RX ORDER — FOLIC ACID 1 MG/1
1 TABLET ORAL ONCE
Status: COMPLETED | OUTPATIENT
Start: 2024-05-23 | End: 2024-05-23

## 2024-05-23 RX ORDER — DIAZEPAM 10 MG/2ML
5 INJECTION, SOLUTION INTRAMUSCULAR; INTRAVENOUS ONCE
Status: COMPLETED | OUTPATIENT
Start: 2024-05-23 | End: 2024-05-23

## 2024-05-23 RX ORDER — GABAPENTIN 300 MG/1
300 CAPSULE ORAL EVERY 8 HOURS
Qty: 6 CAPSULE | Refills: 0 | Status: DISCONTINUED | OUTPATIENT
Start: 2024-05-29 | End: 2024-05-24 | Stop reason: HOSPADM

## 2024-05-23 RX ORDER — DEXTROSE MONOHYDRATE AND SODIUM CHLORIDE 5; .45 G/100ML; G/100ML
INJECTION, SOLUTION INTRAVENOUS CONTINUOUS
Status: DISCONTINUED | OUTPATIENT
Start: 2024-05-23 | End: 2024-05-24

## 2024-05-23 RX ORDER — HALOPERIDOL 5 MG/ML
2.5-5 INJECTION INTRAMUSCULAR EVERY 6 HOURS PRN
Status: DISCONTINUED | OUTPATIENT
Start: 2024-05-23 | End: 2024-05-24 | Stop reason: HOSPADM

## 2024-05-23 RX ORDER — LORAZEPAM 2 MG/1
2 TABLET ORAL ONCE
Status: COMPLETED | OUTPATIENT
Start: 2024-05-23 | End: 2024-05-23

## 2024-05-23 RX ORDER — GABAPENTIN 100 MG/1
100 CAPSULE ORAL EVERY 8 HOURS
Qty: 9 CAPSULE | Refills: 0 | Status: DISCONTINUED | OUTPATIENT
Start: 2024-05-31 | End: 2024-05-24 | Stop reason: HOSPADM

## 2024-05-23 RX ORDER — CLONIDINE HYDROCHLORIDE 0.1 MG/1
0.1 TABLET ORAL EVERY 8 HOURS
Status: DISCONTINUED | OUTPATIENT
Start: 2024-05-23 | End: 2024-05-24 | Stop reason: HOSPADM

## 2024-05-23 RX ORDER — DIAZEPAM 5 MG
10 TABLET ORAL EVERY 30 MIN PRN
Status: DISCONTINUED | OUTPATIENT
Start: 2024-05-23 | End: 2024-05-24 | Stop reason: HOSPADM

## 2024-05-23 RX ORDER — VENLAFAXINE HYDROCHLORIDE 37.5 MG/1
37.5 CAPSULE, EXTENDED RELEASE ORAL DAILY
Status: ON HOLD | COMMUNITY
End: 2024-06-15

## 2024-05-23 RX ORDER — ONDANSETRON 4 MG/1
4 TABLET, ORALLY DISINTEGRATING ORAL EVERY 6 HOURS PRN
Status: DISCONTINUED | OUTPATIENT
Start: 2024-05-23 | End: 2024-05-24 | Stop reason: HOSPADM

## 2024-05-23 RX ORDER — MULTIPLE VITAMINS W/ MINERALS TAB 9MG-400MCG
1 TAB ORAL DAILY
Status: DISCONTINUED | OUTPATIENT
Start: 2024-05-24 | End: 2024-05-24 | Stop reason: HOSPADM

## 2024-05-23 RX ORDER — DIAZEPAM 10 MG/2ML
10 INJECTION, SOLUTION INTRAMUSCULAR; INTRAVENOUS ONCE
Status: DISCONTINUED | OUTPATIENT
Start: 2024-05-23 | End: 2024-05-23

## 2024-05-23 RX ORDER — AMOXICILLIN 250 MG
2 CAPSULE ORAL 2 TIMES DAILY PRN
Status: DISCONTINUED | OUTPATIENT
Start: 2024-05-23 | End: 2024-05-24 | Stop reason: HOSPADM

## 2024-05-23 RX ORDER — LIDOCAINE 40 MG/G
CREAM TOPICAL
Status: DISCONTINUED | OUTPATIENT
Start: 2024-05-23 | End: 2024-05-24 | Stop reason: HOSPADM

## 2024-05-23 RX ORDER — PROCHLORPERAZINE 25 MG
25 SUPPOSITORY, RECTAL RECTAL EVERY 12 HOURS PRN
Status: DISCONTINUED | OUTPATIENT
Start: 2024-05-23 | End: 2024-05-24 | Stop reason: HOSPADM

## 2024-05-23 RX ORDER — GABAPENTIN 300 MG/1
600 CAPSULE ORAL EVERY 8 HOURS
Qty: 12 CAPSULE | Refills: 0 | Status: DISCONTINUED | OUTPATIENT
Start: 2024-05-27 | End: 2024-05-24 | Stop reason: HOSPADM

## 2024-05-23 RX ADMIN — CLONIDINE HYDROCHLORIDE 0.1 MG: 0.1 TABLET ORAL at 20:46

## 2024-05-23 RX ADMIN — GABAPENTIN 1200 MG: 300 CAPSULE ORAL at 20:46

## 2024-05-23 RX ADMIN — LORAZEPAM 2 MG: 2 TABLET ORAL at 17:57

## 2024-05-23 RX ADMIN — SODIUM CHLORIDE 1000 ML: 9 INJECTION, SOLUTION INTRAVENOUS at 22:09

## 2024-05-23 RX ADMIN — SODIUM CHLORIDE 1000 ML: 9 INJECTION, SOLUTION INTRAVENOUS at 19:05

## 2024-05-23 RX ADMIN — SODIUM CHLORIDE 1000 ML: 9 INJECTION, SOLUTION INTRAVENOUS at 17:58

## 2024-05-23 RX ADMIN — Medication 100 MG: at 19:52

## 2024-05-23 RX ADMIN — DIAZEPAM 5 MG: 5 INJECTION INTRAMUSCULAR; INTRAVENOUS at 19:52

## 2024-05-23 RX ADMIN — DIAZEPAM 5 MG: 5 INJECTION INTRAMUSCULAR; INTRAVENOUS at 20:49

## 2024-05-23 RX ADMIN — PANTOPRAZOLE SODIUM 40 MG: 40 INJECTION, POWDER, FOR SOLUTION INTRAVENOUS at 22:31

## 2024-05-23 RX ADMIN — FOLIC ACID 1 MG: 1 TABLET ORAL at 19:52

## 2024-05-23 ASSESSMENT — ACTIVITIES OF DAILY LIVING (ADL)
ADLS_ACUITY_SCORE: 37
ADLS_ACUITY_SCORE: 37
ADLS_ACUITY_SCORE: 38
ADLS_ACUITY_SCORE: 37

## 2024-05-23 ASSESSMENT — LIFESTYLE VARIABLES
HEADACHE, FULLNESS IN HEAD: NOT PRESENT
ANXIETY: 5
ORIENTATION AND CLOUDING OF SENSORIUM: ORIENTED AND CAN DO SERIAL ADDITIONS
PAROXYSMAL SWEATS: 2
AGITATION: NORMAL ACTIVITY
AGITATION: NORMAL ACTIVITY
HEADACHE, FULLNESS IN HEAD: NOT PRESENT
TOTAL SCORE: 10
PAROXYSMAL SWEATS: 3
NAUSEA AND VOMITING: 2
NAUSEA AND VOMITING: 6
AUDITORY DISTURBANCES: NOT PRESENT
ANXIETY: 3
ORIENTATION AND CLOUDING OF SENSORIUM: ORIENTED AND CAN DO SERIAL ADDITIONS
TREMOR: 3
AUDITORY DISTURBANCES: NOT PRESENT
TREMOR: 6
TOTAL SCORE: 20
VISUAL DISTURBANCES: NOT PRESENT
VISUAL DISTURBANCES: NOT PRESENT

## 2024-05-23 NOTE — ED TRIAGE NOTES
BIBA from home, wife called 911 because he was on the ground shaking, and was concerned about withdrawals. Pt reports he was released from [facility] several weeks ago and has been drinking again, but reportedly not as much as before.   Pt drinks vodka everyday, last drink last night, unknown time. Denies drug use, SI/HI. Pt reports he has had withdrawals with seizures in the past.       EMS gave 2.5 droperidol.

## 2024-05-23 NOTE — ED NOTES
Bed: ED16  Expected date:   Expected time:   Means of arrival:   Comments:  HEMS 453 50m etoh , given droperidol

## 2024-05-24 VITALS
HEART RATE: 80 BPM | WEIGHT: 174.6 LBS | DIASTOLIC BLOOD PRESSURE: 105 MMHG | OXYGEN SATURATION: 98 % | TEMPERATURE: 98.4 F | RESPIRATION RATE: 23 BRPM | BODY MASS INDEX: 24.35 KG/M2 | SYSTOLIC BLOOD PRESSURE: 138 MMHG

## 2024-05-24 LAB
ALBUMIN SERPL BCG-MCNC: 3.7 G/DL (ref 3.5–5.2)
ALP SERPL-CCNC: 70 U/L (ref 40–150)
ALT SERPL W P-5'-P-CCNC: 10 U/L (ref 0–70)
ANION GAP SERPL CALCULATED.3IONS-SCNC: 12 MMOL/L (ref 7–15)
ANION GAP SERPL CALCULATED.3IONS-SCNC: 20 MMOL/L (ref 7–15)
AST SERPL W P-5'-P-CCNC: 20 U/L (ref 0–45)
ATRIAL RATE - MUSE: 123 BPM
B-OH-BUTYR SERPL-SCNC: 1.3 MMOL/L
B-OH-BUTYR SERPL-SCNC: 4.2 MMOL/L
BILIRUB SERPL-MCNC: 1.6 MG/DL
BUN SERPL-MCNC: 10.4 MG/DL (ref 6–20)
BUN SERPL-MCNC: 9.2 MG/DL (ref 6–20)
CA-I BLD-MCNC: 4.3 MG/DL (ref 4.4–5.2)
CALCIUM SERPL-MCNC: 8.2 MG/DL (ref 8.6–10)
CALCIUM SERPL-MCNC: 8.3 MG/DL (ref 8.6–10)
CHLORIDE SERPL-SCNC: 102 MMOL/L (ref 98–107)
CHLORIDE SERPL-SCNC: 102 MMOL/L (ref 98–107)
CREAT SERPL-MCNC: 0.77 MG/DL (ref 0.67–1.17)
CREAT SERPL-MCNC: 0.85 MG/DL (ref 0.67–1.17)
DEPRECATED HCO3 PLAS-SCNC: 14 MMOL/L (ref 22–29)
DEPRECATED HCO3 PLAS-SCNC: 21 MMOL/L (ref 22–29)
DIASTOLIC BLOOD PRESSURE - MUSE: NORMAL MMHG
EGFRCR SERPLBLD CKD-EPI 2021: >90 ML/MIN/1.73M2
EGFRCR SERPLBLD CKD-EPI 2021: >90 ML/MIN/1.73M2
ERYTHROCYTE [DISTWIDTH] IN BLOOD BY AUTOMATED COUNT: 14.1 % (ref 10–15)
GLUCOSE SERPL-MCNC: 137 MG/DL (ref 70–99)
GLUCOSE SERPL-MCNC: 148 MG/DL (ref 70–99)
HCT VFR BLD AUTO: 33.8 % (ref 40–53)
HGB BLD-MCNC: 11.4 G/DL (ref 13.3–17.7)
HGB BLD-MCNC: 11.8 G/DL (ref 13.3–17.7)
INTERPRETATION ECG - MUSE: NORMAL
LACTATE SERPL-SCNC: 1.1 MMOL/L (ref 0.7–2)
LACTATE SERPL-SCNC: 1.2 MMOL/L (ref 0.7–2)
MAGNESIUM SERPL-MCNC: 3 MG/DL (ref 1.7–2.3)
MCH RBC QN AUTO: 30.4 PG (ref 26.5–33)
MCHC RBC AUTO-ENTMCNC: 33.7 G/DL (ref 31.5–36.5)
MCV RBC AUTO: 90 FL (ref 78–100)
P AXIS - MUSE: 64 DEGREES
PHOSPHATE SERPL-MCNC: 2.4 MG/DL (ref 2.5–4.5)
PLATELET # BLD AUTO: 156 10E3/UL (ref 150–450)
POTASSIUM SERPL-SCNC: 3.7 MMOL/L (ref 3.4–5.3)
POTASSIUM SERPL-SCNC: 4.9 MMOL/L (ref 3.4–5.3)
PR INTERVAL - MUSE: 152 MS
PROT SERPL-MCNC: 6.3 G/DL (ref 6.4–8.3)
QRS DURATION - MUSE: 68 MS
QT - MUSE: 322 MS
QTC - MUSE: 460 MS
R AXIS - MUSE: 56 DEGREES
RBC # BLD AUTO: 3.75 10E6/UL (ref 4.4–5.9)
SODIUM SERPL-SCNC: 135 MMOL/L (ref 135–145)
SODIUM SERPL-SCNC: 136 MMOL/L (ref 135–145)
SYSTOLIC BLOOD PRESSURE - MUSE: NORMAL MMHG
T AXIS - MUSE: 63 DEGREES
TROPONIN T SERPL HS-MCNC: 13 NG/L
VENTRICULAR RATE- MUSE: 123 BPM
WBC # BLD AUTO: 8.2 10E3/UL (ref 4–11)

## 2024-05-24 PROCEDURE — 83735 ASSAY OF MAGNESIUM: CPT | Performed by: HOSPITALIST

## 2024-05-24 PROCEDURE — 82040 ASSAY OF SERUM ALBUMIN: CPT

## 2024-05-24 PROCEDURE — 250N000013 HC RX MED GY IP 250 OP 250 PS 637: Performed by: INTERNAL MEDICINE

## 2024-05-24 PROCEDURE — 250N000013 HC RX MED GY IP 250 OP 250 PS 637

## 2024-05-24 PROCEDURE — 250N000011 HC RX IP 250 OP 636

## 2024-05-24 PROCEDURE — 85018 HEMOGLOBIN: CPT

## 2024-05-24 PROCEDURE — 99239 HOSP IP/OBS DSCHRG MGMT >30: CPT | Performed by: INTERNAL MEDICINE

## 2024-05-24 PROCEDURE — 258N000003 HC RX IP 258 OP 636

## 2024-05-24 PROCEDURE — 83605 ASSAY OF LACTIC ACID: CPT

## 2024-05-24 PROCEDURE — 82010 KETONE BODYS QUAN: CPT

## 2024-05-24 PROCEDURE — 36415 COLL VENOUS BLD VENIPUNCTURE: CPT

## 2024-05-24 PROCEDURE — 84100 ASSAY OF PHOSPHORUS: CPT

## 2024-05-24 PROCEDURE — 82330 ASSAY OF CALCIUM: CPT

## 2024-05-24 PROCEDURE — C9113 INJ PANTOPRAZOLE SODIUM, VIA: HCPCS

## 2024-05-24 PROCEDURE — S5010 5% DEXTROSE AND 0.45% SALINE: HCPCS

## 2024-05-24 PROCEDURE — 250N000011 HC RX IP 250 OP 636: Performed by: HOSPITALIST

## 2024-05-24 RX ORDER — HYDRALAZINE HYDROCHLORIDE 20 MG/ML
10 INJECTION INTRAMUSCULAR; INTRAVENOUS EVERY 4 HOURS PRN
Status: DISCONTINUED | OUTPATIENT
Start: 2024-05-24 | End: 2024-05-24 | Stop reason: HOSPADM

## 2024-05-24 RX ORDER — MAGNESIUM SULFATE HEPTAHYDRATE 40 MG/ML
4 INJECTION, SOLUTION INTRAVENOUS ONCE
Status: COMPLETED | OUTPATIENT
Start: 2024-05-24 | End: 2024-05-24

## 2024-05-24 RX ORDER — SODIUM CHLORIDE 9 MG/ML
INJECTION, SOLUTION INTRAVENOUS CONTINUOUS
Status: DISCONTINUED | OUTPATIENT
Start: 2024-05-24 | End: 2024-05-24

## 2024-05-24 RX ORDER — DEXTROSE MONOHYDRATE AND SODIUM CHLORIDE 5; .45 G/100ML; G/100ML
INJECTION, SOLUTION INTRAVENOUS CONTINUOUS
Status: ACTIVE | OUTPATIENT
Start: 2024-05-24 | End: 2024-05-24

## 2024-05-24 RX ORDER — METOPROLOL SUCCINATE 50 MG/1
50 TABLET, EXTENDED RELEASE ORAL DAILY
Status: DISCONTINUED | OUTPATIENT
Start: 2024-05-24 | End: 2024-05-24 | Stop reason: HOSPADM

## 2024-05-24 RX ORDER — PANTOPRAZOLE SODIUM 40 MG/1
40 TABLET, DELAYED RELEASE ORAL 2 TIMES DAILY
COMMUNITY
Start: 2024-05-24 | End: 2024-09-06

## 2024-05-24 RX ADMIN — CLONIDINE HYDROCHLORIDE 0.1 MG: 0.1 TABLET ORAL at 20:08

## 2024-05-24 RX ADMIN — Medication 600 MG: at 08:24

## 2024-05-24 RX ADMIN — POTASSIUM & SODIUM PHOSPHATES POWDER PACK 280-160-250 MG 1 PACKET: 280-160-250 PACK at 15:35

## 2024-05-24 RX ADMIN — Medication 600 MG: at 18:19

## 2024-05-24 RX ADMIN — DEXTROSE AND SODIUM CHLORIDE: 5; 450 INJECTION, SOLUTION INTRAVENOUS at 00:49

## 2024-05-24 RX ADMIN — Medication 1 TABLET: at 08:24

## 2024-05-24 RX ADMIN — METOPROLOL SUCCINATE 50 MG: 50 TABLET, EXTENDED RELEASE ORAL at 08:24

## 2024-05-24 RX ADMIN — MAGNESIUM SULFATE HEPTAHYDRATE 4 G: 40 INJECTION, SOLUTION INTRAVENOUS at 04:34

## 2024-05-24 RX ADMIN — PANTOPRAZOLE SODIUM 40 MG: 40 INJECTION, POWDER, FOR SOLUTION INTRAVENOUS at 08:26

## 2024-05-24 RX ADMIN — FOLIC ACID 1 MG: 1 TABLET ORAL at 08:24

## 2024-05-24 RX ADMIN — POTASSIUM & SODIUM PHOSPHATES POWDER PACK 280-160-250 MG 1 PACKET: 280-160-250 PACK at 11:25

## 2024-05-24 RX ADMIN — PANTOPRAZOLE SODIUM 40 MG: 40 INJECTION, POWDER, FOR SOLUTION INTRAVENOUS at 20:08

## 2024-05-24 RX ADMIN — SODIUM CHLORIDE: 9 INJECTION, SOLUTION INTRAVENOUS at 00:10

## 2024-05-24 RX ADMIN — CLONIDINE HYDROCHLORIDE 0.1 MG: 0.1 TABLET ORAL at 11:25

## 2024-05-24 RX ADMIN — THIAMINE HCL TAB 100 MG 100 MG: 100 TAB at 08:24

## 2024-05-24 RX ADMIN — CLONIDINE HYDROCHLORIDE 0.1 MG: 0.1 TABLET ORAL at 04:29

## 2024-05-24 RX ADMIN — GABAPENTIN 900 MG: 300 CAPSULE ORAL at 14:13

## 2024-05-24 RX ADMIN — POTASSIUM & SODIUM PHOSPHATES POWDER PACK 280-160-250 MG 1 PACKET: 280-160-250 PACK at 20:07

## 2024-05-24 RX ADMIN — GABAPENTIN 900 MG: 300 CAPSULE ORAL at 04:34

## 2024-05-24 RX ADMIN — GABAPENTIN 900 MG: 300 CAPSULE ORAL at 20:08

## 2024-05-24 ASSESSMENT — ACTIVITIES OF DAILY LIVING (ADL)
ADLS_ACUITY_SCORE: 38
ADLS_ACUITY_SCORE: 37
ADLS_ACUITY_SCORE: 38
ADLS_ACUITY_SCORE: 37
ADLS_ACUITY_SCORE: 38
ADLS_ACUITY_SCORE: 37
ADLS_ACUITY_SCORE: 38
ADLS_ACUITY_SCORE: 37
ADLS_ACUITY_SCORE: 38

## 2024-05-24 NOTE — PHARMACY-ADMISSION MEDICATION HISTORY
Pharmacist Admission Medication History    Admission medication history is complete. The information provided in this note is only as accurate as the sources available at the time of the update.    Information Source(s): Patient and CareEverywhere/SureScripts via in-person    Pertinent Information: patient unsure of any last doses overall, but able to confirm whether taking all medications during interview.     Changes made to PTA medication list:  Added: None  Deleted: B complex   Changed: famotidine BID --> daily, marked pantoprazole not taking, venlafaxine 75 --> 37.5 mg daily    Allergies reviewed with patient and updates made in EHR: no    Medication History Completed By: Estefania Pro [facility] 5/23/2024 8:28 PM    PTA Med List   Medication Sig Last Dose    acetaminophen (TYLENOL) 500 MG tablet Take 500-1,000 mg by mouth every 6 hours as needed for mild pain More than a month at PRN    famotidine (PEPCID) 20 MG tablet Take 1 tablet (20 mg) by mouth 2 times daily (Patient taking differently: Take 20 mg by mouth daily) Unknown at unknown time    folic acid (FOLVITE) 1 MG tablet Take 1 tablet (1 mg) by mouth daily Unknown at unknown time    gabapentin (NEURONTIN) 100 MG capsule Take 1 capsule (100 mg) by mouth 3 times daily Unknown at unknown time    gabapentin (NEURONTIN) 300 MG capsule Take 1 capsule (300 mg) by mouth nightly as needed (sleep) Unknown at taking consistently every evening    metoprolol succinate ER (TOPROL XL) 50 MG 24 hr tablet Take 1 tablet (50 mg) by mouth daily Unknown at unknown time    Multiple Vitamins-Minerals (MULTIVITAMIN ADULT) CHEW Take 2 chew tab by mouth daily Centrum Unknown at unknown time    naltrexone (DEPADE/REVIA) 50 MG tablet Take 1 tablet (50 mg) by mouth daily Unknown at takes in evenings    thiamine (B-1) 100 MG tablet Take 1 tablet (100 mg) by mouth daily Unknown at unknown time    venlafaxine (EFFEXOR XR) 37.5 MG 24 hr capsule Take 37.5 mg by mouth daily Unknown at  unknown time

## 2024-05-24 NOTE — H&P
[facility]    History and Physical - Hospitalist Service       Date of Admission:  5/23/2024    Assessment & Plan      Joe Shah is a 48 year old male with long history of alcohol dependence with history of withdrawal, esophagitis, hypertension, among others, who was admitted on 5/23/2024 for further evaluation of suspected alcohol withdrawal.    Alcohol withdrawal  Alcohol dependence with history of withdrawal  Anion gap metabolic acidosis, improving   Lactic acidosis, improving  Suspect alcoholic ketoacidosis versus starvation ketoacidosis secondary to intractable nausea and vomiting secondary to above   Leukocytosis   *Last hospitalization 03/09/2024-03/14/2024 for alcohol withdrawal. Detox completed on 03/13. He was noted to require 175 mg valium total throughout his detox. He experienced no complications associated with withdrawal. Hydroxyzine and trazodone were utilized PRN for management of anxiety and sleep. [facility] upon discharge.   *Patient reports he is now in outpatient CD program. He reports he was sober following [facility] until 7 days ago when he relapsed following news he lost his job. Has been drinking heavily since (0.75-1.5 L vodka daily). Last drink 05/22 evening. Awoke at 1000 05/23 tremulous and weak. Developed intractable nausea/vomiting and a heart palpitations. No chest pain or SOB. Called EMS. No witnessed seizure activity. No AMS or AH/VH/SI/HI.   *Upon arrival, labs notable for WBC 14.1 05/23. Anion gap 31. CO2 10. Initial lactate 4.8. Suspect secondary to alcohol withdrawal with low suspicion for infectious component, however, given leukocytosis and significant anion gap further work-up obtained, per below.   - [place] [facility]   - Vital signs q 2 hours.   - Seizure precautions.   - Oxygen PRN.   - CIWA protocol in place with PRN valium available.    - Started on PO  vitamins in ED. PO vitamins ordered for daily starting tomorrow.    - Gabapentin taper started.    - Clonidine 0.1 mg started.    - Trended lactate 4.8 --> 2.5. Recheck at 0300 05/24.   - Trended anion gap 31 --> 24 --> 20. CO2 10 --> 12 --> 14.   - Troponins 7 --> 11 --> 13. Discontinue trending.   - Ketone quantitative ordered and 5.10. Repeat 4.20.  Recheck at 0300 05/24.   - IV fluids given (3 L IVF and mIVF D5 w/ 1/2 normal saline @ 125/hr started following repeat ketones). Reassess fluid status AM.   - Started NPO. Advanced to clear liquid diet. NPO again given hematemesis, per below.   - CXR obtained and negative for acute and infectious pathology.   - Blood cultures obtained, although do not suspect infectious, per above. Follow cultures.   - Patient agreeable to speak with Chem Dep although needs outpatient due to new job search. SW/CM also consulted, appreciate the cares.   - Continue to monitor with morning labs.     Hypomagnesemia   *Mg initially 1.7 05/23. Upon repeat 1.5.   - High replacement RN replacement protocol ordered.     Hypocalcemia   *Ca initially 9.2 05/23. Upon repeat 8.4 --> 8.2.   - Upon repeat 8.4 --> 8.2.   - Ionized calcium 4.3.   - Start scheduled calcium carbonate BID 05/24.  - Continue to monitor.     Hematemesis   Hx of Esophagitis   *Recent admission 02/08/24 for hematemesis. Seen by [facility] at that time. EGD with Grade B esophagitis. Esophagitis and scar from prior healed ulcer, patient either has very minimal bleeding or actually has hemoptysis.   *Patient presented with intractable nausea and vomiting secondary to withdrawal symptoms. Hematemesis upon examination of patient.   *Patient hemodynamically stable upon arrival. Hgb stable (13.9).    - Upon examination, patient denied hematemesis although patient vomited in room and brown. Reports recent onset of brown emesis following several episodes of forceful vomiting.   - IV Protonix BID started.   - Repeat Hgb 11.8.   -  Thor PERLA consulted, appreciate the cares.  NPO at midnight.   - Transfuse for Hgb < 7. Conditional orders placed.   - Continue to monitor with morning labs.     HTN   -Continue PTA metoprolol with hold parameters.   - Hydralazine PRN for SBP > 180.      Dyslipidemia   *Total cholesterol 251,  and non  03/2024. Recommended lifestyle modifications and to follow-up with PCP upon discharge.   - Noted. Recommend follow-up with PCP upon discharge.     Hx of subdural hematoma s/p bur hole and hematoma evacuation (Dec 2023)  *Patient notes some cognitive decline since surgery. Denies AMS or further cognitive decline upon examination.  - Continue to monitor for new or worsening symptoms.         Diet: Clear Liquid Diet  DVT Prophylaxis: Pneumatic Compression Devices  Power Catheter: Not present  Lines: None     Cardiac Monitoring: ACTIVE order. Indication: Withdrawal  Code Status: Full Code    Disposition Plan     Medically Ready for Discharge: Anticipated in 2-4 Days     The patient's care was discussed with the Attending Physician, Dr. Sharma .    Naomi Nolan PA-C  Hospitalist Service  [facility]  Securely message with [employer] (more info)  Text page via [facility] Paging/Directory     ______________________________________________________________________    Chief Complaint   Alcohol withdrawal     History is obtained from the patient and chart review    History of Present Illness   Joe Shah is a 48 year old male with long history of alcohol dependence with history of withdrawal, esophagitis, hypertension, among others, who was admitted on 5/23/2024 for further evaluation of suspected alcohol withdrawal.    Presented to the ED via EMS hypertensive with a /106 and tachycardic with a . He was afebrile. Patient was recently released from an inpatient [facility] chemical dependency program several weeks ago (27 days). Was sober until he relapsed 7 days ago and  has been drinking heavily daily since (0.75-1.5 L daily). He states he was triggered when he found out he lost his job. History of drinking 1.75 of vodka daily for several years (10+ since death of best friend). Last drink 05/22 evening. Awoke at 1000 05/23 tremulous and weak. Developed intractable nausea/vomiting and a heart palpitations. He states he called EMS. Wife spoke to EMS upon their arrival to assist in answering questions. No witnessed seizure activity. No recreational drug use. No AH/VH/SI/HI. Has history of hematemesis, however, denies experiencing this prior to arrival. Denies confusion, chest pain, SOB, abdominal pain, urinary or bowel changes. Has been tolerating PO. Last intake was 05/22 dinner. No recent trauma. Usually takes him 2 days after his last drink before he starts having symptoms.     Labs in the ED notable for leukocytosis (14.1). Electrolytes WNL (K 4.9, Mg 1.7, Phos 4.4, Ca 9.2). CO2 10. Anion Gap 31. Lactate 5.10. Total bili 1.3. Lipase 40. Glucose 147.  UA notable for ketones > 150 and protein, but not infectious appearing. EKG sinus tachycardic. Initial troponin 7. Alcohol ethyl < 0.01. Given 2 L IVF. Ativan 2 mg. Valium 5 mg.     Last hospitalization 03/09/2024-03/14/2024. Detox completed on 03/13. He was noted to require 175 mg valium total throughout his detox. He experienced no complications associated with withdrawal. Hydroxyzine and Trazodone were utilized prn for management of anxiety and sleep, respectively.     Past Medical History    Past Medical History:   Diagnosis Date    Anxiety     Back pain     Depressive disorder     Hypertension     SDH (subdural hematoma) (H)     Substance abuse (H)      Past Surgical History   Past Surgical History:   Procedure Laterality Date    FANTASMA HOLE(S) EVACUATE HEMATOMA SUBDURAL N/A 12/1/2023    Procedure: CREATION, CRANIAL FANTASMA HOLE, WITH SUBDURAL HEMATOMA EVACUATION;  Surgeon: Gio Phoenix MD;  Location:  OR    ENT SURGERY       "ESOPHAGOSCOPY, GASTROSCOPY, DUODENOSCOPY (EGD), COMBINED N/A 9/12/2019    Procedure: ESOPHAGOGASTRODUODENOSCOPY (EGD);  Surgeon: Scott Mcrae MD;  Location:  GI    ESOPHAGOSCOPY, GASTROSCOPY, DUODENOSCOPY (EGD), COMBINED N/A 5/27/2023    Procedure: Esophagoscopy, gastroscopy, duodenoscopy (EGD), combined;  Surgeon: Scott Mcrae MD;  Location:  GI    ESOPHAGOSCOPY, GASTROSCOPY, DUODENOSCOPY (EGD), COMBINED N/A 2/9/2024    Procedure: Esophagoscopy, gastroscopy, duodenoscopy (EGD), combined;  Surgeon: Nakul Arguelles MD;  Location:  GI    NECK SURGERY       Prior to Admission Medications   Prior to Admission Medications   Prescriptions Last Dose Informant Patient Reported? Taking?   Multiple Vitamins-Minerals (MULTIVITAMIN ADULT) CHEW Unknown at unknown time Self No Yes   Sig: Take 2 chew tab by mouth daily Centrum   acetaminophen (TYLENOL) 500 MG tablet More than a month at PRN Self Yes Yes   Sig: Take 500-1,000 mg by mouth every 6 hours as needed for mild pain   famotidine (PEPCID) 20 MG tablet Unknown at unknown time Self No Yes   Sig: Take 1 tablet (20 mg) by mouth 2 times daily   Patient taking differently: Take 20 mg by mouth daily   folic acid (FOLVITE) 1 MG tablet Unknown at unknown time Self No Yes   Sig: Take 1 tablet (1 mg) by mouth daily   gabapentin (NEURONTIN) 100 MG capsule Unknown at unknown time Self No Yes   Sig: Take 1 capsule (100 mg) by mouth 3 times daily   gabapentin (NEURONTIN) 300 MG capsule Unknown at taking consistently every evening Self No Yes   Sig: Take 1 capsule (300 mg) by mouth nightly as needed (sleep)   metoprolol succinate ER (TOPROL XL) 50 MG 24 hr tablet Unknown at unknown time Self No Yes   Sig: Take 1 tablet (50 mg) by mouth daily   naltrexone (DEPADE/REVIA) 50 MG tablet Unknown at takes in evenings Self No Yes   Sig: Take 1 tablet (50 mg) by mouth daily   pantoprazole (PROTONIX) 40 MG EC tablet Not Taking at states was \"hit or miss\" when taking " previously Self No No   Sig: Take 1 tablet (40 mg) by mouth daily   Patient not taking: Reported on 5/23/2024   thiamine (B-1) 100 MG tablet Unknown at unknown time Self No Yes   Sig: Take 1 tablet (100 mg) by mouth daily   venlafaxine (EFFEXOR XR) 37.5 MG 24 hr capsule Unknown at unknown time Self Yes Yes   Sig: Take 37.5 mg by mouth daily   venlafaxine (EFFEXOR XR) 75 MG 24 hr capsule Not Taking at dose decreased Self No No   Sig: Take 1 capsule (75 mg) by mouth daily (with breakfast)   Patient not taking: Reported on 5/23/2024      Facility-Administered Medications: None      Allergies   Allergies   Allergen Reactions    Morphine Nausea and Vomiting    Oxycodone Itching     1/22 Patient reports he can tolerate this      Physical Exam   Vital Signs: Temp: 99.6  F (37.6  C) Temp src: Axillary BP: (!) 157/101 Pulse: (!) 121   Resp: 16 SpO2: 97 % O2 Device: None (Room air)    Weight: 174 lbs 9.6 oz    GENERAL:  Pleasant, cooperative, alert. Patient uncomfortable appearing laying in bed upon examination. Emesis bag in hand and intermittently vomiting brown gastric contents upon examination.   HEENT: Normocephalic, atraumatic.  Extra occular mm intact.  Sclera clear. PERRL.  Mucous membranes dry. Speech clear and coherent.    PULMONOLOGY: Clear to auscultation bilaterally .   CARDIAC: Tachycardic rate and regular rhythm.  No appreciated murmur.   ABDOMEN: Soft, nontender non distended.   MUSCULOSKELETAL:  Moving x 4 spontaneously with CMS intact x4.  Normal bulk and tone.  No LE edema.  Radial pulses 2+ bilaterally.    NEURO: Alert and oriented x3.  CN II-XII grossly intact and symmetric.  Mildly tremulous upon examination.   PSYCH: Patient occasionally tearful during examination.  No evidence of tardive dyskinesia, dystonia, or tics. Thought process logical and linear. Insight good. No evidence of suicidal ideation or homicidal ideation. No evidence of visual or auditory hallucinations.     Medical Decision Making        70 MINUTES SPENT BY ME on the date of service doing chart review, history, exam, documentation & further activities per the note.      Data   ------------------------- PAST 24 HR DATA REVIEWED -----------------------------------------------    I have personally reviewed the following data over the past 24 hrs:    14.1 (H)  \   11.8 (L)   / 203     136 102 10.4 /  148 (H)   4.9 14 (L) 0.85 \     ALT: 16 AST: 28 AP: 97 TBILI: 1.3 (H)   ALB: 4.6 TOT PROTEIN: 7.8 LIPASE: 40     Trop: 13 BNP: N/A     Procal: N/A CRP: N/A Lactic Acid: 2.5 (H)         Imaging results reviewed over the past 24 hrs:   Recent Results (from the past 24 hour(s))   XR Chest 2 Views    Narrative    EXAM: XR CHEST 2 VIEWS  LOCATION: [facility]  DATE: 5/23/2024    INDICATION: Leukocytosis. Hypertension. Alcohol withdrawal.  COMPARISON: None.      Impression    IMPRESSION: Negative chest. Lungs clear.

## 2024-05-24 NOTE — CONSULTS
[facility]  Gastroenterology Consultation         Joe Shah  [address] [id number]  48 year old male    Admission Date/Time: 5/23/2024  Primary Care Provider: Sofia Rebollar  Referring / Attending Physician:  Viola Nolan PA-C    We were asked to see the patient in consultation by Viola Nolan PA-C for evaluation of hematemesis.      CC: hematemesis    HPI:  oJe Shah is a 48 year old male with long history of alcohol dependence and withdrawal, esophagitis, hypertension admitted on 5/23/2024 with suspected alcohol withdrawal. While in ED had episode of hematemesis. This is not known to patient. States has not been nauseated overnight. Has no lightheadedness, dizziness, shortness of breath or abdominal pain.    He has struggled with alcohol for years and recently released from an inpatient [facility] chemical dependency program several weeks ago. He was sober until he relapsed 1 week ago and has been drinking heavily daily since (0.75-1.5 L daily). This was triggered when he found out he lost his job. He has history of drinking 1.75 of vodka daily for several years (10+ since death of best friend). Awoke at 1000 05/23 tremulous and weak. Developed intractable nausea/vomiting and a heart palpitations.  Denies confusion, chest pain, SOB, abdominal pain, urinary or bowel changes. Has been tolerating PO. Last intake was 05/22 dinner. No recent trauma. Usually takes him 2 days after his last drink before he starts having symptoms.     ROS: A comprehensive ten point review of systems was negative aside from those in mentioned in the HPI.      PAST MED HX:  I have reviewed this patient's medical history and updated it with pertinent information if needed.   Past Medical History:   Diagnosis Date    Anxiety     Back pain     Depressive disorder     Hypertension     SDH (subdural hematoma) (H)     Substance abuse (H)        MEDICATIONS:   Prior  "to Admission Medications   Prescriptions Last Dose Informant Patient Reported? Taking?   Multiple Vitamins-Minerals (MULTIVITAMIN ADULT) CHEW Unknown at unknown time Self No Yes   Sig: Take 2 chew tab by mouth daily Centrum   acetaminophen (TYLENOL) 500 MG tablet More than a month at PRN Self Yes Yes   Sig: Take 500-1,000 mg by mouth every 6 hours as needed for mild pain   famotidine (PEPCID) 20 MG tablet Unknown at unknown time Self No Yes   Sig: Take 1 tablet (20 mg) by mouth 2 times daily   Patient taking differently: Take 20 mg by mouth daily   folic acid (FOLVITE) 1 MG tablet Unknown at unknown time Self No Yes   Sig: Take 1 tablet (1 mg) by mouth daily   gabapentin (NEURONTIN) 100 MG capsule Unknown at unknown time Self No Yes   Sig: Take 1 capsule (100 mg) by mouth 3 times daily   gabapentin (NEURONTIN) 300 MG capsule Unknown at taking consistently every evening Self No Yes   Sig: Take 1 capsule (300 mg) by mouth nightly as needed (sleep)   metoprolol succinate ER (TOPROL XL) 50 MG 24 hr tablet Unknown at unknown time Self No Yes   Sig: Take 1 tablet (50 mg) by mouth daily   naltrexone (DEPADE/REVIA) 50 MG tablet Unknown at takes in evenings Self No Yes   Sig: Take 1 tablet (50 mg) by mouth daily   pantoprazole (PROTONIX) 40 MG EC tablet Not Taking at states was \"hit or miss\" when taking previously Self No No   Sig: Take 1 tablet (40 mg) by mouth daily   Patient not taking: Reported on 5/23/2024   thiamine (B-1) 100 MG tablet Unknown at unknown time Self No Yes   Sig: Take 1 tablet (100 mg) by mouth daily   venlafaxine (EFFEXOR XR) 37.5 MG 24 hr capsule Unknown at unknown time Self Yes Yes   Sig: Take 37.5 mg by mouth daily   venlafaxine (EFFEXOR XR) 75 MG 24 hr capsule Not Taking at dose decreased Self No No   Sig: Take 1 capsule (75 mg) by mouth daily (with breakfast)   Patient not taking: Reported on 5/23/2024      Facility-Administered Medications: None       ALLERGIES:   Allergies   Allergen Reactions    " Morphine Nausea and Vomiting    Oxycodone Itching     1/22 Patient reports he can tolerate this       SOCIAL HISTORY:  Social History     Tobacco Use    Smoking status: Former    Smokeless tobacco: Current     Types: Chew   Substance Use Topics    Alcohol use: Yes     Comment: Daily; 750 ML    Drug use: No       FAMILY HISTORY:  Family History   Problem Relation Age of Onset    No Known Problems Maternal Grandmother     No Known Problems Maternal Grandfather     No Known Problems Paternal Grandmother     Substance Abuse Paternal Grandfather        PHYSICAL EXAM:   General  alert, oriented and comfortable  Vital Signs with Ranges  Temp: 98.1  F (36.7  C) Temp src: Oral BP: (!) 128/93 Pulse: 92   Resp: 12 SpO2: 97 % O2 Device: None (Room air)    I/O last 3 completed shifts:  In: -   Out: 350 [Urine:350]    Constitutional: healthy, alert, and no distress   Cardiovascular: negative, PMI normal. No lifts, heaves, or thrills. RRR. No murmurs, clicks gallops or rub  Respiratory: negative, Percussion normal. Good diaphragmatic excursion. Lungs clear  Abdomen: Abdomen soft, non-tender. BS normal. No masses, organomegaly          ADDITIONAL COMMENTS:   I reviewed the patient's new clinical lab test results.   Recent Labs   Lab Test 05/24/24  0631 05/24/24  0047 05/23/24  1735 03/09/24  1438 02/08/24  1207 02/08/24  0742 12/03/23  0531 11/30/23  1747 05/02/20  0818 05/01/20  2218   WBC 8.2  --  14.1* 6.0   < > 4.8   < >  --    < > 8.2   HGB 11.4* 11.8* 13.9 14.3   < > 13.4   < >  --    < > 15.2   MCV 90  --  92 92   < > 96   < >  --    < > 100     --  203 171   < > 215   < >  --    < > 188   INR  --   --   --   --   --  1.09  --  1.08  --  1.02    < > = values in this interval not displayed.     Recent Labs   Lab Test 05/24/24  0631 05/23/24  2251 05/23/24  2106   POTASSIUM 3.7 4.9 4.7   CHLORIDE 102 102 100   CO2 21* 14* 12*   BUN 9.2 10.4 10.5   ANIONGAP 12 20* 24*     Recent Labs   Lab Test 05/24/24  0631  05/23/24  1816 05/23/24  1735 03/09/24  1438 01/23/24  0630 01/22/24  0702 05/28/23  0900 05/26/23  0044 05/09/21  0606 05/08/21  1413   ALBUMIN 3.7  --  4.6 4.3   < > 4.9   < > 4.7   < >  --    BILITOTAL 1.6*  --  1.3* 0.5   < > 2.2*   < > 1.7*   < >  --    ALT 10  --  16 32   < > 85*   < > 63*   < >  --    AST 20  --  28 88*   < > 194*   < > 93*   < >  --    PROTEIN  --  30*  --   --   --   --   --   --   --  50*   LIPASE  --   --  40  --   --  44  --  64*   < >  --     < > = values in this interval not displayed.       I reviewed the patient's new imaging results.        CONSULTATION ASSESSMENT AND PLAN:    Joe Shah is a 48 year old male with long history of alcohol dependence and withdrawal, esophagitis, hypertension admitted on 5/23/2024 with suspected alcohol withdrawal with an episode of hematemesis    Alcohol abuse and withdrawal  Hematemesis  Anemia  Hemoglobin stabled around 11-12 baseline of 14.  Had one episode of hematemesis  Prior EGD 02/2024 noted grade B esophagitis  Likely has recurrent esophagitis/gastritis from vomiting due to excessive alcohol intake  Discussed with patient that could offer EGD but given no recurrent vomiting and stable hemoglobin with recent EGD unlikely to find any other source other that gastritis/esophagitis. Patient agrees    - start clear liquid diet and ADAT  - IV pantoprazole 40 mg BID  - daily hemoglobin  - prn antiemetics  - alcohol cessation      [facility].  Office: [phone]  Cell : [phone] (Dr. Mcrae)  Cell: [phone] (Ester Tobar PA-C)

## 2024-05-24 NOTE — PLAN OF CARE
A&Ox4, lungs clear. NSR. CIWA scored 0. Independent in the room. Clear liquid diet. Discharge order in place. Waiting his ride to come around 8pm to pick him up.

## 2024-05-24 NOTE — ED NOTES
DATE/TIME OF CALL RECEIVED FROM LAB:  05/23/24 at 840 PM   LAB TEST:  [facility]  LAB VALUE:  5.1  PROVIDER NOTIFIED?: Yes  PROVIDER NAME: Dr. Nolan  DATE/TIME LAB VALUE REPORTED TO PROVIDER: 5/23/2024 at 2040  MECHANISM OF PROVIDER NOTIFICATION: Face-To-Face  PROVIDER RESPONSE: Awaiting new orders.

## 2024-05-24 NOTE — ED NOTES
Writer unable to collect Lactic due to difficulty vein draw.  Lab contacted and will be over to collect lab.

## 2024-05-24 NOTE — CONSULTS
5/24/2024  I spoke with pt who was not interested in participating in a RAISSA CA with a referral to either inpatient or outpatient RAISSA treatment at this time. If pt changes their mind, they can call [facility] at [phone] for an Outpatient RAISSA Assessment upon discharge from the hospital.    Kathrin Doan MA [facility]  RAISSA Evaluation Counselor  [phone]  [email or web address]

## 2024-05-24 NOTE — ED PROVIDER NOTES
Emergency Department Note      History of Present Illness     Chief Complaint  Alcohol Intoxication    HPI  Joe Shah is a 48 year old male who presents for evaluation of concern for alcohol withdrawal.  He arrives via EMS.  His wife called 911 today as he was shaking uncontrollably on the ground.  He remembers this event did not have any unresponsive episodes appears he does not remember.  No reported seizure activity from his wife.  He stopped drinking alcohol last night and feels like he is withdrawing from symptoms.  He is quite tremulous.  He has been and he is obtaining treatment several weeks ago has been drinking for at least the last week quite heavily.  He has a history of alcohol withdrawal seizures.  He is nauseous and vomiting and has no blood in his vomit.  No blood in his stool.  No abdominal pain.  No fevers or chills.    Independent Historian  None    Review of External Notes  None  Past Medical History   Medical History and Problem List  Past Medical History:   Diagnosis Date    Anxiety     Back pain     Depressive disorder     Hypertension     SDH (subdural hematoma) (H)     Substance abuse (H)        Medications  No current outpatient medications on file.      Surgical History   Past Surgical History:   Procedure Laterality Date    FANTASMA HOLE(S) EVACUATE HEMATOMA SUBDURAL N/A 12/1/2023    Procedure: CREATION, CRANIAL FANTASMA HOLE, WITH SUBDURAL HEMATOMA EVACUATION;  Surgeon: Gio Phoenix MD;  Location:  OR    ENT SURGERY      ESOPHAGOSCOPY, GASTROSCOPY, DUODENOSCOPY (EGD), COMBINED N/A 9/12/2019    Procedure: ESOPHAGOGASTRODUODENOSCOPY (EGD);  Surgeon: Scott Mcrae MD;  Location:  GI    ESOPHAGOSCOPY, GASTROSCOPY, DUODENOSCOPY (EGD), COMBINED N/A 5/27/2023    Procedure: Esophagoscopy, gastroscopy, duodenoscopy (EGD), combined;  Surgeon: Scott Mcrae MD;  Location:  GI    ESOPHAGOSCOPY, GASTROSCOPY, DUODENOSCOPY (EGD), COMBINED N/A 2/9/2024    Procedure:  Esophagoscopy, gastroscopy, duodenoscopy (EGD), combined;  Surgeon: Nakul Arguelles MD;  Location:  GI    NECK SURGERY       Physical Exam   Patient Vitals for the past 24 hrs:   BP Temp Temp src Pulse Resp SpO2 Weight   05/24/24 0000 -- 99.6  F (37.6  C) Axillary -- 16 97 % --   05/23/24 2200 (!) 157/101 -- -- (!) 121 26 98 % --   05/23/24 2159 -- -- -- -- -- -- 79.2 kg (174 lb 9.6 oz)   05/23/24 2147 (!) 155/101 98.3  F (36.8  C) Oral (!) 121 18 98 % --   05/23/24 2000 (!) 150/95 -- -- 118 -- 99 % --   05/23/24 1930 -- -- -- (!) 123 18 98 % --   05/23/24 1903 -- -- -- (!) 128 21 99 % --   05/23/24 1757 -- 98.1  F (36.7  C) Temporal -- -- -- --   05/23/24 1739 (!) 166/106 -- -- -- -- -- --   05/23/24 1736 -- -- -- (!) 126 18 -- --   05/23/24 1731 -- -- -- -- -- 99 % --     Physical Exam  Constitutional: Nontoxic appearing.  HEENT: Atraumatic.  PERRL.  EOMI.  Moist mucous membranes.  Neck: Soft.  Supple.  No JVD.  Cardiac: Tachycardic rate with a regular rhythm.  No murmur or rub.  Respiratory: Clear to auscultation bilaterally.  No respiratory distress.  No wheezing, rhonchi, or rales.  Abdomen: Soft and nontender.  No guarding.  Nondistended.  Musculoskeletal: No edema.  Normal range of motion.  Neurologic: Alert and oriented x3.  Normal tone and bulk.  Significant tremors of the upper extremities.  5/5 strength in bilateral upper and lower extremities.  Sensation to light touch intact throughout.   Skin: No rashes.  No edema.  Psych: Normal affect.  Normal behavior.  Does not appear to be responding to internal stimuli.          Diagnostics   Lab Results   Labs Ordered and Resulted from Time of ED Arrival to Time of ED Departure   COMPREHENSIVE METABOLIC PANEL - Abnormal       Result Value    Sodium 140      Potassium 4.9      Carbon Dioxide (CO2) 10 (*)     Anion Gap 31 (*)     Urea Nitrogen 12.0      Creatinine 0.87      GFR Estimate >90      Calcium 9.2      Chloride 99      Glucose 147 (*)     Alkaline  Phosphatase 97      AST 28      ALT 16      Protein Total 7.8      Albumin 4.6      Bilirubin Total 1.3 (*)    CBC WITH PLATELETS AND DIFFERENTIAL - Abnormal    WBC Count 14.1 (*)     RBC Count 4.68      Hemoglobin 13.9      Hematocrit 43.0      MCV 92      MCH 29.7      MCHC 32.3      RDW 13.9      Platelet Count 203      % Neutrophils 93      % Lymphocytes 4      % Monocytes 3      % Eosinophils 0      % Basophils 0      % Immature Granulocytes 0      NRBCs per 100 WBC 0      Absolute Neutrophils 13.1 (*)     Absolute Lymphocytes 0.5 (*)     Absolute Monocytes 0.5      Absolute Eosinophils 0.0      Absolute Basophils 0.0      Absolute Immature Granulocytes 0.1      Absolute NRBCs 0.0     LACTIC ACID WHOLE BLOOD WITH 1X REPEAT IN 2 HR WHEN >2 - Abnormal    Lactic Acid, Initial 4.8 (*)    ROUTINE UA WITH MICROSCOPIC REFLEX TO CULTURE - Abnormal    Color Urine Light Yellow      Appearance Urine Clear      Glucose Urine Negative      Bilirubin Urine Negative      Ketones Urine >150 (*)     Specific Gravity Urine 1.021      Blood Urine Negative      pH Urine 5.5      Protein Albumin Urine 30 (*)     Urobilinogen Urine Normal      Nitrite Urine Negative      Leukocyte Esterase Urine Negative      Mucus Urine Present (*)     RBC Urine 1      WBC Urine <1      Squamous Epithelials Urine <1      Hyaline Casts Urine 3 (*)    KETONE BETA-HYDROXYBUTYRATE QUANTITATIVE, RAPID - Abnormal    Ketone (Beta-Hydroxybutyrate) Quantitative 5.10 (*)    BLOOD GAS VENOUS - Abnormal    pH Venous 7.33      pCO2 Venous 27 (*)     pO2 Venous 38      Bicarbonate Venous 14 (*)     Base Excess/Deficit Venous -10.4 (*)     FIO2 20      Oxyhemoglobin Venous 68 (*)     O2 Sat, Venous 69.9 (*)    LIPASE - Normal    Lipase 40     MAGNESIUM - Normal    Magnesium 1.7     ETHYL ALCOHOL LEVEL - Normal    Alcohol ethyl <0.01     TROPONIN T, HIGH SENSITIVITY - Normal    Troponin T, High Sensitivity 7     PHOSPHORUS - Normal    Phosphorus 4.4     BLOOD  CULTURE   BLOOD CULTURE       Imaging  XR Chest 2 Views   Final Result   IMPRESSION: Negative chest. Lungs clear.          ECG results from 05/23/24   EKG 12-lead, tracing only     Value    Systolic Blood Pressure     Diastolic Blood Pressure     Ventricular Rate 123    Atrial Rate 123    WV Interval 152    QRS Duration 68        QTc 460    P Axis 64    R AXIS 56    T Axis 63    Interpretation ECG      Sinus tachycardia  Otherwise normal ECG  When compared with ECG of 08-FEB-2024 07:50,  No significant change was found  Confirmed by GENERATED REPORT, COMPUTER ([id number]),  Brooke Barrientos ([id number]) on 5/24/2024 6:45:19 PM           Independent Interpretation  None  ED Course    Medications Administered  Medications   senna-docusate (SENOKOT-S/PERICOLACE) 8.6-50 MG per tablet 1 tablet (has no administration in time range)     Or   senna-docusate (SENOKOT-S/PERICOLACE) 8.6-50 MG per tablet 2 tablet (has no administration in time range)   cloNIDine (CATAPRES) tablet 0.1 mg (0.1 mg Oral $Given 5/23/24 2046)   OLANZapine zydis (zyPREXA) ODT tab 5-10 mg (has no administration in time range)     Or   haloperidol lactate (HALDOL) injection 2.5-5 mg (has no administration in time range)   flumazenil (ROMAZICON) injection 0.2 mg (has no administration in time range)   melatonin tablet 5 mg (has no administration in time range)   lidocaine 1 % 0.1-1 mL (has no administration in time range)   lidocaine (LMX4) cream (has no administration in time range)   sodium chloride (PF) 0.9% PF flush 3 mL (3 mLs Intracatheter $Given 5/23/24 2209)   sodium chloride (PF) 0.9% PF flush 3 mL (has no administration in time range)   ondansetron (ZOFRAN ODT) ODT tab 4 mg (has no administration in time range)     Or   ondansetron (ZOFRAN) injection 4 mg (has no administration in time range)   prochlorperazine (COMPAZINE) injection 10 mg (has no administration in time range)     Or   prochlorperazine (COMPAZINE) tablet 10 mg (has no  administration in time range)     Or   prochlorperazine (COMPAZINE) suppository 25 mg (has no administration in time range)   gabapentin (NEURONTIN) capsule 900 mg (has no administration in time range)   gabapentin (NEURONTIN) capsule 600 mg (has no administration in time range)   gabapentin (NEURONTIN) capsule 300 mg (has no administration in time range)   gabapentin (NEURONTIN) capsule 100 mg (has no administration in time range)   diazepam (VALIUM) tablet 10 mg ( Oral See Alternative 5/23/24 2049)     Or   diazepam (VALIUM) injection 5-10 mg (5 mg Intravenous $Given 5/23/24 2049)   thiamine (B-1) tablet 100 mg (has no administration in time range)   folic acid (FOLVITE) tablet 1 mg (has no administration in time range)   multivitamin w/minerals (THERA-VIT-M) tablet 1 tablet (has no administration in time range)   pantoprazole (PROTONIX) IV push injection 40 mg (40 mg Intravenous $Given 5/23/24 2231)   calcium carbonate (OS-ISAIAS) tablet 600 mg (has no administration in time range)   metoprolol succinate ER (TOPROL XL) 24 hr tablet 50 mg (has no administration in time range)   hydrALAZINE (APRESOLINE) injection 10 mg (has no administration in time range)   dextrose 5% and 0.45% NaCl infusion ( Intravenous $New Bag 5/24/24 0049)   sodium chloride 0.9% BOLUS 1,000 mL (0 mLs Intravenous Stopped 5/23/24 1905)   LORazepam (ATIVAN) tablet 2 mg (2 mg Oral $Given 5/23/24 1757)   sodium chloride 0.9% BOLUS 1,000 mL (1,000 mLs Intravenous $New Bag 5/23/24 1905)   diazepam (VALIUM) injection 5 mg (5 mg Intravenous $Given 5/23/24 1952)   folic acid (FOLVITE) tablet 1 mg (1 mg Oral $Given 5/23/24 1952)   thiamine (B-1) tablet 100 mg (100 mg Oral $Given 5/23/24 1952)   gabapentin (NEURONTIN) capsule 1,200 mg (1,200 mg Oral $Given 5/23/24 2046)   sodium chloride 0.9% BOLUS 1,000 mL (1,000 mLs Intravenous $New Bag 5/23/24 2209)       Procedures  Procedures     Discussion of Management  None    Social Determinants of Health adding  to complexity of care  None    ED Course     Medical Decision Making / Diagnosis   CMS Diagnoses: The Lactic acid level is elevated due to alcohol withdrawal, dehydration, alcoholic ketosis, at this time there is no sign of severe sepsis or septic shock. and None    MIPS     None    [facility]  Joe Shah is a 48 year old male who is afebrile and tachycardic.  He is tremulous consistent with alcohol withdrawal.  He is alert and oriented no evidence of hallucinations or DTs at this time.  No seizure activity.  He is given IV fluids.  He has a significant metabolic acidosis likely secondary to withdrawal and dehydration and alcohol ketosis.  No evidence of DKA.  His lactic acid is elevated secondary to dehydration, alcohol withdrawal, and alcoholic ketosis and there is no signs of sepsis and no signs of infection.  He was given benzos x 2 IV and require admission to the hospital.  He is in agreement feels comfortable this plan.  I discussed with hospitalist who accepts for admission and he is in stable condition at time of admission.    Disposition  The patient was discharged.     ICD-10 Codes:    ICD-10-CM    1. Alcohol withdrawal, with unspecified complication (H)  F10.939       2. Acidosis  E87.20              MD Jonh Miguel Nicholas J, MD  05/27/24 1347

## 2024-05-24 NOTE — ED NOTES
DATE/TIME OF CALL RECEIVED FROM LAB:  05/23/24 at 8:34 PM   LAB TEST:  Lactic  LAB VALUE:  4.8  PROVIDER NOTIFIED?: Yes  PROVIDER NAME: Dr. Nolan  DATE/TIME LAB VALUE REPORTED TO PROVIDER:  2040  MECHANISM OF PROVIDER NOTIFICATION: Face-To-Face  PROVIDER RESPONSE: Awaiting new orders.

## 2024-05-24 NOTE — PLAN OF CARE
Goal Outcome Evaluation:  Orientation: A&Ox4     Vitals/Tele: vss on ra tele: SR     IV Access/drains: piv infusing d5 125mL/hr    Diet: NPO    Mobility: SBA    GI/: continent     Wound/Skin: intact, small scab on left back     Consults: Chem dep, Gastroenterology, SW    Discharge Plan: when medically ready anticipated 2-4 days     LABS: Mag 1.5 replaced     See Flow sheets for assessment

## 2024-05-24 NOTE — PROVIDER NOTIFICATION
MD Notification    Notified Person: MD    Notified Person Name: Viola Nolan     Notification Date/Time: 0040, 5/24/24    Notification Interaction: vocera     Purpose of Notification: Pts ketones 4.20    Orders Received: start d5 125 mL/hr    Comments:

## 2024-05-24 NOTE — ED NOTES
[facility]    ED Nurse Handoff Report    ED Chief complaint: Alcohol Intoxication      ED Diagnosis:   Final diagnoses:   Alcohol withdrawal, with unspecified complication (H)   Acidosis       Code Status: Full Code    Allergies:   Allergies   Allergen Reactions    Morphine Nausea and Vomiting    Oxycodone Itching     1/22 Patient reports he can tolerate this       Patient Story:  Pt BIBA from home, pt's wife called 911 because pt was on the floor tremulous, unable to care for himself. Pt reports he was at [facility] several weeks ago, since then has been drinking alcohol daily. Pt reports his last drink was last night, unknown time. Pt arrived tremulous, nauseous, and sweating, ciwa 20, given ativan, repeat ciwa 10.    Focused Assessment:    Pt is A&Ox4, independent, can make needs known, appropriate on call light. Pt is mildly sweating, nausea is significantly better, able to keep fluids down. Pt reports he has had withdrawal seizures in the past.     Labs Ordered and Resulted from Time of ED Arrival to Time of ED Departure   COMPREHENSIVE METABOLIC PANEL - Abnormal       Result Value    Sodium 140      Potassium 4.9      Carbon Dioxide (CO2) 10 (*)     Anion Gap 31 (*)     Urea Nitrogen 12.0      Creatinine 0.87      GFR Estimate >90      Calcium 9.2      Chloride 99      Glucose 147 (*)     Alkaline Phosphatase 97      AST 28      ALT 16      Protein Total 7.8      Albumin 4.6      Bilirubin Total 1.3 (*)    CBC WITH PLATELETS AND DIFFERENTIAL - Abnormal    WBC Count 14.1 (*)     RBC Count 4.68      Hemoglobin 13.9      Hematocrit 43.0      MCV 92      MCH 29.7      MCHC 32.3      RDW 13.9      Platelet Count 203      % Neutrophils 93      % Lymphocytes 4      % Monocytes 3      % Eosinophils 0      % Basophils 0      % Immature Granulocytes 0      NRBCs per 100 WBC 0      Absolute Neutrophils 13.1 (*)     Absolute Lymphocytes 0.5 (*)     Absolute Monocytes 0.5      Absolute Eosinophils 0.0       Absolute Basophils 0.0      Absolute Immature Granulocytes 0.1      Absolute NRBCs 0.0     ROUTINE UA WITH MICROSCOPIC REFLEX TO CULTURE - Abnormal    Color Urine Light Yellow      Appearance Urine Clear      Glucose Urine Negative      Bilirubin Urine Negative      Ketones Urine >150 (*)     Specific Gravity Urine 1.021      Blood Urine Negative      pH Urine 5.5      Protein Albumin Urine 30 (*)     Urobilinogen Urine Normal      Nitrite Urine Negative      Leukocyte Esterase Urine Negative      Mucus Urine Present (*)     RBC Urine 1      WBC Urine <1      Squamous Epithelials Urine <1      Hyaline Casts Urine 3 (*)    LIPASE - Normal    Lipase 40     MAGNESIUM - Normal    Magnesium 1.7     ETHYL ALCOHOL LEVEL - Normal    Alcohol ethyl <0.01     TROPONIN T, HIGH SENSITIVITY - Normal    Troponin T, High Sensitivity 7     LACTIC ACID WHOLE BLOOD WITH 1X REPEAT IN 2 HR WHEN >2   KETONE BETA-HYDROXYBUTYRATE QUANTITATIVE, RAPID   PHOSPHORUS       No orders to display         Treatments and/or interventions provided:      Medications   sodium chloride 0.9% BOLUS 1,000 mL (1,000 mLs Intravenous $New Bag 5/23/24 1905)   diazepam (VALIUM) injection 5 mg (has no administration in time range)   folic acid (FOLVITE) tablet 1 mg (has no administration in time range)   thiamine (B-1) tablet 100 mg (has no administration in time range)   sodium chloride 0.9% BOLUS 1,000 mL (0 mLs Intravenous Stopped 5/23/24 1905)   LORazepam (ATIVAN) tablet 2 mg (2 mg Oral $Given 5/23/24 1757)       Patient's response to treatments and/or interventions:    Pt resting in bed    To be done/followed up on inpatient unit:   See any in-patient orders    Does this patient have any cognitive concerns?: none    Activity level - Baseline/Home:    Independent    Activity Level - Current:    Independent    Patient's Preferred language: English     Needed?: No    Isolation: None  Infection: Not Applicable  Patient tested for COVID 19 prior to  admission: NO    Bariatric?: No    Vital Signs:   Vitals:    05/23/24 1736 05/23/24 1739 05/23/24 1757 05/23/24 1903   BP:  (!) 166/106     Pulse: (!) 126   (!) 128   Resp: 18   21   Temp:   98.1  F (36.7  C)    TempSrc:   Temporal    SpO2:    99%       Cardiac Rhythm:     Was the PSS-3 completed:   Yes  What interventions are required if any?               Family Comments: none present  OBS brochure/video discussed/provided to patient/family: N/A              Name of person given brochure if not patient:               Relationship to patient:     For the majority of the shift this patient's behavior was [place].  Behavioral interventions performed were .    ED NURSE PHONE NUMBER: *[phone]

## 2024-05-24 NOTE — DISCHARGE SUMMARY
[facility]  Hospitalist Discharge Summary      Date of Admission:  5/23/2024  Date of Discharge:  5/24/2024  8:50 PM  Discharging Provider: Evelyn Ronquillo MD  Discharge Service: Hospitalist Service    Discharge Diagnoses     Alcohol withdrawal   Alcohol dependence  Anion gap metabolic acidosis, resolved  Lactic acidosis, resolved  Ketoacidosis  Hematemesis  Hypomagnesemia  Hypophosphatemia  H/o Esophagitis  HTN  Dyslipidemia      Clinically Significant Risk Factors          Follow-ups Needed After Discharge   Follow-up Appointments     Follow-up and recommended labs and tests       Follow up with primary care provider, Sofia Rebollar DO, within 7 days   for hospital follow- up.  The following labs/tests are recommended: BMP,   Hb.    Follow up with your counselor regarding outpatient rehab programs.            Unresulted Labs Ordered in the Past 30 Days of this Admission       Date and Time Order Name Status Description    5/23/2024  8:06 PM Blood Culture Arm, Left Preliminary     5/23/2024  8:06 PM Blood Culture Arm, Left Preliminary         These results will be followed up by PCP    Discharge Disposition   Discharged to home  Condition at discharge: Good    Hospital Course      Joe Shah is a 48 year old male with long history of alcohol dependence with history of withdrawal, esophagitis, hypertension, among others, who was admitted on 5/23/2024 for further evaluation of suspected alcohol withdrawal. For a detailed HPI- lease refer to H&P done by Amanda Nolan PA-C on 05/23/2024.    Alcohol withdrawal  Alcohol dependence with history of withdrawal  Anion gap metabolic acidosis, resolved  Lactic acidosis, resolved  Suspect alcoholic ketoacidosis versus starvation ketoacidosis secondary to intractable nausea and vomiting secondary to above   Leukocytosis, resolved  *Last hospitalization 03/09/2024-03/14/2024 for alcohol withdrawal. Detox completed on 03/13.  He completed  an inpatient [facility] chemical dependency program upon discharge.   *Patient reports he is now in outpatient CD program. He reports he was sober following completion of inpatient [facility] program until 7 days ago when he relapsed following news he lost his job. Has been drinking heavily since (0.75-1.5 L vodka daily). Last drink 05/22 evening. Awoke at 1000 05/23 tremulous and weak. Developed intractable nausea/vomiting and a heart palpitations. No chest pain or SOB. Called EMS. No witnessed seizure activity. No AMS or AH/VH/SI/HI.   *Upon arrival, labs notable for WBC 14.1 05/23. Anion gap 31. CO2 10. Initial lactate 4.8. Suspect secondary to alcohol withdrawal with low suspicion for infectious component, however, given leukocytosis and significant anion gap further work-up obtained, per below.   - Admitted to [facility] for close monitoring .   - Seizure precautions.   - CIWA protocol in place with PRN valium available.    - Started on scheduled Clonidine 0.1 mg po TID, Gabapentin taper, PO vitamins, iv fluids .    - Trended lactate 4.8 --> 2.5-->1.2   - Trended anion gap 31 --> 24 --> 20-->12; CO2 10 --> 12 --> 14-->21.   - Ketone quantitative 5.10-->4.2-->1.3   - Patient initially agreeable to speak with Chem Dep although later on-he was not interested in participating in a RAISSA CA with a referral to either inpatient or outpatient RAISSA treatment, stating that he needs to find a job first and start working  - withdrawals symptoms improved, not requiring benzos on 5/24/24, not tremulous, tolerating diet.  - he wanted to be discharged, stating that he has a wedding to attend next day; called his wife- Dedra who confirmed that they are planning to attend his cousin's wedding next day and she also wanted him to be discharged, I expressed my concern about attending a wedding but she stated that there will be no alcoholic beverages at the wedding.  -wife also stated that he has his family support and he plans to further discuss  with his counselor regarding further help to remain sober.    Hypomagnesemia   Hypophosphatemia  - Mag 1.5, Phosph 2.4  - electrolytes replacement protocol    Hypocalcemia   *Ca initially 9.2 05/23. Upon repeat 8.4 --> 8.2.   - Upon repeat 8.4 --> 8.2.   - Ionized calcium 4.3.   - Start scheduled calcium carbonate BID 05/24.    Hematemesis   Hx of Esophagitis   *Recent admission 02/08/24 for hematemesis. Seen by [facility] at that time. EGD with Grade B esophagitis. Esophagitis and scar from prior healed ulcer, patient either has very minimal bleeding or actually has hemoptysis.   *Patient presented with intractable nausea and vomiting secondary to withdrawal symptoms. Hematemesis upon examination of patient.   *Patient hemodynamically stable upon arrival. Hgb stable (13.9).    - IV Protonix BID started.   - Repeat Hgb 11.8.   - [facility] consulted  - no more episodes of hematemesis, tolerating clear liquid diet  - Hb remained stable 11.8--11.4  - given recent EGD- it was felt that hematemesis was likely related to esophagitis related to alcohol intake; no indication for immediate EGD  - diet advanced and tolerating it well  - continue PPI 40 mg po BID for 2 weeks, then 40 mg po daily    HTN   -Continue PTA metoprolol     Dyslipidemia   *Total cholesterol 251,  and non  03/2024. Recommended lifestyle modifications and to follow-up with PCP upon discharge.   - Noted. Recommend follow-up with PCP upon discharge.     Hx of subdural hematoma s/p bur hole and hematoma evacuation (Dec 2023)  *Patient notes some cognitive decline since surgery. Denies AMS or further cognitive decline upon examination.  - Continue to monitor for new or worsening symptoms.     Consultations This Hospital Stay   CARE MANAGEMENT / SOCIAL WORK IP CONSULT  CHEMICAL DEPENDENCY IP CONSULT  GASTROENTEROLOGY IP CONSULT    Code Status   Full Code    Time Spent on this Encounter   I, Evelyn Ronquillo MD, personally saw the patient  today and spent greater than 30 minutes discharging this patient.       Evelyn Ronquillo MD  [facility]  [address] [place] [address]  Phone: [phone]  ______________________________________________________________________    Physical Exam   Vital Signs: Temp: 97.7  F (36.5  C) Temp src: Oral BP: (!) 122/94 Pulse: 77   Resp: 21 SpO2: 97 % O2 Device: None (Room air)    Weight: 174 lbs 9.6 oz  General Appearance: Awake, alert, NAD  Respiratory: bilateral air entry, no wheezing, no rales, no crackles  Cardiovascular: S1S2, RRR, no murmurs, no rubs  GI: abd- soft, nonT, BS present  Skin: no rashes, no cyanosis  Neuro: AAOX3, no FNDs        Primary Care Physician   Sofia Rebollar DO    Discharge Orders      Reason for your hospital stay    Alcohol withdrawal     Activity    Your activity upon discharge: activity as tolerated     When to contact your care team    Call your primary doctor or return to ER if you have any of the following: temperature greater than 100.5 or less than 96, chills, intractable nausea/vomiting, abdominal pain, coffee-ground or bloody emesis, bloody stools, darrk stools, dizziness, loss of consciousness, severe tremor, withdrawal symptoms.     Follow-up and recommended labs and tests     Follow up with primary care provider, Sofia Rebollar DO, within 7 days for hospital follow- up.  The following labs/tests are recommended: BMP, Hb.    Follow up with your counselor regarding outpatient rehab programs.     Diet    Follow this diet upon discharge: Regular       Significant Results and Procedures   Most Recent 3 CBC's:  Recent Labs   Lab Test 05/24/24  0631 05/24/24  0047 05/23/24  1735 03/09/24  1438   WBC 8.2  --  14.1* 6.0   HGB 11.4* 11.8* 13.9 14.3   MCV 90  --  92 92     --  203 171     Most Recent 3 BMP's:  Recent Labs   Lab Test 05/24/24  0631 05/23/24  2251 05/23/24  2226 05/23/24  2106    136  --  136   POTASSIUM 3.7 4.9   --  4.7   CHLORIDE 102 102  --  100   CO2 21* 14*  --  12*   BUN 9.2 10.4  --  10.5   CR 0.77 0.85  --  0.83   ANIONGAP 12 20*  --  24*   ISAIAS 8.3* 8.2*  --  8.4*   * 148* 169* 157*     Most Recent 2 LFT's:  Recent Labs   Lab Test 05/24/24  0631 05/23/24  1735   AST 20 28   ALT 10 16   ALKPHOS 70 97   BILITOTAL 1.6* 1.3*     Most Recent 3 INR's:  Recent Labs   Lab Test 02/08/24  0742 11/30/23  1747 05/01/20  2218   INR 1.09 1.08 1.02     Most Recent 3 Creatinines:  Recent Labs   Lab Test 05/24/24  0631 05/23/24  2251 05/23/24  2106   CR 0.77 0.85 0.83     Most Recent 3 Hemoglobins:  Recent Labs   Lab Test 05/24/24  0631 05/24/24  0047 05/23/24  1735   HGB 11.4* 11.8* 13.9     Most Recent 3 Troponin's:  Recent Labs   Lab Test 05/08/21  1344 02/12/19  1305   TROPI <0.015 <0.015     7-Day Micro Results       Collected Updated Procedure Result Status      05/23/2024 2106 05/25/2024 2248 Blood Culture Arm, Left [[id number]]   Blood from Arm, Left    Preliminary result Component Value   Culture No growth after 2 days  [P]                05/23/2024 2106 05/25/2024 2248 Blood Culture Arm, Left [[id number]]   Blood from Arm, Left    Preliminary result Component Value   Culture No growth after 2 days  [P]                      Most Recent TSH and T4:  Recent Labs   Lab Test 03/11/24  0947   TSH 3.62     Most Recent Hemoglobin A1c:  Recent Labs   Lab Test 03/11/24  0947   A1C 5.6     Most Recent 6 glucoses:  Recent Labs   Lab Test 05/24/24  0631 05/23/24  2251 05/23/24  2226 05/23/24  2106 05/23/24  1735 03/13/24  0745   * 148* 169* 157* 147* 99   ,   Results for orders placed or performed during the hospital encounter of 05/23/24   XR Chest 2 Views    Narrative    EXAM: XR CHEST 2 VIEWS  LOCATION: [facility]  DATE: 5/23/2024    INDICATION: Leukocytosis. Hypertension. Alcohol withdrawal.  COMPARISON: None.      Impression    IMPRESSION: Negative chest. Lungs clear.       Discharge  Medications   Current Discharge Medication List        CONTINUE these medications which have CHANGED    Details   pantoprazole (PROTONIX) 40 MG EC tablet Take Protonix 40 mg twice daily for 2 weeks, then continue with Protonix 40 mg daily    Associated Diagnoses: Alcohol dependence with withdrawal with complication (H)           CONTINUE these medications which have NOT CHANGED    Details   acetaminophen (TYLENOL) 500 MG tablet Take 500-1,000 mg by mouth every 6 hours as needed for mild pain      folic acid (FOLVITE) 1 MG tablet Take 1 tablet (1 mg) by mouth daily  Qty: 30 tablet, Refills: 0    Associated Diagnoses: Alcohol withdrawal syndrome without complication (H)      !! gabapentin (NEURONTIN) 100 MG capsule Take 1 capsule (100 mg) by mouth 3 times daily  Qty: 90 capsule, Refills: 0    Associated Diagnoses: Alcohol withdrawal syndrome without complication (H)      !! gabapentin (NEURONTIN) 300 MG capsule Take 1 capsule (300 mg) by mouth nightly as needed (sleep)  Qty: 30 capsule, Refills: 0    Associated Diagnoses: Alcohol dependence with withdrawal with complication (H)      metoprolol succinate ER (TOPROL XL) 50 MG 24 hr tablet Take 1 tablet (50 mg) by mouth daily  Qty: 30 tablet, Refills: 0    Associated Diagnoses: Alcohol dependence with withdrawal with complication (H)      Multiple Vitamins-Minerals (MULTIVITAMIN ADULT) CHEW Take 2 chew tab by mouth daily Centrum  Qty: 60 tablet, Refills: 0    Associated Diagnoses: Alcohol dependence with withdrawal with complication (H)      naltrexone (DEPADE/REVIA) 50 MG tablet Take 1 tablet (50 mg) by mouth daily  Qty: 30 tablet, Refills: 0    Associated Diagnoses: Alcohol dependence with withdrawal with complication (H)      thiamine (B-1) 100 MG tablet Take 1 tablet (100 mg) by mouth daily  Qty: 30 tablet, Refills: 0    Associated Diagnoses: Alcohol dependence with withdrawal with complication (H)      venlafaxine (EFFEXOR XR) 37.5 MG 24 hr capsule Take 37.5 mg by  mouth daily       !! - Potential duplicate medications found. Please discuss with provider.        STOP taking these medications       famotidine (PEPCID) 20 MG tablet Comments:   Reason for Stopping:             Allergies   Allergies   Allergen Reactions    Morphine Nausea and Vomiting    Oxycodone Itching     1/22 Patient reports he can tolerate this

## 2024-05-25 NOTE — PLAN OF CARE
[id number].    AxOx4; wife at bedside.    VSS on RA except HTN. Independent in room. Clear liquid diet. Up to bathroom independently.    PIV x2 SL.    No skin issues noted.    Denies pain / nausea / shortness of breath.     CIWA = 0.    Discharge medications / AVS packet discussed with patient and wife, all questions answered. Patient and wife verbalized understanding and that they would follow up with PCP on Monday morning.

## 2024-05-28 LAB
BACTERIA BLD CULT: NO GROWTH
BACTERIA BLD CULT: NO GROWTH

## 2024-06-12 NOTE — PROGRESS NOTES
[facility]    Hospitalist Progress Note    Assessment & Plan   Ravi Shah is a 44 year old male with PMHx of hypertension, depression/anxiety and alcohol use who was admitted on 3/14/2020 for management of alcohol withdrawal.     Hospitalized earlier this morning (3/1/20 - 3/3/20) for management of withdrawals    Alcohol Dependence, in withdrawal on admission  Has known hx of alcohol dependence and has been hospitalized for management of withdrawals in the past, most recently from 3/1/20 - 3/3/20. No hx of withdrawal seizures. Has gone through treatment in the past but sounds as though he never followed up on recommendations made during his last hospital stay. Was prescribed natrexone and gabapentin last stay but says he stopped taking these when he began drinking again on 3/12. Last drink was 3/13 PM. Presented to ED on 3/14 PM for management of alcohol withdrawals. Was tachycardic and hypertensive on arrival. Bicarb was 16 with AG of 21 by lytes, renal function and LFTs were otherwise nl. EtOH neg. Given IVFs and valium. Admitted to hospital for ongoing mgmt of withdrawals  -- cont CIWA protocol with prn Valium as needed  -- cont MVI, thiamine, folate  -- additional suportive cares as needed (antiemetics, lyte replacement, etc)  -- chem dep consulted -- had telemedicine visit this afternoon, planning to pursue residential treatment stay in coming days    Vomiting, with possible hematemesis: Resolved  Occurred prior to admission -- had endorsed vomiting numerous times, emesis occ blood streaked but hasn't been consistently bloody. No noted since admission. Hgb stable. No s/sx to suggest occult bleeding. Had EGD in 9/2019 per Dr. Mcrae which showed gastric erosions but no varices  -- conts on Protonix 40mg po BID    Mild JOSE CRUZ: Resolved  Cr 1.12 on presentation. Secondary to volume depletion. Cr normalized after IVFs    Depression / Anxiety  Chronic and stable on sertraline 100mg  no rashes , no suspicious lesions daily    Hypertension  Chronic and stable on metoprolol XL 50mg daily    FEN: oral intake hasn't been great today so added NS @100ml/h, lytes stable, regular diet  DVT Prophylaxis: PCDs, ambulate  Code Status: Full Code    Disposition: Pending withdrawals, likely 1-3d.     Clari Cowan    Interval History   Seen this afternoon. Ongoing tremor. Had initially felt better overnight but now worse this morning. Appetite not great but no nausea/emesis. No abd pain. No cp/sob/cough.    -Data reviewed today: I reviewed all new labs and imaging results over the last 24 hours. I personally reviewed no images or EKG's today.    Physical Exam   Temp: 98.1  F (36.7  C) Temp src: Oral BP: (!) 135/95 Pulse: 98 Heart Rate: 94 Resp: 18 SpO2: 95 % O2 Device: None (Room air)    Vitals:    03/14/20 2132   Weight: 83.9 kg (185 lb)     Vital Signs with Ranges  Temp:  [96.9  F (36.1  C)-98.1  F (36.7  C)] 98.1  F (36.7  C)  Pulse:  [] 98  Heart Rate:  [] 94  Resp:  [11-22] 18  BP: (135-181)/() 135/95  SpO2:  [95 %-99 %] 95 %  I/O last 3 completed shifts:  In: 1000 [IV Piggyback:1000]  Out: -     Constitutional: Resting comfortably, alert and answering questions appropriately, NAD  Respiratory: CTAB, no wheeze/rales/rhonchi, no increased work of breathing  Cardiovascular: HRRR, no MGR, no LE edema  GI: S, NT, ND, +BS  Skin/Integumen: warm/dry  Other: +tremor in UEs    Medications       folic acid  1 mg Oral Daily     metoprolol succinate ER  50 mg Oral Daily     pantoprazole  40 mg Oral BID AC     sertraline  100 mg Oral QAM     sodium chloride (PF)  3 mL Intracatheter Q8H     vitamin B1  100 mg Oral Daily       Data   Recent Labs   Lab 03/15/20  0817 03/14/20  2201   WBC 7.8 11.6*   HGB 13.9 16.2   MCV 99 97    292   INR  --  1.06    133   POTASSIUM 3.5 4.0   CHLORIDE 107 96   CO2 24 16*   BUN 13 16   CR 0.91 1.12   ANIONGAP 7 21*   ISAIAS 8.5 9.6   * 284*   ALBUMIN 3.6 4.9   PROTTOTAL 6.9  9.3*   BILITOTAL 1.6* 2.1*   ALKPHOS 49 68   ALT 37 54   AST 25 43   LIPASE  --  194       No results found for this or any previous visit (from the past 24 hour(s)).     Statement Selected

## 2024-06-13 ENCOUNTER — APPOINTMENT (OUTPATIENT)
Dept: GENERAL RADIOLOGY | Facility: CLINIC | Age: 49
End: 2024-06-13
Attending: EMERGENCY MEDICINE
Payer: COMMERCIAL

## 2024-06-13 ENCOUNTER — HOSPITAL ENCOUNTER (OUTPATIENT)
Facility: CLINIC | Age: 49
Setting detail: OBSERVATION
Discharge: HOME | End: 2024-06-15
Attending: EMERGENCY MEDICINE | Admitting: INTERNAL MEDICINE
Payer: COMMERCIAL

## 2024-06-13 DIAGNOSIS — E83.42 HYPOMAGNESEMIA: ICD-10-CM

## 2024-06-13 DIAGNOSIS — F10.239 ALCOHOL DEPENDENCE WITH WITHDRAWAL WITH COMPLICATION (H): ICD-10-CM

## 2024-06-13 DIAGNOSIS — F10.930 ALCOHOL WITHDRAWAL SYNDROME WITHOUT COMPLICATION (H): ICD-10-CM

## 2024-06-13 DIAGNOSIS — F41.8 DEPRESSION WITH ANXIETY: Primary | ICD-10-CM

## 2024-06-13 DIAGNOSIS — K29.20 ACUTE ALCOHOLIC GASTRITIS WITHOUT HEMORRHAGE: ICD-10-CM

## 2024-06-13 DIAGNOSIS — F10.939 ALCOHOL WITHDRAWAL SYNDROME WITH COMPLICATION (H): ICD-10-CM

## 2024-06-13 LAB
ALBUMIN SERPL BCG-MCNC: 4.5 G/DL (ref 3.5–5.2)
ALP SERPL-CCNC: 83 U/L (ref 40–150)
ALT SERPL W P-5'-P-CCNC: ABNORMAL U/L
ANION GAP SERPL CALCULATED.3IONS-SCNC: 24 MMOL/L (ref 7–15)
AST SERPL W P-5'-P-CCNC: 45 U/L (ref 0–45)
ATRIAL RATE - MUSE: 112 BPM
BASE EXCESS BLDV CALC-SCNC: 0.3 MMOL/L (ref -3–3)
BASOPHILS # BLD AUTO: 0 10E3/UL (ref 0–0.2)
BASOPHILS NFR BLD AUTO: 0 %
BILIRUB SERPL-MCNC: 1.4 MG/DL
BUN SERPL-MCNC: 14 MG/DL (ref 6–20)
CALCIUM SERPL-MCNC: 9.5 MG/DL (ref 8.6–10)
CHLORIDE SERPL-SCNC: 93 MMOL/L (ref 98–107)
CREAT SERPL-MCNC: 0.74 MG/DL (ref 0.67–1.17)
DEPRECATED HCO3 PLAS-SCNC: 17 MMOL/L (ref 22–29)
DIASTOLIC BLOOD PRESSURE - MUSE: NORMAL MMHG
EGFRCR SERPLBLD CKD-EPI 2021: >90 ML/MIN/1.73M2
EOSINOPHIL # BLD AUTO: 0.1 10E3/UL (ref 0–0.7)
EOSINOPHIL NFR BLD AUTO: 1 %
ERYTHROCYTE [DISTWIDTH] IN BLOOD BY AUTOMATED COUNT: 13.8 % (ref 10–15)
ETHANOL SERPL-MCNC: <0.01 G/DL
GLUCOSE SERPL-MCNC: 166 MG/DL (ref 70–99)
HCO3 BLDV-SCNC: 21 MMOL/L (ref 21–28)
HCT VFR BLD AUTO: 40.9 % (ref 40–53)
HGB BLD-MCNC: 13.9 G/DL (ref 13.3–17.7)
IMM GRANULOCYTES # BLD: 0 10E3/UL
IMM GRANULOCYTES NFR BLD: 0 %
INTERPRETATION ECG - MUSE: NORMAL
LIPASE SERPL-CCNC: 19 U/L (ref 13–60)
LYMPHOCYTES # BLD AUTO: 0.6 10E3/UL (ref 0.8–5.3)
LYMPHOCYTES NFR BLD AUTO: 5 %
MAGNESIUM SERPL-MCNC: 1.5 MG/DL (ref 1.7–2.3)
MAGNESIUM SERPL-MCNC: 2.2 MG/DL (ref 1.7–2.3)
MCH RBC QN AUTO: 29.4 PG (ref 26.5–33)
MCHC RBC AUTO-ENTMCNC: 34 G/DL (ref 31.5–36.5)
MCV RBC AUTO: 87 FL (ref 78–100)
MONOCYTES # BLD AUTO: 0.5 10E3/UL (ref 0–1.3)
MONOCYTES NFR BLD AUTO: 5 %
NEUTROPHILS # BLD AUTO: 10.3 10E3/UL (ref 1.6–8.3)
NEUTROPHILS NFR BLD AUTO: 89 %
NRBC # BLD AUTO: 0 10E3/UL
NRBC BLD AUTO-RTO: 0 /100
O2/TOTAL GAS SETTING VFR VENT: 21 %
OXYHGB MFR BLDV: 85 % (ref 70–75)
P AXIS - MUSE: 71 DEGREES
PCO2 BLDV: 23 MM HG (ref 40–50)
PH BLDV: 7.56 [PH] (ref 7.32–7.43)
PHOSPHATE SERPL-MCNC: 2.9 MG/DL (ref 2.5–4.5)
PLATELET # BLD AUTO: 368 10E3/UL (ref 150–450)
PO2 BLDV: 44 MM HG (ref 25–47)
POTASSIUM SERPL-SCNC: 5.3 MMOL/L (ref 3.4–5.3)
PR INTERVAL - MUSE: 152 MS
PROT SERPL-MCNC: 7.9 G/DL (ref 6.4–8.3)
QRS DURATION - MUSE: 72 MS
QT - MUSE: 344 MS
QTC - MUSE: 469 MS
R AXIS - MUSE: 63 DEGREES
RBC # BLD AUTO: 4.73 10E6/UL (ref 4.4–5.9)
SAO2 % BLDV: 86.5 % (ref 70–75)
SODIUM SERPL-SCNC: 134 MMOL/L (ref 135–145)
SYSTOLIC BLOOD PRESSURE - MUSE: NORMAL MMHG
T AXIS - MUSE: 68 DEGREES
VENTRICULAR RATE- MUSE: 112 BPM
WBC # BLD AUTO: 11.6 10E3/UL (ref 4–11)

## 2024-06-13 PROCEDURE — 250N000013 HC RX MED GY IP 250 OP 250 PS 637: Performed by: PHYSICIAN ASSISTANT

## 2024-06-13 PROCEDURE — 83690 ASSAY OF LIPASE: CPT | Performed by: EMERGENCY MEDICINE

## 2024-06-13 PROCEDURE — C9113 INJ PANTOPRAZOLE SODIUM, VIA: HCPCS | Mod: JZ | Performed by: EMERGENCY MEDICINE

## 2024-06-13 PROCEDURE — 71046 X-RAY EXAM CHEST 2 VIEWS: CPT

## 2024-06-13 PROCEDURE — 258N000003 HC RX IP 258 OP 636: Mod: JZ | Performed by: PHYSICIAN ASSISTANT

## 2024-06-13 PROCEDURE — 99285 EMERGENCY DEPT VISIT HI MDM: CPT | Mod: 25

## 2024-06-13 PROCEDURE — 96366 THER/PROPH/DIAG IV INF ADDON: CPT

## 2024-06-13 PROCEDURE — 36415 COLL VENOUS BLD VENIPUNCTURE: CPT | Performed by: EMERGENCY MEDICINE

## 2024-06-13 PROCEDURE — 120N000001 HC R&B MED SURG/OB

## 2024-06-13 PROCEDURE — 82805 BLOOD GASES W/O2 SATURATION: CPT | Performed by: EMERGENCY MEDICINE

## 2024-06-13 PROCEDURE — 250N000011 HC RX IP 250 OP 636: Performed by: EMERGENCY MEDICINE

## 2024-06-13 PROCEDURE — 96365 THER/PROPH/DIAG IV INF INIT: CPT

## 2024-06-13 PROCEDURE — 250N000011 HC RX IP 250 OP 636: Mod: JZ | Performed by: EMERGENCY MEDICINE

## 2024-06-13 PROCEDURE — 96375 TX/PRO/DX INJ NEW DRUG ADDON: CPT

## 2024-06-13 PROCEDURE — 258N000003 HC RX IP 258 OP 636: Mod: JZ | Performed by: EMERGENCY MEDICINE

## 2024-06-13 PROCEDURE — 96361 HYDRATE IV INFUSION ADD-ON: CPT

## 2024-06-13 PROCEDURE — 99223 1ST HOSP IP/OBS HIGH 75: CPT | Performed by: PHYSICIAN ASSISTANT

## 2024-06-13 PROCEDURE — 84100 ASSAY OF PHOSPHORUS: CPT | Performed by: EMERGENCY MEDICINE

## 2024-06-13 PROCEDURE — 250N000011 HC RX IP 250 OP 636: Mod: JZ | Performed by: PHYSICIAN ASSISTANT

## 2024-06-13 PROCEDURE — 82374 ASSAY BLOOD CARBON DIOXIDE: CPT | Performed by: EMERGENCY MEDICINE

## 2024-06-13 PROCEDURE — 36415 COLL VENOUS BLD VENIPUNCTURE: CPT | Performed by: HOSPITALIST

## 2024-06-13 PROCEDURE — 83735 ASSAY OF MAGNESIUM: CPT | Mod: 91 | Performed by: HOSPITALIST

## 2024-06-13 PROCEDURE — 84155 ASSAY OF PROTEIN SERUM: CPT | Performed by: EMERGENCY MEDICINE

## 2024-06-13 PROCEDURE — 80069 RENAL FUNCTION PANEL: CPT | Performed by: EMERGENCY MEDICINE

## 2024-06-13 PROCEDURE — 83735 ASSAY OF MAGNESIUM: CPT | Performed by: EMERGENCY MEDICINE

## 2024-06-13 PROCEDURE — 93005 ELECTROCARDIOGRAM TRACING: CPT

## 2024-06-13 PROCEDURE — 96376 TX/PRO/DX INJ SAME DRUG ADON: CPT

## 2024-06-13 PROCEDURE — 250N000009 HC RX 250: Performed by: EMERGENCY MEDICINE

## 2024-06-13 PROCEDURE — 96367 TX/PROPH/DG ADDL SEQ IV INF: CPT

## 2024-06-13 PROCEDURE — 82077 ASSAY SPEC XCP UR&BREATH IA: CPT | Performed by: EMERGENCY MEDICINE

## 2024-06-13 PROCEDURE — 85025 COMPLETE CBC W/AUTO DIFF WBC: CPT | Performed by: EMERGENCY MEDICINE

## 2024-06-13 RX ORDER — CALCIUM CARBONATE 500 MG/1
1000 TABLET, CHEWABLE ORAL 4 TIMES DAILY PRN
Status: DISCONTINUED | OUTPATIENT
Start: 2024-06-13 | End: 2024-06-15 | Stop reason: HOSPADM

## 2024-06-13 RX ORDER — FLUMAZENIL 0.1 MG/ML
0.2 INJECTION, SOLUTION INTRAVENOUS
Status: DISCONTINUED | OUTPATIENT
Start: 2024-06-13 | End: 2024-06-13

## 2024-06-13 RX ORDER — AMOXICILLIN 250 MG
2 CAPSULE ORAL 2 TIMES DAILY PRN
Status: DISCONTINUED | OUTPATIENT
Start: 2024-06-13 | End: 2024-06-15 | Stop reason: HOSPADM

## 2024-06-13 RX ORDER — LIDOCAINE 40 MG/G
CREAM TOPICAL
Status: DISCONTINUED | OUTPATIENT
Start: 2024-06-13 | End: 2024-06-15 | Stop reason: HOSPADM

## 2024-06-13 RX ORDER — HYDROMORPHONE HCL IN WATER/PF 6 MG/30 ML
0.2 PATIENT CONTROLLED ANALGESIA SYRINGE INTRAVENOUS
Status: DISCONTINUED | OUTPATIENT
Start: 2024-06-13 | End: 2024-06-15 | Stop reason: HOSPADM

## 2024-06-13 RX ORDER — MULTIVITAMIN,THERAPEUTIC
1 TABLET ORAL DAILY
Status: DISCONTINUED | OUTPATIENT
Start: 2024-06-14 | End: 2024-06-15 | Stop reason: HOSPADM

## 2024-06-13 RX ORDER — OLANZAPINE 5 MG/1
5-10 TABLET, ORALLY DISINTEGRATING ORAL EVERY 6 HOURS PRN
Status: DISCONTINUED | OUTPATIENT
Start: 2024-06-13 | End: 2024-06-15 | Stop reason: HOSPADM

## 2024-06-13 RX ORDER — ONDANSETRON 4 MG/1
4 TABLET, ORALLY DISINTEGRATING ORAL EVERY 6 HOURS PRN
Status: DISCONTINUED | OUTPATIENT
Start: 2024-06-13 | End: 2024-06-15 | Stop reason: HOSPADM

## 2024-06-13 RX ORDER — ACETAMINOPHEN 325 MG/1
975 TABLET ORAL 3 TIMES DAILY
Status: DISCONTINUED | OUTPATIENT
Start: 2024-06-13 | End: 2024-06-15 | Stop reason: HOSPADM

## 2024-06-13 RX ORDER — NALOXONE HYDROCHLORIDE 0.4 MG/ML
0.2 INJECTION, SOLUTION INTRAMUSCULAR; INTRAVENOUS; SUBCUTANEOUS
Status: DISCONTINUED | OUTPATIENT
Start: 2024-06-13 | End: 2024-06-15 | Stop reason: HOSPADM

## 2024-06-13 RX ORDER — GABAPENTIN 100 MG/1
100 CAPSULE ORAL 2 TIMES DAILY
Status: DISCONTINUED | OUTPATIENT
Start: 2024-06-13 | End: 2024-06-15 | Stop reason: HOSPADM

## 2024-06-13 RX ORDER — POLYETHYLENE GLYCOL 3350 17 G/17G
17 POWDER, FOR SOLUTION ORAL 2 TIMES DAILY PRN
Status: DISCONTINUED | OUTPATIENT
Start: 2024-06-13 | End: 2024-06-15 | Stop reason: HOSPADM

## 2024-06-13 RX ORDER — SIMETHICONE 80 MG
80 TABLET,CHEWABLE ORAL 4 TIMES DAILY
Status: DISCONTINUED | OUTPATIENT
Start: 2024-06-13 | End: 2024-06-15 | Stop reason: HOSPADM

## 2024-06-13 RX ORDER — HYDROMORPHONE HCL IN WATER/PF 6 MG/30 ML
0.4 PATIENT CONTROLLED ANALGESIA SYRINGE INTRAVENOUS
Status: DISCONTINUED | OUTPATIENT
Start: 2024-06-13 | End: 2024-06-15 | Stop reason: HOSPADM

## 2024-06-13 RX ORDER — AMOXICILLIN 250 MG
1 CAPSULE ORAL 2 TIMES DAILY PRN
Status: DISCONTINUED | OUTPATIENT
Start: 2024-06-13 | End: 2024-06-15 | Stop reason: HOSPADM

## 2024-06-13 RX ORDER — PROCHLORPERAZINE 25 MG
25 SUPPOSITORY, RECTAL RECTAL EVERY 12 HOURS PRN
Status: DISCONTINUED | OUTPATIENT
Start: 2024-06-13 | End: 2024-06-15 | Stop reason: HOSPADM

## 2024-06-13 RX ORDER — DIAZEPAM 10 MG/2ML
5-10 INJECTION, SOLUTION INTRAMUSCULAR; INTRAVENOUS EVERY 30 MIN PRN
Status: DISCONTINUED | OUTPATIENT
Start: 2024-06-13 | End: 2024-06-15 | Stop reason: HOSPADM

## 2024-06-13 RX ORDER — NALOXONE HYDROCHLORIDE 0.4 MG/ML
0.4 INJECTION, SOLUTION INTRAMUSCULAR; INTRAVENOUS; SUBCUTANEOUS
Status: DISCONTINUED | OUTPATIENT
Start: 2024-06-13 | End: 2024-06-15 | Stop reason: HOSPADM

## 2024-06-13 RX ORDER — MORPHINE SULFATE 4 MG/ML
4 INJECTION, SOLUTION INTRAMUSCULAR; INTRAVENOUS
Status: DISCONTINUED | OUTPATIENT
Start: 2024-06-13 | End: 2024-06-13

## 2024-06-13 RX ORDER — VENLAFAXINE HYDROCHLORIDE 37.5 MG/1
37.5 CAPSULE, EXTENDED RELEASE ORAL DAILY
Status: DISCONTINUED | OUTPATIENT
Start: 2024-06-14 | End: 2024-06-15 | Stop reason: HOSPADM

## 2024-06-13 RX ORDER — HYDRALAZINE HYDROCHLORIDE 20 MG/ML
10 INJECTION INTRAMUSCULAR; INTRAVENOUS EVERY 4 HOURS PRN
Status: DISCONTINUED | OUTPATIENT
Start: 2024-06-13 | End: 2024-06-15 | Stop reason: HOSPADM

## 2024-06-13 RX ORDER — DIAZEPAM 10 MG/2ML
5 INJECTION, SOLUTION INTRAMUSCULAR; INTRAVENOUS ONCE
Status: COMPLETED | OUTPATIENT
Start: 2024-06-13 | End: 2024-06-13

## 2024-06-13 RX ORDER — ONDANSETRON 2 MG/ML
4 INJECTION INTRAMUSCULAR; INTRAVENOUS EVERY 30 MIN PRN
Status: DISCONTINUED | OUTPATIENT
Start: 2024-06-13 | End: 2024-06-13

## 2024-06-13 RX ORDER — METOPROLOL SUCCINATE 50 MG/1
50 TABLET, EXTENDED RELEASE ORAL DAILY
Status: DISCONTINUED | OUTPATIENT
Start: 2024-06-14 | End: 2024-06-15 | Stop reason: HOSPADM

## 2024-06-13 RX ORDER — FOLIC ACID 1 MG/1
1 TABLET ORAL DAILY
Status: DISCONTINUED | OUTPATIENT
Start: 2024-06-14 | End: 2024-06-15 | Stop reason: HOSPADM

## 2024-06-13 RX ORDER — DIAZEPAM 5 MG
10 TABLET ORAL EVERY 30 MIN PRN
Status: DISCONTINUED | OUTPATIENT
Start: 2024-06-13 | End: 2024-06-13

## 2024-06-13 RX ORDER — HALOPERIDOL 5 MG/ML
2.5-5 INJECTION INTRAMUSCULAR EVERY 6 HOURS PRN
Status: DISCONTINUED | OUTPATIENT
Start: 2024-06-13 | End: 2024-06-13

## 2024-06-13 RX ORDER — BISACODYL 10 MG
10 SUPPOSITORY, RECTAL RECTAL DAILY PRN
Status: DISCONTINUED | OUTPATIENT
Start: 2024-06-13 | End: 2024-06-15 | Stop reason: HOSPADM

## 2024-06-13 RX ORDER — DIAZEPAM 10 MG/2ML
5-10 INJECTION, SOLUTION INTRAMUSCULAR; INTRAVENOUS EVERY 30 MIN PRN
Status: DISCONTINUED | OUTPATIENT
Start: 2024-06-13 | End: 2024-06-13

## 2024-06-13 RX ORDER — HALOPERIDOL 5 MG/ML
2.5-5 INJECTION INTRAMUSCULAR EVERY 6 HOURS PRN
Status: DISCONTINUED | OUTPATIENT
Start: 2024-06-13 | End: 2024-06-15 | Stop reason: HOSPADM

## 2024-06-13 RX ORDER — HYDROMORPHONE HYDROCHLORIDE 2 MG/1
2 TABLET ORAL EVERY 4 HOURS PRN
Status: DISCONTINUED | OUTPATIENT
Start: 2024-06-13 | End: 2024-06-15 | Stop reason: HOSPADM

## 2024-06-13 RX ORDER — SODIUM CHLORIDE 9 MG/ML
INJECTION, SOLUTION INTRAVENOUS CONTINUOUS
Status: DISCONTINUED | OUTPATIENT
Start: 2024-06-13 | End: 2024-06-14

## 2024-06-13 RX ORDER — PROCHLORPERAZINE MALEATE 10 MG
10 TABLET ORAL EVERY 6 HOURS PRN
Status: DISCONTINUED | OUTPATIENT
Start: 2024-06-13 | End: 2024-06-15 | Stop reason: HOSPADM

## 2024-06-13 RX ORDER — FLUMAZENIL 0.1 MG/ML
0.2 INJECTION, SOLUTION INTRAVENOUS
Status: DISCONTINUED | OUTPATIENT
Start: 2024-06-13 | End: 2024-06-15 | Stop reason: HOSPADM

## 2024-06-13 RX ORDER — OLANZAPINE 5 MG/1
5-10 TABLET, ORALLY DISINTEGRATING ORAL EVERY 6 HOURS PRN
Status: DISCONTINUED | OUTPATIENT
Start: 2024-06-13 | End: 2024-06-13

## 2024-06-13 RX ORDER — ONDANSETRON 2 MG/ML
4 INJECTION INTRAMUSCULAR; INTRAVENOUS EVERY 6 HOURS PRN
Status: DISCONTINUED | OUTPATIENT
Start: 2024-06-13 | End: 2024-06-15 | Stop reason: HOSPADM

## 2024-06-13 RX ORDER — DIAZEPAM 5 MG
10 TABLET ORAL EVERY 30 MIN PRN
Status: DISCONTINUED | OUTPATIENT
Start: 2024-06-13 | End: 2024-06-15 | Stop reason: HOSPADM

## 2024-06-13 RX ORDER — MAGNESIUM SULFATE HEPTAHYDRATE 40 MG/ML
2 INJECTION, SOLUTION INTRAVENOUS ONCE
Status: COMPLETED | OUTPATIENT
Start: 2024-06-13 | End: 2024-06-13

## 2024-06-13 RX ORDER — SUCRALFATE 1 G/1
1 TABLET ORAL
Status: DISCONTINUED | OUTPATIENT
Start: 2024-06-13 | End: 2024-06-15 | Stop reason: HOSPADM

## 2024-06-13 RX ADMIN — SIMETHICONE 80 MG: 80 TABLET, CHEWABLE ORAL at 22:13

## 2024-06-13 RX ADMIN — FOLIC ACID: 5 INJECTION, SOLUTION INTRAMUSCULAR; INTRAVENOUS; SUBCUTANEOUS at 16:30

## 2024-06-13 RX ADMIN — DIAZEPAM 5 MG: 5 INJECTION, SOLUTION INTRAMUSCULAR; INTRAVENOUS at 18:26

## 2024-06-13 RX ADMIN — SUCRALFATE 1 G: 1 TABLET ORAL at 23:38

## 2024-06-13 RX ADMIN — DIAZEPAM 5 MG: 5 INJECTION, SOLUTION INTRAMUSCULAR; INTRAVENOUS at 20:26

## 2024-06-13 RX ADMIN — PANTOPRAZOLE SODIUM 40 MG: 40 INJECTION, POWDER, FOR SOLUTION INTRAVENOUS at 20:40

## 2024-06-13 RX ADMIN — FAMOTIDINE 20 MG: 10 INJECTION, SOLUTION INTRAVENOUS at 23:39

## 2024-06-13 RX ADMIN — MAGNESIUM SULFATE HEPTAHYDRATE 2 G: 40 INJECTION, SOLUTION INTRAVENOUS at 18:28

## 2024-06-13 RX ADMIN — GABAPENTIN 100 MG: 100 CAPSULE ORAL at 22:13

## 2024-06-13 RX ADMIN — MORPHINE SULFATE 4 MG: 4 INJECTION, SOLUTION INTRAMUSCULAR; INTRAVENOUS at 17:01

## 2024-06-13 RX ADMIN — SODIUM CHLORIDE: 9 INJECTION, SOLUTION INTRAVENOUS at 22:13

## 2024-06-13 RX ADMIN — ONDANSETRON 4 MG: 2 INJECTION INTRAMUSCULAR; INTRAVENOUS at 16:08

## 2024-06-13 RX ADMIN — DIAZEPAM 10 MG: 5 INJECTION, SOLUTION INTRAMUSCULAR; INTRAVENOUS at 16:10

## 2024-06-13 RX ADMIN — DIAZEPAM 10 MG: 5 TABLET ORAL at 22:23

## 2024-06-13 RX ADMIN — ONDANSETRON 4 MG: 2 INJECTION INTRAMUSCULAR; INTRAVENOUS at 22:15

## 2024-06-13 RX ADMIN — ACETAMINOPHEN 975 MG: 325 TABLET, FILM COATED ORAL at 22:13

## 2024-06-13 ASSESSMENT — LIFESTYLE VARIABLES
TOTAL SCORE: 12
NAUSEA AND VOMITING: INTERMITTENT NAUSEA WITH DRY HEAVES
NAUSEA AND VOMITING: 6
TREMOR: NO TREMOR
ORIENTATION AND CLOUDING OF SENSORIUM: ORIENTED AND CAN DO SERIAL ADDITIONS
ORIENTATION AND CLOUDING OF SENSORIUM: ORIENTED AND CAN DO SERIAL ADDITIONS
VISUAL DISTURBANCES: NOT PRESENT
VISUAL DISTURBANCES: NOT PRESENT
ORIENTATION AND CLOUDING OF SENSORIUM: ORIENTED AND CAN DO SERIAL ADDITIONS
HEADACHE, FULLNESS IN HEAD: NOT PRESENT
VISUAL DISTURBANCES: NOT PRESENT
AUDITORY DISTURBANCES: NOT PRESENT
AGITATION: NORMAL ACTIVITY
ANXIETY: MILDLY ANXIOUS
TOTAL SCORE: 16
AGITATION: NORMAL ACTIVITY
PAROXYSMAL SWEATS: 2
AUDITORY DISTURBANCES: NOT PRESENT
TOTAL SCORE: 13
ANXIETY: MILDLY ANXIOUS
HEADACHE, FULLNESS IN HEAD: MILD
PAROXYSMAL SWEATS: 3
TREMOR: 3
AUDITORY DISTURBANCES: NOT PRESENT
AGITATION: NORMAL ACTIVITY
ANXIETY: MILDLY ANXIOUS
TREMOR: 2
PAROXYSMAL SWEATS: 3
ANXIETY: 2
NAUSEA AND VOMITING: INTERMITTENT NAUSEA WITH DRY HEAVES
PAROXYSMAL SWEATS: NO SWEAT VISIBLE
AGITATION: NORMAL ACTIVITY
AUDITORY DISTURBANCES: NOT PRESENT
HEADACHE, FULLNESS IN HEAD: MODERATE
TACTILE DISTURBANCES: VERY MILD ITCHING, PINS AND NEEDLES, BURNING OR NUMBNESS
VISUAL DISTURBANCES: NOT PRESENT
TOTAL SCORE: 7
NAUSEA AND VOMITING: 6
TREMOR: 2
HEADACHE, FULLNESS IN HEAD: MILD
ORIENTATION AND CLOUDING OF SENSORIUM: ORIENTED AND CAN DO SERIAL ADDITIONS

## 2024-06-13 ASSESSMENT — ACTIVITIES OF DAILY LIVING (ADL)
ADLS_ACUITY_SCORE: 37
ADLS_ACUITY_SCORE: 23
ADLS_ACUITY_SCORE: 37
ADLS_ACUITY_SCORE: 23
ADLS_ACUITY_SCORE: 37

## 2024-06-13 NOTE — ED PROVIDER NOTES
Emergency Department Note      History of Present Illness     Chief Complaint  Withdrawal    HPI  Joe Shah is a 49 year old male with history of alcohol use disorder who presents with nausea, vomiting, epigastric pain which started this morning.  Patient was recently admitted to the hospital for alcohol abuse and withdrawal and discharged on 5/24/2024.  He began drinking again on Saturday, 5 days ago.  His last drink was last night.  He denies any seizure history.  He denies any other drug use.  He does not feel overly tremulous but does have a headache, epigastric pain, and nausea/vomiting.  He denies any suicidal thoughts.    Independent Historian  None    Review of External Notes  I reviewed the discharge summary from 5/24/2024.  Patient was admitted with alcohol withdrawal with associated ketoacidosis, gap metabolic acidosis and several electrolyte abnormalities.    Past Medical History   Medical History and Problem List  Past Medical History:   Diagnosis Date    Anxiety     Back pain     Depressive disorder     Hypertension     SDH (subdural hematoma) (H)     Substance abuse (H)        Medications  acetaminophen (TYLENOL) 500 MG tablet  folic acid (FOLVITE) 1 MG tablet  gabapentin (NEURONTIN) 100 MG capsule  gabapentin (NEURONTIN) 300 MG capsule  metoprolol succinate ER (TOPROL XL) 50 MG 24 hr tablet  Multiple Vitamins-Minerals (MULTIVITAMIN ADULT) CHEW  naltrexone (DEPADE/REVIA) 50 MG tablet  pantoprazole (PROTONIX) 40 MG EC tablet  thiamine (B-1) 100 MG tablet  venlafaxine (EFFEXOR XR) 37.5 MG 24 hr capsule        Surgical History   Past Surgical History:   Procedure Laterality Date    FANTASMA HOLE(S) EVACUATE HEMATOMA SUBDURAL N/A 12/1/2023    Procedure: CREATION, CRANIAL FANTASMA HOLE, WITH SUBDURAL HEMATOMA EVACUATION;  Surgeon: Gio Phoenix MD;  Location:  OR    ENT SURGERY      ESOPHAGOSCOPY, GASTROSCOPY, DUODENOSCOPY (EGD), COMBINED N/A 9/12/2019    Procedure: ESOPHAGOGASTRODUODENOSCOPY  (EGD);  Surgeon: Scott Mcrae MD;  Location:  GI    ESOPHAGOSCOPY, GASTROSCOPY, DUODENOSCOPY (EGD), COMBINED N/A 5/27/2023    Procedure: Esophagoscopy, gastroscopy, duodenoscopy (EGD), combined;  Surgeon: Scott Mcrae MD;  Location:  GI    ESOPHAGOSCOPY, GASTROSCOPY, DUODENOSCOPY (EGD), COMBINED N/A 2/9/2024    Procedure: Esophagoscopy, gastroscopy, duodenoscopy (EGD), combined;  Surgeon: Nakul Arguelles MD;  Location:  GI    NECK SURGERY       Physical Exam   Patient Vitals for the past 24 hrs:   BP Temp Temp src Pulse Resp SpO2 Weight   06/13/24 1632 (!) 134/95 -- -- 112 12 93 % --   06/13/24 1502 (!) 169/106 98.7  F (37.1  C) Temporal 111 19 98 % 79.4 kg (175 lb)     Physical Exam  Vitals and nursing note reviewed.   Constitutional:       General: He is not in acute distress.     Appearance: He is ill-appearing. He is not toxic-appearing.   HENT:      Head: Normocephalic and atraumatic.      Right Ear: External ear normal.      Left Ear: External ear normal.      Nose: Nose normal.   Eyes:      Conjunctiva/sclera: Conjunctivae normal.   Cardiovascular:      Rate and Rhythm: Regular rhythm. Tachycardia present.      Heart sounds: No murmur heard.  Pulmonary:      Effort: Pulmonary effort is normal. No respiratory distress.      Breath sounds: No wheezing, rhonchi or rales.   Abdominal:      General: Abdomen is flat. There is no distension.      Palpations: Abdomen is soft.      Tenderness: There is abdominal tenderness in the epigastric area. There is no guarding or rebound.   Musculoskeletal:         General: No swelling or deformity.      Cervical back: Normal range of motion and neck supple.   Skin:     General: Skin is warm and dry.      Findings: No rash.   Neurological:      Mental Status: He is alert and oriented to person, place, and time.      Motor: No tremor.   Psychiatric:         Behavior: Behavior normal.           Diagnostics   Lab Results   Labs Ordered and Resulted from  Time of ED Arrival to Time of ED Departure   COMPREHENSIVE METABOLIC PANEL - Abnormal       Result Value    Sodium 134 (*)     Potassium 5.3      Carbon Dioxide (CO2) 17 (*)     Anion Gap 24 (*)     Urea Nitrogen 14.0      Creatinine 0.74      GFR Estimate >90      Calcium 9.5      Chloride 93 (*)     Glucose 166 (*)     Alkaline Phosphatase 83      AST 45      ALT        Protein Total 7.9      Albumin 4.5      Bilirubin Total 1.4 (*)    MAGNESIUM - Abnormal    Magnesium 1.5 (*)    BLOOD GAS VENOUS - Abnormal    pH Venous 7.56 (*)     pCO2 Venous 23 (*)     pO2 Venous 44      Bicarbonate Venous 21      Base Excess/Deficit Venous 0.3      FIO2 21      Oxyhemoglobin Venous 85 (*)     O2 Sat, Venous 86.5 (*)    CBC WITH PLATELETS AND DIFFERENTIAL - Abnormal    WBC Count 11.6 (*)     RBC Count 4.73      Hemoglobin 13.9      Hematocrit 40.9      MCV 87      MCH 29.4      MCHC 34.0      RDW 13.8      Platelet Count 368      % Neutrophils 89      % Lymphocytes 5      % Monocytes 5      % Eosinophils 1      % Basophils 0      % Immature Granulocytes 0      NRBCs per 100 WBC 0      Absolute Neutrophils 10.3 (*)     Absolute Lymphocytes 0.6 (*)     Absolute Monocytes 0.5      Absolute Eosinophils 0.1      Absolute Basophils 0.0      Absolute Immature Granulocytes 0.0      Absolute NRBCs 0.0     ETHYL ALCOHOL LEVEL - Normal    Alcohol ethyl <0.01     PHOSPHORUS - Normal    Phosphorus 2.9     LIPASE       Imaging  XR Chest 2 Views    (Results Pending)   CT Abdomen Pelvis w Contrast    (Results Pending)       EKG   ECG results from 06/13/24   EKG 12-lead, tracing only     Value    Systolic Blood Pressure     Diastolic Blood Pressure     Ventricular Rate 112    Atrial Rate 112    MO Interval 152    QRS Duration 72        QTc 469    P Axis 71    R AXIS 63    T Axis 68    Interpretation ECG      Sinus tachycardia  Cannot rule out Anterior infarct , age undetermined  Abnormal ECG  When compared with ECG of 23-MAY-2024  17:54,  No significant change was found  Confirmed by GENERATED REPORT, COMPUTER ([id number]),  Apple Cruz ([id number]) on 6/13/2024 4:34:27 PM           Independent Interpretation  None  ED Course    Medications Administered  Medications   ondansetron (ZOFRAN) injection 4 mg (4 mg Intravenous $Given 6/13/24 1608)   morphine (PF) injection 4 mg (4 mg Intravenous $Given 6/13/24 1701)   OLANZapine zydis (zyPREXA) ODT tab 5-10 mg (has no administration in time range)     Or   haloperidol lactate (HALDOL) injection 2.5-5 mg (has no administration in time range)   flumazenil (ROMAZICON) injection 0.2 mg (has no administration in time range)   melatonin tablet 5 mg (has no administration in time range)   diazepam (VALIUM) tablet 10 mg ( Oral See Alternative 6/13/24 1826)     Or   diazepam (VALIUM) injection 5-10 mg (10 mg Intravenous $Given 6/13/24 1826)   sodium chloride 0.9 % 1,000 mL with Infuvite Adult 10 mL, thiamine 100 mg, folic acid 1 mg infusion ( Intravenous Stopped 6/13/24 1819)   diazepam (VALIUM) injection 5 mg (5 mg Intravenous Not Given 6/13/24 1600)   magnesium sulfate 2 g in 50 mL sterile water intermittent infusion (2 g Intravenous $New Bag 6/13/24 1828)       Procedures  Procedures     Discussion of Management  Admitting Hospitalist, Octaviano Winkler PA-C    Social Determinants of Health adding to complexity of care  None    ED Course  ED Course as of 06/13/24 1917   Thu Jun 13, 2024 1914 I discussed with SE[facility] [place] for Dr. Sharma     Medical Decision Making / Diagnosis   CMS Diagnoses: None    MIPS     None    [facility] [place]  Joe Shah is a 49 year old male who presents with nausea/vomiting and epigastric pain in setting of recent heavy alcohol use.  He may have withdrawal but this also may just be alcoholic gastritis, pancreatitis, etc.  His initial CIWA is 12.  He was given IV banana bag, antiemetics, pain medications, and Valium with some improvement.  His lab work shows a mild gap acidosis again  with mild hypomagnesemia and mild hyponatremia.  We will continue to treat him symptomatically and if we get his symptoms under control and improve his withdrawal symptoms, we may be able to manage this as an outpatient with oral benzos and antiemetics.    1918 patient still having significant symptoms and CIWA actually went up from 12-13 despite medications.  We will plan to admit for additional management of his withdrawal symptoms.  He also still complains of ongoing significant epigastric pain and vomiting.  Lipase was hemolyzed so we will resend this and also check a CT of his abdomen pelvis to further evaluate his abdominal pain.  I discussed with the hospitalist team who accepted admission.    Disposition  The patient was admitted to the hospital.     ICD-10 Codes:    ICD-10-CM    1. Alcohol withdrawal syndrome with complication (H)  F10.939       2. Acute alcoholic gastritis without hemorrhage  K29.20       3. Hypomagnesemia  E83.42            Discharge Medications  New Prescriptions    No medications on file         MD Yady Conley Shaun M, MD  06/13/24 1918

## 2024-06-13 NOTE — ED TRIAGE NOTES
Pt presents to ed to be evaluated for alcohol withdrawal.   Pt states he was here recently, and went to treatment, and started drinking again a week ago.   Pt stopped drinking last night, and since then has been vomiting.   Pt denies hx of withdrawal seizures.      Triage Assessment (Adult)       Row Name 06/13/24 1500          Triage Assessment    Airway WDL WDL        Respiratory WDL    Respiratory WDL WDL        Cardiac WDL    Cardiac WDL WDL        Cognitive/Neuro/Behavioral WDL    Cognitive/Neuro/Behavioral WDL WDL

## 2024-06-14 LAB
ANION GAP SERPL CALCULATED.3IONS-SCNC: 16 MMOL/L (ref 7–15)
BUN SERPL-MCNC: 10.1 MG/DL (ref 6–20)
CALCIUM SERPL-MCNC: 8.9 MG/DL (ref 8.6–10)
CHLORIDE SERPL-SCNC: 99 MMOL/L (ref 98–107)
CREAT SERPL-MCNC: 0.83 MG/DL (ref 0.67–1.17)
DEPRECATED HCO3 PLAS-SCNC: 21 MMOL/L (ref 22–29)
EGFRCR SERPLBLD CKD-EPI 2021: >90 ML/MIN/1.73M2
ERYTHROCYTE [DISTWIDTH] IN BLOOD BY AUTOMATED COUNT: 13.9 % (ref 10–15)
GLUCOSE SERPL-MCNC: 122 MG/DL (ref 70–99)
HCT VFR BLD AUTO: 38.9 % (ref 40–53)
HGB BLD-MCNC: 13.1 G/DL (ref 13.3–17.7)
MAGNESIUM SERPL-MCNC: 2 MG/DL (ref 1.7–2.3)
MCH RBC QN AUTO: 30.3 PG (ref 26.5–33)
MCHC RBC AUTO-ENTMCNC: 33.7 G/DL (ref 31.5–36.5)
MCV RBC AUTO: 90 FL (ref 78–100)
PHOSPHATE SERPL-MCNC: 1.9 MG/DL (ref 2.5–4.5)
PHOSPHATE SERPL-MCNC: 2.3 MG/DL (ref 2.5–4.5)
PLATELET # BLD AUTO: 274 10E3/UL (ref 150–450)
POTASSIUM SERPL-SCNC: 3.6 MMOL/L (ref 3.4–5.3)
RBC # BLD AUTO: 4.32 10E6/UL (ref 4.4–5.9)
SODIUM SERPL-SCNC: 136 MMOL/L (ref 135–145)
WBC # BLD AUTO: 5.8 10E3/UL (ref 4–11)

## 2024-06-14 PROCEDURE — 83735 ASSAY OF MAGNESIUM: CPT | Performed by: PHYSICIAN ASSISTANT

## 2024-06-14 PROCEDURE — 84100 ASSAY OF PHOSPHORUS: CPT | Performed by: INTERNAL MEDICINE

## 2024-06-14 PROCEDURE — 84100 ASSAY OF PHOSPHORUS: CPT | Performed by: PHYSICIAN ASSISTANT

## 2024-06-14 PROCEDURE — 250N000013 HC RX MED GY IP 250 OP 250 PS 637: Performed by: PHYSICIAN ASSISTANT

## 2024-06-14 PROCEDURE — 80048 BASIC METABOLIC PNL TOTAL CA: CPT | Performed by: PHYSICIAN ASSISTANT

## 2024-06-14 PROCEDURE — 36415 COLL VENOUS BLD VENIPUNCTURE: CPT | Performed by: PHYSICIAN ASSISTANT

## 2024-06-14 PROCEDURE — 250N000013 HC RX MED GY IP 250 OP 250 PS 637: Performed by: INTERNAL MEDICINE

## 2024-06-14 PROCEDURE — G0378 HOSPITAL OBSERVATION PER HR: HCPCS

## 2024-06-14 PROCEDURE — 250N000009 HC RX 250: Performed by: INTERNAL MEDICINE

## 2024-06-14 PROCEDURE — 258N000003 HC RX IP 258 OP 636: Mod: JZ | Performed by: PHYSICIAN ASSISTANT

## 2024-06-14 PROCEDURE — 99232 SBSQ HOSP IP/OBS MODERATE 35: CPT | Performed by: INTERNAL MEDICINE

## 2024-06-14 PROCEDURE — 85027 COMPLETE CBC AUTOMATED: CPT | Performed by: PHYSICIAN ASSISTANT

## 2024-06-14 PROCEDURE — 258N000003 HC RX IP 258 OP 636: Mod: JZ | Performed by: INTERNAL MEDICINE

## 2024-06-14 PROCEDURE — 96376 TX/PRO/DX INJ SAME DRUG ADON: CPT

## 2024-06-14 PROCEDURE — 250N000011 HC RX IP 250 OP 636: Mod: JZ | Performed by: PHYSICIAN ASSISTANT

## 2024-06-14 PROCEDURE — 96375 TX/PRO/DX INJ NEW DRUG ADDON: CPT

## 2024-06-14 PROCEDURE — 36415 COLL VENOUS BLD VENIPUNCTURE: CPT | Performed by: INTERNAL MEDICINE

## 2024-06-14 RX ORDER — MAGNESIUM OXIDE 400 MG/1
400 TABLET ORAL EVERY 4 HOURS
Status: COMPLETED | OUTPATIENT
Start: 2024-06-14 | End: 2024-06-14

## 2024-06-14 RX ORDER — GABAPENTIN 100 MG/1
100 CAPSULE ORAL 2 TIMES DAILY
Status: SHIPPED
Start: 2024-06-14 | End: 2024-06-15

## 2024-06-14 RX ORDER — POTASSIUM CHLORIDE 1500 MG/1
20 TABLET, EXTENDED RELEASE ORAL ONCE
Status: COMPLETED | OUTPATIENT
Start: 2024-06-14 | End: 2024-06-14

## 2024-06-14 RX ADMIN — ACETAMINOPHEN 975 MG: 325 TABLET, FILM COATED ORAL at 22:25

## 2024-06-14 RX ADMIN — MULTIVITAMIN TABLET 1 TABLET: TABLET at 09:34

## 2024-06-14 RX ADMIN — SUCRALFATE 1 G: 1 TABLET ORAL at 17:32

## 2024-06-14 RX ADMIN — Medication 400 MG: at 13:32

## 2024-06-14 RX ADMIN — SIMETHICONE 80 MG: 80 TABLET, CHEWABLE ORAL at 13:32

## 2024-06-14 RX ADMIN — VENLAFAXINE HYDROCHLORIDE 37.5 MG: 37.5 CAPSULE, EXTENDED RELEASE ORAL at 09:34

## 2024-06-14 RX ADMIN — FAMOTIDINE 20 MG: 10 INJECTION, SOLUTION INTRAVENOUS at 11:23

## 2024-06-14 RX ADMIN — SUCRALFATE 1 G: 1 TABLET ORAL at 11:23

## 2024-06-14 RX ADMIN — SUCRALFATE 1 G: 1 TABLET ORAL at 06:33

## 2024-06-14 RX ADMIN — FOLIC ACID 1 MG: 1 TABLET ORAL at 09:35

## 2024-06-14 RX ADMIN — POTASSIUM CHLORIDE 20 MEQ: 1500 TABLET, EXTENDED RELEASE ORAL at 11:23

## 2024-06-14 RX ADMIN — GABAPENTIN 100 MG: 100 CAPSULE ORAL at 09:35

## 2024-06-14 RX ADMIN — ACETAMINOPHEN 975 MG: 325 TABLET, FILM COATED ORAL at 09:34

## 2024-06-14 RX ADMIN — Medication 400 MG: at 11:23

## 2024-06-14 RX ADMIN — SIMETHICONE 80 MG: 80 TABLET, CHEWABLE ORAL at 09:34

## 2024-06-14 RX ADMIN — SODIUM PHOSPHATE, MONOBASIC, MONOHYDRATE AND SODIUM PHOSPHATE, DIBASIC, ANHYDROUS 15 MMOL: 142; 276 INJECTION, SOLUTION INTRAVENOUS at 11:52

## 2024-06-14 RX ADMIN — GABAPENTIN 100 MG: 100 CAPSULE ORAL at 22:25

## 2024-06-14 RX ADMIN — ACETAMINOPHEN 975 MG: 325 TABLET, FILM COATED ORAL at 17:32

## 2024-06-14 RX ADMIN — THIAMINE HCL TAB 100 MG 100 MG: 100 TAB at 09:35

## 2024-06-14 RX ADMIN — FAMOTIDINE 20 MG: 10 INJECTION, SOLUTION INTRAVENOUS at 22:29

## 2024-06-14 RX ADMIN — SUCRALFATE 1 G: 1 TABLET ORAL at 22:25

## 2024-06-14 RX ADMIN — SIMETHICONE 80 MG: 80 TABLET, CHEWABLE ORAL at 17:32

## 2024-06-14 RX ADMIN — METOPROLOL SUCCINATE 50 MG: 50 TABLET, EXTENDED RELEASE ORAL at 09:35

## 2024-06-14 RX ADMIN — SIMETHICONE 80 MG: 80 TABLET, CHEWABLE ORAL at 22:25

## 2024-06-14 RX ADMIN — SODIUM CHLORIDE: 9 INJECTION, SOLUTION INTRAVENOUS at 06:33

## 2024-06-14 ASSESSMENT — ACTIVITIES OF DAILY LIVING (ADL)
ADLS_ACUITY_SCORE: 25
ADLS_ACUITY_SCORE: 24
ADLS_ACUITY_SCORE: 25
ADLS_ACUITY_SCORE: 24
ADLS_ACUITY_SCORE: 24
ADLS_ACUITY_SCORE: 25
ADLS_ACUITY_SCORE: 24
ADLS_ACUITY_SCORE: 25
ADLS_ACUITY_SCORE: 25
ADLS_ACUITY_SCORE: 24
ADLS_ACUITY_SCORE: 25

## 2024-06-14 NOTE — DISCHARGE SUMMARY
[facility]    Discharge Summary  Hospitalist    Date of Admission:  6/13/2024  Date of Discharge:   6/15/2024  Discharging Provider: Karen Lobato MD    Discharge Diagnoses   Alcohol use disorder, with concern for acute withdrawal on admission now resolved  Abdominal pain, persistent nausea/vomiting suspect secondary to alcoholic gastritis vs other: Improved  History of esophagitis  Incidental hiatal hernia  AGMA: Resolved  Hypomagnesemia: Resolved  Hyponatremia: Resolved  Hypophosphatemia: Resolved  Hypertension  Hyperlipidemia  Major depressive disorder  Anxiety  History of SDH s/p [place] hole and hematoma evacuation (12/2023)    History of Present Illness   Joe Shah is a 49 year old male with PMHx of hypertension, hyperlipidemia, esophagitis, anxiety, depression, alcohol use and history of subdural hematoma in 2023 who was admitted on 6/13/2024 for management of alcohol withdrawal    Hospital Course   Joe Shah was admitted on 6/13/2024.  The following problems were addressed during his hospitalization:    Alcohol use disorder, with concern for acute withdrawal on admission now resolved  *Have been recently hospitalized to [facility] from 5/23/24-5/24/24 for management of alcohol withdrawal which was complicated by lactic acidosis with an anion gap metabolic acidosis, ketoacidosis and hematemesis as well as electrolyte disturbances.  He was subsequently discharged to treatment.  He later reported that he began drinking approximately 1 week after his discharge (on 6/8/24).  His last drink was the night prior to presentation to the ED.  On the day of presentation, he developed abdominal pain with associated nausea and vomiting.  *In the ED, he was afebrile, tachycardic with HR 110s and hypertensive.  O2 sat stable on RA. Labs notable for WBC 11.6, Na 134, bicarb 17, AG 24, mag 1.5. BAL neg. CXR showed mild esophageal hiatal hernia. Given IVFs, banana  bag, and prns for pain/nausea.   *CIWA scores have remained low, 2-4.  No benzodiazepines needed.    *Continued on gabapentin BID (as per prior to admission), MVI, thiamine/folate  *Held PTA naltrexone (hadn't taken in the week prior to admission)  *Patient planning to be in touch with his sponsor who is aware of his recent relapse, as well as the outpatient program at [place] with which she had previously venously followed, they are also aware of his recent relapse and recommended he resume following with their outpatient program     Abdominal pain, persistent nausea/vomiting, suspect secondary to alcoholic gastritis vs other: Resolved  History of esophagitis  Incidental hiatal hernia  *Suspect secondary to alcoholic gastritis.  Mild leukocytosis noted in ED with WBC 11.6, low suspicion for acute underlying infection.  Lipase was normal.  CT abdomen pelvis was ordered in the ED but never obtained.  *Reported GI symptoms had improved the day following admission with supportive cares including PPI, Carafate, simethicone.      AGMA: Resolved  Hypomagnesemia: Resolved  Hyponatremia: Resolved  Hypophosphatemia: Resolved  *Abnl lytes in setting of withdrawal.  IVFs started on admission.   *Labs normalized the morning following admission, electrolytes repleted as needed.    Hypertension  Hyperlipidemia  *Chronic and stable on metoprolol XL  *Prior FLP with tot cholest 251, , HDL 89. Follow up with PCP      Depression with anxiety  *Chronic and stable on Effexor; refilled at discharge      History of SDH s/p [place] hole and hematoma evacuation (12/2023)  *No active neds this admission       Code Status   Full Code       Primary Care Physician   Sofia Rebollar, DO    Physical Exam   Temp: 97.8  F (36.6  C) Temp src: Oral BP: (!) 131/99 Pulse: 85   Resp: 16 SpO2: 97 % O2 Device: None (Room air)    Vitals:    06/13/24 1502 06/13/24 2154   Weight: 79.4 kg (175 lb) 78.6 kg (173 lb 3.2 oz)     Vital Signs with  Ranges  Temp:  [97.8  F (36.6  C)-98.7  F (37.1  C)] 97.8  F (36.6  C)  Pulse:  [] 85  Resp:  [12-19] 16  BP: (131-169)/() 131/99  SpO2:  [93 %-100 %] 97 %  I/O last 3 completed shifts:  In: 1000 [I.V.:1000]  Out: -     Constitutional: Resting comfortably, NAD  HEENT: Sclera white, MMM  Respiratory: Breathing non-labored. Lungs CTAB - no wheezes, crackles, or rhonchi  Cardiovascular: Heart RRR, no m/r/g. No pedal edema  GI: +BS, abd soft/NT  Skin/Integument: No rash  Musculoskeletal: Normal muscle bulk and tone  Neuro: Alert and appropriate, LAND  Psych: Calm and cooperative    Discharge Disposition   Discharged to home  Condition at discharge: Stable    Consultations This Hospital Stay   CARE MANAGEMENT / SOCIAL WORK IP CONSULT    Time Spent on this Encounter   I, Karen Lobato MD, personally saw the patient today and spent 35 minutes discharging this patient.    Discharge Orders      Reason for your hospital stay    Agement of your abdominal pain, nausea vomiting and dry heaves which were secondary to your alcohol use.  Your labs were not suggestive of other possible etiologies such as pancreatitis.     Activity    Your activity upon discharge: activity as tolerated     Follow-up and recommended labs and tests     1.  Recommend you follow-up with your sponsor and your counselor at [facility] to reestablish with outpatient treatment program that you had previously been enrolled in.  2.  Follow-up with your PCP as needed.     Diet    Follow this diet upon discharge: Regular     Discharge Medications   Discharge Medication List as of 6/15/2024 12:46 PM        CONTINUE these medications which have CHANGED    Details   !! gabapentin (NEURONTIN) 100 MG capsule Take 1 capsule (100 mg) by mouth 2 times daily Morning and bedtime., Disp-60 capsule, R-0, E-Prescribe      !! gabapentin (NEURONTIN) 300 MG capsule Take 1 capsule (300 mg) by mouth nightly as needed (sleep), Disp-30 capsule, R-0, E-Prescribe       venlafaxine (EFFEXOR XR) 37.5 MG 24 hr capsule Take 1 capsule (37.5 mg) by mouth daily, Disp-30 capsule, R-0, E-Prescribe       !! - Potential duplicate medications found. Please discuss with provider.        CONTINUE these medications which have NOT CHANGED    Details   acetaminophen (TYLENOL) 500 MG tablet Take 500-1,000 mg by mouth every 6 hours as needed for mild pain, Historical      folic acid (FOLVITE) 1 MG tablet Take 1 tablet (1 mg) by mouth daily, Disp-30 tablet, R-0, E-Prescribe      metoprolol succinate ER (TOPROL XL) 50 MG 24 hr tablet Take 1 tablet (50 mg) by mouth daily, Disp-30 tablet, R-0, E-Prescribe      Multiple Vitamins-Minerals (MULTIVITAMIN ADULT) CHEW Take 2 chew tab by mouth daily Centrum, Disp-60 tablet, R-0, E-Prescribe      naltrexone (DEPADE/REVIA) 50 MG tablet Take 1 tablet (50 mg) by mouth daily, Disp-30 tablet, R-0, E-Prescribe      pantoprazole (PROTONIX) 40 MG EC tablet Take Protonix 40 mg twice daily for 2 weeks, then continue with Protonix 40 mg daily, Historical      thiamine (B-1) 100 MG tablet Take 1 tablet (100 mg) by mouth daily, Disp-30 tablet, R-0, E-Prescribe           Allergies   Allergies   Allergen Reactions    Morphine Nausea and Vomiting    Oxycodone Itching     1/22 Patient reports he can tolerate this     Data   Results for orders placed or performed during the hospital encounter of 06/13/24   XR Chest 2 Views    Narrative    EXAM: XR CHEST 2 VIEWS  LOCATION: [facility]  DATE: 6/13/2024    INDICATION: cough, hypoxia  COMPARISON: 5/23/2024      Impression    IMPRESSION: No acute cardiopulmonary abnormalities. Mild esophageal hiatal hernia. Prominent left nipple shadow. Minimal thoracic spinal curvature.     Most Recent 3 CBC's:  Recent Labs   Lab Test 06/14/24  0909 06/13/24  1545 05/24/24  0631   WBC 5.8 11.6* 8.2   HGB 13.1* 13.9 11.4*   MCV 90 87 90    368 156     Most Recent 3 BMP's:  Recent Labs   Lab Test 06/14/24  0909  06/13/24  1545 05/24/24  0631    134* 135   POTASSIUM 3.6 5.3 3.7   CHLORIDE 99 93* 102   CO2 21* 17* 21*   BUN 10.1 14.0 9.2   CR 0.83 0.74 0.77   ANIONGAP 16* 24* 12   ISAIAS 8.9 9.5 8.3*   * 166* 137*

## 2024-06-14 NOTE — ED NOTES
[facility]  ED Nurse Handoff Report    ED Chief complaint: Withdrawal      ED Diagnosis:   Final diagnoses:   Alcohol withdrawal syndrome with complication (H)   Acute alcoholic gastritis without hemorrhage   Hypomagnesemia       Code Status: Assumed full - admitting MD to establish with patient.    Allergies:   Allergies   Allergen Reactions    Morphine Nausea and Vomiting    Oxycodone Itching     1/22 Patient reports he can tolerate this       Patient Story: Pt presents to ed to be evaluated for alcohol withdrawal.   Pt states he was here recently, and went to treatment, and started drinking again a week ago.   Pt stopped drinking last night, and since then has been vomiting.   Pt denies hx of withdrawal seizures.     Focused Assessment:  AxOx4; tachycardic, pain 6/10 in abd D/T vomiting, per pt.     Treatments and/or interventions provided: IV placed, meds, imaging, CIWA    Patient's response to treatments and/or interventions: CIWA down to 7 from 12    To be done/followed up on inpatient unit:  See orders    Does this patient have any cognitive concerns?:  No    Activity level - Baseline/Home:  Independent  Activity Level - Current:   Stand with Assist    Patient's Preferred language: English   Needed?: No    Isolation: None  Infection: Not Applicable  Patient tested for COVID 19 prior to admission: NO  Bariatric?: No    Vital Signs:   Vitals:    06/13/24 1502 06/13/24 1632   BP: (!) 169/106 (!) 134/95   Pulse: 111 112   Resp: 19 12   Temp: 98.7  F (37.1  C)    TempSrc: Temporal    SpO2: 98% 93%   Weight: 79.4 kg (175 lb)        Cardiac Rhythm:     Was the PSS-3 completed:   Yes  What interventions are required if any?               Family Comments:   OBS brochure/video discussed/provided to patient/family: N/A              Name of person given brochure if not patient:               Relationship to patient:     For the majority of the shift this patient's behavior was Green.   No  behavioral interventions performed.    ED NURSE PHONE NUMBER: *[id number]

## 2024-06-14 NOTE — PROGRESS NOTES
Admission    Patient arrives to room 632 via cart from ED.  Care plan note: done     Inpatient nursing criteria listed below were met:    Did you put disposition on whiteboard and in sticky note: Yes  Full skin assessment done (add LDA if skin issue present). Initials of 2nd RN :Yes  Isolation education started/completed NA  Patient allergies verified with patient: Yes  Fall Risk? (Care plan updated, Education given and documented) Yes  Primary Care Plan initiated: Yes  Home medications documented in belongings flowsheet: NA  Patient belongings documented in belongings flowsheet: Yes  Reminder note (belongings/ medications) placed in discharge instructions:Yes  Admission profile/ required documentation complete: Yes  If patient is a 72 hour hold/Commitment are belongings removed from room and locked up? NA

## 2024-06-14 NOTE — PROGRESS NOTES
RECEIVING UNIT ED HANDOFF REVIEW    ED Nurse Handoff Report was reviewed by: Oumou Pinedo RN on June 13, 2024 at 8:41 PM

## 2024-06-14 NOTE — H&P
[facility]  History and Physical - Hospitalist Service       Date of Admission:  6/13/2024  PRIMARY CARE PROVIDER:    Sofia Rebollar    Assessment & Plan   Joe Shah is a 49 year old male admitted on 6/13/2024 due to alcohol withdrawal.      Past medical history significant for Alcohol use D/O, MDD with anxiety, HTN, HLP, History of esophagitis, History of SDH.    Discussed with Dr. Conteh and reviewed ED notes.  Patient presented to the ED to be evaluated for alcohol withdrawal.  Patient reported being hospitalized at [facility] recently (5/23-5/24/2024) and was discharged and went to treatment.  However, he started drinking again ~ 1 week prior to presentation.    His last drink was the night prior to presentation.  The morning of presentation the patient developed abdominal pain with nausea and vomiting.      Work-up in the ED included CMP with a Sodium of 134, CO2 of 17, Anion Gap of 24, Chloride of 93, Total bili of 1.4 and glucose of 166 otherwise within normal limits.  Magnesium level was low at 1.5.  VBG with a pH of 7.56, pCO2 of 23, oxyhemoglobin of 85 and O2 sat of 86.5.  CBC with diff revealed a WBC of 11.6, absolute neutrophil of 10.3, Abs lymphocyte of 0.6 otherwise within normal limits.  Ethyl alcohol level less than 0.01.  Phosphorus within normal limits at 2.9.  Lipase WNL at 19.  2 view chest x-ray without acute cardiopulmonary abnormalities but noted mild esophageal hiatal hernia.    Patient received IV valium totaling 20 mg, IV magnesium of 2 g, IV morphine of 4 mg, IV Zofran 4 mg, IV fluids with a banana bag for 1 L while in the ED.      Alcohol withdrawal  Alcohol use D/O  *Patient began drinking again last Saturday 6/8/24.  He stated it started with a pint of hard alcohol that day and steadily increased every day until the night of 6/12.  - Monitor on CIWA and if scoring 8 or higher contact Hospitalist to initiate PRN ativan/valium.    - PTA gabapentin  ordered 100 mg TID.    - Restart PO multivitamins.    - Hold PTA Naltrexone (last took ~ 1 week ago).      Abdominal pain  Persistent nausea, vomiting and dry heaving  *Awaiting Abd/pelvis CT with contrast that was ordered in the ED.    - Simethicone 80 mg QID.    - Sucralfate 80 mg QID.    - Pain management:               --Schedule APAP 975 mg every 8 hours.                 --PRN PO Dilaudid 1-2 mg every 4 hours based off pain severity.                 --PRN IV dilaudid 0.2-0.4 mg every 2 hours based off severity.      Leukocytosis. mild  *Suspect secondary to alcohol withdrawal and persistent nausea, vomiting and dry heaving.  - CBC ordered for the AM.    - Follow up on Abd/pelvis CT results.      Anion gap metabolic acidosis  - IV fluids with NS at 100 ml/hr.    - BMP ordered for the AM.      Tachycardia  *Suspected secondary to withdrawal.    - Resumed on PTA Toprol XL as below.    - Monitor on telemetry.      Hypomagnesemia  - Monitor and replace per protocol.      Pseudohyponatremia  *Sodium level at 134 but corrected to 136 due to glucose level of 166.    - Monitor.      Major depressive disorder with anxiety  - Resumed on PTA Effexor 37.5 mg/d.      HTN  - Resumed on PTA Toprol XL 50 mg/d.  Hold parameters in place.    - PRN IV hydralazine 10 mg every 4 hours for SBP GREATER THAN 180.      HLP  *Previous lipid panel with Total cholesterol 251,  and non  from 03/2024.  - Follow up with PCP.      History of esophagitis  Incidental hiatal hernia  - Hold PTA Protonix PO 40 mg/d.  - IV Pepcid 20 mg BID ordered.      History of SDH s/p [place] hole and hematoma evacuation (12/2023)  *Noted per chart review.  No interventions at this time.      Clinically Significant Risk Factors Present on Admission            # Hypomagnesemia: Lowest Mg = 1.5 mg/dL in last 2 days, will replace as needed  # Anion Gap Metabolic Acidosis: Highest Anion Gap = 24 mmol/L in last 2 days, will monitor and treat as appropriate       # Hypertension: Noted on problem list    # Financial/Environmental Concerns:           Diet: Clear liquid diet  DVT Prophylaxis: Pneumatic Compression Devices  Power Catheter: Not present  Lines: None     Cardiac Monitoring: ORDERED  Code Status: FULL CODE         Disposition Plan   Inpatient status.  Anticipate greater than 2 evening hospitalization while undergoing continued work-up/management of alcohol withdrawal.      Medically Ready for Discharge: Anticipated in 2-4 Days    The patient's care was discussed with the Patient and Dr. Conteh .    The patient has been discussed with Dr. Sharma, who agrees with the assessment and plan at this time.    John Winkler PA-C  [facility]  Securely message with the Vocera Web Console (learn more here)    ______________________________________________________________________    Chief Complaint   Concern for alcohol withdrawal    History is obtained from Dr. Conteh, the patient and EMR.      History of Present Illness   Joe Shah is a 49 year old male admitted on 6/13/2024 due to alcohol withdrawal.      Past medical history significant for Alcohol use D/O, MDD with anxiety, HTN, HLP, History of esophagitis, History of SDH.    Discussed with Dr. Conteh and reviewed ED notes.  Patient presented to the ED to be evaluated for alcohol withdrawal.  Patient reported being hospitalized at [facility] recently (5/23-5/24/2024) and was discharged and went to treatment.  However, he started drinking again ~ 1 week prior to presentation.  His last drink was the night prior to presentation.  The morning of presentation the patient developed abdominal pain with nausea and vomiting.      Work-up in the ED included CMP with a Sodium of 134, CO2 of 17, Anion Gap of 24, Chloride of 93, Total bili of 1.4 and glucose of 166 otherwise within normal limits.  Magnesium level was low at 1.5.  VBG with a pH of 7.56, pCO2 of 23, oxyhemoglobin of 85  and O2 sat of 86.5.  CBC with diff revealed a WBC of 11.6, absolute neutrophil of 10.3, Abs lymphocyte of 0.6 otherwise within normal limits.  Ethyl alcohol level less than 0.01.  Phosphorus within normal limits at 2.9.  Lipase WNL at 19.  2 view chest x-ray without acute cardiopulmonary abnormalities but noted mild esophageal hiatal hernia.    Patient received IV valium totaling 20 mg, IV magnesium of 2 g, IV morphine of 4 mg, IV Zofran 4 mg, IV fluids with a banana bag for 1 L while in the ED.      Patient was seen in the ED where he was laying on his left side in the fetal position.  He appeared uncomfortable and was dry heaving multiple times.  We briefly discussed events that occurred over this past week resulting in his presentation to the ED.    Upon questioning, patient indicated that he feels flushed and warm.  He has been vomiting ever since this morning around 6 AM.  He currently states there is nothing left in his stomach and now he is dry heaving which is resulting in headache coughing fits and chest palpitations.  He described having a burning sensation in his throat secondary to dry heaving and vomiting as well.  He has ongoing abdominal pain that he described as all over.  He had a bowel movement earlier this morning but stated it was unusual as it was mixed consistency and yellowish.  He has had no issues urinating and been trying to keep up with water intake over the past several days.  He has ongoing abdominal pain that seems like it is worsened since it began this morning.  He feels his speech is slower.    Patient resides in a house in [place], [place] with his wife and son.  He denies any history of current use of smoking cigarettes but utilizes a vape every now and then.  He has been consuming alcohol.  He denies recreational drug use.  He does not utilize a cane, walker, CPAP machine or supplemental oxygen.    Discussed and reviewed CODE STATUS and patient elected to be full code.      Past Medical  History    I have reviewed this patient's medical history and updated it with pertinent information if needed.   Past Medical History:   Diagnosis Date    Anxiety     Back pain     Depressive disorder     Hypertension     SDH (subdural hematoma) (H)     Substance abuse (H)    Alcohol use D/O, MDD with anxiety, HTN, HLP, History of esophagitis, History of SDH.    Prior to Admission Medications   Prior to Admission Medications   Prescriptions Last Dose Informant Patient Reported? Taking?   Multiple Vitamins-Minerals (MULTIVITAMIN ADULT) CHEW  Self No No   Sig: Take 2 chew tab by mouth daily Centrum   acetaminophen (TYLENOL) 500 MG tablet  Self Yes No   Sig: Take 500-1,000 mg by mouth every 6 hours as needed for mild pain   folic acid (FOLVITE) 1 MG tablet  Self No No   Sig: Take 1 tablet (1 mg) by mouth daily   gabapentin (NEURONTIN) 100 MG capsule  Self No No   Sig: Take 1 capsule (100 mg) by mouth 3 times daily   gabapentin (NEURONTIN) 300 MG capsule  Self No No   Sig: Take 1 capsule (300 mg) by mouth nightly as needed (sleep)   metoprolol succinate ER (TOPROL XL) 50 MG 24 hr tablet  Self No No   Sig: Take 1 tablet (50 mg) by mouth daily   naltrexone (DEPADE/REVIA) 50 MG tablet  Self No No   Sig: Take 1 tablet (50 mg) by mouth daily   pantoprazole (PROTONIX) 40 MG EC tablet   Yes No   Sig: Take Protonix 40 mg twice daily for 2 weeks, then continue with Protonix 40 mg daily   thiamine (B-1) 100 MG tablet  Self No No   Sig: Take 1 tablet (100 mg) by mouth daily   venlafaxine (EFFEXOR XR) 37.5 MG 24 hr capsule  Self Yes No   Sig: Take 37.5 mg by mouth daily      Facility-Administered Medications: None     Allergies   Allergies   Allergen Reactions    Morphine Nausea and Vomiting    Oxycodone Itching     1/22 Patient reports he can tolerate this       Physical Exam   Vital Signs: Temp: 98.7  F (37.1  C) Temp src: Temporal BP: (!) 134/95 Pulse: 112   Resp: 12 SpO2: 93 %      Weight: 175 lbs 0 oz    Constitutional: Awake,  alert, cooperative.  Appears uncomfortable and complained of abdominal pain and seen dry heaving several times.  ENT: Normocephalic, without obvious abnormality, atraumatic, oral pharynx with dry mucus membranes.  Eyes extra occular movements intact.  Normal sclera.    Neck: Supple, symmetrical, trachea midline, no adenopathy.  Pulmonary: No increased work of breathing, fair air exchange, clear to auscultation bilaterally, no crackles or wheezing.  Cardiovascular: Regular rhythm and tachycardic, normal S1 and S2, no S3 or S4, and no murmur noted.  GI: Bowel sounds appreciated, soft, non-distended.  Tenderness to minimal palpation of the abdomen.  Skin/Integumen: Visualized skin appeared clear.  Neuro: CN II-XII grossly intact.    Psych:  Alert and oriented x 3.  Flat affect.  Extremities: No lower extremity edema noted, and calves are non-tender to palpation bilaterally.      Medical Decision Making       Please see A&P for additional details of medical decision making.  GREATER THAN 75 MINUTES SPENT BY ME on the date of service doing chart review, history, exam, documentation & further activities per the note.       Data   Data reviewed today: I reviewed all medications, new labs and imaging results over the last 24 hours. I personally reviewed the EKG tracing showing sinus tachycardia with T-wave inversions in V1 and flattened T-waves in V2 and V3 .      I have personally reviewed the following data over the past 24 hrs:    11.6 (H)  \   13.9   / 368     134 (L) 93 (L) 14.0 /  166 (H)   5.3 17 (L) 0.74 \     ALT: N/A AST: 45 AP: 83 TBILI: 1.4 (H)   ALB: 4.5 TOT PROTEIN: 7.9 LIPASE: 19       Imaging results reviewed over the past 24 hrs:   Recent Results (from the past 24 hour(s))   XR Chest 2 Views    Narrative    EXAM: XR CHEST 2 VIEWS  LOCATION: [facility]  DATE: 6/13/2024    INDICATION: cough, hypoxia  COMPARISON: 5/23/2024      Impression    IMPRESSION: No acute cardiopulmonary  abnormalities. Mild esophageal hiatal hernia. Prominent left nipple shadow. Minimal thoracic spinal curvature.

## 2024-06-14 NOTE — PLAN OF CARE
Goal Outcome Evaluation:      Plan of Care Reviewed With: patient    Overall Patient Progress: improvingOverall Patient Progress: improving       Summary: ETOH    DATE & TIME: 6/13/2024 - 06/14/2024 [id number]     Cognitive Concerns/ Orientation: A & O x4    BEHAVIOR & AGGRESSION TOOL COLOR: Green  CIWA SCORE: 8, 0, 0, 2  ABNL VS/O2: VSS on RA except BP elevated   MOBILITY: SBA  PAIN MANAGMENT: C/o abdominal pain and headache - scheduled Tylenol given x1 with relief   DIET: Clears - advanced as tolerated   BOWEL/BLADDER: Continent - up to the Bathroom   ABNL LAB/BG: Na 134, K 5.3, Mg 2.2, Ph 2.9- rechecks placed for AM draw   DRAIN/DEVICES: R PIV infusing NS @ 100mL/hr  TELEMETRY RHYTHM: NSR  SKIN: Scattered bruises   TESTS/PROCEDURES: CT abdomen - still needs to be completed   D/C DAY/GOALS/PLACE: pending improvement     OTHER IMPORTANT INFO: SATHISH consulted

## 2024-06-14 NOTE — PHARMACY-ADMISSION MEDICATION HISTORY
Pharmacist Admission Medication History    Admission medication history is complete. The information provided in this note is only as accurate as the sources available at the time of the update.    Information Source(s): Patient and CareEverywhere/SureScripts via in-person    Pertinent Information: None    Changes made to PTA medication list:  Added: None  Deleted: None  Changed: Gabapentin 100 mg changed to BID (morning and bedtime).     Allergies reviewed with patient and updates made in EHR: yes    Medication History Completed By: Trinh De La Cruz [facility] 6/13/2024 7:44 PM    PTA Med List   Medication Sig Last Dose    acetaminophen (TYLENOL) 500 MG tablet Take 500-1,000 mg by mouth every 6 hours as needed for mild pain More than a month    folic acid (FOLVITE) 1 MG tablet Take 1 tablet (1 mg) by mouth daily Past Week    gabapentin (NEURONTIN) 100 MG capsule Take 1 capsule (100 mg) by mouth 3 times daily (Patient taking differently: Take 100 mg by mouth 2 times daily Morning and bedtime.) Past Week    gabapentin (NEURONTIN) 300 MG capsule Take 1 capsule (300 mg) by mouth nightly as needed (sleep) Past Week    metoprolol succinate ER (TOPROL XL) 50 MG 24 hr tablet Take 1 tablet (50 mg) by mouth daily Past Week    Multiple Vitamins-Minerals (MULTIVITAMIN ADULT) CHEW Take 2 chew tab by mouth daily Centrum Past Week    naltrexone (DEPADE/REVIA) 50 MG tablet Take 1 tablet (50 mg) by mouth daily Past Week    pantoprazole (PROTONIX) 40 MG EC tablet Take Protonix 40 mg twice daily for 2 weeks, then continue with Protonix 40 mg daily Past Week    thiamine (B-1) 100 MG tablet Take 1 tablet (100 mg) by mouth daily Past Week    venlafaxine (EFFEXOR XR) 37.5 MG 24 hr capsule Take 37.5 mg by mouth daily Past Week

## 2024-06-14 NOTE — PROGRESS NOTES
[facility]    Hospitalist Progress Note    Date of Admission: 6/13/2024    Assessment & Plan   Joe Shah is a 49 year old male with PMHx of hypertension, hyperlipidemia, esophagitis, anxiety, depression, alcohol use and history of subdural hematoma in 2023 who was admitted on 6/13/2024 for management of alcohol withdrawal    Alcohol use disorder, with concern for acute withdrawal on admission  *Have been recently hospitalized to [facility] from 5/23/24-5/24/24 for management of alcohol withdrawal which was complicated by lactic acidosis with an anion gap metabolic acidosis, ketoacidosis and hematemesis as well as electrolyte disturbances.  He was subsequently discharged to treatment.  He later reported that he began drinking approximately 1 week after his discharge (on 6/8/24).  His last drink was the night prior to presentation to the ED.  On the day of presentation, he developed abdominal pain with associated nausea and vomiting.  *In the ED, he was afebrile, tachycardic with HR 110s and hypertensive.  O2 sat stable on RA. Labs notable for WBC 11.6, Na 134, bicarb 17, AG 24, mag 1.5. BAL neg. CXR showed mild esophageal hiatal hernia. Given IVFs, banana bag, and prns for pain/nausea.   *CIWA scores have remained low, 2-4.  No benzodiazepines needed thus far  -- cont gabapentin 100mg TID as per prior to admission  -- cont MVI, thiamine/folate  -- holding PTA naltrexone (hadn't taken in the week prior to admission)  -- planning to be in touch with his sponsor who is aware of his recent relapse, as well as the outpatient program at [facility] with which she had previously venously followed, they are also aware of his recent relapse and recommended he resume following with their outpatient program      Abdominal pain, persistent nausea/vomiting, suspect secondary to alcoholic gastritis vs other: Improved   History of esophagitis  Incidental hiatal hernia  *Suspect  secondary to alcoholic gastritis.  Mild leukocytosis noted in ED with WBC 11.6, low suspicion for acute underlying infection.  Lipase was normal.  CT abdomen pelvis was ordered in the ED but never obtained.  *Reported GI symptoms had improved the day following admission.  Labs remained stable.  No indication to obtain a CT at that point.  -- cont supportive cares with Carafate, simethicone, PPI; prns for pain and nausea    AGMA: Improved  Hypomagnesemia: Resolved  Hyponatremia: Resolved  Hypophosphatemia: Resolved  *Abnl lytes in setting of withdrawal.  IVFs started on admission.   *Labs normalized the morning following admission, electrolytes repleted as needed.    Hypertension  Hyperlipidemia  *Chronic and stable on metoprolol XL  *Prior FLP with tot cholest 251, , HDL 89. Follow up with PCP      Major depressive disorder  Anxiety  *Chronic and stable on Effexor      History of SDH s/p [place] hole and hematoma evacuation (12/2023)  *Noted. No concerns at this time.     FEN: IVFs dc'd 6/14, lytes stable, regular diet  DVT Prophylaxis: PCD's when in bed, encourage ambulation  Code Status: Full Code    Clinically Significant Risk Factors Present on Admission            # Hypomagnesemia: Lowest Mg = 1.5 mg/dL in last 2 days, will replace as needed  # Anion Gap Metabolic Acidosis: Highest Anion Gap = 24 mmol/L in last 2 days, will monitor and treat as appropriate      # Hypertension: Noted on problem list                   # Financial/Environmental Concerns:           Disposition: Anticipate discharge home tomorrow morning if tolerating oral intake without recurrent GI symptoms, no signs or symptoms of alcohol withdrawal and ambulating without difficulty    Medically Ready for Discharge: Anticipated Tomorrow      Clari Cowan, DO    Medical Decision Making       Please see A&P for additional details of medical decision making.         Interval History   Chart reviewed.  Seen this afternoon.  Resting  comfortably.  Denies any lingering GI symptoms including abdominal pain, nausea, vomiting or dry heaves.  Has tolerated clear liquids this morning.  Has been ambulating some around the room.  Denies dizziness/lightheadedness, chest pain, shortness of breath or cough.  Feels discouraged by his recent relapse after having completed inpatient stay at [facility] earlier this year and subsequent outpatient treatment program.  We discussed a plan for him to maintain sobriety going forward.  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  His day starts in the early morning and he feels he does better with this at routine including being a position where he cannot be hung over or be intoxicated to do his job.  He has already been in touch with his sponsor and his outpatient counselor at [facility] and plans to reestablish with the outpatient treatment program there.    -Data reviewed today: I reviewed all new labs and imaging results over the last 24 hours. I personally reviewed no images or EKG's today.    Physical Exam   Temp: 97.8  F (36.6  C) Temp src: Oral BP: (!) 131/99 Pulse: 85   Resp: 16 SpO2: 97 % O2 Device: None (Room air)    Vitals:    06/13/24 1502 06/13/24 2154   Weight: 79.4 kg (175 lb) 78.6 kg (173 lb 3.2 oz)     Vital Signs with Ranges  Temp:  [97.8  F (36.6  C)-98.7  F (37.1  C)] 97.8  F (36.6  C)  Pulse:  [] 85  Resp:  [12-19] 16  BP: (131-169)/() 131/99  SpO2:  [93 %-100 %] 97 %  I/O last 3 completed shifts:  In: 1000 [I.V.:1000]  Out: -     Constitutional: Resting comfortably, alert and conversing appropriately, NAD  Respiratory: CTAB, no wheeze, no increased work of breathing or accessory muscle use  Cardiovascular: HRRR, no MGR, no LE edema  GI: S, NT, ND, +BS  Skin/Integumen: warm/dry  Other:      Medications   Current Facility-Administered Medications   Medication Dose Route Frequency Provider Last Rate Last Admin    sodium chloride 0.9 % infusion    Intravenous Continuous John Winkler PA-C 100 mL/hr at 06/14/24 0633 New Bag at 06/14/24 0633     Current Facility-Administered Medications   Medication Dose Route Frequency Provider Last Rate Last Admin    acetaminophen (TYLENOL) tablet 975 mg  975 mg Oral TID John Winkler PA-C   975 mg at 06/14/24 0934    famotidine (PEPCID) injection 20 mg  20 mg Intravenous Q12H John Winkler PA-C   20 mg at 06/14/24 1123    folic acid (FOLVITE) tablet 1 mg  1 mg Oral Daily John Winkler PA-C   1 mg at 06/14/24 0935    gabapentin (NEURONTIN) capsule 100 mg  100 mg Oral BID John Winkler PA-C   100 mg at 06/14/24 0935    magnesium oxide (MAG-OX) tablet 400 mg  400 mg Oral Q4H Clari Cowan DO   400 mg at 06/14/24 1123    metoprolol succinate ER (TOPROL XL) 24 hr tablet 50 mg  50 mg Oral Daily John Winkler PA-C   50 mg at 06/14/24 0935    multivitamin, therapeutic (THERA-VIT) tablet 1 tablet  1 tablet Oral Daily John Winkler PA-C   1 tablet at 06/14/24 0934    simethicone (MYLICON) chewable tablet 80 mg  80 mg Oral 4x Daily John Winkler PA-C   80 mg at 06/14/24 0934    sodium chloride (PF) 0.9% PF flush 3 mL  3 mL Intracatheter Q8H John Winkler PA-C   3 mL at 06/13/24 2218    sodium phosphate 15 mmol in NS 250mL intermittent infusion  15 mmol Intravenous Once Clari Cowan DO   15 mmol at 06/14/24 1152    sucralfate (CARAFATE) tablet 1 g  1 g Oral 4x Daily AC & HS John Winkler PA-C   1 g at 06/14/24 1123    thiamine (B-1) tablet 100 mg  100 mg Oral Daily John Winkler PA-C   100 mg at 06/14/24 0935    venlafaxine (EFFEXOR XR) 24 hr capsule 37.5 mg  37.5 mg Oral Daily John Winkler PA-C   37.5 mg at 06/14/24 0934       Data   Recent Labs   Lab 06/14/24  0909 06/13/24  1848 06/13/24  1545   WBC 5.8  --  11.6*   HGB 13.1*  --  13.9   MCV 90  --  87     --   368     --  134*   POTASSIUM 3.6  --  5.3   CHLORIDE 99  --  93*   CO2 21*  --  17*   BUN 10.1  --  14.0   CR 0.83  --  0.74   ANIONGAP 16*  --  24*   ISAIAS 8.9  --  9.5   *  --  166*   ALBUMIN  --   --  4.5   PROTTOTAL  --   --  7.9   BILITOTAL  --   --  1.4*   ALKPHOS  --   --  83   AST  --   --  45   LIPASE  --  19  --        Recent Results (from the past 24 hour(s))   XR Chest 2 Views    Narrative    EXAM: XR CHEST 2 VIEWS  LOCATION: [facility]  DATE: 6/13/2024    INDICATION: cough, hypoxia  COMPARISON: 5/23/2024      Impression    IMPRESSION: No acute cardiopulmonary abnormalities. Mild esophageal hiatal hernia. Prominent left nipple shadow. Minimal thoracic spinal curvature.

## 2024-06-14 NOTE — UTILIZATION REVIEW
"  Admission Status; Secondary Review Determination         Under the authority of the Utilization Management Committee, the utilization review process indicated a secondary review on the above patient.  The review outcome is based on review of the medical records, discussions with staff, and applying clinical experience noted on the date of the review.          (x) Observation Status Appropriate - This patient does not meet hospital inpatient criteria and is placed in observation status. If this patient's primary payer is Medicare and was admitted as an inpatient, Condition Code 44 should be used and patient status changed to \"observation\".     RATIONALE FOR DETERMINATION     The severity of illness, intensity of service provided, expected LOS and risk for adverse outcome make the care appropriate for further observation; however, doesn't meet criteria for hospital inpatient admission.   notified of this determination.    The patient is a 49-year-old male admitted on 6/13/2024.  Patient came to the ED due to alcohol intoxication and alcohol dependence and desire for alcoholic withdrawal.  He was recently admitted with a discharge of 5/24/2024 for alcohol intoxication and detoxification and went to treatment following that admission and restarted drinking 1 week prior to admission.  He also developed nausea pain and vomiting prior to coming to the ED.  It is not clear from notes if he has had a alcoholic seizure during withdrawal in the past.  He did have significantly low magnesium level and bilirubin total is elevated at 1.4.  Patient had mild leukocytosis which is normalized.  CIWA does not appear to be elevated during current admission but he was given Valium IV totaling 20 mg in the ED.  Abdominal pain was treated with Dilaudid alcohol level was less than 0.01 on admission.  Nursing notes report improved condition today.  Based on relatively low CIWA and no other acute alcohol withdrawal complications " other than magnesium replacement and low phosphorus, recommend the patient be switched from inpatient status to observation status.  Dr. Laxmi Cowan will be texted with this recommendation.      The information on this document is developed by the utilization review team in order for the business office to ensure compliance.  This only denotes the appropriateness of proper admission status and does not reflect the quality of care rendered.         The definitions of Inpatient Status and Observation Status used in making the determination above are those provided in the CMS Coverage Manual, Chapter 1 and Chapter 6, section 70.4.      Sincerely,     Omi Gamino MD  Physician Advisor  Utilization Review/ Case Management  [facility].

## 2024-06-14 NOTE — PLAN OF CARE
Summary: ETOH    DATE & TIME: 6/14/24 [id number]    Cognitive Concerns/ Orientation: A&O x4    BEHAVIOR & AGGRESSION TOOL COLOR: Green  CIWA SCORE: 4, 3, 0  ABNL VS/O2: VSS on RA  MOBILITY: Ind, steady  PAIN MANAGMENT: C/o abdominal pain and headache - scheduled Tylenol given x1 with relief. Ice/heat applied to infiltrated IV site.   DIET: Clear liq, advanced to regular  BOWEL/BLADDER: Continent - up to the Bathroom   ABNL LAB/BG: K: 3.6, Mg 2.0, Ph 1.9- replaced all, recheck in AM.   DRAIN/DEVICES: R PIV SL  TELEMETRY RHYTHM: NSR  SKIN: Scattered bruises   TESTS/PROCEDURES: None  D/C DAY/GOALS/PLACE: discharge likely in AM 6/15/24 per MD  OTHER IMPORTANT INFO: SATHISH consulted

## 2024-06-15 VITALS
WEIGHT: 176.6 LBS | HEART RATE: 69 BPM | SYSTOLIC BLOOD PRESSURE: 129 MMHG | BODY MASS INDEX: 24.72 KG/M2 | DIASTOLIC BLOOD PRESSURE: 88 MMHG | RESPIRATION RATE: 18 BRPM | OXYGEN SATURATION: 98 % | HEIGHT: 71 IN | TEMPERATURE: 98 F

## 2024-06-15 PROCEDURE — 250N000013 HC RX MED GY IP 250 OP 250 PS 637: Performed by: PHYSICIAN ASSISTANT

## 2024-06-15 PROCEDURE — 250N000013 HC RX MED GY IP 250 OP 250 PS 637: Performed by: INTERNAL MEDICINE

## 2024-06-15 PROCEDURE — G0378 HOSPITAL OBSERVATION PER HR: HCPCS

## 2024-06-15 PROCEDURE — 99239 HOSP IP/OBS DSCHRG MGMT >30: CPT | Performed by: INTERNAL MEDICINE

## 2024-06-15 RX ORDER — VENLAFAXINE HYDROCHLORIDE 37.5 MG/1
37.5 CAPSULE, EXTENDED RELEASE ORAL DAILY
Qty: 30 CAPSULE | Refills: 0 | Status: SHIPPED | OUTPATIENT
Start: 2024-06-15

## 2024-06-15 RX ORDER — GABAPENTIN 100 MG/1
100 CAPSULE ORAL 2 TIMES DAILY
Qty: 60 CAPSULE | Refills: 0 | Status: SHIPPED | OUTPATIENT
Start: 2024-06-15 | End: 2024-06-15

## 2024-06-15 RX ORDER — GABAPENTIN 100 MG/1
100 CAPSULE ORAL 2 TIMES DAILY
Qty: 60 CAPSULE | Refills: 0 | Status: SHIPPED | OUTPATIENT
Start: 2024-06-15

## 2024-06-15 RX ORDER — GABAPENTIN 300 MG/1
300 CAPSULE ORAL
Qty: 30 CAPSULE | Refills: 0 | Status: SHIPPED | OUTPATIENT
Start: 2024-06-15 | End: 2024-06-15

## 2024-06-15 RX ORDER — VENLAFAXINE HYDROCHLORIDE 37.5 MG/1
37.5 CAPSULE, EXTENDED RELEASE ORAL DAILY
Qty: 30 CAPSULE | Refills: 0 | Status: SHIPPED | OUTPATIENT
Start: 2024-06-15 | End: 2024-06-15

## 2024-06-15 RX ORDER — GABAPENTIN 300 MG/1
300 CAPSULE ORAL
Qty: 30 CAPSULE | Refills: 0 | Status: SHIPPED | OUTPATIENT
Start: 2024-06-15

## 2024-06-15 RX ADMIN — SUCRALFATE 1 G: 1 TABLET ORAL at 07:13

## 2024-06-15 RX ADMIN — THIAMINE HCL TAB 100 MG 100 MG: 100 TAB at 09:39

## 2024-06-15 RX ADMIN — GABAPENTIN 100 MG: 100 CAPSULE ORAL at 09:39

## 2024-06-15 RX ADMIN — ACETAMINOPHEN 975 MG: 325 TABLET, FILM COATED ORAL at 09:39

## 2024-06-15 RX ADMIN — VENLAFAXINE HYDROCHLORIDE 37.5 MG: 37.5 CAPSULE, EXTENDED RELEASE ORAL at 09:41

## 2024-06-15 RX ADMIN — SUCRALFATE 1 G: 1 TABLET ORAL at 11:36

## 2024-06-15 RX ADMIN — POTASSIUM & SODIUM PHOSPHATES POWDER PACK 280-160-250 MG 1 PACKET: 280-160-250 PACK at 04:42

## 2024-06-15 RX ADMIN — MULTIVITAMIN TABLET 1 TABLET: TABLET at 09:39

## 2024-06-15 RX ADMIN — SIMETHICONE 80 MG: 80 TABLET, CHEWABLE ORAL at 09:39

## 2024-06-15 RX ADMIN — POTASSIUM & SODIUM PHOSPHATES POWDER PACK 280-160-250 MG 1 PACKET: 280-160-250 PACK at 00:45

## 2024-06-15 RX ADMIN — FOLIC ACID 1 MG: 1 TABLET ORAL at 09:39

## 2024-06-15 RX ADMIN — POTASSIUM & SODIUM PHOSPHATES POWDER PACK 280-160-250 MG 1 PACKET: 280-160-250 PACK at 09:39

## 2024-06-15 RX ADMIN — METOPROLOL SUCCINATE 50 MG: 50 TABLET, EXTENDED RELEASE ORAL at 09:39

## 2024-06-15 ASSESSMENT — ACTIVITIES OF DAILY LIVING (ADL)
ADLS_ACUITY_SCORE: 22
ADLS_ACUITY_SCORE: 22
ADLS_ACUITY_SCORE: 24
ADLS_ACUITY_SCORE: 22
ADLS_ACUITY_SCORE: 22
ADLS_ACUITY_SCORE: 24
ADLS_ACUITY_SCORE: 22
ADLS_ACUITY_SCORE: 24

## 2024-06-15 NOTE — PROGRESS NOTES
Discharge    Patient discharged to home via car with wife.  Care plan note  Aox4; calm and cooperative. VSS on RA. Up independently in room/halls. Denies N/V, or abdominal pain. Phosph: 2.3, replaced. Reg diet with good appetite. PIV removed. Belongings packed and sent with pt. Discharge meds filled, reviewed and sent with patient. AVS reviewed, no questions at this time. Pt discharged home with wife.     Listed belongings gathered and given to patient (including from security/pharmacy). Yes  Care Plan and Patient education resolved: Yes  Prescriptions if needed, hard copies sent with patient  Yes  Medication Bin checked and emptied on discharge Yes  SW/care coordinator/charge RN aware of discharge: Yes

## 2024-06-15 NOTE — PLAN OF CARE
Summary: ETOH    DATE & TIME: 6/14/24, 1930 - 0730  Cognitive Concerns/ Orientation: A&O x4    BEHAVIOR & AGGRESSION TOOL COLOR: Green  CIWA SCORE: 0  ABNL VS/O2: VSS on ROOM AIR  MOBILITY: Independent, gait steady  PAIN MANAGMENT: Denied pain  DIET: Regular  BOWEL/BLADDER: Continent   ABNL LAB/BG: Phosphorus 2.3. Replacement in progress  DRAIN/DEVICES:  PIV SL  TELEMETRY RHYTHM: NSR  SKIN: Scattered bruises   TESTS/PROCEDURES: None  D/C DAY/GOALS/PLACE: Discharge likely in AM 6/15/24 per MD  OTHER IMPORTANT INFO: SW consulted     Goal Outcome Evaluation:      Plan of Care Reviewed With: patient    Overall Patient Progress: improvingOverall Patient Progress: improving

## 2024-06-16 ENCOUNTER — HEALTH MAINTENANCE LETTER (OUTPATIENT)
Age: 49
End: 2024-06-16

## 2024-06-17 NOTE — ED NOTES
Entered into chart per mgmt direction to adjust dosing discrepancy for IV Valium from 6/13/24. Per CIWA scoring, administered 5mg IV Valium, but when scanning the vial of medication (which contains a total of 10mg), the full vial dosing automatically scanned into chart (10mg). Corrected the dosing in MAR.

## 2024-06-18 ENCOUNTER — PATIENT OUTREACH (OUTPATIENT)
Dept: CARE COORDINATION | Facility: CLINIC | Age: 49
End: 2024-06-18
Payer: COMMERCIAL

## 2024-06-18 NOTE — PROGRESS NOTES
Connected Care Resource Center:   [facility] Resource Center Contact  [facility]/Voicemail     Clinical Data: Post-Discharge Outreach     Outreach attempted x 2.  Left message on patient's voicemail, providing [facility]'s central phone number of [phone] ([phone]) for questions/concerns and/or to schedule an appt with an [facility] provider, if they do not have a PCP.      Plan:  [facility] will do no further outreaches at this time.       SANDY Pascual  Connected Care Resource [place], [facility]    *Connected Care Resource Team does NOT follow patient ongoing. Referrals are identified based on internal discharge reports and the outreach is to ensure patient has an understanding of their discharge instructions.

## 2024-08-19 ENCOUNTER — HOSPITAL ENCOUNTER (INPATIENT)
Facility: CLINIC | Age: 49
LOS: 2 days | Discharge: HOME | DRG: 897 | End: 2024-08-21
Attending: EMERGENCY MEDICINE | Admitting: STUDENT IN AN ORGANIZED HEALTH CARE EDUCATION/TRAINING PROGRAM
Payer: COMMERCIAL

## 2024-08-19 DIAGNOSIS — K29.20 ACUTE ALCOHOLIC GASTRITIS WITHOUT HEMORRHAGE: ICD-10-CM

## 2024-08-19 DIAGNOSIS — F10.939 ALCOHOL WITHDRAWAL SEIZURE WITH COMPLICATION (H): ICD-10-CM

## 2024-08-19 DIAGNOSIS — R56.9 ALCOHOL WITHDRAWAL SEIZURE WITH COMPLICATION (H): ICD-10-CM

## 2024-08-19 LAB
ALBUMIN SERPL BCG-MCNC: 4.7 G/DL (ref 3.5–5.2)
ALP SERPL-CCNC: 73 U/L (ref 40–150)
ALT SERPL W P-5'-P-CCNC: 23 U/L (ref 0–70)
ANION GAP SERPL CALCULATED.3IONS-SCNC: 20 MMOL/L (ref 7–15)
AST SERPL W P-5'-P-CCNC: 31 U/L (ref 0–45)
BASOPHILS # BLD AUTO: 0 10E3/UL (ref 0–0.2)
BASOPHILS NFR BLD AUTO: 0 %
BILIRUB SERPL-MCNC: 1.2 MG/DL
BUN SERPL-MCNC: 8.6 MG/DL (ref 6–20)
CALCIUM SERPL-MCNC: 9.8 MG/DL (ref 8.8–10.4)
CHLORIDE SERPL-SCNC: 95 MMOL/L (ref 98–107)
CREAT SERPL-MCNC: 0.84 MG/DL (ref 0.67–1.17)
EGFRCR SERPLBLD CKD-EPI 2021: >90 ML/MIN/1.73M2
EOSINOPHIL # BLD AUTO: 0 10E3/UL (ref 0–0.7)
EOSINOPHIL NFR BLD AUTO: 0 %
ERYTHROCYTE [DISTWIDTH] IN BLOOD BY AUTOMATED COUNT: 18.6 % (ref 10–15)
ETHANOL SERPL-MCNC: <0.01 G/DL
GLUCOSE SERPL-MCNC: 135 MG/DL (ref 70–99)
HCO3 SERPL-SCNC: 22 MMOL/L (ref 22–29)
HCT VFR BLD AUTO: 40.2 % (ref 40–53)
HGB BLD-MCNC: 13.1 G/DL (ref 13.3–17.7)
HOLD SPECIMEN: NORMAL
HOLD SPECIMEN: NORMAL
IMM GRANULOCYTES # BLD: 0 10E3/UL
IMM GRANULOCYTES NFR BLD: 0 %
LIPASE SERPL-CCNC: 28 U/L (ref 13–60)
LYMPHOCYTES # BLD AUTO: 1 10E3/UL (ref 0.8–5.3)
LYMPHOCYTES NFR BLD AUTO: 11 %
MAGNESIUM SERPL-MCNC: 1.2 MG/DL (ref 1.7–2.3)
MCH RBC QN AUTO: 28.4 PG (ref 26.5–33)
MCHC RBC AUTO-ENTMCNC: 32.6 G/DL (ref 31.5–36.5)
MCV RBC AUTO: 87 FL (ref 78–100)
MONOCYTES # BLD AUTO: 0.7 10E3/UL (ref 0–1.3)
MONOCYTES NFR BLD AUTO: 7 %
NEUTROPHILS # BLD AUTO: 8.1 10E3/UL (ref 1.6–8.3)
NEUTROPHILS NFR BLD AUTO: 82 %
NRBC # BLD AUTO: 0 10E3/UL
NRBC BLD AUTO-RTO: 0 /100
PHOSPHATE SERPL-MCNC: 2.5 MG/DL (ref 2.5–4.5)
PLATELET # BLD AUTO: 255 10E3/UL (ref 150–450)
POTASSIUM SERPL-SCNC: 3.8 MMOL/L (ref 3.4–5.3)
PROT SERPL-MCNC: 7.8 G/DL (ref 6.4–8.3)
RBC # BLD AUTO: 4.61 10E6/UL (ref 4.4–5.9)
SODIUM SERPL-SCNC: 137 MMOL/L (ref 135–145)
WBC # BLD AUTO: 9.9 10E3/UL (ref 4–11)

## 2024-08-19 PROCEDURE — 258N000003 HC RX IP 258 OP 636: Performed by: EMERGENCY MEDICINE

## 2024-08-19 PROCEDURE — 96375 TX/PRO/DX INJ NEW DRUG ADDON: CPT

## 2024-08-19 PROCEDURE — 84100 ASSAY OF PHOSPHORUS: CPT | Performed by: EMERGENCY MEDICINE

## 2024-08-19 PROCEDURE — 250N000011 HC RX IP 250 OP 636: Performed by: EMERGENCY MEDICINE

## 2024-08-19 PROCEDURE — 85025 COMPLETE CBC W/AUTO DIFF WBC: CPT | Performed by: EMERGENCY MEDICINE

## 2024-08-19 PROCEDURE — 96374 THER/PROPH/DIAG INJ IV PUSH: CPT

## 2024-08-19 PROCEDURE — 99222 1ST HOSP IP/OBS MODERATE 55: CPT | Performed by: HOSPITALIST

## 2024-08-19 PROCEDURE — 85048 AUTOMATED LEUKOCYTE COUNT: CPT | Performed by: EMERGENCY MEDICINE

## 2024-08-19 PROCEDURE — 96361 HYDRATE IV INFUSION ADD-ON: CPT

## 2024-08-19 PROCEDURE — 250N000013 HC RX MED GY IP 250 OP 250 PS 637: Performed by: EMERGENCY MEDICINE

## 2024-08-19 PROCEDURE — 99285 EMERGENCY DEPT VISIT HI MDM: CPT | Mod: 25

## 2024-08-19 PROCEDURE — 36415 COLL VENOUS BLD VENIPUNCTURE: CPT | Performed by: EMERGENCY MEDICINE

## 2024-08-19 PROCEDURE — 120N000001 HC R&B MED SURG/OB

## 2024-08-19 PROCEDURE — 80053 COMPREHEN METABOLIC PANEL: CPT | Performed by: EMERGENCY MEDICINE

## 2024-08-19 PROCEDURE — 83735 ASSAY OF MAGNESIUM: CPT | Performed by: EMERGENCY MEDICINE

## 2024-08-19 PROCEDURE — HZ2ZZZZ DETOXIFICATION SERVICES FOR SUBSTANCE ABUSE TREATMENT: ICD-10-PCS | Performed by: STUDENT IN AN ORGANIZED HEALTH CARE EDUCATION/TRAINING PROGRAM

## 2024-08-19 PROCEDURE — 250N000009 HC RX 250: Performed by: EMERGENCY MEDICINE

## 2024-08-19 PROCEDURE — 82077 ASSAY SPEC XCP UR&BREATH IA: CPT | Performed by: EMERGENCY MEDICINE

## 2024-08-19 PROCEDURE — 83690 ASSAY OF LIPASE: CPT | Performed by: EMERGENCY MEDICINE

## 2024-08-19 RX ORDER — MAGNESIUM HYDROXIDE/ALUMINUM HYDROXICE/SIMETHICONE 120; 1200; 1200 MG/30ML; MG/30ML; MG/30ML
15 SUSPENSION ORAL ONCE
Status: COMPLETED | OUTPATIENT
Start: 2024-08-19 | End: 2024-08-19

## 2024-08-19 RX ORDER — MAGNESIUM SULFATE HEPTAHYDRATE 40 MG/ML
2 INJECTION, SOLUTION INTRAVENOUS ONCE
Status: COMPLETED | OUTPATIENT
Start: 2024-08-19 | End: 2024-08-20

## 2024-08-19 RX ORDER — DIAZEPAM 5 MG
10 TABLET ORAL EVERY 30 MIN PRN
Status: DISCONTINUED | OUTPATIENT
Start: 2024-08-19 | End: 2024-08-20

## 2024-08-19 RX ORDER — ONDANSETRON 2 MG/ML
4 INJECTION INTRAMUSCULAR; INTRAVENOUS ONCE
Status: COMPLETED | OUTPATIENT
Start: 2024-08-19 | End: 2024-08-19

## 2024-08-19 RX ORDER — FLUMAZENIL 0.1 MG/ML
0.2 INJECTION, SOLUTION INTRAVENOUS
Status: DISCONTINUED | OUTPATIENT
Start: 2024-08-19 | End: 2024-08-20

## 2024-08-19 RX ORDER — DIAZEPAM 10 MG/2ML
5-10 INJECTION, SOLUTION INTRAMUSCULAR; INTRAVENOUS EVERY 30 MIN PRN
Status: DISCONTINUED | OUTPATIENT
Start: 2024-08-19 | End: 2024-08-20

## 2024-08-19 RX ORDER — DIAZEPAM 10 MG/2ML
10 INJECTION, SOLUTION INTRAMUSCULAR; INTRAVENOUS ONCE
Status: COMPLETED | OUTPATIENT
Start: 2024-08-19 | End: 2024-08-19

## 2024-08-19 RX ADMIN — DIAZEPAM 10 MG: 5 INJECTION INTRAMUSCULAR; INTRAVENOUS at 23:23

## 2024-08-19 RX ADMIN — ONDANSETRON 4 MG: 2 INJECTION INTRAMUSCULAR; INTRAVENOUS at 22:34

## 2024-08-19 RX ADMIN — SODIUM CHLORIDE 1000 ML: 9 INJECTION, SOLUTION INTRAVENOUS at 22:34

## 2024-08-19 RX ADMIN — PANTOPRAZOLE SODIUM 80 MG: 40 INJECTION, POWDER, FOR SOLUTION INTRAVENOUS at 23:12

## 2024-08-19 RX ADMIN — ALUMINUM HYDROXIDE, MAGNESIUM HYDROXIDE, AND SIMETHICONE 15 ML: 200; 200; 20 SUSPENSION ORAL at 23:26

## 2024-08-19 ASSESSMENT — LIFESTYLE VARIABLES
TREMOR: 2
AGITATION: NORMAL ACTIVITY
TOTAL SCORE: 12
NAUSEA AND VOMITING: 3
VISUAL DISTURBANCES: NOT PRESENT
HEADACHE, FULLNESS IN HEAD: MODERATELY SEVERE
PAROXYSMAL SWEATS: NO SWEAT VISIBLE
ANXIETY: 3
AUDITORY DISTURBANCES: NOT PRESENT
ORIENTATION AND CLOUDING OF SENSORIUM: ORIENTED AND CAN DO SERIAL ADDITIONS

## 2024-08-19 ASSESSMENT — COLUMBIA-SUICIDE SEVERITY RATING SCALE - C-SSRS
2. HAVE YOU ACTUALLY HAD ANY THOUGHTS OF KILLING YOURSELF IN THE PAST MONTH?: NO
6. HAVE YOU EVER DONE ANYTHING, STARTED TO DO ANYTHING, OR PREPARED TO DO ANYTHING TO END YOUR LIFE?: NO
1. IN THE PAST MONTH, HAVE YOU WISHED YOU WERE DEAD OR WISHED YOU COULD GO TO SLEEP AND NOT WAKE UP?: NO

## 2024-08-19 ASSESSMENT — ACTIVITIES OF DAILY LIVING (ADL): ADLS_ACUITY_SCORE: 37

## 2024-08-20 LAB
ANION GAP SERPL CALCULATED.3IONS-SCNC: 10 MMOL/L (ref 7–15)
BASOPHILS # BLD AUTO: 0 10E3/UL (ref 0–0.2)
BASOPHILS NFR BLD AUTO: 0 %
BUN SERPL-MCNC: 7.1 MG/DL (ref 6–20)
CALCIUM SERPL-MCNC: 8.3 MG/DL (ref 8.8–10.4)
CHLORIDE SERPL-SCNC: 101 MMOL/L (ref 98–107)
CREAT SERPL-MCNC: 0.82 MG/DL (ref 0.67–1.17)
EGFRCR SERPLBLD CKD-EPI 2021: >90 ML/MIN/1.73M2
EOSINOPHIL # BLD AUTO: 0 10E3/UL (ref 0–0.7)
EOSINOPHIL NFR BLD AUTO: 0 %
ERYTHROCYTE [DISTWIDTH] IN BLOOD BY AUTOMATED COUNT: 18.6 % (ref 10–15)
GLUCOSE SERPL-MCNC: 108 MG/DL (ref 70–99)
HCO3 SERPL-SCNC: 24 MMOL/L (ref 22–29)
HCT VFR BLD AUTO: 37.6 % (ref 40–53)
HGB BLD-MCNC: 12.2 G/DL (ref 13.3–17.7)
IMM GRANULOCYTES # BLD: 0 10E3/UL
IMM GRANULOCYTES NFR BLD: 0 %
LYMPHOCYTES # BLD AUTO: 1.3 10E3/UL (ref 0.8–5.3)
LYMPHOCYTES NFR BLD AUTO: 20 %
MAGNESIUM SERPL-MCNC: 2.2 MG/DL (ref 1.7–2.3)
MCH RBC QN AUTO: 28.6 PG (ref 26.5–33)
MCHC RBC AUTO-ENTMCNC: 32.4 G/DL (ref 31.5–36.5)
MCV RBC AUTO: 88 FL (ref 78–100)
MONOCYTES # BLD AUTO: 0.8 10E3/UL (ref 0–1.3)
MONOCYTES NFR BLD AUTO: 11 %
NEUTROPHILS # BLD AUTO: 4.6 10E3/UL (ref 1.6–8.3)
NEUTROPHILS NFR BLD AUTO: 68 %
NRBC # BLD AUTO: 0 10E3/UL
NRBC BLD AUTO-RTO: 0 /100
PLATELET # BLD AUTO: 224 10E3/UL (ref 150–450)
POTASSIUM SERPL-SCNC: 3.6 MMOL/L (ref 3.4–5.3)
RBC # BLD AUTO: 4.26 10E6/UL (ref 4.4–5.9)
SODIUM SERPL-SCNC: 135 MMOL/L (ref 135–145)
WBC # BLD AUTO: 6.8 10E3/UL (ref 4–11)

## 2024-08-20 PROCEDURE — 250N000013 HC RX MED GY IP 250 OP 250 PS 637: Performed by: HOSPITALIST

## 2024-08-20 PROCEDURE — 36415 COLL VENOUS BLD VENIPUNCTURE: CPT | Performed by: STUDENT IN AN ORGANIZED HEALTH CARE EDUCATION/TRAINING PROGRAM

## 2024-08-20 PROCEDURE — 85025 COMPLETE CBC W/AUTO DIFF WBC: CPT | Performed by: HOSPITALIST

## 2024-08-20 PROCEDURE — 258N000003 HC RX IP 258 OP 636: Performed by: EMERGENCY MEDICINE

## 2024-08-20 PROCEDURE — 250N000011 HC RX IP 250 OP 636: Performed by: EMERGENCY MEDICINE

## 2024-08-20 PROCEDURE — 250N000011 HC RX IP 250 OP 636: Performed by: HOSPITALIST

## 2024-08-20 PROCEDURE — 83735 ASSAY OF MAGNESIUM: CPT | Performed by: STUDENT IN AN ORGANIZED HEALTH CARE EDUCATION/TRAINING PROGRAM

## 2024-08-20 PROCEDURE — 120N000001 HC R&B MED SURG/OB

## 2024-08-20 PROCEDURE — 82374 ASSAY BLOOD CARBON DIOXIDE: CPT | Performed by: HOSPITALIST

## 2024-08-20 PROCEDURE — 36415 COLL VENOUS BLD VENIPUNCTURE: CPT | Performed by: HOSPITALIST

## 2024-08-20 PROCEDURE — G0378 HOSPITAL OBSERVATION PER HR: HCPCS

## 2024-08-20 PROCEDURE — 250N000009 HC RX 250: Performed by: HOSPITALIST

## 2024-08-20 PROCEDURE — 250N000009 HC RX 250: Performed by: EMERGENCY MEDICINE

## 2024-08-20 PROCEDURE — 258N000003 HC RX IP 258 OP 636: Performed by: HOSPITALIST

## 2024-08-20 PROCEDURE — 99232 SBSQ HOSP IP/OBS MODERATE 35: CPT | Performed by: STUDENT IN AN ORGANIZED HEALTH CARE EDUCATION/TRAINING PROGRAM

## 2024-08-20 PROCEDURE — 250N000013 HC RX MED GY IP 250 OP 250 PS 637: Performed by: STUDENT IN AN ORGANIZED HEALTH CARE EDUCATION/TRAINING PROGRAM

## 2024-08-20 RX ORDER — DIAZEPAM 10 MG/2ML
5-10 INJECTION, SOLUTION INTRAMUSCULAR; INTRAVENOUS EVERY 30 MIN PRN
Status: DISCONTINUED | OUTPATIENT
Start: 2024-08-20 | End: 2024-08-21 | Stop reason: HOSPADM

## 2024-08-20 RX ORDER — MULTIPLE VITAMINS W/ MINERALS TAB 9MG-400MCG
1 TAB ORAL DAILY
Status: DISCONTINUED | OUTPATIENT
Start: 2024-08-20 | End: 2024-08-20

## 2024-08-20 RX ORDER — MAGNESIUM HYDROXIDE/ALUMINUM HYDROXICE/SIMETHICONE 120; 1200; 1200 MG/30ML; MG/30ML; MG/30ML
15 SUSPENSION ORAL 3 TIMES DAILY PRN
Status: DISCONTINUED | OUTPATIENT
Start: 2024-08-20 | End: 2024-08-21 | Stop reason: HOSPADM

## 2024-08-20 RX ORDER — PROCHLORPERAZINE MALEATE 10 MG
10 TABLET ORAL EVERY 6 HOURS PRN
Status: DISCONTINUED | OUTPATIENT
Start: 2024-08-20 | End: 2024-08-21 | Stop reason: HOSPADM

## 2024-08-20 RX ORDER — AMOXICILLIN 250 MG
2 CAPSULE ORAL 2 TIMES DAILY PRN
Status: DISCONTINUED | OUTPATIENT
Start: 2024-08-20 | End: 2024-08-21 | Stop reason: HOSPADM

## 2024-08-20 RX ORDER — GABAPENTIN 100 MG/1
100 CAPSULE ORAL 2 TIMES DAILY
Status: DISCONTINUED | OUTPATIENT
Start: 2024-08-20 | End: 2024-08-21 | Stop reason: HOSPADM

## 2024-08-20 RX ORDER — ONDANSETRON 2 MG/ML
4 INJECTION INTRAMUSCULAR; INTRAVENOUS EVERY 6 HOURS PRN
Status: DISCONTINUED | OUTPATIENT
Start: 2024-08-20 | End: 2024-08-21 | Stop reason: HOSPADM

## 2024-08-20 RX ORDER — ACETAMINOPHEN 500 MG
1000 TABLET ORAL EVERY 6 HOURS PRN
Status: DISCONTINUED | OUTPATIENT
Start: 2024-08-20 | End: 2024-08-21 | Stop reason: HOSPADM

## 2024-08-20 RX ORDER — AMOXICILLIN 250 MG
1 CAPSULE ORAL 2 TIMES DAILY PRN
Status: DISCONTINUED | OUTPATIENT
Start: 2024-08-20 | End: 2024-08-21 | Stop reason: HOSPADM

## 2024-08-20 RX ORDER — FOLIC ACID 1 MG/1
1 TABLET ORAL DAILY
Status: DISCONTINUED | OUTPATIENT
Start: 2024-08-20 | End: 2024-08-21 | Stop reason: HOSPADM

## 2024-08-20 RX ORDER — FLUMAZENIL 0.1 MG/ML
0.2 INJECTION, SOLUTION INTRAVENOUS
Status: DISCONTINUED | OUTPATIENT
Start: 2024-08-20 | End: 2024-08-21 | Stop reason: HOSPADM

## 2024-08-20 RX ORDER — ONDANSETRON 4 MG/1
4 TABLET, ORALLY DISINTEGRATING ORAL EVERY 6 HOURS PRN
Status: DISCONTINUED | OUTPATIENT
Start: 2024-08-20 | End: 2024-08-21 | Stop reason: HOSPADM

## 2024-08-20 RX ORDER — DIAZEPAM 5 MG
10 TABLET ORAL EVERY 30 MIN PRN
Status: DISCONTINUED | OUTPATIENT
Start: 2024-08-20 | End: 2024-08-21 | Stop reason: HOSPADM

## 2024-08-20 RX ORDER — ACETAMINOPHEN 500 MG
1000 TABLET ORAL EVERY 6 HOURS PRN
Status: DISCONTINUED | OUTPATIENT
Start: 2024-08-20 | End: 2024-08-20

## 2024-08-20 RX ORDER — OLANZAPINE 5 MG/1
5-10 TABLET, ORALLY DISINTEGRATING ORAL EVERY 6 HOURS PRN
Status: DISCONTINUED | OUTPATIENT
Start: 2024-08-20 | End: 2024-08-21 | Stop reason: HOSPADM

## 2024-08-20 RX ORDER — MULTIVITAMIN,THERAPEUTIC
1 TABLET ORAL DAILY
Status: DISCONTINUED | OUTPATIENT
Start: 2024-08-21 | End: 2024-08-21 | Stop reason: HOSPADM

## 2024-08-20 RX ORDER — SODIUM CHLORIDE 9 MG/ML
INJECTION, SOLUTION INTRAVENOUS CONTINUOUS
Status: DISCONTINUED | OUTPATIENT
Start: 2024-08-20 | End: 2024-08-21 | Stop reason: HOSPADM

## 2024-08-20 RX ORDER — CALCIUM CARBONATE 500 MG/1
1000 TABLET, CHEWABLE ORAL 4 TIMES DAILY PRN
Status: DISCONTINUED | OUTPATIENT
Start: 2024-08-20 | End: 2024-08-21 | Stop reason: HOSPADM

## 2024-08-20 RX ORDER — GABAPENTIN 300 MG/1
300 CAPSULE ORAL AT BEDTIME
Status: DISCONTINUED | OUTPATIENT
Start: 2024-08-20 | End: 2024-08-21 | Stop reason: HOSPADM

## 2024-08-20 RX ORDER — VENLAFAXINE HYDROCHLORIDE 37.5 MG/1
37.5 CAPSULE, EXTENDED RELEASE ORAL DAILY
Status: DISCONTINUED | OUTPATIENT
Start: 2024-08-20 | End: 2024-08-21 | Stop reason: HOSPADM

## 2024-08-20 RX ORDER — HALOPERIDOL 5 MG/ML
2.5-5 INJECTION INTRAMUSCULAR EVERY 6 HOURS PRN
Status: DISCONTINUED | OUTPATIENT
Start: 2024-08-20 | End: 2024-08-21 | Stop reason: HOSPADM

## 2024-08-20 RX ORDER — METOPROLOL SUCCINATE 25 MG/1
25 TABLET, EXTENDED RELEASE ORAL DAILY
Status: DISCONTINUED | OUTPATIENT
Start: 2024-08-20 | End: 2024-08-21 | Stop reason: HOSPADM

## 2024-08-20 RX ORDER — PROCHLORPERAZINE 25 MG
25 SUPPOSITORY, RECTAL RECTAL EVERY 12 HOURS PRN
Status: DISCONTINUED | OUTPATIENT
Start: 2024-08-20 | End: 2024-08-21 | Stop reason: HOSPADM

## 2024-08-20 RX ORDER — GUAIFENESIN/DEXTROMETHORPHAN 100-10MG/5
10 SYRUP ORAL EVERY 4 HOURS PRN
Status: DISCONTINUED | OUTPATIENT
Start: 2024-08-20 | End: 2024-08-21 | Stop reason: HOSPADM

## 2024-08-20 RX ORDER — LIDOCAINE 40 MG/G
CREAM TOPICAL
Status: DISCONTINUED | OUTPATIENT
Start: 2024-08-20 | End: 2024-08-21 | Stop reason: HOSPADM

## 2024-08-20 RX ADMIN — GABAPENTIN 300 MG: 300 CAPSULE ORAL at 20:32

## 2024-08-20 RX ADMIN — FOLIC ACID 1 MG: 1 TABLET ORAL at 08:03

## 2024-08-20 RX ADMIN — VENLAFAXINE HYDROCHLORIDE 37.5 MG: 37.5 CAPSULE, EXTENDED RELEASE ORAL at 15:55

## 2024-08-20 RX ADMIN — METOPROLOL SUCCINATE 25 MG: 25 TABLET, EXTENDED RELEASE ORAL at 15:55

## 2024-08-20 RX ADMIN — ALUMINUM HYDROXIDE, MAGNESIUM HYDROXIDE, AND SIMETHICONE 15 ML: 200; 200; 20 SUSPENSION ORAL at 13:59

## 2024-08-20 RX ADMIN — THIAMINE HCL TAB 100 MG 100 MG: 100 TAB at 08:03

## 2024-08-20 RX ADMIN — PANTOPRAZOLE SODIUM 40 MG: 40 INJECTION, POWDER, FOR SOLUTION INTRAVENOUS at 15:54

## 2024-08-20 RX ADMIN — MAGNESIUM SULFATE HEPTAHYDRATE 2 G: 40 INJECTION, SOLUTION INTRAVENOUS at 01:00

## 2024-08-20 RX ADMIN — Medication 1 TABLET: at 08:22

## 2024-08-20 RX ADMIN — SODIUM CHLORIDE: 9 INJECTION, SOLUTION INTRAVENOUS at 13:57

## 2024-08-20 RX ADMIN — DIAZEPAM 10 MG: 5 TABLET ORAL at 08:22

## 2024-08-20 RX ADMIN — SODIUM CHLORIDE: 9 INJECTION, SOLUTION INTRAVENOUS at 03:46

## 2024-08-20 RX ADMIN — ACETAMINOPHEN 1000 MG: 500 TABLET, FILM COATED ORAL at 08:21

## 2024-08-20 RX ADMIN — GABAPENTIN 100 MG: 100 CAPSULE ORAL at 20:31

## 2024-08-20 RX ADMIN — SODIUM CHLORIDE: 9 INJECTION, SOLUTION INTRAVENOUS at 20:34

## 2024-08-20 RX ADMIN — ONDANSETRON 4 MG: 2 INJECTION INTRAMUSCULAR; INTRAVENOUS at 07:26

## 2024-08-20 RX ADMIN — FOLIC ACID: 5 INJECTION, SOLUTION INTRAMUSCULAR; INTRAVENOUS; SUBCUTANEOUS at 01:01

## 2024-08-20 RX ADMIN — ACETAMINOPHEN 1000 MG: 500 TABLET, FILM COATED ORAL at 13:55

## 2024-08-20 ASSESSMENT — ACTIVITIES OF DAILY LIVING (ADL)
ADLS_ACUITY_SCORE: 22
ADLS_ACUITY_SCORE: 22
ADLS_ACUITY_SCORE: 37
ADLS_ACUITY_SCORE: 37
ADLS_ACUITY_SCORE: 22
ADLS_ACUITY_SCORE: 22
ADLS_ACUITY_SCORE: 37
ADLS_ACUITY_SCORE: 22
ADLS_ACUITY_SCORE: 37
ADLS_ACUITY_SCORE: 22
ADLS_ACUITY_SCORE: 22
ADLS_ACUITY_SCORE: 37
ADLS_ACUITY_SCORE: 22
ADLS_ACUITY_SCORE: 37
ADLS_ACUITY_SCORE: 22
ADLS_ACUITY_SCORE: 37

## 2024-08-20 ASSESSMENT — LIFESTYLE VARIABLES
AGITATION: NORMAL ACTIVITY
ORIENTATION AND CLOUDING OF SENSORIUM: DISORIENTED FOR DATE BY MORE THAN 2 CALENDAR DAYS
NAUSEA AND VOMITING: 2
TREMOR: NO TREMOR
TOTAL SCORE: 11
HEADACHE, FULLNESS IN HEAD: SEVERE
PAROXYSMAL SWEATS: NO SWEAT VISIBLE
AGITATION: NORMAL ACTIVITY
TOTAL SCORE: 5
AUDITORY DISTURBANCES: NOT PRESENT
AGITATION: NORMAL ACTIVITY
TREMOR: NO TREMOR
ORIENTATION AND CLOUDING OF SENSORIUM: ORIENTED AND CAN DO SERIAL ADDITIONS
AGITATION: NORMAL ACTIVITY
HEADACHE, FULLNESS IN HEAD: MODERATE
TOTAL SCORE: 0
ANXIETY: NO ANXIETY, AT EASE
AUDITORY DISTURBANCES: NOT PRESENT
NAUSEA AND VOMITING: 2
PAROXYSMAL SWEATS: NO SWEAT VISIBLE
NAUSEA AND VOMITING: NO NAUSEA AND NO VOMITING
ORIENTATION AND CLOUDING OF SENSORIUM: ORIENTED AND CAN DO SERIAL ADDITIONS
NAUSEA AND VOMITING: MILD NAUSEA WITH NO VOMITING
AUDITORY DISTURBANCES: NOT PRESENT
ANXIETY: NO ANXIETY, AT EASE
HEADACHE, FULLNESS IN HEAD: MODERATE
ANXIETY: NO ANXIETY, AT EASE
AUDITORY DISTURBANCES: NOT PRESENT
VISUAL DISTURBANCES: NOT PRESENT
VISUAL DISTURBANCES: NOT PRESENT
TREMOR: 2
ANXIETY: NO ANXIETY, AT EASE
TOTAL SCORE: 5
PAROXYSMAL SWEATS: NO SWEAT VISIBLE
VISUAL DISTURBANCES: NOT PRESENT
HEADACHE, FULLNESS IN HEAD: NOT PRESENT
VISUAL DISTURBANCES: NOT PRESENT
PAROXYSMAL SWEATS: NO SWEAT VISIBLE
ORIENTATION AND CLOUDING OF SENSORIUM: ORIENTED AND CAN DO SERIAL ADDITIONS
TREMOR: NO TREMOR

## 2024-08-20 NOTE — PHARMACY-ADMISSION MEDICATION HISTORY
Pharmacist Admission Medication History    Admission medication history is complete. The information provided in this note is only as accurate as the sources available at the time of the update.    Information Source(s): Patient and CareEverywhere/SureScripts via in-person    Pertinent Information:     Changes made to PTA medication list:  Added: None  Deleted: thiamine, folic acid   Changed: metoprolol     Allergies reviewed with patient and updates made in EHR: no    Medication History Completed By: Terri Lyons [facility] 8/20/2024 9:54 AM    PTA Med List   Medication Sig Last Dose    acetaminophen (TYLENOL) 500 MG tablet Take 500-1,000 mg by mouth every 6 hours as needed for mild pain     gabapentin (NEURONTIN) 100 MG capsule Take 1 capsule (100 mg) by mouth 2 times daily Morning and bedtime. Past Week    gabapentin (NEURONTIN) 300 MG capsule Take 1 capsule (300 mg) by mouth nightly as needed (sleep) (Patient taking differently: Take 300 mg by mouth at bedtime With 100mg tab = 400mg at HS) Past Week    metoprolol succinate ER (TOPROL XL) 50 MG 24 hr tablet Take 1 tablet (50 mg) by mouth daily (Patient taking differently: Take 25 mg by mouth daily) Past Week    Multiple Vitamins-Minerals (MULTIVITAMIN ADULT) CHEW Take 2 chew tab by mouth daily Centrum Past Week    naltrexone (DEPADE/REVIA) 50 MG tablet Take 1 tablet (50 mg) by mouth daily (Patient taking differently: Take 50 mg by mouth at bedtime) Past Week    pantoprazole (PROTONIX) 40 MG EC tablet 40 mg 2 times daily Past Week    venlafaxine (EFFEXOR XR) 37.5 MG 24 hr capsule Take 1 capsule (37.5 mg) by mouth daily Past Week     Terri Lyons PharmD

## 2024-08-20 NOTE — PLAN OF CARE
Goal Outcome Evaluation:  Summary:  ETOH   DATE & TIME: 8/20/24 [id number]    Cognitive Concerns/ Orientation : A&OX4   BEHAVIOR & AGGRESSION TOOL COLOR: Green  CIWA SCORE: 5,   ABNL VS/O2: VSS on RA  MOBILITY: Independent  PAIN MANAGMENT: C/o of HA - declined interventions. States he's hungry.  DIET: Advanced to low fiber today. Denies nausea.  BOWEL/BLADDER: Continent  ABNL LAB/BG: No new  DRAIN/DEVICES: R PIV SL  TELEMETRY RHYTHM: NA  SKIN: WNL  TESTS/PROCEDURES: NA  D/C DAY/GOALS/PLACE: Pending withdrawal  OTHER IMPORTANT INFO: Last drink on Sunday.

## 2024-08-20 NOTE — PLAN OF CARE
Goal Outcome Evaluation:  Summary:  ETOH withdrawl  DATE & TIME: 8/20/24    Cognitive Concerns/ Orientation : A&OX4   BEHAVIOR & AGGRESSION TOOL COLOR: Green  CIWA SCORE: 3   ABNL VS/O2: Vss on RA  MOBILITY: Independent  PAIN MANAGMENT: Complains of headache/tylenol given, stomach upset/Maalox given  DIET: Clear  BOWEL/BLADDER: Continent  ABNL LAB/BG: Mg 1.2/replaced waiting for results  DRAIN/DEVICES: NA  TELEMETRY RHYTHM: NA  SKIN: WNL  TESTS/PROCEDURES: NA  D/C DAY/GOALS/PLACE: pending withdrawl  OTHER IMPORTANT INFO:

## 2024-08-20 NOTE — PROGRESS NOTES
[facility]    Medicine Progress Note - Hospitalist Service    Date of Admission:  8/19/2024  Date of Service: 08/20/2024    Assessment & Plan     Joe Shah is a markedly pleasant 49 year old gentleman with past medical history that is most notable for severe alcohol use disorder, who presents with acute abdominal pain and intractable vomiting and is found to have acute alcoholic esophagitis and acute alcohol withdrawal.     PLAN      Acute alcoholic esophagitis and acute alcohol withdrawal: Of note, Mr. Shah has a history of severe alcohol use disorder. He says he has been at [facility] in the past and also had outpatient treatment and at one point he was able to remain sober for 90 days. He says his father and grandfather also suffered from alcohol use disorder. He has been hospitalized in the past for withdrawal, as well as after a fall in 12/2023 leading to SDH, requiring [facility] evacuation. He has had prior episodes of upper GI bleeding; EGD in 2019 had shown non-bleeding esophageal ulcers and non-bleeding gastric ulcers were seen on EGD in 5/2023. His most recent EGD in 2/2024 showed healed ulcerations and Grade B esophagitis. These have been done in the past by [facility]. His last hospitalization here was in 6/2024, for abdominal pain, nausea and vomiting, suspected due to ongoing esophagitis.     Now, he presents for acute abdominal pain and intractable vomiting after alcohol binge. In the ED, he has accelerated hypertension and tachycardia, without fever or hypoxia. WBC, hepatic panel and lipase are normal. Ethyl alcohol level at admission is undetectably low. Overall, his symptoms seem most consistent with acute alcoholic esophagitis and/or gastritis. Early alcohol withdrawal is also possible.   Plan:   -- CIWA with Valium ordered. IV fluids  ml/hour ordered. Thiamine, Folate, MVI ordered.      -- IV PPI BID continued    -- IV Zofran and Compazine and GI cocktails  "as needed. Monitor closely for signs of acute bleeding and consult GI if these occur.    -- Previously on Naltrexone and Gabapentin; consider resumption.     -- CD counseling in AM if he might agree.    -- Repeat CBC and BMP in AM. SW consulted for disposition planning.     Acute hypomagnesemia: Replacing.     Hypertension: Resume home Toprol  Chronic depression: Resume home Effexor      Chronic anemia: Monitor closely as above while hospitalized.        Recent Labs   Lab Test 08/19/24  2232 06/14/24  0909 06/13/24  1545   HGB 13.1* 13.1* 13.9                Diet: Advance Diet as Tolerated: Clear Liquid Diet    DVT Prophylaxis: Pneumatic Compression Devices  Power Catheter: Not present  Lines: None     Cardiac Monitoring: None  Code Status: Full Code      Clinically Significant Risk Factors Present on Admission          # Hypocalcemia: Lowest Ca = 8.3 mg/dL in last 2 days, will monitor and replace as appropriate   # Hypomagnesemia: Lowest Mg = 1.2 mg/dL in last 2 days, will replace as needed  # Anion Gap Metabolic Acidosis: Highest Anion Gap = 20 mmol/L in last 2 days, will monitor and treat as appropriate      # Hypertension: Noted on problem list         # Overweight: Estimated body mass index is 25.91 kg/m  as calculated from the following:    Height as of this encounter: 1.803 m (5' 11\").    Weight as of this encounter: 84.3 kg (185 lb 12.8 oz).       # Financial/Environmental Concerns:                 Disposition Plan     Medically Ready for Discharge: Anticipated Tomorrow             Arsenio Mi MD  Hospitalist Service  [facility]  Securely message with [facility] (more info)  Text page via [employer] Paging/Directory   ______________________________________________________________________    Interval History     Main Complaint is of headache  No SI / HI  Abdominal pain improving  No nausea / vomiting   No new complaints  No CP/SOB  No fevers or chills    Physical Exam   Vital Signs: Temp: " 98.3  F (36.8  C) Temp src: Oral BP: (!) 137/94 Pulse: 70   Resp: 16 SpO2: 98 % O2 Device: None (Room air)    Weight: 185 lbs 12.8 oz    Constitutional: Alert and oriented to person, place and time; no apparent distress  Respiratory: lungs clear to auscultation bilaterally  Cardiovascular: regular S1 S2  GI: abdomen soft mildly tender in epigastrium, non distended bowel sounds positive  Musculoskeletal: no clubbing, cyanosis or edema  Neurologic: extra-ocular muscles intact; moves all four extremities    ----------------------------------------------------------------------------------------    Medical Decision Making       35 MINUTES SPENT BY ME on the date of service doing chart review, history, exam, documentation & further activities per the note.      Data   ------------------------- PAST 24 HR DATA REVIEWED -----------------------------------------------    I have personally reviewed the following data over the past 24 hrs:    6.8  \   12.2 (L)   / 224     135 101 7.1 /  108 (H)   3.6 24 0.82 \     ALT: 23 AST: 31 AP: 73 TBILI: 1.2   ALB: 4.7 TOT PROTEIN: 7.8 LIPASE: 28       Imaging results reviewed over the past 24 hrs:   No results found for this or any previous visit (from the past 24 hour(s)).  ------------------------- ENCOUNTER LABS ----------------------------------------------------------------  Recent Labs   Lab 08/20/24  0843 08/20/24 0633 08/19/24 2232   WBC  --  6.8 9.9   HGB  --  12.2* 13.1*   MCV  --  88 87   PLT  --  224 255     --  137   POTASSIUM 3.6  --  3.8   CHLORIDE 101  --  95*   CO2 24  --  22   BUN 7.1  --  8.6   CR 0.82  --  0.84   ANIONGAP 10  --  20*   ISAIAS 8.3*  --  9.8   *  --  135*   ALBUMIN  --   --  4.7   PROTTOTAL  --   --  7.8   BILITOTAL  --   --  1.2   ALKPHOS  --   --  73   ALT  --   --  23   AST  --   --  31   LIPASE  --   --  28       Most Recent 3 CBC's:  Recent Labs   Lab Test 08/20/24 0633 08/19/24 2232 06/14/24  0909   WBC 6.8 9.9 5.8   HGB 12.2*  13.1* 13.1*   MCV 88 87 90    255 274     Most Recent 3 BMP's:  Recent Labs   Lab Test 08/20/24  0843 08/19/24  2232 06/14/24  0909    137 136   POTASSIUM 3.6 3.8 3.6   CHLORIDE 101 95* 99   CO2 24 22 21*   BUN 7.1 8.6 10.1   CR 0.82 0.84 0.83   ANIONGAP 10 20* 16*   ISAIAS 8.3* 9.8 8.9   * 135* 122*     Most Recent 2 LFT's:  Recent Labs   Lab Test 08/19/24  2232 06/13/24  1545 05/24/24  0631   AST 31 45 20   ALT 23  --  10   ALKPHOS 73 83 70   BILITOTAL 1.2 1.4* 1.6*     Most Recent 3 INR's:  Recent Labs   Lab Test 02/08/24  0742 11/30/23  1747 05/01/20  2218   INR 1.09 1.08 1.02     Most Recent 3 Troponin's:  Recent Labs   Lab Test 05/08/21  1344 02/12/19  1305   TROPI <0.015 <0.015     Most Recent 3 BNP's:No lab results found.  Most Recent D-dimer:No lab results found.  Most Recent Cholesterol Panel:  Recent Labs   Lab Test 03/11/24  0947   CHOL 251*   *   HDL 89   TRIG 136     Most Recent 6 Bacteria Isolates From Any Culture (See EPIC Reports for Culture Details):No lab results found.  Most Recent TSH and T4:  Recent Labs   Lab Test 03/11/24  0947   TSH 3.62     Most Recent Urinalysis:  Recent Labs   Lab Test 05/23/24  1816   COLOR Light Yellow   APPEARANCE Clear   URINEGLC Negative   URINEBILI Negative   URINEKETONE >150*   SG 1.021   UBLD Negative   URINEPH 5.5   PROTEIN 30*   NITRITE Negative   LEUKEST Negative   RBCU 1   WBCU <1

## 2024-08-20 NOTE — ED PROVIDER NOTES
Emergency Department Note      History of Present Illness     Chief Complaint   Vomiting and alcohol withdrawal    HPI   Joe Shah is a 49 year old male with a history of alcohol use disorder and alcohol withdrawal who arrives to the emergency department due to recurrent vomiting and epigastric abdominal pain.  The patient reports that he has been drinking alcohol on a daily basis lately and was binge drinking all weekend until earlier this morning.  He went to work and did not eat today, then about 4 PM he began with epigastric abdominal pain and vomiting.  The emesis is bilious and nonbloody.  The pain does not radiate to the back.  No melena or hematochezia.  No chest pain or shortness of breath.  He feels tremulous and thinks that he is in alcohol withdrawal.  He denies headache.  No recent trauma.    Independent Historian   None    Review of External Notes   none    Past Medical History     Medical History and Problem List   Alcohol withdrawal   Alcohol use disorder, moderate, dependence  Anxiety  Depressive disorder  HLD  HTN  Lumbar radiculopathy  Leukopenia  Primary insomnia  PUD   SDH   Subdural hematoma  Upper GI bleed    Medications   Gabapentin  Metoprolol succinate ER  Naltrexone  Pantoprazole  Venlafaxine    Surgical History   Raphael hole(s) evacuate hematoma subdural  ENT surgery  EGD, combined (X3)  Neck surgery    Physical Exam     Patient Vitals for the past 24 hrs:   BP Temp Temp src Pulse Resp SpO2 Weight   08/19/24 2217 (!) 158/119 98.2  F (36.8  C) Temporal 101 18 99 % 79.8 kg (176 lb)     Physical Exam  Nursing note and vitals reviewed.  Constitutional:  Appears well-developed and well-nourished.   HENT:   Head:    Atraumatic.   Mouth/Throat:   Oropharynx is clear and moist. No oropharyngeal exudate.   Eyes:    Pupils are equal, round, and reactive to light.   Neck:    Normal range of motion. Neck supple.      No tracheal deviation present. No thyromegaly present.   Cardiovascular:   Normal rate, regular rhythm, no murmur   Pulmonary/Chest: Breath sounds are clear and equal without wheezes or crackles.  Abdominal:   Soft. Bowel sounds are normal. Exhibits no distension and      no mass. There is epigastric tenderness.      There is no rebound and no guarding.   Musculoskeletal:  Exhibits no edema.   Lymphadenopathy:  No cervical adenopathy.   Neurological:   Alert and oriented to person, place, and time.   Skin:    Skin is warm and dry. No rash noted. No pallor.       Diagnostics     Lab Results   Labs Ordered and Resulted from Time of ED Arrival to Time of ED Departure   MAGNESIUM - Abnormal       Result Value    Magnesium 1.2 (*)    COMPREHENSIVE METABOLIC PANEL - Abnormal    Sodium 137      Potassium 3.8      Carbon Dioxide (CO2) 22      Anion Gap 20 (*)     Urea Nitrogen 8.6      Creatinine 0.84      GFR Estimate >90      Calcium 9.8      Chloride 95 (*)     Glucose 135 (*)     Alkaline Phosphatase 73      AST 31      ALT 23      Protein Total 7.8      Albumin 4.7      Bilirubin Total 1.2     CBC WITH PLATELETS AND DIFFERENTIAL - Abnormal    WBC Count 9.9      RBC Count 4.61      Hemoglobin 13.1 (*)     Hematocrit 40.2      MCV 87      MCH 28.4      MCHC 32.6      RDW 18.6 (*)     Platelet Count 255      % Neutrophils 82      % Lymphocytes 11      % Monocytes 7      % Eosinophils 0      % Basophils 0      % Immature Granulocytes 0      NRBCs per 100 WBC 0      Absolute Neutrophils 8.1      Absolute Lymphocytes 1.0      Absolute Monocytes 0.7      Absolute Eosinophils 0.0      Absolute Basophils 0.0      Absolute Immature Granulocytes 0.0      Absolute NRBCs 0.0     ETHYL ALCOHOL LEVEL - Normal    Alcohol ethyl <0.01     LIPASE - Normal    Lipase 28     PHOSPHORUS - Normal    Phosphorus 2.5       Independent Interpretation   None    ED Course      Medications Administered   Medications   sodium chloride 0.9 % 1,000 mL with Infuvite Adult 10 mL, thiamine 100 mg, folic acid 1 mg, magnesium  sulfate 2 g infusion (has no administration in time range)   flumazenil (ROMAZICON) injection 0.2 mg (has no administration in time range)   diazepam (VALIUM) tablet 10 mg (has no administration in time range)     Or   diazepam (VALIUM) injection 5-10 mg (has no administration in time range)   magnesium sulfate 2 g in 50 mL sterile water intermittent infusion (has no administration in time range)   ondansetron (ZOFRAN) injection 4 mg (4 mg Intravenous $Given 8/19/24 2234)   sodium chloride 0.9% BOLUS 1,000 mL (0 mLs Intravenous Stopped 8/19/24 2326)   pantoprazole (PROTONIX) IV push injection 80 mg (80 mg Intravenous $Given 8/19/24 2312)   diazepam (VALIUM) injection 10 mg (10 mg Intravenous $Given 8/19/24 2323)   alum & mag hydroxide-simethicone (MAALOX) suspension 15 mL (15 mLs Oral $Given 8/19/24 2326)       Procedures   Procedures     Discussion of Management   Admitting Hospitalist, Dr. Cai    ED Course        Additional Documentation  None    Medical Decision Making / Diagnosis     CMS Diagnoses: None    MIPS       None    [facility]   Joe Shah is a 49 year old male who arrives to the emergency department due to epigastric abdominal pain and vomiting consistent with acute alcohol induced gastritis.  There is no sign of GI bleeding at this time.  He was treated with IV Protonix and antiemetics.  I did not feel there was any concern for perforated ulcer, pancreatitis or cholecystitis at this time.  I found the patient to have acute alcohol withdrawal which was treated with IV Valium.  He was admitted to the care of the hospitalist service for further evaluation treatment.    Disposition   The patient was discharged.     Diagnosis     ICD-10-CM    1. Acute alcoholic gastritis without hemorrhage  K29.20       2. Alcohol withdrawal seizure with complication (H)  F10.939     R56.9            Discharge Medications   New Prescriptions    No medications on file         Scribe Disclosure:  Sasha COOPER, am serving  as a scribe at 11:40 PM on 8/19/2024 to document services personally performed by Ashleigh Wallace MD based on my observations and the provider's statements to me.        Ashleigh Wallace MD  08/20/24 0141

## 2024-08-20 NOTE — PROGRESS NOTES
RECEIVING UNIT ED HANDOFF REVIEW    ED Nurse Handoff Report was reviewed by: Charity Spain RN on August 20, 2024 at 12:03 PM

## 2024-08-20 NOTE — H&P
[facility]    History and Physical  Hospitalist       Date of Admission:  8/19/2024  Date of Service (when I saw the patient): 08/19/24    ASSESSMENT  Joe Shah is a markedly pleasant 49 year old gentleman with past medical history that is most notable for severe alcohol use disorder, who presents with acute abdominal pain and intractable vomiting and is found to have acute alcoholic esophagitis and acute alcohol withdrawal.    PLAN     Acute alcoholic esophagitis and acute alcohol withdrawal: Of note, Mr. Shah has a history of severe alcohol use disorder. He says he has been at [facility] in the past and also had outpatient treatment and at one point he was able to remain sober for 90 days. He says his father and grandfather also suffered from alcohol use disorder. He has been hospitalized in the past for withdrawal, as well as after a fall in 12/2023 leading to SDH, requiring [facility] evacuation. He has had prior episodes of upper GI bleeding; EGD in 2019 had shown non-bleeding esophageal ulcers and non-bleeding gastric ulcers were seen on EGD in 5/2023. His most recent EGD in 2/2024 showed healed ulcerations and Grade B esophagitis. These have been done in the past by [facility]. His last hospitalization here was in 6/2024, for abdominal pain, nausea and vomiting, suspected due to ongoing esophagitis.    Now, he presents for acute abdominal pain and intractable vomiting after alcohol binge. In the ED, he has accelerated hypertension and tachycardia, without fever or hypoxia. WBC, hepatic panel and lipase are normal. Ethyl alcohol level at admission is undetectably low. Overall, his symptoms seem most consistent with acute alcoholic esophagitis and/or gastritis. Early alcohol withdrawal is also possible.       -- Inpatient. CIWA with Valium ordered. IV fluids  ml/hour ordered. Thiamine, Folate, MVI ordered.      -- IV PPI BID ordered    -- IV Zofran and Compazine and GI  cocktails as needed. Monitor closely for signs of acute bleeding and consult GI if these occur.    -- Previously on Naltrexone and Gabapentin; consider resumption. Consider CD counseling in AM if he might agree.    -- Repeat CBC and BMP in AM. SW consulted for disposition planning.    Acute hypomagnesemia: Replacing.    Hypertension: Resume home Toprol when verified  Chronic depression: Resume home Effexor when verified.    Chronic anemia: Monitor closely as above while hospitalized.  Recent Labs   Lab Test 08/19/24  2232 06/14/24  0909 06/13/24  1545   HGB 13.1* 13.1* 13.9      I have spent 55 minutes on the date of service doing chart review, history, examination, documentation, and further activities per the note.    Chief Complaint   Nausea and vomiting    History is obtained from the patient and the ED physician whom I have spoken with    History of Present Illness   Joe Shah is a markedly pleasant 49 year old gentleman who presents with acute onset of nausea and vomiting. He is a . He says that he never drinks alcohol while at work, or for the 8-12 hours prior to a work shift; but he often drinks afterward and yesterday being Sunday, he drank alcohol all day long. Then today he woke up with severe nausea and intractable vomiting, first of clear and then greenish fluid. It persists now and he has kept to food or liquid down. It is associated with localized aching epigastric pain and symptoms of severe heartburn. He came in for further evaluation. Maalox and IV Zofran given in the ED have partially improved his symptoms. He denies any coffee ground appearance to the emesis or blood, or recent melena or hematochezia, or any other acute complaints.    In the ED,   08/19 2217 158/119  98.2  F (36.8  C) 101 18 99 %     Ethyl alcohol level at 22:32 was undetectable.    CBC and CMP were notable for HGB 13.1, Cl 95, AG 20, Mag 1.2, Glucose 135, otherwise were within the normal reference range. WBC  was 9.9. Lipase was 28.    He was given IV Protonix 80 mg, IV fluid, IV Banana Bag, and IV Valium in the ED.    PHYSICAL EXAM  Blood pressure (!) 158/119, pulse 101, temperature 98.2  F (36.8  C), temperature source Temporal, resp. rate 18, weight 79.8 kg (176 lb), SpO2 99%.  Constitutional: Alert and oriented to person, place and time; no apparent distress  Respiratory: lungs clear to auscultation bilaterally  Cardiovascular: regular S1 S2  GI: abdomen soft mildly tender in epigastrium, non distended bowel sounds positive  Musculoskeletal: no clubbing, cyanosis or edema  Neurologic: extra-ocular muscles intact; moves all four extremities     DVT Prophylaxis: Pneumatic Compression Devices  Code Status: Full Code    Disposition: Expected discharge in 2-3 days    Xavier Cai MD, MD    Past Medical History    I have reviewed this patient's medical history and updated it with pertinent information if needed.   Past Medical History:   Diagnosis Date    Alcohol withdrawal (H)     Anxiety     Back pain     Depressive disorder     Hyperlipidemia     Hypertension     PUD (peptic ulcer disease)     SDH (subdural hematoma) (H) 12/2023    Severe alcohol use disorder (H)        Past Surgical History   I have reviewed this patient's surgical history and updated it with pertinent information if needed.  Past Surgical History:   Procedure Laterality Date    FANTASMA HOLE(S) EVACUATE HEMATOMA SUBDURAL N/A 12/1/2023    Procedure: CREATION, CRANIAL FANTASMA HOLE, WITH SUBDURAL HEMATOMA EVACUATION;  Surgeon: Gio Phoenix MD;  Location:  OR    ENT SURGERY      ESOPHAGOSCOPY, GASTROSCOPY, DUODENOSCOPY (EGD), COMBINED N/A 9/12/2019    Procedure: ESOPHAGOGASTRODUODENOSCOPY (EGD);  Surgeon: Scott Mcrae MD;  Location:  GI    ESOPHAGOSCOPY, GASTROSCOPY, DUODENOSCOPY (EGD), COMBINED N/A 5/27/2023    Procedure: Esophagoscopy, gastroscopy, duodenoscopy (EGD), combined;  Surgeon: Scott Mcrae MD;  Location:   GI    ESOPHAGOSCOPY, GASTROSCOPY, DUODENOSCOPY (EGD), COMBINED N/A 2/9/2024    Procedure: Esophagoscopy, gastroscopy, duodenoscopy (EGD), combined;  Surgeon: Nakul Arguelles MD;  Location:  GI    NECK SURGERY         Prior to Admission Medications   Prior to Admission Medications   Prescriptions Last Dose Informant Patient Reported? Taking?   Multiple Vitamins-Minerals (MULTIVITAMIN ADULT) CHEW  Self No No   Sig: Take 2 chew tab by mouth daily Centrum   acetaminophen (TYLENOL) 500 MG tablet  Self Yes No   Sig: Take 500-1,000 mg by mouth every 6 hours as needed for mild pain   folic acid (FOLVITE) 1 MG tablet  Self No No   Sig: Take 1 tablet (1 mg) by mouth daily   gabapentin (NEURONTIN) 100 MG capsule   No No   Sig: Take 1 capsule (100 mg) by mouth 2 times daily Morning and bedtime.   gabapentin (NEURONTIN) 300 MG capsule   No No   Sig: Take 1 capsule (300 mg) by mouth nightly as needed (sleep)   metoprolol succinate ER (TOPROL XL) 50 MG 24 hr tablet  Self No No   Sig: Take 1 tablet (50 mg) by mouth daily   naltrexone (DEPADE/REVIA) 50 MG tablet  Self No No   Sig: Take 1 tablet (50 mg) by mouth daily   pantoprazole (PROTONIX) 40 MG EC tablet  Self Yes No   Sig: Take Protonix 40 mg twice daily for 2 weeks, then continue with Protonix 40 mg daily   thiamine (B-1) 100 MG tablet  Self No No   Sig: Take 1 tablet (100 mg) by mouth daily   venlafaxine (EFFEXOR XR) 37.5 MG 24 hr capsule   No No   Sig: Take 1 capsule (37.5 mg) by mouth daily      Facility-Administered Medications: None     Allergies   Allergies   Allergen Reactions    Morphine Nausea and Vomiting    Oxycodone Itching     1/22 Patient reports he can tolerate this       Social History   I have reviewed this patient's social history and updated it with pertinent information if needed. Joe GORDO Shah  reports that he has quit smoking. His smokeless tobacco use includes chew. He reports current alcohol use. He reports that he does not use drugs.    Family  History   Family history assessed and, except as above, is non-contributory.    Family History   Problem Relation Age of Onset    No Known Problems Maternal Grandmother     No Known Problems Maternal Grandfather     No Known Problems Paternal Grandmother     Substance Abuse Paternal Grandfather        Review of Systems   The 10 point Review of Systems is negative other than noted in the HPI or here.     Primary Care Physician   Sofia Rebollar, DO    Data   Labs Ordered and Resulted from Time of ED Arrival to Time of ED Departure   MAGNESIUM - Abnormal       Result Value    Magnesium 1.2 (*)    COMPREHENSIVE METABOLIC PANEL - Abnormal    Sodium 137      Potassium 3.8      Carbon Dioxide (CO2) 22      Anion Gap 20 (*)     Urea Nitrogen 8.6      Creatinine 0.84      GFR Estimate >90      Calcium 9.8      Chloride 95 (*)     Glucose 135 (*)     Alkaline Phosphatase 73      AST 31      ALT 23      Protein Total 7.8      Albumin 4.7      Bilirubin Total 1.2     CBC WITH PLATELETS AND DIFFERENTIAL - Abnormal    WBC Count 9.9      RBC Count 4.61      Hemoglobin 13.1 (*)     Hematocrit 40.2      MCV 87      MCH 28.4      MCHC 32.6      RDW 18.6 (*)     Platelet Count 255      % Neutrophils 82      % Lymphocytes 11      % Monocytes 7      % Eosinophils 0      % Basophils 0      % Immature Granulocytes 0      NRBCs per 100 WBC 0      Absolute Neutrophils 8.1      Absolute Lymphocytes 1.0      Absolute Monocytes 0.7      Absolute Eosinophils 0.0      Absolute Basophils 0.0      Absolute Immature Granulocytes 0.0      Absolute NRBCs 0.0     ETHYL ALCOHOL LEVEL - Normal    Alcohol ethyl <0.01     LIPASE - Normal    Lipase 28     PHOSPHORUS       Data reviewed today:  I personally reviewed no images or EKG's today.

## 2024-08-20 NOTE — ED TRIAGE NOTES
Patient comes in with ETOH withdrawal, his last drink was Sunday, he came home from work tonight and went straight to bed and woke up violently ill, vomiting and complains of abd pain and blood in his vomit.     Patient is violently vomiting in Triage

## 2024-08-20 NOTE — PROGRESS NOTES
Admission    Patient arrives to room 609 via cart from ER.  Care plan note: Patient alert/oriented X4, vss, on RA. Complained of headache,  CIWA 3  Lungs clear, Bowel sounds +.     Inpatient nursing criteria listed below were met:    Did you put disposition on whiteboard and in sticky note: NA  Full skin assessment done (add LDA if skin issue present). Initials of 2nd RN :TESS/Genie  Isolation education started/completed NA  Patient allergies verified with patient: Yes  Fall Risk? (Care plan updated, Education given and documented) NA  Primary Care Plan initiated: Yes  Home medications documented in belongings flowsheet: NA  Patient belongings documented in belongings flowsheet: Yes  Reminder note (belongings/ medications) placed in discharge instructions:NA  Admission profile/ required documentation complete: Yes  If patient is a 72 hour hold/Commitment are belongings removed from room and locked up? NA

## 2024-08-20 NOTE — ED NOTES
[facility]  ED Nurse Handoff Report    ED Chief complaint: Withdrawal      ED Diagnosis:   Final diagnoses:   Acute alcoholic gastritis without hemorrhage   Alcohol withdrawal seizure with complication (H)       Code Status: to be addressed by provider    Allergies:   Allergies   Allergen Reactions    Morphine Nausea and Vomiting    Oxycodone Itching     1/22 Patient reports he can tolerate this       Patient Story: comes into the ER with ETOH withdrawal. He reports his last drink was Sunday before he passed out. After working today he went home and went to bed, woke up with vomiting and abdominal pain.  Focused Assessment:  He continues to have nausea with abdominal pain. He reports not vomiting for awhile but noted blood in his vomit earlier. He feels mildly anxious with a headache and is somewhat tremulous. Denies hallucinations.    Treatments and/or interventions provided: IV, labs, medications  Patient's response to treatments and/or interventions:   Labs Ordered and Resulted from Time of ED Arrival to Time of ED Departure   MAGNESIUM - Abnormal       Result Value    Magnesium 1.2 (*)    COMPREHENSIVE METABOLIC PANEL - Abnormal    Sodium 137      Potassium 3.8      Carbon Dioxide (CO2) 22      Anion Gap 20 (*)     Urea Nitrogen 8.6      Creatinine 0.84      GFR Estimate >90      Calcium 9.8      Chloride 95 (*)     Glucose 135 (*)     Alkaline Phosphatase 73      AST 31      ALT 23      Protein Total 7.8      Albumin 4.7      Bilirubin Total 1.2     CBC WITH PLATELETS AND DIFFERENTIAL - Abnormal    WBC Count 9.9      RBC Count 4.61      Hemoglobin 13.1 (*)     Hematocrit 40.2      MCV 87      MCH 28.4      MCHC 32.6      RDW 18.6 (*)     Platelet Count 255      % Neutrophils 82      % Lymphocytes 11      % Monocytes 7      % Eosinophils 0      % Basophils 0      % Immature Granulocytes 0      NRBCs per 100 WBC 0      Absolute Neutrophils 8.1      Absolute Lymphocytes 1.0      Absolute Monocytes  0.7      Absolute Eosinophils 0.0      Absolute Basophils 0.0      Absolute Immature Granulocytes 0.0      Absolute NRBCs 0.0     ETHYL ALCOHOL LEVEL - Normal    Alcohol ethyl <0.01     LIPASE - Normal    Lipase 28     PHOSPHORUS         To be done/followed up on inpatient unit:  monitor and treat symptoms    Does this patient have any cognitive concerns?:  none    Activity level - Baseline/Home:  Independent  Activity Level - Current:   Stand with Assist    Patient's Preferred language: English   Needed?: No    Isolation: None  Infection: Not Applicable  Patient tested for COVID 19 prior to admission: NO  Bariatric?: No    Vital Signs:   Vitals:    08/19/24 2217   BP: (!) 158/119   Pulse: 101   Resp: 18   Temp: 98.2  F (36.8  C)   TempSrc: Temporal   SpO2: 99%   Weight: 79.8 kg (176 lb)       Cardiac Rhythm:     Was the PSS-3 completed:   Yes  What interventions are required if any?               Family Comments: none present  OBS brochure/video discussed/provided to patient/family: N/A              Name of person given brochure if not patient:               Relationship to patient:     For the majority of the shift this patient's behavior was Green.   Behavioral interventions performed were .    ED NURSE PHONE NUMBER: [phone]

## 2024-08-21 VITALS
TEMPERATURE: 97.7 F | BODY MASS INDEX: 26.01 KG/M2 | HEART RATE: 69 BPM | HEIGHT: 71 IN | OXYGEN SATURATION: 99 % | DIASTOLIC BLOOD PRESSURE: 95 MMHG | WEIGHT: 185.8 LBS | SYSTOLIC BLOOD PRESSURE: 141 MMHG | RESPIRATION RATE: 18 BRPM

## 2024-08-21 LAB
MAGNESIUM SERPL-MCNC: 2 MG/DL (ref 1.7–2.3)
POTASSIUM SERPL-SCNC: 4.2 MMOL/L (ref 3.4–5.3)

## 2024-08-21 PROCEDURE — 250N000013 HC RX MED GY IP 250 OP 250 PS 637: Performed by: HOSPITALIST

## 2024-08-21 PROCEDURE — 250N000009 HC RX 250: Performed by: HOSPITALIST

## 2024-08-21 PROCEDURE — 250N000013 HC RX MED GY IP 250 OP 250 PS 637: Performed by: STUDENT IN AN ORGANIZED HEALTH CARE EDUCATION/TRAINING PROGRAM

## 2024-08-21 PROCEDURE — 83735 ASSAY OF MAGNESIUM: CPT | Performed by: STUDENT IN AN ORGANIZED HEALTH CARE EDUCATION/TRAINING PROGRAM

## 2024-08-21 PROCEDURE — 99238 HOSP IP/OBS DSCHRG MGMT 30/<: CPT | Performed by: STUDENT IN AN ORGANIZED HEALTH CARE EDUCATION/TRAINING PROGRAM

## 2024-08-21 PROCEDURE — 258N000003 HC RX IP 258 OP 636: Performed by: HOSPITALIST

## 2024-08-21 PROCEDURE — 84132 ASSAY OF SERUM POTASSIUM: CPT | Performed by: STUDENT IN AN ORGANIZED HEALTH CARE EDUCATION/TRAINING PROGRAM

## 2024-08-21 PROCEDURE — 36415 COLL VENOUS BLD VENIPUNCTURE: CPT | Performed by: STUDENT IN AN ORGANIZED HEALTH CARE EDUCATION/TRAINING PROGRAM

## 2024-08-21 PROCEDURE — G0378 HOSPITAL OBSERVATION PER HR: HCPCS

## 2024-08-21 RX ADMIN — FOLIC ACID 1 MG: 1 TABLET ORAL at 08:30

## 2024-08-21 RX ADMIN — SODIUM CHLORIDE: 9 INJECTION, SOLUTION INTRAVENOUS at 04:15

## 2024-08-21 RX ADMIN — MULTIVITAMIN TABLET 1 TABLET: TABLET at 08:30

## 2024-08-21 RX ADMIN — METOPROLOL SUCCINATE 25 MG: 25 TABLET, EXTENDED RELEASE ORAL at 08:30

## 2024-08-21 RX ADMIN — PANTOPRAZOLE SODIUM 40 MG: 40 INJECTION, POWDER, FOR SOLUTION INTRAVENOUS at 04:11

## 2024-08-21 RX ADMIN — GABAPENTIN 100 MG: 100 CAPSULE ORAL at 08:29

## 2024-08-21 RX ADMIN — THIAMINE HCL TAB 100 MG 100 MG: 100 TAB at 08:30

## 2024-08-21 RX ADMIN — VENLAFAXINE HYDROCHLORIDE 37.5 MG: 37.5 CAPSULE, EXTENDED RELEASE ORAL at 08:32

## 2024-08-21 ASSESSMENT — ACTIVITIES OF DAILY LIVING (ADL)
ADLS_ACUITY_SCORE: 22
DEPENDENT_IADLS:: INDEPENDENT

## 2024-08-21 NOTE — PLAN OF CARE
Goal Outcome Evaluation:       DATE & TIME: 8/20/24-8/21/24 0785-0896  Cognitive Concerns/ Orientation : A&OX4   BEHAVIOR & AGGRESSION TOOL COLOR: Green  CIWA SCORE: 1,0   ABNL VS/O2: VSS on RA  MOBILITY: Independent  PAIN MANAGMENT:denies pain  DIET:  low fiber, denies N/V/D  BOWEL/BLADDER: Continent  ABNL LAB/BG: No new  DRAIN/DEVICES: R PIV SL  TELEMETRY RHYTHM: NA  SKIN: WNL  TESTS/PROCEDURES: NA  D/C DAY/GOALS/PLACE: Pending withdrawal  OTHER IMPORTANT INFO: Last drink on Sunday.

## 2024-08-21 NOTE — DISCHARGE SUMMARY
"[facility]  Hospitalist Discharge Summary      Date of Admission:  8/19/2024  Date of Discharge:  8/21/2024  Discharging Provider: Arsenio Mi MD  Discharge Service: Hospitalist Service    Discharge Diagnoses     Acute alcoholic esophagitis and acute alcohol withdrawal     Clinically Significant Risk Factors     # Overweight: Estimated body mass index is 25.91 kg/m  as calculated from the following:    Height as of this encounter: 1.803 m (5' 11\").    Weight as of this encounter: 84.3 kg (185 lb 12.8 oz).       Follow-ups Needed After Discharge   Follow-up Appointments     Follow-up and recommended labs and tests       Follow up with primary care provider, Sofia Rebollar DO, within 7 days   for hospital follow- up.  No follow up labs or test are needed.            Unresulted Labs Ordered in the Past 30 Days of this Admission       No orders found from 7/20/2024 to 8/20/2024.          Discharge Disposition   Discharged to home  Condition at discharge: Stable    Hospital Course   Joe Shah is a markedly pleasant 49 year old gentleman with past medical history that is most notable for severe alcohol use disorder, who presents with acute abdominal pain and intractable vomiting and is found to have acute alcoholic esophagitis and acute alcohol withdrawal.     PLAN      Acute alcoholic esophagitis and acute alcohol withdrawal: Of note, Mr. Shah has a history of severe alcohol use disorder. He says he has been at [facility] in the past and also had outpatient treatment and at one point he was able to remain sober for 90 days. He says his father and grandfather also suffered from alcohol use disorder. He has been hospitalized in the past for withdrawal, as well as after a fall in 12/2023 leading to SDH, requiring [facility] evacuation. He has had prior episodes of upper GI bleeding; EGD in 2019 had shown non-bleeding esophageal ulcers and non-bleeding gastric ulcers were seen on EGD in " 5/2023. His most recent EGD in 2/2024 showed healed ulcerations and Grade B esophagitis. These have been done in the past by [facility]. His last hospitalization here was in 6/2024, for abdominal pain, nausea and vomiting, suspected due to ongoing esophagitis.     Now, he presents for acute abdominal pain and intractable vomiting after alcohol binge. In the ED, he has accelerated hypertension and tachycardia, without fever or hypoxia. WBC, hepatic panel and lipase are normal. Ethyl alcohol level at admission is undetectably low. Overall, his symptoms seem most consistent with acute alcoholic esophagitis and/or gastritis. Early alcohol withdrawal is also possible.   Plan:   -- Symptoms resolved at discharge   -- Continue PO PPI BID   -- Encouraged complete alcohol cessation     Acute hypomagnesemia: Replacing.     Hypertension: Resume home Toprol  Chronic depression: Resume home Effexor      Chronic anemia: Monitor closely as above while hospitalized.        Recent Labs   Lab Test 08/19/24  2232 06/14/24  0909 06/13/24  1545   HGB 13.1* 13.1* 13.9          Consultations This Hospital Stay   CARE MANAGEMENT / SOCIAL WORK IP CONSULT    Code Status   Full Code    Time Spent on this Encounter   I, Arsenio Mi MD, personally saw the patient today and spent less than 30 minutes discharging this patient.       Arsenio Mi MD  [place] [address] [facility]  [address] [place] [place] [address]  Phone: [phone]  ______________________________________________________________________    Physical Exam   Vital Signs: Temp: 97.7  F (36.5  C) Temp src: Oral BP: (!) 141/95 Pulse: 69   Resp: 18 SpO2: 99 % O2 Device: None (Room air)    Weight: 185 lbs 12.8 oz  ----------------------------------------------------------------------------------------       Primary Care Physician   Sofia Rebollar, DO    Discharge Orders      Reason for your hospital stay    You had alcohol withdrawal symptoms     Follow-up  and recommended labs and tests     Follow up with primary care provider, Sofia Rebollar DO, within 7 days for hospital follow- up.  No follow up labs or test are needed.     Activity    Your activity upon discharge: activity as tolerated     Diet    Follow this diet upon discharge: Current Diet:Orders Placed This Encounter      Advance Diet as Tolerated: Regular Diet       Significant Results and Procedures   Most Recent 3 CBC's:  Recent Labs   Lab Test 08/20/24  0633 08/19/24 2232 06/14/24  0909   WBC 6.8 9.9 5.8   HGB 12.2* 13.1* 13.1*   MCV 88 87 90    255 274     Most Recent 3 BMP's:  Recent Labs   Lab Test 08/21/24  1040 08/20/24  0843 08/19/24 2232 06/14/24  0909   NA  --  135 137 136   POTASSIUM 4.2 3.6 3.8 3.6   CHLORIDE  --  101 95* 99   CO2  --  24 22 21*   BUN  --  7.1 8.6 10.1   CR  --  0.82 0.84 0.83   ANIONGAP  --  10 20* 16*   ISAIAS  --  8.3* 9.8 8.9   GLC  --  108* 135* 122*     Most Recent 2 LFT's:  Recent Labs   Lab Test 08/19/24  2232 06/13/24  1545 05/24/24  0631   AST 31 45 20   ALT 23  --  10   ALKPHOS 73 83 70   BILITOTAL 1.2 1.4* 1.6*     Most Recent 3 INR's:  Recent Labs   Lab Test 02/08/24  0742 11/30/23  1747 05/01/20  2218   INR 1.09 1.08 1.02   ,   Results for orders placed or performed during the hospital encounter of 06/13/24   XR Chest 2 Views    Narrative    EXAM: XR CHEST 2 VIEWS  LOCATION: [facility]  DATE: 6/13/2024    INDICATION: cough, hypoxia  COMPARISON: 5/23/2024      Impression    IMPRESSION: No acute cardiopulmonary abnormalities. Mild esophageal hiatal hernia. Prominent left nipple shadow. Minimal thoracic spinal curvature.       Discharge Medications   Current Discharge Medication List        CONTINUE these medications which have NOT CHANGED    Details   acetaminophen (TYLENOL) 500 MG tablet Take 500-1,000 mg by mouth every 6 hours as needed for mild pain      !! gabapentin (NEURONTIN) 100 MG capsule Take 1 capsule (100 mg) by mouth 2  times daily Morning and bedtime.  Qty: 60 capsule, Refills: 0    Associated Diagnoses: Alcohol withdrawal syndrome without complication (H)      !! gabapentin (NEURONTIN) 300 MG capsule Take 1 capsule (300 mg) by mouth nightly as needed (sleep)  Qty: 30 capsule, Refills: 0    Associated Diagnoses: Alcohol dependence with withdrawal with complication (H)      metoprolol succinate ER (TOPROL XL) 50 MG 24 hr tablet Take 1 tablet (50 mg) by mouth daily  Qty: 30 tablet, Refills: 0    Associated Diagnoses: Alcohol dependence with withdrawal with complication (H)      Multiple Vitamins-Minerals (MULTIVITAMIN ADULT) CHEW Take 2 chew tab by mouth daily Centrum  Qty: 60 tablet, Refills: 0    Associated Diagnoses: Alcohol dependence with withdrawal with complication (H)      naltrexone (DEPADE/REVIA) 50 MG tablet Take 1 tablet (50 mg) by mouth daily  Qty: 30 tablet, Refills: 0    Associated Diagnoses: Alcohol dependence with withdrawal with complication (H)      pantoprazole (PROTONIX) 40 MG EC tablet 40 mg 2 times daily    Associated Diagnoses: Alcohol dependence with withdrawal with complication (H)      venlafaxine (EFFEXOR XR) 37.5 MG 24 hr capsule Take 1 capsule (37.5 mg) by mouth daily  Qty: 30 capsule, Refills: 0    Associated Diagnoses: Depression with anxiety       !! - Potential duplicate medications found. Please discuss with provider.        Allergies   Allergies   Allergen Reactions    Morphine Nausea and Vomiting    Oxycodone Itching     1/22 Patient reports he can tolerate this

## 2024-08-21 NOTE — CONSULTS
Care Management Initial Consult    General Information  Assessment completed with: Patient, VM-chart review,    Type of CM/SW Visit: Initial Assessment    Primary Care Provider verified and updated as needed: Yes (Dr. Rebollar)   Readmission within the last 30 days: no previous admission in last 30 days      Reason for Consult: discharge planning  Advance Care Planning: Advance Care Planning Reviewed: no concerns identified          Communication Assessment  Patient's communication style: spoken language (English or Bilingual)    Hearing Difficulty or Deaf: no   Wear Glasses or Blind: yes    Cognitive  Cognitive/Neuro/Behavioral: WDL                      Living Environment:   People in home: spouse, child(silverio), dependent  Dedra, spouse and 15 yo son  Current living Arrangements: house      Able to return to prior arrangements: yes       Family/Social Support:  Care provided by: self  Provides care for: no one  Marital Status:              Description of Support System:           Current Resources:   Patient receiving home care services: No     Community Resources: Other (see comment) (has a sponsor for ETOH dependence)  Equipment currently used at home: none  Supplies currently used at home: None    Employment/Financial:  Employment Status: employed full-time        Financial Concerns: none           Does the patient's insurance plan have a 3 day qualifying hospital stay waiver?  Yes     Which insurance plan 3 day waiver is available? Alternative insurance waiver    Will the waiver be used for post-acute placement? No    Lifestyle & Psychosocial Needs:  Social Determinants of Health     Food Insecurity: Not on file   Depression: Not at risk (4/17/2024)    Received from [facility]    PHQ-2     PHQ-2 TOTAL SCORE: 0   Housing Stability: Not on file   Tobacco Use: Medium Risk (4/17/2024)    Received from [facility]    Patient History     Smoking  "Tobacco Use: Former     Smokeless Tobacco Use: Never     Passive Exposure: Not on file   Financial Resource Strain: High Risk (1/1/2022)    Received from [facility], [facility]    Financial Resource Strain     Difficulty of Paying Living Expenses: Not on file     Difficulty of Paying Living Expenses: Not on file   Alcohol Use: Alcohol Misuse (3/9/2024)    AUDIT-C     Frequency of Alcohol Consumption: 4 or more times a week     Average Number of Drinks: 10 or more     Frequency of Binge Drinking: Daily or almost daily   Transportation Needs: Not on file   Physical Activity: Not on file   Interpersonal Safety: Not on file   Stress: Not on file   Social Connections: Unknown (1/1/2022)    Received from [facility], [facility]    Social Connections     Frequency of Communication with Friends and Family: Not on file   Health Literacy: Not on file       Functional Status:  Prior to admission patient needed assistance:   Dependent ADLs:: Independent  Dependent IADLs:: Independent       Mental Health Status:          Chemical Dependency Status:                Values/Beliefs:  Spiritual, Cultural Beliefs, Shinto Practices, Values that affect care: no               Additional Information:  Met with patient at bedside; explained role in discharge planning.    Patient admitted with alcohol withdrawal symptoms.    Patient is a long time alcoholic.  Has had inpatient treatment at [facility] in the past.  He explains that he works full time as a .  He states he does not drink during the week.  He tends to drink on the weekend when he has nothing else to do.  States he knows he needs a diversion as he was well when he had remodeling to do to his home.  He states he generally has a \"good family life\".  He states his wife has been supportive but she is \"losing her patience\" understandably. " " He states she is assisting him in working on some kind of chem dep program.  He has a sponsor who \"knows I am here\" and \"he is really tough\"; he expresses his appreciation over their relationship.  He has attended AA in the past.  He states he has a good PCP who understands his alcoholism and he can talk with her, trusts her and plans to seek her out post hospital for perhaps a change in his medication regimen.  He also states he can reach out to HR at work for resources to help him.  He has discharge orders.  He declines a need for a CD consult.  He states he knows what he has to do.    No further care management intervention anticipated at this time.  Please re-consult if further needs arise.  Care management signing off.      Halle Mandujano RN  Inpatient Float Care Coordinator  [facility]            "

## 2024-08-21 NOTE — PLAN OF CARE
Goal Outcome Evaluation:  Discharge    Patient discharged to home   Care plan note: Patient alert/oriented X4, up independently in room. Vss, on RA. Denies pain, CIWA 0, tolerating diet. Discharging to home today, went over follow up appointments/medications, able to read back, no questions.     Listed belongings gathered and given to patient (including from security/pharmacy). Yes  Care Plan and Patient education resolved: Yes  Prescriptions if needed, hard copies sent with patient  NA  Medication Bin checked and emptied on discharge Yes  SW/care coordinator/charge RN aware of discharge: Yes

## 2024-08-23 ENCOUNTER — PATIENT OUTREACH (OUTPATIENT)
Dept: CARE COORDINATION | Facility: CLINIC | Age: 49
End: 2024-08-23
Payer: COMMERCIAL

## 2024-08-23 NOTE — PROGRESS NOTES
Connected Care Resource Center Contact  [facility]/Voicemail     Clinical Data: Post-Discharge Outreach     Outreach attempted x 2.  Left message on patient's voicemail, providing [facility]'s central phone number of [phone] ([phone]) for questions/concerns and/or to schedule an appt with an [facility] provider, if they do not have a PCP.      Plan:  [facility] will do no further outreaches at this time.       SARAHY Pelayo  Connected Care Resource Center, [facility]    *Connected Care Resource Team does NOT follow patient ongoing. Referrals are identified based on internal discharge reports and the outreach is to ensure patient has an understanding of their discharge instructions.

## 2024-09-06 ENCOUNTER — HOSPITAL ENCOUNTER (EMERGENCY)
Facility: CLINIC | Age: 49
Discharge: HOME | End: 2024-09-06
Attending: EMERGENCY MEDICINE | Admitting: EMERGENCY MEDICINE
Payer: COMMERCIAL

## 2024-09-06 VITALS
BODY MASS INDEX: 25.9 KG/M2 | OXYGEN SATURATION: 99 % | HEART RATE: 114 BPM | WEIGHT: 185 LBS | RESPIRATION RATE: 18 BRPM | DIASTOLIC BLOOD PRESSURE: 103 MMHG | HEIGHT: 71 IN | TEMPERATURE: 97.5 F | SYSTOLIC BLOOD PRESSURE: 153 MMHG

## 2024-09-06 DIAGNOSIS — R10.13 EPIGASTRIC PAIN: ICD-10-CM

## 2024-09-06 DIAGNOSIS — Z78.9 ALCOHOL USE: ICD-10-CM

## 2024-09-06 LAB
ALBUMIN SERPL BCG-MCNC: 5 G/DL (ref 3.5–5.2)
ALP SERPL-CCNC: 80 U/L (ref 40–150)
ALT SERPL W P-5'-P-CCNC: 42 U/L (ref 0–70)
ANION GAP SERPL CALCULATED.3IONS-SCNC: 25 MMOL/L (ref 7–15)
AST SERPL W P-5'-P-CCNC: 70 U/L (ref 0–45)
BASOPHILS # BLD AUTO: 0.1 10E3/UL (ref 0–0.2)
BASOPHILS NFR BLD AUTO: 1 %
BILIRUB SERPL-MCNC: 0.8 MG/DL
BUN SERPL-MCNC: 10.5 MG/DL (ref 6–20)
CALCIUM SERPL-MCNC: 10 MG/DL (ref 8.8–10.4)
CHLORIDE SERPL-SCNC: 96 MMOL/L (ref 98–107)
CREAT SERPL-MCNC: 1.06 MG/DL (ref 0.67–1.17)
EGFRCR SERPLBLD CKD-EPI 2021: 85 ML/MIN/1.73M2
EOSINOPHIL # BLD AUTO: 0 10E3/UL (ref 0–0.7)
EOSINOPHIL NFR BLD AUTO: 0 %
ERYTHROCYTE [DISTWIDTH] IN BLOOD BY AUTOMATED COUNT: 18.6 % (ref 10–15)
ETHANOL SERPL-MCNC: 0.12 G/DL
GLUCOSE SERPL-MCNC: 112 MG/DL (ref 70–99)
HCO3 SERPL-SCNC: 19 MMOL/L (ref 22–29)
HCT VFR BLD AUTO: 43 % (ref 40–53)
HGB BLD-MCNC: 14.1 G/DL (ref 13.3–17.7)
HOLD SPECIMEN: 0
HOLD SPECIMEN: NORMAL
IMM GRANULOCYTES # BLD: 0 10E3/UL
IMM GRANULOCYTES NFR BLD: 0 %
INR PPP: 1.09 (ref 0.85–1.15)
LIPASE SERPL-CCNC: 56 U/L (ref 13–60)
LYMPHOCYTES # BLD AUTO: 0.6 10E3/UL (ref 0.8–5.3)
LYMPHOCYTES NFR BLD AUTO: 8 %
MCH RBC QN AUTO: 29.1 PG (ref 26.5–33)
MCHC RBC AUTO-ENTMCNC: 32.8 G/DL (ref 31.5–36.5)
MCV RBC AUTO: 89 FL (ref 78–100)
MONOCYTES # BLD AUTO: 0.3 10E3/UL (ref 0–1.3)
MONOCYTES NFR BLD AUTO: 4 %
NEUTROPHILS # BLD AUTO: 6.4 10E3/UL (ref 1.6–8.3)
NEUTROPHILS NFR BLD AUTO: 87 %
NRBC # BLD AUTO: 0 10E3/UL
NRBC BLD AUTO-RTO: 0 /100
PLATELET # BLD AUTO: 360 10E3/UL (ref 150–450)
POTASSIUM SERPL-SCNC: 4.1 MMOL/L (ref 3.4–5.3)
PROT SERPL-MCNC: 8.6 G/DL (ref 6.4–8.3)
RBC # BLD AUTO: 4.85 10E6/UL (ref 4.4–5.9)
SODIUM SERPL-SCNC: 140 MMOL/L (ref 135–145)
WBC # BLD AUTO: 7.3 10E3/UL (ref 4–11)

## 2024-09-06 PROCEDURE — 83690 ASSAY OF LIPASE: CPT | Performed by: EMERGENCY MEDICINE

## 2024-09-06 PROCEDURE — 80053 COMPREHEN METABOLIC PANEL: CPT | Performed by: EMERGENCY MEDICINE

## 2024-09-06 PROCEDURE — 36415 COLL VENOUS BLD VENIPUNCTURE: CPT | Performed by: EMERGENCY MEDICINE

## 2024-09-06 PROCEDURE — 250N000011 HC RX IP 250 OP 636: Performed by: EMERGENCY MEDICINE

## 2024-09-06 PROCEDURE — 258N000003 HC RX IP 258 OP 636: Performed by: EMERGENCY MEDICINE

## 2024-09-06 PROCEDURE — 250N000013 HC RX MED GY IP 250 OP 250 PS 637: Performed by: EMERGENCY MEDICINE

## 2024-09-06 PROCEDURE — 96375 TX/PRO/DX INJ NEW DRUG ADDON: CPT

## 2024-09-06 PROCEDURE — 85610 PROTHROMBIN TIME: CPT | Performed by: EMERGENCY MEDICINE

## 2024-09-06 PROCEDURE — 99284 EMERGENCY DEPT VISIT MOD MDM: CPT | Mod: 25

## 2024-09-06 PROCEDURE — 85025 COMPLETE CBC W/AUTO DIFF WBC: CPT | Performed by: EMERGENCY MEDICINE

## 2024-09-06 PROCEDURE — 96361 HYDRATE IV INFUSION ADD-ON: CPT

## 2024-09-06 PROCEDURE — 250N000009 HC RX 250: Performed by: EMERGENCY MEDICINE

## 2024-09-06 PROCEDURE — 96374 THER/PROPH/DIAG INJ IV PUSH: CPT

## 2024-09-06 PROCEDURE — 82077 ASSAY SPEC XCP UR&BREATH IA: CPT | Performed by: EMERGENCY MEDICINE

## 2024-09-06 PROCEDURE — 93005 ELECTROCARDIOGRAM TRACING: CPT

## 2024-09-06 RX ORDER — PANTOPRAZOLE SODIUM 40 MG/1
40 TABLET, DELAYED RELEASE ORAL 2 TIMES DAILY
Qty: 30 TABLET | Refills: 0 | Status: SHIPPED | OUTPATIENT
Start: 2024-09-06 | End: 2024-09-21

## 2024-09-06 RX ORDER — ACETAMINOPHEN 325 MG/1
650 TABLET ORAL ONCE
Status: COMPLETED | OUTPATIENT
Start: 2024-09-06 | End: 2024-09-06

## 2024-09-06 RX ORDER — ONDANSETRON 4 MG/1
4 TABLET, ORALLY DISINTEGRATING ORAL EVERY 4 HOURS PRN
Qty: 10 TABLET | Refills: 0 | Status: SHIPPED | OUTPATIENT
Start: 2024-09-06 | End: 2024-09-09

## 2024-09-06 RX ORDER — ONDANSETRON 2 MG/ML
4 INJECTION INTRAMUSCULAR; INTRAVENOUS EVERY 30 MIN PRN
Status: DISCONTINUED | OUTPATIENT
Start: 2024-09-06 | End: 2024-09-06 | Stop reason: HOSPADM

## 2024-09-06 RX ADMIN — PANTOPRAZOLE SODIUM 80 MG: 40 INJECTION, POWDER, FOR SOLUTION INTRAVENOUS at 10:36

## 2024-09-06 RX ADMIN — ACETAMINOPHEN 650 MG: 325 TABLET, FILM COATED ORAL at 12:42

## 2024-09-06 RX ADMIN — SODIUM CHLORIDE 1000 ML: 9 INJECTION, SOLUTION INTRAVENOUS at 10:36

## 2024-09-06 RX ADMIN — ONDANSETRON 4 MG: 2 INJECTION INTRAMUSCULAR; INTRAVENOUS at 10:36

## 2024-09-06 ASSESSMENT — ACTIVITIES OF DAILY LIVING (ADL)
ADLS_ACUITY_SCORE: 37

## 2024-09-06 ASSESSMENT — COLUMBIA-SUICIDE SEVERITY RATING SCALE - C-SSRS
1. IN THE PAST MONTH, HAVE YOU WISHED YOU WERE DEAD OR WISHED YOU COULD GO TO SLEEP AND NOT WAKE UP?: NO
2. HAVE YOU ACTUALLY HAD ANY THOUGHTS OF KILLING YOURSELF IN THE PAST MONTH?: NO
6. HAVE YOU EVER DONE ANYTHING, STARTED TO DO ANYTHING, OR PREPARED TO DO ANYTHING TO END YOUR LIFE?: NO

## 2024-09-06 NOTE — ED NOTES
Patient ambulated well and drinking fluids without vomiting. Requested tylenol for headache. Advised MD.

## 2024-09-06 NOTE — ED TRIAGE NOTES
Pt presents with N/V ongoing since this morning. Pt has hx ETOH withdrawal.     Triage Assessment (Adult)       Row Name 09/06/24 1013          Triage Assessment    Airway WDL WDL        Respiratory WDL    Respiratory WDL WDL

## 2024-09-06 NOTE — ED PROVIDER NOTES
History     Chief Complaint:  Nausea & Vomiting       HPI   Joe Shah is a 49 year old male who presents to the ED with abdominal pain and nausea.  Patient was discharged from the hospital about 2 weeks ago with alcoholism and alcoholic gastritis.  He admits that about a week after discharge she started drinking again up to half a pint today he states he stopped drinking 2 days ago he has been having nausea vomiting and abdominal pain since last night.  He denies any other substance abuse.      Independent Historian:    Patient    Review of External Notes:  Old chart reviewed    Medications:    ondansetron (ZOFRAN ODT) 4 MG ODT tab  pantoprazole (PROTONIX) 40 MG EC tablet  acetaminophen (TYLENOL) 500 MG tablet  gabapentin (NEURONTIN) 100 MG capsule  gabapentin (NEURONTIN) 300 MG capsule  metoprolol succinate ER (TOPROL XL) 50 MG 24 hr tablet  Multiple Vitamins-Minerals (MULTIVITAMIN ADULT) CHEW  naltrexone (DEPADE/REVIA) 50 MG tablet  venlafaxine (EFFEXOR XR) 37.5 MG 24 hr capsule        Past Medical History:    Past Medical History:   Diagnosis Date    Alcohol withdrawal (H)     Anxiety     Back pain     Depressive disorder     Hyperlipidemia     Hypertension     PUD (peptic ulcer disease)     SDH (subdural hematoma) (H) 12/2023    Severe alcohol use disorder (H)        Past Surgical History:    Past Surgical History:   Procedure Laterality Date    FANTASMA HOLE(S) EVACUATE HEMATOMA SUBDURAL N/A 12/1/2023    Procedure: CREATION, CRANIAL FANTASMA HOLE, WITH SUBDURAL HEMATOMA EVACUATION;  Surgeon: Gio Phoenix MD;  Location:  OR    ENT SURGERY      ESOPHAGOSCOPY, GASTROSCOPY, DUODENOSCOPY (EGD), COMBINED N/A 9/12/2019    Procedure: ESOPHAGOGASTRODUODENOSCOPY (EGD);  Surgeon: Scott Mcrae MD;  Location:  GI    ESOPHAGOSCOPY, GASTROSCOPY, DUODENOSCOPY (EGD), COMBINED N/A 5/27/2023    Procedure: Esophagoscopy, gastroscopy, duodenoscopy (EGD), combined;  Surgeon: Scott Mcrae MD;   "Location:  GI    ESOPHAGOSCOPY, GASTROSCOPY, DUODENOSCOPY (EGD), COMBINED N/A 2/9/2024    Procedure: Esophagoscopy, gastroscopy, duodenoscopy (EGD), combined;  Surgeon: Nakul Arguelles MD;  Location:  GI    NECK SURGERY            Physical Exam   Patient Vitals for the past 24 hrs:   BP Temp Temp src Pulse Resp SpO2 Height Weight   09/06/24 1007 (!) 153/103 97.5  F (36.4  C) Oral 114 18 99 % 1.803 m (5' 11\") 83.9 kg (185 lb)        Physical Exam  Constitutional: Middle-aged white male supine no respiratory distress  HENT: No signs of trauma.   Eyes: EOM are normal. Pupils are equal, round, and reactive to light.   Neck: Normal range of motion. No JVD present. No cervical adenopathy.  Cardiovascular: Regular rhythm.  Exam reveals no gallop and no friction rub.    No murmur heard.  Pulmonary/Chest: Bilateral breath sounds normal. No wheezes, rhonchi or rales.  Abdominal: Soft. No tenderness. No rebound or guarding.   Musculoskeletal: No edema. No tenderness.   Lymphadenopathy: No lymphadenopathy.   Neurological: Alert and oriented to person, place, and time. Normal strength. Coordination normal.   Skin: Skin is warm and dry. No rash noted. No erythema.      Emergency Department Course   ECG  Normal sinus rhythm  Rate 91 bpm. NY interval 144 ms. QRS duration 78 ms. QT/QTc 378/464 ms. P-R-T axes 73 7270.    Imaging:  No orders to display       Laboratory:  Labs Ordered and Resulted from Time of ED Arrival to Time of ED Departure   COMPREHENSIVE METABOLIC PANEL - Abnormal       Result Value    Sodium 140      Potassium 4.1      Carbon Dioxide (CO2) 19 (*)     Anion Gap 25 (*)     Urea Nitrogen 10.5      Creatinine 1.06      GFR Estimate 85      Calcium 10.0      Chloride 96 (*)     Glucose 112 (*)     Alkaline Phosphatase 80      AST 70 (*)     ALT 42      Protein Total 8.6 (*)     Albumin 5.0      Bilirubin Total 0.8     ETHYL ALCOHOL LEVEL - Abnormal    Alcohol ethyl 0.12 (*)    CBC WITH PLATELETS AND DIFFERENTIAL " - Abnormal    WBC Count 7.3      RBC Count 4.85      Hemoglobin 14.1      Hematocrit 43.0      MCV 89      MCH 29.1      MCHC 32.8      RDW 18.6 (*)     Platelet Count 360      % Neutrophils 87      % Lymphocytes 8      % Monocytes 4      % Eosinophils 0      % Basophils 1      % Immature Granulocytes 0      NRBCs per 100 WBC 0      Absolute Neutrophils 6.4      Absolute Lymphocytes 0.6 (*)     Absolute Monocytes 0.3      Absolute Eosinophils 0.0      Absolute Basophils 0.1      Absolute Immature Granulocytes 0.0      Absolute NRBCs 0.0     INR - Normal    INR 1.09     LIPASE - Normal    Lipase 56          Procedures   None    Emergency Department Course & Assessments:    Interventions:  Medications   sodium chloride 0.9% BOLUS 1,000 mL (0 mLs Intravenous Stopped 9/6/24 1303)   pantoprazole (PROTONIX) IV push injection 80 mg (80 mg Intravenous $Given 9/6/24 1036)   acetaminophen (TYLENOL) tablet 650 mg (650 mg Oral $Given 9/6/24 1242)        Assessments:  Seen and evaluated    Independent Interpretation (X-rays, CTs, rhythm strip):  None    Consultations/Discussion of Management or Tests:  None       Social Determinants of Health affecting care:  None     Disposition:  Discharge    Impression & Plan    CMS Diagnoses: None       Medical Decision Making:  Patient presents to the ED with stomach pain and vomiting about 2 weeks after discharge.  He states he began drinking again a week after discharge but not for the past 2 days.  However his alcohol level is still elevated suggesting he may have drank recently.  His abdomen was nonsurgical with some mild epigastric pain.  He received IV fluids Protonix and Zofran and is doing much better his abdomen is soft and nontender he is able to eat and drink.  He will be discharged with Protonix twice daily and Zofran he needs to stop drinking.  He should follow-up with his primary.        Diagnosis:    ICD-10-CM    1. Epigastric pain  R10.13       2. Alcohol use  Z78.9             Discharge Medications:  Discharge Medication List as of 9/6/2024  1:04 PM        START taking these medications    Details   ondansetron (ZOFRAN ODT) 4 MG ODT tab Take 1 tablet (4 mg) by mouth every 4 hours as needed for nausea., Disp-10 tablet, R-0, Local Print                Alan Wren MD  9/6/2024   No att. providers found              Alan Wren MD  09/06/24 1526

## 2024-09-07 LAB
ATRIAL RATE - MUSE: 91 BPM
DIASTOLIC BLOOD PRESSURE - MUSE: NORMAL MMHG
INTERPRETATION ECG - MUSE: NORMAL
P AXIS - MUSE: 73 DEGREES
PR INTERVAL - MUSE: 144 MS
QRS DURATION - MUSE: 78 MS
QT - MUSE: 378 MS
QTC - MUSE: 464 MS
R AXIS - MUSE: 72 DEGREES
SYSTOLIC BLOOD PRESSURE - MUSE: NORMAL MMHG
T AXIS - MUSE: 70 DEGREES
VENTRICULAR RATE- MUSE: 91 BPM

## 2024-12-05 NOTE — PLAN OF CARE
Goal Outcome Evaluation:      Pt A&Ox4 but forgetful, VSs on room air. CIWA  1,1. Up with assist of 1 with gait belt and walker, continent, denies nausea/vomiting.  Voiding adequately, mechanical dental soft diet.        [id number]

## 2025-03-31 ENCOUNTER — HOSPITAL ENCOUNTER (OUTPATIENT)
Facility: CLINIC | Age: 50
Setting detail: OBSERVATION
Discharge: HOME | End: 2025-04-01
Attending: EMERGENCY MEDICINE | Admitting: INTERNAL MEDICINE
Payer: COMMERCIAL

## 2025-03-31 ENCOUNTER — APPOINTMENT (OUTPATIENT)
Dept: CT IMAGING | Facility: CLINIC | Age: 50
End: 2025-03-31
Attending: EMERGENCY MEDICINE
Payer: COMMERCIAL

## 2025-03-31 DIAGNOSIS — K29.20 ACUTE ALCOHOLIC GASTRITIS WITHOUT HEMORRHAGE: Primary | ICD-10-CM

## 2025-03-31 DIAGNOSIS — R10.84 INTRACTABLE GENERALIZED ABDOMINAL PAIN: ICD-10-CM

## 2025-03-31 DIAGNOSIS — K92.2 UGIB (UPPER GASTROINTESTINAL BLEED): ICD-10-CM

## 2025-03-31 DIAGNOSIS — K92.0 HEMATEMESIS WITH NAUSEA: ICD-10-CM

## 2025-03-31 LAB
ALBUMIN SERPL BCG-MCNC: 4.8 G/DL (ref 3.5–5.2)
ALP SERPL-CCNC: 74 U/L (ref 40–150)
ALT SERPL W P-5'-P-CCNC: 20 U/L (ref 0–70)
ANION GAP SERPL CALCULATED.3IONS-SCNC: 22 MMOL/L (ref 7–15)
APTT PPP: 24 SECONDS (ref 22–38)
AST SERPL W P-5'-P-CCNC: 24 U/L (ref 0–45)
BASOPHILS # BLD AUTO: 0.1 10E3/UL (ref 0–0.2)
BASOPHILS NFR BLD AUTO: 0 %
BILIRUB SERPL-MCNC: 1.6 MG/DL
BUN SERPL-MCNC: 18.2 MG/DL (ref 6–20)
CALCIUM SERPL-MCNC: 9.4 MG/DL (ref 8.8–10.4)
CHLORIDE SERPL-SCNC: 95 MMOL/L (ref 98–107)
CREAT SERPL-MCNC: 0.9 MG/DL (ref 0.67–1.17)
EGFRCR SERPLBLD CKD-EPI 2021: >90 ML/MIN/1.73M2
EOSINOPHIL # BLD AUTO: 0 10E3/UL (ref 0–0.7)
EOSINOPHIL NFR BLD AUTO: 0 %
ERYTHROCYTE [DISTWIDTH] IN BLOOD BY AUTOMATED COUNT: 13.4 % (ref 10–15)
ETHANOL SERPL-MCNC: <0.01 G/DL
GLUCOSE SERPL-MCNC: 171 MG/DL (ref 70–99)
HCO3 SERPL-SCNC: 23 MMOL/L (ref 22–29)
HCT VFR BLD AUTO: 41.9 % (ref 40–53)
HGB BLD-MCNC: 14.2 G/DL (ref 13.3–17.7)
HOLD SPECIMEN: NORMAL
IMM GRANULOCYTES # BLD: 0.1 10E3/UL
IMM GRANULOCYTES NFR BLD: 0 %
INR PPP: 1.13 (ref 0.85–1.15)
LACTATE SERPL-SCNC: 3.1 MMOL/L (ref 0.7–2)
LACTATE SERPL-SCNC: 5 MMOL/L (ref 0.7–2)
LIPASE SERPL-CCNC: 27 U/L (ref 13–60)
LYMPHOCYTES # BLD AUTO: 0.8 10E3/UL (ref 0.8–5.3)
LYMPHOCYTES NFR BLD AUTO: 6 %
MAGNESIUM SERPL-MCNC: 1.4 MG/DL (ref 1.7–2.3)
MCH RBC QN AUTO: 29.2 PG (ref 26.5–33)
MCHC RBC AUTO-ENTMCNC: 33.9 G/DL (ref 31.5–36.5)
MCV RBC AUTO: 86 FL (ref 78–100)
MONOCYTES # BLD AUTO: 0.8 10E3/UL (ref 0–1.3)
MONOCYTES NFR BLD AUTO: 5 %
NEUTROPHILS # BLD AUTO: 13.3 10E3/UL (ref 1.6–8.3)
NEUTROPHILS NFR BLD AUTO: 89 %
NRBC # BLD AUTO: 0 10E3/UL
NRBC BLD AUTO-RTO: 0 /100
PLATELET # BLD AUTO: 457 10E3/UL (ref 150–450)
POTASSIUM SERPL-SCNC: 3.9 MMOL/L (ref 3.4–5.3)
PROT SERPL-MCNC: 7.9 G/DL (ref 6.4–8.3)
RBC # BLD AUTO: 4.86 10E6/UL (ref 4.4–5.9)
SODIUM SERPL-SCNC: 140 MMOL/L (ref 135–145)
WBC # BLD AUTO: 15 10E3/UL (ref 4–11)

## 2025-03-31 PROCEDURE — 83735 ASSAY OF MAGNESIUM: CPT | Performed by: EMERGENCY MEDICINE

## 2025-03-31 PROCEDURE — 83605 ASSAY OF LACTIC ACID: CPT | Performed by: EMERGENCY MEDICINE

## 2025-03-31 PROCEDURE — 96375 TX/PRO/DX INJ NEW DRUG ADDON: CPT

## 2025-03-31 PROCEDURE — 258N000003 HC RX IP 258 OP 636: Performed by: EMERGENCY MEDICINE

## 2025-03-31 PROCEDURE — 250N000009 HC RX 250: Performed by: EMERGENCY MEDICINE

## 2025-03-31 PROCEDURE — 96361 HYDRATE IV INFUSION ADD-ON: CPT

## 2025-03-31 PROCEDURE — 96365 THER/PROPH/DIAG IV INF INIT: CPT

## 2025-03-31 PROCEDURE — 83690 ASSAY OF LIPASE: CPT | Performed by: EMERGENCY MEDICINE

## 2025-03-31 PROCEDURE — 250N000011 HC RX IP 250 OP 636: Performed by: EMERGENCY MEDICINE

## 2025-03-31 PROCEDURE — 83735 ASSAY OF MAGNESIUM: CPT | Performed by: HOSPITALIST

## 2025-03-31 PROCEDURE — 99285 EMERGENCY DEPT VISIT HI MDM: CPT | Mod: 25

## 2025-03-31 PROCEDURE — 85730 THROMBOPLASTIN TIME PARTIAL: CPT | Performed by: EMERGENCY MEDICINE

## 2025-03-31 PROCEDURE — 85025 COMPLETE CBC W/AUTO DIFF WBC: CPT | Performed by: EMERGENCY MEDICINE

## 2025-03-31 PROCEDURE — 85610 PROTHROMBIN TIME: CPT | Performed by: EMERGENCY MEDICINE

## 2025-03-31 PROCEDURE — 96376 TX/PRO/DX INJ SAME DRUG ADON: CPT | Mod: 59

## 2025-03-31 PROCEDURE — 82040 ASSAY OF SERUM ALBUMIN: CPT | Performed by: EMERGENCY MEDICINE

## 2025-03-31 PROCEDURE — 74174 CTA ABD&PLVS W/CONTRAST: CPT

## 2025-03-31 PROCEDURE — 85018 HEMOGLOBIN: CPT | Performed by: EMERGENCY MEDICINE

## 2025-03-31 PROCEDURE — 82310 ASSAY OF CALCIUM: CPT | Performed by: EMERGENCY MEDICINE

## 2025-03-31 PROCEDURE — 84100 ASSAY OF PHOSPHORUS: CPT | Performed by: HOSPITALIST

## 2025-03-31 PROCEDURE — 82077 ASSAY SPEC XCP UR&BREATH IA: CPT | Performed by: EMERGENCY MEDICINE

## 2025-03-31 PROCEDURE — 36415 COLL VENOUS BLD VENIPUNCTURE: CPT | Performed by: EMERGENCY MEDICINE

## 2025-03-31 RX ORDER — IOPAMIDOL 755 MG/ML
114 INJECTION, SOLUTION INTRAVASCULAR ONCE
Status: COMPLETED | OUTPATIENT
Start: 2025-03-31 | End: 2025-03-31

## 2025-03-31 RX ORDER — MAGNESIUM SULFATE HEPTAHYDRATE 40 MG/ML
2 INJECTION, SOLUTION INTRAVENOUS ONCE
Status: COMPLETED | OUTPATIENT
Start: 2025-03-31 | End: 2025-04-01

## 2025-03-31 RX ORDER — ONDANSETRON 2 MG/ML
4 INJECTION INTRAMUSCULAR; INTRAVENOUS ONCE
Status: COMPLETED | OUTPATIENT
Start: 2025-03-31 | End: 2025-03-31

## 2025-03-31 RX ORDER — MORPHINE SULFATE 4 MG/ML
4 INJECTION, SOLUTION INTRAMUSCULAR; INTRAVENOUS ONCE
Status: COMPLETED | OUTPATIENT
Start: 2025-03-31 | End: 2025-03-31

## 2025-03-31 RX ORDER — HYDROMORPHONE HYDROCHLORIDE 1 MG/ML
0.5 INJECTION, SOLUTION INTRAMUSCULAR; INTRAVENOUS; SUBCUTANEOUS ONCE
Status: COMPLETED | OUTPATIENT
Start: 2025-03-31 | End: 2025-03-31

## 2025-03-31 RX ADMIN — IOPAMIDOL 114 ML: 755 INJECTION, SOLUTION INTRAVENOUS at 23:11

## 2025-03-31 RX ADMIN — SODIUM CHLORIDE 1000 ML: 0.9 INJECTION, SOLUTION INTRAVENOUS at 22:22

## 2025-03-31 RX ADMIN — SODIUM CHLORIDE 78 ML: 9 INJECTION, SOLUTION INTRAVENOUS at 23:11

## 2025-03-31 RX ADMIN — MAGNESIUM SULFATE HEPTAHYDRATE 2 G: 40 INJECTION, SOLUTION INTRAVENOUS at 23:36

## 2025-03-31 RX ADMIN — ONDANSETRON 4 MG: 2 INJECTION, SOLUTION INTRAMUSCULAR; INTRAVENOUS at 23:33

## 2025-03-31 RX ADMIN — SODIUM CHLORIDE 1000 ML: 0.9 INJECTION, SOLUTION INTRAVENOUS at 21:36

## 2025-03-31 RX ADMIN — HYDROMORPHONE HYDROCHLORIDE 0.5 MG: 1 INJECTION, SOLUTION INTRAMUSCULAR; INTRAVENOUS; SUBCUTANEOUS at 23:32

## 2025-03-31 RX ADMIN — MORPHINE SULFATE 4 MG: 4 INJECTION, SOLUTION INTRAMUSCULAR; INTRAVENOUS at 21:38

## 2025-03-31 RX ADMIN — PANTOPRAZOLE SODIUM 40 MG: 40 INJECTION, POWDER, FOR SOLUTION INTRAVENOUS at 21:37

## 2025-03-31 RX ADMIN — ONDANSETRON 4 MG: 2 INJECTION, SOLUTION INTRAMUSCULAR; INTRAVENOUS at 21:39

## 2025-03-31 ASSESSMENT — ACTIVITIES OF DAILY LIVING (ADL)
ADLS_ACUITY_SCORE: 56
ADLS_ACUITY_SCORE: 56

## 2025-04-01 VITALS
RESPIRATION RATE: 16 BRPM | BODY MASS INDEX: 25.9 KG/M2 | HEIGHT: 71 IN | WEIGHT: 185 LBS | OXYGEN SATURATION: 97 % | TEMPERATURE: 97.9 F | HEART RATE: 77 BPM | SYSTOLIC BLOOD PRESSURE: 130 MMHG | DIASTOLIC BLOOD PRESSURE: 80 MMHG

## 2025-04-01 PROBLEM — R10.84 INTRACTABLE GENERALIZED ABDOMINAL PAIN: Status: ACTIVE | Noted: 2025-04-01

## 2025-04-01 PROBLEM — K92.2 UGIB (UPPER GASTROINTESTINAL BLEED): Status: ACTIVE | Noted: 2025-04-01

## 2025-04-01 LAB
ALBUMIN UR-MCNC: NEGATIVE MG/DL
ANION GAP SERPL CALCULATED.3IONS-SCNC: 11 MMOL/L (ref 7–15)
APPEARANCE UR: CLEAR
BILIRUB UR QL STRIP: NEGATIVE
BUN SERPL-MCNC: 13.9 MG/DL (ref 6–20)
CALCIUM SERPL-MCNC: 8.9 MG/DL (ref 8.8–10.4)
CHLORIDE SERPL-SCNC: 101 MMOL/L (ref 98–107)
COLOR UR AUTO: ABNORMAL
CREAT SERPL-MCNC: 0.84 MG/DL (ref 0.67–1.17)
EGFRCR SERPLBLD CKD-EPI 2021: >90 ML/MIN/1.73M2
ERYTHROCYTE [DISTWIDTH] IN BLOOD BY AUTOMATED COUNT: 13.5 % (ref 10–15)
GLUCOSE SERPL-MCNC: 122 MG/DL (ref 70–99)
GLUCOSE UR STRIP-MCNC: NEGATIVE MG/DL
HCO3 SERPL-SCNC: 27 MMOL/L (ref 22–29)
HCT VFR BLD AUTO: 38.6 % (ref 40–53)
HGB BLD-MCNC: 12.7 G/DL (ref 13.3–17.7)
HGB BLD-MCNC: 12.9 G/DL (ref 13.3–17.7)
HGB UR QL STRIP: NEGATIVE
KETONES UR STRIP-MCNC: ABNORMAL MG/DL
LACTATE SERPL-SCNC: 1.9 MMOL/L (ref 0.7–2)
LEUKOCYTE ESTERASE UR QL STRIP: NEGATIVE
MAGNESIUM SERPL-MCNC: 1.3 MG/DL (ref 1.7–2.3)
MCH RBC QN AUTO: 28.9 PG (ref 26.5–33)
MCHC RBC AUTO-ENTMCNC: 33.4 G/DL (ref 31.5–36.5)
MCV RBC AUTO: 87 FL (ref 78–100)
MUCOUS THREADS #/AREA URNS LPF: PRESENT /LPF
NITRATE UR QL: NEGATIVE
PH UR STRIP: 8 [PH] (ref 5–7)
PHOSPHATE SERPL-MCNC: 2.5 MG/DL (ref 2.5–4.5)
PLATELET # BLD AUTO: 336 10E3/UL (ref 150–450)
POTASSIUM SERPL-SCNC: 3.8 MMOL/L (ref 3.4–5.3)
RBC # BLD AUTO: 4.46 10E6/UL (ref 4.4–5.9)
RBC URINE: <1 /HPF
SODIUM SERPL-SCNC: 139 MMOL/L (ref 135–145)
SP GR UR STRIP: 1 (ref 1–1.03)
UPPER GI ENDOSCOPY: NORMAL
UROBILINOGEN UR STRIP-MCNC: NORMAL MG/DL
WBC # BLD AUTO: 10.1 10E3/UL (ref 4–11)
WBC URINE: 0 /HPF

## 2025-04-01 PROCEDURE — 250N000013 HC RX MED GY IP 250 OP 250 PS 637: Performed by: EMERGENCY MEDICINE

## 2025-04-01 PROCEDURE — 83605 ASSAY OF LACTIC ACID: CPT | Performed by: HOSPITALIST

## 2025-04-01 PROCEDURE — 250N000013 HC RX MED GY IP 250 OP 250 PS 637: Performed by: HOSPITALIST

## 2025-04-01 PROCEDURE — G0378 HOSPITAL OBSERVATION PER HR: HCPCS

## 2025-04-01 PROCEDURE — 258N000003 HC RX IP 258 OP 636: Performed by: HOSPITALIST

## 2025-04-01 PROCEDURE — 999N000099 HC STATISTIC MODERATE SEDATION < 10 MIN: Performed by: INTERNAL MEDICINE

## 2025-04-01 PROCEDURE — 81003 URINALYSIS AUTO W/O SCOPE: CPT | Performed by: EMERGENCY MEDICINE

## 2025-04-01 PROCEDURE — 96376 TX/PRO/DX INJ SAME DRUG ADON: CPT | Mod: 59

## 2025-04-01 PROCEDURE — 250N000011 HC RX IP 250 OP 636: Mod: JZ | Performed by: INTERNAL MEDICINE

## 2025-04-01 PROCEDURE — 43235 EGD DIAGNOSTIC BRUSH WASH: CPT | Performed by: INTERNAL MEDICINE

## 2025-04-01 PROCEDURE — 96361 HYDRATE IV INFUSION ADD-ON: CPT

## 2025-04-01 PROCEDURE — 250N000011 HC RX IP 250 OP 636: Performed by: EMERGENCY MEDICINE

## 2025-04-01 PROCEDURE — 80048 BASIC METABOLIC PNL TOTAL CA: CPT | Performed by: HOSPITALIST

## 2025-04-01 PROCEDURE — 250N000011 HC RX IP 250 OP 636: Performed by: HOSPITALIST

## 2025-04-01 PROCEDURE — 36415 COLL VENOUS BLD VENIPUNCTURE: CPT | Performed by: HOSPITALIST

## 2025-04-01 PROCEDURE — 96375 TX/PRO/DX INJ NEW DRUG ADDON: CPT | Mod: 59

## 2025-04-01 PROCEDURE — 85018 HEMOGLOBIN: CPT | Performed by: HOSPITALIST

## 2025-04-01 PROCEDURE — 85027 COMPLETE CBC AUTOMATED: CPT | Performed by: HOSPITALIST

## 2025-04-01 RX ORDER — NALOXONE HYDROCHLORIDE 0.4 MG/ML
0.4 INJECTION, SOLUTION INTRAMUSCULAR; INTRAVENOUS; SUBCUTANEOUS
Status: DISCONTINUED | OUTPATIENT
Start: 2025-04-01 | End: 2025-04-01 | Stop reason: HOSPADM

## 2025-04-01 RX ORDER — ONDANSETRON 2 MG/ML
4 INJECTION INTRAMUSCULAR; INTRAVENOUS EVERY 6 HOURS PRN
Status: DISCONTINUED | OUTPATIENT
Start: 2025-04-01 | End: 2025-04-01 | Stop reason: HOSPADM

## 2025-04-01 RX ORDER — DIPHENHYDRAMINE HYDROCHLORIDE 50 MG/ML
25 INJECTION, SOLUTION INTRAMUSCULAR; INTRAVENOUS EVERY 6 HOURS PRN
Status: DISCONTINUED | OUTPATIENT
Start: 2025-04-01 | End: 2025-04-01 | Stop reason: HOSPADM

## 2025-04-01 RX ORDER — SODIUM CHLORIDE, SODIUM LACTATE, POTASSIUM CHLORIDE, CALCIUM CHLORIDE 600; 310; 30; 20 MG/100ML; MG/100ML; MG/100ML; MG/100ML
INJECTION, SOLUTION INTRAVENOUS CONTINUOUS
Status: DISCONTINUED | OUTPATIENT
Start: 2025-04-01 | End: 2025-04-01 | Stop reason: HOSPADM

## 2025-04-01 RX ORDER — FLUMAZENIL 0.1 MG/ML
0.2 INJECTION, SOLUTION INTRAVENOUS
Status: DISCONTINUED | OUTPATIENT
Start: 2025-04-01 | End: 2025-04-01 | Stop reason: HOSPADM

## 2025-04-01 RX ORDER — AMOXICILLIN 250 MG
1 CAPSULE ORAL 2 TIMES DAILY PRN
Status: DISCONTINUED | OUTPATIENT
Start: 2025-04-01 | End: 2025-04-01 | Stop reason: HOSPADM

## 2025-04-01 RX ORDER — MULTIPLE VITAMINS W/ MINERALS TAB 9MG-400MCG
1 TAB ORAL DAILY
Status: DISCONTINUED | OUTPATIENT
Start: 2025-04-01 | End: 2025-04-01 | Stop reason: HOSPADM

## 2025-04-01 RX ORDER — DIAZEPAM 5 MG/1
10 TABLET ORAL EVERY 30 MIN PRN
Status: DISCONTINUED | OUTPATIENT
Start: 2025-04-01 | End: 2025-04-01 | Stop reason: HOSPADM

## 2025-04-01 RX ORDER — DIPHENHYDRAMINE HCL 25 MG
25 CAPSULE ORAL EVERY 6 HOURS PRN
Status: DISCONTINUED | OUTPATIENT
Start: 2025-04-01 | End: 2025-04-01 | Stop reason: HOSPADM

## 2025-04-01 RX ORDER — AMOXICILLIN 250 MG
2 CAPSULE ORAL 2 TIMES DAILY PRN
Status: DISCONTINUED | OUTPATIENT
Start: 2025-04-01 | End: 2025-04-01 | Stop reason: HOSPADM

## 2025-04-01 RX ORDER — FLUMAZENIL 0.1 MG/ML
0.2 INJECTION, SOLUTION INTRAVENOUS
OUTPATIENT
Start: 2025-04-01 | End: 2025-04-02

## 2025-04-01 RX ORDER — LORAZEPAM 2 MG/ML
0.5 INJECTION INTRAMUSCULAR ONCE
Status: COMPLETED | OUTPATIENT
Start: 2025-04-01 | End: 2025-04-01

## 2025-04-01 RX ORDER — NALOXONE HYDROCHLORIDE 0.4 MG/ML
0.2 INJECTION, SOLUTION INTRAMUSCULAR; INTRAVENOUS; SUBCUTANEOUS
Status: DISCONTINUED | OUTPATIENT
Start: 2025-04-01 | End: 2025-04-01 | Stop reason: HOSPADM

## 2025-04-01 RX ORDER — SUCRALFATE ORAL 1 G/10ML
1 SUSPENSION ORAL ONCE
Status: COMPLETED | OUTPATIENT
Start: 2025-04-01 | End: 2025-04-01

## 2025-04-01 RX ORDER — ACETAMINOPHEN 650 MG/1
650 SUPPOSITORY RECTAL EVERY 4 HOURS PRN
Status: DISCONTINUED | OUTPATIENT
Start: 2025-04-01 | End: 2025-04-01 | Stop reason: HOSPADM

## 2025-04-01 RX ORDER — MAGNESIUM HYDROXIDE/ALUMINUM HYDROXICE/SIMETHICONE 120; 1200; 1200 MG/30ML; MG/30ML; MG/30ML
15 SUSPENSION ORAL ONCE
Status: COMPLETED | OUTPATIENT
Start: 2025-04-01 | End: 2025-04-01

## 2025-04-01 RX ORDER — PANTOPRAZOLE SODIUM 40 MG/1
40 TABLET, DELAYED RELEASE ORAL
Qty: 60 TABLET | Refills: 0 | Status: SHIPPED | OUTPATIENT
Start: 2025-04-01

## 2025-04-01 RX ORDER — DIAZEPAM 10 MG/2ML
5-10 INJECTION, SOLUTION INTRAMUSCULAR; INTRAVENOUS EVERY 30 MIN PRN
Status: DISCONTINUED | OUTPATIENT
Start: 2025-04-01 | End: 2025-04-01 | Stop reason: HOSPADM

## 2025-04-01 RX ORDER — PANTOPRAZOLE SODIUM 40 MG/1
40 TABLET, DELAYED RELEASE ORAL DAILY
COMMUNITY
End: 2025-04-01

## 2025-04-01 RX ORDER — HYDROMORPHONE HCL IN WATER/PF 6 MG/30 ML
0.2 PATIENT CONTROLLED ANALGESIA SYRINGE INTRAVENOUS
Status: DISCONTINUED | OUTPATIENT
Start: 2025-04-01 | End: 2025-04-01 | Stop reason: HOSPADM

## 2025-04-01 RX ORDER — FENTANYL CITRATE 50 UG/ML
INJECTION, SOLUTION INTRAMUSCULAR; INTRAVENOUS PRN
Status: DISCONTINUED | OUTPATIENT
Start: 2025-04-01 | End: 2025-04-01 | Stop reason: HOSPADM

## 2025-04-01 RX ORDER — THIAMINE HYDROCHLORIDE 100 MG/ML
100 INJECTION, SOLUTION INTRAMUSCULAR; INTRAVENOUS DAILY
Status: DISCONTINUED | OUTPATIENT
Start: 2025-04-01 | End: 2025-04-01 | Stop reason: HOSPADM

## 2025-04-01 RX ORDER — PROCHLORPERAZINE MALEATE 10 MG
10 TABLET ORAL EVERY 6 HOURS PRN
Status: DISCONTINUED | OUTPATIENT
Start: 2025-04-01 | End: 2025-04-01 | Stop reason: HOSPADM

## 2025-04-01 RX ORDER — FOLIC ACID 5 MG/ML
1 INJECTION, SOLUTION INTRAMUSCULAR; INTRAVENOUS; SUBCUTANEOUS DAILY
Status: DISCONTINUED | OUTPATIENT
Start: 2025-04-01 | End: 2025-04-01 | Stop reason: HOSPADM

## 2025-04-01 RX ORDER — ONDANSETRON 4 MG/1
4 TABLET, ORALLY DISINTEGRATING ORAL EVERY 6 HOURS PRN
Status: DISCONTINUED | OUTPATIENT
Start: 2025-04-01 | End: 2025-04-01 | Stop reason: HOSPADM

## 2025-04-01 RX ORDER — ACETAMINOPHEN 325 MG/1
650 TABLET ORAL EVERY 4 HOURS PRN
Status: DISCONTINUED | OUTPATIENT
Start: 2025-04-01 | End: 2025-04-01 | Stop reason: HOSPADM

## 2025-04-01 RX ORDER — HYDROMORPHONE HCL IN WATER/PF 6 MG/30 ML
0.4 PATIENT CONTROLLED ANALGESIA SYRINGE INTRAVENOUS
Status: DISCONTINUED | OUTPATIENT
Start: 2025-04-01 | End: 2025-04-01 | Stop reason: HOSPADM

## 2025-04-01 RX ADMIN — DIPHENHYDRAMINE HYDROCHLORIDE 25 MG: 50 INJECTION, SOLUTION INTRAMUSCULAR; INTRAVENOUS at 04:35

## 2025-04-01 RX ADMIN — SODIUM CHLORIDE, SODIUM LACTATE, POTASSIUM CHLORIDE, AND CALCIUM CHLORIDE: .6; .31; .03; .02 INJECTION, SOLUTION INTRAVENOUS at 02:18

## 2025-04-01 RX ADMIN — PANTOPRAZOLE SODIUM 40 MG: 40 INJECTION, POWDER, FOR SOLUTION INTRAVENOUS at 02:18

## 2025-04-01 RX ADMIN — Medication 1 TABLET: at 08:14

## 2025-04-01 RX ADMIN — ALUMINUM HYDROXIDE, MAGNESIUM HYDROXIDE, AND SIMETHICONE 15 ML: 200; 200; 20 SUSPENSION ORAL at 02:14

## 2025-04-01 RX ADMIN — LORAZEPAM 0.5 MG: 2 INJECTION INTRAMUSCULAR; INTRAVENOUS at 00:30

## 2025-04-01 RX ADMIN — THIAMINE HYDROCHLORIDE 100 MG: 100 INJECTION, SOLUTION INTRAMUSCULAR; INTRAVENOUS at 08:14

## 2025-04-01 RX ADMIN — SUCRALFATE 1 G: 1 SUSPENSION ORAL at 02:14

## 2025-04-01 RX ADMIN — FOLIC ACID 1 MG: 5 INJECTION, SOLUTION INTRAMUSCULAR; INTRAVENOUS; SUBCUTANEOUS at 08:14

## 2025-04-01 ASSESSMENT — ACTIVITIES OF DAILY LIVING (ADL)
ADLS_ACUITY_SCORE: 56

## 2025-04-01 ASSESSMENT — LIFESTYLE VARIABLES
AUDITORY DISTURBANCES: NOT PRESENT
TREMOR: NO TREMOR
TACTILE DISTURBANCES: MILD ITCHING, PINS AND NEEDLES, BURNING OR NUMBNESS
ORIENTATION AND CLOUDING OF SENSORIUM: ORIENTED AND CAN DO SERIAL ADDITIONS
PAROXYSMAL SWEATS: NO SWEAT VISIBLE
ANXIETY: NO ANXIETY, AT EASE
NAUSEA AND VOMITING: INTERMITTENT NAUSEA WITH DRY HEAVES
TOTAL SCORE: 6
AGITATION: NORMAL ACTIVITY
HEADACHE, FULLNESS IN HEAD: NOT PRESENT
VISUAL DISTURBANCES: NOT PRESENT

## 2025-04-01 NOTE — ED TRIAGE NOTES
Patient states has diverticultitis.  Abdominal pain worse this morning; woke up with it.  Ate popcorn last night.  Vomiting all day. Emesis looks like coffee grounds.

## 2025-04-01 NOTE — PHARMACY-ADMISSION MEDICATION HISTORY
Pharmacist Admission Medication History    Admission medication history is complete. The information provided in this note is only as accurate as the sources available at the time of the update.    Information Source(s): Patient and CareEverywhere/SureScripts via in-person    Pertinent Information: Pt reports not taking PTA meds for a few days     Changes made to PTA medication list:  Added: pantoprazole  Deleted: None  Changed: metoprolol (50 mg daily to 25 mg daily)    Allergies reviewed with patient and updates made in EHR: no    Medication History Completed By: SARA ODELL RP 4/1/2025 7:47 AM    PTA Med List   Medication Sig Last Dose/Taking    gabapentin (NEURONTIN) 100 MG capsule Take 1 capsule (100 mg) by mouth 2 times daily Morning and bedtime. Past Week    gabapentin (NEURONTIN) 300 MG capsule Take 1 capsule (300 mg) by mouth nightly as needed (sleep) Past Week    metoprolol succinate ER (TOPROL XL) 50 MG 24 hr tablet Take 1 tablet (50 mg) by mouth daily (Patient taking differently: Take 25 mg by mouth daily.) Past Week    Multiple Vitamins-Minerals (MULTIVITAMIN ADULT) CHEW Take 2 chew tab by mouth daily Centrum More than a month    pantoprazole (PROTONIX) 40 MG EC tablet Take 40 mg by mouth daily. Past Week Morning    venlafaxine (EFFEXOR XR) 37.5 MG 24 hr capsule Take 1 capsule (37.5 mg) by mouth daily Past Week Morning

## 2025-04-01 NOTE — H&P
[facility]  Hospitalist History and Physical  Date of Admission:  3/31/2025    Primary Care Physician   oSfia Rebollar DO    Chief Complaint  Abdominal Pain and Hematemesis    History obtained from the patient    History of Present Illness   Joe Shah is a 49 year old male with a past medical history of alcohol abuse, esophagitis and peptic ulcer disease presents to the hospital with abdominal pain and vomiting. The patient reports that he had been sober until Jean-Pierre 3/30 when he drank over half a 750ml bottle of tequila. He woke up on 3/31 at 3am with severe abdominal pain located in the epigastrium and the left side and radiating across his abdomen. The pain is associated with nausea and vomiting. He reports vomiting blood multiple times. He reports that the vomiting improved his abdominal pain so he induced vomiting multiple times. He provides no other complaints.   The patient has not been taking his pta meds for several days prior to presentation. He denies any NSAID use.     Past Medical History    I have reviewed this patient's medical history and updated it with pertinent information if needed.   Past Medical History:   Diagnosis Date    Alcohol withdrawal (H)     Anxiety     Back pain     Depressive disorder     Hyperlipidemia     Hypertension     PUD (peptic ulcer disease)     SDH (subdural hematoma) (H) 12/2023    Severe alcohol use disorder (H)        Past Surgical History   I have reviewed this patient's surgical history and updated it with pertinent information if needed.  Past Surgical History:   Procedure Laterality Date    FANTASMA HOLE(S) EVACUATE HEMATOMA SUBDURAL N/A 12/1/2023    Procedure: CREATION, CRANIAL FANTASMA HOLE, WITH SUBDURAL HEMATOMA EVACUATION;  Surgeon: Gio Phoenix MD;  Location:  OR    ENT SURGERY      ESOPHAGOSCOPY, GASTROSCOPY, DUODENOSCOPY (EGD), COMBINED N/A 9/12/2019    Procedure: ESOPHAGOGASTRODUODENOSCOPY (EGD);  Surgeon: Scott Mcrae  MD Jessica;  Location:  GI    ESOPHAGOSCOPY, GASTROSCOPY, DUODENOSCOPY (EGD), COMBINED N/A 5/27/2023    Procedure: Esophagoscopy, gastroscopy, duodenoscopy (EGD), combined;  Surgeon: Scott Mcrae MD;  Location:  GI    ESOPHAGOSCOPY, GASTROSCOPY, DUODENOSCOPY (EGD), COMBINED N/A 2/9/2024    Procedure: Esophagoscopy, gastroscopy, duodenoscopy (EGD), combined;  Surgeon: Nakul Arguelles MD;  Location:  GI    NECK SURGERY         Allergies   Allergies   Allergen Reactions    Morphine Nausea and Vomiting and Nausea    Oxycodone Itching     1/22 Patient reports he can tolerate this       Social History   I have reviewed this patient's social history and updated it with pertinent information if needed. Joe Shah  reports that he has quit smoking. His smokeless tobacco use includes chew. He reports current alcohol use. He reports that he does not use drugs.    Family History   I have reviewed this patient's family history and updated it with pertinent information if needed.   Family History   Problem Relation Age of Onset    No Known Problems Maternal Grandmother     No Known Problems Maternal Grandfather     No Known Problems Paternal Grandmother     Substance Abuse Paternal Grandfather        Physical Exam   Temp: 97.9  F (36.6  C) Temp src: Temporal BP: (!) 157/104 Pulse: 94   Resp: 18 SpO2: 100 % O2 Device: None (Room air)    Vital Signs with Ranges  Temp:  [97.9  F (36.6  C)] 97.9  F (36.6  C)  Pulse:  [] 94  Resp:  [18-20] 18  BP: (145-157)/(100-106) 157/104  SpO2:  [97 %-100 %] 100 %  185 lbs 0 oz  Physical Exam  Vitals reviewed.   Constitutional:       Comments: Pleasant man seen resting in bed in the er. He appears uncomfortable. He appears his stated age. He is well developed and well nourished.    HENT:      Head: Normocephalic and atraumatic.   Eyes:      Extraocular Movements: Extraocular movements intact.      Pupils: Pupils are equal, round, and reactive to light.    Cardiovascular:      Rate and Rhythm: Normal rate and regular rhythm.   Pulmonary:      Effort: Pulmonary effort is normal.      Breath sounds: Normal breath sounds.   Abdominal:      Comments: Hypoactive bowel sounds. Tender to palpation in the epigastrium and the left upper quadrant without rebound or guarding. No palpable mass.    Musculoskeletal:         General: No swelling.   Neurological:      General: No focal deficit present.      Mental Status: He is alert and oriented to person, place, and time.         Assessment & Plan   Joe Shah is a 49 year old male with a past medical history of alcohol abuse, peptic ulcer disease and esophagitis presents to the hospital with abdominal pain and vomiting.     GIB  Hx of alcoholic esophagitis and peptic ulcer disease  Patient presents with a 24 hour history of epigastric pain, nauseas and vomiting in setting of recent alcohol abuse. He has had no witnessed bleeding since arrival in the er and remains hemodynamically stable. Cta abdomen negative for acute pathology. He does have a leucocytosis which could be reactive. His lactic acid was elevated, but improve with iv hydration and is likely due dehydration. Differentials include gastric ulcer, esophagitis, gastritis, blake weiz tears.  -Npo  -Ivf  -Ppi iv bid  -GI consult with Dr Mcrae    Hx of alcohol abuse and withdrawal  Reports a one day binge of alcohol. Should not be at risk for withdrawal, but in case he is not being honest about the extent of his alcohol use will place on ciwa  -ciwa protocol  -valium prn  -iv multivitamins, switch to po once tolerating po.    Chronic stable medical conditions: resume pta meds once pharmacy med rec is complete as needed.   HLD  HTN  Mdd  ADY    DVT ppx: scd  Code Status: full code  Medically Ready for Discharge: Anticipated Tomorrow    Medical Decision Making     60 MINUTES SPENT BY ME on the date of service doing chart review, history, exam, documentation & further  activities per the note.      Tyler Phillips MD  Hospitalist Medicine Service  Pager# [phone]

## 2025-04-01 NOTE — ED PROVIDER NOTES
"  Emergency Department Note      History of Present Illness     Chief Complaint   Abdominal Pain and Hematemesis      HPI   Joe Shah is a 49 year old male with a history of alcohol abuse disorder, hypertension, and hyperlipidemia who presents to the ED for evaluation of abdominal pain and hematemesis. Patient reports that he has had left lower quadrant abdominal pain all day, that is radiating into the rest of his abdomen. He also mentions that due to the severity of the pain, when he coughs it causes him to vomit, and this vomit contained blood. There was enough blood to entirely discolor the water in his toilet. He hasn't had any recent bowel movements and he does not take ibuprofen. He stopped drinking on 1/1/25, but he did have a couple of drinks last night.    Independent Historian   None    Review of External Notes       Past Medical History     Medical History and Problem List   Alcohol abuse  Anxiety  Depression  Hyperlipidemia  Hypertension  PUD    Medications   Toprol xl  Effexor     Surgical History   [place] holes subdural hematoma  ENT surgery  EGD x 3  Neck surgery    Physical Exam     Patient Vitals for the past 24 hrs:   BP Temp Temp src Pulse Resp SpO2 Height Weight   03/31/25 2120 (!) 157/105 97.9  F (36.6  C) Temporal (!) 121 20 100 % 1.803 m (5' 11\") 83.9 kg (185 lb)     Physical Exam  Vitals reviewed.   HENT:      Head: Normocephalic.      Mouth/Throat:      Mouth: Mucous membranes are moist.   Cardiovascular:      Rate and Rhythm: Normal rate.   Pulmonary:      Effort: Pulmonary effort is normal.   Abdominal:      Palpations: Abdomen is soft.      Tenderness: There is generalized abdominal tenderness.      Hernia: No hernia is present.   Skin:     General: Skin is warm.      Capillary Refill: Capillary refill takes less than 2 seconds.   Neurological:      General: No focal deficit present.      Mental Status: He is alert.   Psychiatric:         Mood and Affect: Mood is anxious. "           Diagnostics     Lab Results   Labs Ordered and Resulted from Time of ED Arrival to Time of ED Departure   COMPREHENSIVE METABOLIC PANEL - Abnormal       Result Value    Sodium 140      Potassium 3.9      Carbon Dioxide (CO2) 23      Anion Gap 22 (*)     Urea Nitrogen 18.2      Creatinine 0.90      GFR Estimate >90      Calcium 9.4      Chloride 95 (*)     Glucose 171 (*)     Alkaline Phosphatase 74      AST 24      ALT 20      Protein Total 7.9      Albumin 4.8      Bilirubin Total 1.6 (*)    LACTIC ACID WHOLE BLOOD WITH 1X REPEAT IN 2 HR WHEN >2 - Abnormal    Lactic Acid, Initial 5.0 (*)    MAGNESIUM - Abnormal    Magnesium 1.4 (*)    CBC WITH PLATELETS AND DIFFERENTIAL - Abnormal    WBC Count 15.0 (*)     RBC Count 4.86      Hemoglobin 14.2      Hematocrit 41.9      MCV 86      MCH 29.2      MCHC 33.9      RDW 13.4      Platelet Count 457 (*)     % Neutrophils 89      % Lymphocytes 6      % Monocytes 5      % Eosinophils 0      % Basophils 0      % Immature Granulocytes 0      NRBCs per 100 WBC 0      Absolute Neutrophils 13.3 (*)     Absolute Lymphocytes 0.8      Absolute Monocytes 0.8      Absolute Eosinophils 0.0      Absolute Basophils 0.1      Absolute Immature Granulocytes 0.1      Absolute NRBCs 0.0     LACTIC ACID WHOLE BLOOD - Abnormal    Lactic Acid 3.1 (*)    ROUTINE UA WITH MICROSCOPIC REFLEX TO CULTURE - Abnormal    Color Urine Light Yellow      Appearance Urine Clear      Glucose Urine Negative      Bilirubin Urine Negative      Ketones Urine Trace (*)     Specific Gravity Urine 1.005      Blood Urine Negative      pH Urine 8.0 (*)     Protein Albumin Urine Negative      Urobilinogen Urine Normal      Nitrite Urine Negative      Leukocyte Esterase Urine Negative      Mucus Urine Present (*)     RBC Urine <1      WBC Urine 0     MAGNESIUM - Abnormal    Magnesium 1.3 (*)    BASIC METABOLIC PANEL - Abnormal    Sodium 139      Potassium 3.8      Chloride 101      Carbon Dioxide (CO2) 27       Anion Gap 11      Urea Nitrogen 13.9      Creatinine 0.84      GFR Estimate >90      Calcium 8.9      Glucose 122 (*)    CBC WITH PLATELETS - Abnormal    WBC Count 10.1      RBC Count 4.46      Hemoglobin 12.9 (*)     Hematocrit 38.6 (*)     MCV 87      MCH 28.9      MCHC 33.4      RDW 13.5      Platelet Count 336     INR - Normal    INR 1.13     PARTIAL THROMBOPLASTIN TIME - Normal    aPTT 24     ETHYL ALCOHOL LEVEL - Normal    Alcohol ethyl <0.01     LIPASE - Normal    Lipase 27     PHOSPHORUS - Normal    Phosphorus 2.5     LACTIC ACID WHOLE BLOOD - Normal    Lactic Acid 1.9         Imaging   CTA Abdomen Pelvis with Contrast   Final Result   IMPRESSION:      1.  No acute findings in the abdomen and pelvis, including acute hemorrhage.      2.  Colonic diverticulosis without acute diverticulitis.      3.  Moderate size hiatal hernia.            Independent Interpretation   None    ED Course      Medications Administered   Medications   lactated ringers infusion ( Intravenous $New Bag 4/1/25 0218)   senna-docusate (SENOKOT-S/PERICOLACE) 8.6-50 MG per tablet 1 tablet (has no administration in time range)     Or   senna-docusate (SENOKOT-S/PERICOLACE) 8.6-50 MG per tablet 2 tablet (has no administration in time range)   ondansetron (ZOFRAN ODT) ODT tab 4 mg (has no administration in time range)     Or   ondansetron (ZOFRAN) injection 4 mg (has no administration in time range)   prochlorperazine (COMPAZINE) injection 10 mg (has no administration in time range)     Or   prochlorperazine (COMPAZINE) tablet 10 mg (has no administration in time range)   flumazenil (ROMAZICON) injection 0.2 mg (has no administration in time range)   pantoprazole (PROTONIX) IV push injection 40 mg (40 mg Intravenous $Given 4/1/25 0218)   acetaminophen (TYLENOL) tablet 650 mg (has no administration in time range)     Or   acetaminophen (TYLENOL) Suppository 650 mg (has no administration in time range)   HYDROmorphone (DILAUDID) injection 0.2  mg (has no administration in time range)   HYDROmorphone (DILAUDID) injection 0.4 mg (has no administration in time range)   diazepam (VALIUM) tablet 10 mg (has no administration in time range)     Or   diazepam (VALIUM) injection 5-10 mg (has no administration in time range)   melatonin tablet 5 mg (has no administration in time range)   multivitamin w/minerals (THERA-VIT-M) tablet 1 tablet (1 tablet Oral $Given 4/1/25 0814)   thiamine (B-1) injection 100 mg (100 mg Intravenous $Given 4/1/25 0814)   folic acid injection 1 mg (1 mg Intravenous $Given 4/1/25 0814)   diphenhydrAMINE (BENADRYL) capsule 25 mg ( Oral See Alternative 4/1/25 0435)     Or   diphenhydrAMINE (BENADRYL) injection 25 mg (25 mg Intravenous $Given 4/1/25 0435)   naloxone (NARCAN) injection 0.2 mg (has no administration in time range)     Or   naloxone (NARCAN) injection 0.4 mg (has no administration in time range)     Or   naloxone (NARCAN) injection 0.2 mg (has no administration in time range)     Or   naloxone (NARCAN) injection 0.4 mg (has no administration in time range)   sodium chloride 0.9% BOLUS 1,000 mL (0 mLs Intravenous Stopped 3/31/25 2329)   ondansetron (ZOFRAN) injection 4 mg (4 mg Intravenous $Given 3/31/25 2139)   pantoprazole (PROTONIX) IV push injection 40 mg (40 mg Intravenous $Given 3/31/25 2137)   morphine (PF) injection 4 mg (4 mg Intravenous $Given 3/31/25 2138)   sodium chloride 0.9% BOLUS 1,000 mL (0 mLs Intravenous Stopped 3/31/25 2329)   magnesium sulfate 2 g in 50 mL sterile water intermittent infusion (0 g Intravenous Stopped 4/1/25 0054)   iopamidol (ISOVUE-370) solution 114 mL (114 mLs Intravenous $Given 3/31/25 2311)   sodium chloride 0.9 % bag for CT scan flush (78 mLs Intravenous $Given 3/31/25 2311)   ondansetron (ZOFRAN) injection 4 mg (4 mg Intravenous $Given 3/31/25 2333)   HYDROmorphone (PF) (DILAUDID) injection 0.5 mg (0.5 mg Intravenous $Given 3/31/25 2332)   sucralfate (CARAFATE) suspension 1 g (1 g Oral  "$Given 4/1/25 0214)   LORazepam (ATIVAN) injection 0.5 mg (0.5 mg Intravenous $Given 4/1/25 0030)   alum & mag hydroxide-simethicone (MAALOX) suspension 15 mL (15 mLs Oral $Given 4/1/25 0214)       Procedures   Procedures     Discussion of Management   Admitting Hospitalist,      ED Course   ED Course as of 04/01/25 1441   Mon Mar 31, 2025   2128 I obtained the history and performed the examination as described.        Additional Documentation  None    Medical Decision Making / Diagnosis     CMS Diagnoses: None    MIPS       None    MDM   Joe Shah is a 49 year old male who presents with upper abdominal pain and vomiting red blood.  Patient is well-appearing and hemodynamically stable initially hypertensive and tachycardic but admits to drinking alcohol last night though he states he \"has quit since January\".  Patient has a history of prior hematemesis and known ulcers.  Due to lactic acidosis and severe abdominal pain CT recommended for evaluation of pneumatosis and her GI bleeding.  And this was negative.  Ultimately patient continued to have severe pain.  Cause of which is unclear.  Wonder about alcohol withdrawal due to tachycardia and hypertension.  Patient was given a dose of Ativan would recommend admission on observation for serial hemoglobins due to lactic acidosis in the setting of severe abdominal pain.  Care discussed with the hospitalist and was admitted on observation.    Disposition   The patient was admitted to the hospital.     Diagnosis     ICD-10-CM    1. Acute alcoholic gastritis without hemorrhage  K29.20 pantoprazole (PROTONIX) 40 MG EC tablet      2. UGIB (upper gastrointestinal bleed)  K92.2       3. Intractable generalized abdominal pain  R10.84       4. Hematemesis with nausea  K92.0            Discharge Medications   New Prescriptions    No medications on file         Scribe Disclosure:  I, Koffi Sheehan, am serving as a scribe at 9:38 PM on 3/31/2025 to document services personally " performed by Jarvis Jeffries MD based on my observations and the provider's statements to me.        Jarvis Jeffries MD  04/01/25 [id number]

## 2025-04-01 NOTE — ED NOTES
[facility]  ED Nurse Handoff Report    ED Chief complaint: Abdominal Pain and Hematemesis      ED Diagnosis:   Final diagnoses:   UGIB (upper gastrointestinal bleed)   Intractable generalized abdominal pain   Hematemesis with nausea       Code Status: Full Code    Allergies:   Allergies   Allergen Reactions    Morphine Nausea and Vomiting and Nausea    Oxycodone Itching     1/22 Patient reports he can tolerate this       Patient Story: Patient presents with LLQ abdominal pain and vomiting that started yesterday morning.  He reports hx of diverticulitis.  He reports being sober since Jan. 1st, but admits to drinking alcohol prior to pain starting.  Focused Assessment:  LLQ abdominal pain, nausea, vomiting. Mildly hypertensive, vitals otherwise WNL. Lactic acid elevated at 5 and improved to 3.1 after fluids.  No acute findings on CT scan.  Abnormal Labs Resulted from Time of ED Arrival to Time of ED Departure   COMPREHENSIVE METABOLIC PANEL - Abnormal       Result Value    Sodium 140      Potassium 3.9      Carbon Dioxide (CO2) 23      Anion Gap 22 (*)     Urea Nitrogen 18.2      Creatinine 0.90      GFR Estimate >90      Calcium 9.4      Chloride 95 (*)     Glucose 171 (*)     Alkaline Phosphatase 74      AST 24      ALT 20      Protein Total 7.9      Albumin 4.8      Bilirubin Total 1.6 (*)    LACTIC ACID WHOLE BLOOD WITH 1X REPEAT IN 2 HR WHEN >2 - Abnormal    Lactic Acid, Initial 5.0 (*)    MAGNESIUM - Abnormal    Magnesium 1.4 (*)    CBC WITH PLATELETS AND DIFFERENTIAL - Abnormal    WBC Count 15.0 (*)     RBC Count 4.86      Hemoglobin 14.2      Hematocrit 41.9      MCV 86      MCH 29.2      MCHC 33.9      RDW 13.4      Platelet Count 457 (*)     % Neutrophils 89      % Lymphocytes 6      % Monocytes 5      % Eosinophils 0      % Basophils 0      % Immature Granulocytes 0      NRBCs per 100 WBC 0      Absolute Neutrophils 13.3 (*)     Absolute Lymphocytes 0.8      Absolute Monocytes 0.8       "Absolute Eosinophils 0.0      Absolute Basophils 0.1      Absolute Immature Granulocytes 0.1      Absolute NRBCs 0.0     LACTIC ACID WHOLE BLOOD - Abnormal    Lactic Acid 3.1 (*)          Treatments and/or interventions provided: IVF, IV pain medications, IV Zofran.  Patient's response to treatments and/or interventions: Tolerated well    To be done/followed up on inpatient unit:  Symptom management.    Does this patient have any cognitive concerns?:  none    Activity level - Baseline/Home:  Independent  Activity Level - Current:   Independent    Patient's Preferred language: English   Needed?: No    Isolation: None  Infection: Not Applicable  Patient tested for COVID 19 prior to admission: NO  Bariatric?: No    Vital Signs:   Vitals:    03/31/25 2120 03/31/25 2200 03/31/25 2300 04/01/25 0000   BP: (!) 157/105 (!) 152/106 (!) 145/100 (!) 157/104   Pulse: (!) 121 97 97 94   Resp: 20   18   Temp: 97.9  F (36.6  C)      TempSrc: Temporal      SpO2: 100% 97% 97% 100%   Weight: 83.9 kg (185 lb)      Height: 1.803 m (5' 11\")          Cardiac Rhythm:     Was the PSS-3 completed:   Yes  What interventions are required if any?               Family Comments: None  OBS brochure/video discussed/provided to patient/family: No              Name of person given brochure if not patient: na              Relationship to patient: na    For the majority of the shift this patient's behavior was Green.   Behavioral interventions performed were None.    ED NURSE PHONE NUMBER: *[phone]         "

## 2025-04-01 NOTE — PROCEDURES
EGD:  - Normal upper third of esophagus and middle third of esophagus.   - Moderately severe esophagitis.    - Esophageal ulcer with no bleeding and no stigmata of recent bleeding.   - [place]-colored mucosa.   - 5 cm hiatal hernia.   - Erythematous mucosa in the gastric fundus, gastric body and antrum.   - Normal duodenal bulb, first portion of the duodenum and second portion of the duodenum.   - No specimens collected.    Recs:  - Return patient to hospital draper for ongoing care.   - Advance diet as tolerated.   - Use Prilosec (omeprazole) 40 mg PO BID.   - Take prescribed proton pump inhibitor or H2 blocker (antacid) medications 30 - 60 minutes before   meals.   - Repeat upper endoscopy in 3 months to check healing.    Jeet Casillas MD  [facility]  Cell: [phone]  Office Phone: [phone]  Office Fax: [phone]

## 2025-04-01 NOTE — DISCHARGE SUMMARY
[facility]    Hospitalist Discharge Summary       Date of Admission:  3/31/2025  Date of Discharge:  4/1/2025  Discharging Provider: Gene Montenegro MD      Discharge Diagnoses   Hematemesis  Possible Antonia Leon tear  Esophageal ulcer  Esophagitis    Follow-ups Needed After Discharge   Follow-up Appointments       Follow Up      Follow up with your addiction doctor to discuss treatment of alcohol use disorder.    Follow up with your outpatient alcohol treatment resources.    Follow up with [facility] in 3 months to assess for healing.        Hospital Follow-up with Existing Primary Care Provider (PCP)      Please see details below         Schedule Primary Care visit within: 30 Days             Hospital Course   Joe Shah is a 49 year old male with a PMH of alcohol use disorder, peptic ulcer disease and esophagitis who was admitted on 4/1/2025 with abdominal pain, vomiting, reported hematemesis. Patient presents with a 24 hour history of epigastric pain, nauseas and vomiting blood & coffee ground material in setting of a one day binge alcohol use. He has had no witnessed bleeding since arrival in the ER and remains hemodynamically stable. CTA abdomen negative for acute pathology. Noted lactic acid 5.0 and leukocytosis likely reactive to severe vomiting--lactic acidosis improved with IVF.   Seen by [facility]. EGD noted moderately severe esophagitis, nonbleeding esophageal ulcer, 5cm hiatal hernia. Differential includes bleeding esophageal ulcer or Antonia leon tear. Patient improved and stable to return home with outpatient follow up. Tolerated diet prior to discharge.  - PPI oral BID  - Follow up with Dr. Mcrae in 3 months repeat EGD  - See below regarding alcohol use disorder    Alcohol use disorder with history of alcohol withdrawal  He reports that he has been sober since Jan 1, 2025, but relapsed on 3/30 by drinking half a bottle of vodka. No alcohol withdrawal noted this  "admission. He appears to have good insight into his alcohol use. He admits to constant cravings and feels that he can drink just a couple drinks but when he relapsed he realized that he could not control his alcohol use. He is interested in complete alcohol cessation and reports multiple outpatient resources he will use to abstain from alcohol use.  - Continue naltrexone  - I discussed with him the early evidence that semaglutide may have on helping with alcohol use disorder, he will discuss with his addiction medicine doctor    Clinically Significant Risk Factors Present on Admission          # Hypochloremia: Lowest Cl = 95 mmol/L in last 2 days, will monitor as appropriate    # Hypomagnesemia: Lowest Mg = 1.3 mg/dL in last 2 days, will replace as needed  # Anion Gap Metabolic Acidosis: Highest Anion Gap = 22 mmol/L in last 2 days, will monitor and treat as appropriate      # Hypertension: Noted on problem list           # Overweight: Estimated body mass index is 25.8 kg/m  as calculated from the following:    Height as of this encounter: 1.803 m (5' 11\").    Weight as of this encounter: 83.9 kg (185 lb).       # Financial/Environmental Concerns:             Consultations This Hospital Stay   GASTROENTEROLOGY IP CONSULT    Code Status   Full Code    Time Spent on this Encounter   I, Gene Montenegro, personally saw the patient today and spent approximately 40 minutes discharging this patient.       Gene Montenegro MD  [facility]  ______________________________________________________________________    Physical Exam   Vital Signs: Temp: 97.9  F (36.6  C) Temp src: Temporal BP: 123/90 Pulse: 70   Resp: 15 SpO2: 97 % O2 Device: None (Room air)    Weight: 185 lbs 0 oz    Constitutional: Male in NAD  Eyes: Nonicteric, normal ocular movements  HEENT: Normocephalic, atraumatic, oral mucosa moist  Respiratory: CTAB, no wheezing or crackles  Cardiovascular: RRR, normal S1/2, no m/r/g  GI: No " organomegaly, normoactive bowel sounds, nontender, nondistended  Skin: No rashes  Musculoskeletal: Normal strength in UE and LE, moves all extremities  Neurologic: A&Ox3  Psychiatric: Appropriate affect and mood       Primary Care Physician   Sofia Rebollar, DO    Discharge Disposition   Discharged to home  Condition at discharge: Stable    Significant Results and Procedures   Most Recent 3 CBC's:  Recent Labs   Lab Test 04/01/25  0615 03/31/25 2132 09/06/24  1020   WBC 10.1 15.0* 7.3   HGB 12.9* 14.2 14.1   MCV 87 86 89    457* 360     Most Recent 3 BMP's:  Recent Labs   Lab Test 04/01/25  0615 03/31/25 2132 09/06/24  1020    140 140   POTASSIUM 3.8 3.9 4.1   CHLORIDE 101 95* 96*   CO2 27 23 19*   BUN 13.9 18.2 10.5   CR 0.84 0.90 1.06   ANIONGAP 11 22* 25*   ISAIAS 8.9 9.4 10.0   * 171* 112*     Most Recent 2 LFT's:  Recent Labs   Lab Test 03/31/25 2132 09/06/24  1020   AST 24 70*   ALT 20 42   ALKPHOS 74 80   BILITOTAL 1.6* 0.8     Most Recent 3 INR's:  Recent Labs   Lab Test 03/31/25 2132 09/06/24  1020 02/08/24  0742   INR 1.13 1.09 1.09     Most Recent 3 Troponin's:  Recent Labs   Lab Test 05/08/21  1344 02/12/19  1305   TROPI <0.015 <0.015     Most Recent 3 BNP's:No lab results found.  Most Recent D-dimer:No lab results found.  Most Recent Cholesterol Panel:  Recent Labs   Lab Test 03/11/24  0947   CHOL 251*   *   HDL 89   TRIG 136       Results for orders placed or performed during the hospital encounter of 03/31/25   CTA Abdomen Pelvis with Contrast    Narrative    EXAM: CTA ABDOMEN PELVIS WITH CONTRAST  LOCATION: [facility]  DATE: 3/31/2025    INDICATION: History of diverticulitis. Ate popcorn last night and now has been vomiting all day with coffee ground emesis.  COMPARISON: None.  TECHNIQUE: CT angiogram abdomen pelvis during arterial phase of injection of IV contrast. 2D and 3D MIP reconstructions were performed by the CT technologist. Dose  reduction techniques were used.  CONTRAST: 114 mL Isovue 370    FINDINGS:  ANGIOGRAM ABDOMEN/PELVIS: Mild aortoiliac atherosclerotic calcifications. No abdominal aortic aneurysm or dissection. Celiac artery, SMA, single bilateral renal arteries, MARIANELA and bilateral iliofemoral arteries are patent. No active contrast   extravasation.    LOWER CHEST: Moderate size hiatal hernia is present. No consolidation or pleural effusion.    HEPATOBILIARY: Normal liver and gallbladder.    PANCREAS: Normal.    SPLEEN: Normal.    ADRENAL GLANDS: Normal.    KIDNEYS/BLADDER: Normal.    BOWEL: Multiple diverticula in the descending and sigmoid colon without acute diverticulitis. No inflammatory bowel thickening or bowel obstruction. Appendix is normal. No free fluid or free air.    LYMPH NODES: Normal.    PELVIC ORGANS: Normal.    MUSCULOSKELETAL: Posterior spinal fusion instrumentation at L5/S1. No significant osseous abnormalities.        Impression    IMPRESSION:    1.  No acute findings in the abdomen and pelvis, including acute hemorrhage.    2.  Colonic diverticulosis without acute diverticulitis.    3.  Moderate size hiatal hernia.       Discharge Orders      Reason for your hospital stay    You were hospitalized for vomiting blood. You have an ulcer in your esophagus, and ongoing esophagitis.     Activity    Your activity upon discharge: activity as tolerated     Follow Up    Follow up with your addiction doctor to discuss treatment of alcohol use disorder.    Follow up with your outpatient alcohol treatment resources.    Follow up with [facility] in 3 months to assess for healing.     Diet    Follow this diet upon discharge:   Regular Diet Adult     Hospital Follow-up with Existing Primary Care Provider (PCP)    Please see details below          Discharge Medications   Current Discharge Medication List        CONTINUE these medications which have CHANGED    Details   pantoprazole (PROTONIX) 40 MG EC tablet Take 1 tablet (40 mg)  by mouth 2 times daily (before meals).  Qty: 60 tablet, Refills: 0    Associated Diagnoses: Acute alcoholic gastritis without hemorrhage           CONTINUE these medications which have NOT CHANGED    Details   !! gabapentin (NEURONTIN) 100 MG capsule Take 1 capsule (100 mg) by mouth 2 times daily Morning and bedtime.  Qty: 60 capsule, Refills: 0    Associated Diagnoses: Alcohol withdrawal syndrome without complication (H)      !! gabapentin (NEURONTIN) 300 MG capsule Take 1 capsule (300 mg) by mouth nightly as needed (sleep)  Qty: 30 capsule, Refills: 0    Associated Diagnoses: Alcohol dependence with withdrawal with complication (H)      metoprolol succinate ER (TOPROL XL) 50 MG 24 hr tablet Take 1 tablet (50 mg) by mouth daily  Qty: 30 tablet, Refills: 0    Associated Diagnoses: Alcohol dependence with withdrawal with complication (H)      Multiple Vitamins-Minerals (MULTIVITAMIN ADULT) CHEW Take 2 chew tab by mouth daily Centrum  Qty: 60 tablet, Refills: 0    Associated Diagnoses: Alcohol dependence with withdrawal with complication (H)      venlafaxine (EFFEXOR XR) 37.5 MG 24 hr capsule Take 1 capsule (37.5 mg) by mouth daily  Qty: 30 capsule, Refills: 0    Associated Diagnoses: Depression with anxiety      acetaminophen (TYLENOL) 500 MG tablet Take 500-1,000 mg by mouth every 6 hours as needed for mild pain      naltrexone (DEPADE/REVIA) 50 MG tablet Take 1 tablet (50 mg) by mouth daily  Qty: 30 tablet, Refills: 0    Associated Diagnoses: Alcohol dependence with withdrawal with complication (H)       !! - Potential duplicate medications found. Please discuss with provider.        Allergies   Allergies   Allergen Reactions    Morphine Nausea and Vomiting and Nausea    Oxycodone Itching     1/22 Patient reports he can tolerate this

## 2025-04-01 NOTE — ED NOTES
Bed: ED20  Expected date:   Expected time:   Means of arrival:   Comments:  Pt in endo coming back

## 2025-04-01 NOTE — CONSULTS
[facility]  Gastroenterology Consultation         Joe Shah  [address] [id number]  49 year old male    Admission Date/Time: 3/31/2025  Primary Care Provider: Sofia Rebollar  Referring / Attending Physician:  Dr. Phillips    We were asked to see the patient in consultation by Dr. Phillips for evaluation of GI bleed.      CC: Abdominal Pain and Hematemesis     HPI: Joe Shah is a pleasant 49 year old male with a history of alcohol abuse disorder, hypertension, and hyperlipidemia who presents to the ED for evaluation of abdominal pain and hematemesis. Patient reports that he has had left lower quadrant abdominal pain all day, that is radiating into the rest of his abdomen. He also mentions that due to the severity of the pain, when he coughs it causes him to vomit, and this vomit contained blood. There was enough blood to entirely discolor the water in his toilet. He hasn't had any recent bowel movements and he does not take ibuprofen. He stopped drinking on 1/1/25, but he did have a couple of drinks last night.     ROS: A comprehensive ten point review of systems was negative aside from those in mentioned in the HPI.      PAST MED HX:  I have reviewed this patient's medical history and updated it with pertinent information if needed.   Past Medical History:   Diagnosis Date    Alcohol withdrawal (H)     Anxiety     Back pain     Depressive disorder     Hyperlipidemia     Hypertension     PUD (peptic ulcer disease)     SDH (subdural hematoma) (H) 12/2023    Severe alcohol use disorder (H)        MEDICATIONS:   Prior to Admission Medications   Prescriptions Last Dose Informant Patient Reported? Taking?   Multiple Vitamins-Minerals (MULTIVITAMIN ADULT) CHEW More than a month Self No Yes   Sig: Take 2 chew tab by mouth daily Centrum   acetaminophen (TYLENOL) 500 MG tablet Unknown Self Yes No   Sig: Take 500-1,000 mg by mouth every 6 hours as needed for mild  pain   gabapentin (NEURONTIN) 100 MG capsule Past Week Self No Yes   Sig: Take 1 capsule (100 mg) by mouth 2 times daily Morning and bedtime.   gabapentin (NEURONTIN) 300 MG capsule Past Week Self No Yes   Sig: Take 1 capsule (300 mg) by mouth nightly as needed (sleep)   metoprolol succinate ER (TOPROL XL) 50 MG 24 hr tablet Past Week Self No Yes   Sig: Take 1 tablet (50 mg) by mouth daily   Patient taking differently: Take 25 mg by mouth daily.   naltrexone (DEPADE/REVIA) 50 MG tablet Not Taking Self No No   Sig: Take 1 tablet (50 mg) by mouth daily   Patient not taking: Reported on 4/1/2025   pantoprazole (PROTONIX) 40 MG EC tablet Past Week Morning  Yes Yes   Sig: Take 40 mg by mouth daily.   venlafaxine (EFFEXOR XR) 37.5 MG 24 hr capsule Past Week Morning Self No Yes   Sig: Take 1 capsule (37.5 mg) by mouth daily      Facility-Administered Medications: None       ALLERGIES:   Allergies   Allergen Reactions    Morphine Nausea and Vomiting and Nausea    Oxycodone Itching     1/22 Patient reports he can tolerate this       SOCIAL HISTORY:  Social History     Tobacco Use    Smoking status: Former    Smokeless tobacco: Current     Types: Chew   Substance Use Topics    Alcohol use: Yes     Comment: Daily; 750 ML    Drug use: No       FAMILY HISTORY:  Family History   Problem Relation Age of Onset    No Known Problems Maternal Grandmother     No Known Problems Maternal Grandfather     No Known Problems Paternal Grandmother     Substance Abuse Paternal Grandfather        PHYSICAL EXAM:   Vital Signs with Ranges  Temp: 97.9  F (36.6  C) Temp src: Temporal BP: (!) 163/113 Pulse: 96   Resp: 16 SpO2: 97 % O2 Device: None (Room air)    I/O last 3 completed shifts:  In: 2050 [IV Piggyback:2050]  Out: -     Constitutional: Alert, oriented to person, place, date, situation.  Cooperative, lying in bed in NAD.  Respiratory:  Lungs CTAB.  No crackles, wheezes, or rhonchi, no labored breathing.  Cardiovascular:  Heart RRR, no MRG,  no edema.  GI:  Abdomen soft, non-distended, TTP in epigastrium and with normoactive BS  Skin/Integumen:  Warm, dry, non-diaphoretic.  MSK: CMS x4 intact.      ADDITIONAL COMMENTS:   I reviewed the patient's new clinical lab test results.   Recent Labs   Lab Test 04/01/25  0615 03/31/25 2132 09/06/24  1020 02/08/24  1207 02/08/24  0742   WBC 10.1 15.0* 7.3   < > 4.8   HGB 12.9* 14.2 14.1   < > 13.4   MCV 87 86 89   < > 96    457* 360   < > 215   INR  --  1.13 1.09  --  1.09    < > = values in this interval not displayed.     Recent Labs   Lab Test 04/01/25  0615 03/31/25  2132 09/06/24  1020   POTASSIUM 3.8 3.9 4.1   CHLORIDE 101 95* 96*   CO2 27 23 19*   BUN 13.9 18.2 10.5   ANIONGAP 11 22* 25*     Recent Labs   Lab Test 04/01/25  0105 03/31/25 2132 09/06/24  1020 08/19/24  2232 05/24/24  0631 05/23/24  1816 05/09/21  0606 05/08/21  1413   ALBUMIN  --  4.8 5.0 4.7   < >  --    < >  --    BILITOTAL  --  1.6* 0.8 1.2   < >  --    < >  --    ALT  --  20 42 23   < >  --    < >  --    AST  --  24 70* 31   < >  --    < >  --    PROTEIN Negative  --   --   --   --  30*  --  50*   LIPASE  --  27 56 28   < >  --    < >  --     < > = values in this interval not displayed.       I reviewed the patient's new imaging results.        CONSULTATION ASSESSMENT AND PLAN:    Joe Shah is a pleasant 49 year old male with a history of alcohol abuse disorder, hypertension, and hyperlipidemia who presents to the ED for evaluation of abdominal pain and hematemesis.     GIB - Hematemesis  Hx of alcoholic esophagitis and peptic ulcer disease  Patient presents with a 24 hour history of epigastric pain, nausea/vomiting in setting of recent alcohol abuse. He has had no witnessed bleeding since arrival in the ED and remains hemodynamically stable.   CTA abdomen negative for acute pathology. He does have a leucocytosis which could be reactive. His lactic acid was elevated, but improve with iv hydration and is likely due  dehydration. Differential include gastric ulcer, esophagitis, gastritis, blake-weiz tears.  -NPO  -Iv fluids  -Protonix 40 mg IV BID  -EGD today  -We appreciate the consult and will follow     Hx of alcohol abuse and withdrawal  Reports a one day binge of alcohol. Should not be at risk for withdrawal, but in case he is not being honest about the extent of his alcohol use will place on ciwa  -CIWA protocol  -valium prn  -MVI, Thiamine, Folate  -Recommend alcohol cessation      [facility].  Office: [phone]  Cell: [phone] (Dr. Mcrae)

## 2025-05-29 ENCOUNTER — HOSPITAL ENCOUNTER (EMERGENCY)
Facility: CLINIC | Age: 50
Discharge: HOME | End: 2025-05-29
Attending: EMERGENCY MEDICINE
Payer: COMMERCIAL

## 2025-05-29 VITALS
OXYGEN SATURATION: 96 % | TEMPERATURE: 98 F | BODY MASS INDEX: 25.48 KG/M2 | DIASTOLIC BLOOD PRESSURE: 107 MMHG | HEART RATE: 107 BPM | WEIGHT: 182 LBS | SYSTOLIC BLOOD PRESSURE: 156 MMHG | RESPIRATION RATE: 26 BRPM | HEIGHT: 71 IN

## 2025-05-29 DIAGNOSIS — R11.2 NAUSEA AND VOMITING, UNSPECIFIED VOMITING TYPE: ICD-10-CM

## 2025-05-29 DIAGNOSIS — F10.930 ALCOHOL WITHDRAWAL, UNCOMPLICATED (H): ICD-10-CM

## 2025-05-29 LAB
ABO + RH BLD: NORMAL
ALBUMIN SERPL BCG-MCNC: 4.7 G/DL (ref 3.5–5.2)
ALP SERPL-CCNC: 93 U/L (ref 40–150)
ALT SERPL W P-5'-P-CCNC: 15 U/L (ref 0–70)
ANION GAP SERPL CALCULATED.3IONS-SCNC: 26 MMOL/L (ref 7–15)
AST SERPL W P-5'-P-CCNC: 28 U/L (ref 0–45)
ATRIAL RATE - MUSE: 110 BPM
BASOPHILS # BLD AUTO: 0 10E3/UL (ref 0–0.2)
BASOPHILS NFR BLD AUTO: 0 %
BILIRUB SERPL-MCNC: 1.6 MG/DL
BLD GP AB SCN SERPL QL: NEGATIVE
BUN SERPL-MCNC: 11 MG/DL (ref 6–20)
CALCIUM SERPL-MCNC: 9.7 MG/DL (ref 8.8–10.4)
CHLORIDE SERPL-SCNC: 97 MMOL/L (ref 98–107)
CREAT SERPL-MCNC: 0.84 MG/DL (ref 0.67–1.17)
DIASTOLIC BLOOD PRESSURE - MUSE: NORMAL MMHG
EGFRCR SERPLBLD CKD-EPI 2021: >90 ML/MIN/1.73M2
EOSINOPHIL # BLD AUTO: 0 10E3/UL (ref 0–0.7)
EOSINOPHIL NFR BLD AUTO: 0 %
ERYTHROCYTE [DISTWIDTH] IN BLOOD BY AUTOMATED COUNT: 17 % (ref 10–15)
ETHANOL SERPL-MCNC: <0.01 G/DL
GLUCOSE BLDC GLUCOMTR-MCNC: 142 MG/DL (ref 70–99)
GLUCOSE SERPL-MCNC: 161 MG/DL (ref 70–99)
HCO3 SERPL-SCNC: 17 MMOL/L (ref 22–29)
HCT VFR BLD AUTO: 42.1 % (ref 40–53)
HGB BLD-MCNC: 14.3 G/DL (ref 13.3–17.7)
HOLD SPECIMEN: NORMAL
IMM GRANULOCYTES # BLD: 0.1 10E3/UL
IMM GRANULOCYTES NFR BLD: 1 %
INR PPP: 1.07 (ref 0.85–1.15)
INTERPRETATION ECG - MUSE: NORMAL
LIPASE SERPL-CCNC: 27 U/L (ref 13–60)
LYMPHOCYTES # BLD AUTO: 0.6 10E3/UL (ref 0.8–5.3)
LYMPHOCYTES NFR BLD AUTO: 4 %
MAGNESIUM SERPL-MCNC: 1.5 MG/DL (ref 1.7–2.3)
MCH RBC QN AUTO: 28.5 PG (ref 26.5–33)
MCHC RBC AUTO-ENTMCNC: 34 G/DL (ref 31.5–36.5)
MCV RBC AUTO: 84 FL (ref 78–100)
MONOCYTES # BLD AUTO: 0.8 10E3/UL (ref 0–1.3)
MONOCYTES NFR BLD AUTO: 6 %
NEUTROPHILS # BLD AUTO: 12.8 10E3/UL (ref 1.6–8.3)
NEUTROPHILS NFR BLD AUTO: 90 %
NRBC # BLD AUTO: 0 10E3/UL
NRBC BLD AUTO-RTO: 0 /100
P AXIS - MUSE: -7 DEGREES
PLATELET # BLD AUTO: 264 10E3/UL (ref 150–450)
POTASSIUM SERPL-SCNC: 4.2 MMOL/L (ref 3.4–5.3)
PR INTERVAL - MUSE: 150 MS
PROT SERPL-MCNC: 7.8 G/DL (ref 6.4–8.3)
PROTHROMBIN TIME: 13.7 SECONDS (ref 11.8–14.8)
QRS DURATION - MUSE: 68 MS
QT - MUSE: 356 MS
QTC - MUSE: 481 MS
R AXIS - MUSE: 11 DEGREES
RBC # BLD AUTO: 5.02 10E6/UL (ref 4.4–5.9)
SODIUM SERPL-SCNC: 140 MMOL/L (ref 135–145)
SPECIMEN EXP DATE BLD: NORMAL
SYSTOLIC BLOOD PRESSURE - MUSE: NORMAL MMHG
T AXIS - MUSE: -1 DEGREES
VENTRICULAR RATE- MUSE: 110 BPM
WBC # BLD AUTO: 14.3 10E3/UL (ref 4–11)

## 2025-05-29 PROCEDURE — 250N000011 HC RX IP 250 OP 636: Performed by: EMERGENCY MEDICINE

## 2025-05-29 PROCEDURE — 96376 TX/PRO/DX INJ SAME DRUG ADON: CPT

## 2025-05-29 PROCEDURE — 85610 PROTHROMBIN TIME: CPT | Performed by: EMERGENCY MEDICINE

## 2025-05-29 PROCEDURE — 82962 GLUCOSE BLOOD TEST: CPT

## 2025-05-29 PROCEDURE — 258N000003 HC RX IP 258 OP 636: Performed by: EMERGENCY MEDICINE

## 2025-05-29 PROCEDURE — 80053 COMPREHEN METABOLIC PANEL: CPT | Performed by: EMERGENCY MEDICINE

## 2025-05-29 PROCEDURE — 96375 TX/PRO/DX INJ NEW DRUG ADDON: CPT

## 2025-05-29 PROCEDURE — 96365 THER/PROPH/DIAG IV INF INIT: CPT

## 2025-05-29 PROCEDURE — 86850 RBC ANTIBODY SCREEN: CPT | Performed by: EMERGENCY MEDICINE

## 2025-05-29 PROCEDURE — 85048 AUTOMATED LEUKOCYTE COUNT: CPT | Performed by: EMERGENCY MEDICINE

## 2025-05-29 PROCEDURE — 36415 COLL VENOUS BLD VENIPUNCTURE: CPT | Performed by: EMERGENCY MEDICINE

## 2025-05-29 PROCEDURE — 96361 HYDRATE IV INFUSION ADD-ON: CPT

## 2025-05-29 PROCEDURE — 99284 EMERGENCY DEPT VISIT MOD MDM: CPT | Mod: 25

## 2025-05-29 PROCEDURE — 83690 ASSAY OF LIPASE: CPT | Performed by: EMERGENCY MEDICINE

## 2025-05-29 PROCEDURE — 83735 ASSAY OF MAGNESIUM: CPT | Performed by: EMERGENCY MEDICINE

## 2025-05-29 PROCEDURE — 82077 ASSAY SPEC XCP UR&BREATH IA: CPT | Performed by: EMERGENCY MEDICINE

## 2025-05-29 PROCEDURE — 93005 ELECTROCARDIOGRAM TRACING: CPT

## 2025-05-29 RX ORDER — THIAMINE HYDROCHLORIDE 100 MG/ML
100 INJECTION, SOLUTION INTRAMUSCULAR; INTRAVENOUS ONCE
Status: COMPLETED | OUTPATIENT
Start: 2025-05-29 | End: 2025-05-29

## 2025-05-29 RX ORDER — ONDANSETRON 2 MG/ML
4 INJECTION INTRAMUSCULAR; INTRAVENOUS EVERY 30 MIN PRN
Status: DISCONTINUED | OUTPATIENT
Start: 2025-05-29 | End: 2025-05-29 | Stop reason: HOSPADM

## 2025-05-29 RX ORDER — FOLIC ACID 5 MG/ML
1 INJECTION, SOLUTION INTRAMUSCULAR; INTRAVENOUS; SUBCUTANEOUS ONCE
Status: COMPLETED | OUTPATIENT
Start: 2025-05-29 | End: 2025-05-29

## 2025-05-29 RX ORDER — LORAZEPAM 1 MG/1
1 TABLET ORAL 2 TIMES DAILY PRN
Qty: 10 TABLET | Refills: 0 | Status: SHIPPED | OUTPATIENT
Start: 2025-05-29

## 2025-05-29 RX ORDER — LORAZEPAM 1 MG/1
1 TABLET ORAL 2 TIMES DAILY PRN
Qty: 10 TABLET | Refills: 0 | Status: SHIPPED | OUTPATIENT
Start: 2025-05-29 | End: 2025-05-29

## 2025-05-29 RX ORDER — MAGNESIUM SULFATE HEPTAHYDRATE 40 MG/ML
2 INJECTION, SOLUTION INTRAVENOUS ONCE
Status: COMPLETED | OUTPATIENT
Start: 2025-05-29 | End: 2025-05-29

## 2025-05-29 RX ORDER — ONDANSETRON 4 MG/1
4 TABLET, ORALLY DISINTEGRATING ORAL EVERY 6 HOURS PRN
Qty: 8 TABLET | Refills: 0 | Status: SHIPPED | OUTPATIENT
Start: 2025-05-29

## 2025-05-29 RX ORDER — LORAZEPAM 2 MG/ML
1 INJECTION INTRAMUSCULAR
Status: COMPLETED | OUTPATIENT
Start: 2025-05-29 | End: 2025-05-29

## 2025-05-29 RX ORDER — ONDANSETRON 4 MG/1
4 TABLET, ORALLY DISINTEGRATING ORAL EVERY 6 HOURS PRN
Qty: 8 TABLET | Refills: 0 | Status: SHIPPED | OUTPATIENT
Start: 2025-05-29 | End: 2025-05-29

## 2025-05-29 RX ADMIN — ONDANSETRON 4 MG: 2 INJECTION, SOLUTION INTRAMUSCULAR; INTRAVENOUS at 09:18

## 2025-05-29 RX ADMIN — LORAZEPAM 1 MG: 2 INJECTION INTRAMUSCULAR; INTRAVENOUS at 09:30

## 2025-05-29 RX ADMIN — LORAZEPAM 1 MG: 2 INJECTION INTRAMUSCULAR; INTRAVENOUS at 13:27

## 2025-05-29 RX ADMIN — SODIUM CHLORIDE 1000 ML: 0.9 INJECTION, SOLUTION INTRAVENOUS at 11:58

## 2025-05-29 RX ADMIN — MAGNESIUM SULFATE HEPTAHYDRATE 2 G: 40 INJECTION, SOLUTION INTRAVENOUS at 10:39

## 2025-05-29 RX ADMIN — ONDANSETRON 4 MG: 2 INJECTION, SOLUTION INTRAMUSCULAR; INTRAVENOUS at 09:29

## 2025-05-29 RX ADMIN — PANTOPRAZOLE SODIUM 80 MG: 40 INJECTION, POWDER, FOR SOLUTION INTRAVENOUS at 09:18

## 2025-05-29 RX ADMIN — FOLIC ACID 1 MG: 5 INJECTION, SOLUTION INTRAMUSCULAR; INTRAVENOUS; SUBCUTANEOUS at 09:41

## 2025-05-29 RX ADMIN — THIAMINE HYDROCHLORIDE 100 MG: 100 INJECTION, SOLUTION INTRAMUSCULAR; INTRAVENOUS at 09:29

## 2025-05-29 RX ADMIN — LORAZEPAM 1 MG: 2 INJECTION INTRAMUSCULAR; INTRAVENOUS at 11:59

## 2025-05-29 RX ADMIN — SODIUM CHLORIDE 1000 ML: 0.9 INJECTION, SOLUTION INTRAVENOUS at 09:17

## 2025-05-29 ASSESSMENT — ACTIVITIES OF DAILY LIVING (ADL)
ADLS_ACUITY_SCORE: 56

## 2025-05-29 ASSESSMENT — COLUMBIA-SUICIDE SEVERITY RATING SCALE - C-SSRS
6. HAVE YOU EVER DONE ANYTHING, STARTED TO DO ANYTHING, OR PREPARED TO DO ANYTHING TO END YOUR LIFE?: NO
1. IN THE PAST MONTH, HAVE YOU WISHED YOU WERE DEAD OR WISHED YOU COULD GO TO SLEEP AND NOT WAKE UP?: NO
2. HAVE YOU ACTUALLY HAD ANY THOUGHTS OF KILLING YOURSELF IN THE PAST MONTH?: NO

## 2025-05-29 NOTE — ED PROVIDER NOTES
Emergency Department Note      History of Present Illness     Chief Complaint   Abdominal Pain and Hematemesis      HPI   Joe Shah is a 49 year old male with a history of alcohol abuse, peptic ulcer disease, hypertension who presents with abdominal pain, vomiting. The patient endorses intermittent alcoholism over the past 30 years. States this started to improve after treatment in January but has had relapses. Over the memorial day weekend he [place] to the cabin, family left on Sunday (4 days ago) and he started drinking again, vodka. He came home Monday and his last drink was on Tuesday (2 days ago). Last night around 0100 he woke up with vomiting that persisted into the morning where he began to notice some blood in his vomit.     Independent Historian   None    Review of External Notes   I reviewed a note from 3/31/2025 where he was seen for abdominal pain, vomiting blood.     Past Medical History     Medical History and Problem List   Alcohol abuse  Anxiety  Depression  Hyperlipidemia  Hypertension  PUD   Subdural hematoma   Primary insomnia   Upper GI bleed   Alcohol withdrawal syndrome     Medications   Neurontin   Toprol   Protonix   Effexor     Surgical History   Twining holes evacuate hematoma subdural   ENT surgery   EGD  Neck surgery     Physical Exam     Patient Vitals for the past 24 hrs:   BP Temp Temp src Pulse Resp SpO2 Height Weight   05/29/25 1300 (!) 157/103 -- -- 110 18 98 % -- --   05/29/25 1230 (!) 167/114 -- -- 103 20 98 % -- --   05/29/25 1200 (!) 151/106 -- -- 105 13 100 % -- --   05/29/25 1130 (!) 154/108 -- -- 105 19 98 % -- --   05/29/25 1100 (!) 139/105 -- -- 106 20 98 % -- --   05/29/25 1030 (!) 144/108 -- -- 96 23 97 % -- --   05/29/25 1000 (!) 144/108 -- -- 109 22 96 % -- --   05/29/25 0935 (!) 148/108 -- -- 115 17 95 % -- --   05/29/25 0930 (!) 146/115 -- -- 108 30 98 % -- --   05/29/25 0925 (!) 157/116 -- -- 109 20 96 % -- --   05/29/25 0822 (!) 141/101 98  F (36.7  C)  "Temporal 105 20 99 % 1.803 m (5' 11\") 82.6 kg (182 lb)     Physical Exam  Nursing note and vitals reviewed.    Constitutional:  Appears uncomfortable.    HENT:                Nose normal.  No discharge.      Oral mucosa is moist.  Eyes:    Conjunctivae are normal without injection.  Pupils are equal.  Cardiovascular:  Tachycardic, regular rhythm with normal S1 and S2.      Normal heart sounds and peripheral pulses 2+ and equal.       No murmur or germain.  Pulmonary:  Effort normal and breath sounds clear to auscultation bilaterally.    No respiratory distress.     GI:    Soft. Some abdominal pain with guarding. No distension and no mass. No tenderness.   Musculoskeletal:  Normal range of motion. No extremity deformity.     No edema and no tenderness.    Neurological:   Alert and oriented. No focal weakness. Some tremulousness   Skin:    Skin is warm and dry. No rash noted.   Psychiatric:   Behavior is normal. Appropriate mood and affect.     Judgment and thought content normal.     Diagnostics     Lab Results   Labs Ordered and Resulted from Time of ED Arrival to Time of ED Departure   COMPREHENSIVE METABOLIC PANEL - Abnormal       Result Value    Sodium 140      Potassium 4.2      Carbon Dioxide (CO2) 17 (*)     Anion Gap 26 (*)     Urea Nitrogen 11.0      Creatinine 0.84      GFR Estimate >90      Calcium 9.7      Chloride 97 (*)     Glucose 161 (*)     Alkaline Phosphatase 93      AST 28      ALT 15      Protein Total 7.8      Albumin 4.7      Bilirubin Total 1.6 (*)    MAGNESIUM - Abnormal    Magnesium 1.5 (*)    CBC WITH PLATELETS AND DIFFERENTIAL - Abnormal    WBC Count 14.3 (*)     RBC Count 5.02      Hemoglobin 14.3      Hematocrit 42.1      MCV 84      MCH 28.5      MCHC 34.0      RDW 17.0 (*)     Platelet Count 264      % Neutrophils 90      % Lymphocytes 4      % Monocytes 6      % Eosinophils 0      % Basophils 0      % Immature Granulocytes 1      NRBCs per 100 WBC 0      Absolute Neutrophils 12.8 (*)  "    Absolute Lymphocytes 0.6 (*)     Absolute Monocytes 0.8      Absolute Eosinophils 0.0      Absolute Basophils 0.0      Absolute Immature Granulocytes 0.1      Absolute NRBCs 0.0     GLUCOSE BY METER - Abnormal    GLUCOSE BY METER POCT 142 (*)    LIPASE - Normal    Lipase 27     ETHANOL LEVEL BLOOD - Normal    Ethanol Level Blood <0.01     INR - Normal    INR 1.07      PT 13.7     TYPE AND SCREEN, ADULT    ABO/RH(D) O POS      Antibody Screen Negative      SPECIMEN EXPIRATION DATE 6/1/2025 11:59:00 PM CDT     ABO/RH TYPE AND SCREEN       Imaging   No orders to display       EKG   ECG results from 05/29/25   EKG 12 lead     Value    Systolic Blood Pressure     Diastolic Blood Pressure     Ventricular Rate 110    Atrial Rate 110    MS Interval 150    QRS Duration 68        QTc 481    P Axis -7    R AXIS 11    T Axis -1    Interpretation ECG      Sinus tachycardia  QTcB >= 480 msec  Abnormal ECG       Independent Interpretation   None    ED Course      Medications Administered   Medications   ondansetron (ZOFRAN) injection 4 mg (4 mg Intravenous $Given 5/29/25 0929)   sodium chloride 0.9% BOLUS 1,000 mL (0 mLs Intravenous Stopped 5/29/25 1030)   pantoprazole (PROTONIX) IV push injection 80 mg (80 mg Intravenous $Given 5/29/25 0918)   thiamine (B-1) injection 100 mg (100 mg Intravenous $Given 5/29/25 0929)   folic acid injection 1 mg (1 mg Intravenous $Given 5/29/25 0941)   LORazepam (ATIVAN) injection 1 mg (1 mg Intravenous $Given 5/29/25 1327)   magnesium sulfate 2 g in 50 mL sterile water intermittent infusion (0 g Intravenous Stopped 5/29/25 1140)   sodium chloride 0.9% BOLUS 1,000 mL (0 mLs Intravenous Stopped 5/29/25 1326)       Procedures   Procedures     Discussion of Management   None    ED Course   ED Course as of 05/29/25 1430   Thu May 29, 2025   0911 I initially assessed the patient and obtained the above history and physical exam.    1337 I rechecked the patient        Additional  Documentation  None    Medical Decision Making / Diagnosis     CMS Diagnoses: None    MIPS   None               [facility]   Joe Shah is a 49 year old male with alcohol abuse history who has been drinking again comes in with signs of withdrawal with tremors and tachycardic but also having nausea and vomiting.  Labs were obtained and his white blood cell, slightly elevated at 14.3, magnesium was low at 1.5 so he was given 2 g of magnesium sulfate, liver function test normal other than a mildly elevated bilirubin of 1.6.  Lipase is normal.  He really did not have significant abdominal pain just some discomfort.  After Zofran and IV fluids his abdominal pain was much improved he was no longer vomiting.  His alcohol level is 0 his coagulation studies were normal.  EKG normal.  The patient was tremulous and tachycardic so was given Ativan he said he felt much better after the Ativan he was given a second dose of Ativan IV.  I think the patient is going through withdrawal.  He had a history of bleeding ulcer and I did give him 80 mg of Protonix IV.  He is on a proton pump inhibitor at home and he will continue that.  Right now he does not want to go to detox, he is going to contact his treatment facility to get support and start going to AA.  I am going to prescribe some Zofran.  I am also prescribing Ativan for withdrawal over the next 24 to 48 hours and he is comfortable utilizing this.  He will return if he gets worse.  His hemoglobin is good and I do not suspect that he is got an active bleeding ulcer at this time.    Fluids, take the Ativan every couple of hours as needed for shakes and withdrawal symptoms.  Zofran as needed for nausea.  I would recommend picking up a multivitamin and taking that daily.  You need to contact your treatment facility in [place] and be honest with them and see how they can support you.  I would recommend you go to at least 1 or 2 AA meetings this week and you need a sponsor locally.   You need to quit listening to the addict and start taking care of you and learning to love yourself again.    Disposition   The patient was discharged.     Diagnosis     ICD-10-CM    1. Alcohol withdrawal, uncomplicated (H)  F10.930       2. Nausea and vomiting, unspecified vomiting type  R11.2            Discharge Medications   New Prescriptions    LORAZEPAM (ATIVAN) 1 MG TABLET    Take 1 tablet (1 mg) by mouth 2 times daily as needed for withdrawal.    ONDANSETRON (ZOFRAN ODT) 4 MG ODT TAB    Take 1 tablet (4 mg) by mouth every 6 hours as needed for nausea.     Scribe Disclosure:  I, John Gore, am serving as a scribe at 9:18 AM on 5/29/2025 to document services personally performed by Gertrudis Prieto MD based on my observations and the provider's statements to me.        Gertrudis Prieto MD  05/29/25 2004

## 2025-05-29 NOTE — ED TRIAGE NOTES
Pt woke up with abdominal pain, had several bouts of emesis, now the last 2 times had blood in emesis. Pt states he drank beer up at the cabin last weekend and should not be drinking as he has had this problem in the past. Rates abdominal pain 10/10 intermittent.     Triage Assessment (Adult)       Row Name 05/29/25 0827          Triage Assessment    Airway WDL WDL        Respiratory WDL    Respiratory WDL WDL        Cardiac WDL    Cardiac WDL WDL        Peripheral/Neurovascular WDL    Peripheral Neurovascular WDL WDL        Cognitive/Neuro/Behavioral WDL    Cognitive/Neuro/Behavioral WDL WDL

## 2025-05-29 NOTE — ED TRIAGE NOTES
Patient has had an intermittent struggle with alcoholism for over 30 years. Patient reports that he has had ulcers in the past when he was on a drinking binge. Patient's best friend from childhood  11 years ago and patient reports that he had had a serious and continuous drinking problem since then. He reports the pain is worse now than when he had ulcers 10/10. Patient reports that his longest stent of sobriety has been 6 months when he was at [place]. Patient reports having blood in his emesis.     Triage Assessment (Adult)       Row Name [phone] [id number]       Triage Assessment    Airway WDL WDL WDL       Respiratory WDL    Respiratory WDL WDL WDL       Skin Circulation/Temperature WDL    Skin Circulation/Temperature WDL WDL --       Cardiac WDL    Cardiac WDL WDL WDL       Peripheral/Neurovascular WDL    Peripheral Neurovascular WDL WDL WDL       Cognitive/Neuro/Behavioral WDL    Cognitive/Neuro/Behavioral WDL WDL WDL       [place] Coma Scale    Best Eye Response 4-->(E4) spontaneous --    Best Motor Response 6-->(M6) obeys commands --    Best Verbal Response 5-->(V5) oriented --    [place] Coma Scale Score 15 --

## 2025-05-29 NOTE — DISCHARGE INSTRUCTIONS
Fluids, take the Ativan every couple of hours as needed for shakes and withdrawal symptoms.  Zofran as needed for nausea.  I would recommend picking up a multivitamin and taking that daily.  You need to contact your treatment facility in [place] and be honest with them and see how they can support you.  I would recommend you go to at least 1 or 2 AA meetings this week and you need a sponsor locally.  You need to quit listening to the addict and start taking care of you and learning to love yourself again.

## 2025-07-01 ENCOUNTER — HOSPITAL ENCOUNTER (EMERGENCY)
Facility: CLINIC | Age: 50
Discharge: HOME | End: 2025-07-01
Attending: EMERGENCY MEDICINE | Admitting: EMERGENCY MEDICINE
Payer: COMMERCIAL

## 2025-07-01 ENCOUNTER — APPOINTMENT (OUTPATIENT)
Dept: CT IMAGING | Facility: CLINIC | Age: 50
End: 2025-07-01
Attending: EMERGENCY MEDICINE
Payer: COMMERCIAL

## 2025-07-01 VITALS
WEIGHT: 180 LBS | SYSTOLIC BLOOD PRESSURE: 130 MMHG | OXYGEN SATURATION: 96 % | HEIGHT: 71 IN | BODY MASS INDEX: 25.2 KG/M2 | DIASTOLIC BLOOD PRESSURE: 104 MMHG | TEMPERATURE: 97 F | HEART RATE: 89 BPM | RESPIRATION RATE: 18 BRPM

## 2025-07-01 DIAGNOSIS — F10.90 ALCOHOL USE DISORDER: ICD-10-CM

## 2025-07-01 DIAGNOSIS — R74.8 ELEVATED LIVER ENZYMES: ICD-10-CM

## 2025-07-01 DIAGNOSIS — R74.8 ELEVATED LIPASE: ICD-10-CM

## 2025-07-01 DIAGNOSIS — R42 DIZZINESS: ICD-10-CM

## 2025-07-01 LAB
ALBUMIN SERPL BCG-MCNC: 4.5 G/DL (ref 3.5–5.2)
ALP SERPL-CCNC: 86 U/L (ref 40–150)
ALT SERPL W P-5'-P-CCNC: 41 U/L (ref 0–70)
ANION GAP SERPL CALCULATED.3IONS-SCNC: 21 MMOL/L (ref 7–15)
AST SERPL W P-5'-P-CCNC: 95 U/L (ref 0–45)
ATRIAL RATE - MUSE: 82 BPM
BASOPHILS # BLD AUTO: 0 10E3/UL (ref 0–0.2)
BASOPHILS NFR BLD AUTO: 1 %
BILIRUB SERPL-MCNC: 1.2 MG/DL
BUN SERPL-MCNC: 9.2 MG/DL (ref 6–20)
CALCIUM SERPL-MCNC: 9 MG/DL (ref 8.8–10.4)
CHLORIDE SERPL-SCNC: 96 MMOL/L (ref 98–107)
CREAT SERPL-MCNC: 0.89 MG/DL (ref 0.67–1.17)
DIASTOLIC BLOOD PRESSURE - MUSE: NORMAL MMHG
EGFRCR SERPLBLD CKD-EPI 2021: >90 ML/MIN/1.73M2
EOSINOPHIL # BLD AUTO: 0 10E3/UL (ref 0–0.7)
EOSINOPHIL NFR BLD AUTO: 1 %
ERYTHROCYTE [DISTWIDTH] IN BLOOD BY AUTOMATED COUNT: 18.9 % (ref 10–15)
ETHANOL SERPL-MCNC: 0.26 G/DL
GLUCOSE BLDC GLUCOMTR-MCNC: 89 MG/DL (ref 70–99)
GLUCOSE SERPL-MCNC: 93 MG/DL (ref 70–99)
HCO3 SERPL-SCNC: 20 MMOL/L (ref 22–29)
HCT VFR BLD AUTO: 41.2 % (ref 40–53)
HGB BLD-MCNC: 14.5 G/DL (ref 13.3–17.7)
IMM GRANULOCYTES # BLD: 0 10E3/UL
IMM GRANULOCYTES NFR BLD: 0 %
INTERPRETATION ECG - MUSE: NORMAL
LIPASE SERPL-CCNC: 114 U/L (ref 13–60)
LYMPHOCYTES # BLD AUTO: 1 10E3/UL (ref 0.8–5.3)
LYMPHOCYTES NFR BLD AUTO: 24 %
MAGNESIUM SERPL-MCNC: 1.9 MG/DL (ref 1.7–2.3)
MCH RBC QN AUTO: 29.3 PG (ref 26.5–33)
MCHC RBC AUTO-ENTMCNC: 35.2 G/DL (ref 31.5–36.5)
MCV RBC AUTO: 83 FL (ref 78–100)
MONOCYTES # BLD AUTO: 0.5 10E3/UL (ref 0–1.3)
MONOCYTES NFR BLD AUTO: 11 %
NEUTROPHILS # BLD AUTO: 2.8 10E3/UL (ref 1.6–8.3)
NEUTROPHILS NFR BLD AUTO: 64 %
NRBC # BLD AUTO: 0 10E3/UL
NRBC BLD AUTO-RTO: 0 /100
P AXIS - MUSE: 51 DEGREES
PLATELET # BLD AUTO: 148 10E3/UL (ref 150–450)
POTASSIUM SERPL-SCNC: 4.1 MMOL/L (ref 3.4–5.3)
PR INTERVAL - MUSE: 154 MS
PROT SERPL-MCNC: 7.7 G/DL (ref 6.4–8.3)
QRS DURATION - MUSE: 76 MS
QT - MUSE: 404 MS
QTC - MUSE: 472 MS
R AXIS - MUSE: 41 DEGREES
RBC # BLD AUTO: 4.95 10E6/UL (ref 4.4–5.9)
SODIUM SERPL-SCNC: 137 MMOL/L (ref 135–145)
SYSTOLIC BLOOD PRESSURE - MUSE: NORMAL MMHG
T AXIS - MUSE: 75 DEGREES
TROPONIN T SERPL HS-MCNC: 6 NG/L
VENTRICULAR RATE- MUSE: 82 BPM
WBC # BLD AUTO: 4.4 10E3/UL (ref 4–11)

## 2025-07-01 PROCEDURE — 83735 ASSAY OF MAGNESIUM: CPT | Performed by: EMERGENCY MEDICINE

## 2025-07-01 PROCEDURE — 36415 COLL VENOUS BLD VENIPUNCTURE: CPT | Performed by: EMERGENCY MEDICINE

## 2025-07-01 PROCEDURE — 250N000013 HC RX MED GY IP 250 OP 250 PS 637: Performed by: EMERGENCY MEDICINE

## 2025-07-01 PROCEDURE — 258N000003 HC RX IP 258 OP 636: Performed by: EMERGENCY MEDICINE

## 2025-07-01 PROCEDURE — 82077 ASSAY SPEC XCP UR&BREATH IA: CPT | Performed by: EMERGENCY MEDICINE

## 2025-07-01 PROCEDURE — 84484 ASSAY OF TROPONIN QUANT: CPT | Performed by: EMERGENCY MEDICINE

## 2025-07-01 PROCEDURE — 82947 ASSAY GLUCOSE BLOOD QUANT: CPT | Performed by: EMERGENCY MEDICINE

## 2025-07-01 PROCEDURE — 96374 THER/PROPH/DIAG INJ IV PUSH: CPT | Mod: 59

## 2025-07-01 PROCEDURE — 99285 EMERGENCY DEPT VISIT HI MDM: CPT | Mod: 25

## 2025-07-01 PROCEDURE — 85004 AUTOMATED DIFF WBC COUNT: CPT | Performed by: EMERGENCY MEDICINE

## 2025-07-01 PROCEDURE — 82962 GLUCOSE BLOOD TEST: CPT

## 2025-07-01 PROCEDURE — 250N000011 HC RX IP 250 OP 636: Performed by: EMERGENCY MEDICINE

## 2025-07-01 PROCEDURE — 96361 HYDRATE IV INFUSION ADD-ON: CPT

## 2025-07-01 PROCEDURE — 99207 PR NO BILLABLE SERVICE THIS VISIT: CPT | Performed by: NURSE PRACTITIONER

## 2025-07-01 PROCEDURE — 70498 CT ANGIOGRAPHY NECK: CPT

## 2025-07-01 PROCEDURE — 70450 CT HEAD/BRAIN W/O DYE: CPT | Mod: XU

## 2025-07-01 PROCEDURE — 93005 ELECTROCARDIOGRAM TRACING: CPT

## 2025-07-01 PROCEDURE — 250N000009 HC RX 250: Performed by: EMERGENCY MEDICINE

## 2025-07-01 PROCEDURE — 83690 ASSAY OF LIPASE: CPT | Performed by: EMERGENCY MEDICINE

## 2025-07-01 RX ORDER — SCOPOLAMINE 1 MG/3D
1 PATCH, EXTENDED RELEASE TRANSDERMAL
Status: DISCONTINUED | OUTPATIENT
Start: 2025-07-01 | End: 2025-07-01 | Stop reason: HOSPADM

## 2025-07-01 RX ORDER — MAGNESIUM HYDROXIDE/ALUMINUM HYDROXICE/SIMETHICONE 120; 1200; 1200 MG/30ML; MG/30ML; MG/30ML
15 SUSPENSION ORAL ONCE
Status: COMPLETED | OUTPATIENT
Start: 2025-07-01 | End: 2025-07-01

## 2025-07-01 RX ORDER — IOPAMIDOL 755 MG/ML
67 INJECTION, SOLUTION INTRAVASCULAR ONCE
Status: COMPLETED | OUTPATIENT
Start: 2025-07-01 | End: 2025-07-01

## 2025-07-01 RX ORDER — MECLIZINE HYDROCHLORIDE 25 MG/1
25 TABLET ORAL ONCE
Status: COMPLETED | OUTPATIENT
Start: 2025-07-01 | End: 2025-07-01

## 2025-07-01 RX ORDER — DIAZEPAM 10 MG/2ML
5 INJECTION, SOLUTION INTRAMUSCULAR; INTRAVENOUS ONCE
Status: COMPLETED | OUTPATIENT
Start: 2025-07-01 | End: 2025-07-01

## 2025-07-01 RX ADMIN — SCOPOLAMINE 1 PATCH: 1.5 PATCH, EXTENDED RELEASE TRANSDERMAL at 10:21

## 2025-07-01 RX ADMIN — DIAZEPAM 5 MG: 5 INJECTION INTRAMUSCULAR; INTRAVENOUS at 10:21

## 2025-07-01 RX ADMIN — SODIUM CHLORIDE 1000 ML: 0.9 INJECTION, SOLUTION INTRAVENOUS at 10:10

## 2025-07-01 RX ADMIN — ALUMINUM HYDROXIDE, MAGNESIUM HYDROXIDE, AND DIMETHICONE 15 ML: 200; 20; 200 SUSPENSION ORAL at 12:55

## 2025-07-01 RX ADMIN — IOPAMIDOL 67 ML: 755 INJECTION, SOLUTION INTRAVENOUS at 10:28

## 2025-07-01 RX ADMIN — SODIUM CHLORIDE 90 ML: 9 INJECTION, SOLUTION INTRAVENOUS at 10:28

## 2025-07-01 RX ADMIN — MECLIZINE HYDROCHLORIDE 25 MG: 25 TABLET ORAL at 10:21

## 2025-07-01 ASSESSMENT — ACTIVITIES OF DAILY LIVING (ADL)
ADLS_ACUITY_SCORE: 56

## 2025-07-01 NOTE — Clinical Note
Joe Shah was seen and treated in our emergency department on 7/1/2025.  He may return to work on 07/02/2025.  Patient was given benzodiazepine as part of medical care on July 1, 2025.     If you have any questions or concerns, please don't hesitate to call.      Nikolay Tee MD

## 2025-07-01 NOTE — CONSULTS
"Addendum:  CT head reviewed and no acute intracranial pathology. CTA head and neck reviewed, no LVO or critical stenosis.   Ethanol level was 0.26. ED reports that Joe's symptoms likely due to alcohol use and lower suspicion for cerebrovascular etiologies at this time.    No further stroke work-up needed from our standpoint. Stroke team will sign off.     YUVAL Sharp CNP  Vascular Neurology      [facility]    Stroke Telephone Note    I was called by Dr. Nikolay Tee on 07/01/25 regarding patient Joe Shah. The patient is a 50 year old male with history of alcohol abuse, traumatic subdural hematoma, HTN, HLD, depressive disorder presenting to ED today with 2 days of dizziness/vertigo, disequilibrium, and unsteady gait. Ed reports no other focal neurological deficits.     Per chart review, Joe normally has 1 pint of vodka daily but reports that he has not had any in the last 24 hours.    Vitals  BP: (!) 137/102   Pulse: 81   Resp: 18   Temp: 97  F (36.1  C)   Weight: 81.6 kg (180 lb)    Imaging Findings  CT head: pending  CTA head/neck: pending    Impression  Dizziness, vertigo, disequilibrium, and unsteady gait. Differentials to consider include possible posterior circulation stroke vs alcohol use vs toxic metabolic infectious vs others    Recommendations  CT head pending  CTA head/neck pending  Ethanol level pending  Toxic metabolic work-up    Case discussed with vascular neurology attending Dr. Dawson.    My recommendations are based on the information provided over the phone by Joe Shah's in-person providers. They are not intended to replace the clinical judgment of his in-person providers. I was not requested to personally see or examine the patient at this time.     YUVAL Sharp CNP  Vascular Neurology    To page me or covering stroke neurology team member, click here: [facility]  Choose \"On Call\" tab at top, then select \"NEUROLOGY/ALL SITES\" from middle " "drop-down box, press Enter, then look for \"stroke\" or \"telestroke\" for your site.   "

## 2025-07-01 NOTE — ED PROVIDER NOTES
"  Emergency Department Note      History of Present Illness     Chief Complaint:  Dizziness    HPI   Joe Shah is a 50 year old male with a history of alcoholism as well as prior subdural hematoma for which she had a bur hole several years ago who presents emergency department for evaluation of 2 to 3 days of dizziness, he states that he has mostly been lying in bed.  He states that when he gets up quickly, he feels lightheaded.  He has had some chicken noodle soup and other food and drink in the last 24 hours, normally has 1 pint of vodka daily but has not had any in the last few days.  He does not think he is going through alcohol withdrawal.  He states that he feels his \"equilibrium\" is off, that he is having some spinning sensation as well.  He is having a hard time walking because of the dizziness, this caused him to fall recently but he denies hitting his head.  No diarrhea or urinary symptoms.  He states he has had some nausea and has made himself gag to throw up, denies any new abdominal pain.  He works driving a semitruck.  He would like to feel better.    Review of External Notes: I personally reviewed prior records including CT of the head from March 2024 showing a prior bur hole but no acute findings at that time.    Past Medical History     Medical History and Problem List   Past Medical History:   Diagnosis Date    Alcohol withdrawal (H)     Anxiety     Back pain     Depressive disorder     Hyperlipidemia     Hypertension     PUD (peptic ulcer disease)     SDH (subdural hematoma) (H) 12/2023    Severe alcohol use disorder (H)        Medications   acetaminophen (TYLENOL) 500 MG tablet  gabapentin (NEURONTIN) 100 MG capsule  gabapentin (NEURONTIN) 300 MG capsule  LORazepam (ATIVAN) 1 MG tablet  metoprolol succinate ER (TOPROL XL) 50 MG 24 hr tablet  Multiple Vitamins-Minerals (MULTIVITAMIN ADULT) CHEW  naltrexone (DEPADE/REVIA) 50 MG tablet  ondansetron (ZOFRAN ODT) 4 MG ODT tab  pantoprazole " "(PROTONIX) 40 MG EC tablet  venlafaxine (EFFEXOR XR) 37.5 MG 24 hr capsule      Surgical History   Past Surgical History:   Procedure Laterality Date    FANTASMA HOLE(S) EVACUATE HEMATOMA SUBDURAL N/A 12/1/2023    Procedure: CREATION, CRANIAL FANTASMA HOLE, WITH SUBDURAL HEMATOMA EVACUATION;  Surgeon: Gio Phoenix MD;  Location:  OR    ENT SURGERY      ESOPHAGOSCOPY, GASTROSCOPY, DUODENOSCOPY (EGD), COMBINED N/A 9/12/2019    Procedure: ESOPHAGOGASTRODUODENOSCOPY (EGD);  Surgeon: Scott Mcrae MD;  Location:  GI    ESOPHAGOSCOPY, GASTROSCOPY, DUODENOSCOPY (EGD), COMBINED N/A 5/27/2023    Procedure: Esophagoscopy, gastroscopy, duodenoscopy (EGD), combined;  Surgeon: Scott Mcrae MD;  Location:  GI    ESOPHAGOSCOPY, GASTROSCOPY, DUODENOSCOPY (EGD), COMBINED N/A 2/9/2024    Procedure: Esophagoscopy, gastroscopy, duodenoscopy (EGD), combined;  Surgeon: Nakul Arguelles MD;  Location:  GI    ESOPHAGOSCOPY, GASTROSCOPY, DUODENOSCOPY (EGD), COMBINED N/A 4/1/2025    Procedure: Esophagoscopy, gastroscopy, duodenoscopy (EGD), combined;  Surgeon: Jeet Casillas MD;  Location:  GI    NECK SURGERY       Physical Exam     Patient Vitals for the past 24 hrs:   BP Temp Temp src Pulse Resp SpO2 Height Weight   07/01/25 1230 (!) 130/104 -- -- 89 18 -- -- --   07/01/25 1200 (!) 147/112 -- -- 85 10 -- -- --   07/01/25 1130 (!) 136/103 -- -- 73 14 -- -- --   07/01/25 1100 (!) 136/98 -- -- 80 12 96 % -- --   07/01/25 1045 112/85 -- -- 75 25 96 % -- --   07/01/25 1015 (!) 137/102 -- -- 81 18 97 % -- --   07/01/25 0936 -- -- -- -- -- -- 1.803 m (5' 11\") 81.6 kg (180 lb)   07/01/25 0935 (!) 151/115 97  F (36.1  C) Temporal 88 16 97 % -- --     Physical Exam  General: Male recumbent in the gurney in room 22  HENT: mucous membranes moist, TMs clear,  left skull with nontender postsurgical deformity  Eyes: PERRL, EOMI, no nystagmus  CV: rate as above, regular rhythm, no murmur audible, no LE edema, normal radial " pulses  Resp: normal effort, speaks in full phrases, no cough observed  GI: abdomen soft and nontender, no guarding  MSK: no bony tenderness, mastoids nontender  Skin: appropriately warm and dry, no facial rash  Neuro: awake, alert, clear speech, fully oriented, face symmetric,  normal, finger-nose normal bilaterally, strength and sensation intact in all extr, no nuchal rigidity, able to bear weight but holds the wall while he stands  Psych: Slightly anxious, cooperative    Diagnostics     ECG results from 07/01/25   EKG 12-lead, tracing only     Value    Systolic Blood Pressure     Diastolic Blood Pressure     Ventricular Rate 82    Atrial Rate 82    MD Interval 154    QRS Duration 76        QTc 472    P Axis 51    R AXIS 41    T Axis 75    Interpretation ECG      Sinus rhythm     Lab Results   Labs Ordered and Resulted from Time of ED Arrival to Time of ED Departure   COMPREHENSIVE METABOLIC PANEL - Abnormal       Result Value    Sodium 137      Potassium 4.1      Carbon Dioxide (CO2) 20 (*)     Anion Gap 21 (*)     Urea Nitrogen 9.2      Creatinine 0.89      GFR Estimate >90      Calcium 9.0      Chloride 96 (*)     Glucose 93      Alkaline Phosphatase 86      AST 95 (*)     ALT 41      Protein Total 7.7      Albumin 4.5      Bilirubin Total 1.2     ETHANOL LEVEL BLOOD - Abnormal    Ethanol Level Blood 0.26 (*)    LIPASE - Abnormal    Lipase 114 (*)    CBC WITH PLATELETS AND DIFFERENTIAL - Abnormal    WBC Count 4.4      RBC Count 4.95      Hemoglobin 14.5      Hematocrit 41.2      MCV 83      MCH 29.3      MCHC 35.2      RDW 18.9 (*)     Platelet Count 148 (*)     % Neutrophils 64      % Lymphocytes 24      % Monocytes 11      % Eosinophils 1      % Basophils 1      % Immature Granulocytes 0      NRBCs per 100 WBC 0      Absolute Neutrophils 2.8      Absolute Lymphocytes 1.0      Absolute Monocytes 0.5      Absolute Eosinophils 0.0      Absolute Basophils 0.0      Absolute Immature Granulocytes 0.0       Absolute NRBCs 0.0     GLUCOSE BY METER - Normal    GLUCOSE BY METER POCT 89     MAGNESIUM - Normal    Magnesium 1.9     TROPONIN T, HIGH SENSITIVITY - Normal    Troponin T, High Sensitivity 6       Imaging   CTA Head Neck w Contrast   Final Result   IMPRESSION:    HEAD CTA:   1.  No high-grade proximal arterial stenosis/occlusion of the major intracranial arteries.   2.  No intracranial aneurysm or high flow vascular malformation.      NECK CTA:   1.  No hemodynamically significant stenosis in the neck vessels.   2.  No evidence for dissection.         CT Head w/o Contrast   Final Result   IMPRESSION:   1.  No CT findings of acute intracranial process.   2.  Chronic findings, as described.           Independent Interpretation:   I personally reviewed CT head images, I see no large ICH.    ED Course      Medications Administered   Medications   scopolamine (TRANSDERM) 72 hr patch 1 patch (1 patch Transdermal $Patch/Med Applied 7/1/25 1021)     And   scopolamine (TRANSDERM-SCOP) Patch in Place ( Transdermal Patch in Place 7/1/25 1023)   sodium chloride 0.9% BOLUS 1,000 mL (0 mLs Intravenous Stopped 7/1/25 1157)   diazepam (VALIUM) injection 5 mg (5 mg Intravenous $Given 7/1/25 1021)   meclizine (ANTIVERT) tablet 25 mg (25 mg Oral $Given 7/1/25 1021)   iopamidol (ISOVUE-370) solution 67 mL (67 mLs Intravenous $Given 7/1/25 1028)   sodium chloride 0.9 % bag for CT scan flush (90 mLs Intravenous $Given 7/1/25 1028)   alum & mag hydroxide-simethicone (MAALOX) suspension 15 mL (15 mLs Oral $Given 7/1/25 1255)       ED Course and Discussion of Management   ED Course as of 07/01/25 1512   Tue Jul 01, 2025   1038 I spoke with Stroke Neurology team, JUSTEN Bates, she will discuss with team and let me know recommendations.   1101 JUSTEN Bates agreed with plan for CT/CTA, may eventually need MRI.   1156 I rechecked patient, discussed test results.   1255 I rechecked patient, spoke with RN       Additional  Documentation  None    MIPS   None             Medical Decision Making / Diagnosis   Medical Decision Making:  Patient presents with some dizziness, he describes this as both lightheadedness with some component of orthostasis as well as difficulty on his feet, he did hold onto the wall initially though no clear evidence of ataxia was noted.  He initially stated he had not had any alcohol in several days though his laboratory results ultimately available prove this to be inconsistent.  Ultimately I think that his presenting symptoms are due to heavy alcohol use.  No signs of withdrawal at this time.  CT and CT angiography were performed out of concern for the possibility of acute neurovascular process, though in light of his lab results, as well as clinical improvement when rechecked later, including ability to ambulate steadily, I do not think that MRI is indicated.  I did consult with the ED stroke neurology team who did not feel that MRI was indicated either now in light of his benign clinical course, lab results, and imaging results.  I counseled the patient to decrease his alcohol use, he declined my offer of resources regarding treatment programs, though I did provide a work note at his request to document that he was given benzodiazepines here in the emergency department.  I strictly cautioned him not to drink alcohol whatsoever anytime close to working, he is a .  Minimal elevation of liver enzyme and lipase I do not think represents acute hepatobiliary disease or pancreatitis given that he does not have abdominal tenderness or vomiting at this time.  Close follow-up through primary clinic as advised, would also benefit from GI consultation as an outpatient, return here in the unexpected event of acute worsening at any hour.      Disposition   Discharged    Diagnosis     ICD-10-CM    1. Dizziness  R42       2. Alcohol use disorder  F10.90       3. Elevated liver enzymes  R74.8     likely due to alcohol  use      4. Elevated lipase  R74.8     very mild           Discharge Medications   Discharge Medication List as of 7/1/2025 12:56 PM          7/1/2025   MD Randal Bee, Nikolay Lopez MD  07/01/25 1515

## 2025-07-01 NOTE — ED TRIAGE NOTES
Pt has had dizziness that started on Friday   Pt has had vertigo feeling since that has not gone away

## (undated) DEVICE — TOOL DISSECT MIDAS MR8 9CM BALL 6MM DIA MR8-9BA60

## (undated) DEVICE — PERFORATOR 14MM CODMAN

## (undated) DEVICE — SURGICEL HEMOSTAT 3X4" NUKNIT 1943

## (undated) DEVICE — BATTERY PACK FOR POWERED DRIVER 05.000.007.01S

## (undated) DEVICE — SYR 10ML SLIP TIP W/O NDL

## (undated) DEVICE — SPONGE COTTONOID 1X3" 80-1408

## (undated) DEVICE — SU ETHILON 3-0 FS-1 18" 669H

## (undated) DEVICE — RX SURGIFLO HEMOSTATIC MATRIX 10ML 199102S

## (undated) DEVICE — SU VICRYL 2-0 CT-2 CR 8X18" J726D

## (undated) DEVICE — DECANTER BAG 2002S

## (undated) DEVICE — GLOVE BIOGEL PI MICRO INDICATOR UNDERGLOVE SZ 8.5 48985

## (undated) DEVICE — SU NUROLON 4-0 TF CR 8X18" C584D

## (undated) DEVICE — DRAPE POUCH INSTRUMENT 1018

## (undated) DEVICE — DRSG TELFA 3X8" 1238

## (undated) DEVICE — SPONGE SURGIFOAM 100 1974

## (undated) DEVICE — MANIFOLD NEPTUNE 4 PORT 700-20

## (undated) DEVICE — LINEN TOWEL PACK X5 5464

## (undated) DEVICE — PREP SKIN SCRUB TRAY 4461A

## (undated) DEVICE — RETR ELASTIC STAYS LONE STAR BLUNT DUAL LEAD 3550-1G

## (undated) DEVICE — GUN RANEY W/30 CLIPS CG8900

## (undated) DEVICE — ESU ELEC BLADE 2.75" COATED/INSULATED E1455

## (undated) DEVICE — DRSG ADAPTIC 3X8" 6113

## (undated) DEVICE — PACK UNIVERSAL SPLIT 29131

## (undated) DEVICE — DRAPE VARI-LENS II MICROSCOPE 52X150" 6120VL2

## (undated) DEVICE — NDL BLUNT 17GA 1.5" 8881202330

## (undated) DEVICE — NDL 19GA 1.5"

## (undated) DEVICE — GOWN XXLG REINFORCED 9071EL

## (undated) DEVICE — SPONGE COTTONOID 1/2X3" 80-1407

## (undated) DEVICE — DRSG KERLIX 4 1/2"X4YDS ROLL 6715

## (undated) DEVICE — DRSG KERLIX FLUFFS X5

## (undated) DEVICE — DRAPE STERI TOWEL SM 1000

## (undated) DEVICE — SPONGE COTTON BALL 1/4" W/STRING 30-00

## (undated) DEVICE — ESU CORD BIPOLAR 12' E0512

## (undated) DEVICE — DRAPE MICROSCOPE LEICA 54X150" AR8033650

## (undated) DEVICE — GLOVE BIOGEL PI MICRO SZ 7.5 48575

## (undated) DEVICE — SOL NACL 0.9% IRRIG 1000ML BOTTLE 2F7124

## (undated) DEVICE — STPL SKIN 35W ROTATING HEAD PRW35

## (undated) DEVICE — ESU GROUND PAD UNIVERSAL W/O CORD

## (undated) DEVICE — SOL WATER IRRIG 1000ML BOTTLE 2F7114

## (undated) DEVICE — DRAPE U SPLIT 74X120" 29440

## (undated) DEVICE — PACK NEURO SNE15SNFSA

## (undated) RX ORDER — LABETALOL HYDROCHLORIDE 5 MG/ML
INJECTION, SOLUTION INTRAVENOUS
Status: DISPENSED
Start: 2023-12-01

## (undated) RX ORDER — BUPIVACAINE HYDROCHLORIDE 5 MG/ML
INJECTION, SOLUTION EPIDURAL; INTRACAUDAL
Status: DISPENSED
Start: 2023-12-01

## (undated) RX ORDER — ONDANSETRON 2 MG/ML
INJECTION INTRAMUSCULAR; INTRAVENOUS
Status: DISPENSED
Start: 2023-12-01

## (undated) RX ORDER — PROPOFOL 10 MG/ML
INJECTION, EMULSION INTRAVENOUS
Status: DISPENSED
Start: 2023-12-01

## (undated) RX ORDER — FENTANYL CITRATE 50 UG/ML
INJECTION, SOLUTION INTRAMUSCULAR; INTRAVENOUS
Status: DISPENSED
Start: 2023-12-01

## (undated) RX ORDER — FENTANYL CITRATE 50 UG/ML
INJECTION, SOLUTION INTRAMUSCULAR; INTRAVENOUS
Status: DISPENSED
Start: 2023-05-27

## (undated) RX ORDER — FENTANYL CITRATE 50 UG/ML
INJECTION, SOLUTION INTRAMUSCULAR; INTRAVENOUS
Status: DISPENSED
Start: 2019-09-12

## (undated) RX ORDER — FENTANYL CITRATE 50 UG/ML
INJECTION, SOLUTION INTRAMUSCULAR; INTRAVENOUS
Status: DISPENSED
Start: 2024-02-09

## (undated) RX ORDER — DEXAMETHASONE SODIUM PHOSPHATE 4 MG/ML
INJECTION, SOLUTION INTRA-ARTICULAR; INTRALESIONAL; INTRAMUSCULAR; INTRAVENOUS; SOFT TISSUE
Status: DISPENSED
Start: 2023-12-01

## (undated) RX ORDER — CEFAZOLIN SODIUM 1 G/3ML
INJECTION, POWDER, FOR SOLUTION INTRAMUSCULAR; INTRAVENOUS
Status: DISPENSED
Start: 2023-12-01

## (undated) RX ORDER — GINSENG 100 MG
CAPSULE ORAL
Status: DISPENSED
Start: 2023-12-01

## (undated) RX ORDER — FENTANYL CITRATE 50 UG/ML
INJECTION, SOLUTION INTRAMUSCULAR; INTRAVENOUS
Status: DISPENSED
Start: 2025-04-01